# Patient Record
Sex: FEMALE | Race: BLACK OR AFRICAN AMERICAN | NOT HISPANIC OR LATINO | Employment: UNEMPLOYED | URBAN - METROPOLITAN AREA
[De-identification: names, ages, dates, MRNs, and addresses within clinical notes are randomized per-mention and may not be internally consistent; named-entity substitution may affect disease eponyms.]

---

## 2017-05-15 PROCEDURE — 99284 EMERGENCY DEPT VISIT MOD MDM: CPT

## 2017-05-16 ENCOUNTER — APPOINTMENT (OUTPATIENT)
Dept: GENERAL RADIOLOGY | Facility: HOSPITAL | Age: 62
End: 2017-05-16

## 2017-05-16 ENCOUNTER — HOSPITAL ENCOUNTER (INPATIENT)
Facility: HOSPITAL | Age: 62
LOS: 3 days | Discharge: NURSING, REHAB OR LONG-TERM CARE | End: 2017-05-19
Attending: EMERGENCY MEDICINE | Admitting: INTERNAL MEDICINE

## 2017-05-16 DIAGNOSIS — N39.0 UTI (URINARY TRACT INFECTION), BACTERIAL: ICD-10-CM

## 2017-05-16 DIAGNOSIS — R53.1 GENERALIZED WEAKNESS: Primary | ICD-10-CM

## 2017-05-16 DIAGNOSIS — R77.8 ELEVATED TROPONIN: ICD-10-CM

## 2017-05-16 DIAGNOSIS — A49.9 UTI (URINARY TRACT INFECTION), BACTERIAL: ICD-10-CM

## 2017-05-16 PROBLEM — I25.10 CORONARY ARTERY DISEASE: Chronic | Status: ACTIVE | Noted: 2017-05-16

## 2017-05-16 PROBLEM — J96.11 CHRONIC RESPIRATORY FAILURE WITH HYPOXIA AND HYPERCAPNIA (HCC): Chronic | Status: ACTIVE | Noted: 2017-05-16

## 2017-05-16 PROBLEM — J96.12 CHRONIC RESPIRATORY FAILURE WITH HYPOXIA AND HYPERCAPNIA (HCC): Chronic | Status: ACTIVE | Noted: 2017-05-16

## 2017-05-16 PROBLEM — G92.8 TOXIC METABOLIC ENCEPHALOPATHY: Status: ACTIVE | Noted: 2017-05-16

## 2017-05-16 PROBLEM — C54.1 ENDOMETRIAL CANCER DETERMINED BY UTERINE BIOPSY (HCC): Chronic | Status: ACTIVE | Noted: 2017-05-16

## 2017-05-16 PROBLEM — I50.9 CHF (CONGESTIVE HEART FAILURE) (HCC): Chronic | Status: ACTIVE | Noted: 2017-05-16

## 2017-05-16 PROBLEM — E11.9 DIABETES MELLITUS (HCC): Chronic | Status: ACTIVE | Noted: 2017-05-16

## 2017-05-16 PROBLEM — F25.9 SCHIZO AFFECTIVE SCHIZOPHRENIA (HCC): Chronic | Status: ACTIVE | Noted: 2017-05-16

## 2017-05-16 LAB
ALBUMIN SERPL-MCNC: 3.5 G/DL (ref 3.5–5.2)
ALBUMIN/GLOB SERPL: 0.8 G/DL
ALP SERPL-CCNC: 79 U/L (ref 39–117)
ALT SERPL W P-5'-P-CCNC: 10 U/L (ref 1–33)
ANION GAP SERPL CALCULATED.3IONS-SCNC: 11.1 MMOL/L
APTT PPP: 28.6 SECONDS (ref 22.7–35.4)
AST SERPL-CCNC: 12 U/L (ref 1–32)
BACTERIA UR QL AUTO: ABNORMAL /HPF
BASOPHILS # BLD AUTO: 0.02 10*3/MM3 (ref 0–0.2)
BASOPHILS NFR BLD AUTO: 0.3 % (ref 0–1.5)
BILIRUB SERPL-MCNC: 0.5 MG/DL (ref 0.1–1.2)
BILIRUB UR QL STRIP: NEGATIVE
BUN BLD-MCNC: 14 MG/DL (ref 8–23)
BUN/CREAT SERPL: 19.4 (ref 7–25)
CALCIUM SPEC-SCNC: 10.1 MG/DL (ref 8.6–10.5)
CHLORIDE SERPL-SCNC: 104 MMOL/L (ref 98–107)
CLARITY UR: ABNORMAL
CO2 SERPL-SCNC: 31.9 MMOL/L (ref 22–29)
COLOR UR: YELLOW
CREAT BLD-MCNC: 0.72 MG/DL (ref 0.57–1)
DEPRECATED RDW RBC AUTO: 51.6 FL (ref 37–54)
EOSINOPHIL # BLD AUTO: 0.16 10*3/MM3 (ref 0–0.7)
EOSINOPHIL NFR BLD AUTO: 2.3 % (ref 0.3–6.2)
ERYTHROCYTE [DISTWIDTH] IN BLOOD BY AUTOMATED COUNT: 17.2 % (ref 11.7–13)
GFR SERPL CREATININE-BSD FRML MDRD: 99 ML/MIN/1.73
GLOBULIN UR ELPH-MCNC: 4.2 GM/DL
GLUCOSE BLD-MCNC: 85 MG/DL (ref 65–99)
GLUCOSE BLDC GLUCOMTR-MCNC: 103 MG/DL (ref 70–130)
GLUCOSE BLDC GLUCOMTR-MCNC: 118 MG/DL (ref 70–130)
GLUCOSE BLDC GLUCOMTR-MCNC: 67 MG/DL (ref 70–130)
GLUCOSE BLDC GLUCOMTR-MCNC: 88 MG/DL (ref 70–130)
GLUCOSE BLDC GLUCOMTR-MCNC: 89 MG/DL (ref 70–130)
GLUCOSE UR STRIP-MCNC: NEGATIVE MG/DL
HCT VFR BLD AUTO: 41.4 % (ref 35.6–45.5)
HGB BLD-MCNC: 13.2 G/DL (ref 11.9–15.5)
HGB UR QL STRIP.AUTO: ABNORMAL
HYALINE CASTS UR QL AUTO: ABNORMAL /LPF
IMM GRANULOCYTES # BLD: 0 10*3/MM3 (ref 0–0.03)
IMM GRANULOCYTES NFR BLD: 0 % (ref 0–0.5)
INR PPP: 1.13 (ref 0.9–1.1)
KETONES UR QL STRIP: NEGATIVE
LEUKOCYTE ESTERASE UR QL STRIP.AUTO: ABNORMAL
LYMPHOCYTES # BLD AUTO: 1.61 10*3/MM3 (ref 0.9–4.8)
LYMPHOCYTES NFR BLD AUTO: 22.9 % (ref 19.6–45.3)
MAGNESIUM SERPL-MCNC: 2.1 MG/DL (ref 1.6–2.4)
MCH RBC QN AUTO: 26.2 PG (ref 26.9–32)
MCHC RBC AUTO-ENTMCNC: 31.9 G/DL (ref 32.4–36.3)
MCV RBC AUTO: 82.1 FL (ref 80.5–98.2)
MONOCYTES # BLD AUTO: 0.64 10*3/MM3 (ref 0.2–1.2)
MONOCYTES NFR BLD AUTO: 9.1 % (ref 5–12)
NEUTROPHILS # BLD AUTO: 4.6 10*3/MM3 (ref 1.9–8.1)
NEUTROPHILS NFR BLD AUTO: 65.4 % (ref 42.7–76)
NITRITE UR QL STRIP: NEGATIVE
NT-PROBNP SERPL-MCNC: 47 PG/ML (ref 5–900)
NT-PROBNP SERPL-MCNC: 57.4 PG/ML (ref 5–900)
PH UR STRIP.AUTO: 5.5 [PH] (ref 5–8)
PLATELET # BLD AUTO: 293 10*3/MM3 (ref 140–500)
PMV BLD AUTO: 10.6 FL (ref 6–12)
POTASSIUM BLD-SCNC: 3.7 MMOL/L (ref 3.5–5.2)
PROT SERPL-MCNC: 7.7 G/DL (ref 6–8.5)
PROT UR QL STRIP: ABNORMAL
PROTHROMBIN TIME: 14 SECONDS (ref 11.7–14.2)
RBC # BLD AUTO: 5.04 10*6/MM3 (ref 3.9–5.2)
RBC # UR: ABNORMAL /HPF
REF LAB TEST METHOD: ABNORMAL
SODIUM BLD-SCNC: 147 MMOL/L (ref 136–145)
SP GR UR STRIP: 1.01 (ref 1–1.03)
SQUAMOUS #/AREA URNS HPF: ABNORMAL /HPF
T4 FREE SERPL-MCNC: 1.24 NG/DL (ref 0.93–1.7)
TROPONIN T SERPL-MCNC: 0.08 NG/ML (ref 0–0.03)
TSH SERPL DL<=0.05 MIU/L-ACNC: 2.65 MIU/ML (ref 0.27–4.2)
UROBILINOGEN UR QL STRIP: ABNORMAL
VALPROATE SERPL-MCNC: 25 MCG/ML (ref 50–125)
WBC NRBC COR # BLD: 7.03 10*3/MM3 (ref 4.5–10.7)
WBC UR QL AUTO: ABNORMAL /HPF

## 2017-05-16 PROCEDURE — 93005 ELECTROCARDIOGRAM TRACING: CPT | Performed by: EMERGENCY MEDICINE

## 2017-05-16 PROCEDURE — 93005 ELECTROCARDIOGRAM TRACING: CPT | Performed by: INTERNAL MEDICINE

## 2017-05-16 PROCEDURE — 84484 ASSAY OF TROPONIN QUANT: CPT | Performed by: EMERGENCY MEDICINE

## 2017-05-16 PROCEDURE — 83880 ASSAY OF NATRIURETIC PEPTIDE: CPT | Performed by: NURSE PRACTITIONER

## 2017-05-16 PROCEDURE — 87186 SC STD MICRODIL/AGAR DIL: CPT | Performed by: EMERGENCY MEDICINE

## 2017-05-16 PROCEDURE — 99222 1ST HOSP IP/OBS MODERATE 55: CPT | Performed by: INTERNAL MEDICINE

## 2017-05-16 PROCEDURE — 84484 ASSAY OF TROPONIN QUANT: CPT | Performed by: NURSE PRACTITIONER

## 2017-05-16 PROCEDURE — 85025 COMPLETE CBC W/AUTO DIFF WBC: CPT | Performed by: EMERGENCY MEDICINE

## 2017-05-16 PROCEDURE — 85730 THROMBOPLASTIN TIME PARTIAL: CPT | Performed by: EMERGENCY MEDICINE

## 2017-05-16 PROCEDURE — 84443 ASSAY THYROID STIM HORMONE: CPT | Performed by: NURSE PRACTITIONER

## 2017-05-16 PROCEDURE — 83735 ASSAY OF MAGNESIUM: CPT | Performed by: NURSE PRACTITIONER

## 2017-05-16 PROCEDURE — 84439 ASSAY OF FREE THYROXINE: CPT | Performed by: NURSE PRACTITIONER

## 2017-05-16 PROCEDURE — 93010 ELECTROCARDIOGRAM REPORT: CPT | Performed by: INTERNAL MEDICINE

## 2017-05-16 PROCEDURE — 25010000002 ENOXAPARIN PER 10 MG: Performed by: NURSE PRACTITIONER

## 2017-05-16 PROCEDURE — 71020 HC CHEST PA AND LATERAL: CPT

## 2017-05-16 PROCEDURE — 80053 COMPREHEN METABOLIC PANEL: CPT | Performed by: EMERGENCY MEDICINE

## 2017-05-16 PROCEDURE — 82962 GLUCOSE BLOOD TEST: CPT

## 2017-05-16 PROCEDURE — 84484 ASSAY OF TROPONIN QUANT: CPT | Performed by: INTERNAL MEDICINE

## 2017-05-16 PROCEDURE — 80164 ASSAY DIPROPYLACETIC ACD TOT: CPT | Performed by: EMERGENCY MEDICINE

## 2017-05-16 PROCEDURE — 25010000003 CEFTRIAXONE PER 250 MG: Performed by: NURSE PRACTITIONER

## 2017-05-16 PROCEDURE — 81001 URINALYSIS AUTO W/SCOPE: CPT | Performed by: EMERGENCY MEDICINE

## 2017-05-16 PROCEDURE — 85610 PROTHROMBIN TIME: CPT | Performed by: EMERGENCY MEDICINE

## 2017-05-16 PROCEDURE — 87086 URINE CULTURE/COLONY COUNT: CPT | Performed by: EMERGENCY MEDICINE

## 2017-05-16 RX ORDER — BUMETANIDE 2 MG/1
2 TABLET ORAL DAILY
COMMUNITY
End: 2018-01-10 | Stop reason: HOSPADM

## 2017-05-16 RX ORDER — ATORVASTATIN CALCIUM 20 MG/1
20 TABLET, FILM COATED ORAL DAILY
COMMUNITY
End: 2018-02-02 | Stop reason: HOSPADM

## 2017-05-16 RX ORDER — ATORVASTATIN CALCIUM 20 MG/1
20 TABLET, FILM COATED ORAL DAILY
Status: DISCONTINUED | OUTPATIENT
Start: 2017-05-16 | End: 2017-05-16

## 2017-05-16 RX ORDER — CEFTRIAXONE SODIUM 1 G/50ML
1 INJECTION, SOLUTION INTRAVENOUS EVERY 24 HOURS
Status: DISCONTINUED | OUTPATIENT
Start: 2017-05-16 | End: 2017-05-19 | Stop reason: HOSPADM

## 2017-05-16 RX ORDER — BUMETANIDE 2 MG/1
2 TABLET ORAL DAILY
Status: DISCONTINUED | OUTPATIENT
Start: 2017-05-16 | End: 2017-05-16

## 2017-05-16 RX ORDER — ROSUVASTATIN CALCIUM 10 MG/1
10 TABLET, COATED ORAL DAILY
Status: DISCONTINUED | OUTPATIENT
Start: 2017-05-16 | End: 2017-05-19 | Stop reason: HOSPADM

## 2017-05-16 RX ORDER — MEGESTROL ACETATE 40 MG/ML
80 SUSPENSION ORAL 2 TIMES DAILY
COMMUNITY
End: 2017-05-19 | Stop reason: HOSPADM

## 2017-05-16 RX ORDER — CLOPIDOGREL BISULFATE 75 MG/1
75 TABLET ORAL DAILY
COMMUNITY
End: 2017-06-23 | Stop reason: HOSPADM

## 2017-05-16 RX ORDER — RISPERIDONE 2 MG/1
2 TABLET ORAL NIGHTLY
Status: DISCONTINUED | OUTPATIENT
Start: 2017-05-16 | End: 2017-05-19 | Stop reason: HOSPADM

## 2017-05-16 RX ORDER — METOPROLOL TARTRATE 100 MG/1
100 TABLET ORAL 2 TIMES DAILY
Status: DISCONTINUED | OUTPATIENT
Start: 2017-05-16 | End: 2017-05-19 | Stop reason: HOSPADM

## 2017-05-16 RX ORDER — PETROLATUM 42 G/100G
OINTMENT TOPICAL
Status: DISCONTINUED | OUTPATIENT
Start: 2017-05-16 | End: 2017-05-19 | Stop reason: HOSPADM

## 2017-05-16 RX ORDER — PANTOPRAZOLE SODIUM 40 MG/1
40 TABLET, DELAYED RELEASE ORAL
Status: DISCONTINUED | OUTPATIENT
Start: 2017-05-16 | End: 2017-05-19 | Stop reason: HOSPADM

## 2017-05-16 RX ORDER — ACETAMINOPHEN 160 MG/5ML
650 SUSPENSION ORAL EVERY 4 HOURS PRN
COMMUNITY
End: 2017-11-23 | Stop reason: HOSPADM

## 2017-05-16 RX ORDER — HYDROCODONE BITARTRATE AND ACETAMINOPHEN 5; 325 MG/1; MG/1
1 TABLET ORAL EVERY 6 HOURS PRN
COMMUNITY
End: 2017-05-19 | Stop reason: HOSPADM

## 2017-05-16 RX ORDER — VALPROIC ACID 250 MG/5ML
250 SOLUTION ORAL EVERY 12 HOURS SCHEDULED
Status: DISCONTINUED | OUTPATIENT
Start: 2017-05-16 | End: 2017-05-19 | Stop reason: HOSPADM

## 2017-05-16 RX ORDER — IPRATROPIUM BROMIDE AND ALBUTEROL SULFATE 2.5; .5 MG/3ML; MG/3ML
3 SOLUTION RESPIRATORY (INHALATION) EVERY 4 HOURS PRN
COMMUNITY

## 2017-05-16 RX ORDER — LORAZEPAM 1 MG/1
1 TABLET ORAL EVERY 8 HOURS PRN
Status: DISCONTINUED | OUTPATIENT
Start: 2017-05-16 | End: 2017-05-16

## 2017-05-16 RX ORDER — CLOPIDOGREL BISULFATE 75 MG/1
75 TABLET ORAL DAILY
Status: DISCONTINUED | OUTPATIENT
Start: 2017-05-16 | End: 2017-05-19 | Stop reason: HOSPADM

## 2017-05-16 RX ORDER — LORAZEPAM 1 MG/1
1 TABLET ORAL EVERY 8 HOURS PRN
Status: ON HOLD | COMMUNITY
End: 2017-06-23

## 2017-05-16 RX ORDER — RISPERIDONE 2 MG/1
2 TABLET ORAL EVERY MORNING
Status: DISCONTINUED | OUTPATIENT
Start: 2017-05-16 | End: 2017-05-19 | Stop reason: HOSPADM

## 2017-05-16 RX ORDER — LORAZEPAM 1 MG/1
1 TABLET ORAL EVERY 8 HOURS PRN
Status: DISCONTINUED | OUTPATIENT
Start: 2017-05-16 | End: 2017-05-19 | Stop reason: HOSPADM

## 2017-05-16 RX ORDER — MEGESTROL ACETATE 40 MG/ML
80 SUSPENSION ORAL 2 TIMES DAILY
Status: DISCONTINUED | OUTPATIENT
Start: 2017-05-16 | End: 2017-05-16

## 2017-05-16 RX ORDER — HYDROCODONE BITARTRATE AND ACETAMINOPHEN 5; 325 MG/1; MG/1
1 TABLET ORAL EVERY 6 HOURS PRN
Status: DISCONTINUED | OUTPATIENT
Start: 2017-05-16 | End: 2017-05-19 | Stop reason: HOSPADM

## 2017-05-16 RX ORDER — SODIUM CHLORIDE 0.9 % (FLUSH) 0.9 %
10 SYRINGE (ML) INJECTION AS NEEDED
Status: DISCONTINUED | OUTPATIENT
Start: 2017-05-16 | End: 2017-05-19 | Stop reason: HOSPADM

## 2017-05-16 RX ORDER — IPRATROPIUM BROMIDE AND ALBUTEROL SULFATE 2.5; .5 MG/3ML; MG/3ML
3 SOLUTION RESPIRATORY (INHALATION)
COMMUNITY
End: 2017-06-23 | Stop reason: HOSPADM

## 2017-05-16 RX ORDER — RISPERIDONE 3 MG/1
3 TABLET ORAL NIGHTLY
Status: DISCONTINUED | OUTPATIENT
Start: 2017-05-16 | End: 2017-05-16

## 2017-05-16 RX ORDER — BUMETANIDE 2 MG/1
2 TABLET ORAL DAILY
Status: DISCONTINUED | OUTPATIENT
Start: 2017-05-16 | End: 2017-05-19 | Stop reason: HOSPADM

## 2017-05-16 RX ORDER — RISPERIDONE 3 MG/1
3 TABLET ORAL NIGHTLY
COMMUNITY

## 2017-05-16 RX ADMIN — VALPROIC ACID 250 MG: 250 SOLUTION ORAL at 11:09

## 2017-05-16 RX ADMIN — ISOSORBIDE MONONITRATE 30 MG: 20 TABLET ORAL at 11:10

## 2017-05-16 RX ADMIN — RISPERIDONE 2 MG: 2 TABLET, FILM COATED ORAL at 11:10

## 2017-05-16 RX ADMIN — VALPROIC ACID 250 MG: 250 SOLUTION ORAL at 20:36

## 2017-05-16 RX ADMIN — PANTOPRAZOLE SODIUM 40 MG: 40 TABLET, DELAYED RELEASE ORAL at 11:10

## 2017-05-16 RX ADMIN — ROSUVASTATIN CALCIUM 10 MG: 10 TABLET, FILM COATED ORAL at 11:10

## 2017-05-16 RX ADMIN — CEFTRIAXONE SODIUM 1 G: 1 INJECTION, SOLUTION INTRAVENOUS at 11:11

## 2017-05-16 RX ADMIN — BUMETANIDE 2 MG: 2 TABLET ORAL at 11:09

## 2017-05-16 RX ADMIN — ENOXAPARIN SODIUM 40 MG: 40 INJECTION SUBCUTANEOUS at 11:16

## 2017-05-16 RX ADMIN — METOPROLOL TARTRATE 100 MG: 100 TABLET ORAL at 11:10

## 2017-05-16 RX ADMIN — MEGESTROL ACETATE 80 MG: 40 SUSPENSION ORAL at 11:09

## 2017-05-16 RX ADMIN — CLOPIDOGREL 75 MG: 75 TABLET, FILM COATED ORAL at 11:10

## 2017-05-16 RX ADMIN — PETROLATUM 1 APPLICATION: 42 OINTMENT TOPICAL at 12:00

## 2017-05-16 RX ADMIN — ENOXAPARIN SODIUM 40 MG: 40 INJECTION SUBCUTANEOUS at 23:21

## 2017-05-16 RX ADMIN — METOPROLOL TARTRATE 100 MG: 100 TABLET ORAL at 18:00

## 2017-05-17 ENCOUNTER — APPOINTMENT (OUTPATIENT)
Dept: CARDIOLOGY | Facility: HOSPITAL | Age: 62
End: 2017-05-17
Attending: INTERNAL MEDICINE

## 2017-05-17 PROBLEM — J18.9 PNEUMONIA: Status: ACTIVE | Noted: 2017-05-17

## 2017-05-17 LAB
ANION GAP SERPL CALCULATED.3IONS-SCNC: 10.1 MMOL/L
BASOPHILS # BLD AUTO: 0.02 10*3/MM3 (ref 0–0.2)
BASOPHILS NFR BLD AUTO: 0.3 % (ref 0–1.5)
BUN BLD-MCNC: 22 MG/DL (ref 8–23)
BUN/CREAT SERPL: 27.8 (ref 7–25)
CALCIUM SPEC-SCNC: 9.1 MG/DL (ref 8.6–10.5)
CHLORIDE SERPL-SCNC: 99 MMOL/L (ref 98–107)
CHOLEST SERPL-MCNC: 92 MG/DL (ref 0–200)
CO2 SERPL-SCNC: 28.9 MMOL/L (ref 22–29)
CREAT BLD-MCNC: 0.79 MG/DL (ref 0.57–1)
DEPRECATED RDW RBC AUTO: 51.5 FL (ref 37–54)
EOSINOPHIL # BLD AUTO: 0.22 10*3/MM3 (ref 0–0.7)
EOSINOPHIL NFR BLD AUTO: 3.5 % (ref 0.3–6.2)
ERYTHROCYTE [DISTWIDTH] IN BLOOD BY AUTOMATED COUNT: 17 % (ref 11.7–13)
GFR SERPL CREATININE-BSD FRML MDRD: 89 ML/MIN/1.73
GLUCOSE BLD-MCNC: 97 MG/DL (ref 65–99)
GLUCOSE BLDC GLUCOMTR-MCNC: 106 MG/DL (ref 70–130)
GLUCOSE BLDC GLUCOMTR-MCNC: 137 MG/DL (ref 70–130)
GLUCOSE BLDC GLUCOMTR-MCNC: 86 MG/DL (ref 70–130)
GLUCOSE BLDC GLUCOMTR-MCNC: 94 MG/DL (ref 70–130)
HCT VFR BLD AUTO: 35.7 % (ref 35.6–45.5)
HDLC SERPL-MCNC: 33 MG/DL (ref 40–60)
HGB BLD-MCNC: 11.1 G/DL (ref 11.9–15.5)
IMM GRANULOCYTES # BLD: 0 10*3/MM3 (ref 0–0.03)
IMM GRANULOCYTES NFR BLD: 0 % (ref 0–0.5)
LDLC SERPL CALC-MCNC: 43 MG/DL (ref 0–100)
LDLC/HDLC SERPL: 1.29 {RATIO}
LYMPHOCYTES # BLD AUTO: 1.72 10*3/MM3 (ref 0.9–4.8)
LYMPHOCYTES NFR BLD AUTO: 27.5 % (ref 19.6–45.3)
MCH RBC QN AUTO: 25.4 PG (ref 26.9–32)
MCHC RBC AUTO-ENTMCNC: 31.1 G/DL (ref 32.4–36.3)
MCV RBC AUTO: 81.7 FL (ref 80.5–98.2)
MONOCYTES # BLD AUTO: 0.65 10*3/MM3 (ref 0.2–1.2)
MONOCYTES NFR BLD AUTO: 10.4 % (ref 5–12)
NEUTROPHILS # BLD AUTO: 3.64 10*3/MM3 (ref 1.9–8.1)
NEUTROPHILS NFR BLD AUTO: 58.3 % (ref 42.7–76)
PLATELET # BLD AUTO: 293 10*3/MM3 (ref 140–500)
PMV BLD AUTO: 10.9 FL (ref 6–12)
POTASSIUM BLD-SCNC: 3.6 MMOL/L (ref 3.5–5.2)
RBC # BLD AUTO: 4.37 10*6/MM3 (ref 3.9–5.2)
SODIUM BLD-SCNC: 138 MMOL/L (ref 136–145)
TRIGL SERPL-MCNC: 82 MG/DL (ref 0–150)
TROPONIN T SERPL-MCNC: 0.07 NG/ML (ref 0–0.03)
VLDLC SERPL-MCNC: 16.4 MG/DL (ref 5–40)
WBC NRBC COR # BLD: 6.25 10*3/MM3 (ref 4.5–10.7)

## 2017-05-17 PROCEDURE — 25010000003 CEFTRIAXONE PER 250 MG: Performed by: NURSE PRACTITIONER

## 2017-05-17 PROCEDURE — 99232 SBSQ HOSP IP/OBS MODERATE 35: CPT | Performed by: INTERNAL MEDICINE

## 2017-05-17 PROCEDURE — 80061 LIPID PANEL: CPT | Performed by: NURSE PRACTITIONER

## 2017-05-17 PROCEDURE — 93306 TTE W/DOPPLER COMPLETE: CPT | Performed by: INTERNAL MEDICINE

## 2017-05-17 PROCEDURE — 93010 ELECTROCARDIOGRAM REPORT: CPT | Performed by: INTERNAL MEDICINE

## 2017-05-17 PROCEDURE — 82962 GLUCOSE BLOOD TEST: CPT

## 2017-05-17 PROCEDURE — 84484 ASSAY OF TROPONIN QUANT: CPT | Performed by: INTERNAL MEDICINE

## 2017-05-17 PROCEDURE — 93005 ELECTROCARDIOGRAM TRACING: CPT | Performed by: NURSE PRACTITIONER

## 2017-05-17 PROCEDURE — 80048 BASIC METABOLIC PNL TOTAL CA: CPT | Performed by: NURSE PRACTITIONER

## 2017-05-17 PROCEDURE — 85025 COMPLETE CBC W/AUTO DIFF WBC: CPT | Performed by: NURSE PRACTITIONER

## 2017-05-17 PROCEDURE — 25010000002 ENOXAPARIN PER 10 MG: Performed by: NURSE PRACTITIONER

## 2017-05-17 PROCEDURE — 93306 TTE W/DOPPLER COMPLETE: CPT

## 2017-05-17 RX ADMIN — ENOXAPARIN SODIUM 40 MG: 40 INJECTION SUBCUTANEOUS at 10:49

## 2017-05-17 RX ADMIN — RISPERIDONE 2 MG: 2 TABLET, FILM COATED ORAL at 20:18

## 2017-05-17 RX ADMIN — CEFTRIAXONE SODIUM 1 G: 1 INJECTION, SOLUTION INTRAVENOUS at 11:49

## 2017-05-17 RX ADMIN — Medication 10 ML: at 11:48

## 2017-05-17 RX ADMIN — ROSUVASTATIN CALCIUM 10 MG: 10 TABLET, FILM COATED ORAL at 10:52

## 2017-05-17 RX ADMIN — ISOSORBIDE MONONITRATE 30 MG: 20 TABLET ORAL at 10:52

## 2017-05-17 RX ADMIN — METOPROLOL TARTRATE 100 MG: 100 TABLET ORAL at 18:37

## 2017-05-17 RX ADMIN — CLOPIDOGREL 75 MG: 75 TABLET, FILM COATED ORAL at 10:51

## 2017-05-17 RX ADMIN — VALPROIC ACID 250 MG: 250 SOLUTION ORAL at 20:18

## 2017-05-17 RX ADMIN — ENOXAPARIN SODIUM 40 MG: 40 INJECTION SUBCUTANEOUS at 23:44

## 2017-05-17 RX ADMIN — VALPROIC ACID 250 MG: 250 SOLUTION ORAL at 10:53

## 2017-05-17 RX ADMIN — BUMETANIDE 2 MG: 2 TABLET ORAL at 10:52

## 2017-05-17 RX ADMIN — METOPROLOL TARTRATE 100 MG: 100 TABLET ORAL at 10:51

## 2017-05-17 RX ADMIN — Medication 10 ML: at 10:50

## 2017-05-17 RX ADMIN — PANTOPRAZOLE SODIUM 40 MG: 40 TABLET, DELAYED RELEASE ORAL at 06:16

## 2017-05-17 RX ADMIN — RISPERIDONE 2 MG: 2 TABLET, FILM COATED ORAL at 10:53

## 2017-05-17 RX ADMIN — PETROLATUM: 42 OINTMENT TOPICAL at 10:55

## 2017-05-18 LAB
ANION GAP SERPL CALCULATED.3IONS-SCNC: 11.1 MMOL/L
BACTERIA SPEC AEROBE CULT: ABNORMAL
BASOPHILS # BLD AUTO: 0.04 10*3/MM3 (ref 0–0.2)
BASOPHILS NFR BLD AUTO: 0.8 % (ref 0–1.5)
BUN BLD-MCNC: 12 MG/DL (ref 8–23)
BUN/CREAT SERPL: 18.8 (ref 7–25)
CALCIUM SPEC-SCNC: 9.3 MG/DL (ref 8.6–10.5)
CHLORIDE SERPL-SCNC: 99 MMOL/L (ref 98–107)
CO2 SERPL-SCNC: 27.9 MMOL/L (ref 22–29)
CREAT BLD-MCNC: 0.64 MG/DL (ref 0.57–1)
DEPRECATED RDW RBC AUTO: 50.7 FL (ref 37–54)
EOSINOPHIL # BLD AUTO: 0.17 10*3/MM3 (ref 0–0.7)
EOSINOPHIL NFR BLD AUTO: 3.3 % (ref 0.3–6.2)
ERYTHROCYTE [DISTWIDTH] IN BLOOD BY AUTOMATED COUNT: 17.1 % (ref 11.7–13)
GFR SERPL CREATININE-BSD FRML MDRD: 114 ML/MIN/1.73
GLUCOSE BLD-MCNC: 90 MG/DL (ref 65–99)
GLUCOSE BLDC GLUCOMTR-MCNC: 102 MG/DL (ref 70–130)
GLUCOSE BLDC GLUCOMTR-MCNC: 112 MG/DL (ref 70–130)
GLUCOSE BLDC GLUCOMTR-MCNC: 145 MG/DL (ref 70–130)
GLUCOSE BLDC GLUCOMTR-MCNC: 86 MG/DL (ref 70–130)
HCT VFR BLD AUTO: 34.3 % (ref 35.6–45.5)
HGB BLD-MCNC: 10.8 G/DL (ref 11.9–15.5)
IMM GRANULOCYTES # BLD: 0 10*3/MM3 (ref 0–0.03)
IMM GRANULOCYTES NFR BLD: 0 % (ref 0–0.5)
LYMPHOCYTES # BLD AUTO: 1.61 10*3/MM3 (ref 0.9–4.8)
LYMPHOCYTES NFR BLD AUTO: 31.4 % (ref 19.6–45.3)
MCH RBC QN AUTO: 25.4 PG (ref 26.9–32)
MCHC RBC AUTO-ENTMCNC: 31.5 G/DL (ref 32.4–36.3)
MCV RBC AUTO: 80.7 FL (ref 80.5–98.2)
MONOCYTES # BLD AUTO: 0.59 10*3/MM3 (ref 0.2–1.2)
MONOCYTES NFR BLD AUTO: 11.5 % (ref 5–12)
NEUTROPHILS # BLD AUTO: 2.72 10*3/MM3 (ref 1.9–8.1)
NEUTROPHILS NFR BLD AUTO: 53 % (ref 42.7–76)
PLATELET # BLD AUTO: 278 10*3/MM3 (ref 140–500)
PMV BLD AUTO: 10.4 FL (ref 6–12)
POTASSIUM BLD-SCNC: 3.3 MMOL/L (ref 3.5–5.2)
POTASSIUM BLD-SCNC: 3.9 MMOL/L (ref 3.5–5.2)
RBC # BLD AUTO: 4.25 10*6/MM3 (ref 3.9–5.2)
SODIUM BLD-SCNC: 138 MMOL/L (ref 136–145)
WBC NRBC COR # BLD: 5.13 10*3/MM3 (ref 4.5–10.7)

## 2017-05-18 PROCEDURE — 99232 SBSQ HOSP IP/OBS MODERATE 35: CPT | Performed by: INTERNAL MEDICINE

## 2017-05-18 PROCEDURE — 85025 COMPLETE CBC W/AUTO DIFF WBC: CPT | Performed by: NURSE PRACTITIONER

## 2017-05-18 PROCEDURE — 80048 BASIC METABOLIC PNL TOTAL CA: CPT | Performed by: NURSE PRACTITIONER

## 2017-05-18 PROCEDURE — 93010 ELECTROCARDIOGRAM REPORT: CPT | Performed by: INTERNAL MEDICINE

## 2017-05-18 PROCEDURE — 84132 ASSAY OF SERUM POTASSIUM: CPT | Performed by: INTERNAL MEDICINE

## 2017-05-18 PROCEDURE — 25010000002 ENOXAPARIN PER 10 MG: Performed by: NURSE PRACTITIONER

## 2017-05-18 PROCEDURE — 25010000003 CEFTRIAXONE PER 250 MG: Performed by: NURSE PRACTITIONER

## 2017-05-18 PROCEDURE — 82962 GLUCOSE BLOOD TEST: CPT

## 2017-05-18 PROCEDURE — 93005 ELECTROCARDIOGRAM TRACING: CPT | Performed by: NURSE PRACTITIONER

## 2017-05-18 RX ORDER — POTASSIUM CHLORIDE 1.5 G/1.77G
40 POWDER, FOR SOLUTION ORAL AS NEEDED
Status: DISCONTINUED | OUTPATIENT
Start: 2017-05-18 | End: 2017-05-19 | Stop reason: HOSPADM

## 2017-05-18 RX ORDER — POTASSIUM CHLORIDE 750 MG/1
40 CAPSULE, EXTENDED RELEASE ORAL AS NEEDED
Status: DISCONTINUED | OUTPATIENT
Start: 2017-05-18 | End: 2017-05-19 | Stop reason: HOSPADM

## 2017-05-18 RX ORDER — POTASSIUM CHLORIDE 1.5 G/1.77G
40 POWDER, FOR SOLUTION ORAL ONCE
Status: COMPLETED | OUTPATIENT
Start: 2017-05-18 | End: 2017-05-18

## 2017-05-18 RX ORDER — POTASSIUM CHLORIDE 7.45 MG/ML
10 INJECTION INTRAVENOUS
Status: DISCONTINUED | OUTPATIENT
Start: 2017-05-18 | End: 2017-05-19 | Stop reason: HOSPADM

## 2017-05-18 RX ADMIN — POTASSIUM CHLORIDE 40 MEQ: 1.5 POWDER, FOR SOLUTION ORAL at 12:28

## 2017-05-18 RX ADMIN — POTASSIUM CHLORIDE 40 MEQ: 750 CAPSULE, EXTENDED RELEASE ORAL at 09:09

## 2017-05-18 RX ADMIN — ISOSORBIDE MONONITRATE 30 MG: 20 TABLET ORAL at 09:09

## 2017-05-18 RX ADMIN — PANTOPRAZOLE SODIUM 40 MG: 40 TABLET, DELAYED RELEASE ORAL at 06:16

## 2017-05-18 RX ADMIN — METOPROLOL TARTRATE 100 MG: 100 TABLET ORAL at 18:07

## 2017-05-18 RX ADMIN — ENOXAPARIN SODIUM 40 MG: 40 INJECTION SUBCUTANEOUS at 21:47

## 2017-05-18 RX ADMIN — CEFTRIAXONE SODIUM 1 G: 1 INJECTION, SOLUTION INTRAVENOUS at 09:08

## 2017-05-18 RX ADMIN — ENOXAPARIN SODIUM 40 MG: 40 INJECTION SUBCUTANEOUS at 12:29

## 2017-05-18 RX ADMIN — ROSUVASTATIN CALCIUM 10 MG: 10 TABLET, FILM COATED ORAL at 09:09

## 2017-05-18 RX ADMIN — RISPERIDONE 2 MG: 2 TABLET, FILM COATED ORAL at 09:10

## 2017-05-18 RX ADMIN — CLOPIDOGREL 75 MG: 75 TABLET, FILM COATED ORAL at 09:09

## 2017-05-18 RX ADMIN — VALPROIC ACID 250 MG: 250 SOLUTION ORAL at 09:09

## 2017-05-18 RX ADMIN — PETROLATUM: 42 OINTMENT TOPICAL at 09:10

## 2017-05-18 RX ADMIN — METOPROLOL TARTRATE 100 MG: 100 TABLET ORAL at 09:11

## 2017-05-18 RX ADMIN — BUMETANIDE 2 MG: 2 TABLET ORAL at 09:09

## 2017-05-18 RX ADMIN — VALPROIC ACID 250 MG: 250 SOLUTION ORAL at 21:47

## 2017-05-18 RX ADMIN — RISPERIDONE 2 MG: 2 TABLET, FILM COATED ORAL at 21:47

## 2017-05-19 VITALS
TEMPERATURE: 98.6 F | DIASTOLIC BLOOD PRESSURE: 63 MMHG | WEIGHT: 265 LBS | HEIGHT: 63 IN | SYSTOLIC BLOOD PRESSURE: 110 MMHG | HEART RATE: 64 BPM | RESPIRATION RATE: 16 BRPM | OXYGEN SATURATION: 99 % | BODY MASS INDEX: 46.95 KG/M2

## 2017-05-19 LAB
ANION GAP SERPL CALCULATED.3IONS-SCNC: 13.6 MMOL/L
BASOPHILS # BLD AUTO: 0.02 10*3/MM3 (ref 0–0.2)
BASOPHILS NFR BLD AUTO: 0.4 % (ref 0–1.5)
BH CV ECHO MEAS - ACS: 2.1 CM
BH CV ECHO MEAS - AO MEAN PG (FULL): 1 MMHG
BH CV ECHO MEAS - AO MEAN PG: 3 MMHG
BH CV ECHO MEAS - AO ROOT AREA (BSA CORRECTED): 1.4
BH CV ECHO MEAS - AO ROOT AREA: 7.1 CM^2
BH CV ECHO MEAS - AO ROOT DIAM: 3 CM
BH CV ECHO MEAS - AO V2 MEAN: 87.1 CM/SEC
BH CV ECHO MEAS - AO V2 VTI: 19.3 CM
BH CV ECHO MEAS - AVA(I,A): 2.8 CM^2
BH CV ECHO MEAS - AVA(I,D): 2.8 CM^2
BH CV ECHO MEAS - BSA(HAYCOCK): 2.4 M^2
BH CV ECHO MEAS - BSA: 2.2 M^2
BH CV ECHO MEAS - BZI_BMI: 47.8 KILOGRAMS/M^2
BH CV ECHO MEAS - BZI_METRIC_HEIGHT: 160 CM
BH CV ECHO MEAS - BZI_METRIC_WEIGHT: 122.5 KG
BH CV ECHO MEAS - CONTRAST EF (2CH): 60.7 ML/M^2
BH CV ECHO MEAS - CONTRAST EF 4CH: 56.5 ML/M^2
BH CV ECHO MEAS - EDV(CUBED): 91.1 ML
BH CV ECHO MEAS - EDV(MOD-SP2): 84 ML
BH CV ECHO MEAS - EDV(MOD-SP4): 92 ML
BH CV ECHO MEAS - EDV(TEICH): 92.4 ML
BH CV ECHO MEAS - EF(CUBED): 89.8 %
BH CV ECHO MEAS - EF(MOD-SP2): 60.7 %
BH CV ECHO MEAS - EF(MOD-SP4): 56.5 %
BH CV ECHO MEAS - EF(TEICH): 84.4 %
BH CV ECHO MEAS - ESV(CUBED): 9.3 ML
BH CV ECHO MEAS - ESV(MOD-SP2): 33 ML
BH CV ECHO MEAS - ESV(MOD-SP4): 40 ML
BH CV ECHO MEAS - ESV(TEICH): 14.4 ML
BH CV ECHO MEAS - FS: 53.3 %
BH CV ECHO MEAS - IVS/LVPW: 1.3
BH CV ECHO MEAS - IVSD: 1 CM
BH CV ECHO MEAS - LAT PEAK E' VEL: 7 CM/SEC
BH CV ECHO MEAS - LV DIASTOLIC VOL/BSA (35-75): 41.9 ML/M^2
BH CV ECHO MEAS - LV MASS(C)D: 132.8 GRAMS
BH CV ECHO MEAS - LV MASS(C)DI: 60.5 GRAMS/M^2
BH CV ECHO MEAS - LV MEAN PG: 2 MMHG
BH CV ECHO MEAS - LV SYSTOLIC VOL/BSA (12-30): 18.2 ML/M^2
BH CV ECHO MEAS - LV V1 MEAN: 61.7 CM/SEC
BH CV ECHO MEAS - LV V1 VTI: 15.4 CM
BH CV ECHO MEAS - LVIDD: 4.5 CM
BH CV ECHO MEAS - LVIDS: 2.1 CM
BH CV ECHO MEAS - LVLD AP2: 7.6 CM
BH CV ECHO MEAS - LVLD AP4: 8.2 CM
BH CV ECHO MEAS - LVLS AP2: 6 CM
BH CV ECHO MEAS - LVLS AP4: 6.8 CM
BH CV ECHO MEAS - LVOT AREA (M): 3.5 CM^2
BH CV ECHO MEAS - LVOT AREA: 3.5 CM^2
BH CV ECHO MEAS - LVOT DIAM: 2.1 CM
BH CV ECHO MEAS - LVPWD: 0.8 CM
BH CV ECHO MEAS - MED PEAK E' VEL: 7 CM/SEC
BH CV ECHO MEAS - MV A DUR: 0.1 SEC
BH CV ECHO MEAS - MV A MAX VEL: 91.8 CM/SEC
BH CV ECHO MEAS - MV DEC SLOPE: 282 CM/SEC^2
BH CV ECHO MEAS - MV DEC TIME: 0.26 SEC
BH CV ECHO MEAS - MV E MAX VEL: 51.8 CM/SEC
BH CV ECHO MEAS - MV E/A: 0.56
BH CV ECHO MEAS - MV MEAN PG: 2 MMHG
BH CV ECHO MEAS - MV P1/2T MAX VEL: 66.5 CM/SEC
BH CV ECHO MEAS - MV P1/2T: 69.1 MSEC
BH CV ECHO MEAS - MV V2 MEAN: 66 CM/SEC
BH CV ECHO MEAS - MV V2 VTI: 20.8 CM
BH CV ECHO MEAS - MVA P1/2T LCG: 3.3 CM^2
BH CV ECHO MEAS - MVA(P1/2T): 3.2 CM^2
BH CV ECHO MEAS - MVA(VTI): 2.6 CM^2
BH CV ECHO MEAS - PA ACC SLOPE: 96.4 CM/SEC^2
BH CV ECHO MEAS - PA ACC TIME: 0.03 SEC
BH CV ECHO MEAS - PA MAX PG (FULL): 2.3 MMHG
BH CV ECHO MEAS - PA MAX PG: 5.3 MMHG
BH CV ECHO MEAS - PA PR(ACCEL): 66.4 MMHG
BH CV ECHO MEAS - PA V2 MAX: 115 CM/SEC
BH CV ECHO MEAS - PVA(V,A): 1.2 CM^2
BH CV ECHO MEAS - PVA(V,D): 1.2 CM^2
BH CV ECHO MEAS - QP/QS: 0.52
BH CV ECHO MEAS - RV MAX PG: 3 MMHG
BH CV ECHO MEAS - RV MEAN PG: 2 MMHG
BH CV ECHO MEAS - RV V1 MAX: 86.5 CM/SEC
BH CV ECHO MEAS - RV V1 MEAN: 67.5 CM/SEC
BH CV ECHO MEAS - RV V1 VTI: 18 CM
BH CV ECHO MEAS - RVOT AREA: 1.5 CM^2
BH CV ECHO MEAS - RVOT DIAM: 1.4 CM
BH CV ECHO MEAS - SI(AO): 62.1 ML/M^2
BH CV ECHO MEAS - SI(CUBED): 37.3 ML/M^2
BH CV ECHO MEAS - SI(LVOT): 24.3 ML/M^2
BH CV ECHO MEAS - SI(MOD-SP2): 23.2 ML/M^2
BH CV ECHO MEAS - SI(MOD-SP4): 23.7 ML/M^2
BH CV ECHO MEAS - SI(TEICH): 35.5 ML/M^2
BH CV ECHO MEAS - SUP REN AO DIAM: 2.1 CM
BH CV ECHO MEAS - SV(AO): 136.4 ML
BH CV ECHO MEAS - SV(CUBED): 81.9 ML
BH CV ECHO MEAS - SV(LVOT): 53.3 ML
BH CV ECHO MEAS - SV(MOD-SP2): 51 ML
BH CV ECHO MEAS - SV(MOD-SP4): 52 ML
BH CV ECHO MEAS - SV(RVOT): 27.7 ML
BH CV ECHO MEAS - SV(TEICH): 78 ML
BH CV ECHO MEAS - TAPSE (>1.6): 1.1 CM2
BH CV XLRA - RV BASE: 3 CM
BH CV XLRA - TDI S': 10 CM/SEC
BUN BLD-MCNC: 13 MG/DL (ref 8–23)
BUN/CREAT SERPL: 21 (ref 7–25)
CALCIUM SPEC-SCNC: 9.5 MG/DL (ref 8.6–10.5)
CHLORIDE SERPL-SCNC: 100 MMOL/L (ref 98–107)
CO2 SERPL-SCNC: 27.4 MMOL/L (ref 22–29)
CREAT BLD-MCNC: 0.62 MG/DL (ref 0.57–1)
DEPRECATED RDW RBC AUTO: 48.8 FL (ref 37–54)
E/E' RATIO: 8
EOSINOPHIL # BLD AUTO: 0.16 10*3/MM3 (ref 0–0.7)
EOSINOPHIL NFR BLD AUTO: 3.3 % (ref 0.3–6.2)
ERYTHROCYTE [DISTWIDTH] IN BLOOD BY AUTOMATED COUNT: 17.1 % (ref 11.7–13)
GFR SERPL CREATININE-BSD FRML MDRD: 118 ML/MIN/1.73
GLUCOSE BLD-MCNC: 89 MG/DL (ref 65–99)
GLUCOSE BLDC GLUCOMTR-MCNC: 85 MG/DL (ref 70–130)
GLUCOSE BLDC GLUCOMTR-MCNC: 96 MG/DL (ref 70–130)
HCT VFR BLD AUTO: 34.9 % (ref 35.6–45.5)
HGB BLD-MCNC: 11.3 G/DL (ref 11.9–15.5)
IMM GRANULOCYTES # BLD: 0 10*3/MM3 (ref 0–0.03)
IMM GRANULOCYTES NFR BLD: 0 % (ref 0–0.5)
LEFT ATRIUM VOLUME INDEX: 19 ML/M2
LYMPHOCYTES # BLD AUTO: 1.6 10*3/MM3 (ref 0.9–4.8)
LYMPHOCYTES NFR BLD AUTO: 33.2 % (ref 19.6–45.3)
MCH RBC QN AUTO: 25.3 PG (ref 26.9–32)
MCHC RBC AUTO-ENTMCNC: 32.4 G/DL (ref 32.4–36.3)
MCV RBC AUTO: 78.3 FL (ref 80.5–98.2)
MONOCYTES # BLD AUTO: 0.5 10*3/MM3 (ref 0.2–1.2)
MONOCYTES NFR BLD AUTO: 10.4 % (ref 5–12)
NEUTROPHILS # BLD AUTO: 2.54 10*3/MM3 (ref 1.9–8.1)
NEUTROPHILS NFR BLD AUTO: 52.7 % (ref 42.7–76)
PLATELET # BLD AUTO: 297 10*3/MM3 (ref 140–500)
PMV BLD AUTO: 10.4 FL (ref 6–12)
POTASSIUM BLD-SCNC: 3.7 MMOL/L (ref 3.5–5.2)
RBC # BLD AUTO: 4.46 10*6/MM3 (ref 3.9–5.2)
SODIUM BLD-SCNC: 141 MMOL/L (ref 136–145)
WBC NRBC COR # BLD: 4.82 10*3/MM3 (ref 4.5–10.7)

## 2017-05-19 PROCEDURE — 25010000002 ENOXAPARIN PER 10 MG: Performed by: NURSE PRACTITIONER

## 2017-05-19 PROCEDURE — 93005 ELECTROCARDIOGRAM TRACING: CPT | Performed by: NURSE PRACTITIONER

## 2017-05-19 PROCEDURE — 25010000003 CEFTRIAXONE PER 250 MG: Performed by: NURSE PRACTITIONER

## 2017-05-19 PROCEDURE — 80048 BASIC METABOLIC PNL TOTAL CA: CPT | Performed by: NURSE PRACTITIONER

## 2017-05-19 PROCEDURE — 93010 ELECTROCARDIOGRAM REPORT: CPT | Performed by: INTERNAL MEDICINE

## 2017-05-19 PROCEDURE — 82962 GLUCOSE BLOOD TEST: CPT

## 2017-05-19 PROCEDURE — 85025 COMPLETE CBC W/AUTO DIFF WBC: CPT | Performed by: NURSE PRACTITIONER

## 2017-05-19 RX ORDER — CEFTRIAXONE 1 G/1
1 INJECTION, POWDER, FOR SOLUTION INTRAMUSCULAR; INTRAVENOUS DAILY
Qty: 3 VIAL | Refills: 0 | Status: SHIPPED | OUTPATIENT
Start: 2017-05-19 | End: 2017-05-22

## 2017-05-19 RX ORDER — HYDROCODONE BITARTRATE AND ACETAMINOPHEN 5; 325 MG/1; MG/1
1 TABLET ORAL EVERY 6 HOURS PRN
Qty: 120 TABLET | Refills: 0 | Status: ON HOLD | OUTPATIENT
Start: 2017-05-19 | End: 2017-06-23

## 2017-05-19 RX ADMIN — ROSUVASTATIN CALCIUM 10 MG: 10 TABLET, FILM COATED ORAL at 09:11

## 2017-05-19 RX ADMIN — ENOXAPARIN SODIUM 40 MG: 40 INJECTION SUBCUTANEOUS at 11:13

## 2017-05-19 RX ADMIN — METOPROLOL TARTRATE 100 MG: 100 TABLET ORAL at 09:12

## 2017-05-19 RX ADMIN — VALPROIC ACID 250 MG: 250 SOLUTION ORAL at 09:12

## 2017-05-19 RX ADMIN — CLOPIDOGREL 75 MG: 75 TABLET, FILM COATED ORAL at 09:12

## 2017-05-19 RX ADMIN — RISPERIDONE 2 MG: 2 TABLET, FILM COATED ORAL at 09:12

## 2017-05-19 RX ADMIN — BUMETANIDE 2 MG: 2 TABLET ORAL at 09:12

## 2017-05-19 RX ADMIN — PANTOPRAZOLE SODIUM 40 MG: 40 TABLET, DELAYED RELEASE ORAL at 06:15

## 2017-05-19 RX ADMIN — ISOSORBIDE MONONITRATE 30 MG: 20 TABLET ORAL at 09:11

## 2017-05-19 RX ADMIN — CEFTRIAXONE SODIUM 1 G: 1 INJECTION, SOLUTION INTRAVENOUS at 09:12

## 2017-05-19 RX ADMIN — PETROLATUM: 42 OINTMENT TOPICAL at 09:12

## 2017-06-18 ENCOUNTER — HOSPITAL ENCOUNTER (OUTPATIENT)
Facility: HOSPITAL | Age: 62
Setting detail: OBSERVATION
LOS: 4 days | Discharge: NURSING, REHAB OR LONG-TERM CARE | End: 2017-06-23
Attending: FAMILY MEDICINE | Admitting: INTERNAL MEDICINE

## 2017-06-18 ENCOUNTER — APPOINTMENT (OUTPATIENT)
Dept: CT IMAGING | Facility: HOSPITAL | Age: 62
End: 2017-06-18

## 2017-06-18 DIAGNOSIS — N85.8 UTERINE MASS: ICD-10-CM

## 2017-06-18 DIAGNOSIS — N93.9 ABNORMAL VAGINAL BLEEDING: Primary | ICD-10-CM

## 2017-06-18 LAB
ALBUMIN SERPL-MCNC: 3.1 G/DL (ref 3.5–5.2)
ALBUMIN/GLOB SERPL: 0.8 G/DL
ALP SERPL-CCNC: 76 U/L (ref 39–117)
ALT SERPL W P-5'-P-CCNC: 11 U/L (ref 1–33)
ANION GAP SERPL CALCULATED.3IONS-SCNC: 12 MMOL/L
AST SERPL-CCNC: 8 U/L (ref 1–32)
BACTERIA UR QL AUTO: NORMAL /HPF
BASOPHILS # BLD AUTO: 0.03 10*3/MM3 (ref 0–0.2)
BASOPHILS NFR BLD AUTO: 0.5 % (ref 0–1.5)
BILIRUB SERPL-MCNC: 0.4 MG/DL (ref 0.1–1.2)
BILIRUB UR QL STRIP: NEGATIVE
BUN BLD-MCNC: 15 MG/DL (ref 8–23)
BUN/CREAT SERPL: 23.1 (ref 7–25)
CALCIUM SPEC-SCNC: 9 MG/DL (ref 8.6–10.5)
CHLORIDE SERPL-SCNC: 103 MMOL/L (ref 98–107)
CLARITY UR: CLEAR
CO2 SERPL-SCNC: 29 MMOL/L (ref 22–29)
COLOR UR: YELLOW
CREAT BLD-MCNC: 0.65 MG/DL (ref 0.57–1)
DEPRECATED RDW RBC AUTO: 53.2 FL (ref 37–54)
EOSINOPHIL # BLD AUTO: 0.15 10*3/MM3 (ref 0–0.7)
EOSINOPHIL NFR BLD AUTO: 2.6 % (ref 0.3–6.2)
ERYTHROCYTE [DISTWIDTH] IN BLOOD BY AUTOMATED COUNT: 17.6 % (ref 11.7–13)
GFR SERPL CREATININE-BSD FRML MDRD: 112 ML/MIN/1.73
GLOBULIN UR ELPH-MCNC: 3.8 GM/DL
GLUCOSE BLD-MCNC: 107 MG/DL (ref 65–99)
GLUCOSE UR STRIP-MCNC: NEGATIVE MG/DL
HCT VFR BLD AUTO: 33 % (ref 35.6–45.5)
HGB BLD-MCNC: 10.2 G/DL (ref 11.9–15.5)
HGB UR QL STRIP.AUTO: ABNORMAL
HYALINE CASTS UR QL AUTO: NORMAL /LPF
IMM GRANULOCYTES # BLD: 0.03 10*3/MM3 (ref 0–0.03)
IMM GRANULOCYTES NFR BLD: 0.5 % (ref 0–0.5)
KETONES UR QL STRIP: NEGATIVE
LEUKOCYTE ESTERASE UR QL STRIP.AUTO: ABNORMAL
LYMPHOCYTES # BLD AUTO: 1.56 10*3/MM3 (ref 0.9–4.8)
LYMPHOCYTES NFR BLD AUTO: 27.1 % (ref 19.6–45.3)
MCH RBC QN AUTO: 25.4 PG (ref 26.9–32)
MCHC RBC AUTO-ENTMCNC: 30.9 G/DL (ref 32.4–36.3)
MCV RBC AUTO: 82.3 FL (ref 80.5–98.2)
MONOCYTES # BLD AUTO: 0.56 10*3/MM3 (ref 0.2–1.2)
MONOCYTES NFR BLD AUTO: 9.7 % (ref 5–12)
NEUTROPHILS # BLD AUTO: 3.43 10*3/MM3 (ref 1.9–8.1)
NEUTROPHILS NFR BLD AUTO: 59.6 % (ref 42.7–76)
NITRITE UR QL STRIP: NEGATIVE
NRBC BLD MANUAL-RTO: 0 /100 WBC (ref 0–0)
PH UR STRIP.AUTO: 6 [PH] (ref 5–8)
PLATELET # BLD AUTO: 299 10*3/MM3 (ref 140–500)
PMV BLD AUTO: 10.3 FL (ref 6–12)
POTASSIUM BLD-SCNC: 3.5 MMOL/L (ref 3.5–5.2)
PROT SERPL-MCNC: 6.9 G/DL (ref 6–8.5)
PROT UR QL STRIP: NEGATIVE
RBC # BLD AUTO: 4.01 10*6/MM3 (ref 3.9–5.2)
RBC # UR: NORMAL /HPF
REF LAB TEST METHOD: NORMAL
SODIUM BLD-SCNC: 144 MMOL/L (ref 136–145)
SP GR UR STRIP: 1.01 (ref 1–1.03)
SQUAMOUS #/AREA URNS HPF: NORMAL /HPF
UROBILINOGEN UR QL STRIP: ABNORMAL
WBC NRBC COR # BLD: 5.76 10*3/MM3 (ref 4.5–10.7)
WBC UR QL AUTO: NORMAL /HPF

## 2017-06-18 PROCEDURE — 99284 EMERGENCY DEPT VISIT MOD MDM: CPT

## 2017-06-18 PROCEDURE — 80053 COMPREHEN METABOLIC PANEL: CPT | Performed by: FAMILY MEDICINE

## 2017-06-18 PROCEDURE — 85025 COMPLETE CBC W/AUTO DIFF WBC: CPT | Performed by: FAMILY MEDICINE

## 2017-06-18 PROCEDURE — 81001 URINALYSIS AUTO W/SCOPE: CPT | Performed by: FAMILY MEDICINE

## 2017-06-18 PROCEDURE — 74176 CT ABD & PELVIS W/O CONTRAST: CPT

## 2017-06-18 RX ORDER — ATORVASTATIN CALCIUM 20 MG/1
20 TABLET, FILM COATED ORAL DAILY
Status: DISCONTINUED | OUTPATIENT
Start: 2017-06-19 | End: 2017-06-23 | Stop reason: HOSPADM

## 2017-06-18 RX ORDER — LACTULOSE 10 G/15ML
20 SOLUTION ORAL DAILY
Status: DISCONTINUED | OUTPATIENT
Start: 2017-06-19 | End: 2017-06-23 | Stop reason: HOSPADM

## 2017-06-18 RX ORDER — IPRATROPIUM BROMIDE AND ALBUTEROL SULFATE 2.5; .5 MG/3ML; MG/3ML
3 SOLUTION RESPIRATORY (INHALATION) EVERY 4 HOURS PRN
Status: DISCONTINUED | OUTPATIENT
Start: 2017-06-18 | End: 2017-06-23 | Stop reason: HOSPADM

## 2017-06-18 RX ORDER — POTASSIUM CHLORIDE 1.5 G/1.77G
20 POWDER, FOR SOLUTION ORAL DAILY
Status: DISCONTINUED | OUTPATIENT
Start: 2017-06-19 | End: 2017-06-23 | Stop reason: HOSPADM

## 2017-06-18 RX ORDER — HYDROCODONE BITARTRATE AND ACETAMINOPHEN 5; 325 MG/1; MG/1
1 TABLET ORAL EVERY 6 HOURS PRN
Status: DISCONTINUED | OUTPATIENT
Start: 2017-06-18 | End: 2017-06-23 | Stop reason: HOSPADM

## 2017-06-18 RX ORDER — CLOPIDOGREL BISULFATE 75 MG/1
75 TABLET ORAL DAILY
Status: DISCONTINUED | OUTPATIENT
Start: 2017-06-19 | End: 2017-06-20

## 2017-06-18 RX ORDER — RISPERIDONE 1 MG/1
1 TABLET ORAL DAILY
Status: DISCONTINUED | OUTPATIENT
Start: 2017-06-19 | End: 2017-06-19

## 2017-06-18 RX ORDER — BUMETANIDE 1 MG/1
2 TABLET ORAL DAILY
Status: DISCONTINUED | OUTPATIENT
Start: 2017-06-19 | End: 2017-06-19 | Stop reason: RX

## 2017-06-18 RX ORDER — PANTOPRAZOLE SODIUM 40 MG/1
40 TABLET, DELAYED RELEASE ORAL
Status: DISCONTINUED | OUTPATIENT
Start: 2017-06-19 | End: 2017-06-23 | Stop reason: HOSPADM

## 2017-06-18 RX ORDER — VALPROIC ACID 250 MG/5ML
250 SOLUTION ORAL EVERY 12 HOURS SCHEDULED
Status: DISCONTINUED | OUTPATIENT
Start: 2017-06-19 | End: 2017-06-23 | Stop reason: HOSPADM

## 2017-06-18 RX ORDER — LORAZEPAM 1 MG/1
1 TABLET ORAL EVERY 8 HOURS PRN
Status: DISCONTINUED | OUTPATIENT
Start: 2017-06-18 | End: 2017-06-23 | Stop reason: HOSPADM

## 2017-06-18 NOTE — ED NOTES
Call ahead placed for pt with history of endomet. CA with excessive vaginal bleeding     Fifi Jefferson RN  06/18/17 1760

## 2017-06-18 NOTE — ED PROVIDER NOTES
" EMERGENCY DEPARTMENT ENCOUNTER    CHIEF COMPLAINT  Chief Complaint: vaginal bleeding  History given by: pt  History limited by: nothing  Room Number: 11/11  PMD: Shane Barcenas MD      HPI:  Pt is a 61 y.o. female who presents complaining o vaginal bleeding. Pt last saw a gynecologist and pelvic exam one year ago which showed an endometrial tumor and possibly cancer.  She had biopsies but no surgery, she says due to her morbid obesity.. Pt admits to vague abdominal pain but does not look in pain.  She denies fever, trouble urinating, vomiting, or diarrhea.    Duration:  chronic  Onset: gradual  Timing: constant  Location: pelvic  Radiation: none  Quality: \"bleeding\"  Intensity/Severity: moderate  Progression: unchanged  Associated Symptoms: abdominal pain  Aggravating Factors: unknown  Alleviating Factors: unknown  Previous Episodes: no  Treatment before arrival: unknown    PAST MEDICAL HISTORY  Active Ambulatory Problems     Diagnosis Date Noted   • Elevated troponin 05/13/2016   • Aspiration pneumonia 05/16/2016   • Hematuria 05/16/2016   • Hypokalemia 05/18/2016   • Pelvic mass in female 05/19/2016   • Fecal impaction 05/19/2016   • Endometrial carcinoma 05/21/2016   • Acute on chronic respiratory failure with hypoxia and hypercapnia 05/21/2016   • Acute on chronic diastolic CHF (congestive heart failure) 05/21/2016   • UTI (urinary tract infection) 05/29/2016   • Leucocytosis 05/30/2016   • Chronic diastolic CHF (congestive heart failure) 05/30/2016   • DM (diabetes mellitus) 05/30/2016   • Morbid obesity 05/30/2016   • HTN (hypertension) 05/30/2016   • Schizophrenia 05/30/2016   • Tracheostomy malfunction 06/22/2016   • Pyuria 06/22/2016   • THAI (obstructive sleep apnea) 06/22/2016   • Generalized weakness 05/16/2017   • Schizo affective schizophrenia 05/16/2017   • Diabetes mellitus 05/16/2017   • Coronary artery disease 05/16/2017   • Endometrial cancer determined by uterine biopsy 05/16/2017   • Chronic " respiratory failure with hypoxia and hypercapnia 05/16/2017   • Toxic metabolic encephalopathy 05/16/2017   • Pneumonia 05/17/2017     Resolved Ambulatory Problems     Diagnosis Date Noted   • No Resolved Ambulatory Problems     Past Medical History:   Diagnosis Date   • Acute respiratory distress syndrome    • Arthritis    • Bipolar disorder, unspecified    • Cellulitis    • CHF (congestive heart failure)    • Chronic ischemic heart disease    • Chronic respiratory failure with hypoxia and hypercapnia    • Chronic ulcer of calf    • Coronary artery disease    • Depression    • Diabetes mellitus    • Dysphagia    • Endometrial cancer determined by uterine biopsy    • Endometrial hyperplasia    • History of transfusion    • Hypertension    • Immobility    • Lymphedema    • Muscle weakness    • Obesities, morbid    • Oxygen dependent    • Postmenopausal bleeding    • Schizo affective schizophrenia    • Skin ulcer    • Stroke    • Uterine cancer    • Venous insufficiency        PAST SURGICAL HISTORY  Past Surgical History:   Procedure Laterality Date   • TRACHEOSTOMY AND PEG TUBE INSERTION         FAMILY HISTORY  Family History   Problem Relation Age of Onset   • Family history unknown: Yes       SOCIAL HISTORY  Social History     Social History   • Marital status: Single     Spouse name: N/A   • Number of children: N/A   • Years of education: N/A     Occupational History   • Not on file.     Social History Main Topics   • Smoking status: Former Smoker     Packs/day: 2.00     Years: 15.00     Types: Cigarettes     Quit date: 1988   • Smokeless tobacco: Not on file   • Alcohol use No   • Drug use: No   • Sexual activity: No     Other Topics Concern   • Not on file     Social History Narrative       ALLERGIES  Codeine; Penicillins; and Sulfa antibiotics    REVIEW OF SYSTEMS  Review of Systems   Constitutional: Negative for fever.   HENT: Negative for sore throat.    Eyes: Negative.    Respiratory: Negative for cough and  "shortness of breath.    Cardiovascular: Negative for chest pain.   Gastrointestinal: Positive for abdominal pain. Negative for diarrhea and vomiting.   Genitourinary: Positive for vaginal bleeding. Negative for dysuria.   Musculoskeletal: Negative for neck pain.   Skin: Negative for rash.   Allergic/Immunologic: Negative.    Neurological: Negative for weakness, numbness and headaches.   Hematological: Negative.    Psychiatric/Behavioral: Negative.    All other systems reviewed and are negative.      PHYSICAL EXAM  ED Triage Vitals   Temp Heart Rate Resp BP SpO2   06/18/17 1717 06/18/17 1717 06/18/17 1717 06/18/17 1717 06/18/17 1717   97.6 °F (36.4 °C) 97 19 127/74 99 %      Temp src Heart Rate Source Patient Position BP Location FiO2 (%)   06/18/17 1717 06/18/17 1717 06/18/17 1717 06/18/17 1717 --   Oral Monitor Sitting Right arm        Physical Exam   Constitutional: She is oriented to person, place, and time and well-developed, well-nourished, and in no distress. No distress.   HENT:   Head: Normocephalic and atraumatic.   Eyes: EOM are normal. Pupils are equal, round, and reactive to light.   Neck: Normal range of motion. Neck supple.   Cardiovascular: Normal rate, regular rhythm and normal heart sounds.    Pulmonary/Chest: Effort normal and breath sounds normal. No respiratory distress.   Abdominal: Soft. There is no tenderness. There is no rebound and no guarding.   Genitourinary:   Genitourinary Comments: Pelvic exam performed, chaperone present. Cannot reach cervix, old, black, vaginal blood is present.   Musculoskeletal: Normal range of motion. She exhibits no edema.   Lymphedema and dressings on legs, which pt stated were \"doing fine\"   Neurological: She is alert and oriented to person, place, and time. She has normal sensation and normal strength.   Skin: Skin is warm and dry. No rash noted.   Psychiatric: Mood and affect normal.   Nursing note and vitals reviewed.      LAB RESULTS  Lab Results (last 24 " hours)     Procedure Component Value Units Date/Time    Comprehensive Metabolic Panel [[id number]]  (Abnormal) Collected:  06/18/17 1835    Specimen:  Blood Updated:  06/18/17 1905     Glucose 107 (H) mg/dL      BUN 15 mg/dL      Creatinine 0.65 mg/dL      Sodium 144 mmol/L      Potassium 3.5 mmol/L      Chloride 103 mmol/L      CO2 29.0 mmol/L      Calcium 9.0 mg/dL      Total Protein 6.9 g/dL      Albumin 3.10 (L) g/dL      ALT (SGPT) 11 U/L      AST (SGOT) 8 U/L      Alkaline Phosphatase 76 U/L      Total Bilirubin 0.4 mg/dL      eGFR  African Amer 112 mL/min/1.73      Globulin 3.8 gm/dL      A/G Ratio 0.8 g/dL      BUN/Creatinine Ratio 23.1     Anion Gap 12.0 mmol/L     CBC & Differential [[id number]] Collected:  06/18/17 1836    Specimen:  Blood Updated:  06/18/17 1844    Narrative:       The following orders were created for panel order CBC & Differential.  Procedure                               Abnormality         Status                     ---------                               -----------         ------                     CBC Auto Differential[[id number]]        Abnormal            Final result                 Please view results for these tests on the individual orders.    CBC Auto Differential [[id number]]  (Abnormal) Collected:  06/18/17 1836    Specimen:  Blood Updated:  06/18/17 1844     WBC 5.76 10*3/mm3      RBC 4.01 10*6/mm3      Hemoglobin 10.2 (L) g/dL      Hematocrit 33.0 (L) %      MCV 82.3 fL      MCH 25.4 (L) pg      MCHC 30.9 (L) g/dL      RDW 17.6 (H) %      RDW-SD 53.2 fl      MPV 10.3 fL      Platelets 299 10*3/mm3      Neutrophil % 59.6 %      Lymphocyte % 27.1 %      Monocyte % 9.7 %      Eosinophil % 2.6 %      Basophil % 0.5 %      Immature Grans % 0.5 %      Neutrophils, Absolute 3.43 10*3/mm3      Lymphocytes, Absolute 1.56 10*3/mm3      Monocytes, Absolute 0.56 10*3/mm3      Eosinophils, Absolute 0.15 10*3/mm3      Basophils, Absolute 0.03 10*3/mm3      Immature Grans, Absolute 0.03  10*3/mm3      nRBC 0.0 /100 WBC     Urinalysis With / Culture If Indicated [[id number]]  (Abnormal) Collected:  06/18/17 1923    Specimen:  Urine from Urine, Catheter Updated:  06/18/17 1937     Color, UA Yellow     Appearance, UA Clear     pH, UA 6.0     Specific Gravity, UA 1.014     Glucose, UA Negative     Ketones, UA Negative     Bilirubin, UA Negative     Blood, UA Trace (A)     Protein, UA Negative     Leuk Esterase, UA Trace (A)     Nitrite, UA Negative     Urobilinogen, UA 1.0 E.U./dL    Urinalysis, Microscopic Only [[id number]] Collected:  06/18/17 1923    Specimen:  Urine from Urine, Catheter Updated:  06/18/17 1945     RBC, UA None Seen /HPF      WBC, UA 0-2 /HPF      Bacteria, UA None Seen /HPF      Squamous Epithelial Cells, UA 0-2 /HPF      Hyaline Casts, UA None Seen /LPF      Methodology Manual Light Microscopy          I ordered the above labs and reviewed the results    RADIOLOGY  CT Abdomen Pelvis Without Contrast   Preliminary Result   1.  Multiple hyperdense masses in the uterus, may be there are bleeding   uterine lesions, differential diagnoses includes fibroids, more   aggressive lesions cannot be entirely excluded. Overall uterine size has   not significantly changed.   2.  Stable 1.6 cm cyst of the right hepatic lobe. Findings of fecal   impaction.       Findings called to Dr. Clifford, 9:35 PM.               I ordered the above noted radiological studies. Interpreted by radiologist. Discussed with radiologist.  She has a hemorrhagic mass, not much larger than a year ago but the bleeding is new.      PROCEDURES  Procedures      PROGRESS AND CONSULTS  ED Course     1818 - Ordered labs and CT Abdomen/Pelvis for further evaluation.    Discussed with Dr Barcenas who will admit.  Her CT shows the uterine mass slightly larger from a year ago with blood in the uterus.    MEDICAL DECISION MAKING  Results were reviewed/discussed with the patient and they were also made aware of online access. Pt also  made aware that some labs, such as cultures, will not be resulted during ER visit and follow up with PMD is necessary.     MDM  Number of Diagnoses or Management Options     Amount and/or Complexity of Data Reviewed  Decide to obtain previous medical records or to obtain history from someone other than the patient: yes  Review and summarize past medical records: yes (Pt had a CT scan that showed a uterine mass while admitted to [facility] one year ago.)           DIAGNOSIS  Final diagnoses:   Abnormal vaginal bleeding   Uterine mass       DISPOSITIONAdmission    Latest Documented Vital Signs:  As of 9:41 PM  BP- 115/67 HR- 105 Temp- 97.6 °F (36.4 °C) (Oral) O2 sat- 99%    --  Documentation assistance provided by jennie Herring for Dr. Clifford.  Information recorded by the scribe was done at my direction and has been verified and validated by me.     Zeus Herring  06/18/17 1932       Payam Clifford MD  06/18/17 7325

## 2017-06-19 PROBLEM — I25.10 CORONARY ARTERY DISEASE: Chronic | Status: ACTIVE | Noted: 2017-06-19

## 2017-06-19 PROBLEM — Z74.09 IMMOBILITY: Chronic | Status: ACTIVE | Noted: 2017-06-19

## 2017-06-19 PROBLEM — C54.1 ENDOMETRIAL CANCER DETERMINED BY UTERINE BIOPSY (HCC): Chronic | Status: ACTIVE | Noted: 2017-06-19

## 2017-06-19 PROBLEM — I50.9 CHF (CONGESTIVE HEART FAILURE) (HCC): Chronic | Status: ACTIVE | Noted: 2017-06-19

## 2017-06-19 PROBLEM — F25.9 SCHIZO AFFECTIVE SCHIZOPHRENIA (HCC): Chronic | Status: ACTIVE | Noted: 2017-06-19

## 2017-06-19 PROBLEM — I89.0 LYMPHEDEMA: Chronic | Status: ACTIVE | Noted: 2017-06-19

## 2017-06-19 LAB
ANION GAP SERPL CALCULATED.3IONS-SCNC: 10.4 MMOL/L
BUN BLD-MCNC: 13 MG/DL (ref 8–23)
BUN/CREAT SERPL: 24.5 (ref 7–25)
CALCIUM SPEC-SCNC: 9.3 MG/DL (ref 8.6–10.5)
CHLORIDE SERPL-SCNC: 101 MMOL/L (ref 98–107)
CO2 SERPL-SCNC: 29.6 MMOL/L (ref 22–29)
CREAT BLD-MCNC: 0.53 MG/DL (ref 0.57–1)
DEPRECATED RDW RBC AUTO: 50.6 FL (ref 37–54)
ERYTHROCYTE [DISTWIDTH] IN BLOOD BY AUTOMATED COUNT: 17 % (ref 11.7–13)
GFR SERPL CREATININE-BSD FRML MDRD: 141 ML/MIN/1.73
GLUCOSE BLD-MCNC: 97 MG/DL (ref 65–99)
GLUCOSE BLDC GLUCOMTR-MCNC: 100 MG/DL (ref 70–130)
GLUCOSE BLDC GLUCOMTR-MCNC: 101 MG/DL (ref 70–130)
GLUCOSE BLDC GLUCOMTR-MCNC: 106 MG/DL (ref 70–130)
GLUCOSE BLDC GLUCOMTR-MCNC: 92 MG/DL (ref 70–130)
HCT VFR BLD AUTO: 32 % (ref 35.6–45.5)
HGB BLD-MCNC: 10.1 G/DL (ref 11.9–15.5)
INR PPP: 1.1 (ref 0.9–1.1)
MCH RBC QN AUTO: 25.4 PG (ref 26.9–32)
MCHC RBC AUTO-ENTMCNC: 31.6 G/DL (ref 32.4–36.3)
MCV RBC AUTO: 80.6 FL (ref 80.5–98.2)
PLATELET # BLD AUTO: 279 10*3/MM3 (ref 140–500)
PMV BLD AUTO: 10.5 FL (ref 6–12)
POTASSIUM BLD-SCNC: 3.4 MMOL/L (ref 3.5–5.2)
PROTHROMBIN TIME: 13.7 SECONDS (ref 11.7–14.2)
RBC # BLD AUTO: 3.97 10*6/MM3 (ref 3.9–5.2)
SODIUM BLD-SCNC: 141 MMOL/L (ref 136–145)
WBC NRBC COR # BLD: 6.02 10*3/MM3 (ref 4.5–10.7)

## 2017-06-19 PROCEDURE — 80048 BASIC METABOLIC PNL TOTAL CA: CPT | Performed by: INTERNAL MEDICINE

## 2017-06-19 PROCEDURE — 96361 HYDRATE IV INFUSION ADD-ON: CPT

## 2017-06-19 PROCEDURE — 85027 COMPLETE CBC AUTOMATED: CPT | Performed by: INTERNAL MEDICINE

## 2017-06-19 PROCEDURE — 82962 GLUCOSE BLOOD TEST: CPT

## 2017-06-19 PROCEDURE — 96360 HYDRATION IV INFUSION INIT: CPT

## 2017-06-19 PROCEDURE — 85610 PROTHROMBIN TIME: CPT | Performed by: INTERNAL MEDICINE

## 2017-06-19 RX ORDER — BUMETANIDE 0.5 MG/1
2 TABLET ORAL DAILY
Status: DISCONTINUED | OUTPATIENT
Start: 2017-06-19 | End: 2017-06-23 | Stop reason: HOSPADM

## 2017-06-19 RX ORDER — RISPERIDONE 1 MG/1
2 TABLET, ORALLY DISINTEGRATING ORAL EVERY MORNING
Status: DISCONTINUED | OUTPATIENT
Start: 2017-06-19 | End: 2017-06-23 | Stop reason: HOSPADM

## 2017-06-19 RX ORDER — RISPERIDONE 1 MG/1
3 TABLET, ORALLY DISINTEGRATING ORAL NIGHTLY
Status: DISCONTINUED | OUTPATIENT
Start: 2017-06-19 | End: 2017-06-23 | Stop reason: HOSPADM

## 2017-06-19 RX ORDER — DEXTROSE MONOHYDRATE 25 G/50ML
25 INJECTION, SOLUTION INTRAVENOUS
Status: DISCONTINUED | OUTPATIENT
Start: 2017-06-19 | End: 2017-06-23 | Stop reason: HOSPADM

## 2017-06-19 RX ORDER — MEGESTROL ACETATE 40 MG/ML
80 SUSPENSION ORAL 2 TIMES DAILY
COMMUNITY
End: 2017-06-23 | Stop reason: HOSPADM

## 2017-06-19 RX ORDER — POTASSIUM CHLORIDE 750 MG/1
40 CAPSULE, EXTENDED RELEASE ORAL 2 TIMES DAILY
Status: COMPLETED | OUTPATIENT
Start: 2017-06-19 | End: 2017-06-19

## 2017-06-19 RX ORDER — SODIUM CHLORIDE 9 MG/ML
75 INJECTION, SOLUTION INTRAVENOUS CONTINUOUS
Status: DISCONTINUED | OUTPATIENT
Start: 2017-06-19 | End: 2017-06-23 | Stop reason: HOSPADM

## 2017-06-19 RX ORDER — NICOTINE POLACRILEX 4 MG
15 LOZENGE BUCCAL
Status: DISCONTINUED | OUTPATIENT
Start: 2017-06-19 | End: 2017-06-23 | Stop reason: HOSPADM

## 2017-06-19 RX ADMIN — RISPERIDONE 2 MG: 1 TABLET, ORALLY DISINTEGRATING ORAL at 10:30

## 2017-06-19 RX ADMIN — ATORVASTATIN CALCIUM 20 MG: 20 TABLET, FILM COATED ORAL at 08:52

## 2017-06-19 RX ADMIN — CLOPIDOGREL 75 MG: 75 TABLET, FILM COATED ORAL at 08:52

## 2017-06-19 RX ADMIN — POTASSIUM CHLORIDE 40 MEQ: 750 CAPSULE, EXTENDED RELEASE ORAL at 10:29

## 2017-06-19 RX ADMIN — POTASSIUM CHLORIDE 40 MEQ: 750 CAPSULE, EXTENDED RELEASE ORAL at 17:25

## 2017-06-19 RX ADMIN — PANTOPRAZOLE SODIUM 40 MG: 40 TABLET, DELAYED RELEASE ORAL at 07:00

## 2017-06-19 RX ADMIN — POTASSIUM CHLORIDE 20 MEQ: 1.5 POWDER, FOR SOLUTION ORAL at 08:54

## 2017-06-19 RX ADMIN — VALPROIC ACID 250 MG: 250 SOLUTION ORAL at 20:10

## 2017-06-19 RX ADMIN — VALPROIC ACID 250 MG: 250 SOLUTION ORAL at 03:13

## 2017-06-19 RX ADMIN — RISPERIDONE 3 MG: 1 TABLET, ORALLY DISINTEGRATING ORAL at 20:10

## 2017-06-19 RX ADMIN — ISOSORBIDE MONONITRATE 30 MG: 20 TABLET ORAL at 08:53

## 2017-06-19 RX ADMIN — BUMETANIDE 2 MG: 0.5 TABLET ORAL at 13:28

## 2017-06-19 RX ADMIN — SODIUM CHLORIDE 75 ML/HR: 9 INJECTION, SOLUTION INTRAVENOUS at 10:29

## 2017-06-19 NOTE — PLAN OF CARE
Problem: Patient Care Overview (Adult)  Goal: Plan of Care Review  Outcome: Ongoing (interventions implemented as appropriate)    06/19/17 0543   Coping/Psychosocial Response Interventions   Plan Of Care Reviewed With patient   Patient Care Overview   Progress improving   Outcome Evaluation   Outcome Summary/Follow up Plan pt arrived from ER, confused at times, from NH, gtube in place, NPO diet, GYN consult for vag bleeding, hx of uterine cancer w/o treatment       Goal: Adult Individualization and Mutuality  Outcome: Ongoing (interventions implemented as appropriate)  Goal: Discharge Needs Assessment  Outcome: Ongoing (interventions implemented as appropriate)    Problem: Skin Integrity Impairment, Risk/Actual (Adult)  Goal: Identify Related Risk Factors and Signs and Symptoms  Outcome: Ongoing (interventions implemented as appropriate)  Goal: Skin Integrity/Wound Healing  Outcome: Ongoing (interventions implemented as appropriate)    Problem: Fall Risk (Adult)  Goal: Identify Related Risk Factors and Signs and Symptoms  Outcome: Outcome(s) achieved Date Met:  06/19/17  Goal: Absence of Falls  Outcome: Ongoing (interventions implemented as appropriate)

## 2017-06-19 NOTE — PROGRESS NOTES
Continued Stay Note  [facility]     Patient Name: Lupe Burleson  MRN: [id number]  Today's Date: 6/19/2017    Admit Date: 6/18/2017          Discharge Plan       06/19/17 1447    Case Management/Social Work Plan    Additional Comments I notfied Helene Pepe(State guardian) at [phone] ext. [phone] of pt's admission and that currently pt has an inpt order but the attending plans to change pt to observation if no surgery is planned, I offered to fax copy of IMM Letter to her but she said that it was not necessary, she ask that she be updated with plans, I advised I will and will also update her if pt is changed to observation.  [facility] notified of admit, she said pt is from an  bed with a Medicaid bed hold.       06/19/17 1446    Case Management/Social Work Plan    Plan Return to [facility] level of care    Patient/Family In Agreement With Plan yes              Discharge Codes     None            Maria Ines Sebastian RN

## 2017-06-19 NOTE — PROGRESS NOTES
"HISTORY AND PHYSICAL   [facility], [facility]        Patient Identification:    Name: Lupe Burleson  Age: 61 y.o.  Sex: female  :  1955  MRN: [id number]                     Primary Care Physician: Shane Barcenas MD    Chief Complaint:  Abnormal vaginal bleeding    Chief Complaint   Patient presents with   • Vaginal Bleeding     pt with \"excessive bleeding\" and a history of endometrial CA         History of Present Illness:     Ms. Burleson is a 61 year old female with a prior history of schizophrenia, CAD, CHF, endometrial Cancer who has had a 2 year history of intermittent vaginal bleeding. She is followed by Dr. Lebron and is receiving megace for the bleeding. It has been determined that she is NOT a candidate for surgical intervention due to co- morbidities. During last few days she was experiencing heavy vaginal bleeding, she was tachycardic and her BP dropped, she was sent to ER. In the ER her hgb had decreased by about 1 gram from previous levels. Patient was placed in the hospital for further evaluation and treatment. This morning, she denies CP, SOA, N/V/D.     Past Medical History:  Past Medical History:   Diagnosis Date   • Acute respiratory distress syndrome    • Arthritis    • Bipolar disorder, unspecified    • CAD (coronary artery disease)    • Cellulitis    • Cellulitis    • CHF (congestive heart failure)    • Chronic ischemic heart disease    • Chronic respiratory failure with hypoxia and hypercapnia    • Chronic ulcer of calf    • Constipation    • Coronary artery disease    • Depression    • Depressive disorder    • Diabetes mellitus    • Dysphagia    • Dysphagia    • Endometrial cancer determined by uterine biopsy     s/p radiation; no improvement   • Endometrial hyperplasia    • History of transfusion    • Hypercapnia    • Hypertension    • Immobility    • Lack of coordination    • Lymphedema    • Malignant neoplasm of uterus    • Muscle weakness    • Obesities, morbid    • " Oxygen dependent    • Post-menopausal bleeding    • Postmenopausal bleeding    • Schizo affective schizophrenia    • Skin ulcer    • Sleep apnea    • Stroke    • Symbolic dysfunction    • Uterine cancer    • Venous insufficiency    • Venous insufficiency      Past Surgical History:  Past Surgical History:   Procedure Laterality Date   • TRACHEOSTOMY AND PEG TUBE INSERTION        Home Meds:  Prescriptions Prior to Admission   Medication Sig Dispense Refill Last Dose   • acetaminophen (MAPAP) 160 MG/5ML liquid 15 mg/kg by Per G Tube route Every 4 (Four) Hours As Needed for Fever (give 20.3 ml per G tube).      • atorvastatin (LIPITOR) 20 MG tablet 20 mg by Per G Tube route Daily.      • bumetanide (BUMEX) 2 MG tablet 2 mg by Per G Tube route Daily.      • clopidogrel (PLAVIX) 75 MG tablet Take 75 mg by mouth Daily.      • HYDROcodone-acetaminophen (NORCO) 5-325 MG per tablet Take 1 tablet by mouth Every 6 (Six) Hours As Needed for Moderate Pain (4-6). 120 tablet 0    • insulin aspart (novoLOG FLEXPEN) 100 UNIT/ML solution pen-injector sc pen Inject 7 Units under the skin 4 (Four) Times a Day With Meals & at Bedtime.      • ipratropium (ATROVENT) 0.02 % nebulizer solution Take 500 mcg by nebulization Every 4 (Four) Hours As Needed for Wheezing or Shortness of Air.      • ipratropium-albuterol (DUO-NEB) 0.5-2.5 mg/mL nebulizer Take 3 mL by nebulization Every 4 (Four) Hours As Needed for Wheezing.      • ISOSORBIDE MONONITRATE PO 30 mg by Per G Tube route Daily.      • lactulose (CHRONULAC) 10 GM/15ML solution 20 g by Per PEG Tube route daily.   5/28/2016 at Unknown time   • LORazepam (ATIVAN) 1 MG tablet 1 mg by Per G Tube route Every 8 (Eight) Hours As Needed for Anxiety.      • megestrol (MEGACE) 40 MG/ML suspension Take 80 mg by mouth 2 (Two) Times a Day.      • pantoprazole (PROTONIX) 40 MG pack packet 40 mg by Per PEG Tube route.   5/12/2016 at Unknown time   • Pollen Extracts (PROSTAT PO) Take 30 mL by mouth 2  (Two) Times a Day.      • POTASSIUM CHLORIDE PO 20 mEq by Per G Tube route Daily.      • risperiDONE (RisperDAL) 1 MG tablet Take 2 mg by mouth Every Morning. Take 3 mg at bedtime & 2 mg every morning   5/28/2016 at Unknown time   • valproic acid (DEPAKENE) 250 MG/5ML syrup syrup 250 mg by Per PEG Tube route every 12 (twelve) hours.   5/28/2016 at Unknown time   • ammonium lactate (AMLACTIN) 12 % cream Apply 1 application topically 2 (two) times a day. To bilat legs and feet   5/28/2016 at Unknown time   • ipratropium-albuterol (DUO-NEB) 0.5-2.5 mg/mL nebulizer Take 3 mL by nebulization Every 4 (Four) Hours.      • metoprolol tartrate (LOPRESSOR) 100 MG tablet Take 100 mg by mouth 2 (two) times a day.      • QUEtiapine (SEROquel) 25 MG tablet 25 mg by Per PEG Tube route every 12 (twelve) hours.   5/28/2016 at Unknown time   • risperiDONE (risperDAL) 3 MG tablet Take 3 mg by mouth Every Night.          Allergies:  Allergies   Allergen Reactions   • Codeine    • Penicillins      Tolerated ceftriaxone during 5/2017 admission   • Sulfa Antibiotics      Immunizations:  There is no immunization history for the selected administration types on file for this patient.  Social History:   Social History     Social History Narrative     Social History   Substance Use Topics   • Smoking status: Former Smoker     Packs/day: 2.00     Years: 15.00     Types: Cigarettes     Quit date: 1988   • Smokeless tobacco: Not on file   • Alcohol use No     Family History:  Family History   Problem Relation Age of Onset   • Family history unknown: Yes        Review of Systems  See history of present illness and past medical history.    Patient denies headache, dizziness, syncope, falls, trauma, change in vision, change in hearing, change in taste, changes in weight, changes in appetite, focal weakness, numbness, or paresthesia.    Patient denies chest pain, palpitations, dyspnea, orthopnea, PND, cough, sinus pressure, rhinorrhea, epistaxis,  "hemoptysis, nausea, vomiting,hematemesis, diarrhea, constipation or hematchezia.    Denies cold or heat intolerance, polydipsia, polyuria, polyphagia.   Denies hematuria, pyuria, dysuria, hesitancy, frequency or urgency.   Denies consumption of raw and under cooked meats foods or change in water source.  Denies fever, chills, sweats, night sweats.    Denies missing any routine medications.   Remainder of ROS is negative.    Objective:  tMax 24 hrs: Temp (24hrs), Av.2 °F (36.8 °C), Min:97.6 °F (36.4 °C), Max:98.9 °F (37.2 °C)    Vitals Ranges:   Temp:  [97.6 °F (36.4 °C)-98.9 °F (37.2 °C)] 97.8 °F (36.6 °C)  Heart Rate:  [] 88  Resp:  [16-19] 18  BP: (107-141)/(61-86) 126/79      Exam:    /79 (BP Location: Right arm, Patient Position: Lying)  Pulse 88  Temp 97.8 °F (36.6 °C) (Oral)   Resp 18  Ht 63\" (160 cm)  Wt 258 lb 6.4 oz (117 kg)  SpO2 98%  BMI 45.77 kg/m2    General: Alert, cooperative and appears stated age. In no acute distress  Constitutional: She is oriented to person, place, and time.       Head: Normocephalic, without obvious abnormality, atraumatic  Eyes: EOM are normal. Pupils are equal, round, and reactive to light.   Oropharynx: Mucosa and tongue normal  Neck: Supple, symmetrical, trachea midline, no adenopathy;              Thyroid without enlargement/tenderness/nodules;              no carotid bruit or JVD  Cardiovascular: Normal rate, regular rhythm and intact distal pulses.                              Exam reveals no gallop and no friction rub. No murmur heard  Chest wall: No tenderness or deformity  Pulmonary: Clear to auscultation bilaterally, respirations unlabored.                        No rhonchi, wheezing or rales.   Abdominal: Soft,  non-tender; bowel sounds active all four quadrants;                      no masses,  hepatomegaly, splenomegaly.   Extremities: Bilateral LE lymphedema, chronic  Pulses:         2 + symmetric all extremities  Neurological: She is alert " and oriented to person, place, and time.                           CNII-XII intact, normal strength, sensation intact throughout  Skin: Skin color, texture, turgor normal, no rashes or lesions      Data Review:      Results from last 7 days  Lab Units 06/19/17  0626 06/18/17  1836   WBC 10*3/mm3 6.02 5.76   HEMOGLOBIN g/dL 10.1* 10.2*   HEMATOCRIT % 32.0* 33.0*   PLATELETS 10*3/mm3 279 299         Results from last 7 days  Lab Units 06/19/17  0626 06/18/17  1835   SODIUM mmol/L 141 144   POTASSIUM mmol/L 3.4* 3.5   CHLORIDE mmol/L 101 103   TOTAL CO2 mmol/L 29.6* 29.0   BUN mg/dL 13 15   CREATININE mg/dL 0.53* 0.65   CALCIUM mg/dL 9.3 9.0   BILIRUBIN mg/dL  --  0.4   ALK PHOS U/L  --  76   ALT (SGPT) U/L  --  11   AST (SGOT) U/L  --  8   GLUCOSE mg/dL 97 107*       Results from last 7 days  Lab Units 06/19/17  0626   INR  1.10         0  Lab Value Date/Time   TROPONINT 0.065 (H) 05/17/2017 0505   TROPONINT 0.077 (H) 05/16/2017 1517   TROPONINT 0.080 (H) 05/16/2017 1112   TROPONINT 0.078 (H) 05/16/2017 0703   TROPONINT 0.084 (H) 05/16/2017 0116   TROPONINT <0.010 05/15/2016 0622   TROPONINT 0.017 05/14/2016 0437   TROPONINT 0.023 05/13/2016 2036   TROPONINT 0.031 (H) 05/13/2016 1231   TROPONINT <0.01 02/03/2016 0537   TROPONINT <0.01 02/02/2016 0914   TROPONINT <0.01 11/11/2015 0516   TROPONINT <0.01 11/09/2015 2049   TROPONINT <0.01 04/18/2014 0517        Imaging Results (all)     Procedure Component Value Units Date/Time    CT Abdomen Pelvis Without Contrast [[id number]] Collected:  06/18/17 2135     Updated:  06/19/17 0057    Narrative:       CT ABDOMEN AND PELVIS WITHOUT CONTRAST.     TECHNIQUE: A routine CT scan of the abdomen and pelvis was performed  with coronal and sagittal reconstructed images.     HISTORY: Vaginal bleeding.     COMPARISON: 05/20/2016.     FINDINGS:  Lung bases demonstrate no consolidation or effusion.          Liver demonstrates homogeneous parenchyma, no definite mass. 1.6 cm cyst  of the  right hepatic lobe is unchanged. Multiple gallstones are seen  again, no evidence for acute cholecystitis. No intra or extrahepatic  biliary ductal dilatation.      The spleen is unremarkable. Pancreas is unremarkable, no pancreatic  ductal dilatation. Adrenal glands are within normal limits.     Kidneys do not demonstrate hydronephrosis. No definite renal calculi.  Urinary bladder is unremarkable.         Gastrostomy tube is in position. Small and large bowel loops are within  normal limits. Appendix is normal. Large amount of stool in the rectum,  concerning for fecal impaction.     No significant retroperitoneal lymphadenopathy. Uterus is not  significantly changed in size and demonstrates multiple hyperdense  masses, there appears to be hyperdense material within the lumen of the  uterus, in keeping with known vaginal bleeding.          Impression:       1.  Multiple hyperdense masses in the uterus, may be hemorrhagic uterine  lesions, in keeping with history. differential diagnoses includes  fibroids, more aggressive lesions cannot be entirely excluded. Overall  uterine size has not significantly changed.  2.  Stable 1.6 cm cyst of the right hepatic lobe. Findings of fecal  impaction.     Findings called to Dr. Clifford, 9:35 PM.             Assessment:    Active Problems:    Abnormal vaginal bleeding    Schizo affective schizophrenia    CHF (congestive heart failure)    Endometrial cancer determined by uterine biopsy    Coronary artery disease    Lymphedema    Immobility      Patient Active Problem List   Diagnosis Code   • Elevated troponin R79.89   • Aspiration pneumonia J69.0   • Hematuria R31.9   • Hypokalemia E87.6   • Pelvic mass in female R19.00   • Fecal impaction K56.41   • Endometrial carcinoma C54.1   • Acute on chronic respiratory failure with hypoxia and hypercapnia J96.21, J96.22   • Acute on chronic diastolic CHF (congestive heart failure) I50.33   • UTI (urinary tract infection) N39.0   •  Leucocytosis D72.829   • Chronic diastolic CHF (congestive heart failure) I50.32   • DM (diabetes mellitus) E11.9   • Morbid obesity E66.01   • HTN (hypertension) I10   • Schizophrenia F20.9   • Tracheostomy malfunction J95.03   • Pyuria N39.0   • THAI (obstructive sleep apnea) G47.33   • Generalized weakness R53.1   • Schizo affective schizophrenia F25.0   • Diabetes mellitus E11.9   • Coronary artery disease I25.10   • Endometrial cancer determined by uterine biopsy C54.1, Z15.04   • Chronic respiratory failure with hypoxia and hypercapnia J96.11, J96.12   • Toxic metabolic encephalopathy G92   • Pneumonia J18.9   • Abnormal vaginal bleeding N93.9   • Schizo affective schizophrenia F25.0   • CHF (congestive heart failure) I50.9   • Endometrial cancer determined by uterine biopsy C54.1, Z15.04   • Coronary artery disease I25.10   • Lymphedema I89.0   • Immobility Z74.09       Plan:      NPO until seen by OBGYN  OBGYN consult  IV fluids  Monitor and correct electrolytes- replace potassium  Monitor mental status  Accucheck and SSI- has been well controlled DMII with diet to this time  Home medications  PT/OT/ST consult  DVT/stress ulcer prophylaxis  Labs in am        Shane Barcenas MD  6/19/2017  9:35 AM      Much of this encounter note is an electronic transcription/translation of spoken language to printed text. The electronic translation of spoken language may permit erroneous, or at times, nonsensical words or phrases to be inadvertently transcribed; Although I have reviewed the note for such errors, some may still exist

## 2017-06-19 NOTE — CONSULTS
"GYN/ONC CONSULTATION      Requesting MD Dr. Barcenas    CHIEF COMPLAINT    Chief Complaint   Patient presents with   • Vaginal Bleeding     pt with \"excessive bleeding\" and a history of endometrial CA       HISTORY OF PRESENT ILLNESS    Ms. Lupe Burleson is a 61 y.o. female who presented to the  with vaginal bleeding. She has schizoaffective disorder and lives at [facility]. She is a izquierdo of the [facility]; Maria Ines Sen is patient's guardian. She is unaware of how long she has been bleeding. She reports stomach pain and back pain.     Oncology history.  G4/P$4female with papillary serous adenocarcinoma of the uterus. She was seen by Dr. Lebron in 06/2014 during hospital admission for [facility]. She has significant co-morbidies of respiratory failure, CAD, CHF, DM and severe morbid obesity. She was felt not to be surgical candidate. She was seen by Dr. Mike for palliative external beam radiation; however, simulation showed no reduction in the sie of the uterus and treatments were stopped. She has been treated with Megace; 80mg BID since 7/2014. She was last seen by Dr. Lebron in 12/2015.    PAST MEDICAL HISTORY    Past Medical History:   Diagnosis Date   • Acute respiratory distress syndrome    • Arthritis    • Bipolar disorder, unspecified    • CAD (coronary artery disease)    • Cellulitis    • Cellulitis    • CHF (congestive heart failure)    • Chronic ischemic heart disease    • Chronic respiratory failure with hypoxia and hypercapnia    • Chronic ulcer of calf    • Constipation    • Coronary artery disease    • Depression    • Depressive disorder    • Diabetes mellitus    • Dysphagia    • Dysphagia    • Endometrial cancer determined by uterine biopsy     s/p radiation; no improvement   • Endometrial hyperplasia    • History of transfusion    • Hypercapnia    • Hypertension    • Immobility    • Lack of coordination    • Lymphedema    • Malignant neoplasm of uterus    • Muscle weakness    • Obesities, morbid    • " Oxygen dependent    • Post-menopausal bleeding    • Postmenopausal bleeding    • Schizo affective schizophrenia    • Skin ulcer    • Sleep apnea    • Stroke    • Symbolic dysfunction    • Uterine cancer    • Venous insufficiency    • Venous insufficiency        PAST SURGICAL HISTORY    Past Surgical History:   Procedure Laterality Date   • D&C HYSTEROSCOPY     • LAPAROSCOPIC TUBAL LIGATION     • TRACHEOSTOMY AND PEG TUBE INSERTION     • UMBILICAL HERNIA REPAIR     • WOUND DEBRIDEMENT         Family history  Family History   Problem Relation Age of Onset   • Family history unknown: Yes       MEDICATIONS    Current Facility-Administered Medications   Medication Dose Route Frequency Provider Last Rate Last Dose   • atorvastatin (LIPITOR) tablet 20 mg  20 mg Per G Tube Daily Shane Barcenas MD   20 mg at 06/19/17 0852   • bumetanide (BUMEX) tablet 2 mg  2 mg Per G Tube Daily Shane Barcenas MD       • clopidogrel (PLAVIX) tablet 75 mg  75 mg Oral Daily Shane Barcenas MD   75 mg at 06/19/17 0852   • dextrose (D50W) solution 25 g  25 g Intravenous Q15 Min PRN Shane Barcenas MD       • dextrose (GLUTOSE) oral gel 15 g  15 g Oral Q15 Min PRN Shane Barcenas MD       • glucagon (human recombinant) (GLUCAGEN DIAGNOSTIC) injection 1 mg  1 mg Subcutaneous Q15 Min PRN Shane Barcenas MD       • HYDROcodone-acetaminophen (NORCO) 5-325 MG per tablet 1 tablet  1 tablet Oral Q6H PRN Shane Barcenas MD       • insulin aspart (novoLOG) injection 0-9 Units  0-9 Units Subcutaneous 4x Daily With Meals & Nightly Shane Barcenas MD       • ipratropium-albuterol (DUO-NEB) nebulizer solution 3 mL  3 mL Nebulization Q4H PRN Shane Barcenas MD       • isosorbide mononitrate (ISMO,MONOKET) tablet 30 mg  30 mg Per G Tube Daily Shane Barcenas MD   30 mg at 06/19/17 0853   • lactulose (CHRONULAC) 10 GM/15ML solution 20 g  20 g Oral Daily Shane Barcenas MD       • LORazepam (ATIVAN) tablet 1 mg  1 mg Per G Tube Q8H PRN Shane MANUEL  "MD Swapna       • pantoprazole (PROTONIX) EC tablet 40 mg  40 mg Oral Q AM Shane Barcenas MD   40 mg at 06/19/17 0700   • potassium chloride (KLOR-CON) packet 20 mEq  20 mEq Per G Tube Daily Shane Barcenas MD   20 mEq at 06/19/17 0854   • potassium chloride (MICRO-K) CR capsule 40 mEq  40 mEq Oral BID VASHTI Heller       • risperiDONE (risperDAL M-TABS) disintegrating tablet 2 mg  2 mg Oral QAM Shane Barcenas MD       • risperiDONE (risperDAL M-TABS) disintegrating tablet 3 mg  3 mg Oral Nightly Shane Barcenas MD       • sodium chloride 0.9 % infusion  75 mL/hr Intravenous Continuous Shane Barcenas MD       • Valproic Acid (DEPAKENE) syrup 250 mg  250 mg Oral Q12H Shane Barcenas MD   250 mg at 06/19/17 0313        ALLERGIES    Allergies   Allergen Reactions   • Codeine    • Penicillins      Tolerated ceftriaxone during 5/2017 admission   • Sulfa Antibiotics        Review of Systems    Review of Systems - History obtained from unobtainable from patient due to mental status  General ROS: positive for  - weakness  Psychological ROS: positive for - behavioral disorder and concentration difficulties  Respiratory ROS: no cough, shortness of breath, or wheezing  Cardiovascular ROS: no chest pain or dyspnea on exertion  Gastrointestinal ROS: positive for - abdominal pain  Genito-Urinary ROS: positive for - vaginal bleeding  Musculoskeletal ROS: positive for - gait disturbance, muscular weakness and swelling in leg - bilateral  Neurological ROS: negative  Dermatological ROS: positive for skin changes of lower legs      PHYSICIAL EXAM    Vital Signs: /79 (BP Location: Right arm, Patient Position: Lying)  Pulse 88  Temp 97.8 °F (36.6 °C) (Oral)   Resp 18  Ht 63\" (160 cm)  Wt 258 lb 6.4 oz (117 kg)  SpO2 98%  BMI 45.77 kg/m2  Constitutional:  Chronically ill appearing. Alert to person.    Respiratory:  No respiratory distress, normal breath sounds, no rales, no wheezing. O2 per NC.  "   Cardiovascular:  Normal rate, normal rhythm, no murmurs, no gallops, no rubs   GI:  Soft, nondistended, normal bowel sounds, generalized tenderness,   Musculoskeletal:  Trace edema,  Integument:  Hyperpigmented lower legs with chronic ulcer scar on left tibia.        LABORATORY  Recent Results (from the past 24 hour(s))   Comprehensive Metabolic Panel    Collection Time: 06/18/17  6:35 PM   Result Value Ref Range    Glucose 107 (H) 65 - 99 mg/dL    BUN 15 8 - 23 mg/dL    Creatinine 0.65 0.57 - 1.00 mg/dL    Sodium 144 136 - 145 mmol/L    Potassium 3.5 3.5 - 5.2 mmol/L    Chloride 103 98 - 107 mmol/L    CO2 29.0 22.0 - 29.0 mmol/L    Calcium 9.0 8.6 - 10.5 mg/dL    Total Protein 6.9 6.0 - 8.5 g/dL    Albumin 3.10 (L) 3.50 - 5.20 g/dL    ALT (SGPT) 11 1 - 33 U/L    AST (SGOT) 8 1 - 32 U/L    Alkaline Phosphatase 76 39 - 117 U/L    Total Bilirubin 0.4 0.1 - 1.2 mg/dL    eGFR  African Amer 112 >60 mL/min/1.73    Globulin 3.8 gm/dL    A/G Ratio 0.8 g/dL    BUN/Creatinine Ratio 23.1 7.0 - 25.0    Anion Gap 12.0 mmol/L   CBC Auto Differential    Collection Time: 06/18/17  6:36 PM   Result Value Ref Range    WBC 5.76 4.50 - 10.70 10*3/mm3    RBC 4.01 3.90 - 5.20 10*6/mm3    Hemoglobin 10.2 (L) 11.9 - 15.5 g/dL    Hematocrit 33.0 (L) 35.6 - 45.5 %    MCV 82.3 80.5 - 98.2 fL    MCH 25.4 (L) 26.9 - 32.0 pg    MCHC 30.9 (L) 32.4 - 36.3 g/dL    RDW 17.6 (H) 11.7 - 13.0 %    RDW-SD 53.2 37.0 - 54.0 fl    MPV 10.3 6.0 - 12.0 fL    Platelets 299 140 - 500 10*3/mm3    Neutrophil % 59.6 42.7 - 76.0 %    Lymphocyte % 27.1 19.6 - 45.3 %    Monocyte % 9.7 5.0 - 12.0 %    Eosinophil % 2.6 0.3 - 6.2 %    Basophil % 0.5 0.0 - 1.5 %    Immature Grans % 0.5 0.0 - 0.5 %    Neutrophils, Absolute 3.43 1.90 - 8.10 10*3/mm3    Lymphocytes, Absolute 1.56 0.90 - 4.80 10*3/mm3    Monocytes, Absolute 0.56 0.20 - 1.20 10*3/mm3    Eosinophils, Absolute 0.15 0.00 - 0.70 10*3/mm3    Basophils, Absolute 0.03 0.00 - 0.20 10*3/mm3    Immature Grans,  Absolute 0.03 0.00 - 0.03 10*3/mm3    nRBC 0.0 0.0 - 0.0 /100 WBC   Urinalysis With / Culture If Indicated    Collection Time: 06/18/17  7:23 PM   Result Value Ref Range    Color, UA Yellow Yellow, Straw    Appearance, UA Clear Clear    pH, UA 6.0 5.0 - 8.0    Specific Gravity, UA 1.014 1.005 - 1.030    Glucose, UA Negative Negative    Ketones, UA Negative Negative    Bilirubin, UA Negative Negative    Blood, UA Trace (A) Negative    Protein, UA Negative Negative    Leuk Esterase, UA Trace (A) Negative    Nitrite, UA Negative Negative    Urobilinogen, UA 1.0 E.U./dL 0.2 - 1.0 E.U./dL   Urinalysis, Microscopic Only    Collection Time: 06/18/17  7:23 PM   Result Value Ref Range    RBC, UA None Seen None Seen, 0-2 /HPF    WBC, UA 0-2 None Seen, 0-2 /HPF    Bacteria, UA None Seen None Seen /HPF    Squamous Epithelial Cells, UA 0-2 None Seen, 0-2 /HPF    Hyaline Casts, UA None Seen None Seen /LPF    Methodology Manual Light Microscopy    CBC (No Diff)    Collection Time: 06/19/17  6:26 AM   Result Value Ref Range    WBC 6.02 4.50 - 10.70 10*3/mm3    RBC 3.97 3.90 - 5.20 10*6/mm3    Hemoglobin 10.1 (L) 11.9 - 15.5 g/dL    Hematocrit 32.0 (L) 35.6 - 45.5 %    MCV 80.6 80.5 - 98.2 fL    MCH 25.4 (L) 26.9 - 32.0 pg    MCHC 31.6 (L) 32.4 - 36.3 g/dL    RDW 17.0 (H) 11.7 - 13.0 %    RDW-SD 50.6 37.0 - 54.0 fl    MPV 10.5 6.0 - 12.0 fL    Platelets 279 140 - 500 10*3/mm3   Basic Metabolic Panel    Collection Time: 06/19/17  6:26 AM   Result Value Ref Range    Glucose 97 65 - 99 mg/dL    BUN 13 8 - 23 mg/dL    Creatinine 0.53 (L) 0.57 - 1.00 mg/dL    Sodium 141 136 - 145 mmol/L    Potassium 3.4 (L) 3.5 - 5.2 mmol/L    Chloride 101 98 - 107 mmol/L    CO2 29.6 (H) 22.0 - 29.0 mmol/L    Calcium 9.3 8.6 - 10.5 mg/dL    eGFR  African Amer 141 >60 mL/min/1.73    BUN/Creatinine Ratio 24.5 7.0 - 25.0    Anion Gap 10.4 mmol/L   Protime-INR    Collection Time: 06/19/17  6:26 AM   Result Value Ref Range    Protime 13.7 11.7 - 14.2 Seconds     INR 1.10 0.90 - 1.10   POC Glucose Fingerstick    Collection Time: 06/19/17  7:33 AM   Result Value Ref Range    Glucose 106 70 - 130 mg/dL       Imaging & OTHER STUDIES    Ct Abdomen Pelvis Without Contrast    Result Date: 6/19/2017  CT ABDOMEN AND PELVIS WITHOUT CONTRAST.  TECHNIQUE: A routine CT scan of the abdomen and pelvis was performed with coronal and sagittal reconstructed images.  HISTORY: Vaginal bleeding.  COMPARISON: 05/20/2016.  FINDINGS: Lung bases demonstrate no consolidation or effusion.      Liver demonstrates homogeneous parenchyma, no definite mass. 1.6 cm cyst of the right hepatic lobe is unchanged. Multiple gallstones are seen again, no evidence for acute cholecystitis. No intra or extrahepatic biliary ductal dilatation.  The spleen is unremarkable. Pancreas is unremarkable, no pancreatic ductal dilatation. Adrenal glands are within normal limits.  Kidneys do not demonstrate hydronephrosis. No definite renal calculi. Urinary bladder is unremarkable.     Gastrostomy tube is in position. Small and large bowel loops are within normal limits. Appendix is normal. Large amount of stool in the rectum, concerning for fecal impaction.  No significant retroperitoneal lymphadenopathy. Uterus is not significantly changed in size and demonstrates multiple hyperdense masses, there appears to be hyperdense material within the lumen of the uterus, in keeping with known vaginal bleeding.       1.  Multiple hyperdense masses in the uterus, may be hemorrhagic uterine lesions, in keeping with history. differential diagnoses includes fibroids, more aggressive lesions cannot be entirely excluded. Overall uterine size has not significantly changed. 2.  Stable 1.6 cm cyst of the right hepatic lobe. Findings of fecal impaction.  Findings called to Dr. Clifford, 9:35 PM.        aSSESSMENT & PLAN    1. Papillary serous adenocarcinoma of the endometrium. Palliative care (radaition stopped secondary to poor tumor response  and megace therapy). Continues megace at 80mg BID.  2. Vaginal bleeding secondary to above. Hemoglobin stable for over one year. Might be exacerbated by Plavix.   3. Significant co-morbidities- CHF,CAD, Stoke, DM, obesity, schizoaffective disorder  4. [place] of the state.      Ok to let her eat. I do not anticipate emergent need for surgery and she has taken Plavix none-the-less.  MD to follow and manage consult    VASHTI Sanchez  6/19/2017    Gynecologic Oncology Attending Physician:  History reviewed with VASHTI Webber.  61-year-old female with known history of papillary serous adenocarcinoma of the endometrium.  Due to multiple comorbid medical conditions including CHF, CAD, CVA, DM, obesity, and schizoaffective disorder, she had previously been judged to be a poor surgical candidate.  She did receive some pelvic radiation therapy in the past, although I am uncertain of the details.  Currently admitted with vaginal bleeding.    She is not able to provide any useful information regarding her medical history her previous treatment.  She does state that she remembers having vaginal bleeding, and that has stopped now.    Physical exam: Abdomen is soft and nontender    Agree with assessment and plans as outlined above by VASHTI Webber.    We'll need to discuss her care with whoever has POA for medical decision making.  Her cancer is likely not curable.  We could consider palliative hysterectomy for control of bleeding, if she can be cleared for surgery by cardiology.    Electronically signed by:  Ortiz Washington MD 6/19/2017 6:54 PM

## 2017-06-20 LAB
ANION GAP SERPL CALCULATED.3IONS-SCNC: 9.5 MMOL/L
BASOPHILS # BLD AUTO: 0.02 10*3/MM3 (ref 0–0.2)
BASOPHILS NFR BLD AUTO: 0.4 % (ref 0–1.5)
BUN BLD-MCNC: 11 MG/DL (ref 8–23)
BUN/CREAT SERPL: 17.2 (ref 7–25)
CALCIUM SPEC-SCNC: 9 MG/DL (ref 8.6–10.5)
CHLORIDE SERPL-SCNC: 100 MMOL/L (ref 98–107)
CO2 SERPL-SCNC: 27.5 MMOL/L (ref 22–29)
CREAT BLD-MCNC: 0.64 MG/DL (ref 0.57–1)
DEPRECATED RDW RBC AUTO: 51.4 FL (ref 37–54)
EOSINOPHIL # BLD AUTO: 0.18 10*3/MM3 (ref 0–0.7)
EOSINOPHIL NFR BLD AUTO: 3.3 % (ref 0.3–6.2)
ERYTHROCYTE [DISTWIDTH] IN BLOOD BY AUTOMATED COUNT: 17 % (ref 11.7–13)
GFR SERPL CREATININE-BSD FRML MDRD: 114 ML/MIN/1.73
GLUCOSE BLD-MCNC: 91 MG/DL (ref 65–99)
GLUCOSE BLDC GLUCOMTR-MCNC: 89 MG/DL (ref 70–130)
GLUCOSE BLDC GLUCOMTR-MCNC: 97 MG/DL (ref 70–130)
GLUCOSE BLDC GLUCOMTR-MCNC: 98 MG/DL (ref 70–130)
GLUCOSE BLDC GLUCOMTR-MCNC: 98 MG/DL (ref 70–130)
HCT VFR BLD AUTO: 30.7 % (ref 35.6–45.5)
HGB BLD-MCNC: 9.6 G/DL (ref 11.9–15.5)
IMM GRANULOCYTES # BLD: 0 10*3/MM3 (ref 0–0.03)
IMM GRANULOCYTES NFR BLD: 0 % (ref 0–0.5)
LYMPHOCYTES # BLD AUTO: 1.37 10*3/MM3 (ref 0.9–4.8)
LYMPHOCYTES NFR BLD AUTO: 25.3 % (ref 19.6–45.3)
MCH RBC QN AUTO: 25.7 PG (ref 26.9–32)
MCHC RBC AUTO-ENTMCNC: 31.3 G/DL (ref 32.4–36.3)
MCV RBC AUTO: 82.3 FL (ref 80.5–98.2)
MONOCYTES # BLD AUTO: 0.66 10*3/MM3 (ref 0.2–1.2)
MONOCYTES NFR BLD AUTO: 12.2 % (ref 5–12)
NEUTROPHILS # BLD AUTO: 3.18 10*3/MM3 (ref 1.9–8.1)
NEUTROPHILS NFR BLD AUTO: 58.8 % (ref 42.7–76)
PLATELET # BLD AUTO: 301 10*3/MM3 (ref 140–500)
PMV BLD AUTO: 10.7 FL (ref 6–12)
POTASSIUM BLD-SCNC: 4.3 MMOL/L (ref 3.5–5.2)
RBC # BLD AUTO: 3.73 10*6/MM3 (ref 3.9–5.2)
SODIUM BLD-SCNC: 137 MMOL/L (ref 136–145)
WBC NRBC COR # BLD: 5.41 10*3/MM3 (ref 4.5–10.7)

## 2017-06-20 PROCEDURE — 85025 COMPLETE CBC W/AUTO DIFF WBC: CPT | Performed by: NURSE PRACTITIONER

## 2017-06-20 PROCEDURE — 96361 HYDRATE IV INFUSION ADD-ON: CPT

## 2017-06-20 PROCEDURE — 82962 GLUCOSE BLOOD TEST: CPT

## 2017-06-20 PROCEDURE — 80048 BASIC METABOLIC PNL TOTAL CA: CPT | Performed by: NURSE PRACTITIONER

## 2017-06-20 RX ADMIN — PANTOPRAZOLE SODIUM 40 MG: 40 TABLET, DELAYED RELEASE ORAL at 06:39

## 2017-06-20 RX ADMIN — ATORVASTATIN CALCIUM 20 MG: 20 TABLET, FILM COATED ORAL at 09:32

## 2017-06-20 RX ADMIN — CLOPIDOGREL 75 MG: 75 TABLET, FILM COATED ORAL at 09:32

## 2017-06-20 RX ADMIN — BUMETANIDE 2 MG: 0.5 TABLET ORAL at 09:32

## 2017-06-20 RX ADMIN — SODIUM CHLORIDE 75 ML/HR: 9 INJECTION, SOLUTION INTRAVENOUS at 18:28

## 2017-06-20 RX ADMIN — POTASSIUM CHLORIDE 20 MEQ: 1.5 POWDER, FOR SOLUTION ORAL at 09:32

## 2017-06-20 RX ADMIN — VALPROIC ACID 250 MG: 250 SOLUTION ORAL at 20:29

## 2017-06-20 RX ADMIN — VALPROIC ACID 250 MG: 250 SOLUTION ORAL at 09:32

## 2017-06-20 RX ADMIN — RISPERIDONE 2 MG: 1 TABLET, ORALLY DISINTEGRATING ORAL at 06:39

## 2017-06-20 RX ADMIN — ISOSORBIDE MONONITRATE 30 MG: 20 TABLET ORAL at 09:33

## 2017-06-20 NOTE — PROGRESS NOTES
"DAILY PROGRESS NOTE  [facility], [facility]      Patient Identification:  Name: Lupe Burleson  Age: 61 y.o.  Sex: female  :  1955  MRN: [id number]         Primary Care Physician: Shane Barcenas MD    Subjective:  Interval History:    Ms. Burleson is awake and alert this morning. She denies CP, SOA, N/V/D. We await cardiology consult for possible surgical clearance for palliative hysterectomy.   She is still having considerable bleeding and her Hgb started to drop.    Objective:    Scheduled Meds:    atorvastatin 20 mg Per G Tube Daily   bumetanide 2 mg Per G Tube Daily   clopidogrel 75 mg Oral Daily   insulin aspart 0-9 Units Subcutaneous 4x Daily With Meals & Nightly   isosorbide mononitrate 30 mg Per G Tube Daily   lactulose 20 g Oral Daily   pantoprazole 40 mg Oral Q AM   potassium chloride 20 mEq Per G Tube Daily   risperiDONE 2 mg Oral QAM   risperiDONE 3 mg Oral Nightly   Valproic Acid 250 mg Oral Q12H     Continuous Infusions:    sodium chloride 75 mL/hr Last Rate: 75 mL/hr (17 1029)       Vital signs in last 24 hours:  Temp:  [97.4 °F (36.3 °C)-98.9 °F (37.2 °C)] 98.1 °F (36.7 °C)  Heart Rate:  [63-77] 63  Resp:  [18] 18  BP: (104-138)/(58-69) 104/58    tMax 24 hrs: Temp (24hrs), Av.3 °F (36.8 °C), Min:97.4 °F (36.3 °C), Max:98.9 °F (37.2 °C)      Intake/Output:    Intake/Output Summary (Last 24 hours) at 17 1227  Last data filed at 17 0930   Gross per 24 hour   Intake             1970 ml   Output                0 ml   Net             1970 ml       Exam:    /58 (BP Location: Right arm, Patient Position: Lying)  Pulse 63  Temp 98.1 °F (36.7 °C) (Oral)   Resp 18  Ht 63\" (160 cm)  Wt 258 lb 6.4 oz (117 kg)  SpO2 98%  BMI 45.77 kg/m2    General: Alert, cooperative, appears stated age. In no acute distress.    Head: Normocephalic, without obvious abnormality, atraumatic  Neck: Supple, symmetrical; trachea midline, without  " adenopathy;              Thyroid without  enlargement/tenderness/nodules;              no carotid bruit or JVD  Cardiovascular: Normal rate, regular rhythm and intact distal pulses.                              Exam reveals no gallop and no friction rub. No murmur heard  Pulmonary: Clear to auscultation bilaterally, respirations unlabored.                         No rhonchi, wheezing or rales.   Abdominal: Soft, non-tender, bowel sounds active all four quadrants;                       no masses,  hepatomegaly, or  splenomegaly.   Extremities: Chronic bilateral Lymphedema LE's, atraumatic; no cyanosis  Pulses:        2 + symmetric all extremities  Neurological: She is alert and oriented to person, place, and time.                          CNII-XII intact, normal strength, sensation intact throughout  Skin:  Smooth, without lesions, rash, or wounds. Warm, dry and intact.         Data Review:      Results from last 7 days  Lab Units 06/20/17 0555 06/19/17 0626 06/18/17  1836   WBC 10*3/mm3 5.41 6.02 5.76   HEMOGLOBIN g/dL 9.6* 10.1* 10.2*   HEMATOCRIT % 30.7* 32.0* 33.0*   PLATELETS 10*3/mm3 301 279 299         Results from last 7 days  Lab Units 06/20/17  0555 06/19/17  0626 06/18/17  1835   SODIUM mmol/L 137 141 144   POTASSIUM mmol/L 4.3 3.4* 3.5   CHLORIDE mmol/L 100 101 103   TOTAL CO2 mmol/L 27.5 29.6* 29.0   BUN mg/dL 11 13 15   CREATININE mg/dL 0.64 0.53* 0.65   CALCIUM mg/dL 9.0 9.3 9.0   BILIRUBIN mg/dL  --   --  0.4   ALK PHOS U/L  --   --  76   ALT (SGPT) U/L  --   --  11   AST (SGOT) U/L  --   --  8   GLUCOSE mg/dL 91 97 107*       Results from last 7 days  Lab Units 06/19/17  0626   INR  1.10         0  Lab Value Date/Time   TROPONINT 0.065 (H) 05/17/2017 0505   TROPONINT 0.077 (H) 05/16/2017 1517   TROPONINT 0.080 (H) 05/16/2017 1112   TROPONINT 0.078 (H) 05/16/2017 0703   TROPONINT 0.084 (H) 05/16/2017 0116   TROPONINT <0.010 05/15/2016 0622   TROPONINT 0.017 05/14/2016 0437   TROPONINT 0.023  05/13/2016 2036   TROPONINT 0.031 (H) 05/13/2016 1231   TROPONINT <0.01 02/03/2016 0537   TROPONINT <0.01 02/02/2016 0914   TROPONINT <0.01 11/11/2015 0516   TROPONINT <0.01 11/09/2015 2049   TROPONINT <0.01 04/18/2014 0517       Microbiology Results (last 10 days)     ** No results found for the last 240 hours. **           Imaging Results (last 7 days)     Procedure Component Value Units Date/Time    CT Abdomen Pelvis Without Contrast [[id number]] Collected:  06/18/17 2135     Updated:  06/19/17 0057    Narrative:       CT ABDOMEN AND PELVIS WITHOUT CONTRAST.     TECHNIQUE: A routine CT scan of the abdomen and pelvis was performed  with coronal and sagittal reconstructed images.     HISTORY: Vaginal bleeding.     COMPARISON: 05/20/2016.     FINDINGS:  Lung bases demonstrate no consolidation or effusion.          Liver demonstrates homogeneous parenchyma, no definite mass. 1.6 cm cyst  of the right hepatic lobe is unchanged. Multiple gallstones are seen  again, no evidence for acute cholecystitis. No intra or extrahepatic  biliary ductal dilatation.      The spleen is unremarkable. Pancreas is unremarkable, no pancreatic  ductal dilatation. Adrenal glands are within normal limits.     Kidneys do not demonstrate hydronephrosis. No definite renal calculi.  Urinary bladder is unremarkable.         Gastrostomy tube is in position. Small and large bowel loops are within  normal limits. Appendix is normal. Large amount of stool in the rectum,  concerning for fecal impaction.     No significant retroperitoneal lymphadenopathy. Uterus is not  significantly changed in size and demonstrates multiple hyperdense  masses, there appears to be hyperdense material within the lumen of the  uterus, in keeping with known vaginal bleeding.          Impression:       1.  Multiple hyperdense masses in the uterus, may be hemorrhagic uterine  lesions, in keeping with history. differential diagnoses includes  fibroids, more aggressive  lesions cannot be entirely excluded. Overall  uterine size has not significantly changed.  2.  Stable 1.6 cm cyst of the right hepatic lobe. Findings of fecal  impaction.     Findings called to Dr. Clifford, 9:35 PM.               Assessment:      Active Problems:    Abnormal vaginal bleeding    Schizo affective schizophrenia    CHF (congestive heart failure)    Endometrial cancer determined by uterine biopsy    Coronary artery disease    Lymphedema    Immobility      Patient Active Problem List   Diagnosis Code   • Elevated troponin R79.89   • Aspiration pneumonia J69.0   • Hematuria R31.9   • Hypokalemia E87.6   • Pelvic mass in female R19.00   • Fecal impaction K56.41   • Endometrial carcinoma C54.1   • Acute on chronic respiratory failure with hypoxia and hypercapnia J96.21, J96.22   • Acute on chronic diastolic CHF (congestive heart failure) I50.33   • UTI (urinary tract infection) N39.0   • Leucocytosis D72.829   • Chronic diastolic CHF (congestive heart failure) I50.32   • DM (diabetes mellitus) E11.9   • Morbid obesity E66.01   • HTN (hypertension) I10   • Schizophrenia F20.9   • Tracheostomy malfunction J95.03   • Pyuria N39.0   • THAI (obstructive sleep apnea) G47.33   • Generalized weakness R53.1   • Schizo affective schizophrenia F25.0   • Diabetes mellitus E11.9   • Coronary artery disease I25.10   • Endometrial cancer determined by uterine biopsy C54.1, Z15.04   • Chronic respiratory failure with hypoxia and hypercapnia J96.11, J96.12   • Toxic metabolic encephalopathy G92   • Pneumonia J18.9   • Abnormal vaginal bleeding N93.9   • Schizo affective schizophrenia F25.0   • CHF (congestive heart failure) I50.9   • Endometrial cancer determined by uterine biopsy C54.1, Z15.04   • Coronary artery disease I25.10   • Lymphedema I89.0   • Immobility Z74.09       Plan:    F/U cardiology and gyn recommendations- Patient is state guardian.   If it is of any help, psychiatry in the nursing home where she resides  cleared her for decisional capacity as it relates to her cancer.   Diet: CCHO  Monitor and correct electrolytes  Monitor mental status  Continue Accuchecks and SSI  PT to see pt  DVT/stress ulcer prophylaxis  Labs in am          Shane Barcenas MD  6/20/2017  12:27 PM        Much of this encounter note is an electronic transcription/translation of spoken language to printed text. The electronic translation of spoken language may permit erroneous, or at times, nonsensical words or phrases to be inadvertently transcribed; Although I have reviewed the note for such errors, some may still exist

## 2017-06-20 NOTE — PROGRESS NOTES
Continued Stay Note  [facility]     Patient Name: Lupe Burleson  MRN: [id number]  Today's Date: 6/20/2017    Admit Date: 6/18/2017          Discharge Plan       06/20/17 1518    Case Management/Social Work Plan    Plan Return to [facility] level of care    Additional Comments Voice mail left on nursing prompt at Dr Washington's office, I left name and number of pt's state guardian in event he wanted to speak with her re possible hysterectomy.              Discharge Codes     None            Maria Ines Sebastian RN

## 2017-06-20 NOTE — PLAN OF CARE
Problem: Patient Care Overview (Adult)  Goal: Plan of Care Review  Outcome: Ongoing (interventions implemented as appropriate)    06/20/17 9936   Outcome Evaluation   Outcome Summary/Follow up Plan tolerating PO meds. pt states tightness in abdomen, but no pain. continues to saturate briefs with urine and blood. no large clots noted. turning patient every 2 hours. She is refusing scd's.        Goal: Adult Individualization and Mutuality  Outcome: Ongoing (interventions implemented as appropriate)  Goal: Discharge Needs Assessment  Outcome: Ongoing (interventions implemented as appropriate)    Problem: Skin Integrity Impairment, Risk/Actual (Adult)  Goal: Identify Related Risk Factors and Signs and Symptoms  Outcome: Ongoing (interventions implemented as appropriate)  Goal: Skin Integrity/Wound Healing  Outcome: Ongoing (interventions implemented as appropriate)    Problem: Fall Risk (Adult)  Goal: Absence of Falls  Outcome: Ongoing (interventions implemented as appropriate)

## 2017-06-20 NOTE — PLAN OF CARE
Problem: Patient Care Overview (Adult)  Goal: Plan of Care Review  Outcome: Ongoing (interventions implemented as appropriate)    06/20/17 0414   Coping/Psychosocial Response Interventions   Plan Of Care Reviewed With patient   Patient Care Overview   Progress improving   Outcome Evaluation   Outcome Summary/Follow up Plan Isolation maintained. Dr Washington examined, small amt vag bleeding. He is to contact state guardian in re: to plan. Wander Po meds and diet. Rested well tonight, no complaints.       Goal: Adult Individualization and Mutuality  Outcome: Ongoing (interventions implemented as appropriate)  Goal: Discharge Needs Assessment  Outcome: Ongoing (interventions implemented as appropriate)    Problem: Skin Integrity Impairment, Risk/Actual (Adult)  Goal: Identify Related Risk Factors and Signs and Symptoms  Outcome: Ongoing (interventions implemented as appropriate)  Goal: Skin Integrity/Wound Healing  Outcome: Ongoing (interventions implemented as appropriate)    Problem: Fall Risk (Adult)  Goal: Absence of Falls  Outcome: Ongoing (interventions implemented as appropriate)

## 2017-06-21 LAB
ANION GAP SERPL CALCULATED.3IONS-SCNC: 10 MMOL/L
BASOPHILS # BLD AUTO: 0.01 10*3/MM3 (ref 0–0.2)
BASOPHILS NFR BLD AUTO: 0.2 % (ref 0–1.5)
BUN BLD-MCNC: 8 MG/DL (ref 8–23)
BUN/CREAT SERPL: 11.8 (ref 7–25)
CALCIUM SPEC-SCNC: 8.5 MG/DL (ref 8.6–10.5)
CHLORIDE SERPL-SCNC: 102 MMOL/L (ref 98–107)
CO2 SERPL-SCNC: 30 MMOL/L (ref 22–29)
CREAT BLD-MCNC: 0.68 MG/DL (ref 0.57–1)
DEPRECATED RDW RBC AUTO: 50.7 FL (ref 37–54)
EOSINOPHIL # BLD AUTO: 0.19 10*3/MM3 (ref 0–0.7)
EOSINOPHIL NFR BLD AUTO: 3.7 % (ref 0.3–6.2)
ERYTHROCYTE [DISTWIDTH] IN BLOOD BY AUTOMATED COUNT: 17 % (ref 11.7–13)
GFR SERPL CREATININE-BSD FRML MDRD: 106 ML/MIN/1.73
GLUCOSE BLD-MCNC: 101 MG/DL (ref 65–99)
GLUCOSE BLDC GLUCOMTR-MCNC: 100 MG/DL (ref 70–130)
GLUCOSE BLDC GLUCOMTR-MCNC: 100 MG/DL (ref 70–130)
GLUCOSE BLDC GLUCOMTR-MCNC: 111 MG/DL (ref 70–130)
GLUCOSE BLDC GLUCOMTR-MCNC: 97 MG/DL (ref 70–130)
HCT VFR BLD AUTO: 27.5 % (ref 35.6–45.5)
HCT VFR BLD AUTO: 27.7 % (ref 35.6–45.5)
HGB BLD-MCNC: 8.7 G/DL (ref 11.9–15.5)
HGB BLD-MCNC: 8.7 G/DL (ref 11.9–15.5)
IMM GRANULOCYTES # BLD: 0 10*3/MM3 (ref 0–0.03)
IMM GRANULOCYTES NFR BLD: 0 % (ref 0–0.5)
LYMPHOCYTES # BLD AUTO: 1.35 10*3/MM3 (ref 0.9–4.8)
LYMPHOCYTES NFR BLD AUTO: 26.1 % (ref 19.6–45.3)
MCH RBC QN AUTO: 25.7 PG (ref 26.9–32)
MCHC RBC AUTO-ENTMCNC: 31.4 G/DL (ref 32.4–36.3)
MCV RBC AUTO: 81.7 FL (ref 80.5–98.2)
MONOCYTES # BLD AUTO: 0.4 10*3/MM3 (ref 0.2–1.2)
MONOCYTES NFR BLD AUTO: 7.7 % (ref 5–12)
NEUTROPHILS # BLD AUTO: 3.22 10*3/MM3 (ref 1.9–8.1)
NEUTROPHILS NFR BLD AUTO: 62.3 % (ref 42.7–76)
PLATELET # BLD AUTO: 292 10*3/MM3 (ref 140–500)
PMV BLD AUTO: 10.2 FL (ref 6–12)
POTASSIUM BLD-SCNC: 3.5 MMOL/L (ref 3.5–5.2)
RBC # BLD AUTO: 3.39 10*6/MM3 (ref 3.9–5.2)
SODIUM BLD-SCNC: 142 MMOL/L (ref 136–145)
WBC NRBC COR # BLD: 5.17 10*3/MM3 (ref 4.5–10.7)

## 2017-06-21 PROCEDURE — 96361 HYDRATE IV INFUSION ADD-ON: CPT

## 2017-06-21 PROCEDURE — 85018 HEMOGLOBIN: CPT | Performed by: INTERNAL MEDICINE

## 2017-06-21 PROCEDURE — 85025 COMPLETE CBC W/AUTO DIFF WBC: CPT | Performed by: NURSE PRACTITIONER

## 2017-06-21 PROCEDURE — 85014 HEMATOCRIT: CPT | Performed by: INTERNAL MEDICINE

## 2017-06-21 PROCEDURE — 82962 GLUCOSE BLOOD TEST: CPT

## 2017-06-21 PROCEDURE — 80048 BASIC METABOLIC PNL TOTAL CA: CPT | Performed by: NURSE PRACTITIONER

## 2017-06-21 PROCEDURE — 99222 1ST HOSP IP/OBS MODERATE 55: CPT | Performed by: INTERNAL MEDICINE

## 2017-06-21 RX ADMIN — ATORVASTATIN CALCIUM 20 MG: 20 TABLET, FILM COATED ORAL at 08:09

## 2017-06-21 RX ADMIN — RISPERIDONE 3 MG: 1 TABLET, ORALLY DISINTEGRATING ORAL at 20:06

## 2017-06-21 RX ADMIN — BUMETANIDE 2 MG: 0.5 TABLET ORAL at 08:08

## 2017-06-21 RX ADMIN — VALPROIC ACID 250 MG: 250 SOLUTION ORAL at 20:06

## 2017-06-21 RX ADMIN — PANTOPRAZOLE SODIUM 40 MG: 40 TABLET, DELAYED RELEASE ORAL at 06:05

## 2017-06-21 RX ADMIN — LACTULOSE 20 G: 20 SOLUTION ORAL at 08:08

## 2017-06-21 RX ADMIN — POTASSIUM CHLORIDE 20 MEQ: 1.5 POWDER, FOR SOLUTION ORAL at 08:08

## 2017-06-21 RX ADMIN — ISOSORBIDE MONONITRATE 30 MG: 20 TABLET ORAL at 08:08

## 2017-06-21 RX ADMIN — SODIUM CHLORIDE 75 ML/HR: 9 INJECTION, SOLUTION INTRAVENOUS at 20:10

## 2017-06-21 RX ADMIN — RISPERIDONE 2 MG: 1 TABLET, ORALLY DISINTEGRATING ORAL at 06:05

## 2017-06-21 RX ADMIN — VALPROIC ACID 250 MG: 250 SOLUTION ORAL at 08:08

## 2017-06-21 NOTE — PROGRESS NOTES
"DAILY PROGRESS NOTE  [facility], [facility]      Patient Identification:  Name: Lupe Burleson  Age: 61 y.o.  Sex: female  :  1955  MRN: [id number]         Primary Care Physician: Shane Barcenas MD    Subjective:  Ms. Burleson is awake and alert this morning, eating breakfast. Cardiology has seen and cleared for surgery. She denies CP, SOA, N/V/D. She continues to bleed and hbg is lower today.    Objective:    Scheduled Meds:    atorvastatin 20 mg Per G Tube Daily   bumetanide 2 mg Per G Tube Daily   insulin aspart 0-9 Units Subcutaneous 4x Daily With Meals & Nightly   isosorbide mononitrate 30 mg Per G Tube Daily   lactulose 20 g Oral Daily   pantoprazole 40 mg Oral Q AM   potassium chloride 20 mEq Per G Tube Daily   risperiDONE 2 mg Oral QAM   risperiDONE 3 mg Oral Nightly   Valproic Acid 250 mg Oral Q12H     Continuous Infusions:    sodium chloride 75 mL/hr Last Rate: 75 mL/hr (17 1828)       Vital signs in last 24 hours:  Temp:  [97.2 °F (36.2 °C)-99.5 °F (37.5 °C)] 97.2 °F (36.2 °C)  Heart Rate:  [63-92] 65  Resp:  [18] 18  BP: (104-132)/(56-67) 132/56    tMax 24 hrs: Temp (24hrs), Av.2 °F (36.8 °C), Min:97.2 °F (36.2 °C), Max:99.5 °F (37.5 °C)      Intake/Output:    Intake/Output Summary (Last 24 hours) at 17 0924  Last data filed at 17 0458   Gross per 24 hour   Intake             3741 ml   Output                0 ml   Net             3741 ml       Exam:    /56 (BP Location: Right arm, Patient Position: Lying)  Pulse 65  Temp 97.2 °F (36.2 °C) (Oral)   Resp 18  Ht 63\" (160 cm)  Wt 253 lb 12.8 oz (115 kg)  SpO2 99%  BMI 44.96 kg/m2    General: Alert, cooperative, appears stated age. In no acute distress.    Head: Normocephalic, without obvious abnormality, atraumatic  Neck: Supple, symmetrical; trachea midline, without  adenopathy;              Thyroid without  enlargement/tenderness/nodules;              no carotid bruit or " JVD  Cardiovascular: Normal rate, regular rhythm and intact distal pulses.                              Exam reveals no gallop and no friction rub. No murmur heard  Pulmonary: Clear to auscultation bilaterally, respirations unlabored.                         No rhonchi, wheezing or rales.   Abdominal: Soft, non-tender, bowel sounds active all four quadrants;                       no masses,  hepatomegaly, or  splenomegaly.   Extremities: Chronic bilateral Lymphedema LE's, atraumatic; no cyanosis  Pulses:        2 + symmetric all extremities  Neurological: She is alert and oriented to person, place, and time.                          CNII-XII intact, normal strength, sensation intact throughout  Skin:  Smooth, without lesions, rash, or wounds. Warm, dry and intact.         Data Review:      Results from last 7 days  Lab Units 06/21/17 0524 06/20/17  0555 06/19/17 0626   WBC 10*3/mm3 5.17 5.41 6.02   HEMOGLOBIN g/dL 8.7* 9.6* 10.1*   HEMATOCRIT % 27.7* 30.7* 32.0*   PLATELETS 10*3/mm3 292 301 279         Results from last 7 days  Lab Units 06/21/17 0524 06/20/17  0555 06/19/17  0626 06/18/17  1835   SODIUM mmol/L 142 137 141 144   POTASSIUM mmol/L 3.5 4.3 3.4* 3.5   CHLORIDE mmol/L 102 100 101 103   TOTAL CO2 mmol/L 30.0* 27.5 29.6* 29.0   BUN mg/dL 8 11 13 15   CREATININE mg/dL 0.68 0.64 0.53* 0.65   CALCIUM mg/dL 8.5* 9.0 9.3 9.0   BILIRUBIN mg/dL  --   --   --  0.4   ALK PHOS U/L  --   --   --  76   ALT (SGPT) U/L  --   --   --  11   AST (SGOT) U/L  --   --   --  8   GLUCOSE mg/dL 101* 91 97 107*       Results from last 7 days  Lab Units 06/19/17 0626   INR  1.10         0  Lab Value Date/Time   TROPONINT 0.065 (H) 05/17/2017 0505   TROPONINT 0.077 (H) 05/16/2017 1517   TROPONINT 0.080 (H) 05/16/2017 1112   TROPONINT 0.078 (H) 05/16/2017 0703   TROPONINT 0.084 (H) 05/16/2017 0116   TROPONINT <0.010 05/15/2016 0622   TROPONINT 0.017 05/14/2016 0437   TROPONINT 0.023 05/13/2016 2036   TROPONINT 0.031 (H)  05/13/2016 1231   TROPONINT <0.01 02/03/2016 0537   TROPONINT <0.01 02/02/2016 0914   TROPONINT <0.01 11/11/2015 0516   TROPONINT <0.01 11/09/2015 2049   TROPONINT <0.01 04/18/2014 0517       Microbiology Results (last 10 days)     ** No results found for the last 240 hours. **           Imaging Results (last 7 days)     Procedure Component Value Units Date/Time    CT Abdomen Pelvis Without Contrast [[id number]] Collected:  06/18/17 2135     Updated:  06/19/17 0057    Narrative:       CT ABDOMEN AND PELVIS WITHOUT CONTRAST.     TECHNIQUE: A routine CT scan of the abdomen and pelvis was performed  with coronal and sagittal reconstructed images.     HISTORY: Vaginal bleeding.     COMPARISON: 05/20/2016.     FINDINGS:  Lung bases demonstrate no consolidation or effusion.          Liver demonstrates homogeneous parenchyma, no definite mass. 1.6 cm cyst  of the right hepatic lobe is unchanged. Multiple gallstones are seen  again, no evidence for acute cholecystitis. No intra or extrahepatic  biliary ductal dilatation.      The spleen is unremarkable. Pancreas is unremarkable, no pancreatic  ductal dilatation. Adrenal glands are within normal limits.     Kidneys do not demonstrate hydronephrosis. No definite renal calculi.  Urinary bladder is unremarkable.         Gastrostomy tube is in position. Small and large bowel loops are within  normal limits. Appendix is normal. Large amount of stool in the rectum,  concerning for fecal impaction.     No significant retroperitoneal lymphadenopathy. Uterus is not  significantly changed in size and demonstrates multiple hyperdense  masses, there appears to be hyperdense material within the lumen of the  uterus, in keeping with known vaginal bleeding.          Impression:       1.  Multiple hyperdense masses in the uterus, may be hemorrhagic uterine  lesions, in keeping with history. differential diagnoses includes  fibroids, more aggressive lesions cannot be entirely excluded.  Overall  uterine size has not significantly changed.  2.  Stable 1.6 cm cyst of the right hepatic lobe. Findings of fecal  impaction.     Findings called to Dr. Clifford, 9:35 PM.               Assessment:      Active Problems:    Abnormal vaginal bleeding    Schizo affective schizophrenia    CHF (congestive heart failure)    Endometrial cancer determined by uterine biopsy    Coronary artery disease    Lymphedema    Immobility      Patient Active Problem List   Diagnosis Code   • Elevated troponin R79.89   • Aspiration pneumonia J69.0   • Hematuria R31.9   • Hypokalemia E87.6   • Pelvic mass in female R19.00   • Fecal impaction K56.41   • Endometrial carcinoma C54.1   • Acute on chronic respiratory failure with hypoxia and hypercapnia J96.21, J96.22   • Acute on chronic diastolic CHF (congestive heart failure) I50.33   • UTI (urinary tract infection) N39.0   • Leucocytosis D72.829   • Chronic diastolic CHF (congestive heart failure) I50.32   • DM (diabetes mellitus) E11.9   • Morbid obesity E66.01   • HTN (hypertension) I10   • Schizophrenia F20.9   • Tracheostomy malfunction J95.03   • Pyuria N39.0   • THAI (obstructive sleep apnea) G47.33   • Generalized weakness R53.1   • Schizo affective schizophrenia F25.0   • Diabetes mellitus E11.9   • Coronary artery disease I25.10   • Endometrial cancer determined by uterine biopsy C54.1, Z15.04   • Chronic respiratory failure with hypoxia and hypercapnia J96.11, J96.12   • Toxic metabolic encephalopathy G92   • Pneumonia J18.9   • Abnormal vaginal bleeding N93.9   • Schizo affective schizophrenia F25.0   • CHF (congestive heart failure) I50.9   • Endometrial cancer determined by uterine biopsy C54.1, Z15.04   • Coronary artery disease I25.10   • Lymphedema I89.0   • Immobility Z74.09       Plan:    F/U further gyn recommendations   Patient continues to bleed, if Hbg keep dropping, then it should be considered as an emergency situation and needing emergency surgery.  Diet:  CCHO  Monitor and correct electrolytes  Monitor mental status  Continue Accuchecks and SSI  DVT/stress ulcer prophylaxis  Labs in am    Shane Barcenas MD  6/21/2017  9:24 AM        Much of this encounter note is an electronic transcription/translation of spoken language to printed text. The electronic translation of spoken language may permit erroneous, or at times, nonsensical words or phrases to be inadvertently transcribed; Although I have reviewed the note for such errors, some may still exist

## 2017-06-21 NOTE — PROGRESS NOTES
Continued Stay Note  [facility]     Patient Name: Lupe Burleson  MRN: [id number]  Today's Date: 6/21/2017    Admit Date: 6/18/2017          Discharge Plan       06/21/17 1547    Case Management/Social Work Plan    Plan Return to [facility] level of care    Additional Comments I faxed to both Dr Washington's office [phone] & Dr Barcenas's office [phone] the letter that was sent to me from 's State Guardian  Helene Pepe ([phone] ext. [phone]) advising of information needed to take request for non-emergent organ removal (hysterectomy) to the court for approval and advised the court only reviews these request on Thursdays.              Discharge Codes     None            Maria Ines Sebastian RN

## 2017-06-21 NOTE — PLAN OF CARE
Problem: Patient Care Overview (Adult)  Goal: Plan of Care Review  Outcome: Ongoing (interventions implemented as appropriate)    06/21/17 0402   Coping/Psychosocial Response Interventions   Plan Of Care Reviewed With patient   Patient Care Overview   Progress no change   Outcome Evaluation   Outcome Summary/Follow up Plan tolerated po meds well, slow to respond but able to repond appropriately. small amount of vaginal bleeding, clots noted.        Goal: Adult Individualization and Mutuality  Outcome: Ongoing (interventions implemented as appropriate)  Goal: Discharge Needs Assessment  Outcome: Ongoing (interventions implemented as appropriate)    Problem: Skin Integrity Impairment, Risk/Actual (Adult)  Goal: Identify Related Risk Factors and Signs and Symptoms  Outcome: Ongoing (interventions implemented as appropriate)  Goal: Skin Integrity/Wound Healing  Outcome: Ongoing (interventions implemented as appropriate)    Problem: Fall Risk (Adult)  Goal: Absence of Falls  Outcome: Ongoing (interventions implemented as appropriate)

## 2017-06-21 NOTE — PROGRESS NOTES
"DAILY PROGRESS NOTE    Patient Identification:  Name: Lupe Burleson  Age: 61 y.o.  Sex: Female  :  1955  MRN:[id number]    Cheif complaint:  Chief Complaint   Patient presents with   • Vaginal Bleeding     pt with \"excessive bleeding\" and a history of endometrial CA       Subjective:  Patient mildly interactive. She is angry about NPO status.  Nursing reports patient was made NPO in case surgery could be performed today; however, she continues to take Plavix which contraindicates elective/pallative surgery none-the-less.    Objective:  Scheduled Meds:    atorvastatin 20 mg Per G Tube Daily   bumetanide 2 mg Per G Tube Daily   insulin aspart 0-9 Units Subcutaneous 4x Daily With Meals & Nightly   isosorbide mononitrate 30 mg Per G Tube Daily   lactulose 20 g Oral Daily   pantoprazole 40 mg Oral Q AM   potassium chloride 20 mEq Per G Tube Daily   risperiDONE 2 mg Oral QAM   risperiDONE 3 mg Oral Nightly   Valproic Acid 250 mg Oral Q12H       Continuous Infusions:    sodium chloride 75 mL/hr Last Rate: 75 mL/hr (17)       PRN Meds:  dextrose  •  dextrose  •  glucagon (human recombinant)  •  HYDROcodone-acetaminophen  •  ipratropium-albuterol  •  LORazepam    Intake/Output:  I/O last 3 completed shifts:  In: 4751 [P.O.:2270; I.V.:2481]  Out: 0     Exam:  /56 (BP Location: Right arm, Patient Position: Lying)  Pulse 65  Temp 97.2 °F (36.2 °C) (Oral)   Resp 18  Ht 63\" (160 cm)  Wt 253 lb 12.8 oz (115 kg)  SpO2 99%  BMI 44.96 kg/m2    Physical Examination:   General appearance - chronically ill appearing, alert, and in no distress  Chest - clear to auscultation, no wheezes, rales or rhonchi, symmetric air entry  Heart - normal rate, regular rhythm, normal S1, S2, no murmurs, rubs, clicks or gallops  Abdomen - soft, nontender, nondistended,      Data Review:  Recent Results (from the past 36 hour(s))   POC Glucose Fingerstick    Collection Time: 17  9:30 PM   Result Value Ref Range    " Glucose 100 70 - 130 mg/dL   POC Glucose Fingerstick    Collection Time: 06/20/17  5:54 AM   Result Value Ref Range    Glucose 89 70 - 130 mg/dL   Basic Metabolic Panel    Collection Time: 06/20/17  5:55 AM   Result Value Ref Range    Glucose 91 65 - 99 mg/dL    BUN 11 8 - 23 mg/dL    Creatinine 0.64 0.57 - 1.00 mg/dL    Sodium 137 136 - 145 mmol/L    Potassium 4.3 3.5 - 5.2 mmol/L    Chloride 100 98 - 107 mmol/L    CO2 27.5 22.0 - 29.0 mmol/L    Calcium 9.0 8.6 - 10.5 mg/dL    eGFR  African Amer 114 >60 mL/min/1.73    BUN/Creatinine Ratio 17.2 7.0 - 25.0    Anion Gap 9.5 mmol/L   CBC Auto Differential    Collection Time: 06/20/17  5:55 AM   Result Value Ref Range    WBC 5.41 4.50 - 10.70 10*3/mm3    RBC 3.73 (L) 3.90 - 5.20 10*6/mm3    Hemoglobin 9.6 (L) 11.9 - 15.5 g/dL    Hematocrit 30.7 (L) 35.6 - 45.5 %    MCV 82.3 80.5 - 98.2 fL    MCH 25.7 (L) 26.9 - 32.0 pg    MCHC 31.3 (L) 32.4 - 36.3 g/dL    RDW 17.0 (H) 11.7 - 13.0 %    RDW-SD 51.4 37.0 - 54.0 fl    MPV 10.7 6.0 - 12.0 fL    Platelets 301 140 - 500 10*3/mm3    Neutrophil % 58.8 42.7 - 76.0 %    Lymphocyte % 25.3 19.6 - 45.3 %    Monocyte % 12.2 (H) 5.0 - 12.0 %    Eosinophil % 3.3 0.3 - 6.2 %    Basophil % 0.4 0.0 - 1.5 %    Immature Grans % 0.0 0.0 - 0.5 %    Neutrophils, Absolute 3.18 1.90 - 8.10 10*3/mm3    Lymphocytes, Absolute 1.37 0.90 - 4.80 10*3/mm3    Monocytes, Absolute 0.66 0.20 - 1.20 10*3/mm3    Eosinophils, Absolute 0.18 0.00 - 0.70 10*3/mm3    Basophils, Absolute 0.02 0.00 - 0.20 10*3/mm3    Immature Grans, Absolute 0.00 0.00 - 0.03 10*3/mm3   POC Glucose Fingerstick    Collection Time: 06/20/17 12:00 PM   Result Value Ref Range    Glucose 97 70 - 130 mg/dL   POC Glucose Fingerstick    Collection Time: 06/20/17  4:50 PM   Result Value Ref Range    Glucose 98 70 - 130 mg/dL   POC Glucose Fingerstick    Collection Time: 06/20/17  9:26 PM   Result Value Ref Range    Glucose 98 70 - 130 mg/dL   Basic Metabolic Panel    Collection Time: 06/21/17   5:24 AM   Result Value Ref Range    Glucose 101 (H) 65 - 99 mg/dL    BUN 8 8 - 23 mg/dL    Creatinine 0.68 0.57 - 1.00 mg/dL    Sodium 142 136 - 145 mmol/L    Potassium 3.5 3.5 - 5.2 mmol/L    Chloride 102 98 - 107 mmol/L    CO2 30.0 (H) 22.0 - 29.0 mmol/L    Calcium 8.5 (L) 8.6 - 10.5 mg/dL    eGFR  African Amer 106 >60 mL/min/1.73    BUN/Creatinine Ratio 11.8 7.0 - 25.0    Anion Gap 10.0 mmol/L   CBC Auto Differential    Collection Time: 06/21/17  5:24 AM   Result Value Ref Range    WBC 5.17 4.50 - 10.70 10*3/mm3    RBC 3.39 (L) 3.90 - 5.20 10*6/mm3    Hemoglobin 8.7 (L) 11.9 - 15.5 g/dL    Hematocrit 27.7 (L) 35.6 - 45.5 %    MCV 81.7 80.5 - 98.2 fL    MCH 25.7 (L) 26.9 - 32.0 pg    MCHC 31.4 (L) 32.4 - 36.3 g/dL    RDW 17.0 (H) 11.7 - 13.0 %    RDW-SD 50.7 37.0 - 54.0 fl    MPV 10.2 6.0 - 12.0 fL    Platelets 292 140 - 500 10*3/mm3    Neutrophil % 62.3 42.7 - 76.0 %    Lymphocyte % 26.1 19.6 - 45.3 %    Monocyte % 7.7 5.0 - 12.0 %    Eosinophil % 3.7 0.3 - 6.2 %    Basophil % 0.2 0.0 - 1.5 %    Immature Grans % 0.0 0.0 - 0.5 %    Neutrophils, Absolute 3.22 1.90 - 8.10 10*3/mm3    Lymphocytes, Absolute 1.35 0.90 - 4.80 10*3/mm3    Monocytes, Absolute 0.40 0.20 - 1.20 10*3/mm3    Eosinophils, Absolute 0.19 0.00 - 0.70 10*3/mm3    Basophils, Absolute 0.01 0.00 - 0.20 10*3/mm3    Immature Grans, Absolute 0.00 0.00 - 0.03 10*3/mm3   POC Glucose Fingerstick    Collection Time: 06/21/17  5:46 AM   Result Value Ref Range    Glucose 100 70 - 130 mg/dL         Assessment/Plan:  Papillary serous carcinoma of the uterus.  Considering palliative hysterectomy to control vaginal bleeding.   Cardiac consult for pre-operative clearance. + clearance at high risk  Co-morbidities- CHF, CAD, DM, obesity, history of respiratory failture  Dr. Washington tried to contact State guardian, Helene Pepe, yesterday 06/20/2017 but was unsuccessful.   Plavix on hold (last dose 06/20/2017)    If patient is discharge, we can follow outpatiently  and schedule surgery once consent obtained.    MD to follow    VASHTI Sanchez    6/21/2017  8:06 AM     Gynecologic Oncology Attending Physician:  History reviewed with VASHTI Webber.  The patient has no specific complaints this afternoon.  She reports that she continues to bleed vaginally.  He is agreeable to surgery.    Physical exam: Min remains soft.    Agree with assessment and plans as outlined above by VASHTI Webber.    Appreciate cardiology input.  I have attempted to contact the patient's guardian, Helene Pepe, however I been unsuccessful.  I did leave a message on her voicemail.  Once I speak with the patient's guardian and obtain permission to proceed with surgery, we will attempt to schedule the procedure.  Most likely I will not be up to perform the procedure until next week at the earliest.  If the patient is stable, she could be discharged back to her residential facility, with plans to return for scheduled surgery.    Electronically signed by:  Ortiz Washington MD 6/21/2017 4:58 PM

## 2017-06-21 NOTE — CONSULTS
"                       [facility]  Cardiology Consult Note  .  Patient Identification:  Name: Lupe Burleson  Age: 61 y.o.  Sex: female  :  1955  MRN: [id number]            Requesting Physician: Dr Mary  Reason for Consultation: Preop eval    Chief Complaint   Patient presents with   • Vaginal Bleeding     pt with \"excessive bleeding\" and a history of endometrial CA       HPI     Patient is a 61-year-old -American female, resident of nursing home, with history for bipolar disorder, schizophrenia, arthritis, congestive heart failure possible, coronary disease, diabetes, hypertension, morbid obesity, who presents for reported by the staff and the nursing home with vaginal bleed.. . Denies shortness of breath. Appears comfortable, in no acute distress. Resting lying back . No orthopnea or PND. When asked other simple questions falls back to appears sleeping.She was seen in past underwent cardiac testing . Past Cardiolite stress test May 2016 shows no ischemia. Past Echocardiogram with Doppler shows normal left ventricular systolic function. Diastolic dysfunction grade 1 present. Echo in may of this year showed normal LVEF    Stress test in May of last year showed no ischemia and normal LVEF. She is mostly bed bound and denies any angina.    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    Past Medical History:  Past Medical History:   Diagnosis Date   • Acute respiratory distress syndrome    • Arthritis    • Bipolar disorder, unspecified    • CAD (coronary artery disease)    • Cellulitis    • Cellulitis    • CHF (congestive heart failure)    • Chronic ischemic heart disease    • Chronic respiratory failure with hypoxia and hypercapnia    • Chronic ulcer of calf    • Constipation    • Coronary artery disease    • Depression    • Depressive disorder    • Diabetes mellitus    • Dysphagia    • Dysphagia    • Endometrial cancer determined by uterine biopsy     s/p " radiation; no improvement   • Endometrial hyperplasia    • History of transfusion    • Hypercapnia    • Hypertension    • Immobility    • Lack of coordination    • Lymphedema    • Malignant neoplasm of uterus    • Muscle weakness    • Obesities, morbid    • Oxygen dependent    • Post-menopausal bleeding    • Postmenopausal bleeding    • Schizo affective schizophrenia    • Skin ulcer    • Sleep apnea    • Stroke    • Symbolic dysfunction    • Uterine cancer    • Venous insufficiency    • Venous insufficiency      Past Surgical History:  Past Surgical History:   Procedure Laterality Date   • D&C HYSTEROSCOPY     • LAPAROSCOPIC TUBAL LIGATION     • TRACHEOSTOMY AND PEG TUBE INSERTION     • UMBILICAL HERNIA REPAIR     • WOUND DEBRIDEMENT        Current Meds:     Current Facility-Administered Medications:   •  atorvastatin (LIPITOR) tablet 20 mg, 20 mg, Per G Tube, Daily, Shane Barcenas MD, 20 mg at 06/20/17 0932  •  bumetanide (BUMEX) tablet 2 mg, 2 mg, Per G Tube, Daily, Shane Barcenas MD, 2 mg at 06/20/17 0932  •  dextrose (D50W) solution 25 g, 25 g, Intravenous, Q15 Min PRN, Shane Barcenas MD  •  dextrose (GLUTOSE) oral gel 15 g, 15 g, Oral, Q15 Min PRN, Shane Barcenas MD  •  glucagon (human recombinant) (GLUCAGEN DIAGNOSTIC) injection 1 mg, 1 mg, Subcutaneous, Q15 Min PRN, Shane Barcenas MD  •  HYDROcodone-acetaminophen (NORCO) 5-325 MG per tablet 1 tablet, 1 tablet, Oral, Q6H PRN, Shane Barcenas MD  •  insulin aspart (novoLOG) injection 0-9 Units, 0-9 Units, Subcutaneous, 4x Daily With Meals & Nightly, Shane Barcenas MD  •  ipratropium-albuterol (DUO-NEB) nebulizer solution 3 mL, 3 mL, Nebulization, Q4H PRN, Shane Barcenas MD  •  isosorbide mononitrate (ISMO,MONOKET) tablet 30 mg, 30 mg, Per G Tube, Daily, Shane Barcenas MD, 30 mg at 06/20/17 0933  •  lactulose (CHRONULAC) 10 GM/15ML solution 20 g, 20 g, Oral, Daily, Shane Barcenas MD  •  LORazepam (ATIVAN) tablet 1 mg, 1 mg, Per G Tube, Q8H  "PRN, Shane Barcenas MD  •  pantoprazole (PROTONIX) EC tablet 40 mg, 40 mg, Oral, Q AM, Shane Barcenas MD, 40 mg at 06/20/17 0639  •  potassium chloride (KLOR-CON) packet 20 mEq, 20 mEq, Per G Tube, Daily, Shane Barcenas MD, 20 mEq at 06/20/17 0932  •  risperiDONE (risperDAL M-TABS) disintegrating tablet 2 mg, 2 mg, Oral, QAM, Shane Barcenas MD, 2 mg at 06/20/17 0639  •  risperiDONE (risperDAL M-TABS) disintegrating tablet 3 mg, 3 mg, Oral, Nightly, Shane Barcenas MD, 3 mg at 06/19/17 2010  •  sodium chloride 0.9 % infusion, 75 mL/hr, Intravenous, Continuous, Shane Barcenas MD, Last Rate: 75 mL/hr at 06/20/17 1828, 75 mL/hr at 06/20/17 1828  •  Valproic Acid (DEPAKENE) syrup 250 mg, 250 mg, Oral, Q12H, Shane Barcenas MD, 250 mg at 06/20/17 2029    Allergies:  Allergies   Allergen Reactions   • Codeine    • Penicillins      Tolerated ceftriaxone during 5/2017 admission   • Sulfa Antibiotics      Social History:   Social History   Substance Use Topics   • Smoking status: Former Smoker     Packs/day: 2.00     Years: 15.00     Types: Cigarettes     Quit date: 1988   • Smokeless tobacco: Not on file   • Alcohol use No      Family History:  Family History   Problem Relation Age of Onset   • Diabetes Mother    • Stroke Mother         Review of systems could not be obtained due to  patient confusion.  Review of systems:  Pertenant positives are as mentioned in the HPI and all the other    review of systems are negative    Objective:  /61 (BP Location: Right arm, Patient Position: Lying)  Pulse 77  Temp 99.5 °F (37.5 °C) (Oral)   Resp 18  Ht 63\" (160 cm)  Wt 258 lb 6.4 oz (117 kg)  SpO2 96%  BMI 45.77 kg/m2    I/O last 3 completed shifts:  In: 4330 [P.O.:2640; I.V.:1690]  Out: 0        Physical Examination: By     Physical Exam        General: No acute distress, obese   Skin: Warm and dry, no diaphoresis noted;    no pallor or cyanosis   HEENT: Normal Pupills; no conjunctiva erythema; " external ear and nose normal; oral mucosa moist, no xanthelasma, lips not cyanotic   Neck: Supple; no carotid bruits; mild JVD; thyroid not enlarged. Trachea mid line    Heart: [place]ha not palpable, S1S2  regular rate and rhythm; soft systolic murmurs, no rubs or gallops are auscultated.   Chest: Respirations regular, unlabored at rest, bilateral breath sounds have good air entry throughout all lung fields; no crackles,  or wheezes auscultated.     Abdomen: Soft, non-tender, non-distended, positive bowel sounds, no hepatomegaly, No Bruit   Extremities: Bilateral lower extremities have  pre-tibial pitting llymph edema; Radials pulses are palpable   Musculoskeletal:  Gait and station; in bedrest   No clubbing of the fingers   Neurological/  Psychological:  Alert and oriented; no new  motor deficits; speech and behavior appropriate;     Rest of the exam is stable     Lab Review:  Personally reviewed the labs, radiology imaging and other cardiac procedures.      Results from last 7 days  Lab Units 06/20/17  0555  06/18/17  1835   SODIUM mmol/L 137  < > 144   POTASSIUM mmol/L 4.3  < > 3.5   CHLORIDE mmol/L 100  < > 103   TOTAL CO2 mmol/L 27.5  < > 29.0   BUN mg/dL 11  < > 15   CREATININE mg/dL 0.64  < > 0.65   CALCIUM mg/dL 9.0  < > 9.0   BILIRUBIN mg/dL  --   --  0.4   ALK PHOS U/L  --   --  76   ALT (SGPT) U/L  --   --  11   AST (SGOT) U/L  --   --  8   GLUCOSE mg/dL 91  < > 107*   < > = values in this interval not displayed.      @LABRCNTbnp@    Results from last 7 days  Lab Units 06/20/17  0555 06/19/17  0626 06/18/17  1836   WBC 10*3/mm3 5.41 6.02 5.76   HEMOGLOBIN g/dL 9.6* 10.1* 10.2*   HEMATOCRIT % 30.7* 32.0* 33.0*   PLATELETS 10*3/mm3 301 279 299       Results from last 7 days  Lab Units 06/19/17  0626   INR  1.10         @LABRCNTIP(chol,trig,hdl,ldl)    I personally viewed and interpreted the patient's EKG/Telemetry data    ECG: sinus  Echocardiogram: normal LVEF    XRAY:  )  Imaging Results (last 24 hours)      ** No results found for the last 24 hours. **             Patient Active Problem List   Diagnosis   • Elevated troponin   • Aspiration pneumonia   • Hematuria   • Hypokalemia   • Pelvic mass in female   • Fecal impaction   • Endometrial carcinoma   • Acute on chronic respiratory failure with hypoxia and hypercapnia   • Acute on chronic diastolic CHF (congestive heart failure)   • UTI (urinary tract infection)   • Leucocytosis   • Chronic diastolic CHF (congestive heart failure)   • DM (diabetes mellitus)   • Morbid obesity   • HTN (hypertension)   • Schizophrenia   • Tracheostomy malfunction   • Pyuria   • THAI (obstructive sleep apnea)   • Generalized weakness   • Schizo affective schizophrenia   • Diabetes mellitus   • Coronary artery disease   • Endometrial cancer determined by uterine biopsy   • Chronic respiratory failure with hypoxia and hypercapnia   • Toxic metabolic encephalopathy   • Pneumonia   • Abnormal vaginal bleeding   • Schizo affective schizophrenia   • CHF (congestive heart failure)   • Endometrial cancer determined by uterine biopsy   • Coronary artery disease   • Lymphedema   • Immobility          Assessment/ Plan :    Overall she has multiple comorbidities and her risk is going to be high. But the importance of surgery is noted. Can proceed with surgery. Recommmend to avoid hypotension post op and telemtery monitoring. She is prone to respiratoy failure with any sedation as well.      Thank you for requesting the consult and please call me if has any further questions on this consult    I not only counseled the patient today on the significant factors noted in the assessment and plan, but I also recommended that the patient reduce salt and saturated animal fat intake in diet, as well as to perform scheduled exercise on a regular basis.    Sage Lake MD,FACC  6/21/2017, 12:02 AM    Patient personally interviewed and above subjective findings personally confirmed during a face to face  contact with patient today, and I personally performed the  physical examination.  All labs, cardiac procedures,pertinent radiographs of the last 24 hours personally reviewed, Impression and plans discussed/elaborated and implemented by me or jointly as described above -----------Sage Lake MD

## 2017-06-21 NOTE — PROGRESS NOTES
Continued Stay Note  [facility]     Patient Name: Lupe Burleson  MRN: [id number]  Today's Date: 6/21/2017    Admit Date: 6/18/2017          Discharge Plan       06/21/17 0955    Case Management/Social Work Plan    Plan Return to [facility] level of care    Additional Comments I spoke with  Helene Pepe(State guardian) at [phone] ext. [phone], I advised of planned surgical procedure & ask if she has reviewed with a doctor, she said she has not had a call or voice mail from a doctor re patients surgery.  She said if a surgical procedure is not an emergency and pt is a [place] of the [facility] there is paperwork that must be done,  the court reviewes these request on Thursdays, she said if the paperwork is not ready for review tomorrow it will not be presented to the court until the following Thursday.  I have updated attending.              Discharge Codes     None            Maria Ines Sebastian RN

## 2017-06-21 NOTE — PLAN OF CARE
Problem: Patient Care Overview (Adult)  Goal: Plan of Care Review  Outcome: Ongoing (interventions implemented as appropriate)    06/21/17 2719   Coping/Psychosocial Response Interventions   Plan Of Care Reviewed With patient   Patient Care Overview   Progress no change   Outcome Evaluation   Outcome Summary/Follow up Plan Wander Po meds. Vag bleeding same, small amt of clots present. Cardio ok with surg, now awaiting ok from state to proceed.        Goal: Adult Individualization and Mutuality  Outcome: Ongoing (interventions implemented as appropriate)  Goal: Discharge Needs Assessment  Outcome: Ongoing (interventions implemented as appropriate)    Problem: Skin Integrity Impairment, Risk/Actual (Adult)  Goal: Skin Integrity/Wound Healing  Outcome: Ongoing (interventions implemented as appropriate)    Problem: Fall Risk (Adult)  Goal: Absence of Falls  Outcome: Ongoing (interventions implemented as appropriate)

## 2017-06-22 PROBLEM — D62 ACUTE BLOOD LOSS ANEMIA: Status: ACTIVE | Noted: 2017-06-22

## 2017-06-22 LAB
ANION GAP SERPL CALCULATED.3IONS-SCNC: 9.9 MMOL/L
BASOPHILS # BLD AUTO: 0.01 10*3/MM3 (ref 0–0.2)
BASOPHILS NFR BLD AUTO: 0.2 % (ref 0–1.5)
BUN BLD-MCNC: 10 MG/DL (ref 8–23)
BUN/CREAT SERPL: 20.8 (ref 7–25)
CALCIUM SPEC-SCNC: 8.8 MG/DL (ref 8.6–10.5)
CHLORIDE SERPL-SCNC: 104 MMOL/L (ref 98–107)
CO2 SERPL-SCNC: 29.1 MMOL/L (ref 22–29)
CREAT BLD-MCNC: 0.48 MG/DL (ref 0.57–1)
DEPRECATED RDW RBC AUTO: 50 FL (ref 37–54)
EOSINOPHIL # BLD AUTO: 0.22 10*3/MM3 (ref 0–0.7)
EOSINOPHIL NFR BLD AUTO: 3.6 % (ref 0.3–6.2)
ERYTHROCYTE [DISTWIDTH] IN BLOOD BY AUTOMATED COUNT: 17.2 % (ref 11.7–13)
GFR SERPL CREATININE-BSD FRML MDRD: >150 ML/MIN/1.73
GLUCOSE BLD-MCNC: 95 MG/DL (ref 65–99)
GLUCOSE BLDC GLUCOMTR-MCNC: 129 MG/DL (ref 70–130)
GLUCOSE BLDC GLUCOMTR-MCNC: 85 MG/DL (ref 70–130)
GLUCOSE BLDC GLUCOMTR-MCNC: 87 MG/DL (ref 70–130)
GLUCOSE BLDC GLUCOMTR-MCNC: 94 MG/DL (ref 70–130)
HCT VFR BLD AUTO: 26.1 % (ref 35.6–45.5)
HCT VFR BLD AUTO: 27.7 % (ref 35.6–45.5)
HGB BLD-MCNC: 8.4 G/DL (ref 11.9–15.5)
HGB BLD-MCNC: 8.8 G/DL (ref 11.9–15.5)
IMM GRANULOCYTES # BLD: 0.02 10*3/MM3 (ref 0–0.03)
IMM GRANULOCYTES NFR BLD: 0.3 % (ref 0–0.5)
LYMPHOCYTES # BLD AUTO: 1.81 10*3/MM3 (ref 0.9–4.8)
LYMPHOCYTES NFR BLD AUTO: 29.9 % (ref 19.6–45.3)
MCH RBC QN AUTO: 25.4 PG (ref 26.9–32)
MCHC RBC AUTO-ENTMCNC: 31.8 G/DL (ref 32.4–36.3)
MCV RBC AUTO: 80.1 FL (ref 80.5–98.2)
MONOCYTES # BLD AUTO: 0.49 10*3/MM3 (ref 0.2–1.2)
MONOCYTES NFR BLD AUTO: 8.1 % (ref 5–12)
NEUTROPHILS # BLD AUTO: 3.5 10*3/MM3 (ref 1.9–8.1)
NEUTROPHILS NFR BLD AUTO: 57.9 % (ref 42.7–76)
PLATELET # BLD AUTO: 317 10*3/MM3 (ref 140–500)
PMV BLD AUTO: 10.2 FL (ref 6–12)
POTASSIUM BLD-SCNC: 3.6 MMOL/L (ref 3.5–5.2)
RBC # BLD AUTO: 3.46 10*6/MM3 (ref 3.9–5.2)
SODIUM BLD-SCNC: 143 MMOL/L (ref 136–145)
WBC NRBC COR # BLD: 6.05 10*3/MM3 (ref 4.5–10.7)

## 2017-06-22 PROCEDURE — G0378 HOSPITAL OBSERVATION PER HR: HCPCS

## 2017-06-22 PROCEDURE — 85025 COMPLETE CBC W/AUTO DIFF WBC: CPT | Performed by: NURSE PRACTITIONER

## 2017-06-22 PROCEDURE — 80048 BASIC METABOLIC PNL TOTAL CA: CPT | Performed by: NURSE PRACTITIONER

## 2017-06-22 PROCEDURE — 85018 HEMOGLOBIN: CPT | Performed by: INTERNAL MEDICINE

## 2017-06-22 PROCEDURE — 82962 GLUCOSE BLOOD TEST: CPT

## 2017-06-22 PROCEDURE — 85014 HEMATOCRIT: CPT | Performed by: INTERNAL MEDICINE

## 2017-06-22 PROCEDURE — 96361 HYDRATE IV INFUSION ADD-ON: CPT

## 2017-06-22 RX ORDER — MEGESTROL ACETATE 40 MG/1
80 TABLET ORAL 4 TIMES DAILY
Status: DISCONTINUED | OUTPATIENT
Start: 2017-06-22 | End: 2017-06-23 | Stop reason: HOSPADM

## 2017-06-22 RX ADMIN — SODIUM CHLORIDE 75 ML/HR: 9 INJECTION, SOLUTION INTRAVENOUS at 14:05

## 2017-06-22 RX ADMIN — POTASSIUM CHLORIDE 20 MEQ: 1.5 POWDER, FOR SOLUTION ORAL at 08:36

## 2017-06-22 RX ADMIN — VALPROIC ACID 250 MG: 250 SOLUTION ORAL at 21:10

## 2017-06-22 RX ADMIN — ISOSORBIDE MONONITRATE 30 MG: 20 TABLET ORAL at 08:35

## 2017-06-22 RX ADMIN — MEGESTROL ACETATE 80 MG: 40 TABLET ORAL at 21:28

## 2017-06-22 RX ADMIN — LACTULOSE 20 G: 20 SOLUTION ORAL at 08:44

## 2017-06-22 RX ADMIN — RISPERIDONE 2 MG: 1 TABLET, ORALLY DISINTEGRATING ORAL at 06:31

## 2017-06-22 RX ADMIN — ATORVASTATIN CALCIUM 20 MG: 20 TABLET, FILM COATED ORAL at 08:35

## 2017-06-22 RX ADMIN — RISPERIDONE 3 MG: 1 TABLET, ORALLY DISINTEGRATING ORAL at 21:10

## 2017-06-22 RX ADMIN — VALPROIC ACID 250 MG: 250 SOLUTION ORAL at 08:36

## 2017-06-22 RX ADMIN — PANTOPRAZOLE SODIUM 40 MG: 40 TABLET, DELAYED RELEASE ORAL at 06:31

## 2017-06-22 RX ADMIN — BUMETANIDE 2 MG: 0.5 TABLET ORAL at 08:35

## 2017-06-22 RX ADMIN — SODIUM CHLORIDE 75 ML/HR: 9 INJECTION, SOLUTION INTRAVENOUS at 21:10

## 2017-06-22 NOTE — PLAN OF CARE
Problem: Patient Care Overview (Adult)  Goal: Adult Individualization and Mutuality  Outcome: Ongoing (interventions implemented as appropriate)  Goal: Discharge Needs Assessment  Outcome: Ongoing (interventions implemented as appropriate)    Problem: Fluid Volume Deficit (Adult)  Goal: Fluid/Electrolyte Balance  Outcome: Ongoing (interventions implemented as appropriate)  Goal: Comfort/Well Being  Outcome: Ongoing (interventions implemented as appropriate)

## 2017-06-22 NOTE — PROGRESS NOTES
Continued Stay Note  [facility]     Patient Name: Lupe Burleson  MRN: [id number]  Today's Date: 6/22/2017    Admit Date: 6/18/2017          Discharge Plan       06/22/17 1447    Case Management/Social Work Plan    Additional Comments Angie with Dr Washington's office called to advise she has the information ready to send to pt's state guardian, I called pt's state Guardian: Helene Pepe: [phone] ext. [phone], she said the infomation can be faxed to her @ [phone] or emailed at [email or web address], I called Angie back with this information.      06/22/17 1409    Case Management/Social Work Plan    Plan Return to [facility] level of care    Additional Comments I spoke earlier via phone with Dr Barcenas, he said the nurse told him that I had submitted the information to the [facility] for pt to have a hysterectomy, I advised the clinical must come from the doctors and I have put the list of information on the front of pts chart and faxed to his and Dr Washington's office.  He said if pt's H&H remains stable then he will discharge tomorrow back to [facility].      06/22/17 1236    Case Management/Social Work Plan    Plan Return to [facility] level of care    Additional Comments Earlier today @ 0887 I left a voice mail at Dr Washington with request for return call from Ibis KINGSTON, she returnd my call a few minutes ago, I advised what must be done in order for pt to have hysterectomy and that I see in her note today pt can be discharged and return for non emergent surgery, she ask that I fax the information to the office attention to Angie, I advised I faxed it yesterday, she said then she should have it.              Discharge Codes     None            Maria Ines Sebastian RN

## 2017-06-22 NOTE — PROGRESS NOTES
Continued Stay Note  [facility]     Patient Name: Lupe Burleson  MRN: [id number]  Today's Date: 6/22/2017    Admit Date: 6/18/2017          Discharge Plan       06/22/17 0844    Case Management/Social Work Plan    Plan Return to [facility] level of care    Additional Comments Yesterday I spoke with pt's state guardian: Helene Sethp: [phone] ext. [phone], she said that she has not received a call or voice mail from a doctor re patient. I reviewed with Helene the plan for hysterectomy and pt has had cardiac clearance, she said in order for pt to have a non-emergent organ removal (hysterectomy) she will have to have a letter from two doctors with the reason pt needs a non emergent organ removal, then she will have to present that information to the court.  Helene emailed to me a list of what she will need to take the request to the court, I faxed to both Dr Barcenas &Padmini's office the list and advised the court only reviews request for non-emergent organ removal on Thursdays.  At this point we are not waiting on approval from the state for the procedure, we are waiting on the request to be made to the state via the letters to patients state guardian Helene Pepe.              Discharge Codes     None            Maria Ines Sebastian RN

## 2017-06-22 NOTE — SIGNIFICANT NOTE
06/22/17 1400   Rehab Evaluation   Evaluation Not Performed (Per notes from admission last year, pt is dependent and transferred with a lift at LTC facility. Discussed with CCP, pt will return to LTC facility at d/c. Therefore, pt not appropriate for skilled therapy. PT will sign off.)

## 2017-06-22 NOTE — PROGRESS NOTES
Continued Stay Note  [facility]     Patient Name: Lupe Burleson  MRN: [id number]  Today's Date: 6/22/2017    Admit Date: 6/18/2017          Discharge Plan       06/22/17 1236    Case Management/Social Work Plan    Plan Return to [facility] of care    Additional Comments Earlier today @ 0823 I left a voice mail at Dr Washington with request for return call from Ibis KINGSTON, she returnd my call a few minutes ago, I advised what must be done in order for pt to have non-emergent hysterectomy, I acknowledged that I see in her note today pt can be discharged and return for non emergent surgery.  Ibis asked that I fax the information list (from state guardian) to the office attention to Angie, I advised I faxed it yesterday, she said then she should have it.              Discharge Codes     None            Maria Ines Sebastian RN

## 2017-06-22 NOTE — PLAN OF CARE
Problem: Patient Care Overview (Adult)  Goal: Plan of Care Review  Outcome: Outcome(s) achieved Date Met:  06/22/17 06/22/17 0414   Coping/Psychosocial Response Interventions   Plan Of Care Reviewed With patient   Patient Care Overview   Progress no change   Outcome Evaluation   Outcome Summary/Follow up Plan small amount of vagina l bleeding, hgb last night at 8.7. awaiting approval from state for surgery       Goal: Adult Individualization and Mutuality  Outcome: Ongoing (interventions implemented as appropriate)  Goal: Discharge Needs Assessment  Outcome: Ongoing (interventions implemented as appropriate)    Problem: Skin Integrity Impairment, Risk/Actual (Adult)  Goal: Identify Related Risk Factors and Signs and Symptoms  Outcome: Outcome(s) achieved Date Met:  06/22/17  Goal: Skin Integrity/Wound Healing  Outcome: Ongoing (interventions implemented as appropriate)    Problem: Fall Risk (Adult)  Goal: Absence of Falls  Outcome: Ongoing (interventions implemented as appropriate)    Problem: Fluid Volume Deficit (Adult)  Goal: Identify Related Risk Factors and Signs and Symptoms  Outcome: Outcome(s) achieved Date Met:  06/22/17  Goal: Fluid/Electrolyte Balance  Outcome: Ongoing (interventions implemented as appropriate)  Goal: Comfort/Well Being  Outcome: Ongoing (interventions implemented as appropriate)

## 2017-06-22 NOTE — PROGRESS NOTES
"DAILY PROGRESS NOTE  [facility], [facility]      Patient Identification:  Name: Lupe Burleson  Age: 61 y.o.  Sex: female  :  1955  MRN: [id number]         Primary Care Physician: Shane Barcenas MD    Subjective:    No new events  Still bleeding  Hbg 8.7, 8.8  Cardiology has seen and cleared for surgery.   She denies CP, SOA, N/V/D.     Objective:    Scheduled Meds:    atorvastatin 20 mg Per G Tube Daily   bumetanide 2 mg Per G Tube Daily   insulin aspart 0-9 Units Subcutaneous 4x Daily With Meals & Nightly   isosorbide mononitrate 30 mg Per G Tube Daily   lactulose 20 g Oral Daily   pantoprazole 40 mg Oral Q AM   potassium chloride 20 mEq Per G Tube Daily   risperiDONE 2 mg Oral QAM   risperiDONE 3 mg Oral Nightly   Valproic Acid 250 mg Oral Q12H     Continuous Infusions:    sodium chloride 75 mL/hr Last Rate: 75 mL/hr (17)       Vital signs in last 24 hours:  Temp:  [97.2 °F (36.2 °C)-98.7 °F (37.1 °C)] 98.1 °F (36.7 °C)  Heart Rate:  [66-88] 68  Resp:  [18] 18  BP: (109-175)/(62-87) 115/69    tMax 24 hrs: Temp (24hrs), Av.2 °F (36.8 °C), Min:97.2 °F (36.2 °C), Max:98.7 °F (37.1 °C)      Intake/Output:    Intake/Output Summary (Last 24 hours) at 17 1235  Last data filed at 17 0900   Gross per 24 hour   Intake             1732 ml   Output               95 ml   Net             1637 ml       Exam:    /69 (BP Location: Right arm, Patient Position: Lying)  Pulse 68  Temp 98.1 °F (36.7 °C) (Oral)   Resp 18  Ht 63\" (160 cm)  Wt 253 lb 12.8 oz (115 kg)  SpO2 99%  BMI 44.96 kg/m2    General: Alert, cooperative, appears stated age. In no acute distress.    Head: Normocephalic, without obvious abnormality, atraumatic  Neck: Supple, symmetrical; trachea midline, without  adenopathy;              Thyroid without  enlargement/tenderness/nodules;              no carotid bruit or JVD  Cardiovascular: Normal rate, regular rhythm and " intact distal pulses.                              Exam reveals no gallop and no friction rub. No murmur heard  Pulmonary: Diminished BS bilaterally, respirations unlabored.                         No rhonchi, wheezing or rales.   Abdominal: Soft, non-tender, bowel sounds active all four quadrants;                       no masses,  hepatomegaly, or  splenomegaly.   Extremities: Chronic bilateral Lymphedema LE's, atraumatic; no cyanosis  Pulses:        2 + symmetric all extremities  Neurological: She is alert and oriented to person, place, and time.                          CNII-XII intact, normal strength, sensation intact throughout  Skin:  Smooth, without lesions, rash, or wounds. Warm, dry and intact.         Data Review:      Results from last 7 days  Lab Units 06/22/17  0733 06/21/17 1957 06/21/17  0524 06/20/17  0555   WBC 10*3/mm3 6.05  --  5.17 5.41   HEMOGLOBIN g/dL 8.8* 8.7* 8.7* 9.6*   HEMATOCRIT % 27.7* 27.5* 27.7* 30.7*   PLATELETS 10*3/mm3 317  --  292 301         Results from last 7 days  Lab Units 06/22/17  0733 06/21/17  0524 06/20/17  0555  06/18/17  1835   SODIUM mmol/L 143 142 137  < > 144   POTASSIUM mmol/L 3.6 3.5 4.3  < > 3.5   CHLORIDE mmol/L 104 102 100  < > 103   TOTAL CO2 mmol/L 29.1* 30.0* 27.5  < > 29.0   BUN mg/dL 10 8 11  < > 15   CREATININE mg/dL 0.48* 0.68 0.64  < > 0.65   CALCIUM mg/dL 8.8 8.5* 9.0  < > 9.0   BILIRUBIN mg/dL  --   --   --   --  0.4   ALK PHOS U/L  --   --   --   --  76   ALT (SGPT) U/L  --   --   --   --  11   AST (SGOT) U/L  --   --   --   --  8   GLUCOSE mg/dL 95 101* 91  < > 107*   < > = values in this interval not displayed.    Results from last 7 days  Lab Units 06/19/17  0626   INR  1.10         0  Lab Value Date/Time   TROPONINT 0.065 (H) 05/17/2017 0505   TROPONINT 0.077 (H) 05/16/2017 1517   TROPONINT 0.080 (H) 05/16/2017 1112   TROPONINT 0.078 (H) 05/16/2017 0703   TROPONINT 0.084 (H) 05/16/2017 0116   TROPONINT <0.010 05/15/2016 0622   TROPONINT 0.017  05/14/2016 0437   TROPONINT 0.023 05/13/2016 2036   TROPONINT 0.031 (H) 05/13/2016 1231   TROPONINT <0.01 02/03/2016 0537   TROPONINT <0.01 02/02/2016 0914   TROPONINT <0.01 11/11/2015 0516   TROPONINT <0.01 11/09/2015 2049   TROPONINT <0.01 04/18/2014 0517       Microbiology Results (last 10 days)     ** No results found for the last 240 hours. **           Imaging Results (last 7 days)     Procedure Component Value Units Date/Time    CT Abdomen Pelvis Without Contrast [[id number]] Collected:  06/18/17 2135     Updated:  06/19/17 0057    Narrative:       CT ABDOMEN AND PELVIS WITHOUT CONTRAST.     TECHNIQUE: A routine CT scan of the abdomen and pelvis was performed  with coronal and sagittal reconstructed images.     HISTORY: Vaginal bleeding.     COMPARISON: 05/20/2016.     FINDINGS:  Lung bases demonstrate no consolidation or effusion.          Liver demonstrates homogeneous parenchyma, no definite mass. 1.6 cm cyst  of the right hepatic lobe is unchanged. Multiple gallstones are seen  again, no evidence for acute cholecystitis. No intra or extrahepatic  biliary ductal dilatation.      The spleen is unremarkable. Pancreas is unremarkable, no pancreatic  ductal dilatation. Adrenal glands are within normal limits.     Kidneys do not demonstrate hydronephrosis. No definite renal calculi.  Urinary bladder is unremarkable.         Gastrostomy tube is in position. Small and large bowel loops are within  normal limits. Appendix is normal. Large amount of stool in the rectum,  concerning for fecal impaction.     No significant retroperitoneal lymphadenopathy. Uterus is not  significantly changed in size and demonstrates multiple hyperdense  masses, there appears to be hyperdense material within the lumen of the  uterus, in keeping with known vaginal bleeding.          Impression:       1.  Multiple hyperdense masses in the uterus, may be hemorrhagic uterine  lesions, in keeping with history. differential diagnoses  includes  fibroids, more aggressive lesions cannot be entirely excluded. Overall  uterine size has not significantly changed.  2.  Stable 1.6 cm cyst of the right hepatic lobe. Findings of fecal  impaction.     Findings called to Dr. Clifford, 9:35 PM.               Assessment:      Principal Problem:    Acute blood loss anemia  Active Problems:    Abnormal vaginal bleeding    Endometrial cancer determined by uterine biopsy    Chronic diastolic CHF (congestive heart failure)    DM (diabetes mellitus)    Schizo affective schizophrenia    Coronary artery disease    Lymphedema    Immobility      Patient Active Problem List   Diagnosis Code   • Elevated troponin R79.89   • Aspiration pneumonia J69.0   • Hematuria R31.9   • Hypokalemia E87.6   • Pelvic mass in female R19.00   • Fecal impaction K56.41   • Endometrial carcinoma C54.1   • Acute on chronic respiratory failure with hypoxia and hypercapnia J96.21, J96.22   • Acute on chronic diastolic CHF (congestive heart failure) I50.33   • UTI (urinary tract infection) N39.0   • Leucocytosis D72.829   • Chronic diastolic CHF (congestive heart failure) I50.32   • DM (diabetes mellitus) E11.9   • Morbid obesity E66.01   • HTN (hypertension) I10   • Schizophrenia F20.9   • Tracheostomy malfunction J95.03   • Pyuria N39.0   • THAI (obstructive sleep apnea) G47.33   • Generalized weakness R53.1   • Schizo affective schizophrenia F25.0   • Diabetes mellitus E11.9   • Coronary artery disease I25.10   • Endometrial cancer determined by uterine biopsy C54.1, Z15.04   • Chronic respiratory failure with hypoxia and hypercapnia J96.11, J96.12   • Toxic metabolic encephalopathy G92   • Pneumonia J18.9   • Abnormal vaginal bleeding N93.9   • Schizo affective schizophrenia F25.0   • CHF (congestive heart failure) I50.9   • Endometrial cancer determined by uterine biopsy C54.1, Z15.04   • Coronary artery disease I25.10   • Lymphedema I89.0   • Immobility Z74.09   • Acute blood loss anemia D62        Plan:    Continue monitor Hgb. Has stabilized at 8.7 so far, but still bleeding  Appreciate GYN input  Await permission from court re surgery  Diet: CCHO  Monitor and correct electrolytes  Monitor mental status  Continue Accuchecks and SSI  DVT/stress ulcer prophylaxis  Labs in am    Shane Barcenas MD  6/22/2017  12:35 PM        Much of this encounter note is an electronic transcription/translation of spoken language to printed text. The electronic translation of spoken language may permit erroneous, or at times, nonsensical words or phrases to be inadvertently transcribed; Although I have reviewed the note for such errors, some may still exist

## 2017-06-22 NOTE — PLAN OF CARE
Problem: Patient Care Overview (Adult)  Goal: Plan of Care Review  Outcome: Ongoing (interventions implemented as appropriate)    06/22/17 3860   Outcome Evaluation   Outcome Summary/Follow up Plan small amt. of vag. bleeding, hgb, remains >8 ,vss, no c/o pain. no acute distress noted.        Goal: Adult Individualization and Mutuality  Outcome: Ongoing (interventions implemented as appropriate)    Problem: Skin Integrity Impairment, Risk/Actual (Adult)  Goal: Skin Integrity/Wound Healing  Outcome: Ongoing (interventions implemented as appropriate)    Problem: Fluid Volume Deficit (Adult)  Goal: Fluid/Electrolyte Balance  Outcome: Ongoing (interventions implemented as appropriate)    Problem: Pressure Ulcer Risk (Sunny Scale) (Adult,Obstetrics,Pediatric)  Goal: Identify Related Risk Factors and Signs and Symptoms  Outcome: Ongoing (interventions implemented as appropriate)

## 2017-06-22 NOTE — PROGRESS NOTES
"                       [facility]  Cardiology Progress Note    Patient Identification:  Name:Lupe Burleson  Age:61 y.o.  Sex: female  :  1955  MRN: [id number]             Length of Stay: 3    Follow up for: Indeterminate troponin      Interval History :  No new symtpoms, clincially stable with no angina, ECG has been stable, more confused today, cardiac wise I intend not to do any aggressive work up      Vaginal Bleeding     Altered Mental Status       Patient is a 61-year-old -American female, resident of nursing home, with history for bipolar disorder, schizophrenia, arthritis, congestive heart failure ischemic heart disease, coronary disease, diabetes, hypertension, morbid obesity, who presents for reported by the staff and the nursing home to be \"barely responsive \"all day. Information obtained from medical record and small amount of information obtained from patient. She is lethargic, opens eyes when spoken to, frequently falling back to sleep during interview. When arouseable is alert and oriented. When asked reason for this hospital stay she states \"I don't know\" when asked where she lives \"nursing home \"when asked if having any chest pain or chest discomfort, says \"no\". When asked if having a shortness of breath says \"no\". When asked if has history of heart attack \"yes\". Denies shortness of breath. Appears comfortable, in no acute distress. Resting lying back . No orthopnea or PND. When asked other simple questions falls back to appears sleeping.  Reportedly had not eat or drink anything by NH staff. The staff was instructed to send the patient to the ER for further evaluation. In ER, showed elevated troponin of 0.084, was 0.078, for indeterminate range. Urine showed positive leukocytes, blood, TNTC WBCs. Vital signs: Pulse rate 53 /82 RR 20. Exam: Alert briefly, cooperative and appears in no acute distress cardiac: Normal rate, regular rhythm, tach pulses. No murmur, " gallop or rub pulmonary: Clear. No EKG noted. She was admitted for further evaluation and treatment. Cardiology consultation asked to evaluate patient with high risk history with elevated troponins per Dr Barcenas. She was seen in past underwent cardiac testing by Dr. Lake. Past Cardiolite stress test May 2016 shows no ischemia. Past Echocardiogram with Doppler shows normal left ventricular systolic function. Diastolic dysfunction grade 1 present.      Cardiac risk factors: Positive for diabetes. Positive hypertension. Unknown cholesterol.  Former smoker, 2 pack per day, ×15 years, quit 1988. Unknown family history for early CAD.   The following portions of the patient's history were reviewed and updated as appropriate: allergies, current medications, past family history, past medical history, past social history, past surgical history and problem list.  Past Medical History:   Diagnosis Date   • Acute respiratory distress syndrome    • Arthritis    • Bipolar disorder, unspecified    • CAD (coronary artery disease)    • Cellulitis    • Cellulitis    • CHF (congestive heart failure)    • Chronic ischemic heart disease    • Chronic respiratory failure with hypoxia and hypercapnia    • Chronic ulcer of calf    • Constipation    • Coronary artery disease    • Depression    • Depressive disorder    • Diabetes mellitus    • Dysphagia    • Dysphagia    • Endometrial cancer determined by uterine biopsy     s/p radiation; no improvement   • Endometrial hyperplasia    • History of transfusion    • Hypercapnia    • Hypertension    • Immobility    • Lack of coordination    • Lymphedema    • Malignant neoplasm of uterus    • Muscle weakness    • Obesities, morbid    • Oxygen dependent    • Post-menopausal bleeding    • Postmenopausal bleeding    • Schizo affective schizophrenia    • Skin ulcer    • Sleep apnea    • Stroke    • Symbolic dysfunction    • Uterine cancer    • Venous insufficiency    • Venous insufficiency         Scheduled Meds:    Current Facility-Administered Medications:   •  atorvastatin (LIPITOR) tablet 20 mg, 20 mg, Per G Tube, Daily, Shane Barcenas MD, 20 mg at 06/21/17 0809  •  bumetanide (BUMEX) tablet 2 mg, 2 mg, Per G Tube, Daily, Shane Barcenas MD, 2 mg at 06/21/17 0808  •  dextrose (D50W) solution 25 g, 25 g, Intravenous, Q15 Min PRN, Shane Barcenas MD  •  dextrose (GLUTOSE) oral gel 15 g, 15 g, Oral, Q15 Min PRN, Shane Barcenas MD  •  glucagon (human recombinant) (GLUCAGEN DIAGNOSTIC) injection 1 mg, 1 mg, Subcutaneous, Q15 Min PRN, Shane Barcenas MD  •  HYDROcodone-acetaminophen (NORCO) 5-325 MG per tablet 1 tablet, 1 tablet, Oral, Q6H PRN, Shane Barcenas MD  •  insulin aspart (novoLOG) injection 0-9 Units, 0-9 Units, Subcutaneous, 4x Daily With Meals & Nightly, Shane Barcenas MD  •  ipratropium-albuterol (DUO-NEB) nebulizer solution 3 mL, 3 mL, Nebulization, Q4H PRN, Shane Barcenas MD  •  isosorbide mononitrate (ISMO,MONOKET) tablet 30 mg, 30 mg, Per G Tube, Daily, Shane Barcenas MD, 30 mg at 06/21/17 0808  •  lactulose (CHRONULAC) 10 GM/15ML solution 20 g, 20 g, Oral, Daily, Shane Barcenas MD, 20 g at 06/21/17 0808  •  LORazepam (ATIVAN) tablet 1 mg, 1 mg, Per G Tube, Q8H PRN, Shane Bacrenas MD  •  pantoprazole (PROTONIX) EC tablet 40 mg, 40 mg, Oral, Q AM, Shane Barcenas MD, 40 mg at 06/21/17 0605  •  potassium chloride (KLOR-CON) packet 20 mEq, 20 mEq, Per G Tube, Daily, Shane Barcenas MD, 20 mEq at 06/21/17 0808  •  risperiDONE (risperDAL M-TABS) disintegrating tablet 2 mg, 2 mg, Oral, QAM, Shane Barcenas MD, 2 mg at 06/21/17 0605  •  risperiDONE (risperDAL M-TABS) disintegrating tablet 3 mg, 3 mg, Oral, Nightly, Shane Barcenas MD, 3 mg at 06/21/17 2006  •  sodium chloride 0.9 % infusion, 75 mL/hr, Intravenous, Continuous, Shane Barcenas MD, Last Rate: 75 mL/hr at 06/21/17 2010, 75 mL/hr at 06/21/17 2010  •  Valproic Acid (DEPAKENE) syrup 250 mg, 250 mg, Oral, Q12H,  "Shane Barcenas MD, 250 mg at 06/21/17 2006    The following systems were reviewed and negative;  cardiovascular    /66 (BP Location: Right arm, Patient Position: Lying)  Pulse 78  Temp 98.7 °F (37.1 °C) (Oral)   Resp 18  Ht 63\" (160 cm)  Wt 253 lb 12.8 oz (115 kg)  SpO2 92%  BMI 44.96 kg/m2       Intake/Output Summary (Last 24 hours) at 06/21/17 2144  Last data filed at 06/21/17 2009   Gross per 24 hour   Intake             1771 ml   Output                0 ml   Net             1771 ml          Wt Readings from Last 1 Encounters:   06/21/17 0550 253 lb 12.8 oz (115 kg)   06/18/17 2245 258 lb 6.4 oz (117 kg)   06/18/17 1717 260 lb (118 kg)       Physical Examination: By       Physical Exam    General: No acute distress. obese   Skin: Warm and dry, no diaphoresis noted   HEENT: No ptosis;  oral mucosa moist   Neck: Supple; no carotid bruits; no JVD, Trachea mid line   Heart: S1S2  regular rate and rhythm;  Soft systolic murmurs; no gallop or rub appreciated, No thrills palpable   Chest: Respirations unlabored at rest, bilateral breath sounds have good air entry; no  wheezes auscultated.     Abdomen: Soft, non-tender, non-distended, positive bowel sounds  ,No aneurysms palpable   Extremities: Bilateral lower extremities have no pre-tibial pitting edema; Radials are palpable   Neurological: Alert and oriented ; no new motor deficits,       Lab Review:  Personally reviewed the labs, radiology imaging and other cardiac procedures.       Results from last 7 days  Lab Units 06/21/17  0524  06/18/17  1835   SODIUM mmol/L 142  < > 144   POTASSIUM mmol/L 3.5  < > 3.5   CHLORIDE mmol/L 102  < > 103   TOTAL CO2 mmol/L 30.0*  < > 29.0   BUN mg/dL 8  < > 15   CREATININE mg/dL 0.68  < > 0.65   CALCIUM mg/dL 8.5*  < > 9.0   BILIRUBIN mg/dL  --   --  0.4   ALK PHOS U/L  --   --  76   ALT (SGPT) U/L  --   --  11   AST (SGOT) U/L  --   --  8   GLUCOSE mg/dL 101*  < > 107*   < > = values in this interval not " displayed.      @[id number]@    Results from last 7 days  Lab Units 06/21/17  1957 06/21/17  0524 06/20/17  0555 06/19/17  0626   WBC 10*3/mm3  --  5.17 5.41 6.02   HEMOGLOBIN g/dL 8.7* 8.7* 9.6* 10.1*   HEMATOCRIT % 27.5* 27.7* 30.7* 32.0*   PLATELETS 10*3/mm3  --  292 301 279       Results from last 7 days  Lab Units 06/19/17  0626   INR  1.10       Estimated Creatinine Clearance: 103.5 mL/min (by C-G formula based on Cr of 0.68).    I personally viewed and interpreted the patient's EKG/Telemetry data    ECG: SR        Echocardiogram:  ordered      Patient Active Problem List   Diagnosis   • Elevated troponin   • Aspiration pneumonia   • Hematuria   • Hypokalemia   • Pelvic mass in female   • Fecal impaction   • Endometrial carcinoma   • Acute on chronic respiratory failure with hypoxia and hypercapnia   • Acute on chronic diastolic CHF (congestive heart failure)   • UTI (urinary tract infection)   • Leucocytosis   • Chronic diastolic CHF (congestive heart failure)   • DM (diabetes mellitus)   • Morbid obesity   • HTN (hypertension)   • Schizophrenia   • Tracheostomy malfunction   • Pyuria   • THAI (obstructive sleep apnea)   • Generalized weakness   • Schizo affective schizophrenia   • Diabetes mellitus   • Coronary artery disease   • Endometrial cancer determined by uterine biopsy   • Chronic respiratory failure with hypoxia and hypercapnia   • Toxic metabolic encephalopathy   • Pneumonia   • Abnormal vaginal bleeding   • Schizo affective schizophrenia   • CHF (congestive heart failure)   • Endometrial cancer determined by uterine biopsy   • Coronary artery disease   • Lymphedema   • Immobility       Assessment/ Plan :  Elevated troponin During previous admission in May 2017: dropping 0.065->was0.080, toward the end of the hospital stay.  We'll recheck troponin and CK-MB and CK. She denies chest pain or shortness of breath. EKG shows sinus, inferior infarct, low voltage. Cardiolite imaging study shows no ischemia  nor infarction May 2017. Echocardiogram with Doppler in May 2017 showed normal ejection fraction.  Taking antiplatelet, anticoagulation, nitrate, beta blocker      -CAD- Taking Plavix, Lovenox 40 mg every 12 hours, Imdur, Lopressor, Crestor.  It would be okay to hold the Plavix prior to a semielective procedure.      -Chronic diastolic congestive heart failure-on O2 by nasal cannula. Takes oral Bumex.  Will check chest x-ray PA/ lateral     -Abnormal EKG:sinus, low voltage, inferior Q waves .  However, no MI on Cardiolite stress test a month ago.     -Hyperlipidemia-takes statin. LDL 43, excellent     -LV EF 55%     The patient will be at a moderate to high risk for GYN oncology surgery per Dr. Lake's note a month ago.  The recommendation was to make sure that she had adequate hydration to provide hypotension.  It sounds like the surgery will not be scheduled until next week as Plavix has to be held.  Make sure that she takes Crestor on the day of surgery.    I not only counseled the patient today on the significant factors noted in the assessment and plan, but I also recommended that the patient reduce salt and saturated animal fat intake in diet, as well as to perform scheduled exercise on a regular basis.    Mars Wilhelm M.D.  6/21/20174:05 PM

## 2017-06-22 NOTE — PROGRESS NOTES
Continued Stay Note  [facility]     Patient Name: Lupe Burleson  MRN: [id number]  Today's Date: 6/22/2017    Admit Date: 6/18/2017          Discharge Plan       06/22/17 1409    Case Management/Social Work Plan    Plan Return to [facility] level of care    Additional Comments I spoke earlier via phone with Dr Barcenas, he said the nurse told him that I had submitted the information to the [facility] for pt to have a hysterectomy, I advised the cliniical must come from the doctors and I have put the list of information on the front of pts char and faxed to his and Dr Washington's office.  He said if pt's H&H remains stable then he will discharge tomorrow back to [place].      06/22/17 1236    Case Management/Social Work Plan    Plan Return to [facility] level of care    Additional Comments Earlier today @ @ 0859 I left a voice mail at Dr Washington with request for return call from Ibis KINGSTON, she returnd my call a few minutes ago, I advised what must be done in order for pt to have hysterectomy and that I see in her note today pt can be discharged and return for non emergent surgery, she ask that I fax the information to the office attention to Angei, I advised I faxed it yesterday, she said then she should have it.              Discharge Codes     None            Maria Ines Sebastian RN

## 2017-06-22 NOTE — SIGNIFICANT NOTE
06/21/17 0754   Rehab Treatment   Discipline physical therapist   Rehab Evaluation   Evaluation Not Performed (No sx yet, discussed pt status w/ RN who reports yelling out when turned.  Waiting possible approval for sx 6/22. MIGUE kaur w/ PT plan to check back 6/23.  )   Recommendation   PT - Next Appointment 06/23/17

## 2017-06-23 VITALS
TEMPERATURE: 97 F | DIASTOLIC BLOOD PRESSURE: 54 MMHG | BODY MASS INDEX: 44.97 KG/M2 | OXYGEN SATURATION: 99 % | WEIGHT: 253.8 LBS | SYSTOLIC BLOOD PRESSURE: 98 MMHG | HEIGHT: 63 IN | RESPIRATION RATE: 18 BRPM | HEART RATE: 69 BPM

## 2017-06-23 LAB
ANION GAP SERPL CALCULATED.3IONS-SCNC: 10.6 MMOL/L
BUN BLD-MCNC: 8 MG/DL (ref 8–23)
BUN/CREAT SERPL: 16 (ref 7–25)
CALCIUM SPEC-SCNC: 8.9 MG/DL (ref 8.6–10.5)
CHLORIDE SERPL-SCNC: 106 MMOL/L (ref 98–107)
CO2 SERPL-SCNC: 28.4 MMOL/L (ref 22–29)
CREAT BLD-MCNC: 0.5 MG/DL (ref 0.57–1)
DEPRECATED RDW RBC AUTO: 49.6 FL (ref 37–54)
ERYTHROCYTE [DISTWIDTH] IN BLOOD BY AUTOMATED COUNT: 17 % (ref 11.7–13)
GFR SERPL CREATININE-BSD FRML MDRD: >150 ML/MIN/1.73
GLUCOSE BLD-MCNC: 96 MG/DL (ref 65–99)
GLUCOSE BLDC GLUCOMTR-MCNC: 101 MG/DL (ref 70–130)
GLUCOSE BLDC GLUCOMTR-MCNC: 92 MG/DL (ref 70–130)
GLUCOSE BLDC GLUCOMTR-MCNC: 98 MG/DL (ref 70–130)
HCT VFR BLD AUTO: 24.8 % (ref 35.6–45.5)
HCT VFR BLD AUTO: 27.2 % (ref 35.6–45.5)
HCT VFR BLD AUTO: 27.7 % (ref 35.6–45.5)
HCT VFR BLD AUTO: 27.7 % (ref 35.6–45.5)
HGB BLD-MCNC: 7.9 G/DL (ref 11.9–15.5)
HGB BLD-MCNC: 8.4 G/DL (ref 11.9–15.5)
HGB BLD-MCNC: 8.8 G/DL (ref 11.9–15.5)
HGB BLD-MCNC: 8.8 G/DL (ref 11.9–15.5)
MCH RBC QN AUTO: 25.6 PG (ref 26.9–32)
MCHC RBC AUTO-ENTMCNC: 31.8 G/DL (ref 32.4–36.3)
MCV RBC AUTO: 80.5 FL (ref 80.5–98.2)
PLATELET # BLD AUTO: 311 10*3/MM3 (ref 140–500)
PMV BLD AUTO: 10.1 FL (ref 6–12)
POTASSIUM BLD-SCNC: 3.6 MMOL/L (ref 3.5–5.2)
RBC # BLD AUTO: 3.44 10*6/MM3 (ref 3.9–5.2)
SODIUM BLD-SCNC: 145 MMOL/L (ref 136–145)
WBC NRBC COR # BLD: 6.9 10*3/MM3 (ref 4.5–10.7)

## 2017-06-23 PROCEDURE — G0378 HOSPITAL OBSERVATION PER HR: HCPCS

## 2017-06-23 PROCEDURE — 85027 COMPLETE CBC AUTOMATED: CPT | Performed by: INTERNAL MEDICINE

## 2017-06-23 PROCEDURE — 82962 GLUCOSE BLOOD TEST: CPT

## 2017-06-23 PROCEDURE — 85014 HEMATOCRIT: CPT | Performed by: INTERNAL MEDICINE

## 2017-06-23 PROCEDURE — 85018 HEMOGLOBIN: CPT | Performed by: INTERNAL MEDICINE

## 2017-06-23 PROCEDURE — 80048 BASIC METABOLIC PNL TOTAL CA: CPT | Performed by: INTERNAL MEDICINE

## 2017-06-23 PROCEDURE — 96361 HYDRATE IV INFUSION ADD-ON: CPT

## 2017-06-23 RX ORDER — MEGESTROL ACETATE 40 MG/1
80 TABLET ORAL 4 TIMES DAILY
Qty: 240 TABLET | Refills: 0 | Status: SHIPPED | OUTPATIENT
Start: 2017-06-23 | End: 2017-08-21 | Stop reason: HOSPADM

## 2017-06-23 RX ORDER — LORAZEPAM 1 MG/1
1 TABLET ORAL EVERY 8 HOURS PRN
Qty: 90 TABLET | Refills: 0 | Status: SHIPPED | OUTPATIENT
Start: 2017-06-23 | End: 2017-08-21 | Stop reason: HOSPADM

## 2017-06-23 RX ORDER — HYDROCODONE BITARTRATE AND ACETAMINOPHEN 5; 325 MG/1; MG/1
1 TABLET ORAL EVERY 6 HOURS PRN
Qty: 120 TABLET | Refills: 0 | Status: SHIPPED | OUTPATIENT
Start: 2017-06-23 | End: 2017-08-21 | Stop reason: HOSPADM

## 2017-06-23 RX ADMIN — ATORVASTATIN CALCIUM 20 MG: 20 TABLET, FILM COATED ORAL at 08:24

## 2017-06-23 RX ADMIN — ISOSORBIDE MONONITRATE 30 MG: 20 TABLET ORAL at 08:23

## 2017-06-23 RX ADMIN — BUMETANIDE 2 MG: 0.5 TABLET ORAL at 08:24

## 2017-06-23 RX ADMIN — RISPERIDONE 2 MG: 1 TABLET, ORALLY DISINTEGRATING ORAL at 05:59

## 2017-06-23 RX ADMIN — POTASSIUM CHLORIDE 20 MEQ: 1.5 POWDER, FOR SOLUTION ORAL at 08:24

## 2017-06-23 RX ADMIN — LACTULOSE 20 G: 20 SOLUTION ORAL at 08:24

## 2017-06-23 RX ADMIN — PANTOPRAZOLE SODIUM 40 MG: 40 TABLET, DELAYED RELEASE ORAL at 05:59

## 2017-06-23 RX ADMIN — MEGESTROL ACETATE 80 MG: 40 TABLET ORAL at 18:34

## 2017-06-23 RX ADMIN — HYDROCODONE BITARTRATE AND ACETAMINOPHEN 1 TABLET: 5; 325 TABLET ORAL at 09:07

## 2017-06-23 RX ADMIN — HYDROCODONE BITARTRATE AND ACETAMINOPHEN 1 TABLET: 5; 325 TABLET ORAL at 17:24

## 2017-06-23 RX ADMIN — VALPROIC ACID 250 MG: 250 SOLUTION ORAL at 08:24

## 2017-06-23 RX ADMIN — MEGESTROL ACETATE 80 MG: 40 TABLET ORAL at 08:23

## 2017-06-23 NOTE — PROGRESS NOTES
Case Management/Social Work    Patient Name:  Lupe Burleson  YOB: 1955  MRN: [id number]  Admit Date:  6/18/2017    Re: [facility] Observation Letter  I did not receive a return call from Helene Pepe, pt's state guardian but at 1227 today she did send me an email with the heading of observation KIM Burleson: advising: Here ya go! Just let me know if/when she’s being discharged!   Thanks! Helene Pepe    Electronically signed by:  Maria Ines Sebastian RN  06/23/17 4:12 PM

## 2017-06-23 NOTE — PLAN OF CARE
Problem: Patient Care Overview (Adult)  Goal: Plan of Care Review  Outcome: Ongoing (interventions implemented as appropriate)    06/23/17 0442   Coping/Psychosocial Response Interventions   Plan Of Care Reviewed With patient   Outcome Evaluation   Outcome Summary/Follow up Plan hgb <8, vaginal bleeding present with every brief change. no c/o pain. vss. slept well.        Goal: Adult Individualization and Mutuality  Outcome: Ongoing (interventions implemented as appropriate)  Goal: Discharge Needs Assessment  Outcome: Ongoing (interventions implemented as appropriate)    Problem: Skin Integrity Impairment, Risk/Actual (Adult)  Goal: Skin Integrity/Wound Healing  Outcome: Ongoing (interventions implemented as appropriate)    Problem: Fall Risk (Adult)  Goal: Absence of Falls  Outcome: Ongoing (interventions implemented as appropriate)    Problem: Fluid Volume Deficit (Adult)  Goal: Fluid/Electrolyte Balance  Outcome: Ongoing (interventions implemented as appropriate)  Goal: Comfort/Well Being  Outcome: Ongoing (interventions implemented as appropriate)    Problem: Pressure Ulcer Risk (Sunny Scale) (Adult,Obstetrics,Pediatric)  Goal: Identify Related Risk Factors and Signs and Symptoms  Outcome: Ongoing (interventions implemented as appropriate)  Goal: Skin Integrity  Outcome: Ongoing (interventions implemented as appropriate)

## 2017-06-23 NOTE — DISCHARGE SUMMARY
PHYSICIAN DISCHARGE SUMMARY  [facility], [facility]    Patient Identification:    Name: Lupe Burleson  Age: 61 y.o.  Sex: female  :  1955  MRN: [id number]    Primary Care Physician: Shane Barcenas MD    Admit date: 2017    Discharge date and time:2017    Discharged Condition: stable    Discharge Diagnoses:  Principal Problem:    Acute blood loss anemia  Active Problems:    Abnormal vaginal bleeding    Endometrial cancer determined by uterine biopsy    Chronic diastolic CHF (congestive heart failure)    DM (diabetes mellitus)    Schizo affective schizophrenia    Coronary artery disease    Lymphedema    Immobility    Patient Active Problem List   Diagnosis Code   • Elevated troponin R79.89   • Aspiration pneumonia J69.0   • Hematuria R31.9   • Hypokalemia E87.6   • Pelvic mass in female R19.00   • Fecal impaction K56.41   • Endometrial carcinoma C54.1   • Acute on chronic respiratory failure with hypoxia and hypercapnia J96.21, J96.22   • Acute on chronic diastolic CHF (congestive heart failure) I50.33   • UTI (urinary tract infection) N39.0   • Leucocytosis D72.829   • Chronic diastolic CHF (congestive heart failure) I50.32   • DM (diabetes mellitus) E11.9   • Morbid obesity E66.01   • HTN (hypertension) I10   • Schizophrenia F20.9   • Tracheostomy malfunction J95.03   • Pyuria N39.0   • THAI (obstructive sleep apnea) G47.33   • Generalized weakness R53.1   • Schizo affective schizophrenia F25.0   • Diabetes mellitus E11.9   • Coronary artery disease I25.10   • Endometrial cancer determined by uterine biopsy C54.1, Z15.04   • Chronic respiratory failure with hypoxia and hypercapnia J96.11, J96.12   • Toxic metabolic encephalopathy G92   • Pneumonia J18.9   • Abnormal vaginal bleeding N93.9   • Schizo affective schizophrenia F25.0   • CHF (congestive heart failure) I50.9   • Endometrial cancer determined by uterine biopsy C54.1, Z15.04   • Coronary artery  disease I25.10   • Lymphedema I89.0   • Immobility Z74.09   • Acute blood loss anemia D62          Hospital Course: Lupe Burleson  is a 61 year old female with a prior history of schizophrenia, CAD, CHF, endometrial Cancer who has had a 2 year history of intermittent vaginal bleeding. She is followed by Dr. Lebron and is receiving megace for the bleeding. It has been determined that she is NOT a candidate for surgical intervention due to co- morbidities. During last few days she was experiencing heavy vaginal bleeding, she was tachycardic and her BP dropped, she was sent to ER. In the ER her hgb had decreased by about 1 gram from previous levels. Patient was placed in the hospital for further evaluation and treatment. This morning, she denies CP, SOA, N/V/D.     OB/GYN was consulted, needed for palliative hysterectomy was recommended to alleviate her bleeding.  Process for this has not been approved yet per her guardian.  Patient's hemoglobin dropped but now has stabilized around 8.5 during the last 48 hours.  Patient should be okay to be discharged, however, if her bleeding recurs, she will be admitted for emergency surgery.  At this time her vital signs are stable, she will go back to the nursing home without Plavix and with no diuretics.  We'll follow her very close to the nursing home.    PMHX:   Past Medical History:   Diagnosis Date   • Acute respiratory distress syndrome    • Arthritis    • Bipolar disorder, unspecified    • CAD (coronary artery disease)    • Cellulitis    • Cellulitis    • CHF (congestive heart failure)    • Chronic ischemic heart disease    • Chronic respiratory failure with hypoxia and hypercapnia    • Chronic ulcer of calf    • Constipation    • Coronary artery disease    • Depression    • Depressive disorder    • Diabetes mellitus    • Dysphagia    • Dysphagia    • Endometrial cancer determined by uterine biopsy     s/p radiation; no improvement   • Endometrial hyperplasia    •  "History of transfusion    • Hypercapnia    • Hypertension    • Immobility    • Lack of coordination    • Lymphedema    • Malignant neoplasm of uterus    • Muscle weakness    • Obesities, morbid    • Oxygen dependent    • Post-menopausal bleeding    • Postmenopausal bleeding    • Schizo affective schizophrenia    • Skin ulcer    • Sleep apnea    • Stroke    • Symbolic dysfunction    • Uterine cancer    • Venous insufficiency    • Venous insufficiency      PSHX:   Past Surgical History:   Procedure Laterality Date   • D&C HYSTEROSCOPY     • LAPAROSCOPIC TUBAL LIGATION     • TRACHEOSTOMY AND PEG TUBE INSERTION     • UMBILICAL HERNIA REPAIR     • WOUND DEBRIDEMENT             Consults:     Consults     Date and Time Order Name Status Description    6/20/2017 0802 Inpatient Consult to Cardiology Completed     6/19/2017 0646 Inpatient Consult to Gynecologic Oncology Completed     6/18/2017 2338 Inpatient Consult to Obstetrics / Gynecology      6/18/2017 2132 Family Medicine Consult Completed           Discharge Exam:    BP 99/63 (BP Location: Left arm, Patient Position: Lying)  Pulse 89  Temp 97.2 °F (36.2 °C) (Oral)   Resp 18  Ht 63\" (160 cm)  Wt 253 lb 12.8 oz (115 kg)  SpO2 95%  BMI 44.96 kg/m2    General: Alert, cooperative, appears stated age. In no acute distress.   Head: Normocephalic, without obvious abnormality, atraumatic  Neck: Supple, symmetrical; trachea midline, without  adenopathy;   Thyroid without  enlargement/tenderness/nodules;   no carotid bruit or JVD  Cardiovascular: Normal rate, regular rhythm and intact distal pulses.  Exam reveals no gallop and no friction rub. No murmur heard  Pulmonary: Diminished BS bilaterally, respirations unlabored.   No rhonchi, wheezing or rales.   Abdominal: Soft, non-tender, bowel sounds active all four quadrants;  no masses, hepatomegaly, or  splenomegaly.   Extremities: Chronic bilateral Lymphedema LE's, atraumatic; no cyanosis  Pulses:  2 + symmetric all " extremities  Neurological: She is alert and oriented to person, place, and time.   CNII-XII intact, normal strength, sensation intact throughout  Skin: Smooth, without lesions, rash, or wounds. Warm, dry and intact.        Data Review:          Results from last 7 days  Lab Units 06/23/17  0751 06/22/17  2354 06/22/17  1554 06/22/17  0733  06/21/17  0524   WBC 10*3/mm3 6.90  --   --  6.05  --  5.17   HEMOGLOBIN g/dL 8.8*  8.8* 7.9* 8.4* 8.8*  < > 8.7*   HEMATOCRIT % 27.7*  27.7* 24.8* 26.1* 27.7*  < > 27.7*   PLATELETS 10*3/mm3 311  --   --  317  --  292   < > = values in this interval not displayed.      Results from last 7 days  Lab Units 06/23/17  0751 06/22/17  0733 06/21/17  0524  06/18/17  1835   SODIUM mmol/L 145 143 142  < > 144   POTASSIUM mmol/L 3.6 3.6 3.5  < > 3.5   CHLORIDE mmol/L 106 104 102  < > 103   TOTAL CO2 mmol/L 28.4 29.1* 30.0*  < > 29.0   BUN mg/dL 8 10 8  < > 15   CREATININE mg/dL 0.50* 0.48* 0.68  < > 0.65   CALCIUM mg/dL 8.9 8.8 8.5*  < > 9.0   BILIRUBIN mg/dL  --   --   --   --  0.4   ALK PHOS U/L  --   --   --   --  76   ALT (SGPT) U/L  --   --   --   --  11   AST (SGOT) U/L  --   --   --   --  8   GLUCOSE mg/dL 96 95 101*  < > 107*   < > = values in this interval not displayed.    Results from last 7 days  Lab Units 06/19/17  0626   INR  1.10         0  Lab Value Date/Time   TROPONINT 0.065 (H) 05/17/2017 0505   TROPONINT 0.077 (H) 05/16/2017 1517   TROPONINT 0.080 (H) 05/16/2017 1112   TROPONINT 0.078 (H) 05/16/2017 0703   TROPONINT 0.084 (H) 05/16/2017 0116   TROPONINT <0.010 05/15/2016 0622   TROPONINT 0.017 05/14/2016 0437   TROPONINT 0.023 05/13/2016 2036   TROPONINT 0.031 (H) 05/13/2016 1231   TROPONINT <0.01 02/03/2016 0537   TROPONINT <0.01 02/02/2016 0914   TROPONINT <0.01 11/11/2015 0516   TROPONINT <0.01 11/09/2015 2049   TROPONINT <0.01 04/18/2014 0517       Microbiology Results (last 10 days)     ** No results found for the last 240 hours. **           Imaging Results  (all)     Procedure Component Value Units Date/Time    CT Abdomen Pelvis Without Contrast [[id number]] Collected:  06/18/17 2135     Updated:  06/22/17 1334    Narrative:       CT ABDOMEN AND PELVIS WITHOUT CONTRAST.     TECHNIQUE: A routine CT scan of the abdomen and pelvis was performed  with coronal and sagittal reconstructed images.     HISTORY: Vaginal bleeding.     COMPARISON: 05/20/2016.     FINDINGS:  Lung bases demonstrate no consolidation or effusion.          Liver demonstrates homogeneous parenchyma, no definite mass. 1.6 cm cyst  of the right hepatic lobe is unchanged. Multiple gallstones are seen  again, no evidence for acute cholecystitis. No intra or extrahepatic  biliary ductal dilatation.      The spleen is unremarkable. Pancreas is unremarkable, no pancreatic  ductal dilatation. Adrenal glands are within normal limits.     Kidneys do not demonstrate hydronephrosis. No definite renal calculi.  Urinary bladder is unremarkable.         Gastrostomy tube is in position. Small and large bowel loops are within  normal limits. Appendix is normal. Large amount of stool in the rectum,  concerning for fecal impaction.     No significant retroperitoneal lymphadenopathy. Uterus is not  significantly changed in size and demonstrates multiple hyperdense  masses, there appears to be hyperdense material within the lumen of the  uterus, in keeping with known vaginal bleeding.          Impression:       1.  Multiple hyperdense masses in the uterus, may be hemorrhagic uterine  lesions, in keeping with history. differential diagnoses includes  fibroids, more aggressive lesions cannot be entirely excluded. Overall  uterine size has not significantly changed.  2.  Stable 1.6 cm cyst of the right hepatic lobe. Findings of fecal  impaction.     Findings called to Dr. Clifofrd, 9:35 PM.     This report was finalized on 6/22/2017 1:31 PM by Dr. Cesia Sarabia MD.               Disposition:    Skilled nursing  facility    Patient Instructions:      Lupe Burleson   Home Medication Instructions NATO:[id number]    Printed on:06/23/17 6392   Medication Information                      acetaminophen (MAPAP) 160 MG/5ML liquid  15 mg/kg by Per G Tube route Every 4 (Four) Hours As Needed for Fever (give 20.3 ml per G tube).             ammonium lactate (AMLACTIN) 12 % cream  Apply 1 application topically 2 (two) times a day. To bilat legs and feet             atorvastatin (LIPITOR) 20 MG tablet  20 mg by Per G Tube route Daily.             bumetanide (BUMEX) 2 MG tablet  2 mg by Per G Tube route Daily.             HYDROcodone-acetaminophen (NORCO) 5-325 MG per tablet  Take 1 tablet by mouth Every 6 (Six) Hours As Needed for Moderate Pain (4-6) or Severe Pain (7-10).             insulin aspart (novoLOG FLEXPEN) 100 UNIT/ML solution pen-injector sc pen  Inject 5 Units under the skin 4 (Four) Times a Day With Meals & at Bedtime.             ipratropium-albuterol (DUO-NEB) 0.5-2.5 mg/mL nebulizer  Take 3 mL by nebulization Every 4 (Four) Hours As Needed for Wheezing.             ISOSORBIDE MONONITRATE PO  30 mg by Per G Tube route Daily.             lactulose (CHRONULAC) 10 GM/15ML solution  20 g by Per PEG Tube route daily.             LORazepam (ATIVAN) 1 MG tablet  1 tablet by Per G Tube route Every 8 (Eight) Hours As Needed for Anxiety.             megestrol (MEGACE) 40 MG tablet  Take 2 tablets by mouth 4 (Four) Times a Day.             pantoprazole (PROTONIX) 40 MG pack packet  40 mg by Per PEG Tube route.             POTASSIUM CHLORIDE PO  20 mEq by Per G Tube route Daily.             QUEtiapine (SEROquel) 25 MG tablet  25 mg by Per PEG Tube route every 12 (twelve) hours.             risperiDONE (RisperDAL) 1 MG tablet  Take 2 mg by mouth Every Morning. Take 3 mg at bedtime & 2 mg every morning             risperiDONE (risperDAL) 3 MG tablet  Take 3 mg by mouth Every Night.             valproic acid (DEPAKENE) 250 MG/5ML  syrup syrup  250 mg by Per PEG Tube route every 12 (twelve) hours.                 Discharge Order     Start     Ordered    06/23/17 1210  Discharge patient  Once     Expected Discharge Date:  06/23/17    Discharge Disposition:  Skilled Nursing Facility (DC - External)        06/23/17 1209          Follow-up Information     Follow up with [facility] .    Specialties:  Skilled Nursing Facility, Intermediate Care Facility    Contact information:    [address]urto  [facility] [address] [phone]          Total time spent discharging patient including evaluation,post hospitalization follow up,  medication and post hospitalization instructions and education total time exceeds 30 minutes.    Signed:  Shane Barcenas MD  6/23/2017  12:09 PM      Much of this encounter note is an electronic transcription/translation of spoken language to printed text. The electronic translation of spoken language may permit erroneous, or at times, nonsensical words or phrases to be inadvertently transcribed; Although I have reviewed the note for such errors, some may still exist

## 2017-06-23 NOTE — PROGRESS NOTES
Continued Stay Note  [facility]     Patient Name: Lupe Burleson  MRN: [id number]  Today's Date: 6/23/2017    Admit Date: 6/18/2017          Discharge Plan       06/23/17 1338    Case Management/Social Work Plan    Plan Return to the [facility] of care    Additional Comments Johny at [place] notified of discharge and [facility] scheduled for 7:00 PM and if ambulance can transport sooner they will, I later left her a voice mail that pt was changed to observation (due to she told me on the initial call for pt's stay that if she had a qualifying stay she would return skilled).  Voice mail to pt's [facility] guardian: Helene Pepe [phone] notifying of discharge and transport time, I also reminded her of pt's change to observtion status and ask if she has questions or needs any info re patient that she return my call. Discharge clinical faxed to [place]      06/23/17 1134    Case Management/Social Work Plan    Plan Return to [facility]    Additional Comments Today at 0825 I left a voice mail for pt's state guardian Helene Pepe: [phone] ext. [phone] advising that pt has been changed to observation and attending's plan is that pt will be discharged today if H&H is stable, and it is.  I also faxed to Helene Pepe ([phone]) the observation letter and request that she sign and fax back.  I have not had a return fax from Helene Pepe, I spoke with pt and advised of observation status, she said she does not understand what I am talking about.               Discharge Codes     None        Expected Discharge Date and Time     Expected Discharge Date Expected Discharge Time    Jun 23, 2017             Maria Ines Sebastian RN

## 2017-06-23 NOTE — PROGRESS NOTES
Continued Stay Note  [facility]     Patient Name: Lupe Burleson  MRN: [id number]  Today's Date: 6/23/2017    Admit Date: 6/18/2017          Discharge Plan       06/23/17 1134    Case Management/Social Work Plan    Plan Return to [facility] of care    Additional Comments Today at 0825 I left a voice mail for pt's [facility] madhurian Helene My: [phone] ext. [phone] advising that pt has been changed to observation and according to what attending told me yesterday the plan is that pt will be discharged today if H&H is stable, and it is.  I also faxed to Helene Pepe ([phone]) the observation letter and request that she sign and fax back.    I have not had a return fax from Helene Pepe.  I spoke with pt and advised of observation status, she said she does not understand what I am talking about.   I called Helene Pepe again at this time and the call was answered by voice mail again, I did not leave 2nd voice mail. I called the main line for  Guardian ship and ask if Helene was working today, they did not answer my question and instead transferred me to Helene Pepe's voice mail.  I left a message asking (1) if she received the fax advising of pt's change to observation, (2) to confirm that she received the clinical from Dr Washington's office to support the need for a hysterectomy and (3) if clinical has been provided to her from Dr Barcenas to support the need for surgery.  I request a return call.              Discharge Codes     None            Maria Ines Sebastian RN

## 2017-06-23 NOTE — PROGRESS NOTES
"DAILY PROGRESS NOTE  [facility], [facility]      Patient Identification:  Name: Lupe Burleson  Age: 61 y.o.  Sex: female  :  1955  MRN: [id number]         Primary Care Physician: Shane Barcenas MD    Subjective:   Ms Burleson is awake and alert this morning. She has no complaints of vomiting, diarrhea, SOA, CP. She continues with vaginal bleeding and her Hgb is 7.9 last PM, it is 8.8 this am.  We have cardiology clearance for surgery, but are awaiting guardianship clearance for surgery.     Objective:    Scheduled Meds:    atorvastatin 20 mg Per G Tube Daily   bumetanide 2 mg Per G Tube Daily   insulin aspart 0-9 Units Subcutaneous 4x Daily With Meals & Nightly   isosorbide mononitrate 30 mg Per G Tube Daily   lactulose 20 g Oral Daily   megestrol 80 mg Oral 4x Daily   pantoprazole 40 mg Oral Q AM   potassium chloride 20 mEq Per G Tube Daily   risperiDONE 2 mg Oral QAM   risperiDONE 3 mg Oral Nightly   Valproic Acid 250 mg Oral Q12H     Continuous Infusions:    sodium chloride 75 mL/hr Last Rate: 75 mL/hr (17 0827)       Vital signs in last 24 hours:  Temp:  [97 °F (36.1 °C)-98.7 °F (37.1 °C)] 97.2 °F (36.2 °C)  Heart Rate:  [78-89] 89  Resp:  [18] 18  BP: ()/(62-65) 99/63    tMax 24 hrs: Temp (24hrs), Av.6 °F (36.4 °C), Min:97 °F (36.1 °C), Max:98.7 °F (37.1 °C)      Intake/Output:    Intake/Output Summary (Last 24 hours) at 17 1205  Last data filed at 17 0900   Gross per 24 hour   Intake          2563.75 ml   Output                0 ml   Net          2563.75 ml       Exam:    BP 99/63 (BP Location: Left arm, Patient Position: Lying)  Pulse 89  Temp 97.2 °F (36.2 °C) (Oral)   Resp 18  Ht 63\" (160 cm)  Wt 253 lb 12.8 oz (115 kg)  SpO2 95%  BMI 44.96 kg/m2    General: Alert, cooperative, appears stated age. In no acute distress.    Head: Normocephalic, without obvious abnormality, atraumatic  Neck: Supple, symmetrical; trachea " midline, without  adenopathy;              Thyroid without  enlargement/tenderness/nodules;              no carotid bruit or JVD  Cardiovascular: Normal rate, regular rhythm and intact distal pulses.                              Exam reveals no gallop and no friction rub. No murmur heard  Pulmonary: Diminished BS bilaterally, respirations unlabored.                         No rhonchi, wheezing or rales.   Abdominal: Soft, non-tender, bowel sounds active all four quadrants;                       no masses,  hepatomegaly, or  splenomegaly.   Extremities: Chronic bilateral Lymphedema LE's, atraumatic; no cyanosis  Pulses:        2 + symmetric all extremities  Neurological: She is alert and oriented to person, place, and time.                          CNII-XII intact, normal strength, sensation intact throughout  Skin:  Smooth, without lesions, rash, or wounds. Warm, dry and intact.         Data Review:      Results from last 7 days  Lab Units 06/23/17  0751 06/22/17  2354 06/22/17  1554 06/22/17  0733  06/21/17  0524   WBC 10*3/mm3 6.90  --   --  6.05  --  5.17   HEMOGLOBIN g/dL 8.8*  8.8* 7.9* 8.4* 8.8*  < > 8.7*   HEMATOCRIT % 27.7*  27.7* 24.8* 26.1* 27.7*  < > 27.7*   PLATELETS 10*3/mm3 311  --   --  317  --  292   < > = values in this interval not displayed.      Results from last 7 days  Lab Units 06/23/17  0751 06/22/17  0733 06/21/17  0524  06/18/17  1835   SODIUM mmol/L 145 143 142  < > 144   POTASSIUM mmol/L 3.6 3.6 3.5  < > 3.5   CHLORIDE mmol/L 106 104 102  < > 103   TOTAL CO2 mmol/L 28.4 29.1* 30.0*  < > 29.0   BUN mg/dL 8 10 8  < > 15   CREATININE mg/dL 0.50* 0.48* 0.68  < > 0.65   CALCIUM mg/dL 8.9 8.8 8.5*  < > 9.0   BILIRUBIN mg/dL  --   --   --   --  0.4   ALK PHOS U/L  --   --   --   --  76   ALT (SGPT) U/L  --   --   --   --  11   AST (SGOT) U/L  --   --   --   --  8   GLUCOSE mg/dL 96 95 101*  < > 107*   < > = values in this interval not displayed.    Results from last 7 days  Lab Units  06/19/17  0626   INR  1.10         0  Lab Value Date/Time   TROPONINT 0.065 (H) 05/17/2017 0505   TROPONINT 0.077 (H) 05/16/2017 1517   TROPONINT 0.080 (H) 05/16/2017 1112   TROPONINT 0.078 (H) 05/16/2017 0703   TROPONINT 0.084 (H) 05/16/2017 0116   TROPONINT <0.010 05/15/2016 0622   TROPONINT 0.017 05/14/2016 0437   TROPONINT 0.023 05/13/2016 2036   TROPONINT 0.031 (H) 05/13/2016 1231   TROPONINT <0.01 02/03/2016 0537   TROPONINT <0.01 02/02/2016 0914   TROPONINT <0.01 11/11/2015 0516   TROPONINT <0.01 11/09/2015 2049   TROPONINT <0.01 04/18/2014 0517       Microbiology Results (last 10 days)     ** No results found for the last 240 hours. **           Imaging Results (last 7 days)     Procedure Component Value Units Date/Time    CT Abdomen Pelvis Without Contrast [[id number]] Collected:  06/18/17 2135     Updated:  06/19/17 0057    Narrative:       CT ABDOMEN AND PELVIS WITHOUT CONTRAST.     TECHNIQUE: A routine CT scan of the abdomen and pelvis was performed  with coronal and sagittal reconstructed images.     HISTORY: Vaginal bleeding.     COMPARISON: 05/20/2016.     FINDINGS:  Lung bases demonstrate no consolidation or effusion.          Liver demonstrates homogeneous parenchyma, no definite mass. 1.6 cm cyst  of the right hepatic lobe is unchanged. Multiple gallstones are seen  again, no evidence for acute cholecystitis. No intra or extrahepatic  biliary ductal dilatation.      The spleen is unremarkable. Pancreas is unremarkable, no pancreatic  ductal dilatation. Adrenal glands are within normal limits.     Kidneys do not demonstrate hydronephrosis. No definite renal calculi.  Urinary bladder is unremarkable.         Gastrostomy tube is in position. Small and large bowel loops are within  normal limits. Appendix is normal. Large amount of stool in the rectum,  concerning for fecal impaction.     No significant retroperitoneal lymphadenopathy. Uterus is not  significantly changed in size and demonstrates  multiple hyperdense  masses, there appears to be hyperdense material within the lumen of the  uterus, in keeping with known vaginal bleeding.          Impression:       1.  Multiple hyperdense masses in the uterus, may be hemorrhagic uterine  lesions, in keeping with history. differential diagnoses includes  fibroids, more aggressive lesions cannot be entirely excluded. Overall  uterine size has not significantly changed.  2.  Stable 1.6 cm cyst of the right hepatic lobe. Findings of fecal  impaction.     Findings called to Dr. Clifford, 9:35 PM.               Assessment:      Principal Problem:    Acute blood loss anemia  Active Problems:    Abnormal vaginal bleeding    Endometrial cancer determined by uterine biopsy    Chronic diastolic CHF (congestive heart failure)    DM (diabetes mellitus)    Schizo affective schizophrenia    Coronary artery disease    Lymphedema    Immobility      Patient Active Problem List   Diagnosis Code   • Elevated troponin R79.89   • Aspiration pneumonia J69.0   • Hematuria R31.9   • Hypokalemia E87.6   • Pelvic mass in female R19.00   • Fecal impaction K56.41   • Endometrial carcinoma C54.1   • Acute on chronic respiratory failure with hypoxia and hypercapnia J96.21, J96.22   • Acute on chronic diastolic CHF (congestive heart failure) I50.33   • UTI (urinary tract infection) N39.0   • Leucocytosis D72.829   • Chronic diastolic CHF (congestive heart failure) I50.32   • DM (diabetes mellitus) E11.9   • Morbid obesity E66.01   • HTN (hypertension) I10   • Schizophrenia F20.9   • Tracheostomy malfunction J95.03   • Pyuria N39.0   • THAI (obstructive sleep apnea) G47.33   • Generalized weakness R53.1   • Schizo affective schizophrenia F25.0   • Diabetes mellitus E11.9   • Coronary artery disease I25.10   • Endometrial cancer determined by uterine biopsy C54.1, Z15.04   • Chronic respiratory failure with hypoxia and hypercapnia J96.11, J96.12   • Toxic metabolic encephalopathy G92   • Pneumonia  J18.9   • Abnormal vaginal bleeding N93.9   • Schizo affective schizophrenia F25.0   • CHF (congestive heart failure) I50.9   • Endometrial cancer determined by uterine biopsy C54.1, Z15.04   • Coronary artery disease I25.10   • Lymphedema I89.0   • Immobility Z74.09   • Acute blood loss anemia D62       Plan:    Hgb stable, will dc to NH on megace. If bleeding persist, she will come back to the hospital as an emergency and needing emergency surgery, o/w surgery will be scheduled as outpt.      Shane Barcenas MD  6/23/2017  12:05 PM        Much of this encounter note is an electronic transcription/translation of spoken language to printed text. The electronic translation of spoken language may permit erroneous, or at times, nonsensical words or phrases to be inadvertently transcribed; Although I have reviewed the note for such errors, some may still exist

## 2017-06-23 NOTE — PROGRESS NOTES
"DAILY PROGRESS NOTE    Patient Identification:  Name: Lupe Burleson  Age: 61 y.o.  Sex: Female  :  1955  MRN:[id number]    Cheif complaint:  Chief Complaint   Patient presents with   • Vaginal Bleeding     pt with \"excessive bleeding\" and a history of endometrial CA       Subjective:  Patient sleeping but awakens. Reports feeling fine. She does reports vaginal bleeding.     Objective:  Scheduled Meds:    atorvastatin 20 mg Per G Tube Daily   bumetanide 2 mg Per G Tube Daily   insulin aspart 0-9 Units Subcutaneous 4x Daily With Meals & Nightly   isosorbide mononitrate 30 mg Per G Tube Daily   lactulose 20 g Oral Daily   megestrol 80 mg Oral 4x Daily   pantoprazole 40 mg Oral Q AM   potassium chloride 20 mEq Per G Tube Daily   risperiDONE 2 mg Oral QAM   risperiDONE 3 mg Oral Nightly   Valproic Acid 250 mg Oral Q12H       Continuous Infusions:    sodium chloride 75 mL/hr Last Rate: 75 mL/hr (17 0056)       PRN Meds:  dextrose  •  dextrose  •  glucagon (human recombinant)  •  HYDROcodone-acetaminophen  •  ipratropium-albuterol  •  LORazepam    Intake/Output:  I/O last 3 completed shifts:  In: 3642 [P.O.:2000; I.V.:1642]  Out: 95 [Stool:95]    Exam:  BP 97/63 (BP Location: Left arm, Patient Position: Lying)  Pulse 81  Temp 98.7 °F (37.1 °C) (Oral)   Resp 18  Ht 63\" (160 cm)  Wt 253 lb 12.8 oz (115 kg)  SpO2 95%  BMI 44.96 kg/m2    Physical Examination:   General appearance - chronically ill appearing, alert, and in no distress  Chest - clear to auscultation, no wheezes, rales or rhonchi, symmetric air entry  Heart - normal rate, regular rhythm, normal S1, S2, no murmurs, rubs, clicks or gallops  Abdomen - soft, nontender, nondistended,  - scant blood in brief.       Data Review:  Recent Results (from the past 36 hour(s))   Hemoglobin & Hematocrit, Blood    Collection Time: 17  7:57 PM   Result Value Ref Range    Hemoglobin 8.7 (L) 11.9 - 15.5 g/dL    Hematocrit 27.5 (L) 35.6 - 45.5 % "   POC Glucose Fingerstick    Collection Time: 06/21/17  9:20 PM   Result Value Ref Range    Glucose 100 70 - 130 mg/dL   POC Glucose Fingerstick    Collection Time: 06/22/17  6:11 AM   Result Value Ref Range    Glucose 94 70 - 130 mg/dL   Basic Metabolic Panel    Collection Time: 06/22/17  7:33 AM   Result Value Ref Range    Glucose 95 65 - 99 mg/dL    BUN 10 8 - 23 mg/dL    Creatinine 0.48 (L) 0.57 - 1.00 mg/dL    Sodium 143 136 - 145 mmol/L    Potassium 3.6 3.5 - 5.2 mmol/L    Chloride 104 98 - 107 mmol/L    CO2 29.1 (H) 22.0 - 29.0 mmol/L    Calcium 8.8 8.6 - 10.5 mg/dL    eGFR  African Amer >150 >60 mL/min/1.73    BUN/Creatinine Ratio 20.8 7.0 - 25.0    Anion Gap 9.9 mmol/L   CBC Auto Differential    Collection Time: 06/22/17  7:33 AM   Result Value Ref Range    WBC 6.05 4.50 - 10.70 10*3/mm3    RBC 3.46 (L) 3.90 - 5.20 10*6/mm3    Hemoglobin 8.8 (L) 11.9 - 15.5 g/dL    Hematocrit 27.7 (L) 35.6 - 45.5 %    MCV 80.1 (L) 80.5 - 98.2 fL    MCH 25.4 (L) 26.9 - 32.0 pg    MCHC 31.8 (L) 32.4 - 36.3 g/dL    RDW 17.2 (H) 11.7 - 13.0 %    RDW-SD 50.0 37.0 - 54.0 fl    MPV 10.2 6.0 - 12.0 fL    Platelets 317 140 - 500 10*3/mm3    Neutrophil % 57.9 42.7 - 76.0 %    Lymphocyte % 29.9 19.6 - 45.3 %    Monocyte % 8.1 5.0 - 12.0 %    Eosinophil % 3.6 0.3 - 6.2 %    Basophil % 0.2 0.0 - 1.5 %    Immature Grans % 0.3 0.0 - 0.5 %    Neutrophils, Absolute 3.50 1.90 - 8.10 10*3/mm3    Lymphocytes, Absolute 1.81 0.90 - 4.80 10*3/mm3    Monocytes, Absolute 0.49 0.20 - 1.20 10*3/mm3    Eosinophils, Absolute 0.22 0.00 - 0.70 10*3/mm3    Basophils, Absolute 0.01 0.00 - 0.20 10*3/mm3    Immature Grans, Absolute 0.02 0.00 - 0.03 10*3/mm3   POC Glucose Fingerstick    Collection Time: 06/22/17 11:33 AM   Result Value Ref Range    Glucose 129 70 - 130 mg/dL   Hemoglobin & Hematocrit, Blood    Collection Time: 06/22/17  3:54 PM   Result Value Ref Range    Hemoglobin 8.4 (L) 11.9 - 15.5 g/dL    Hematocrit 26.1 (L) 35.6 - 45.5 %   POC Glucose  Fingerstick    Collection Time: 06/22/17  4:08 PM   Result Value Ref Range    Glucose 85 70 - 130 mg/dL   POC Glucose Fingerstick    Collection Time: 06/22/17  9:28 PM   Result Value Ref Range    Glucose 87 70 - 130 mg/dL   Hemoglobin & Hematocrit, Blood    Collection Time: 06/22/17 11:54 PM   Result Value Ref Range    Hemoglobin 7.9 (L) 11.9 - 15.5 g/dL    Hematocrit 24.8 (L) 35.6 - 45.5 %   POC Glucose Fingerstick    Collection Time: 06/23/17  5:35 AM   Result Value Ref Range    Glucose 98 70 - 130 mg/dL       Component      Latest Ref Rng & Units 6/18/2017 6/19/2017 6/20/2017 6/21/2017              5:24 AM   Hemoglobin      11.9 - 15.5 g/dL 10.2 (L) 10.1 (L) 9.6 (L) 8.7 (L)   Hematocrit      35.6 - 45.5 % 33.0 (L) 32.0 (L) 30.7 (L) 27.7 (L)     Component      Latest Ref Rng & Units 6/21/2017 6/22/2017 6/22/2017 6/22/2017           7:57 PM  7:33 AM  3:54 PM 11:54 PM   Hemoglobin      11.9 - 15.5 g/dL 8.7 (L) 8.8 (L) 8.4 (L) 7.9 (L)   Hematocrit      35.6 - 45.5 % 27.5 (L) 27.7 (L) 26.1 (L) 24.8 (L)     Assessment/Plan:  1. Papillary serous carcinoma of the uterus.  -Considering palliative hysterectomy to control vaginal bleeding.    Court approval is required for nonemergent surgery.  The Court apparently meets once weekly on Thursdays.  We have submitted the paperwork to her guardian, Helene Pepe; however it will apparently be at least another week before we can even have approval.  After that we will need to find time on the OR schedule for the surgery.  If her bleeding persists, we may have to consider this an emergency and move toward surgery without Court approval.  In the meantime we can try increasing her Megace to 80 mg by mouth 4 times daily in an attempt to suppress her bleeding. If the patient is stable, she could be discharged back to her residential facility, with plans to return for scheduled surgery    2. Acute on chronic blood loss anemia. Today's hgb pending though last night was down at 7.9  3.  Cardiac consult for pre-operative clearance. + clearance at high risk. Plavix on hold (last dose 06/20/2017).  Prophylactic lovenox  4. Co-morbidities- CHF, CAD, DM, obesity, history of respiratory failture       MD to follow    VASHTI Sanchez    6/23/2017  6:23 AM     Gynecologic Oncology Attending Physician:  History reviewed with VASHTI Webber.  Patient reports that she feels well.  She is continuing to have some bleeding.    Physical exam: Abdomen remains soft    Agree with assessment and plans as outlined above by VASHTI Webber.    Patient is apparently going to be discharged night to [facility].  Once we have Court approval, we can schedule hysterectomy for palliation of her individual cancer and bleeding.  It is not likely to cure the disease.  If she returns to the hospital with more bleeding, I think we will have to consider to be an emergency and proceed with surgery.    Electronically signed by:  Ortiz Washington MD 6/23/2017 5:30 PM

## 2017-06-26 NOTE — PROGRESS NOTES
Continued Stay Note  [facility]     Patient Name: Lupe Burleson  MRN: [id number]  Today's Date: 6/26/2017    Admit Date: 6/18/2017          Discharge Plan       06/26/17 1241    Case Management/Social Work Plan    Additional Comments Per Flor with [facility]: returned to her  level of care bed on Friday, June 23    Final Note    Final Note 04 / returned to [facility] level of care bed on Friday, June 23, 2017              Discharge Codes     None        Expected Discharge Date and Time     Expected Discharge Date Expected Discharge Time    Jun 23, 2017             Maria Ines Sebastian RN

## 2017-07-20 ENCOUNTER — APPOINTMENT (OUTPATIENT)
Dept: PREADMISSION TESTING | Facility: HOSPITAL | Age: 62
End: 2017-07-20

## 2017-07-25 ENCOUNTER — APPOINTMENT (OUTPATIENT)
Dept: PREADMISSION TESTING | Facility: HOSPITAL | Age: 62
End: 2017-07-25

## 2017-08-13 ENCOUNTER — APPOINTMENT (OUTPATIENT)
Dept: ULTRASOUND IMAGING | Facility: HOSPITAL | Age: 62
End: 2017-08-13

## 2017-08-13 ENCOUNTER — HOSPITAL ENCOUNTER (INPATIENT)
Facility: HOSPITAL | Age: 62
LOS: 8 days | Discharge: NURSING, REHAB OR LONG-TERM CARE | End: 2017-08-21
Attending: EMERGENCY MEDICINE | Admitting: INTERNAL MEDICINE

## 2017-08-13 DIAGNOSIS — C55 UTERINE CANCER (HCC): ICD-10-CM

## 2017-08-13 DIAGNOSIS — N93.9 VAGINAL BLEEDING: Primary | ICD-10-CM

## 2017-08-13 DIAGNOSIS — D50.0 BLOOD LOSS ANEMIA: ICD-10-CM

## 2017-08-13 DIAGNOSIS — F20.9 SCHIZOPHRENIA, UNSPECIFIED TYPE (HCC): ICD-10-CM

## 2017-08-13 DIAGNOSIS — C54.1 ENDOMETRIAL CANCER (HCC): ICD-10-CM

## 2017-08-13 DIAGNOSIS — D25.9 UTERINE LEIOMYOMA, UNSPECIFIED LOCATION: ICD-10-CM

## 2017-08-13 LAB
ABO GROUP BLD: NORMAL
ALBUMIN SERPL-MCNC: 3.3 G/DL (ref 3.5–5.2)
ALBUMIN/GLOB SERPL: 0.9 G/DL
ALP SERPL-CCNC: 68 U/L (ref 39–117)
ALT SERPL W P-5'-P-CCNC: 10 U/L (ref 1–33)
ANION GAP SERPL CALCULATED.3IONS-SCNC: 9.5 MMOL/L
ANISOCYTOSIS BLD QL: NORMAL
AST SERPL-CCNC: 9 U/L (ref 1–32)
BACTERIA UR QL AUTO: ABNORMAL /HPF
BASOPHILS # BLD AUTO: 0.02 10*3/MM3 (ref 0–0.2)
BASOPHILS NFR BLD AUTO: 0.3 % (ref 0–1.5)
BILIRUB SERPL-MCNC: 0.4 MG/DL (ref 0.1–1.2)
BILIRUB UR QL STRIP: NEGATIVE
BLD GP AB SCN SERPL QL: NEGATIVE
BUN BLD-MCNC: 16 MG/DL (ref 8–23)
BUN/CREAT SERPL: 22.2 (ref 7–25)
CALCIUM SPEC-SCNC: 9.4 MG/DL (ref 8.6–10.5)
CHLORIDE SERPL-SCNC: 101 MMOL/L (ref 98–107)
CLARITY UR: CLEAR
CO2 SERPL-SCNC: 32.5 MMOL/L (ref 22–29)
COLOR UR: YELLOW
CREAT BLD-MCNC: 0.72 MG/DL (ref 0.57–1)
DEPRECATED RDW RBC AUTO: 47.9 FL (ref 37–54)
EOSINOPHIL # BLD AUTO: 0.14 10*3/MM3 (ref 0–0.7)
EOSINOPHIL NFR BLD AUTO: 2 % (ref 0.3–6.2)
ERYTHROCYTE [DISTWIDTH] IN BLOOD BY AUTOMATED COUNT: 18 % (ref 11.7–13)
GFR SERPL CREATININE-BSD FRML MDRD: 99 ML/MIN/1.73
GLOBULIN UR ELPH-MCNC: 3.8 GM/DL
GLUCOSE BLD-MCNC: 95 MG/DL (ref 65–99)
GLUCOSE UR STRIP-MCNC: NEGATIVE MG/DL
HCT VFR BLD AUTO: 29.9 % (ref 35.6–45.5)
HGB BLD-MCNC: 8.5 G/DL (ref 11.9–15.5)
HGB UR QL STRIP.AUTO: NEGATIVE
HYALINE CASTS UR QL AUTO: ABNORMAL /LPF
HYPOCHROMIA BLD QL: NORMAL
IMM GRANULOCYTES # BLD: 0 10*3/MM3 (ref 0–0.03)
IMM GRANULOCYTES NFR BLD: 0 % (ref 0–0.5)
INR PPP: 1.15 (ref 0.9–1.1)
KETONES UR QL STRIP: NEGATIVE
LEUKOCYTE ESTERASE UR QL STRIP.AUTO: ABNORMAL
LYMPHOCYTES # BLD AUTO: 1.39 10*3/MM3 (ref 0.9–4.8)
LYMPHOCYTES NFR BLD AUTO: 19.7 % (ref 19.6–45.3)
MCH RBC QN AUTO: 20.6 PG (ref 26.9–32)
MCHC RBC AUTO-ENTMCNC: 28.4 G/DL (ref 32.4–36.3)
MCV RBC AUTO: 72.4 FL (ref 80.5–98.2)
MICROCYTES BLD QL: NORMAL
MONOCYTES # BLD AUTO: 0.77 10*3/MM3 (ref 0.2–1.2)
MONOCYTES NFR BLD AUTO: 10.9 % (ref 5–12)
NEUTROPHILS # BLD AUTO: 4.72 10*3/MM3 (ref 1.9–8.1)
NEUTROPHILS NFR BLD AUTO: 67.1 % (ref 42.7–76)
NITRITE UR QL STRIP: NEGATIVE
PH UR STRIP.AUTO: 6 [PH] (ref 5–8)
PLAT MORPH BLD: NORMAL
PLATELET # BLD AUTO: 386 10*3/MM3 (ref 140–500)
PMV BLD AUTO: 10.1 FL (ref 6–12)
POIKILOCYTOSIS BLD QL SMEAR: NORMAL
POTASSIUM BLD-SCNC: 3.7 MMOL/L (ref 3.5–5.2)
PROT SERPL-MCNC: 7.1 G/DL (ref 6–8.5)
PROT UR QL STRIP: ABNORMAL
PROTHROMBIN TIME: 14.2 SECONDS (ref 11.7–14.2)
RBC # BLD AUTO: 4.13 10*6/MM3 (ref 3.9–5.2)
RBC # UR: ABNORMAL /HPF
REF LAB TEST METHOD: ABNORMAL
RH BLD: POSITIVE
SODIUM BLD-SCNC: 143 MMOL/L (ref 136–145)
SP GR UR STRIP: 1.01 (ref 1–1.03)
SQUAMOUS #/AREA URNS HPF: ABNORMAL /HPF
UROBILINOGEN UR QL STRIP: ABNORMAL
WBC MORPH BLD: NORMAL
WBC NRBC COR # BLD: 7.04 10*3/MM3 (ref 4.5–10.7)
WBC UR QL AUTO: ABNORMAL /HPF

## 2017-08-13 PROCEDURE — 86923 COMPATIBILITY TEST ELECTRIC: CPT

## 2017-08-13 PROCEDURE — 85025 COMPLETE CBC W/AUTO DIFF WBC: CPT | Performed by: EMERGENCY MEDICINE

## 2017-08-13 PROCEDURE — 85007 BL SMEAR W/DIFF WBC COUNT: CPT | Performed by: EMERGENCY MEDICINE

## 2017-08-13 PROCEDURE — 87086 URINE CULTURE/COLONY COUNT: CPT | Performed by: EMERGENCY MEDICINE

## 2017-08-13 PROCEDURE — 86901 BLOOD TYPING SEROLOGIC RH(D): CPT | Performed by: EMERGENCY MEDICINE

## 2017-08-13 PROCEDURE — 86850 RBC ANTIBODY SCREEN: CPT | Performed by: EMERGENCY MEDICINE

## 2017-08-13 PROCEDURE — 80053 COMPREHEN METABOLIC PANEL: CPT | Performed by: EMERGENCY MEDICINE

## 2017-08-13 PROCEDURE — 86900 BLOOD TYPING SEROLOGIC ABO: CPT | Performed by: EMERGENCY MEDICINE

## 2017-08-13 PROCEDURE — 76856 US EXAM PELVIC COMPLETE: CPT

## 2017-08-13 PROCEDURE — 99285 EMERGENCY DEPT VISIT HI MDM: CPT

## 2017-08-13 PROCEDURE — 85610 PROTHROMBIN TIME: CPT | Performed by: EMERGENCY MEDICINE

## 2017-08-13 PROCEDURE — 81001 URINALYSIS AUTO W/SCOPE: CPT | Performed by: EMERGENCY MEDICINE

## 2017-08-13 RX ORDER — POTASSIUM CHLORIDE 750 MG/1
20 CAPSULE, EXTENDED RELEASE ORAL DAILY
Status: DISCONTINUED | OUTPATIENT
Start: 2017-08-14 | End: 2017-08-20

## 2017-08-13 RX ORDER — RISPERIDONE 2 MG/1
2 TABLET ORAL DAILY
Status: DISCONTINUED | OUTPATIENT
Start: 2017-08-14 | End: 2017-08-22 | Stop reason: HOSPADM

## 2017-08-13 RX ORDER — SODIUM CHLORIDE 0.9 % (FLUSH) 0.9 %
10 SYRINGE (ML) INJECTION AS NEEDED
Status: DISCONTINUED | OUTPATIENT
Start: 2017-08-13 | End: 2017-08-22 | Stop reason: HOSPADM

## 2017-08-13 RX ORDER — QUETIAPINE FUMARATE 25 MG/1
25 TABLET, FILM COATED ORAL EVERY 12 HOURS SCHEDULED
Status: DISCONTINUED | OUTPATIENT
Start: 2017-08-13 | End: 2017-08-22 | Stop reason: HOSPADM

## 2017-08-13 RX ORDER — SODIUM CHLORIDE 9 MG/ML
100 INJECTION, SOLUTION INTRAVENOUS CONTINUOUS
Status: DISCONTINUED | OUTPATIENT
Start: 2017-08-13 | End: 2017-08-19

## 2017-08-13 RX ORDER — MEGESTROL ACETATE 40 MG/1
80 TABLET ORAL 4 TIMES DAILY
Status: DISCONTINUED | OUTPATIENT
Start: 2017-08-13 | End: 2017-08-16

## 2017-08-13 RX ORDER — LACTULOSE 10 G/15ML
20 SOLUTION ORAL DAILY
Status: DISCONTINUED | OUTPATIENT
Start: 2017-08-14 | End: 2017-08-22 | Stop reason: HOSPADM

## 2017-08-13 RX ORDER — ACETAMINOPHEN 160 MG/5ML
500 SOLUTION ORAL EVERY 4 HOURS PRN
Status: DISCONTINUED | OUTPATIENT
Start: 2017-08-13 | End: 2017-08-22 | Stop reason: HOSPADM

## 2017-08-13 RX ORDER — HYDROCODONE BITARTRATE AND ACETAMINOPHEN 5; 325 MG/1; MG/1
1 TABLET ORAL EVERY 6 HOURS PRN
Status: DISCONTINUED | OUTPATIENT
Start: 2017-08-13 | End: 2017-08-22 | Stop reason: HOSPADM

## 2017-08-13 RX ORDER — PANTOPRAZOLE SODIUM 40 MG/1
40 TABLET, DELAYED RELEASE ORAL
Status: DISCONTINUED | OUTPATIENT
Start: 2017-08-14 | End: 2017-08-22 | Stop reason: HOSPADM

## 2017-08-13 RX ORDER — RISPERIDONE 3 MG/1
3 TABLET ORAL NIGHTLY
Status: DISCONTINUED | OUTPATIENT
Start: 2017-08-13 | End: 2017-08-22 | Stop reason: HOSPADM

## 2017-08-13 RX ORDER — ATORVASTATIN CALCIUM 20 MG/1
20 TABLET, FILM COATED ORAL DAILY
Status: DISCONTINUED | OUTPATIENT
Start: 2017-08-14 | End: 2017-08-22 | Stop reason: HOSPADM

## 2017-08-13 RX ORDER — VALPROIC ACID 250 MG/5ML
250 SOLUTION ORAL EVERY 12 HOURS SCHEDULED
Status: DISCONTINUED | OUTPATIENT
Start: 2017-08-13 | End: 2017-08-22 | Stop reason: HOSPADM

## 2017-08-13 RX ORDER — BUMETANIDE 2 MG/1
2 TABLET ORAL DAILY
Status: DISCONTINUED | OUTPATIENT
Start: 2017-08-14 | End: 2017-08-22 | Stop reason: HOSPADM

## 2017-08-13 RX ORDER — IPRATROPIUM BROMIDE AND ALBUTEROL SULFATE 2.5; .5 MG/3ML; MG/3ML
3 SOLUTION RESPIRATORY (INHALATION) EVERY 4 HOURS PRN
Status: DISCONTINUED | OUTPATIENT
Start: 2017-08-13 | End: 2017-08-22 | Stop reason: HOSPADM

## 2017-08-13 RX ORDER — POTASSIUM CHLORIDE 20MEQ/15ML
20 LIQUID (ML) ORAL DAILY
COMMUNITY
End: 2018-02-02 | Stop reason: HOSPADM

## 2017-08-13 RX ORDER — LORAZEPAM 1 MG/1
1 TABLET ORAL EVERY 8 HOURS PRN
Status: DISCONTINUED | OUTPATIENT
Start: 2017-08-13 | End: 2017-08-22 | Stop reason: HOSPADM

## 2017-08-13 RX ORDER — POTASSIUM CHLORIDE 20MEQ/15ML
20 LIQUID (ML) ORAL DAILY
Status: DISCONTINUED | OUTPATIENT
Start: 2017-08-14 | End: 2017-08-13 | Stop reason: CLARIF

## 2017-08-13 RX ADMIN — SODIUM CHLORIDE 100 ML/HR: 9 INJECTION, SOLUTION INTRAVENOUS at 23:52

## 2017-08-13 NOTE — ED NOTES
Spoke with patients nurse Lilliana at [facility] and she stated they sent mabel in today because he d/c paper work stated if she had any further vaginal bleeding she was to return to the hospital for surgery. Nurse states vaginally bleeding had stopped since last D/C from hospital but stared back up, at this point the nurse is not aware of a scheduled surgery date for patient. Nurse is going fax over medication list as it is not on chart.      Mady Lloyd RN  08/13/17 1910

## 2017-08-13 NOTE — ED NOTES
Nursing home reports vaginal bleeding since noon today. Nursing home report changing her diaper 3 times since noon.     Kayla Cortez RN  08/13/17 [id number]

## 2017-08-13 NOTE — ED PROVIDER NOTES
" EMERGENCY DEPARTMENT ENCOUNTER    CHIEF COMPLAINT  Chief Complaint: vaginal bleeding  History given by: patient  History limited by: schizophrenia  Room Number: 09/09  PMD: Shane Barcenas MD      HPI:  Pt is a 61 y.o. female who presents complaining of vaginal bleeding since 1200 today. Pt states that there was \"a lot\" of vaginal bleeding today. Pt denies abd pain. Pt states that she is non-ambulatory at baseline.    Duration:  6 hours  Onset: gradual  Timing: constant  Location: vaginal  Intensity/Severity: moderate  Progression: unchanged  Associated Symptoms: none  Aggravating Factors: none specified   Alleviating Factors: none specified  Previous Episodes: Pt has a history of abnormal vaginal bleeding secondary to endometrial CA.  Treatment before arrival: none    PAST MEDICAL HISTORY  Active Ambulatory Problems     Diagnosis Date Noted   • Elevated troponin 05/13/2016   • Aspiration pneumonia 05/16/2016   • Hematuria 05/16/2016   • Hypokalemia 05/18/2016   • Pelvic mass in female 05/19/2016   • Fecal impaction 05/19/2016   • Endometrial carcinoma 05/21/2016   • Acute on chronic respiratory failure with hypoxia and hypercapnia 05/21/2016   • Acute on chronic diastolic CHF (congestive heart failure) 05/21/2016   • UTI (urinary tract infection) 05/29/2016   • Leucocytosis 05/30/2016   • Chronic diastolic CHF (congestive heart failure) 05/30/2016   • DM (diabetes mellitus) 05/30/2016   • Morbid obesity 05/30/2016   • HTN (hypertension) 05/30/2016   • Schizophrenia 05/30/2016   • Tracheostomy malfunction 06/22/2016   • Pyuria 06/22/2016   • THAI (obstructive sleep apnea) 06/22/2016   • Generalized weakness 05/16/2017   • Schizo affective schizophrenia 05/16/2017   • Diabetes mellitus 05/16/2017   • Coronary artery disease 05/16/2017   • Endometrial cancer determined by uterine biopsy 05/16/2017   • Chronic respiratory failure with hypoxia and hypercapnia 05/16/2017   • Toxic metabolic encephalopathy 05/16/2017 "   • Pneumonia 05/17/2017   • Abnormal vaginal bleeding 06/18/2017   • Schizo affective schizophrenia 06/19/2017   • CHF (congestive heart failure) 06/19/2017   • Endometrial cancer determined by uterine biopsy 06/19/2017   • Coronary artery disease 06/19/2017   • Lymphedema 06/19/2017   • Immobility 06/19/2017   • Acute blood loss anemia 06/22/2017     Resolved Ambulatory Problems     Diagnosis Date Noted   • No Resolved Ambulatory Problems     Past Medical History:   Diagnosis Date   • Acute respiratory distress syndrome    • Arthritis    • Bipolar disorder, unspecified    • CAD (coronary artery disease)    • Cellulitis    • Cellulitis    • CHF (congestive heart failure)    • Chronic ischemic heart disease    • Chronic respiratory failure with hypoxia and hypercapnia    • Chronic ulcer of calf    • Constipation    • Coronary artery disease    • Depression    • Depressive disorder    • Diabetes mellitus    • Dysphagia    • Dysphagia    • Endometrial cancer determined by uterine biopsy    • Endometrial hyperplasia    • History of transfusion    • Hypercapnia    • Hypertension    • Immobility    • Lack of coordination    • Lymphedema    • Malignant neoplasm of uterus    • Muscle weakness    • Obesities, morbid    • Oxygen dependent    • Post-menopausal bleeding    • Postmenopausal bleeding    • Schizo affective schizophrenia    • Skin ulcer    • Sleep apnea    • Stroke    • Symbolic dysfunction    • Uterine cancer    • Venous insufficiency    • Venous insufficiency        PAST SURGICAL HISTORY  Past Surgical History:   Procedure Laterality Date   • D&C HYSTEROSCOPY     • LAPAROSCOPIC TUBAL LIGATION     • TRACHEOSTOMY AND PEG TUBE INSERTION     • UMBILICAL HERNIA REPAIR     • WOUND DEBRIDEMENT         FAMILY HISTORY  Family History   Problem Relation Age of Onset   • Diabetes Mother    • Stroke Mother        SOCIAL HISTORY  Social History     Social History   • Marital status: Single     Spouse name: N/A   • Number of  children: N/A   • Years of education: N/A     Occupational History   • Not on file.     Social History Main Topics   • Smoking status: Former Smoker     Packs/day: 2.00     Years: 15.00     Types: Cigarettes     Quit date: 1988   • Smokeless tobacco: Not on file   • Alcohol use No   • Drug use: No   • Sexual activity: No     Other Topics Concern   • Not on file     Social History Narrative       ALLERGIES  Codeine; Penicillins; and Sulfa antibiotics    REVIEW OF SYSTEMS  Review of Systems   Unable to perform ROS: Psychiatric disorder   Genitourinary: Positive for vaginal bleeding.       PHYSICAL EXAM  ED Triage Vitals   Temp Heart Rate Resp BP SpO2   -- 08/13/17 1729 08/13/17 1729 08/13/17 1729 08/13/17 1729    90 20 133/70 97 %      Temp src Heart Rate Source Patient Position BP Location FiO2 (%)   -- -- -- -- --              Physical Exam   Constitutional: She is well-developed, well-nourished, and in no distress. No distress.   HENT:   Head: Normocephalic and atraumatic.   Eyes: Conjunctivae and EOM are normal. Pupils are equal, round, and reactive to light.   Neck: Normal range of motion. Neck supple.   Cardiovascular: Normal rate, regular rhythm and normal heart sounds.    Pulmonary/Chest: Effort normal and breath sounds normal. No respiratory distress.   Abdominal: Soft. There is no tenderness. There is no rebound and no guarding.   obese   Genitourinary:   Genitourinary Comments: Female chaperone present for  exam. There is a mild amount of bloody discharge.   Neurological: She is alert. She displays weakness (generalized).   Pt is immobile at baseline   Skin: Skin is warm and dry. No rash noted.   Nursing note and vitals reviewed.      LAB RESULTS  Lab Results (last 24 hours)     Procedure Component Value Units Date/Time    CBC & Differential [[id number]] Collected:  08/13/17 1852    Specimen:  Blood Updated:  08/13/17 1935    Narrative:       The following orders were created for panel order CBC &  Differential.  Procedure                               Abnormality         Status                     ---------                               -----------         ------                     Scan Slide[[id number]]                                       Final result               CBC Auto Differential[[id number]]        Abnormal            Final result                 Please view results for these tests on the individual orders.    Comprehensive Metabolic Panel [[id number]]  (Abnormal) Collected:  08/13/17 1852    Specimen:  Blood Updated:  08/13/17 1921     Glucose 95 mg/dL      BUN 16 mg/dL      Creatinine 0.72 mg/dL      Sodium 143 mmol/L      Potassium 3.7 mmol/L      Chloride 101 mmol/L      CO2 32.5 (H) mmol/L      Calcium 9.4 mg/dL      Total Protein 7.1 g/dL      Albumin 3.30 (L) g/dL      ALT (SGPT) 10 U/L      AST (SGOT) 9 U/L      Alkaline Phosphatase 68 U/L      Total Bilirubin 0.4 mg/dL      eGFR  African Amer 99 mL/min/1.73      Globulin 3.8 gm/dL      A/G Ratio 0.9 g/dL      BUN/Creatinine Ratio 22.2     Anion Gap 9.5 mmol/L     Protime-INR [[id number]]  (Abnormal) Collected:  08/13/17 1852    Specimen:  Blood Updated:  08/13/17 1924     Protime 14.2 Seconds      INR 1.15 (H)    CBC Auto Differential [[id number]]  (Abnormal) Collected:  08/13/17 1852    Specimen:  Blood Updated:  08/13/17 1935     WBC 7.04 10*3/mm3      RBC 4.13 10*6/mm3      Hemoglobin 8.5 (L) g/dL      Hematocrit 29.9 (L) %      MCV 72.4 (L) fL      MCH 20.6 (L) pg      MCHC 28.4 (L) g/dL      RDW 18.0 (H) %      RDW-SD 47.9 fl      MPV 10.1 fL      Platelets 386 10*3/mm3      Neutrophil % 67.1 %      Lymphocyte % 19.7 %      Monocyte % 10.9 %      Eosinophil % 2.0 %      Basophil % 0.3 %      Immature Grans % 0.0 %      Neutrophils, Absolute 4.72 10*3/mm3      Lymphocytes, Absolute 1.39 10*3/mm3      Monocytes, Absolute 0.77 10*3/mm3      Eosinophils, Absolute 0.14 10*3/mm3      Basophils, Absolute 0.02 10*3/mm3      Immature Grans,  Absolute 0.00 10*3/mm3     Scan Slide [[id number]] Collected:  08/13/17 1852    Specimen:  Blood Updated:  08/13/17 1935     Anisocytosis Large/3+     Hypochromia Mod/2+     Microcytes Mod/2+     Poikilocytes Mod/2+     WBC Morphology Normal     Platelet Morphology Normal    Urinalysis With / Culture If Indicated [[id number]]  (Abnormal) Collected:  08/13/17 2002    Specimen:  Urine from Urine, Catheter Updated:  08/13/17 2025     Color, UA Yellow     Appearance, UA Clear     pH, UA 6.0     Specific Gravity, UA 1.013     Glucose, UA Negative     Ketones, UA Negative     Bilirubin, UA Negative     Blood, UA Negative     Protein, UA 30 mg/dL (1+) (A)     Leuk Esterase, UA Trace (A)     Nitrite, UA Negative     Urobilinogen, UA 1.0 E.U./dL    Urinalysis, Microscopic Only [[id number]]  (Abnormal) Collected:  08/13/17 2002    Specimen:  Urine from Urine, Catheter Updated:  08/13/17 2025     RBC, UA None Seen /HPF      WBC, UA 6-12 (A) /HPF      Bacteria, UA None Seen /HPF      Squamous Epithelial Cells, UA 0-2 /HPF      Hyaline Casts, UA 3-6 /LPF      Methodology Automated Microscopy    Urine Culture [[id number]] Collected:  08/13/17 2002    Specimen:  Urine from Urine, Catheter Updated:  08/13/17 2023          I ordered the above labs and reviewed the results    RADIOLOGY  US Pelvis Complete            1833- Reviewed pt's pelvic US, which shows the endometrial stripe thickened at 1.9cm and uterine fibroids, with the largest at 7cm. Independently viewed by me. Interpreted by radiologist. Discussed with US technician.    I ordered the above noted radiological studies. Interpreted by radiologist. Reviewed by me in PACS.       PROCEDURES  Procedures      PROGRESS AND CONSULTS  ED Course     1801- Ordered blood work, PT with INR, UA, type and screen and non-OB transvaginal US for further evaluation.     1942- Placed call to Dr. Barcenas (PMD) for admission.    2004- Discussed the pt's case with Dr. Barcenas (PMD) who agrees to admit  the pt to a telemetry bed.    8:07 PM  Latest vital signs   BP- 120/73 HR- 90 Temp- 98.5 °F (36.9 °C) (Oral) O2 sat- 100%. Has remained stable in ED. No distress.       MEDICAL DECISION MAKING  Results were reviewed/discussed with the patient and they were also made aware of online access. Pt also made aware that some labs, such as cultures, will not be resulted during ER visit and follow up with PMD is necessary.     MDM  Number of Diagnoses or Management Options     Amount and/or Complexity of Data Reviewed  Clinical lab tests: ordered and reviewed (Hgb=8.5)  Tests in the radiology section of CPT®: ordered and reviewed (Pelvic US shows the endometrial stripe thickened at 1.9cm and uterine fibroids, with the largest at 7cm.)  Discussion of test results with the performing providers: yes (US technician)  Decide to obtain previous medical records or to obtain history from someone other than the patient: yes  Review and summarize past medical records: yes (Pt was admitted from 6-18 until 6-23-17 for abnormal vaginal bleeding)  Independent visualization of images, tracings, or specimens: yes           DIAGNOSIS  Final diagnoses:   Vaginal bleeding   Endometrial cancer   Blood loss anemia, chronic   Uterine leiomyoma, unspecified location   Schizophrenia, unspecified type       DISPOSITION  ADMISSION    Discussed treatment plan and reason for admission with pt/family and admitting physician.  Pt/family voiced understanding of the plan for admission for further testing/treatment as needed.         Latest Documented Vital Signs:  As of 8:33 PM  BP- 120/73 HR- 90 Temp- 98.5 °F (36.9 °C) (Oral) O2 sat- 100%    --  Documentation assistance provided by jennie Call for Dr. Kinney.  Information recorded by the jennie was done at my direction and has been verified and validated by me.     Carissa Call  08/13/17 2011       Harley Kinney MD  08/13/17 2033

## 2017-08-14 PROBLEM — I10 HYPERTENSION: Status: ACTIVE | Noted: 2017-08-14

## 2017-08-14 PROBLEM — C55 UTERINE CANCER (HCC): Chronic | Status: ACTIVE | Noted: 2017-08-14

## 2017-08-14 PROBLEM — F31.9 BIPOLAR DISORDER, UNSPECIFIED (HCC): Chronic | Status: ACTIVE | Noted: 2017-08-14

## 2017-08-14 LAB
ANION GAP SERPL CALCULATED.3IONS-SCNC: 6.8 MMOL/L
BUN BLD-MCNC: 17 MG/DL (ref 8–23)
BUN/CREAT SERPL: 26.2 (ref 7–25)
CALCIUM SPEC-SCNC: 8.9 MG/DL (ref 8.6–10.5)
CHLORIDE SERPL-SCNC: 103 MMOL/L (ref 98–107)
CO2 SERPL-SCNC: 34.2 MMOL/L (ref 22–29)
CREAT BLD-MCNC: 0.65 MG/DL (ref 0.57–1)
DEPRECATED RDW RBC AUTO: 47 FL (ref 37–54)
ERYTHROCYTE [DISTWIDTH] IN BLOOD BY AUTOMATED COUNT: 17.8 % (ref 11.7–13)
GFR SERPL CREATININE-BSD FRML MDRD: 112 ML/MIN/1.73
GLUCOSE BLD-MCNC: 110 MG/DL (ref 65–99)
HCT VFR BLD AUTO: 28 % (ref 35.6–45.5)
HGB BLD-MCNC: 8.3 G/DL (ref 11.9–15.5)
MCH RBC QN AUTO: 21.3 PG (ref 26.9–32)
MCHC RBC AUTO-ENTMCNC: 29.6 G/DL (ref 32.4–36.3)
MCV RBC AUTO: 72 FL (ref 80.5–98.2)
PLATELET # BLD AUTO: 330 10*3/MM3 (ref 140–500)
PMV BLD AUTO: 9.8 FL (ref 6–12)
POTASSIUM BLD-SCNC: 3.4 MMOL/L (ref 3.5–5.2)
RBC # BLD AUTO: 3.89 10*6/MM3 (ref 3.9–5.2)
SODIUM BLD-SCNC: 144 MMOL/L (ref 136–145)
WBC NRBC COR # BLD: 6.45 10*3/MM3 (ref 4.5–10.7)

## 2017-08-14 PROCEDURE — 86900 BLOOD TYPING SEROLOGIC ABO: CPT

## 2017-08-14 PROCEDURE — 85027 COMPLETE CBC AUTOMATED: CPT | Performed by: INTERNAL MEDICINE

## 2017-08-14 PROCEDURE — 97162 PT EVAL MOD COMPLEX 30 MIN: CPT

## 2017-08-14 PROCEDURE — 80048 BASIC METABOLIC PNL TOTAL CA: CPT | Performed by: INTERNAL MEDICINE

## 2017-08-14 PROCEDURE — 86901 BLOOD TYPING SEROLOGIC RH(D): CPT

## 2017-08-14 RX ORDER — CEFAZOLIN SODIUM 2 G/100ML
2 INJECTION, SOLUTION INTRAVENOUS ONCE
Status: COMPLETED | OUTPATIENT
Start: 2017-08-15 | End: 2017-08-15

## 2017-08-14 RX ORDER — CEFAZOLIN SODIUM 2 G/100ML
2 INJECTION, SOLUTION INTRAVENOUS ONCE
Status: DISCONTINUED | OUTPATIENT
Start: 2017-08-14 | End: 2017-08-14

## 2017-08-14 RX ORDER — POTASSIUM CHLORIDE 1.5 G/1.77G
40 POWDER, FOR SOLUTION ORAL 2 TIMES DAILY
Status: DISPENSED | OUTPATIENT
Start: 2017-08-14 | End: 2017-08-16

## 2017-08-14 RX ADMIN — MEGESTROL ACETATE 80 MG: 40 TABLET ORAL at 12:24

## 2017-08-14 RX ADMIN — QUETIAPINE FUMARATE 25 MG: 25 TABLET, FILM COATED ORAL at 01:45

## 2017-08-14 RX ADMIN — ATORVASTATIN CALCIUM 20 MG: 20 TABLET, FILM COATED ORAL at 09:16

## 2017-08-14 RX ADMIN — RISPERIDONE 2 MG: 2 TABLET ORAL at 11:20

## 2017-08-14 RX ADMIN — VALPROIC ACID 250 MG: 250 SOLUTION ORAL at 20:56

## 2017-08-14 RX ADMIN — POTASSIUM CHLORIDE 20 MEQ: 750 CAPSULE, EXTENDED RELEASE ORAL at 09:16

## 2017-08-14 RX ADMIN — QUETIAPINE FUMARATE 25 MG: 25 TABLET, FILM COATED ORAL at 20:57

## 2017-08-14 RX ADMIN — MEGESTROL ACETATE 80 MG: 40 TABLET ORAL at 01:46

## 2017-08-14 RX ADMIN — QUETIAPINE FUMARATE 25 MG: 25 TABLET, FILM COATED ORAL at 09:16

## 2017-08-14 RX ADMIN — POTASSIUM CHLORIDE 40 MEQ: 1.5 POWDER, FOR SOLUTION ORAL at 14:34

## 2017-08-14 RX ADMIN — SODIUM CHLORIDE 100 ML/HR: 9 INJECTION, SOLUTION INTRAVENOUS at 09:21

## 2017-08-14 RX ADMIN — BUMETANIDE 2 MG: 2 TABLET ORAL at 09:16

## 2017-08-14 RX ADMIN — PANTOPRAZOLE SODIUM 40 MG: 40 TABLET, DELAYED RELEASE ORAL at 05:24

## 2017-08-14 RX ADMIN — MEGESTROL ACETATE 80 MG: 40 TABLET ORAL at 20:57

## 2017-08-14 RX ADMIN — ISOSORBIDE MONONITRATE 30 MG: 20 TABLET ORAL at 09:16

## 2017-08-14 RX ADMIN — RISPERIDONE 3 MG: 3 TABLET ORAL at 01:46

## 2017-08-14 RX ADMIN — MEGESTROL ACETATE 80 MG: 40 TABLET ORAL at 09:16

## 2017-08-14 RX ADMIN — RISPERIDONE 3 MG: 3 TABLET ORAL at 20:57

## 2017-08-14 RX ADMIN — MEGESTROL ACETATE 80 MG: 40 TABLET ORAL at 18:21

## 2017-08-14 RX ADMIN — VALPROIC ACID 250 MG: 250 SOLUTION ORAL at 01:47

## 2017-08-14 RX ADMIN — POTASSIUM CHLORIDE 40 MEQ: 1.5 POWDER, FOR SOLUTION ORAL at 20:56

## 2017-08-14 RX ADMIN — VALPROIC ACID 250 MG: 250 SOLUTION ORAL at 09:16

## 2017-08-14 RX ADMIN — SODIUM CHLORIDE 100 ML/HR: 9 INJECTION, SOLUTION INTRAVENOUS at 18:21

## 2017-08-14 NOTE — PROGRESS NOTES
Discharge Planning Assessment  [facility]     Patient Name: Lupe Burleson  MRN: [id number]  Today's Date: 8/14/2017    Admit Date: 8/13/2017          Discharge Needs Assessment       08/14/17 0935    Living Environment    Lives With facility resident   from [facility]    Living Arrangements extended care facility    Home Accessibility no concerns    Stair Railings at Home none    Type of Financial/Environmental Concern none    Transportation Available ambulance    Discharge Needs Assessment    Concerns To Be Addressed no discharge needs identified    Readmission Within The Last 30 Days no previous admission in last 30 days    Anticipated Changes Related to Illness none    Equipment Currently Used at Home none    Equipment Needed After Discharge none    Discharge Disposition nursing facility    Discharge Planning Comments [facility] Helene johnson ( [phone], ext [phone])            Discharge Plan       08/14/17 0937    Case Management/Social Work Plan    Plan return to [facility]- waiting to hear back from [facility] on bed status    Patient/Family In Agreement With Plan yes    Additional Comments Per chart, patient is from [facility].  Call placed to [facility] guardianHelene ( [phone], ext [phone]), and she confirms that patient will return there at [place].   Per Helene Cota, [facility] has approved patient for a hysterectomy as of August 2nd.  She e-mailed me a copy of this 1 page document..  One copy was placed in patient chart and another copy placed in basket to have scanned into system by HIM.  Transfer packet started and in office.  CCP will follow.         Discharge Placement     Facility/Agency Request Status Selected? Address Phone Number Fax Number    [facility] Pending - Request Sent     [address] [address] [phone]                Demographic Summary       08/14/17 0933    Referral  Information    Admission Type inpatient    Arrived From long-term care   from [facility]- waiting to here back from [facility] on bed status    Referral Source admission list    Reason For Consult discharge planning    Primary Care Physician Information    Name Dr Shane Thomas             Functional Status       08/14/17 0934    Functional Status Current    Ambulation 4-->completely dependent    Transferring 4-->completely dependent    Toileting 4-->completely dependent    Bathing 4-->completely dependent    Dressing 4-->completely dependent    Eating 0-->independent    Communication 2-->difficulty speaking (not related to language barrier)    Swallowing (if score 2 or more for any item, consult Rehab Services) 0-->swallows foods/liquids without difficulty    Change in Functional Status Since Onset of Current Illness/Injury no    Functional Status Prior    Ambulation 4-->completely dependent    Transferring 4-->completely dependent    Toileting 4-->completely dependent    Bathing 4-->completely dependent    Dressing 4-->completely dependent    Eating 0-->independent    Communication 2-->difficulty speaking (not related to language barrier)    Swallowing 0-->swallows foods/liquids without difficulty    Activity Tolerance    Usual Activity Tolerance poor    Current Activity Tolerance poor            Psychosocial     None            Abuse/Neglect     None            Legal     None            Substance Abuse     None            Patient Forms     None          Mady Holguin, RN

## 2017-08-14 NOTE — PLAN OF CARE
Problem: Fall Risk (Adult)  Goal: Identify Related Risk Factors and Signs and Symptoms  Outcome: Ongoing (interventions implemented as appropriate)    08/13/17 2155   Fall Risk   Fall Risk: Related Risk Factors age-related changes;environment unfamiliar;sleep pattern alteration   Fall Risk: Signs and Symptoms presence of risk factors         Problem: Tissue Perfusion, Ineffective Peripheral (Adult)  Goal: Identify Related Risk Factors and Signs and Symptoms  Outcome: Ongoing (interventions implemented as appropriate)    08/13/17 2155   Tissue Perfusion, Ineffective Peripheral   Tissue Perfusion, Ineffective Peripheral: Related Risk Factors anemia;disease process   Signs and Symptoms (Ineffective Peripheral Tissue Perfusion) muscle weakness;skin color changes       Goal: Adequate Tissue Perfusion  Outcome: Ongoing (interventions implemented as appropriate)    08/13/17 2155   Tissue Perfusion, Ineffective Peripheral (Adult)   Adequate Tissue Perfusion making progress toward outcome

## 2017-08-14 NOTE — H&P
"HISTORY AND PHYSICAL   [facility], [facility]        Patient Identification:    Name: Lupe Burleson  Age: 61 y.o.  Sex: female  :  1955  MRN: [id number]                     Primary Care Physician: Shane Barcenas MD    Chief Complaint:  Vaginal Bleeding    Chief Complaint   Patient presents with   • Vaginal Bleeding         History of Present Illness:     Ms. Lupe Burleson is a 61 year old female with prior history of bipolar DO, CHF, DMII and edometrial cancer who has been on megace for vaginal bleeding for approximately 2 years. She was discharged from this facility 17 for vaginal bleeding and was due to follow up with Dr. Washington this week if she had no further bleeding. Per the NH staff and the patient, she had no bleeding until yesterday, at which times she states \"it was a Lot\". She was transferred to the ED and admitted for further treatment. This morning, she denies CP, SOA, N/V/d.     Past Medical History:  Past Medical History:   Diagnosis Date   • Acute respiratory distress syndrome    • Arthritis    • Bipolar disorder, unspecified    • CAD (coronary artery disease)    • Cellulitis    • Cellulitis    • CHF (congestive heart failure)    • Chronic ischemic heart disease    • Chronic respiratory failure with hypoxia and hypercapnia    • Chronic ulcer of calf    • Constipation    • Coronary artery disease    • Depression    • Depressive disorder    • Diabetes mellitus    • Dysphagia    • Dysphagia    • Endometrial cancer determined by uterine biopsy     s/p radiation; no improvement   • Endometrial hyperplasia    • History of transfusion    • Hypercapnia    • Hypertension    • Immobility    • Lack of coordination    • Lymphedema    • Malignant neoplasm of uterus    • Muscle weakness    • Obesities, morbid    • Oxygen dependent    • Post-menopausal bleeding    • Postmenopausal bleeding    • Schizo affective schizophrenia    • Skin ulcer    • Sleep apnea    • Stroke    • Symbolic " dysfunction    • Uterine cancer    • Venous insufficiency    • Venous insufficiency      Past Surgical History:  Past Surgical History:   Procedure Laterality Date   • D&C HYSTEROSCOPY     • LAPAROSCOPIC TUBAL LIGATION     • TRACHEOSTOMY AND PEG TUBE INSERTION     • UMBILICAL HERNIA REPAIR     • WOUND DEBRIDEMENT        Home Meds:  Prescriptions Prior to Admission   Medication Sig Dispense Refill Last Dose   • acetaminophen (MAPAP) 160 MG/5ML liquid 15 mg/kg by Per G Tube route Every 4 (Four) Hours As Needed for Fever (give 20.3 ml per G tube).      • atorvastatin (LIPITOR) 20 MG tablet 20 mg by Per G Tube route Daily.      • bumetanide (BUMEX) 2 MG tablet 2 mg by Per G Tube route Daily.      • ipratropium-albuterol (DUO-NEB) 0.5-2.5 mg/mL nebulizer Take 3 mL by nebulization Every 4 (Four) Hours As Needed for Wheezing.      • ISOSORBIDE MONONITRATE PO 30 mg by Per G Tube route Daily.      • lactulose (CHRONULAC) 10 GM/15ML solution 20 g by Per PEG Tube route daily.   5/28/2016 at Unknown time   • megestrol (MEGACE) 40 MG tablet Take 2 tablets by mouth 4 (Four) Times a Day. 240 tablet 0    • pantoprazole (PROTONIX) 40 MG pack packet 80 mg by Per PEG Tube route 4 (Four) Times a Day.   5/12/2016 at Unknown time   • potassium chloride (KAYCIEL) 20 MEQ/15ML (10%) solution Take 20 mEq by mouth Daily.      • valproic acid (DEPAKENE) 250 MG/5ML syrup syrup 250 mg by Per PEG Tube route every 12 (twelve) hours.   5/28/2016 at Unknown time   • ammonium lactate (AMLACTIN) 12 % cream Apply 1 application topically 2 (two) times a day. To bilat legs and feet   5/28/2016 at Unknown time   • HYDROcodone-acetaminophen (NORCO) 5-325 MG per tablet Take 1 tablet by mouth Every 6 (Six) Hours As Needed for Moderate Pain (4-6) or Severe Pain (7-10). 120 tablet 0    • insulin aspart (novoLOG FLEXPEN) 100 UNIT/ML solution pen-injector sc pen Inject 5 Units under the skin 4 (Four) Times a Day With Meals & at Bedtime. 1 pen 1    • LORazepam  (ATIVAN) 1 MG tablet 1 tablet by Per G Tube route Every 8 (Eight) Hours As Needed for Anxiety. 90 tablet 0    • POTASSIUM CHLORIDE PO 20 mEq by Per G Tube route Daily.      • QUEtiapine (SEROquel) 25 MG tablet 25 mg by Per PEG Tube route every 12 (twelve) hours.   5/28/2016 at Unknown time   • risperiDONE (RisperDAL) 1 MG tablet Take 2 mg by mouth Every Morning. Take 3 mg at bedtime & 2 mg every morning   5/28/2016 at Unknown time   • risperiDONE (risperDAL) 3 MG tablet Take 3 mg by mouth Every Night.          Allergies:  Allergies   Allergen Reactions   • Codeine    • Penicillins      Tolerated ceftriaxone during 5/2017 admission   • Sulfa Antibiotics      Immunizations:  There is no immunization history for the selected administration types on file for this patient.  Social History:   Social History     Social History Narrative     Social History   Substance Use Topics   • Smoking status: Former Smoker     Packs/day: 2.00     Years: 15.00     Types: Cigarettes     Quit date: 1988   • Smokeless tobacco: Not on file   • Alcohol use No     Family History:  Family History   Problem Relation Age of Onset   • Diabetes Mother    • Stroke Mother         Review of Systems  See history of present illness and past medical history.    Patient denies headache, dizziness, syncope, falls, trauma, change in vision, change in hearing, change in taste, changes in weight, changes in appetite, focal weakness, numbness, or paresthesia.    Patient denies chest pain, palpitations, dyspnea, orthopnea, PND, cough, sinus pressure, rhinorrhea, epistaxis, hemoptysis, nausea, vomiting,hematemesis, diarrhea, constipation or hematchezia.    Denies cold or heat intolerance, polydipsia, polyuria, polyphagia.   Denies hematuria, pyuria, dysuria, hesitancy, frequency or urgency.   Denies consumption of raw and under cooked meats foods or change in water source.  Denies fever, chills, sweats, night sweats.    Denies missing any routine medications.  "  Remainder of ROS is negative.    Objective:  tMax 24 hrs: Temp (24hrs), Av.1 °F (36.7 °C), Min:97.5 °F (36.4 °C), Max:98.5 °F (36.9 °C)    Vitals Ranges:   Temp:  [97.5 °F (36.4 °C)-98.5 °F (36.9 °C)] 97.5 °F (36.4 °C)  Heart Rate:  [70-90] 83  Resp:  [16-20] 16  BP: (111-142)/() 111/59      Exam:    /59 (BP Location: Right arm, Patient Position: Lying)  Pulse 83  Temp 97.5 °F (36.4 °C) (Oral)   Resp 16  Ht 66\" (167.6 cm)  Wt 300 lb (136 kg)  SpO2 96%  BMI 48.42 kg/m2    General: Alert, cooperative and appears stated age. In no acute distress  Constitutional: She is oriented to person, place, and time.       Head: Normocephalic, without obvious abnormality, atraumatic  Eyes: EOM are normal. Pupils are equal, round, and reactive to light.   Oropharynx: Mucosa and tongue normal  Neck: Supple, symmetrical, trachea midline, no adenopathy;              Thyroid without enlargement/tenderness/nodules;              no carotid bruit or JVD  Cardiovascular: Normal rate, regular rhythm and intact distal pulses.                              Exam reveals no gallop and no friction rub. No murmur heard  Chest wall: No tenderness or deformity  Pulmonary: Clear to auscultation bilaterally, respirations unlabored.                        No rhonchi, wheezing or rales.   Abdominal: Soft,  non-tender; bowel sounds active all four quadrants;                      no masses,  hepatomegaly, splenomegaly.   Extremities: Normal, atraumatic, no cyanosis or edema  Pulses:         2 + symmetric all extremities  Neurological: She is alert and oriented to person, place, and time.                           CNII-XII intact, normal strength, sensation intact throughout  Skin: Skin color, texture, turgor normal, no rashes or lesions      Data Review:      Results from last 7 days  Lab Units 17  0646 17  1852   WBC 10*3/mm3 6.45 7.04   HEMOGLOBIN g/dL 8.3* 8.5*   HEMATOCRIT % 28.0* 29.9*   PLATELETS 10*3/mm3 330 386 "         Results from last 7 days  Lab Units 08/14/17  0646 08/13/17  1852   SODIUM mmol/L 144 143   POTASSIUM mmol/L 3.4* 3.7   CHLORIDE mmol/L 103 101   CO2 mmol/L 34.2* 32.5*   BUN mg/dL 17 16   CREATININE mg/dL 0.65 0.72   CALCIUM mg/dL 8.9 9.4   BILIRUBIN mg/dL  --  0.4   ALK PHOS U/L  --  68   ALT (SGPT) U/L  --  10   AST (SGOT) U/L  --  9   GLUCOSE mg/dL 110* 95       Results from last 7 days  Lab Units 08/13/17  1852   INR  1.15*         Lab Results  Lab Value Date/Time   TROPONINT 0.065 (H) 05/17/2017 0505   TROPONINT 0.077 (H) 05/16/2017 1517   TROPONINT 0.080 (H) 05/16/2017 1112   TROPONINT 0.078 (H) 05/16/2017 0703   TROPONINT 0.084 (H) 05/16/2017 0116   TROPONINT <0.010 05/15/2016 0622   TROPONINT 0.017 05/14/2016 0437   TROPONINT 0.023 05/13/2016 2036   TROPONINT 0.031 (H) 05/13/2016 1231   TROPONINT <0.01 02/03/2016 0537   TROPONINT <0.01 02/02/2016 0914   TROPONINT <0.01 11/11/2015 0516   TROPONINT <0.01 11/09/2015 2049   TROPONINT <0.01 04/18/2014 0517        Imaging Results (all)     Procedure Component Value Units Date/Time    US Pelvis Complete [[id number]] Collected:  08/13/17 1902     Updated:  08/13/17 1902    Narrative:       PELVIC ULTRASOUND     HISTORY: 61-year-old nursing home patient with vaginal bleeding.     The examination was performed as an emergency procedure. The patient  refused transvaginal imaging. The uterus is enlarged and lobulated,  measuring 9.8 x 9.8 x 13.7 cm and there are multiple fibroids measuring  up to 7 cm in size. There is also generalized endometrial thickening  with double wall thickness of 1.9 cm. The patient is postmenopausal and  this does raise a concern of endometrial tumor and further clinical  correlation is required.     The ovaries are not visualized. There is no free fluid identified.             Assessment:    Principal Problem:    Vaginal bleeding  Active Problems:    Endometrial cancer determined by uterine biopsy    Hypertension    Chronic diastolic  CHF (congestive heart failure)    DM (diabetes mellitus)    Schizo affective schizophrenia    Coronary artery disease    Bipolar disorder, unspecified      Patient Active Problem List   Diagnosis Code   • Elevated troponin R79.89   • Aspiration pneumonia J69.0   • Hematuria R31.9   • Hypokalemia E87.6   • Pelvic mass in female R19.00   • Fecal impaction K56.41   • Endometrial carcinoma C54.1   • Acute on chronic respiratory failure with hypoxia and hypercapnia J96.21, J96.22   • Acute on chronic diastolic CHF (congestive heart failure) I50.33   • UTI (urinary tract infection) N39.0   • Leucocytosis D72.829   • Chronic diastolic CHF (congestive heart failure) I50.32   • DM (diabetes mellitus) E11.9   • Morbid obesity E66.01   • HTN (hypertension) I10   • Schizophrenia F20.9   • Tracheostomy malfunction J95.03   • Pyuria N39.0   • THAI (obstructive sleep apnea) G47.33   • Generalized weakness R53.1   • Schizo affective schizophrenia F25.0   • Diabetes mellitus E11.9   • Coronary artery disease I25.10   • Endometrial cancer determined by uterine biopsy C54.1, Z15.04   • Chronic respiratory failure with hypoxia and hypercapnia J96.11, J96.12   • Toxic metabolic encephalopathy G92   • Pneumonia J18.9   • Abnormal vaginal bleeding N93.9   • Schizo affective schizophrenia F25.0   • CHF (congestive heart failure) I50.9   • Endometrial cancer determined by uterine biopsy C54.1, Z15.04   • Coronary artery disease I25.10   • Lymphedema I89.0   • Immobility Z74.09   • Acute blood loss anemia D62   • Vaginal bleeding N93.9   • Hypertension I10   • Uterine cancer C55   • Bipolar disorder, unspecified F31.9       Plan:    Inpatient admission  [facility] soft, CCHO  IV fluids  Monitor and correct electrolytes- replace potassium  GYN-Oncology consult  Monitor mental status  Accucheck and SSI  Home medications  PT consult  DVT/stress ulcer prophylaxis  Labs in am        Shane Barcenas MD  8/14/2017  1:16 PM      Much of this encounter  note is an electronic transcription/translation of spoken language to printed text. The electronic translation of spoken language may permit erroneous, or at times, nonsensical words or phrases to be inadvertently transcribed; Although I have reviewed the note for such errors, some may still exist

## 2017-08-14 NOTE — CONSULTS
"Consults   GYN/ONC CONSULTATION      Requesting MD Dr. Barcenas    CHIEF COMPLAINT    Chief Complaint   Patient presents with   • Vaginal Bleeding       HISTORY OF PRESENT ILLNESS    Ms. Lupe Burleson is a 61 y.o. female well known to GYN/Oncology with personal history of papillary serous carcinoma of the uterus. She has been treated with Megace 80 mg, which was more recently increased around June 2017, to four times daily due to continued vaginal bleeding. Unfortunately, despite the increase in Megace she presented to [facility] with \"excessie vaginal bleeding\" from her long term care facility. Today, she reports vaginal bleeding became heavy a few days ago. She denies associated pain/cramping, or any other symptoms. 8/13/17 pelvic ultrasound, demonstrated an enlarged lobulated uterus measing 9.8 x 9.8 x 13.7 cm with multiple fibroids measuring up to 7 cm. Endometrial thickness of 1.9 cm.   She has significant co morbidities including CHF, CAD, DM, history respiratory failure, schizoaffective d/o, and severe morbid obesity. At one time, she was seen by Dr. Lebron, GYN/Oncology and due to these co morbidities she was felt to be a poor surgical candidate. Palliative external beam radiation (1 course of \"quad shot\" external beam therapy in May 2015) was attempted at one time under the direction of Dr. Harley Mike, but was discontinued due resolution of vaginal bleeding at that time, along with no reduction in size of the uterus.     PAST MEDICAL HISTORY    Past Medical History:   Diagnosis Date   • Acute respiratory distress syndrome    • Arthritis    • Bipolar disorder, unspecified    • CAD (coronary artery disease)    • Cellulitis    • Cellulitis    • CHF (congestive heart failure)    • Chronic ischemic heart disease    • Chronic respiratory failure with hypoxia and hypercapnia    • Chronic ulcer of calf    • Constipation    • Coronary artery disease    • Depression    • Depressive disorder    • Diabetes mellitus    • " Dysphagia    • Dysphagia    • Endometrial cancer determined by uterine biopsy     s/p radiation; no improvement   • Endometrial hyperplasia    • History of transfusion    • Hypercapnia    • Hypertension    • Immobility    • Lack of coordination    • Lymphedema    • Malignant neoplasm of uterus    • Muscle weakness    • Obesities, morbid    • Oxygen dependent    • Post-menopausal bleeding    • Postmenopausal bleeding    • Schizo affective schizophrenia    • Skin ulcer    • Sleep apnea    • Stroke    • Symbolic dysfunction    • Uterine cancer    • Venous insufficiency    • Venous insufficiency        PAST SURGICAL HISTORY    Past Surgical History:   Procedure Laterality Date   • D&C HYSTEROSCOPY     • LAPAROSCOPIC TUBAL LIGATION     • TRACHEOSTOMY AND PEG TUBE INSERTION     • UMBILICAL HERNIA REPAIR     • WOUND DEBRIDEMENT         Family history  Family History   Problem Relation Age of Onset   • Diabetes Mother    • Stroke Mother        MEDICATIONS    Current Facility-Administered Medications   Medication Dose Route Frequency Provider Last Rate Last Dose   • acetaminophen (TYLENOL) 160 MG/5ML solution 500 mg  500 mg Per G Tube Q4H PRN Shane Barcenas MD       • atorvastatin (LIPITOR) tablet 20 mg  20 mg Per G Tube Daily Shane Barcenas MD   20 mg at 08/14/17 0916   • bumetanide (BUMEX) tablet 2 mg  2 mg Per G Tube Daily Shane Barcenas MD   2 mg at 08/14/17 0916   • ceFAZolin in dextrose (ANCEF) IVPB solution 2 g  2 g Intravenous Once Tara Laurae Schoenbeck, APRN       • HYDROcodone-acetaminophen (NORCO) 5-325 MG per tablet 1 tablet  1 tablet Oral Q6H PRN Shane Barcenas MD       • ipratropium-albuterol (DUO-NEB) nebulizer solution 3 mL  3 mL Nebulization Q4H PRN Shane Barcenas MD       • isosorbide mononitrate (ISMO,MONOKET) tablet 30 mg  30 mg Per G Tube Daily Shane Barcenas MD   30 mg at 08/14/17 0916   • lactulose (CHRONULAC) 10 GM/15ML solution 20 g  20 g Oral Daily Shane Barcenas MD       • LORazepam  (ATIVAN) tablet 1 mg  1 mg Per G Tube Q8H PRN Shane Barcenas MD       • megestrol (MEGACE) tablet 80 mg  80 mg Oral 4x Daily Shane Barcenas MD   80 mg at 08/14/17 1224   • pantoprazole (PROTONIX) EC tablet 40 mg  40 mg Oral Q AM Shane Barcenas MD   40 mg at 08/14/17 0524   • potassium chloride (KLOR-CON) packet 40 mEq  40 mEq Oral BID VASHTI Heller   40 mEq at 08/14/17 1434   • potassium chloride (MICRO-K) CR capsule 20 mEq  20 mEq Oral Daily Shane Barcenas MD   20 mEq at 08/14/17 0916   • QUEtiapine (SEROquel) tablet 25 mg  25 mg Oral Q12H Shane Barcenas MD   25 mg at 08/14/17 0916   • risperiDONE (risperDAL) tablet 2 mg  2 mg Oral Daily Shane Barcenas MD   2 mg at 08/14/17 1120   • risperiDONE (risperDAL) tablet 3 mg  3 mg Oral Nightly Shane Barcenas MD   3 mg at 08/14/17 0146   • sodium chloride 0.9 % flush 10 mL  10 mL Intravenous PRN Harley Kinney MD       • sodium chloride 0.9 % infusion  100 mL/hr Intravenous Continuous Shane Barcenas  mL/hr at 08/14/17 0921 100 mL/hr at 08/14/17 0921   • Valproic Acid (DEPAKENE) syrup 250 mg  250 mg Per G Tube Q12H Shane Barcenas MD   250 mg at 08/14/17 0916        ALLERGIES    Allergies   Allergen Reactions   • Codeine    • Penicillins      Tolerated ceftriaxone during 5/2017 admission   • Sulfa Antibiotics        Review of Systems    Review of Systems - History obtained from chart and patient  General ROS: negative  Psychological ROS: positive for - schizoaffective disorder. Poor historian  Respiratory ROS: no cough, shortness of breath, or wheezing  Cardiovascular ROS: no chest pain or dyspnea on exertion  Gastrointestinal ROS: no abdominal pain, change in bowel habits, or black or bloody stools  Genito-Urinary ROS: positive for - vaginal bleeding  Musculoskeletal ROS: positive for - poor mobility  Neurological ROS: negative  Dermatological ROS: negative      PHYSICIAL EXAM    Vital Signs: /59 (BP Location: Right arm, Patient  "Position: Lying)  Pulse 83  Temp 97.5 °F (36.4 °C) (Oral)   Resp 16  Ht 66\" (167.6 cm)  Wt 300 lb (136 kg)  SpO2 96%  BMI 48.42 kg/m2  Constitutional:  Morbidly obese, no acute distress, non-toxic appearance   Eyes:  PERRL, conjunctiva normal   HENT:  Atraumatic, external ears normal, nose normal, oropharynx moist, no pharyngeal exudates. Neck- normal range of motion, no tenderness, supple   Respiratory:  No respiratory distress, normal breath sounds, no rales, no wheezing   Cardiovascular:  Normal rate, normal rhythm, no murmurs, no gallops, no rubs   GI:  Soft, obese, nondistended, normal bowel sounds, nontender, firm mass in midline pelvis, no rebound, no guarding. G-tube noted in LUQ.   :  No costovertebral angle tenderness   Musculoskeletal:  No edema, no tenderness, no deformities. Back- no tenderness  Integument:  Well hydrated, no rash   Lymphatic:  No lymphadenopathy noted   Neurologic:  Alert & oriented to person, year, can report that she is in the hospital, but not sure where.     LABORATORY  Recent Results (from the past 24 hour(s))   Comprehensive Metabolic Panel    Collection Time: 08/13/17  6:52 PM   Result Value Ref Range    Glucose 95 65 - 99 mg/dL    BUN 16 8 - 23 mg/dL    Creatinine 0.72 0.57 - 1.00 mg/dL    Sodium 143 136 - 145 mmol/L    Potassium 3.7 3.5 - 5.2 mmol/L    Chloride 101 98 - 107 mmol/L    CO2 32.5 (H) 22.0 - 29.0 mmol/L    Calcium 9.4 8.6 - 10.5 mg/dL    Total Protein 7.1 6.0 - 8.5 g/dL    Albumin 3.30 (L) 3.50 - 5.20 g/dL    ALT (SGPT) 10 1 - 33 U/L    AST (SGOT) 9 1 - 32 U/L    Alkaline Phosphatase 68 39 - 117 U/L    Total Bilirubin 0.4 0.1 - 1.2 mg/dL    eGFR  African Amer 99 >60 mL/min/1.73    Globulin 3.8 gm/dL    A/G Ratio 0.9 g/dL    BUN/Creatinine Ratio 22.2 7.0 - 25.0    Anion Gap 9.5 mmol/L   Protime-INR    Collection Time: 08/13/17  6:52 PM   Result Value Ref Range    Protime 14.2 11.7 - 14.2 Seconds    INR 1.15 (H) 0.90 - 1.10   Type & Screen    Collection " Time: 08/13/17  6:52 PM   Result Value Ref Range    ABO Type B     RH type Positive     Antibody Screen Negative    CBC Auto Differential    Collection Time: 08/13/17  6:52 PM   Result Value Ref Range    WBC 7.04 4.50 - 10.70 10*3/mm3    RBC 4.13 3.90 - 5.20 10*6/mm3    Hemoglobin 8.5 (L) 11.9 - 15.5 g/dL    Hematocrit 29.9 (L) 35.6 - 45.5 %    MCV 72.4 (L) 80.5 - 98.2 fL    MCH 20.6 (L) 26.9 - 32.0 pg    MCHC 28.4 (L) 32.4 - 36.3 g/dL    RDW 18.0 (H) 11.7 - 13.0 %    RDW-SD 47.9 37.0 - 54.0 fl    MPV 10.1 6.0 - 12.0 fL    Platelets 386 140 - 500 10*3/mm3    Neutrophil % 67.1 42.7 - 76.0 %    Lymphocyte % 19.7 19.6 - 45.3 %    Monocyte % 10.9 5.0 - 12.0 %    Eosinophil % 2.0 0.3 - 6.2 %    Basophil % 0.3 0.0 - 1.5 %    Immature Grans % 0.0 0.0 - 0.5 %    Neutrophils, Absolute 4.72 1.90 - 8.10 10*3/mm3    Lymphocytes, Absolute 1.39 0.90 - 4.80 10*3/mm3    Monocytes, Absolute 0.77 0.20 - 1.20 10*3/mm3    Eosinophils, Absolute 0.14 0.00 - 0.70 10*3/mm3    Basophils, Absolute 0.02 0.00 - 0.20 10*3/mm3    Immature Grans, Absolute 0.00 0.00 - 0.03 10*3/mm3   Scan Slide    Collection Time: 08/13/17  6:52 PM   Result Value Ref Range    Anisocytosis Large/3+ None Seen    Hypochromia Mod/2+ None Seen    Microcytes Mod/2+ None Seen    Poikilocytes Mod/2+ None Seen    WBC Morphology Normal Normal    Platelet Morphology Normal Normal   Urinalysis With / Culture If Indicated    Collection Time: 08/13/17  8:02 PM   Result Value Ref Range    Color, UA Yellow Yellow, Straw    Appearance, UA Clear Clear    pH, UA 6.0 5.0 - 8.0    Specific Gravity, UA 1.013 1.005 - 1.030    Glucose, UA Negative Negative    Ketones, UA Negative Negative    Bilirubin, UA Negative Negative    Blood, UA Negative Negative    Protein, UA 30 mg/dL (1+) (A) Negative    Leuk Esterase, UA Trace (A) Negative    Nitrite, UA Negative Negative    Urobilinogen, UA 1.0 E.U./dL 0.2 - 1.0 E.U./dL   Urinalysis, Microscopic Only    Collection Time: 08/13/17  8:02 PM    Result Value Ref Range    RBC, UA None Seen None Seen, 0-2 /HPF    WBC, UA 6-12 (A) None Seen, 0-2 /HPF    Bacteria, UA None Seen None Seen /HPF    Squamous Epithelial Cells, UA 0-2 None Seen, 0-2 /HPF    Hyaline Casts, UA 3-6 None Seen /LPF    Methodology Automated Microscopy    Urine Culture    Collection Time: 08/13/17  8:02 PM   Result Value Ref Range    Urine Culture Culture in progress    CBC (No Diff)    Collection Time: 08/14/17  6:46 AM   Result Value Ref Range    WBC 6.45 4.50 - 10.70 10*3/mm3    RBC 3.89 (L) 3.90 - 5.20 10*6/mm3    Hemoglobin 8.3 (L) 11.9 - 15.5 g/dL    Hematocrit 28.0 (L) 35.6 - 45.5 %    MCV 72.0 (L) 80.5 - 98.2 fL    MCH 21.3 (L) 26.9 - 32.0 pg    MCHC 29.6 (L) 32.4 - 36.3 g/dL    RDW 17.8 (H) 11.7 - 13.0 %    RDW-SD 47.0 37.0 - 54.0 fl    MPV 9.8 6.0 - 12.0 fL    Platelets 330 140 - 500 10*3/mm3   Basic Metabolic Panel    Collection Time: 08/14/17  6:46 AM   Result Value Ref Range    Glucose 110 (H) 65 - 99 mg/dL    BUN 17 8 - 23 mg/dL    Creatinine 0.65 0.57 - 1.00 mg/dL    Sodium 144 136 - 145 mmol/L    Potassium 3.4 (L) 3.5 - 5.2 mmol/L    Chloride 103 98 - 107 mmol/L    CO2 34.2 (H) 22.0 - 29.0 mmol/L    Calcium 8.9 8.6 - 10.5 mg/dL    eGFR  African Amer 112 >60 mL/min/1.73    BUN/Creatinine Ratio 26.2 (H) 7.0 - 25.0    Anion Gap 6.8 mmol/L       Imaging & OTHER STUDIES    Us Pelvis Complete    Result Date: 8/13/2017  PELVIC ULTRASOUND  HISTORY: 61-year-old nursing home patient with vaginal bleeding.  The examination was performed as an emergency procedure. The patient refused transvaginal imaging. The uterus is enlarged and lobulated, measuring 9.8 x 9.8 x 13.7 cm and there are multiple fibroids measuring up to 7 cm in size. There is also generalized endometrial thickening with double wall thickness of 1.9 cm. The patient is postmenopausal and this does raise a concern of endometrial tumor and further clinical correlation is required.  The ovaries are not visualized. There is  "no free fluid identified.        aSSESSMENT & PLAN    Papillary serous adenocarcinoma of the endometrium. Previous palliative radiation stopped due to poor tumor response. Megace 80 mg increased from BID to QID in June 2017. Now with continued heavy breakthrough vaginal bleeding. Multiple medical comorbidities including CHF, CAD, CVA, DM, obesity, and schizoaffective disorder.  Patient is a iz[facility]do of the [facility], with state appointed Guardian, Helene Pepe. Patient has court approval to proceed with Davinci TLH/BSO, omentectomy, staging with lymph node dissection, possibly laparotomy. Preop orders have been placed for informed consent of this surgery with nursing to obtain phone consent today for planned procedure tomorrow morning at 0700; also, type and cross for 2 units and hold for OR, Ancef 2 gm on call to OR, NPO after MN, hold any prophylactic anticoagulant.  Currently nursing has a call out to state guardian for surgical consent. MD to follow and manage consent.      Tara Schoenbeck, APRN  8/14/2017    Gynecologic Oncology Attending Physician:  History reviewed with Tara Schoenbeck, APRN.  61-year-old patient with known papillary serous uterine carcinoma with continued heavy vaginal bleeding, despite Megace 80 mg 4 times daily.  She also had one course of \"quad shot\" external beam radiation therapy in 2015.    Physical exam: Abdomen is obese but soft.  G-tube present in the left upper quadrant.    Agree with assessment and plans as outlined above by Tara Schoenbeck, APRN.    We plan da Carlos TLH/BSO if possible.  Laparotomy may be necessary.  These procedures are considered palliative.  I do not plan to perform any type of extended staging procedure, as the patient has already had radiation therapy and 2 years of hormonal therapy.    She is awake and alert today and indicates that she wants to proceed with the surgery.    I have counseled the patient in detail as to the nature of the proposed procedure(s), " including indications, alternatives, risks versus benefits. Risks include bleeding, which might require blood transfusion; infection, which might require antibiotic therapy or abscess drainage; damage to other organs, including bowel or urinary tract injury which might cause obstruction or fistula and require additional surgery, neurovascular injury which might cause pain, numbness, or weakness (which could be permanent); failure to cure malignancy if present; even the remote possibility of death. She indicates understanding and agreement, and desires to proceed.      Electronically signed by:  Ortiz Washington MD 8/14/2017 6:08 PM

## 2017-08-14 NOTE — DISCHARGE PLACEMENT REQUEST
"Lupe Burleson (61 y.o. Female)     Date of Birth Social Security Number Address Home Phone MRN    [id number]  [id number] [place] [id number] [phone] [id number]    Amish Marital Status          Sikhism Single       Admission Date Admission Type Admitting Provider Attending Provider Department, Room/Bed    8/13/17 Emergency Shane Barcenas MD [place], Marlon R, MD [address], [address]    Discharge Date Discharge Disposition Discharge Destination                      Attending Provider: Shane Barcenas MD     Allergies:  Codeine, Penicillins, Sulfa Antibiotics    Isolation:  Contact   Infection:  CRE (05/31/16), MDR Acinetobacter (04/17/14)   Code Status:  Prior    Ht:  66\" (167.6 cm)   Wt:  300 lb (136 kg)    Admission Cmt:  None   Principal Problem:  Vaginal bleeding [N93.9]                 Active Insurance as of 8/13/2017     Primary Coverage     Payor Plan Insurance Group Employer/Plan Group    MEDICARE MEDICARE A & B      Payor Plan Address Payor Plan Phone Number Effective From Effective To    [address] [phone] 10/1/[address]     [place] [address]       Subscriber Name Subscriber Birth Date Member ID       LUPE BURLESON 1955 [id number]           Secondary Coverage     Payor Plan Insurance Group Employer/Plan Group    [place]      Payor Plan Address Payor Plan Phone Number Effective From Effective To    [address] [phone] 5/28/2016     [place] [address]       Subscriber Name Subscriber Birth Date Member ID       LUPE BURLESON 1955 [id number]                 Emergency Contacts      (Rel.) Home Phone Work Phone Mobile Phone    Helene Pepe () [phone] -- --    Raji Fairbanks (Other) [phone] -- --    Tavia Torres (Relative) [phone] -- --              "

## 2017-08-14 NOTE — NURSING NOTE
Blood consent given by the on call guardianship, Katelyn Kearns.  According to Katelyn she can not give consent for the hysterectomy but it should be covered under the court order document that was signed by the court August 3, 2017; see chart.

## 2017-08-14 NOTE — PLAN OF CARE
Problem: Fall Risk (Adult)  Goal: Identify Related Risk Factors and Signs and Symptoms  Outcome: Ongoing (interventions implemented as appropriate)  Goal: Absence of Falls  Outcome: Ongoing (interventions implemented as appropriate)    Problem: Tissue Perfusion, Ineffective Peripheral (Adult)  Goal: Identify Related Risk Factors and Signs and Symptoms  Outcome: Ongoing (interventions implemented as appropriate)  Goal: Adequate Tissue Perfusion  Outcome: Ongoing (interventions implemented as appropriate)    Problem: Patient Care Overview (Adult)  Goal: Plan of Care Review  Outcome: Ongoing (interventions implemented as appropriate)    08/14/17 1548 08/14/17 1633   Coping/Psychosocial Response Interventions   Plan Of Care Reviewed With patient --    Outcome Evaluation   Outcome Summary/Follow up Plan --  pt is flat; Dr. Washington is planning a hysterectomy for tomorrow but awaiting call back from guardian for consent. also need blood consent from guardian. pt had small amount of blood in her briefs today.    Patient Care Overview   Progress --  no change

## 2017-08-14 NOTE — THERAPY EVALUATION
Acute Care - Physical Therapy Initial Evaluation  [facility]     Patient Name: Lupe Burleson  : 1955  MRN: [id number]  Today's Date: 2017   Onset of Illness/Injury or Date of Surgery Date: 17  Date of Referral to PT: 17  Referring Physician: Dr. Barcenas      Admit Date: 2017     Visit Dx:    ICD-10-CM ICD-9-CM   1. Vaginal bleeding N93.9 623.8   2. Endometrial cancer C54.1 182.0   3. Blood loss anemia, chronic D50.0 280.0   4. Uterine leiomyoma, unspecified location D25.9 218.9   5. Schizophrenia, unspecified type F20.9      Patient Active Problem List   Diagnosis   • Elevated troponin   • Aspiration pneumonia   • Hematuria   • Hypokalemia   • Pelvic mass in female   • Fecal impaction   • Endometrial carcinoma   • Acute on chronic respiratory failure with hypoxia and hypercapnia   • Acute on chronic diastolic CHF (congestive heart failure)   • UTI (urinary tract infection)   • Leucocytosis   • Chronic diastolic CHF (congestive heart failure)   • DM (diabetes mellitus)   • Morbid obesity   • HTN (hypertension)   • Schizophrenia   • Tracheostomy malfunction   • Pyuria   • THAI (obstructive sleep apnea)   • Generalized weakness   • Schizo affective schizophrenia   • Diabetes mellitus   • Coronary artery disease   • Endometrial cancer determined by uterine biopsy   • Chronic respiratory failure with hypoxia and hypercapnia   • Toxic metabolic encephalopathy   • Pneumonia   • Abnormal vaginal bleeding   • Schizo affective schizophrenia   • CHF (congestive heart failure)   • Endometrial cancer determined by uterine biopsy   • Coronary artery disease   • Lymphedema   • Immobility   • Acute blood loss anemia   • Vaginal bleeding   • Hypertension   • Uterine cancer   • Bipolar disorder, unspecified     Past Medical History:   Diagnosis Date   • Acute respiratory distress syndrome    • Arthritis    • Bipolar disorder, unspecified    • CAD (coronary artery disease)    • Cellulitis    •  Cellulitis    • CHF (congestive heart failure)    • Chronic ischemic heart disease    • Chronic respiratory failure with hypoxia and hypercapnia    • Chronic ulcer of calf    • Constipation    • Coronary artery disease    • Depression    • Depressive disorder    • Diabetes mellitus    • Dysphagia    • Dysphagia    • Endometrial cancer determined by uterine biopsy     s/p radiation; no improvement   • Endometrial hyperplasia    • History of transfusion    • Hypercapnia    • Hypertension    • Immobility    • Lack of coordination    • Lymphedema    • Malignant neoplasm of uterus    • Muscle weakness    • Obesities, morbid    • Oxygen dependent    • Post-menopausal bleeding    • Postmenopausal bleeding    • Schizo affective schizophrenia    • Skin ulcer    • Sleep apnea    • Stroke    • Symbolic dysfunction    • Uterine cancer    • Venous insufficiency    • Venous insufficiency      Past Surgical History:   Procedure Laterality Date   • D&C HYSTEROSCOPY     • LAPAROSCOPIC TUBAL LIGATION     • TRACHEOSTOMY AND PEG TUBE INSERTION     • UMBILICAL HERNIA REPAIR     • WOUND DEBRIDEMENT            PT ASSESSMENT (last 72 hours)      PT Evaluation       08/14/17 1500 08/14/17 0935    Rehab Evaluation    Document Type evaluation  -MA     Subjective Information agree to therapy  -MA     Patient Effort, Rehab Treatment poor  -MA     Symptoms Noted During/After Treatment none  -MA     General Information    Patient Profile Review yes  -MA     Onset of Illness/Injury or Date of Surgery Date 08/14/17  -MA     Referring Physician Dr. Barcenas  -MA     Precautions/Limitations fall precautions  -MA     Equipment Currently Used at Home  none  -    Plans/Goals Discussed With patient  -MA     Barriers to Rehab previous functional deficit  -MA     Living Environment    Lives With facility resident  -MA facility resident   from [facility]  -    Living Arrangements extended care facility  -MA extended care facility  -     Home Accessibility  no concerns  -KH    Stair Railings at Home  none  -KH    Type of Financial/Environmental Concern  none  -KH    Transportation Available ambulance  -MA ambulance  -    Clinical Impression    Date of Referral to PT 08/14/17  -MA     PT Diagnosis decr strength, decr endurance, decr functional mobility  -MA     Criteria for Skilled Therapeutic Interventions Met no;no significant expected improvement in functional status;current level of function same as previous level of function  -MA     Pathology/Pathophysiology Noted (Describe Specifically for Each System) genitourinary;musculoskeletal  -MA     Impairments Found (describe specific impairments) aerobic capacity/endurance;gait, locomotion, and balance  -MA     Rehab Potential other (see comments)   PT services not indicated at this time  -MA     Pain Assessment    Pain Assessment No/denies pain  -MA     Vision Assessment/Intervention    Visual Impairment WFL  -MA     Cognitive Assessment/Intervention    Current Cognitive/Communication Assessment functional  -MA     Orientation Status oriented to;person;place  -MA     Follows Commands/Answers Questions 50% of the time;able to follow single-step instructions;needs increased time;needs cueing;needs repetition;unable/difficult to assess  -MA     Personal Safety fully aware of deficits  -MA     ROM (Range of Motion)    General ROM other (see comments)   ROM grossly limited  -MA     MMT (Manual Muscle Testing)    General MMT Assessment other (see comments)   not formally assessed-B LE MMT grossly 3+/5  -MA     Bed Mobility, Assessment/Treatment    Bed Mobility, Assistive Device bed rails;head of bed elevated;leg ;draw sheet  -MA     Bed Mobility, Roll Left, [place] dependent (less than 25% patient effort);verbal cues required  -MA     Bed Mobility, Scoot/Bridge, [place] not tested;unable to perform   patient refusal  -MA     Bed Mob, Supine to Sit, [place] not tested;unable to  perform   patient refusal  -MA     Bed Mobility, Safety Issues impaired trunk control for bed mobility;decreased use of legs for bridging/pushing;decreased use of arms for pushing/pulling  -MA     Bed Mobility, Impairments strength decreased;decreased flexibility  -MA     Transfer Assessment/Treatment    Transfers, Bed-Chair Power not appropriate to assess  -MA     Transfers, Sit-Stand Power not appropriate to assess  -MA     Gait Assessment/Treatment    Gait, Power Level not appropriate to assess  -MA     Therapy Exercises    Bilateral Lower Extremities PROM:;5 reps;ankle pumps/circles;hip flexion;heel slides  -MA     Positioning and Restraints    Pre-Treatment Position in bed  -MA     Post Treatment Position bed  -MA     In Bed notified nsg;supine;call light within reach;encouraged to call for assist;exit alarm on;LLE elevated;L heel elevated  -MA       08/13/17 2300 08/13/17 7200    General Information    Equipment Currently Used at Home  none  -TJ    Living Environment    Lives With facility resident   [facility]  -     Living Arrangements extended care facility  -     Home Accessibility no concerns  -TJ     Stair Railings at Home none  -TJ     Type of Financial/Environmental Concern none  -TJ     Transportation Available ambulance  -TJ       User Key  (r) = Recorded By, (t) = Taken By, (c) = Cosigned By    Initials Name Provider Type    DARIUS Holguin, MIGUE Case Manager    HEENA Rubi, RN Registered Nurse    ELY Hopkins PT Physical Therapist          Physical Therapy Education     Title: PT OT SLP Therapies (Active)     Topic: Physical Therapy (Active)     Point: Mobility training (Done)    Learning Progress Summary    Learner Readiness Method Response Comment Documented by Status   Patient Avinash WINTERS  MA 08/14/17 2911 Done               Point: Home exercise program (Done)    Learning Progress Summary    Learner Readiness Method Response Comment  Documented by Status   Patient Avinash WINTERS MA 08/14/17 1548 Done               Point: Precautions (Done)    Learning Progress Summary    Learner Readiness Method Response Comment Documented by Status   Patient Avinash WINTERS MA 08/14/17 1548 Done                      User Key     Initials Effective Dates Name Provider Type Discipline    MA 12/13/16 -  Maria Fernanda Hopkins, PT Physical Therapist PT                PT Recommendation and Plan  Anticipated Discharge Disposition: extended care facility  PT Frequency: evaluation only  Plan of Care Review  Plan Of Care Reviewed With: (P) patient  Outcome Summary/Follow up Plan: (P) PT evaluation performed. Patient lacked motivation and refused sitting EOB. Patient currently resides in extended care facility. Patient stated facility uses a leonardo lift for bed mobility/transfers and has not ambulated in years. Patient assisted with B LE LEONCIO in supine- 25% patient effort. Maximum encouragement required but patient continued to refuse sitting EOB. Due to clinical presentation and patient at baseline level of function, PT skilled services acutely is not appropriate at this time.               Outcome Measures       08/14/17 1500          How much help from another person do you currently need...    Turning from your back to your side while in flat bed without using bedrails? 1  -MA      Moving from lying on back to sitting on the side of a flat bed without bedrails? 1  -MA      Moving to and from a bed to a chair (including a wheelchair)? 1  -MA      Standing up from a chair using your arms (e.g., wheelchair, bedside chair)? 1  -MA      Climbing 3-5 steps with a railing? 1  -MA      To walk in hospital room? 1  -MA      AM-PAC 6 Clicks Score 6  -MA      Functional Assessment    Outcome Measure Options AM-PAC 6 Clicks Basic Mobility (PT)  -MA        User Key  (r) = Recorded By, (t) = Taken By, (c) = Cosigned By    Initials Name Provider Type    ELY Hopkins PT Physical  Therapist           Time Calculation:         PT Charges       08/14/17 1555          Time Calculation    Start Time 1515  -MA      Stop Time 1530  -MA      Time Calculation (min) 15 min  -MA      PT Received On 08/14/17  -MA        User Key  (r) = Recorded By, (t) = Taken By, (c) = Cosigned By    Initials Name Provider Type    ELY Hopkins PT Physical Therapist          Therapy Charges for Today     Code Description Service Date Service Provider Modifiers Qty    29172774245 HC PT EVAL MOD COMPLEXITY 2 8/14/2017 Maria Fernanda Hopkins, PT GP 1          PT G-Codes  Outcome Measure Options: AM-PAC 6 Clicks Basic Mobility (PT)      Maria Fernanda Hopkins, PT  8/14/2017

## 2017-08-14 NOTE — PLAN OF CARE
Problem: Patient Care Overview (Adult)  Goal: Plan of Care Review    08/14/17 1548   Coping/Psychosocial Response Interventions   Plan Of Care Reviewed With patient   Outcome Evaluation   Outcome Summary/Follow up Plan PT evaluation performed. Patient lacked motivation and refused sitting EOB. Patient currently resides in extended care facility. Patient stated facility uses a leonardo lift for bed mobility/transfers and has not ambulated in years. Patient assisted with B LE TE in supine- 25% patient effort. Maximum encouragement required but patient continued to refuse sitting EOB. Due to clinical presentation and patient at baseline level of function, PT skilled services acutely is not appropriate at this time. Thank you.

## 2017-08-15 ENCOUNTER — APPOINTMENT (OUTPATIENT)
Dept: GENERAL RADIOLOGY | Facility: HOSPITAL | Age: 62
End: 2017-08-15

## 2017-08-15 ENCOUNTER — ANESTHESIA EVENT (OUTPATIENT)
Dept: PERIOP | Facility: HOSPITAL | Age: 62
End: 2017-08-15

## 2017-08-15 ENCOUNTER — ANESTHESIA (OUTPATIENT)
Dept: PERIOP | Facility: HOSPITAL | Age: 62
End: 2017-08-15

## 2017-08-15 LAB
ANION GAP SERPL CALCULATED.3IONS-SCNC: 10 MMOL/L
ANISOCYTOSIS BLD QL: NORMAL
BACTERIA SPEC AEROBE CULT: NO GROWTH
BASOPHILS # BLD AUTO: 0.02 10*3/MM3 (ref 0–0.2)
BASOPHILS NFR BLD AUTO: 0.1 % (ref 0–1.5)
BUN BLD-MCNC: 11 MG/DL (ref 8–23)
BUN/CREAT SERPL: 19.6 (ref 7–25)
CALCIUM SPEC-SCNC: 8.6 MG/DL (ref 8.6–10.5)
CHLORIDE SERPL-SCNC: 109 MMOL/L (ref 98–107)
CO2 SERPL-SCNC: 25 MMOL/L (ref 22–29)
CREAT BLD-MCNC: 0.56 MG/DL (ref 0.57–1)
DACRYOCYTES BLD QL SMEAR: NORMAL
DEPRECATED RDW RBC AUTO: 50.2 FL (ref 37–54)
EOSINOPHIL # BLD AUTO: 0.11 10*3/MM3 (ref 0–0.7)
EOSINOPHIL NFR BLD AUTO: 0.6 % (ref 0.3–6.2)
ERYTHROCYTE [DISTWIDTH] IN BLOOD BY AUTOMATED COUNT: 18.5 % (ref 11.7–13)
GFR SERPL CREATININE-BSD FRML MDRD: 133 ML/MIN/1.73
GLUCOSE BLD-MCNC: 120 MG/DL (ref 65–99)
GLUCOSE BLDC GLUCOMTR-MCNC: 114 MG/DL (ref 70–130)
GLUCOSE BLDC GLUCOMTR-MCNC: 89 MG/DL (ref 70–130)
HCT VFR BLD AUTO: 29.8 % (ref 35.6–45.5)
HGB BLD-MCNC: 8.7 G/DL (ref 11.9–15.5)
HYPOCHROMIA BLD QL: NORMAL
IMM GRANULOCYTES # BLD: 0.05 10*3/MM3 (ref 0–0.03)
IMM GRANULOCYTES NFR BLD: 0.3 % (ref 0–0.5)
LYMPHOCYTES # BLD AUTO: 1.82 10*3/MM3 (ref 0.9–4.8)
LYMPHOCYTES NFR BLD AUTO: 10.7 % (ref 19.6–45.3)
MCH RBC QN AUTO: 21.6 PG (ref 26.9–32)
MCHC RBC AUTO-ENTMCNC: 29.2 G/DL (ref 32.4–36.3)
MCV RBC AUTO: 73.9 FL (ref 80.5–98.2)
MONOCYTES # BLD AUTO: 1.09 10*3/MM3 (ref 0.2–1.2)
MONOCYTES NFR BLD AUTO: 6.4 % (ref 5–12)
NEUTROPHILS # BLD AUTO: 13.95 10*3/MM3 (ref 1.9–8.1)
NEUTROPHILS NFR BLD AUTO: 81.9 % (ref 42.7–76)
OVALOCYTES BLD QL SMEAR: NORMAL
PLAT MORPH BLD: NORMAL
PLATELET # BLD AUTO: 353 10*3/MM3 (ref 140–500)
PMV BLD AUTO: 9.9 FL (ref 6–12)
POTASSIUM BLD-SCNC: 3.7 MMOL/L (ref 3.5–5.2)
RBC # BLD AUTO: 4.03 10*6/MM3 (ref 3.9–5.2)
SODIUM BLD-SCNC: 144 MMOL/L (ref 136–145)
WBC MORPH BLD: NORMAL
WBC NRBC COR # BLD: 17.04 10*3/MM3 (ref 4.5–10.7)

## 2017-08-15 PROCEDURE — 85025 COMPLETE CBC W/AUTO DIFF WBC: CPT | Performed by: NURSE PRACTITIONER

## 2017-08-15 PROCEDURE — 82947 ASSAY GLUCOSE BLOOD QUANT: CPT

## 2017-08-15 PROCEDURE — 85014 HEMATOCRIT: CPT

## 2017-08-15 PROCEDURE — 0UT90ZZ RESECTION OF UTERUS, OPEN APPROACH: ICD-10-PCS | Performed by: OBSTETRICS & GYNECOLOGY

## 2017-08-15 PROCEDURE — 85018 HEMOGLOBIN: CPT

## 2017-08-15 PROCEDURE — 25810000003 POTASSIUM CHLORIDE PER 2 MEQ: Performed by: OBSTETRICS & GYNECOLOGY

## 2017-08-15 PROCEDURE — 25010000002 PROPOFOL 10 MG/ML EMULSION: Performed by: NURSE ANESTHETIST, CERTIFIED REGISTERED

## 2017-08-15 PROCEDURE — 25010000002 PHENYLEPHRINE PER 1 ML: Performed by: NURSE ANESTHETIST, CERTIFIED REGISTERED

## 2017-08-15 PROCEDURE — 86900 BLOOD TYPING SEROLOGIC ABO: CPT

## 2017-08-15 PROCEDURE — 0UTC0ZZ RESECTION OF CERVIX, OPEN APPROACH: ICD-10-PCS | Performed by: OBSTETRICS & GYNECOLOGY

## 2017-08-15 PROCEDURE — 25010000002 HYDROMORPHONE PER 4 MG: Performed by: NURSE ANESTHETIST, CERTIFIED REGISTERED

## 2017-08-15 PROCEDURE — 0UT20ZZ RESECTION OF BILATERAL OVARIES, OPEN APPROACH: ICD-10-PCS | Performed by: OBSTETRICS & GYNECOLOGY

## 2017-08-15 PROCEDURE — 88309 TISSUE EXAM BY PATHOLOGIST: CPT | Performed by: OBSTETRICS & GYNECOLOGY

## 2017-08-15 PROCEDURE — 82803 BLOOD GASES ANY COMBINATION: CPT

## 2017-08-15 PROCEDURE — 36430 TRANSFUSION BLD/BLD COMPNT: CPT

## 2017-08-15 PROCEDURE — 25010000002 SUCCINYLCHOLINE PER 20 MG: Performed by: NURSE ANESTHETIST, CERTIFIED REGISTERED

## 2017-08-15 PROCEDURE — 80048 BASIC METABOLIC PNL TOTAL CA: CPT | Performed by: NURSE PRACTITIONER

## 2017-08-15 PROCEDURE — 94799 UNLISTED PULMONARY SVC/PX: CPT

## 2017-08-15 PROCEDURE — 25010000003 CEFAZOLIN IN DEXTROSE 2-4 GM/100ML-% SOLUTION: Performed by: OBSTETRICS & GYNECOLOGY

## 2017-08-15 PROCEDURE — 25010000002 FENTANYL CITRATE (PF) 100 MCG/2ML SOLUTION: Performed by: NURSE ANESTHETIST, CERTIFIED REGISTERED

## 2017-08-15 PROCEDURE — 82962 GLUCOSE BLOOD TEST: CPT

## 2017-08-15 PROCEDURE — C1751 CATH, INF, PER/CENT/MIDLINE: HCPCS | Performed by: ANESTHESIOLOGY

## 2017-08-15 PROCEDURE — 25010000002 FENTANYL CITRATE (PF) 100 MCG/2ML SOLUTION: Performed by: ANESTHESIOLOGY

## 2017-08-15 PROCEDURE — 25010000002 MORPHINE PER 10 MG: Performed by: OBSTETRICS & GYNECOLOGY

## 2017-08-15 PROCEDURE — 25010000002 ONDANSETRON PER 1 MG: Performed by: NURSE ANESTHETIST, CERTIFIED REGISTERED

## 2017-08-15 PROCEDURE — 85007 BL SMEAR W/DIFF WBC COUNT: CPT | Performed by: NURSE PRACTITIONER

## 2017-08-15 PROCEDURE — 0UT70ZZ RESECTION OF BILATERAL FALLOPIAN TUBES, OPEN APPROACH: ICD-10-PCS | Performed by: OBSTETRICS & GYNECOLOGY

## 2017-08-15 PROCEDURE — P9016 RBC LEUKOCYTES REDUCED: HCPCS

## 2017-08-15 PROCEDURE — 88311 DECALCIFY TISSUE: CPT | Performed by: OBSTETRICS & GYNECOLOGY

## 2017-08-15 RX ORDER — ALUMINA, MAGNESIA, AND SIMETHICONE 2400; 2400; 240 MG/30ML; MG/30ML; MG/30ML
15 SUSPENSION ORAL EVERY 6 HOURS PRN
Status: DISCONTINUED | OUTPATIENT
Start: 2017-08-15 | End: 2017-08-22 | Stop reason: HOSPADM

## 2017-08-15 RX ORDER — FENTANYL CITRATE 50 UG/ML
50 INJECTION, SOLUTION INTRAMUSCULAR; INTRAVENOUS
Status: DISCONTINUED | OUTPATIENT
Start: 2017-08-15 | End: 2017-08-15 | Stop reason: HOSPADM

## 2017-08-15 RX ORDER — ONDANSETRON 2 MG/ML
INJECTION INTRAMUSCULAR; INTRAVENOUS AS NEEDED
Status: DISCONTINUED | OUTPATIENT
Start: 2017-08-15 | End: 2017-08-15 | Stop reason: SURG

## 2017-08-15 RX ORDER — SODIUM CHLORIDE, SODIUM LACTATE, POTASSIUM CHLORIDE, CALCIUM CHLORIDE 600; 310; 30; 20 MG/100ML; MG/100ML; MG/100ML; MG/100ML
9 INJECTION, SOLUTION INTRAVENOUS CONTINUOUS
Status: DISCONTINUED | OUTPATIENT
Start: 2017-08-15 | End: 2017-08-22 | Stop reason: HOSPADM

## 2017-08-15 RX ORDER — BISACODYL 5 MG/1
10 TABLET, DELAYED RELEASE ORAL DAILY PRN
Status: DISCONTINUED | OUTPATIENT
Start: 2017-08-15 | End: 2017-08-22 | Stop reason: HOSPADM

## 2017-08-15 RX ORDER — FENTANYL CITRATE 50 UG/ML
INJECTION, SOLUTION INTRAMUSCULAR; INTRAVENOUS AS NEEDED
Status: DISCONTINUED | OUTPATIENT
Start: 2017-08-15 | End: 2017-08-15 | Stop reason: SURG

## 2017-08-15 RX ORDER — SUCCINYLCHOLINE CHLORIDE 20 MG/ML
INJECTION INTRAMUSCULAR; INTRAVENOUS AS NEEDED
Status: DISCONTINUED | OUTPATIENT
Start: 2017-08-15 | End: 2017-08-15 | Stop reason: SURG

## 2017-08-15 RX ORDER — PROMETHAZINE HYDROCHLORIDE 25 MG/ML
12.5 INJECTION, SOLUTION INTRAMUSCULAR; INTRAVENOUS ONCE AS NEEDED
Status: DISCONTINUED | OUTPATIENT
Start: 2017-08-15 | End: 2017-08-15 | Stop reason: HOSPADM

## 2017-08-15 RX ORDER — BUPIVACAINE HYDROCHLORIDE AND EPINEPHRINE 5; 5 MG/ML; UG/ML
INJECTION, SOLUTION PERINEURAL AS NEEDED
Status: DISCONTINUED | OUTPATIENT
Start: 2017-08-15 | End: 2017-08-15 | Stop reason: HOSPADM

## 2017-08-15 RX ORDER — ACETAMINOPHEN 325 MG/1
650 TABLET ORAL EVERY 4 HOURS PRN
Status: DISCONTINUED | OUTPATIENT
Start: 2017-08-15 | End: 2017-08-22 | Stop reason: HOSPADM

## 2017-08-15 RX ORDER — CALCIUM CARBONATE 200(500)MG
1 TABLET,CHEWABLE ORAL 3 TIMES DAILY PRN
Status: DISCONTINUED | OUTPATIENT
Start: 2017-08-15 | End: 2017-08-22 | Stop reason: HOSPADM

## 2017-08-15 RX ORDER — IPRATROPIUM BROMIDE AND ALBUTEROL SULFATE 2.5; .5 MG/3ML; MG/3ML
3 SOLUTION RESPIRATORY (INHALATION) ONCE AS NEEDED
Status: DISCONTINUED | OUTPATIENT
Start: 2017-08-15 | End: 2017-08-15 | Stop reason: HOSPADM

## 2017-08-15 RX ORDER — MIDAZOLAM HYDROCHLORIDE 1 MG/ML
2 INJECTION INTRAMUSCULAR; INTRAVENOUS
Status: DISCONTINUED | OUTPATIENT
Start: 2017-08-15 | End: 2017-08-15 | Stop reason: HOSPADM

## 2017-08-15 RX ORDER — MIDAZOLAM HYDROCHLORIDE 1 MG/ML
1 INJECTION INTRAMUSCULAR; INTRAVENOUS
Status: DISCONTINUED | OUTPATIENT
Start: 2017-08-15 | End: 2017-08-15 | Stop reason: HOSPADM

## 2017-08-15 RX ORDER — FAMOTIDINE 10 MG/ML
20 INJECTION, SOLUTION INTRAVENOUS ONCE
Status: COMPLETED | OUTPATIENT
Start: 2017-08-15 | End: 2017-08-15

## 2017-08-15 RX ORDER — PROMETHAZINE HYDROCHLORIDE 25 MG/1
12.5 TABLET ORAL ONCE AS NEEDED
Status: DISCONTINUED | OUTPATIENT
Start: 2017-08-15 | End: 2017-08-15 | Stop reason: HOSPADM

## 2017-08-15 RX ORDER — LABETALOL HYDROCHLORIDE 5 MG/ML
INJECTION, SOLUTION INTRAVENOUS AS NEEDED
Status: DISCONTINUED | OUTPATIENT
Start: 2017-08-15 | End: 2017-08-15 | Stop reason: SURG

## 2017-08-15 RX ORDER — EPHEDRINE SULFATE 50 MG/ML
5 INJECTION, SOLUTION INTRAVENOUS ONCE AS NEEDED
Status: DISCONTINUED | OUTPATIENT
Start: 2017-08-15 | End: 2017-08-15 | Stop reason: HOSPADM

## 2017-08-15 RX ORDER — OXYCODONE HYDROCHLORIDE AND ACETAMINOPHEN 5; 325 MG/1; MG/1
2 TABLET ORAL EVERY 4 HOURS PRN
Status: DISCONTINUED | OUTPATIENT
Start: 2017-08-15 | End: 2017-08-15 | Stop reason: CLARIF

## 2017-08-15 RX ORDER — PROPOFOL 10 MG/ML
VIAL (ML) INTRAVENOUS AS NEEDED
Status: DISCONTINUED | OUTPATIENT
Start: 2017-08-15 | End: 2017-08-15 | Stop reason: SURG

## 2017-08-15 RX ORDER — ONDANSETRON 4 MG/1
4 TABLET, FILM COATED ORAL EVERY 6 HOURS PRN
Status: DISCONTINUED | OUTPATIENT
Start: 2017-08-15 | End: 2017-08-22 | Stop reason: HOSPADM

## 2017-08-15 RX ORDER — NALOXONE HCL 0.4 MG/ML
0.4 VIAL (ML) INJECTION
Status: DISCONTINUED | OUTPATIENT
Start: 2017-08-15 | End: 2017-08-20

## 2017-08-15 RX ORDER — OXYCODONE AND ACETAMINOPHEN 7.5; 325 MG/1; MG/1
1 TABLET ORAL ONCE AS NEEDED
Status: DISCONTINUED | OUTPATIENT
Start: 2017-08-15 | End: 2017-08-15 | Stop reason: HOSPADM

## 2017-08-15 RX ORDER — PROMETHAZINE HYDROCHLORIDE 25 MG/1
25 SUPPOSITORY RECTAL ONCE AS NEEDED
Status: DISCONTINUED | OUTPATIENT
Start: 2017-08-15 | End: 2017-08-15 | Stop reason: HOSPADM

## 2017-08-15 RX ORDER — MAGNESIUM HYDROXIDE 1200 MG/15ML
LIQUID ORAL AS NEEDED
Status: DISCONTINUED | OUTPATIENT
Start: 2017-08-15 | End: 2017-08-15 | Stop reason: HOSPADM

## 2017-08-15 RX ORDER — MORPHINE SULFATE 2 MG/ML
2 INJECTION, SOLUTION INTRAMUSCULAR; INTRAVENOUS
Status: DISCONTINUED | OUTPATIENT
Start: 2017-08-15 | End: 2017-08-20

## 2017-08-15 RX ORDER — ONDANSETRON 2 MG/ML
4 INJECTION INTRAMUSCULAR; INTRAVENOUS EVERY 6 HOURS PRN
Status: DISCONTINUED | OUTPATIENT
Start: 2017-08-15 | End: 2017-08-22 | Stop reason: HOSPADM

## 2017-08-15 RX ORDER — SODIUM CHLORIDE AND POTASSIUM CHLORIDE 150; 450 MG/100ML; MG/100ML
125 INJECTION, SOLUTION INTRAVENOUS CONTINUOUS
Status: DISCONTINUED | OUTPATIENT
Start: 2017-08-15 | End: 2017-08-17

## 2017-08-15 RX ORDER — HYDROMORPHONE HCL 110MG/55ML
PATIENT CONTROLLED ANALGESIA SYRINGE INTRAVENOUS AS NEEDED
Status: DISCONTINUED | OUTPATIENT
Start: 2017-08-15 | End: 2017-08-15 | Stop reason: SURG

## 2017-08-15 RX ORDER — ONDANSETRON 4 MG/1
4 TABLET, ORALLY DISINTEGRATING ORAL EVERY 6 HOURS PRN
Status: DISCONTINUED | OUTPATIENT
Start: 2017-08-15 | End: 2017-08-22 | Stop reason: HOSPADM

## 2017-08-15 RX ORDER — ROCURONIUM BROMIDE 10 MG/ML
INJECTION, SOLUTION INTRAVENOUS AS NEEDED
Status: DISCONTINUED | OUTPATIENT
Start: 2017-08-15 | End: 2017-08-15 | Stop reason: SURG

## 2017-08-15 RX ORDER — DIPHENHYDRAMINE HCL 25 MG
25 CAPSULE ORAL NIGHTLY PRN
Status: DISCONTINUED | OUTPATIENT
Start: 2017-08-15 | End: 2017-08-20

## 2017-08-15 RX ORDER — OXYCODONE HYDROCHLORIDE AND ACETAMINOPHEN 5; 325 MG/1; MG/1
2 TABLET ORAL EVERY 4 HOURS PRN
Status: DISCONTINUED | OUTPATIENT
Start: 2017-08-15 | End: 2017-08-22 | Stop reason: HOSPADM

## 2017-08-15 RX ORDER — LIDOCAINE HYDROCHLORIDE 20 MG/ML
INJECTION, SOLUTION INFILTRATION; PERINEURAL AS NEEDED
Status: DISCONTINUED | OUTPATIENT
Start: 2017-08-15 | End: 2017-08-15 | Stop reason: SURG

## 2017-08-15 RX ORDER — DIPHENHYDRAMINE HYDROCHLORIDE 50 MG/ML
25 INJECTION INTRAMUSCULAR; INTRAVENOUS NIGHTLY PRN
Status: DISCONTINUED | OUTPATIENT
Start: 2017-08-15 | End: 2017-08-20

## 2017-08-15 RX ORDER — NALOXONE HCL 0.4 MG/ML
0.2 VIAL (ML) INJECTION AS NEEDED
Status: DISCONTINUED | OUTPATIENT
Start: 2017-08-15 | End: 2017-08-15 | Stop reason: HOSPADM

## 2017-08-15 RX ORDER — HYDROMORPHONE HYDROCHLORIDE 1 MG/ML
0.5 INJECTION, SOLUTION INTRAMUSCULAR; INTRAVENOUS; SUBCUTANEOUS
Status: DISCONTINUED | OUTPATIENT
Start: 2017-08-15 | End: 2017-08-15 | Stop reason: HOSPADM

## 2017-08-15 RX ORDER — OXYCODONE HYDROCHLORIDE AND ACETAMINOPHEN 5; 325 MG/1; MG/1
1 TABLET ORAL EVERY 4 HOURS PRN
Status: DISCONTINUED | OUTPATIENT
Start: 2017-08-15 | End: 2017-08-22 | Stop reason: HOSPADM

## 2017-08-15 RX ORDER — HYDROCODONE BITARTRATE AND ACETAMINOPHEN 7.5; 325 MG/1; MG/1
1 TABLET ORAL ONCE AS NEEDED
Status: DISCONTINUED | OUTPATIENT
Start: 2017-08-15 | End: 2017-08-15 | Stop reason: HOSPADM

## 2017-08-15 RX ORDER — BISACODYL 10 MG
10 SUPPOSITORY, RECTAL RECTAL DAILY PRN
Status: DISCONTINUED | OUTPATIENT
Start: 2017-08-15 | End: 2017-08-22 | Stop reason: HOSPADM

## 2017-08-15 RX ORDER — LABETALOL HYDROCHLORIDE 5 MG/ML
5 INJECTION, SOLUTION INTRAVENOUS
Status: DISCONTINUED | OUTPATIENT
Start: 2017-08-15 | End: 2017-08-15 | Stop reason: HOSPADM

## 2017-08-15 RX ORDER — DIPHENHYDRAMINE HYDROCHLORIDE 50 MG/ML
12.5 INJECTION INTRAMUSCULAR; INTRAVENOUS
Status: DISCONTINUED | OUTPATIENT
Start: 2017-08-15 | End: 2017-08-15 | Stop reason: HOSPADM

## 2017-08-15 RX ORDER — SODIUM CHLORIDE 0.9 % (FLUSH) 0.9 %
1-10 SYRINGE (ML) INJECTION AS NEEDED
Status: DISCONTINUED | OUTPATIENT
Start: 2017-08-15 | End: 2017-08-15 | Stop reason: HOSPADM

## 2017-08-15 RX ORDER — ONDANSETRON 2 MG/ML
4 INJECTION INTRAMUSCULAR; INTRAVENOUS ONCE AS NEEDED
Status: DISCONTINUED | OUTPATIENT
Start: 2017-08-15 | End: 2017-08-15 | Stop reason: HOSPADM

## 2017-08-15 RX ORDER — OXYCODONE HYDROCHLORIDE AND ACETAMINOPHEN 5; 325 MG/1; MG/1
1 TABLET ORAL EVERY 4 HOURS PRN
Status: DISCONTINUED | OUTPATIENT
Start: 2017-08-15 | End: 2017-08-15 | Stop reason: CLARIF

## 2017-08-15 RX ORDER — DOCUSATE SODIUM 100 MG/1
100 CAPSULE, LIQUID FILLED ORAL 2 TIMES DAILY PRN
Status: DISCONTINUED | OUTPATIENT
Start: 2017-08-15 | End: 2017-08-22 | Stop reason: HOSPADM

## 2017-08-15 RX ORDER — HYDRALAZINE HYDROCHLORIDE 20 MG/ML
5 INJECTION INTRAMUSCULAR; INTRAVENOUS
Status: DISCONTINUED | OUTPATIENT
Start: 2017-08-15 | End: 2017-08-15 | Stop reason: HOSPADM

## 2017-08-15 RX ORDER — SODIUM CHLORIDE 9 MG/ML
INJECTION, SOLUTION INTRAVENOUS CONTINUOUS PRN
Status: DISCONTINUED | OUTPATIENT
Start: 2017-08-15 | End: 2017-08-15 | Stop reason: SURG

## 2017-08-15 RX ORDER — FLUMAZENIL 0.1 MG/ML
0.2 INJECTION INTRAVENOUS AS NEEDED
Status: DISCONTINUED | OUTPATIENT
Start: 2017-08-15 | End: 2017-08-15 | Stop reason: HOSPADM

## 2017-08-15 RX ORDER — SIMETHICONE 80 MG
80 TABLET,CHEWABLE ORAL 4 TIMES DAILY PRN
Status: DISCONTINUED | OUTPATIENT
Start: 2017-08-15 | End: 2017-08-22 | Stop reason: HOSPADM

## 2017-08-15 RX ORDER — PROMETHAZINE HYDROCHLORIDE 25 MG/1
25 TABLET ORAL ONCE AS NEEDED
Status: DISCONTINUED | OUTPATIENT
Start: 2017-08-15 | End: 2017-08-15 | Stop reason: HOSPADM

## 2017-08-15 RX ADMIN — PHENYLEPHRINE HYDROCHLORIDE 100 MCG: 10 INJECTION INTRAVENOUS at 09:38

## 2017-08-15 RX ADMIN — FAMOTIDINE 20 MG: 10 INJECTION, SOLUTION INTRAVENOUS at 06:46

## 2017-08-15 RX ADMIN — SODIUM CHLORIDE: 9 INJECTION, SOLUTION INTRAVENOUS at 07:57

## 2017-08-15 RX ADMIN — RISPERIDONE 3 MG: 3 TABLET ORAL at 20:49

## 2017-08-15 RX ADMIN — POTASSIUM CHLORIDE AND SODIUM CHLORIDE 125 ML/HR: 450; 150 INJECTION, SOLUTION INTRAVENOUS at 14:52

## 2017-08-15 RX ADMIN — PHENYLEPHRINE HYDROCHLORIDE 100 MCG: 10 INJECTION INTRAVENOUS at 09:41

## 2017-08-15 RX ADMIN — SODIUM CHLORIDE, POTASSIUM CHLORIDE, SODIUM LACTATE AND CALCIUM CHLORIDE 9 ML/HR: 600; 310; 30; 20 INJECTION, SOLUTION INTRAVENOUS at 06:46

## 2017-08-15 RX ADMIN — SODIUM CHLORIDE: 9 INJECTION, SOLUTION INTRAVENOUS at 10:50

## 2017-08-15 RX ADMIN — HYDROMORPHONE HYDROCHLORIDE 0.5 MG: 1 INJECTION, SOLUTION INTRAMUSCULAR; INTRAVENOUS; SUBCUTANEOUS at 12:25

## 2017-08-15 RX ADMIN — PHENYLEPHRINE HYDROCHLORIDE 0.1 MCG: 10 INJECTION, SOLUTION INTRAMUSCULAR; INTRAVENOUS; SUBCUTANEOUS at 09:55

## 2017-08-15 RX ADMIN — MEGESTROL ACETATE 80 MG: 40 TABLET ORAL at 17:48

## 2017-08-15 RX ADMIN — MEGESTROL ACETATE 80 MG: 40 TABLET ORAL at 20:45

## 2017-08-15 RX ADMIN — LIDOCAINE HYDROCHLORIDE 100 MG: 20 INJECTION, SOLUTION INFILTRATION; PERINEURAL at 07:34

## 2017-08-15 RX ADMIN — HYDROMORPHONE HYDROCHLORIDE 0.5 MG: 1 INJECTION, SOLUTION INTRAMUSCULAR; INTRAVENOUS; SUBCUTANEOUS at 11:12

## 2017-08-15 RX ADMIN — PROPOFOL 200 MG: 10 INJECTION, EMULSION INTRAVENOUS at 07:34

## 2017-08-15 RX ADMIN — SUGAMMADEX 500 MG: 100 INJECTION, SOLUTION INTRAVENOUS at 10:20

## 2017-08-15 RX ADMIN — HYDROMORPHONE HYDROCHLORIDE 2 MG: 2 INJECTION, SOLUTION INTRAMUSCULAR; INTRAVENOUS; SUBCUTANEOUS at 09:10

## 2017-08-15 RX ADMIN — FENTANYL CITRATE 50 MCG: 50 INJECTION INTRAMUSCULAR; INTRAVENOUS at 11:12

## 2017-08-15 RX ADMIN — MORPHINE SULFATE 2 MG: 2 INJECTION, SOLUTION INTRAMUSCULAR; INTRAVENOUS at 17:49

## 2017-08-15 RX ADMIN — FENTANYL CITRATE 100 MCG: 50 INJECTION, SOLUTION INTRAMUSCULAR; INTRAVENOUS at 08:55

## 2017-08-15 RX ADMIN — ROCURONIUM BROMIDE 20 MG: 10 INJECTION INTRAVENOUS at 09:06

## 2017-08-15 RX ADMIN — FENTANYL CITRATE 100 MCG: 50 INJECTION, SOLUTION INTRAMUSCULAR; INTRAVENOUS at 07:34

## 2017-08-15 RX ADMIN — OXYCODONE HYDROCHLORIDE AND ACETAMINOPHEN 2 TABLET: 5; 325 TABLET ORAL at 20:46

## 2017-08-15 RX ADMIN — LABETALOL HYDROCHLORIDE 10 MG: 5 INJECTION, SOLUTION INTRAVENOUS at 10:41

## 2017-08-15 RX ADMIN — PHENYLEPHRINE HYDROCHLORIDE 200 MCG: 10 INJECTION INTRAVENOUS at 09:47

## 2017-08-15 RX ADMIN — MORPHINE SULFATE 2 MG: 2 INJECTION, SOLUTION INTRAMUSCULAR; INTRAVENOUS at 14:54

## 2017-08-15 RX ADMIN — ROCURONIUM BROMIDE 30 MG: 10 INJECTION INTRAVENOUS at 08:54

## 2017-08-15 RX ADMIN — ROCURONIUM BROMIDE 45 MG: 10 INJECTION INTRAVENOUS at 07:42

## 2017-08-15 RX ADMIN — ROCURONIUM BROMIDE 20 MG: 10 INJECTION INTRAVENOUS at 09:25

## 2017-08-15 RX ADMIN — LABETALOL HYDROCHLORIDE 10 MG: 5 INJECTION, SOLUTION INTRAVENOUS at 09:17

## 2017-08-15 RX ADMIN — ROCURONIUM BROMIDE 30 MG: 10 INJECTION INTRAVENOUS at 08:35

## 2017-08-15 RX ADMIN — SODIUM CHLORIDE, POTASSIUM CHLORIDE, SODIUM LACTATE AND CALCIUM CHLORIDE: 600; 310; 30; 20 INJECTION, SOLUTION INTRAVENOUS at 09:30

## 2017-08-15 RX ADMIN — FENTANYL CITRATE 50 MCG: 50 INJECTION, SOLUTION INTRAMUSCULAR; INTRAVENOUS at 08:59

## 2017-08-15 RX ADMIN — PHENYLEPHRINE HYDROCHLORIDE 100 MCG: 10 INJECTION INTRAVENOUS at 09:35

## 2017-08-15 RX ADMIN — QUETIAPINE FUMARATE 25 MG: 25 TABLET, FILM COATED ORAL at 20:45

## 2017-08-15 RX ADMIN — POTASSIUM CHLORIDE 40 MEQ: 1.5 POWDER, FOR SOLUTION ORAL at 17:48

## 2017-08-15 RX ADMIN — PHENYLEPHRINE HYDROCHLORIDE 200 MCG: 10 INJECTION INTRAVENOUS at 09:44

## 2017-08-15 RX ADMIN — SODIUM CHLORIDE 100 ML/HR: 9 INJECTION, SOLUTION INTRAVENOUS at 04:28

## 2017-08-15 RX ADMIN — VALPROIC ACID 250 MG: 250 SOLUTION ORAL at 20:45

## 2017-08-15 RX ADMIN — FENTANYL CITRATE 50 MCG: 50 INJECTION INTRAMUSCULAR; INTRAVENOUS at 06:46

## 2017-08-15 RX ADMIN — PHENYLEPHRINE HYDROCHLORIDE 100 MCG: 10 INJECTION INTRAVENOUS at 09:53

## 2017-08-15 RX ADMIN — CEFAZOLIN SODIUM 2 G: 2 INJECTION, SOLUTION INTRAVENOUS at 07:34

## 2017-08-15 RX ADMIN — SUCCINYLCHOLINE CHLORIDE 200 MG: 20 INJECTION, SOLUTION INTRAMUSCULAR; INTRAVENOUS; PARENTERAL at 07:34

## 2017-08-15 RX ADMIN — ROCURONIUM BROMIDE 5 MG: 10 INJECTION INTRAVENOUS at 07:34

## 2017-08-15 RX ADMIN — PHENYLEPHRINE HYDROCHLORIDE 100 MCG: 10 INJECTION INTRAVENOUS at 09:50

## 2017-08-15 RX ADMIN — ONDANSETRON 4 MG: 2 INJECTION INTRAMUSCULAR; INTRAVENOUS at 10:20

## 2017-08-15 RX ADMIN — FENTANYL CITRATE 50 MCG: 50 INJECTION INTRAMUSCULAR; INTRAVENOUS at 11:37

## 2017-08-15 NOTE — PROGRESS NOTES
DAILY PROGRESS NOTE  [facility], [facility]    8/15/2017    Patient Identification:  Name: Lupe Burleson  Age: 61 y.o.  Sex: female  :  1955  MRN: [id number]           Primary Care Physician: Shane Barcenas MD    Subjective:    Interval History:    Patient underwent surgery today.  No complications.  She is having supper.  No other events.    ROS:     Denies nausea, vomiting, diarrhea, constipation.  She does have some abdominal tenderness. No worsening shortness of breath.    Objective:    Scheduled Meds:    atorvastatin 20 mg Per G Tube Daily   bumetanide 2 mg Per G Tube Daily   [START ON 2017] enoxaparin 40 mg Subcutaneous Daily   isosorbide mononitrate 30 mg Per G Tube Daily   lactulose 20 g Oral Daily   megestrol 80 mg Oral 4x Daily   pantoprazole 40 mg Oral Q AM   potassium chloride 40 mEq Oral BID   potassium chloride 20 mEq Oral Daily   QUEtiapine 25 mg Oral Q12H   risperiDONE 2 mg Oral Daily   risperiDONE 3 mg Oral Nightly   Valproic Acid 250 mg Per G Tube Q12H       Continuous Infusions:    lactated ringers 9 mL/hr Last Rate: 9 mL/hr (08/15/17 0646)   sodium chloride 0.45 % with KCl 20 mEq 125 mL/hr Last Rate: 125 mL/hr (08/15/17 1452)   sodium chloride 100 mL/hr Last Rate: 100 mL/hr (08/15/17 0428)       PRN Meds:  •  acetaminophen  •  acetaminophen  •  aluminum-magnesium hydroxide-simethicone  •  bisacodyl  •  bisacodyl  •  calcium carbonate  •  diphenhydrAMINE **OR** diphenhydrAMINE  •  docusate sodium  •  HYDROcodone-acetaminophen  •  ipratropium-albuterol  •  LORazepam  •  magnesium hydroxide  •  Morphine **AND** naloxone  •  Morphine **AND** naloxone  •  ondansetron **OR** ondansetron ODT **OR** ondansetron  •  oxyCODONE-acetaminophen **OR** oxyCODONE-acetaminophen  •  simethicone  •  Insert peripheral IV **AND** sodium chloride    Intake/Output:    Intake/Output Summary (Last 24 hours) at 08/15/17 7425  Last data filed at 08/15/17 4002    "Gross per 24 hour   Intake             4625 ml   Output             1120 ml   Net             3505 ml         Exam:    tMax 24 hrs: Temp (24hrs), Av.1 °F (36.7 °C), Min:97.6 °F (36.4 °C), Max:99.2 °F (37.3 °C)    Vitals Ranges:   Temp:  [97.6 °F (36.4 °C)-99.2 °F (37.3 °C)] 97.8 °F (36.6 °C)  Heart Rate:  [] 109  Resp:  [12-18] 16  BP: (103-164)/(58-94) 141/92    /92 (BP Location: Right arm, Patient Position: Lying)  Pulse 109  Temp 97.8 °F (36.6 °C) (Oral)   Resp 16  Ht 66\" (167.6 cm)  Wt 263 lb (119 kg)  SpO2 100%  BMI 42.45 kg/m2    General: Alert, cooperative and appears stated age. In no acute distress  Neck: Supple, symmetrical, trachea midline, no adenopathy;              Thyroid without enlargement/tenderness/nodules;              no carotid bruit or JVD  Cardiovascular: Normal rate, regular rhythm and intact distal pulses.                              Exam reveals no gallop and no friction rub. No murmur heard  Pulmonary: Clear to auscultation bilaterally, respirations unlabored.                        No rhonchi, wheezing or rales.   Abdominal: Soft,  non-tender; bowel sounds active all four quadrants;                      no masses,  hepatomegaly, splenomegaly.   Extremities: Normal, atraumatic, no cyanosis or edema  Pulses:         2 + symmetric all extremities  Neurological: She is alert and oriented to person, place, and time.                           CNII-XII intact, normal strength, sensation intact throughout  Skin: Skin color, texture, turgor normal, no rashes or lesions    Data Review:      Results from last 7 days  Lab Units 08/15/17  1149 17  0646 17  1852   WBC 10*3/mm3 17.04* 6.45 7.04   HEMOGLOBIN g/dL 8.7* 8.3* 8.5*   HEMATOCRIT % 29.8* 28.0* 29.9*   PLATELETS 10*3/mm3 353 330 386         Results from last 7 days  Lab Units 08/15/17  1149 17  0646 17  1852   SODIUM mmol/L 144 144 143   POTASSIUM mmol/L 3.7 3.4* 3.7   CHLORIDE mmol/L 109* 103 " 101   CO2 mmol/L 25.0 34.2* 32.5*   BUN mg/dL 11 17 16   CREATININE mg/dL 0.56* 0.65 0.72   CALCIUM mg/dL 8.6 8.9 9.4   BILIRUBIN mg/dL  --   --  0.4   ALK PHOS U/L  --   --  68   ALT (SGPT) U/L  --   --  10   AST (SGOT) U/L  --   --  9   GLUCOSE mg/dL 120* 110* 95       Results from last 7 days  Lab Units 08/13/17  1852   INR  1.15*         Lab Results  Lab Value Date/Time   TROPONINT 0.065 (H) 05/17/2017 0505   TROPONINT 0.077 (H) 05/16/2017 1517   TROPONINT 0.080 (H) 05/16/2017 1112   TROPONINT 0.078 (H) 05/16/2017 0703   TROPONINT 0.084 (H) 05/16/2017 0116   TROPONINT <0.010 05/15/2016 0622   TROPONINT 0.017 05/14/2016 0437   TROPONINT 0.023 05/13/2016 2036   TROPONINT 0.031 (H) 05/13/2016 1231   TROPONINT <0.01 02/03/2016 0537   TROPONINT <0.01 02/02/2016 0914   TROPONINT <0.01 11/11/2015 0516   TROPONINT <0.01 11/09/2015 2049   TROPONINT <0.01 04/18/2014 0517       Microbiology Results (last 10 days)     Procedure Component Value - Date/Time    Urine Culture [[id number]]  (Normal) Collected:  08/13/17 2002    Lab Status:  Final result Specimen:  Urine from Urine, Catheter Updated:  08/15/17 0632     Urine Culture No growth           Imaging Results (last 7 days)     Procedure Component Value Units Date/Time    US Pelvis Complete [[id number]] Collected:  08/13/17 1902     Updated:  08/13/17 1902    Narrative:       PELVIC ULTRASOUND     HISTORY: 61-year-old nursing home patient with vaginal bleeding.     The examination was performed as an emergency procedure. The patient  refused transvaginal imaging. The uterus is enlarged and lobulated,  measuring 9.8 x 9.8 x 13.7 cm and there are multiple fibroids measuring  up to 7 cm in size. There is also generalized endometrial thickening  with double wall thickness of 1.9 cm. The patient is postmenopausal and  this does raise a concern of endometrial tumor and further clinical  correlation is required.     The ovaries are not visualized. There is no free fluid  identified.       XR Chest Post CVA Port [[id number]] Collected:  08/15/17 1148     Updated:  08/15/17 1153    Narrative:       CHEST POST CVA PORT     HISTORY: Morbidly obese 61-year-old female for right jugular line  placement.     COMPARISON: 05/16/2017     FINDINGS:  1. Right jugular line projects over the superior vena cava.  2. Imaging is considerably limited by morbid obesity, low lung volumes,  mandibular artifact obscuring detail of the right apex.     If patient has symptoms suggesting pneumothorax, follow-up lordotic  imaging or standard imaging with removal mandibular artifact would be  helpful.     This report was finalized on 8/15/2017 11:50 AM by Dr. Bryan Yadav MD.               Assessment:      Principal Problem:    Vaginal bleeding  Active Problems:    Endometrial cancer determined by uterine biopsy    Hypertension    Chronic diastolic CHF (congestive heart failure)    DM (diabetes mellitus)    Schizo affective schizophrenia    Coronary artery disease    Bipolar disorder, unspecified      Patient Active Problem List   Diagnosis Code   • Elevated troponin R79.89   • Aspiration pneumonia J69.0   • Hematuria R31.9   • Hypokalemia E87.6   • Pelvic mass in female R19.00   • Fecal impaction K56.41   • Endometrial carcinoma C54.1   • Acute on chronic respiratory failure with hypoxia and hypercapnia J96.21, J96.22   • Acute on chronic diastolic CHF (congestive heart failure) I50.33   • UTI (urinary tract infection) N39.0   • Leucocytosis D72.829   • Chronic diastolic CHF (congestive heart failure) I50.32   • DM (diabetes mellitus) E11.9   • Morbid obesity E66.01   • HTN (hypertension) I10   • Schizophrenia F20.9   • Tracheostomy malfunction J95.03   • Pyuria N39.0   • THAI (obstructive sleep apnea) G47.33   • Generalized weakness R53.1   • Schizo affective schizophrenia F25.0   • Diabetes mellitus E11.9   • Coronary artery disease I25.10   • Endometrial cancer determined by uterine biopsy C54.1, Z15.04   •  Chronic respiratory failure with hypoxia and hypercapnia J96.11, J96.12   • Toxic metabolic encephalopathy G92   • Pneumonia J18.9   • Abnormal vaginal bleeding N93.9   • Schizo affective schizophrenia F25.0   • CHF (congestive heart failure) I50.9   • Endometrial cancer determined by uterine biopsy C54.1, Z15.04   • Coronary artery disease I25.10   • Lymphedema I89.0   • Immobility Z74.09   • Acute blood loss anemia D62   • Vaginal bleeding N93.9   • Hypertension I10   • Uterine cancer C55   • Bipolar disorder, unspecified F31.9       Plan:    Continue post operatory management.  IV fluids, by mouth diet.  Monitor and correct electrolytes  Adjust insulin as needed  Monitor mental status  Continue home medications  PT to see pt  DVT/stress ulcer prophylaxis  Labs in       Shane Barcenas MD  8/15/2017  6:28 PM

## 2017-08-15 NOTE — PLAN OF CARE
Problem: Fall Risk (Adult)  Goal: Identify Related Risk Factors and Signs and Symptoms  Outcome: Ongoing (interventions implemented as appropriate)  Goal: Absence of Falls  Outcome: Ongoing (interventions implemented as appropriate)    Problem: Tissue Perfusion, Ineffective Peripheral (Adult)  Goal: Identify Related Risk Factors and Signs and Symptoms  Outcome: Ongoing (interventions implemented as appropriate)  Goal: Adequate Tissue Perfusion  Outcome: Ongoing (interventions implemented as appropriate)    Problem: Patient Care Overview (Adult)  Goal: Plan of Care Review  Outcome: Ongoing (interventions implemented as appropriate)  Goal: Adult Individualization and Mutuality  Outcome: Ongoing (interventions implemented as appropriate)    08/15/17 6189   Mutuality/Individual Preferences   What Information Would Help Us Give You More Personalized Care? Patient post total hysterectomy. Pain managed. Patient flat affect difficult to monitor pain level. Chirinos catheter in place. Patient will communicated occasionallly. VS and labs monitored. Fluids continuos with potassium addittive started. VS and labs monitored.       Goal: Discharge Needs Assessment  Outcome: Ongoing (interventions implemented as appropriate)    Problem: Perioperative Period (Adult)  Goal: Signs and Symptoms of Listed Potential Problems Will be Absent or Manageable (Perioperative Period)  Outcome: Ongoing (interventions implemented as appropriate)

## 2017-08-15 NOTE — NURSING NOTE
Spoke with Dr. Lopez with anesthesia regarding H&H results. No new orders other than to call surgeon with ressults.

## 2017-08-15 NOTE — ANESTHESIA POSTPROCEDURE EVALUATION
Patient: Lupe Burleson    Procedure Summary     Date Anesthesia Start Anesthesia Stop Room / Location    08/15/17 0725 1056  [facility] / [facility] MAIN OR       Procedure Diagnosis Surgeon Provider    OPEN TOTAL  ABDOMINAL HYSTERECTOMY WITH BILATERAL SALPINGO-OOPHERECTOMY (Bilateral Abdomen) No diagnosis on file. MD Ortiz Pino MD          Anesthesia Type: general  Last vitals  BP   126/93 (08/15/17 1300)    Temp   36.6 °C (97.9 °F) (08/15/17 1245)    Pulse   97 (08/15/17 1300)   Resp   16 (08/15/17 1300)    SpO2   100 % (08/15/17 1300)      Post Anesthesia Care and Evaluation    Patient location during evaluation: PACU  Patient participation: complete - patient participated  Level of consciousness: awake and alert  Pain management: adequate  Airway patency: patent  Anesthetic complications: No anesthetic complications    Cardiovascular status: acceptable  Respiratory status: acceptable  Hydration status: acceptable    Comments: --------------------            08/15/17               1300     --------------------   BP:       126/93     Pulse:      97       Resp:       16       Temp:                SpO2:      100%     --------------------

## 2017-08-15 NOTE — ANESTHESIA PROCEDURE NOTES
Airway  Urgency: elective    Date/Time: 8/15/2017 8:30 AM  End Time:8/15/2017 8:30 AM  Airway not difficult    General Information and Staff    Patient location during procedure: OR  Anesthesiologist: TUYET BARTON  CRNA: MARIAELENA AGUILAR    Indications and Patient Condition  Indications for airway management: airway protection    Preoxygenated: yes  MILS maintained throughout  Mask difficulty assessment: 1 - vent by mask    Final Airway Details  Final airway type: endotracheal airway      Successful airway: ETT  Cuffed: yes   Successful intubation technique: direct laryngoscopy  Facilitating devices/methods: intubating stylet  Endotracheal tube insertion site: oral  Blade: Awad  Blade size: #2  ETT size: 7.5 mm  Cormack-Lehane Classification: grade I - full view of glottis  Placement verified by: chest auscultation and capnometry   Measured from: lips  Number of attempts at approach: 1    Additional Comments  Atraumatic, Secured after verification of placement. Hi-low evac tube

## 2017-08-15 NOTE — ANESTHESIA PROCEDURE NOTES
Central Line    Patient location during procedure: OR  Start time: 8/15/2017 7:40 AM  Stop Time:8/15/2017 7:55 AM  Indications: vascular access, MD/Surgeon request and central pressure monitoring  Staff  Anesthesiologist: TUYET BARTON  Preanesthetic Checklist  Completed: patient identified, surgical consent, pre-op evaluation, timeout performed, IV checked, risks and benefits discussed and monitors and equipment checked  Central Line Prep  Sterile Tech:cap, gloves, gown, mask and sterile barriers  Prep: chloraprep  Patient monitoring: blood pressure monitoring, continuous pulse oximetry and EKG  Central Line Procedure  Laterality:right  Location:internal jugular  Catheter Type:double lumen  Catheter Size:8 Fr  Guidance:landmark technique  Assessment  Post procedure:biopatch applied, line sutured and occlusive dressing applied  Assessement:blood return through all ports, free fluid flow and Angel Test  Complications:no  Patient Tolerance:patient tolerated the procedure well with no apparent complications

## 2017-08-15 NOTE — PLAN OF CARE
Problem: Fall Risk (Adult)  Goal: Identify Related Risk Factors and Signs and Symptoms  Outcome: Ongoing (interventions implemented as appropriate)  Goal: Absence of Falls  Outcome: Ongoing (interventions implemented as appropriate)    Problem: Tissue Perfusion, Ineffective Peripheral (Adult)  Goal: Identify Related Risk Factors and Signs and Symptoms  Outcome: Ongoing (interventions implemented as appropriate)  Goal: Adequate Tissue Perfusion  Outcome: Ongoing (interventions implemented as appropriate)    Problem: Patient Care Overview (Adult)  Goal: Plan of Care Review  Outcome: Ongoing (interventions implemented as appropriate)    08/15/17 0102   Coping/Psychosocial Response Interventions   Plan Of Care Reviewed With patient   Outcome Evaluation   Outcome Summary/Follow up Plan pt is alert but flat expression, slow to answer, some confusion occassionally, incont of B&B, diet NPO since midnight, new Iv site for surgery in am, planned for possible hysterectomy in am, consent from guardian on chart, blood consent from guardian on chart, small amounts of blood noted with brief change, small oozing wound to Lf ankle area open to air, Peg tube-LUQ- in tact flushed with 30cc water, dry guaze placed at site, heals elevated on pillows   Patient Care Overview   Progress no change       Goal: Adult Individualization and Mutuality  Outcome: Ongoing (interventions implemented as appropriate)

## 2017-08-15 NOTE — ANESTHESIA PREPROCEDURE EVALUATION
Anesthesia Evaluation     Patient summary reviewed and Nursing notes reviewed   no history of anesthetic complications:  NPO Solid Status: > 8 hours  NPO Liquid Status: > 8 hours     Airway   Mallampati: III  Dental    (+) poor dentition    Pulmonary - normal exam   (+) sleep apnea,   Cardiovascular - normal exam    (+) hypertension well controlled, CAD, CHF,       Neuro/Psych  (+) CVA, psychiatric history Depression and Schizophrenia,    GI/Hepatic/Renal/Endo    (+) obesity, morbid obesity, diabetes mellitus type 1,     Musculoskeletal     Abdominal    Substance History      OB/GYN          Other                                        Anesthesia Plan    ASA 4     general     intravenous induction   Anesthetic plan and risks discussed with patient.

## 2017-08-15 NOTE — OP NOTE
Operative Note      Lupe Burleson  61 y.o.  1955  female  [id number]      8/13/2017 - 8/15/2017    Surgeon(s) and Role:     * Ortiz Washington MD - Primary     Assistant:  Rohan Hernandez M.D., who assisted in all aspects of the surgery including the incision, dissection, exposure, and closure.    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  The tubes and ovaries were atrophic.  There was no evidence of carcinoma outside of the uterus.  The anterior abdominal wall in the pelvis as well as the pelvic tissues were quite fibrotic due to the patient's previous radiation therapy.  This, along with the patient's body habitus made exposure very difficult approximately doubled the length of the surgery.  The visualized upper abdominal structures, including the liver, gallbladder, stomach, small and large intestines, and omentum, were all normal.  There was no palpable retroperitoneal adenopathy.    Residual Disease: None    Procedure(s):   CRISS, BSO    Specimens:  Uterus, tubes, ovaries     Estimated Blood Loss: 900 cc    Fluids:   1 unit packed red blood cells, 1900 cc crystalloid    Drains:   Chirinos catheter    Complications:   None    Description of Procedure:  The patient was properly identified and taken to the operating room.  A surgical timeout was performed, verifying the patient's identification, the proposed procedures, and the necessary equipment.  After the satisfactory induction of Gen. anesthesia, exam under anesthesia was performed with the above noted findings.  The abdomen, vagina, and perineum were prepped and draped in the usual sterile fashion.  The bladder was drained with a Chirinos catheter.    Exam under anesthesia revealed the uterus to be very large (approximate 16 weeks) and relatively immobile.  Despite the use of an extra long vaginal speculum and  Breisky retractors, we were not able to visualize the cervix.  Therefore we determined that the da Carlos hysterectomy was not possible and that the patient would require a laparotomy.    The peritoneal cavity was entered through a vertical midline infraumbilical incision.  Exploration was carried out with the above noted findings. A Bookwalter retractor was placed into the incision, and the intestines were packed into the upper abdomen.  Caution was exercised avoid pressure on the femoral nerves or psoas muscles.    Attention was turned to the hysterectomy.  The uterine cornua were grasped with Chayo clamps and traction was applied.  The round ligaments were ligated and transected, and the anterior leaves of the broad ligament were opened to the midline.  The bladder was dissected off the anterior aspect of the cervix and upper vagina using careful sharp dissection.  The infundibulopelvic ligaments were identified anterior to the ureters.  They were doubly crossclamped, incised, and suture-ligated bilaterally.  The cardinal ligaments were skeletonized.  They were then  crossclamped, incised, and suture-ligated on each side at the level of the internal cervical os.  The cardinal and uterosacral ligaments were then successively crossclamped, incised, and suture-ligated, until the level of the external cervical os was reached.  The vaginal angles were crossclamped, incised and suture-ligated.  The specimen was removed by transecting the vagina beneath the cervix.  The vaginal cuff was closed with interrupted figures-of-eight of 0 Vicryl.    The operative fields were copiously irrigated and inspected for hemostasis.  After confirming adequate hemostasis, all packs and instruments were removed from the abdominal cavity.  The abdominal incision was closed using a running, #1 loop, PDS suture in a modified Smead Chou fashion to reapproximate the fascia, muscle, and peritoneum.  In the midportion of the incision, around  the umbilicus, the fascia was so thickened fibrotic that I was not able to drive the needle of the PDS suture through the fascia.  Therefore the midportion of this incision was closed with a running #1 Prolene suture.  Each end of the Prolene suture was secured to the PDS sutures which had been started inferiorly and superiorly.  The skin and subcutaneous tissues were copiously irrigated, the skin edges were reapproximated with skin staples.      A dressing was applied, the patient was awakened from her anesthetic, and taken to the recovery room in satisfactory condition, having tolerated the procedure well.  The sponge, needle, and instrument counts were correct and verified x2, as reported by the operating room staff.         Ortiz Washington MD  8/15/2017  10:40 AM

## 2017-08-15 NOTE — NURSING NOTE
Called Dr. Washington in regards to H&H results as requested by Dr. Lopez (AA). No new orders from Dr. Washington at this time. Dr. Washington stated that patient norm is around 8.7 Hemoglobin

## 2017-08-16 LAB
ANION GAP SERPL CALCULATED.3IONS-SCNC: 11.4 MMOL/L
BASE EXCESS BLDA CALC-SCNC: 2 MMOL/L (ref -5–5)
BUN BLD-MCNC: 7 MG/DL (ref 8–23)
BUN/CREAT SERPL: 11.5 (ref 7–25)
CALCIUM SPEC-SCNC: 9.3 MG/DL (ref 8.6–10.5)
CHLORIDE SERPL-SCNC: 102 MMOL/L (ref 98–107)
CO2 BLDA-SCNC: 29 MMOL/L (ref 24–29)
CO2 SERPL-SCNC: 24.6 MMOL/L (ref 22–29)
CREAT BLD-MCNC: 0.61 MG/DL (ref 0.57–1)
DEPRECATED RDW RBC AUTO: 51 FL (ref 37–54)
ERYTHROCYTE [DISTWIDTH] IN BLOOD BY AUTOMATED COUNT: 19.2 % (ref 11.7–13)
GFR SERPL CREATININE-BSD FRML MDRD: 120 ML/MIN/1.73
GLUCOSE BLD-MCNC: 111 MG/DL (ref 65–99)
GLUCOSE BLDC GLUCOMTR-MCNC: 108 MG/DL (ref 70–130)
GLUCOSE BLDC GLUCOMTR-MCNC: 115 MG/DL (ref 70–130)
HCO3 BLDA-SCNC: 27.2 MMOL/L (ref 22–26)
HCT VFR BLD AUTO: 29.5 % (ref 35.6–45.5)
HCT VFR BLDA CALC: 29 % (ref 38–51)
HGB BLD-MCNC: 8.9 G/DL (ref 11.9–15.5)
HGB BLDA-MCNC: 9.9 G/DL (ref 12–17)
LAB AP CASE REPORT: NORMAL
Lab: NORMAL
MCH RBC QN AUTO: 22.2 PG (ref 26.9–32)
MCHC RBC AUTO-ENTMCNC: 30.2 G/DL (ref 32.4–36.3)
MCV RBC AUTO: 73.6 FL (ref 80.5–98.2)
PATH REPORT.FINAL DX SPEC: NORMAL
PATH REPORT.GROSS SPEC: NORMAL
PCO2 BLDA: 47.3 MM HG (ref 35–45)
PH BLDA: 7.37 PH UNITS (ref 7.35–7.6)
PLATELET # BLD AUTO: 360 10*3/MM3 (ref 140–500)
PMV BLD AUTO: 10.5 FL (ref 6–12)
PO2 BLDA: 33 MMHG (ref 80–105)
POTASSIUM BLD-SCNC: 4.6 MMOL/L (ref 3.5–5.2)
POTASSIUM BLDA-SCNC: 4.1 MMOL/L (ref 3.5–4.9)
RBC # BLD AUTO: 4.01 10*6/MM3 (ref 3.9–5.2)
SAO2 % BLDA: 61 % (ref 95–98)
SODIUM BLD-SCNC: 138 MMOL/L (ref 136–145)
WBC NRBC COR # BLD: 10.13 10*3/MM3 (ref 4.5–10.7)

## 2017-08-16 PROCEDURE — 94799 UNLISTED PULMONARY SVC/PX: CPT

## 2017-08-16 PROCEDURE — 25010000002 MORPHINE PER 10 MG: Performed by: OBSTETRICS & GYNECOLOGY

## 2017-08-16 PROCEDURE — 93010 ELECTROCARDIOGRAM REPORT: CPT | Performed by: INTERNAL MEDICINE

## 2017-08-16 PROCEDURE — 25010000002 ENOXAPARIN PER 10 MG: Performed by: OBSTETRICS & GYNECOLOGY

## 2017-08-16 PROCEDURE — 93005 ELECTROCARDIOGRAM TRACING: CPT | Performed by: INTERNAL MEDICINE

## 2017-08-16 PROCEDURE — 94640 AIRWAY INHALATION TREATMENT: CPT

## 2017-08-16 PROCEDURE — 85027 COMPLETE CBC AUTOMATED: CPT | Performed by: INTERNAL MEDICINE

## 2017-08-16 PROCEDURE — 80048 BASIC METABOLIC PNL TOTAL CA: CPT | Performed by: INTERNAL MEDICINE

## 2017-08-16 PROCEDURE — 82962 GLUCOSE BLOOD TEST: CPT

## 2017-08-16 PROCEDURE — 94760 N-INVAS EAR/PLS OXIMETRY 1: CPT

## 2017-08-16 PROCEDURE — 25810000003 POTASSIUM CHLORIDE PER 2 MEQ: Performed by: OBSTETRICS & GYNECOLOGY

## 2017-08-16 RX ORDER — IPRATROPIUM BROMIDE AND ALBUTEROL SULFATE 2.5; .5 MG/3ML; MG/3ML
3 SOLUTION RESPIRATORY (INHALATION)
Status: DISCONTINUED | OUTPATIENT
Start: 2017-08-16 | End: 2017-08-22 | Stop reason: HOSPADM

## 2017-08-16 RX ADMIN — LACTULOSE 20 G: 20 SOLUTION ORAL at 09:54

## 2017-08-16 RX ADMIN — POTASSIUM CHLORIDE AND SODIUM CHLORIDE 125 ML/HR: 450; 150 INJECTION, SOLUTION INTRAVENOUS at 14:56

## 2017-08-16 RX ADMIN — OXYCODONE HYDROCHLORIDE AND ACETAMINOPHEN 2 TABLET: 5; 325 TABLET ORAL at 21:55

## 2017-08-16 RX ADMIN — MEGESTROL ACETATE 80 MG: 40 TABLET ORAL at 09:55

## 2017-08-16 RX ADMIN — MORPHINE SULFATE 2 MG: 2 INJECTION, SOLUTION INTRAMUSCULAR; INTRAVENOUS at 17:27

## 2017-08-16 RX ADMIN — RISPERIDONE 2 MG: 2 TABLET ORAL at 09:55

## 2017-08-16 RX ADMIN — OXYCODONE HYDROCHLORIDE AND ACETAMINOPHEN 2 TABLET: 5; 325 TABLET ORAL at 06:26

## 2017-08-16 RX ADMIN — IPRATROPIUM BROMIDE AND ALBUTEROL SULFATE 3 ML: .5; 3 SOLUTION RESPIRATORY (INHALATION) at 20:38

## 2017-08-16 RX ADMIN — ENOXAPARIN SODIUM 40 MG: 40 INJECTION SUBCUTANEOUS at 09:54

## 2017-08-16 RX ADMIN — POTASSIUM CHLORIDE 20 MEQ: 750 CAPSULE, EXTENDED RELEASE ORAL at 09:55

## 2017-08-16 RX ADMIN — BUMETANIDE 2 MG: 2 TABLET ORAL at 09:54

## 2017-08-16 RX ADMIN — ATORVASTATIN CALCIUM 20 MG: 20 TABLET, FILM COATED ORAL at 09:55

## 2017-08-16 RX ADMIN — ISOSORBIDE MONONITRATE 30 MG: 20 TABLET ORAL at 09:54

## 2017-08-16 RX ADMIN — MEGESTROL ACETATE 80 MG: 40 TABLET ORAL at 12:48

## 2017-08-16 RX ADMIN — VALPROIC ACID 250 MG: 250 SOLUTION ORAL at 21:54

## 2017-08-16 RX ADMIN — QUETIAPINE FUMARATE 25 MG: 25 TABLET, FILM COATED ORAL at 21:54

## 2017-08-16 RX ADMIN — IPRATROPIUM BROMIDE AND ALBUTEROL SULFATE 3 ML: .5; 3 SOLUTION RESPIRATORY (INHALATION) at 11:32

## 2017-08-16 RX ADMIN — MORPHINE SULFATE 2 MG: 2 INJECTION, SOLUTION INTRAMUSCULAR; INTRAVENOUS at 14:56

## 2017-08-16 RX ADMIN — QUETIAPINE FUMARATE 25 MG: 25 TABLET, FILM COATED ORAL at 09:54

## 2017-08-16 RX ADMIN — RISPERIDONE 3 MG: 3 TABLET ORAL at 21:54

## 2017-08-16 RX ADMIN — POTASSIUM CHLORIDE AND SODIUM CHLORIDE 125 ML/HR: 450; 150 INJECTION, SOLUTION INTRAVENOUS at 06:26

## 2017-08-16 RX ADMIN — PANTOPRAZOLE SODIUM 40 MG: 40 TABLET, DELAYED RELEASE ORAL at 06:26

## 2017-08-16 RX ADMIN — OXYCODONE HYDROCHLORIDE AND ACETAMINOPHEN 1 TABLET: 5; 325 TABLET ORAL at 10:37

## 2017-08-16 RX ADMIN — VALPROIC ACID 250 MG: 250 SOLUTION ORAL at 09:55

## 2017-08-16 NOTE — PROGRESS NOTES
DAILY PROGRESS NOTE    Patient Identification:  Name: Lupe Burleson  Age: 61 y.o.  Sex: Female  :  1955  MRN:[id number]    Cheif complaint:  Chief Complaint   Patient presents with   • Vaginal Bleeding     Post-op    Subjective:  Patient not responding this afternoon to questions, also, does not nod head in response.     Objective:  Scheduled Meds:    atorvastatin 20 mg Per G Tube Daily   bumetanide 2 mg Per G Tube Daily   enoxaparin 40 mg Subcutaneous Daily   isosorbide mononitrate 30 mg Per G Tube Daily   lactulose 20 g Oral Daily   megestrol 80 mg Oral 4x Daily   pantoprazole 40 mg Oral Q AM   potassium chloride 20 mEq Oral Daily   QUEtiapine 25 mg Oral Q12H   risperiDONE 2 mg Oral Daily   risperiDONE 3 mg Oral Nightly   Valproic Acid 250 mg Per G Tube Q12H       Continuous Infusions:    lactated ringers 9 mL/hr Last Rate: 9 mL/hr (08/15/17 0646)   sodium chloride 0.45 % with KCl 20 mEq 125 mL/hr Last Rate: Stopped (17)   sodium chloride 100 mL/hr Last Rate: 100 mL/hr (08/15/17 0428)       PRN Meds:  •  acetaminophen  •  acetaminophen  •  aluminum-magnesium hydroxide-simethicone  •  bisacodyl  •  bisacodyl  •  calcium carbonate  •  diphenhydrAMINE **OR** diphenhydrAMINE  •  docusate sodium  •  HYDROcodone-acetaminophen  •  ipratropium-albuterol  •  LORazepam  •  magnesium hydroxide  •  Morphine **AND** naloxone  •  Morphine **AND** naloxone  •  ondansetron **OR** ondansetron ODT **OR** ondansetron  •  oxyCODONE-acetaminophen **OR** oxyCODONE-acetaminophen  •  simethicone  •  Insert peripheral IV **AND** sodium chloride    Intake/Output:  I/O last 3 completed shifts:  In: 4825 [P.O.:200; I.V.:4295; Blood:300; Other:30]  Out: 1520 [Urine:600; Emesis/NG output:20; Blood:900]    Exam:  Vitals:    17 1355   BP: 113/58   Pulse: 111   Resp: 14   Temp:    SpO2: 100%         Physical Examination:   General appearance - alert and awake, not answering questions at this time verbally or by  nodding. NAD  Chest - decreased bibasilar breath sounds, however, not taking deep breaths when asked. No adventitious sounds Heart - tachy, regular rhythm, normal S1, S2, no murmurs, rubs, clicks or gallops  Abdomen - obese, soft, nondistended, hypoactive bowel sounds.    - keen to BSD with clear urine output  Incision- midline staples intact without erythema, or drainage. Old blood on dressing at distal end  Neurological - alert, unable to fully assess due to patient not speaking at this time.   Musculoskeletal - no deformity or swelling  Extremities - peripheral pulses normal, no pedal edema, no clubbing or cyanosis    Data Review:  Recent Results (from the past 36 hour(s))   POC Glucose Fingerstick    Collection Time: 08/15/17  6:31 AM   Result Value Ref Range    Glucose 89 70 - 130 mg/dL   POC OR Panel, ISTAT    Collection Time: 08/15/17  9:55 AM   Result Value Ref Range    Potassium 4.1 3.5 - 4.9 mmol/L    Total CO2 29 24 - 29 mmol/L    Hemoglobin 9.9 (L) 12.0 - 17.0 g/dL    Hematocrit 29 (L) 38 - 51 %    pH, Arterial 7.37 7.35 - 7.6 pH units    HCO3, Arterial 27.2 (H) 22 - 26 mmol/L    Base Excess 2.0000 -5 - 5 mmol/L    O2 Saturation, Arterial 61 (L) 95 - 98 %    pO2, Arterial 33 (L) 80 - 105 mmHg    pCO2, Arterial 47.3 (H) 35 - 45 mm Hg    Glucose 108 70 - 130 mg/dL   POC Glucose Fingerstick    Collection Time: 08/15/17 10:58 AM   Result Value Ref Range    Glucose 114 70 - 130 mg/dL   Basic Metabolic Panel    Collection Time: 08/15/17 11:49 AM   Result Value Ref Range    Glucose 120 (H) 65 - 99 mg/dL    BUN 11 8 - 23 mg/dL    Creatinine 0.56 (L) 0.57 - 1.00 mg/dL    Sodium 144 136 - 145 mmol/L    Potassium 3.7 3.5 - 5.2 mmol/L    Chloride 109 (H) 98 - 107 mmol/L    CO2 25.0 22.0 - 29.0 mmol/L    Calcium 8.6 8.6 - 10.5 mg/dL    eGFR  African Amer 133 >60 mL/min/1.73    BUN/Creatinine Ratio 19.6 7.0 - 25.0    Anion Gap 10.0 mmol/L   CBC Auto Differential    Collection Time: 08/15/17 11:49 AM   Result Value  Ref Range    WBC 17.04 (H) 4.50 - 10.70 10*3/mm3    RBC 4.03 3.90 - 5.20 10*6/mm3    Hemoglobin 8.7 (L) 11.9 - 15.5 g/dL    Hematocrit 29.8 (L) 35.6 - 45.5 %    MCV 73.9 (L) 80.5 - 98.2 fL    MCH 21.6 (L) 26.9 - 32.0 pg    MCHC 29.2 (L) 32.4 - 36.3 g/dL    RDW 18.5 (H) 11.7 - 13.0 %    RDW-SD 50.2 37.0 - 54.0 fl    MPV 9.9 6.0 - 12.0 fL    Platelets 353 140 - 500 10*3/mm3    Neutrophil % 81.9 (H) 42.7 - 76.0 %    Lymphocyte % 10.7 (L) 19.6 - 45.3 %    Monocyte % 6.4 5.0 - 12.0 %    Eosinophil % 0.6 0.3 - 6.2 %    Basophil % 0.1 0.0 - 1.5 %    Immature Grans % 0.3 0.0 - 0.5 %    Neutrophils, Absolute 13.95 (H) 1.90 - 8.10 10*3/mm3    Lymphocytes, Absolute 1.82 0.90 - 4.80 10*3/mm3    Monocytes, Absolute 1.09 0.20 - 1.20 10*3/mm3    Eosinophils, Absolute 0.11 0.00 - 0.70 10*3/mm3    Basophils, Absolute 0.02 0.00 - 0.20 10*3/mm3    Immature Grans, Absolute 0.05 (H) 0.00 - 0.03 10*3/mm3   Scan Slide    Collection Time: 08/15/17 11:49 AM   Result Value Ref Range    Anisocytosis Mod/2+ None Seen    Dacrocytes Slight/1+ None Seen    Hypochromia Mod/2+ None Seen    Ovalocytes Slight/1+ None Seen    WBC Morphology Normal Normal    Platelet Morphology Normal Normal   Prepare RBC, 2 Units    Collection Time: 08/16/17  6:00 AM   Result Value Ref Range    Product Code T8333T34     Unit Number [id number]     UNIT  ABO B     UNIT  RH POS     Dispense Status XM     Blood Type BPOS     Blood Expiration Date 201709052359     Blood Type Barcode 7300     Product Code K6842D79     Unit Number [id number]     UNIT  ABO B     UNIT  RH POS     Dispense Status PT     Blood Type BPOS     Blood Expiration Date 201709012359     Blood Type Barcode 7300            Assessment/Plan:  POD 1: CRISS/BSO  Final Path: Pending    Heme/ID-  Acute on chronic anemia, preop Hgb 8.5; post op 8.7. Stable. Continue to monitor CBC in am.   CV/Resp-  stable  GI- per nursing tolerating diet w/o N/V. No flatus/BM, awaiting return bowel function  - keen  currently in place, will d/c today  Post-op/Pain- well controlled    MD to follow    Tara Schoenbeck, APRN  8/16/2017  2:20 PM    Gynecologic Oncology Attending Physician:  History reviewed with Tara Schoenbeck, APRN.  The patient has no specific complaints this afternoon.  She answers questions with yes or no.    Physical exam: Abdomen is obese, mildly tender, few bowel sounds.  Incision-clean, dry, intact    Agree with assessment and plans as outlined above by Tara Schoenbeck, APRN.    Electronically signed by:  Ortiz Washington MD 8/16/2017 4:07 PM

## 2017-08-16 NOTE — PROGRESS NOTES
Continued Stay Note  [facility]     Patient Name: Lupe Burleson  MRN: [id number]  Today's Date: 8/16/2017    Admit Date: 8/13/2017          Discharge Plan       08/16/17 1424    Case Management/Social Work Plan    Plan Return to [facility]    Patient/Family In Agreement With Plan yes    Additional Comments Spoke with Flor - patient is from  level bed with a medicaid bedhold.  Flor sent copy of guardianship order and it is in chart.  Packet started and in cubbie.  CCP will follow.               Discharge Codes     None            Becky S. [place], RN

## 2017-08-16 NOTE — PROGRESS NOTES
DAILY PROGRESS NOTE  [facility], [facility]    2017    Patient Identification:  Name: Lupe Puente  Age: 61 y.o.  Sex: female  :  1955  MRN: [id number]           Primary Care Physician: Shane Barcenas MD    Subjective:    Interval History:    Ms. Puente is awake and alert this morning, but not responding verbally. She has productive cough, but is not clearing it and staff has been encouraging cooperation with suctioning, but she is minimally responding. Their has been no vomiting or diarrhea. She shake head no to questions related to pain.       Objective:    Scheduled Meds:    atorvastatin 20 mg Per G Tube Daily   bumetanide 2 mg Per G Tube Daily   enoxaparin 40 mg Subcutaneous Daily   isosorbide mononitrate 30 mg Per G Tube Daily   lactulose 20 g Oral Daily   pantoprazole 40 mg Oral Q AM   potassium chloride 20 mEq Oral Daily   QUEtiapine 25 mg Oral Q12H   risperiDONE 2 mg Oral Daily   risperiDONE 3 mg Oral Nightly   Valproic Acid 250 mg Per G Tube Q12H       Continuous Infusions:    lactated ringers 9 mL/hr Last Rate: 9 mL/hr (08/15/17 0646)   sodium chloride 0.45 % with KCl 20 mEq 125 mL/hr Last Rate: 125 mL/hr (17 1456)   sodium chloride 100 mL/hr Last Rate: 100 mL/hr (08/15/17 0428)       PRN Meds:  •  acetaminophen  •  acetaminophen  •  aluminum-magnesium hydroxide-simethicone  •  bisacodyl  •  bisacodyl  •  calcium carbonate  •  diphenhydrAMINE **OR** diphenhydrAMINE  •  docusate sodium  •  HYDROcodone-acetaminophen  •  ipratropium-albuterol  •  LORazepam  •  magnesium hydroxide  •  Morphine **AND** naloxone  •  Morphine **AND** naloxone  •  ondansetron **OR** ondansetron ODT **OR** ondansetron  •  oxyCODONE-acetaminophen **OR** oxyCODONE-acetaminophen  •  simethicone  •  Insert peripheral IV **AND** sodium chloride    Intake/Output:    Intake/Output Summary (Last 24 hours) at 17 4331  Last data filed at 17 1456   Gross per 24 hour  "  Intake             1200 ml   Output             2500 ml   Net            -1300 ml         Exam:    tMax 24 hrs: Temp (24hrs), Av.6 °F (36.4 °C), Min:96.6 °F (35.9 °C), Max:99.1 °F (37.3 °C)    Vitals Ranges:   Temp:  [96.6 °F (35.9 °C)-99.1 °F (37.3 °C)] 99.1 °F (37.3 °C)  Heart Rate:  [105-118] 111  Resp:  [14-18] 14  BP: (112-128)/(58-88) 113/58    /58 (BP Location: Left arm, Patient Position: Lying)  Pulse 111  Temp 99.1 °F (37.3 °C) (Oral)   Resp 14  Ht 66\" (167.6 cm)  Wt 265 lb (120 kg)  SpO2 100%  BMI 42.77 kg/m2    General: Alert, passive, but not actively cooperating,  and appears stated age. In no acute distress  Neck: Supple, symmetrical, trachea midline, no adenopathy;              Thyroid without enlargement/tenderness/nodules;              no carotid bruit or JVD  Cardiovascular: Normal rate, regular rhythm and intact distal pulses.                              Exam reveals no gallop and no friction rub. No murmur heard  Pulmonary: dimnished bilaterally                        No rhonchi, wheezing or rales.   Abdominal: Soft,  non-tender; bowel sounds active all four quadrants;                      no masses,  hepatomegaly, splenomegaly. Mild SS staining of closed and intact vertical abdominal     incision.   Extremities: Normal, atraumatic, no cyanosis or edema  Pulses:         2 + symmetric all extremities  Neurological: She is alert and oriented to person, place, and time.                           CNII-XII intact, normal strength, sensation intact throughout  Skin: Skin color, texture, turgor normal, no rashes or lesions    Data Review:      Results from last 7 days  Lab Units 08/15/17  1149 08/15/17  0955 17  0646 17  1852   WBC 10*3/mm3 17.04*  --  6.45 7.04   HEMOGLOBIN g/dL 8.7*  --  8.3* 8.5*   HEMOGLOBIN, POC g/dL  --  9.9*  --   --    HEMATOCRIT % 29.8*  --  28.0* 29.9*   HEMATOCRIT POC %  --  29*  --   --    PLATELETS 10*3/mm3 353  --  330 386         Results " from last 7 days  Lab Units 08/15/17  1149 08/14/17  0646 08/13/17  1852   SODIUM mmol/L 144 144 143   POTASSIUM mmol/L 3.7 3.4* 3.7   CHLORIDE mmol/L 109* 103 101   CO2 mmol/L 25.0 34.2* 32.5*   BUN mg/dL 11 17 16   CREATININE mg/dL 0.56* 0.65 0.72   CALCIUM mg/dL 8.6 8.9 9.4   BILIRUBIN mg/dL  --   --  0.4   ALK PHOS U/L  --   --  68   ALT (SGPT) U/L  --   --  10   AST (SGOT) U/L  --   --  9   GLUCOSE mg/dL 120* 110* 95       Results from last 7 days  Lab Units 08/13/17  1852   INR  1.15*         Lab Results  Lab Value Date/Time   TROPONINT 0.065 (H) 05/17/2017 0505   TROPONINT 0.077 (H) 05/16/2017 1517   TROPONINT 0.080 (H) 05/16/2017 1112   TROPONINT 0.078 (H) 05/16/2017 0703   TROPONINT 0.084 (H) 05/16/2017 0116   TROPONINT <0.010 05/15/2016 0622   TROPONINT 0.017 05/14/2016 0437   TROPONINT 0.023 05/13/2016 2036   TROPONINT 0.031 (H) 05/13/2016 1231   TROPONINT <0.01 02/03/2016 0537   TROPONINT <0.01 02/02/2016 0914   TROPONINT <0.01 11/11/2015 0516   TROPONINT <0.01 11/09/2015 2049   TROPONINT <0.01 04/18/2014 0517       Microbiology Results (last 10 days)     Procedure Component Value - Date/Time    Urine Culture [[id number]]  (Normal) Collected:  08/13/17 2002    Lab Status:  Final result Specimen:  Urine from Urine, Catheter Updated:  08/15/17 0632     Urine Culture No growth           Imaging Results (last 7 days)     Procedure Component Value Units Date/Time    US Pelvis Complete [[id number]] Collected:  08/13/17 1902     Updated:  08/13/17 1902    Narrative:       PELVIC ULTRASOUND     HISTORY: 61-year-old nursing home patient with vaginal bleeding.     The examination was performed as an emergency procedure. The patient  refused transvaginal imaging. The uterus is enlarged and lobulated,  measuring 9.8 x 9.8 x 13.7 cm and there are multiple fibroids measuring  up to 7 cm in size. There is also generalized endometrial thickening  with double wall thickness of 1.9 cm. The patient is postmenopausal  and  this does raise a concern of endometrial tumor and further clinical  correlation is required.     The ovaries are not visualized. There is no free fluid identified.       XR Chest Post CVA Port [[id number]] Collected:  08/15/17 1148     Updated:  08/15/17 1153    Narrative:       CHEST POST CVA PORT     HISTORY: Morbidly obese 61-year-old female for right jugular line  placement.     COMPARISON: 05/16/2017     FINDINGS:  1. Right jugular line projects over the superior vena cava.  2. Imaging is considerably limited by morbid obesity, low lung volumes,  mandibular artifact obscuring detail of the right apex.     If patient has symptoms suggesting pneumothorax, follow-up lordotic  imaging or standard imaging with removal mandibular artifact would be  helpful.     This report was finalized on 8/15/2017 11:50 AM by Dr. Bryan Yadav MD.               Assessment:      Principal Problem:    Vaginal bleeding  Active Problems:    Endometrial cancer determined by uterine biopsy    Hypertension    Chronic diastolic CHF (congestive heart failure)    DM (diabetes mellitus)    Schizo affective schizophrenia    Coronary artery disease    Bipolar disorder, unspecified      Patient Active Problem List   Diagnosis Code   • Elevated troponin R79.89   • Aspiration pneumonia J69.0   • Hematuria R31.9   • Hypokalemia E87.6   • Pelvic mass in female R19.00   • Fecal impaction K56.41   • Endometrial carcinoma C54.1   • Acute on chronic respiratory failure with hypoxia and hypercapnia J96.21, J96.22   • Acute on chronic diastolic CHF (congestive heart failure) I50.33   • UTI (urinary tract infection) N39.0   • Leucocytosis D72.829   • Chronic diastolic CHF (congestive heart failure) I50.32   • DM (diabetes mellitus) E11.9   • Morbid obesity E66.01   • HTN (hypertension) I10   • Schizophrenia F20.9   • Tracheostomy malfunction J95.03   • Pyuria N39.0   • THAI (obstructive sleep apnea) G47.33   • Generalized weakness R53.1   • Schizo  affective schizophrenia F25.0   • Diabetes mellitus E11.9   • Coronary artery disease I25.10   • Endometrial cancer determined by uterine biopsy C54.1, Z15.04   • Chronic respiratory failure with hypoxia and hypercapnia J96.11, J96.12   • Toxic metabolic encephalopathy G92   • Pneumonia J18.9   • Abnormal vaginal bleeding N93.9   • Schizo affective schizophrenia F25.0   • CHF (congestive heart failure) I50.9   • Endometrial cancer determined by uterine biopsy C54.1, Z15.04   • Coronary artery disease I25.10   • Lymphedema I89.0   • Immobility Z74.09   • Acute blood loss anemia D62   • Vaginal bleeding N93.9   • Hypertension I10   • Uterine cancer C55   • Bipolar disorder, unspecified F31.9       Plan:    Continue post operatory management.  IV fluids, by mouth diet.  Monitor and correct electrolytes  Adjust insulin as needed  Monitor mental status  Encourage pulmonary toiletry- discussed with her the need to cooperate in order to avoid complications. MN  Continue home medications  PT to see pt  DVT/stress ulcer prophylaxis  Labs in       Shane Barcenas MD  8/16/2017  6:53 PM

## 2017-08-16 NOTE — PLAN OF CARE
Problem: Patient Care Overview (Adult)  Goal: Plan of Care Review  Outcome: Ongoing (interventions implemented as appropriate)    08/16/17 0609   Coping/Psychosocial Response Interventions   Plan Of Care Reviewed With patient   Outcome Evaluation   Outcome Summary/Follow up Plan Patient slept the majority of the night. Very slow to respond to questions and often won't respond. Flat facial expressions. Small amount of moist drainage on island dressing. Turned Q2hrs. IVF's infusing. VSS. Will continue to monitor.    Patient Care Overview   Progress no change

## 2017-08-16 NOTE — PLAN OF CARE
Problem: Fall Risk (Adult)  Goal: Identify Related Risk Factors and Signs and Symptoms  Outcome: Ongoing (interventions implemented as appropriate)  Goal: Absence of Falls  Outcome: Ongoing (interventions implemented as appropriate)    Problem: Tissue Perfusion, Ineffective Peripheral (Adult)  Goal: Identify Related Risk Factors and Signs and Symptoms  Outcome: Ongoing (interventions implemented as appropriate)  Goal: Adequate Tissue Perfusion  Outcome: Ongoing (interventions implemented as appropriate)    Problem: Patient Care Overview (Adult)  Goal: Plan of Care Review  Outcome: Ongoing (interventions implemented as appropriate)  Goal: Adult Individualization and Mutuality  Outcome: Ongoing (interventions implemented as appropriate)    08/16/17 5227   Mutuality/Individual Preferences   What Information Would Help Us Give You More Personalized Care? Catheter removed today. Surgical site staples intact, GLADYS. Duoneb requested PRN. VS and labs monitored.       Goal: Discharge Needs Assessment  Outcome: Ongoing (interventions implemented as appropriate)    Problem: Perioperative Period (Adult)  Goal: Signs and Symptoms of Listed Potential Problems Will be Absent or Manageable (Perioperative Period)  Outcome: Ongoing (interventions implemented as appropriate)    Problem: Infection, Risk/Actual (Adult)  Goal: Identify Related Risk Factors and Signs and Symptoms  Outcome: Ongoing (interventions implemented as appropriate)  Goal: Infection Prevention/Resolution  Outcome: Ongoing (interventions implemented as appropriate)

## 2017-08-17 LAB
ABO + RH BLD: NORMAL
ABO + RH BLD: NORMAL
ANION GAP SERPL CALCULATED.3IONS-SCNC: 9.9 MMOL/L
BASOPHILS # BLD AUTO: 0.02 10*3/MM3 (ref 0–0.2)
BASOPHILS NFR BLD AUTO: 0.2 % (ref 0–1.5)
BH BB BLOOD EXPIRATION DATE: NORMAL
BH BB BLOOD EXPIRATION DATE: NORMAL
BH BB BLOOD TYPE BARCODE: 7300
BH BB BLOOD TYPE BARCODE: 7300
BH BB DISPENSE STATUS: NORMAL
BH BB DISPENSE STATUS: NORMAL
BH BB PRODUCT CODE: NORMAL
BH BB PRODUCT CODE: NORMAL
BH BB UNIT NUMBER: NORMAL
BH BB UNIT NUMBER: NORMAL
BUN BLD-MCNC: 6 MG/DL (ref 8–23)
BUN/CREAT SERPL: 12 (ref 7–25)
CALCIUM SPEC-SCNC: 8.7 MG/DL (ref 8.6–10.5)
CHLORIDE SERPL-SCNC: 101 MMOL/L (ref 98–107)
CO2 SERPL-SCNC: 27.1 MMOL/L (ref 22–29)
CREAT BLD-MCNC: 0.5 MG/DL (ref 0.57–1)
DEPRECATED RDW RBC AUTO: 51.5 FL (ref 37–54)
EOSINOPHIL # BLD AUTO: 0.13 10*3/MM3 (ref 0–0.7)
EOSINOPHIL NFR BLD AUTO: 1.4 % (ref 0.3–6.2)
ERYTHROCYTE [DISTWIDTH] IN BLOOD BY AUTOMATED COUNT: 19.4 % (ref 11.7–13)
GFR SERPL CREATININE-BSD FRML MDRD: >150 ML/MIN/1.73
GLUCOSE BLD-MCNC: 102 MG/DL (ref 65–99)
HCT VFR BLD AUTO: 26.8 % (ref 35.6–45.5)
HGB BLD-MCNC: 8.1 G/DL (ref 11.9–15.5)
IMM GRANULOCYTES # BLD: 0.02 10*3/MM3 (ref 0–0.03)
IMM GRANULOCYTES NFR BLD: 0.2 % (ref 0–0.5)
LYMPHOCYTES # BLD AUTO: 1.16 10*3/MM3 (ref 0.9–4.8)
LYMPHOCYTES NFR BLD AUTO: 12.8 % (ref 19.6–45.3)
MCH RBC QN AUTO: 21.9 PG (ref 26.9–32)
MCHC RBC AUTO-ENTMCNC: 30.2 G/DL (ref 32.4–36.3)
MCV RBC AUTO: 72.4 FL (ref 80.5–98.2)
MONOCYTES # BLD AUTO: 1.3 10*3/MM3 (ref 0.2–1.2)
MONOCYTES NFR BLD AUTO: 14.3 % (ref 5–12)
NEUTROPHILS # BLD AUTO: 6.44 10*3/MM3 (ref 1.9–8.1)
NEUTROPHILS NFR BLD AUTO: 71.1 % (ref 42.7–76)
PLATELET # BLD AUTO: 340 10*3/MM3 (ref 140–500)
PMV BLD AUTO: 10.4 FL (ref 6–12)
POTASSIUM BLD-SCNC: 4.2 MMOL/L (ref 3.5–5.2)
RBC # BLD AUTO: 3.7 10*6/MM3 (ref 3.9–5.2)
SODIUM BLD-SCNC: 138 MMOL/L (ref 136–145)
UNIT  ABO: NORMAL
UNIT  ABO: NORMAL
UNIT  RH: NORMAL
UNIT  RH: NORMAL
WBC NRBC COR # BLD: 9.07 10*3/MM3 (ref 4.5–10.7)

## 2017-08-17 PROCEDURE — 25810000003 POTASSIUM CHLORIDE PER 2 MEQ: Performed by: OBSTETRICS & GYNECOLOGY

## 2017-08-17 PROCEDURE — 85025 COMPLETE CBC W/AUTO DIFF WBC: CPT | Performed by: NURSE PRACTITIONER

## 2017-08-17 PROCEDURE — 25010000002 MORPHINE PER 10 MG: Performed by: OBSTETRICS & GYNECOLOGY

## 2017-08-17 PROCEDURE — 94799 UNLISTED PULMONARY SVC/PX: CPT

## 2017-08-17 PROCEDURE — 80048 BASIC METABOLIC PNL TOTAL CA: CPT | Performed by: NURSE PRACTITIONER

## 2017-08-17 PROCEDURE — 25010000002 ENOXAPARIN PER 10 MG: Performed by: OBSTETRICS & GYNECOLOGY

## 2017-08-17 RX ORDER — AMMONIUM LACTATE 120 MG/G
CREAM TOPICAL EVERY 12 HOURS
Status: DISCONTINUED | OUTPATIENT
Start: 2017-08-17 | End: 2017-08-22 | Stop reason: HOSPADM

## 2017-08-17 RX ADMIN — MORPHINE SULFATE 4 MG: 4 INJECTION, SOLUTION INTRAMUSCULAR; INTRAVENOUS at 18:04

## 2017-08-17 RX ADMIN — POTASSIUM CHLORIDE AND SODIUM CHLORIDE 125 ML/HR: 450; 150 INJECTION, SOLUTION INTRAVENOUS at 14:14

## 2017-08-17 RX ADMIN — AMMONIUM LACTATE: 120 CREAM TOPICAL at 20:17

## 2017-08-17 RX ADMIN — POTASSIUM CHLORIDE AND SODIUM CHLORIDE 125 ML/HR: 450; 150 INJECTION, SOLUTION INTRAVENOUS at 06:03

## 2017-08-17 RX ADMIN — IPRATROPIUM BROMIDE AND ALBUTEROL SULFATE 3 ML: .5; 3 SOLUTION RESPIRATORY (INHALATION) at 15:00

## 2017-08-17 RX ADMIN — RISPERIDONE 2 MG: 2 TABLET ORAL at 10:28

## 2017-08-17 RX ADMIN — BUMETANIDE 2 MG: 2 TABLET ORAL at 10:29

## 2017-08-17 RX ADMIN — QUETIAPINE FUMARATE 25 MG: 25 TABLET, FILM COATED ORAL at 10:29

## 2017-08-17 RX ADMIN — IPRATROPIUM BROMIDE AND ALBUTEROL SULFATE 3 ML: .5; 3 SOLUTION RESPIRATORY (INHALATION) at 07:08

## 2017-08-17 RX ADMIN — OXYCODONE HYDROCHLORIDE AND ACETAMINOPHEN 2 TABLET: 5; 325 TABLET ORAL at 20:18

## 2017-08-17 RX ADMIN — LACTULOSE 20 G: 20 SOLUTION ORAL at 10:29

## 2017-08-17 RX ADMIN — PANTOPRAZOLE SODIUM 40 MG: 40 TABLET, DELAYED RELEASE ORAL at 06:03

## 2017-08-17 RX ADMIN — VALPROIC ACID 250 MG: 250 SOLUTION ORAL at 10:29

## 2017-08-17 RX ADMIN — RISPERIDONE 3 MG: 3 TABLET ORAL at 20:17

## 2017-08-17 RX ADMIN — VALPROIC ACID 250 MG: 250 SOLUTION ORAL at 20:17

## 2017-08-17 RX ADMIN — MORPHINE SULFATE 2 MG: 2 INJECTION, SOLUTION INTRAMUSCULAR; INTRAVENOUS at 10:08

## 2017-08-17 RX ADMIN — QUETIAPINE FUMARATE 25 MG: 25 TABLET, FILM COATED ORAL at 20:17

## 2017-08-17 RX ADMIN — POTASSIUM CHLORIDE 20 MEQ: 750 CAPSULE, EXTENDED RELEASE ORAL at 10:18

## 2017-08-17 RX ADMIN — IPRATROPIUM BROMIDE AND ALBUTEROL SULFATE 3 ML: .5; 3 SOLUTION RESPIRATORY (INHALATION) at 19:29

## 2017-08-17 RX ADMIN — OXYCODONE HYDROCHLORIDE AND ACETAMINOPHEN 2 TABLET: 5; 325 TABLET ORAL at 06:02

## 2017-08-17 RX ADMIN — ENOXAPARIN SODIUM 40 MG: 40 INJECTION SUBCUTANEOUS at 10:30

## 2017-08-17 RX ADMIN — IPRATROPIUM BROMIDE AND ALBUTEROL SULFATE 3 ML: .5; 3 SOLUTION RESPIRATORY (INHALATION) at 11:37

## 2017-08-17 RX ADMIN — MORPHINE SULFATE 4 MG: 4 INJECTION, SOLUTION INTRAMUSCULAR; INTRAVENOUS at 14:14

## 2017-08-17 RX ADMIN — ISOSORBIDE MONONITRATE 30 MG: 20 TABLET ORAL at 10:28

## 2017-08-17 RX ADMIN — ATORVASTATIN CALCIUM 20 MG: 20 TABLET, FILM COATED ORAL at 10:19

## 2017-08-17 NOTE — PROGRESS NOTES
DAILY PROGRESS NOTE  [facility], [facility]    2017    Patient Identification:  Name: Lupe Puente  Age: 61 y.o.  Sex: female  :  1955  MRN: [id number]           Primary Care Physician: Shane Barcenas MD    Subjective:    Interval History:    Ms. Puente is found sitting upright in bed, diaphoretic. Nursing reports she has been in pain, but that her R. IJ is leaking. She just successfully gave her some morphine in her right hand and she is slowly calming. She is reported to be eating and drinking and has been able to swallow pain pills, but call placed to PCP to determine if the line can be dc'd. She denies CP, SOA, N/V/D and now that morphine is starting to work she is no longer diaphoretic.       Objective:    Scheduled Meds:    atorvastatin 20 mg Per G Tube Daily   bumetanide 2 mg Per G Tube Daily   enoxaparin 40 mg Subcutaneous Daily   ipratropium-albuterol 3 mL Nebulization 4x Daily - RT   isosorbide mononitrate 30 mg Per G Tube Daily   lactulose 20 g Oral Daily   pantoprazole 40 mg Oral Q AM   potassium chloride 20 mEq Oral Daily   QUEtiapine 25 mg Oral Q12H   risperiDONE 2 mg Oral Daily   risperiDONE 3 mg Oral Nightly   Valproic Acid 250 mg Per G Tube Q12H       Continuous Infusions:    lactated ringers 9 mL/hr Last Rate: 9 mL/hr (08/15/17 0646)   Pharmacy Consult     sodium chloride 0.45 % with KCl 20 mEq 125 mL/hr Last Rate: Stopped (17 1031)   sodium chloride 100 mL/hr Last Rate: 100 mL/hr (08/15/17 0428)       PRN Meds:  •  acetaminophen  •  acetaminophen  •  aluminum-magnesium hydroxide-simethicone  •  bisacodyl  •  bisacodyl  •  calcium carbonate  •  diphenhydrAMINE **OR** diphenhydrAMINE  •  docusate sodium  •  HYDROcodone-acetaminophen  •  ipratropium-albuterol  •  LORazepam  •  magnesium hydroxide  •  Morphine **AND** naloxone  •  Morphine **AND** naloxone  •  ondansetron **OR** ondansetron ODT **OR** ondansetron  •   "oxyCODONE-acetaminophen **OR** oxyCODONE-acetaminophen  •  Pharmacy Consult  •  simethicone  •  Insert peripheral IV **AND** sodium chloride    Intake/Output:    Intake/Output Summary (Last 24 hours) at 17 1335  Last data filed at 17 1008   Gross per 24 hour   Intake             2180 ml   Output             2100 ml   Net               80 ml         Exam:    tMax 24 hrs: Temp (24hrs), Av.5 °F (36.4 °C), Min:97.3 °F (36.3 °C), Max:97.6 °F (36.4 °C)    Vitals Ranges:   Temp:  [97.3 °F (36.3 °C)-97.6 °F (36.4 °C)] 97.3 °F (36.3 °C)  Heart Rate:  [] 109  Resp:  [14-20] 20  BP: ()/(58-78) 108/69    /69 (BP Location: Left arm, Patient Position: Lying)  Pulse 109  Temp 97.3 °F (36.3 °C) (Oral)   Resp 20  Ht 66\" (167.6 cm)  Wt 262 lb 8 oz (119 kg)  SpO2 97%  BMI 42.37 kg/m2    General: Alert, very slow to respond, but following commands and cooperating with nursing.+ In no acute distress  Neck: Supple, symmetrical, trachea midline, no adenopathy;              Thyroid without enlargement/tenderness/nodules;              no carotid bruit or JVD  Cardiovascular: Normal rate, regular rhythm and intact distal pulses.                              Exam reveals no gallop and no friction rub. No murmur heard  Pulmonary: dimnished bilaterally                        No rhonchi, wheezing or rales.   Abdominal: Soft,  non-tender; bowel sounds active all four quadrants;                      no masses,  hepatomegaly, splenomegaly. Mild SS staining of closed and intact vertical abdominal     incision.   Extremities: Normal, atraumatic, no cyanosis or edema  Pulses:         2 + symmetric all extremities  Neurological: She is alert and oriented to person, place, and time.                           CNII-XII intact, normal strength, sensation intact throughout  Skin: Skin color, texture, turgor normal, no rashes or lesions    Data Review:      Results from last 7 days  Lab Units 17  0521 " 08/16/17 2044 08/15/17  1149   WBC 10*3/mm3 9.07 10.13 17.04*   HEMOGLOBIN g/dL 8.1* 8.9* 8.7*   HEMATOCRIT % 26.8* 29.5* 29.8*   PLATELETS 10*3/mm3 340 360 353         Results from last 7 days  Lab Units 08/17/17  0521 08/16/17 2038 08/15/17  1149  08/13/17  1852   SODIUM mmol/L 138 138 144  < > 143   POTASSIUM mmol/L 4.2 4.6 3.7  < > 3.7   CHLORIDE mmol/L 101 102 109*  < > 101   CO2 mmol/L 27.1 24.6 25.0  < > 32.5*   BUN mg/dL 6* 7* 11  < > 16   CREATININE mg/dL 0.50* 0.61 0.56*  < > 0.72   CALCIUM mg/dL 8.7 9.3 8.6  < > 9.4   BILIRUBIN mg/dL  --   --   --   --  0.4   ALK PHOS U/L  --   --   --   --  68   ALT (SGPT) U/L  --   --   --   --  10   AST (SGOT) U/L  --   --   --   --  9   GLUCOSE mg/dL 102* 111* 120*  < > 95   < > = values in this interval not displayed.    Results from last 7 days  Lab Units 08/13/17  1852   INR  1.15*         Lab Results  Lab Value Date/Time   TROPONINT 0.065 (H) 05/17/2017 0505   TROPONINT 0.077 (H) 05/16/2017 1517   TROPONINT 0.080 (H) 05/16/2017 1112   TROPONINT 0.078 (H) 05/16/2017 0703   TROPONINT 0.084 (H) 05/16/2017 0116   TROPONINT <0.010 05/15/2016 0622   TROPONINT 0.017 05/14/2016 0437   TROPONINT 0.023 05/13/2016 2036   TROPONINT 0.031 (H) 05/13/2016 1231   TROPONINT <0.01 02/03/2016 0537   TROPONINT <0.01 02/02/2016 0914   TROPONINT <0.01 11/11/2015 0516   TROPONINT <0.01 11/09/2015 2049   TROPONINT <0.01 04/18/2014 0517       Microbiology Results (last 10 days)     Procedure Component Value - Date/Time    Urine Culture [[id number]]  (Normal) Collected:  08/13/17 2002    Lab Status:  Final result Specimen:  Urine from Urine, Catheter Updated:  08/15/17 0632     Urine Culture No growth           Imaging Results (last 7 days)     Procedure Component Value Units Date/Time    US Pelvis Complete [[id number]] Collected:  08/13/17 1902     Updated:  08/13/17 1902    Narrative:       PELVIC ULTRASOUND     HISTORY: 61-year-old nursing home patient with vaginal bleeding.     The  examination was performed as an emergency procedure. The patient  refused transvaginal imaging. The uterus is enlarged and lobulated,  measuring 9.8 x 9.8 x 13.7 cm and there are multiple fibroids measuring  up to 7 cm in size. There is also generalized endometrial thickening  with double wall thickness of 1.9 cm. The patient is postmenopausal and  this does raise a concern of endometrial tumor and further clinical  correlation is required.     The ovaries are not visualized. There is no free fluid identified.       XR Chest Post CVA Port [[id number]] Collected:  08/15/17 1148     Updated:  08/15/17 1153    Narrative:       CHEST POST CVA PORT     HISTORY: Morbidly obese 61-year-old female for right jugular line  placement.     COMPARISON: 05/16/2017     FINDINGS:  1. Right jugular line projects over the superior vena cava.  2. Imaging is considerably limited by morbid obesity, low lung volumes,  mandibular artifact obscuring detail of the right apex.     If patient has symptoms suggesting pneumothorax, follow-up lordotic  imaging or standard imaging with removal mandibular artifact would be  helpful.     This report was finalized on 8/15/2017 11:50 AM by Dr. Bryan Yadav MD.               Assessment:      Principal Problem:    Vaginal bleeding  Active Problems:    Endometrial cancer determined by uterine biopsy    Hypertension    Chronic diastolic CHF (congestive heart failure)    DM (diabetes mellitus)    Schizo affective schizophrenia    Coronary artery disease    Bipolar disorder, unspecified  s/p CRISS SBO    Patient Active Problem List   Diagnosis Code   • Elevated troponin R79.89   • Aspiration pneumonia J69.0   • Hematuria R31.9   • Hypokalemia E87.6   • Pelvic mass in female R19.00   • Fecal impaction K56.41   • Endometrial carcinoma C54.1   • Acute on chronic respiratory failure with hypoxia and hypercapnia J96.21, J96.22   • Acute on chronic diastolic CHF (congestive heart failure) I50.33   • UTI (urinary  tract infection) N39.0   • Leucocytosis D72.829   • Chronic diastolic CHF (congestive heart failure) I50.32   • DM (diabetes mellitus) E11.9   • Morbid obesity E66.01   • HTN (hypertension) I10   • Schizophrenia F20.9   • Tracheostomy malfunction J95.03   • Pyuria N39.0   • THAI (obstructive sleep apnea) G47.33   • Generalized weakness R53.1   • Schizo affective schizophrenia F25.0   • Diabetes mellitus E11.9   • Coronary artery disease I25.10   • Endometrial cancer determined by uterine biopsy C54.1, Z15.04   • Chronic respiratory failure with hypoxia and hypercapnia J96.11, J96.12   • Toxic metabolic encephalopathy G92   • Pneumonia J18.9   • Abnormal vaginal bleeding N93.9   • Schizo affective schizophrenia F25.0   • CHF (congestive heart failure) I50.9   • Endometrial cancer determined by uterine biopsy C54.1, Z15.04   • Coronary artery disease I25.10   • Lymphedema I89.0   • Immobility Z74.09   • Acute blood loss anemia D62   • Vaginal bleeding N93.9   • Hypertension I10   • Uterine cancer C55   • Bipolar disorder, unspecified F31.9       Plan:    Continue post operatory management.  IV fluids, by mouth diet. MN, O2  Check CXR, coughing, some SOB.  Monitor and correct electrolytes  Adjust insulin as needed  Monitor mental status  Encourage pulmonary toiletry  Continue home medications  PT to see pt  DVT/stress ulcer prophylaxis  Labs in       Shane Barcenas MD  8/17/2017  1:35 PM

## 2017-08-17 NOTE — PROGRESS NOTES
"DAILY PROGRESS NOTE    Patient Identification:  Name: Lupe Burleson  Age: 61 y.o.  Sex: Female  :  1955  MRN:[id number]    Cheif complaint:  Chief Complaint   Patient presents with   • Vaginal Bleeding     Post-op    Subjective:  Patient states \"yes\" when asked if she has pain and if pain is in abdomen.      Objective:  Scheduled Meds:    ammonium lactate  Topical Q12H   atorvastatin 20 mg Per G Tube Daily   bumetanide 2 mg Per G Tube Daily   enoxaparin 40 mg Subcutaneous Daily   ipratropium-albuterol 3 mL Nebulization 4x Daily - RT   isosorbide mononitrate 30 mg Per G Tube Daily   lactulose 20 g Oral Daily   pantoprazole 40 mg Oral Q AM   potassium chloride 20 mEq Oral Daily   QUEtiapine 25 mg Oral Q12H   risperiDONE 2 mg Oral Daily   risperiDONE 3 mg Oral Nightly   Valproic Acid 250 mg Per G Tube Q12H       Continuous Infusions:    lactated ringers 9 mL/hr Last Rate: 9 mL/hr (08/15/17 0646)   Pharmacy Consult     sodium chloride 0.45 % with KCl 20 mEq 125 mL/hr Last Rate: 125 mL/hr (17 1414)   sodium chloride 100 mL/hr Last Rate: 100 mL/hr (08/15/17 0428)       PRN Meds:  •  acetaminophen  •  acetaminophen  •  aluminum-magnesium hydroxide-simethicone  •  bisacodyl  •  bisacodyl  •  calcium carbonate  •  diphenhydrAMINE **OR** diphenhydrAMINE  •  docusate sodium  •  HYDROcodone-acetaminophen  •  ipratropium-albuterol  •  LORazepam  •  magnesium hydroxide  •  Morphine **AND** naloxone  •  Morphine **AND** naloxone  •  ondansetron **OR** ondansetron ODT **OR** ondansetron  •  oxyCODONE-acetaminophen **OR** oxyCODONE-acetaminophen  •  Pharmacy Consult  •  simethicone  •  Insert peripheral IV **AND** sodium chloride    Intake/Output:  I/O last 3 completed shifts:  In: 2350 [P.O.:350; I.V.:2000]  Out: 2500 [Urine:2500]    Exam:  Vitals:    17 1500   BP: (!) 80/51   Pulse: 111   Resp: 16   Temp:    SpO2: 96%         Physical Examination:   General appearance - alert and awake. Answering questions " "with \"yes\" or \"no\" answers today. NAD  Chest - decreased bibasilar breath sounds, however, breaths are shallow. No adventitious sounds   Heart - tachy, regular rhythm, normal S1, S2, no murmurs, rubs, clicks or gallops  Abdomen - obese, soft, nondistended, bowel sounds present throughout.   Incision- midline staples intact without erythema. Small amount blood oozing at middle of incision  Neurological - alert, unable to fully assess    Musculoskeletal - no deformity or swelling  Extremities - peripheral pulses normal, 1-2+ edema, dark scaley patches on skin, heal protectors in place    Data Review:  Recent Results (from the past 36 hour(s))   POC Glucose Fingerstick    Collection Time: 08/16/17  8:07 PM   Result Value Ref Range    Glucose 115 70 - 130 mg/dL   Basic Metabolic Panel    Collection Time: 08/16/17  8:38 PM   Result Value Ref Range    Glucose 111 (H) 65 - 99 mg/dL    BUN 7 (L) 8 - 23 mg/dL    Creatinine 0.61 0.57 - 1.00 mg/dL    Sodium 138 136 - 145 mmol/L    Potassium 4.6 3.5 - 5.2 mmol/L    Chloride 102 98 - 107 mmol/L    CO2 24.6 22.0 - 29.0 mmol/L    Calcium 9.3 8.6 - 10.5 mg/dL    eGFR  African Amer 120 >60 mL/min/1.73    BUN/Creatinine Ratio 11.5 7.0 - 25.0    Anion Gap 11.4 mmol/L   CBC (No Diff)    Collection Time: 08/16/17  8:44 PM   Result Value Ref Range    WBC 10.13 4.50 - 10.70 10*3/mm3    RBC 4.01 3.90 - 5.20 10*6/mm3    Hemoglobin 8.9 (L) 11.9 - 15.5 g/dL    Hematocrit 29.5 (L) 35.6 - 45.5 %    MCV 73.6 (L) 80.5 - 98.2 fL    MCH 22.2 (L) 26.9 - 32.0 pg    MCHC 30.2 (L) 32.4 - 36.3 g/dL    RDW 19.2 (H) 11.7 - 13.0 %    RDW-SD 51.0 37.0 - 54.0 fl    MPV 10.5 6.0 - 12.0 fL    Platelets 360 140 - 500 10*3/mm3   Basic Metabolic Panel    Collection Time: 08/17/17  5:21 AM   Result Value Ref Range    Glucose 102 (H) 65 - 99 mg/dL    BUN 6 (L) 8 - 23 mg/dL    Creatinine 0.50 (L) 0.57 - 1.00 mg/dL    Sodium 138 136 - 145 mmol/L    Potassium 4.2 3.5 - 5.2 mmol/L    Chloride 101 98 - 107 mmol/L    CO2 " 27.1 22.0 - 29.0 mmol/L    Calcium 8.7 8.6 - 10.5 mg/dL    eGFR  African Amer >150 >60 mL/min/1.73    BUN/Creatinine Ratio 12.0 7.0 - 25.0    Anion Gap 9.9 mmol/L   CBC Auto Differential    Collection Time: 08/17/17  5:21 AM   Result Value Ref Range    WBC 9.07 4.50 - 10.70 10*3/mm3    RBC 3.70 (L) 3.90 - 5.20 10*6/mm3    Hemoglobin 8.1 (L) 11.9 - 15.5 g/dL    Hematocrit 26.8 (L) 35.6 - 45.5 %    MCV 72.4 (L) 80.5 - 98.2 fL    MCH 21.9 (L) 26.9 - 32.0 pg    MCHC 30.2 (L) 32.4 - 36.3 g/dL    RDW 19.4 (H) 11.7 - 13.0 %    RDW-SD 51.5 37.0 - 54.0 fl    MPV 10.4 6.0 - 12.0 fL    Platelets 340 140 - 500 10*3/mm3    Neutrophil % 71.1 42.7 - 76.0 %    Lymphocyte % 12.8 (L) 19.6 - 45.3 %    Monocyte % 14.3 (H) 5.0 - 12.0 %    Eosinophil % 1.4 0.3 - 6.2 %    Basophil % 0.2 0.0 - 1.5 %    Immature Grans % 0.2 0.0 - 0.5 %    Neutrophils, Absolute 6.44 1.90 - 8.10 10*3/mm3    Lymphocytes, Absolute 1.16 0.90 - 4.80 10*3/mm3    Monocytes, Absolute 1.30 (H) 0.20 - 1.20 10*3/mm3    Eosinophils, Absolute 0.13 0.00 - 0.70 10*3/mm3    Basophils, Absolute 0.02 0.00 - 0.20 10*3/mm3    Immature Grans, Absolute 0.02 0.00 - 0.03 10*3/mm3   Prepare RBC, 2 Units    Collection Time: 08/17/17  6:29 AM   Result Value Ref Range    Product Code J3048U98     Unit Number [id number]     UNIT  ABO B     UNIT  RH POS     Dispense Status RE     Blood Type BPOS     Blood Expiration Date 201709052359     Blood Type Barcode 7300     Product Code Z2723G55     Unit Number [id number]     UNIT  ABO B     UNIT  RH POS     Dispense Status PT     Blood Type [facility]     Blood Expiration Date 201709012359     Blood Type Barcode 7300            Assessment/Plan:  POD 2: CRISS/BSO  Final Path:   Final Diagnosis   UTERUS WITH BILATERAL OVARIES AND FALLOPIAN TUBES, HYSTERECTOMY AND SALPINGO-OOPHORECTOMY:                         HIGH-GRADE, 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                          SEE SYNOPTIC REPORT (BELOW) FOR ADDITIONAL DETAILS:     SYNOPTIC REPORT  (Based on [facility] Cancer Case Summary, version January 2016):                         Specimen: Uterine corpus, cervix, right ovary, left ovary, right fallopian tube, left fallopian tube, right                                 parametrium, and left parametrium.                         Procedure: Radical hysterectomy.                         Lymph node sampling: Not performed.                         Specimen integrity: Intact hysterectomy specimen.                         Tumor site: Anterior and posterior endometrium and lower uterine segment.                         Tumor size: Indeterminate (tumor diffusely involves entirety of endometrium with extensive myometrial                                 invasion).                         Histologic type: Carcinosarcoma (malignant mixed Mullerian tumor) with heterologous differentiation,                                 serous carcinoma, and clear cell carcinoma.                          Histologic grade: Grade 3 (poorly differentiated).                         Myometrial invasion: Present and extensive.                                                Depth of invasion: Tumor invades much greater than 50% of myometrial thickness.  Tumor                                                        extends to nearly the serosal surface diffusely. Tumor extends through the uterine wall at                                                        the lower uterine segment.                         Tumor involvement of cervix: Invasion of cervical stromal connective tissue.                         Extent of involvement of other organs:                                                Right ovary: Focally involved (microscopic focus of tumor at right  ovarian hilum).                                                Left ovary: Focally involved (microscpic foci of carcinoma in paraovarian soft tissue).                                                Right fallopian tube: Not involved.                                                Left fallopian tube: Focally involved by carcinoma.                                                Right parametrium: Involved by carcinoma.                                                Left parametrium: Involved by carcinoma.                         Peritoneal ascitic fluid: Not performed/unknown.                         Margins: Indeterminate.                         Lymph-vascular invasion: Present.                         Pathologic staging:                                                  Primary tumor: pT3b.                                                Regional lymph nodes: pNX (no nodes submitted or found).                                                Distant metastasis: Not applicable.                         Additional pathologic findings:                                                 Leiomyomata.                                                Adenomyosis.                         Ancillary studies: None performed.            Heme/ID-  Acute on chronic anemia, preop Hgb 8.5; post op 8.7. Stable. Continue to monitor CBC in am.   CV/Resp-  stable  GI- per nursing tolerating diet w/o N/V. No flatus/BM, awaiting return bowel function  - creatinine 0.5; voiding in brief  Post-op/Pain- well controlled, continue prn narcotic    MD to follow    Tara Schoenbeck, APRN  8/17/2017  6:07 PM    Gynecologic Oncology Attending Physician:  History reviewed with Tara Schoenbeck, APRN.  Patient appears comfortable.  She answers affirmatively when asked if she is okay.    Physical exam: Obese, soft, mildly tender    Agree with assessment and plans as outlined above by Tara Schoenbeck, APRN.    Electronically signed by:  Ortiz Washington MD  8/17/2017 8:51 PM

## 2017-08-17 NOTE — PLAN OF CARE
Problem: Fall Risk (Adult)  Goal: Identify Related Risk Factors and Signs and Symptoms  Outcome: Ongoing (interventions implemented as appropriate)  Goal: Absence of Falls  Outcome: Ongoing (interventions implemented as appropriate)    Problem: Tissue Perfusion, Ineffective Peripheral (Adult)  Goal: Identify Related Risk Factors and Signs and Symptoms  Outcome: Ongoing (interventions implemented as appropriate)  Goal: Adequate Tissue Perfusion  Outcome: Ongoing (interventions implemented as appropriate)    Problem: Patient Care Overview (Adult)  Goal: Plan of Care Review  Outcome: Ongoing (interventions implemented as appropriate)  Goal: Adult Individualization and Mutuality  Outcome: Ongoing (interventions implemented as appropriate)  Goal: Discharge Needs Assessment  Outcome: Ongoing (interventions implemented as appropriate)    Problem: Perioperative Period (Adult)  Goal: Signs and Symptoms of Listed Potential Problems Will be Absent or Manageable (Perioperative Period)  Outcome: Ongoing (interventions implemented as appropriate)    Problem: Infection, Risk/Actual (Adult)  Goal: Identify Related Risk Factors and Signs and Symptoms  Outcome: Ongoing (interventions implemented as appropriate)  Goal: Infection Prevention/Resolution  Outcome: Ongoing (interventions implemented as appropriate)

## 2017-08-17 NOTE — PLAN OF CARE
Problem: Patient Care Overview (Adult)  Goal: Plan of Care Review  Outcome: Ongoing (interventions implemented as appropriate)    08/17/17 0412   Coping/Psychosocial Response Interventions   Plan Of Care Reviewed With patient   Outcome Evaluation   Outcome Summary/Follow up Plan VSS and on 2-3 Liters NC. Turn Q 2 hours. Very slow to respond and non-verbal at times. Incontinent of bowel and bladder. Voiding per self after keen was removed. Midline incision open to air, staples intact, with no drainage. Patient nods to pain while guarding abdomen. Will continue to monitor.    Patient Care Overview   Progress improving

## 2017-08-17 NOTE — NURSING NOTE
CWOCN requested by RN- discussed with RN- patient has had a wound on LLE which has healed however legs continue to have very dry and scaly skin. Recommend lac-hydrin lotion for BLE to provide moisture to skin.

## 2017-08-17 NOTE — PROGRESS NOTES
Continued Stay Note  [facility]     Patient Name: Lupe Burleson  MRN: [id number]  Today's Date: 8/17/2017    Admit Date: 8/13/2017          Discharge Plan       08/17/17 1302    Case Management/Social Work Plan    Plan Return to [facility] bed    Patient/Family In Agreement With Plan yes    Additional Comments Spoke with Paty Lobato.  Bed is available whenever patient is ready for dc.  [facility]               Discharge Codes     None            Mady Holguin RN

## 2017-08-17 NOTE — CONSULTS
Pharmacy Consult     Pharmacy asked to review for any medications that can cause urinary retention. Pt has keen removed yesterday and has 2 instances of urinary incontinence this morning.  After reviewing the patient's medications the only one that is know to cause urinary retention is morphine.    Thank you,  Seferino Virgen PharmD

## 2017-08-17 NOTE — PLAN OF CARE
"Problem: Patient Care Overview (Adult)  Goal: Plan of Care Review    08/17/17 1605   Coping/Psychosocial Response Interventions   Plan Of Care Reviewed With patient   Outcome Evaluation   Outcome Summary/Follow up Plan Pt pulled out Central Line today. Notified ; Pt has a flat affect and is very slow to respond. Can make needs known by nodding head to \"Yes\" or \"No\" questions. Spoke to wound care nurse regarding Amonia Lact cream for pt 's lower extermities. Turn q 2 hrs. Needs encouragement to feed self. VSS will continue to monitor. 08/17/2017 1608   Patient Care Overview   Progress improving           "

## 2017-08-17 NOTE — NURSING NOTE
Upon entering the room of the patient, this nurse noticed the patient had removed the dressing over the Central Line, and the patient was picking at it. There was clear fluid dripping from the Central Line onto the patient's chest. It was noted that the patient had been previously picking at the tracheotomy scar on her neck due to the amount of dried blood around the scar. This nurse immediately notified the Charge Nurse for assistance. During that time, the APRN arrived in  the patient's room. Upon further assessment it was discovered that the patient had pulled out the Central Line, yet the Central Line merino/stablizer that was sutured to the patient's neck was intact. To prevent infection, it was decided to remove the Central Line, Central Line merino/stablizer, and sutures. Using aseptic sterile technique, the sutures were removed, and a sterile guaze was placed and covered by a PICC line dressing. The patient was calm and cooperative during this procedure and complained of no pain. The APRN notified Dr. Barcenas of the patient's problem with the Central Line and notified this nurse to continue IV therapy using one of the established IV peripheral sites.

## 2017-08-18 ENCOUNTER — APPOINTMENT (OUTPATIENT)
Dept: GENERAL RADIOLOGY | Facility: HOSPITAL | Age: 62
End: 2017-08-18

## 2017-08-18 LAB
ANION GAP SERPL CALCULATED.3IONS-SCNC: 12.7 MMOL/L
BASOPHILS # BLD AUTO: 0.01 10*3/MM3 (ref 0–0.2)
BASOPHILS NFR BLD AUTO: 0.1 % (ref 0–1.5)
BUN BLD-MCNC: 8 MG/DL (ref 8–23)
BUN/CREAT SERPL: 15.7 (ref 7–25)
CALCIUM SPEC-SCNC: 9 MG/DL (ref 8.6–10.5)
CHLORIDE SERPL-SCNC: 101 MMOL/L (ref 98–107)
CO2 SERPL-SCNC: 26.3 MMOL/L (ref 22–29)
CREAT BLD-MCNC: 0.51 MG/DL (ref 0.57–1)
DEPRECATED RDW RBC AUTO: 51.6 FL (ref 37–54)
EOSINOPHIL # BLD AUTO: 0.07 10*3/MM3 (ref 0–0.7)
EOSINOPHIL NFR BLD AUTO: 1 % (ref 0.3–6.2)
ERYTHROCYTE [DISTWIDTH] IN BLOOD BY AUTOMATED COUNT: 19.8 % (ref 11.7–13)
GFR SERPL CREATININE-BSD FRML MDRD: 148 ML/MIN/1.73
GLUCOSE BLD-MCNC: 104 MG/DL (ref 65–99)
HCT VFR BLD AUTO: 27.8 % (ref 35.6–45.5)
HGB BLD-MCNC: 8.3 G/DL (ref 11.9–15.5)
IMM GRANULOCYTES # BLD: 0 10*3/MM3 (ref 0–0.03)
IMM GRANULOCYTES NFR BLD: 0 % (ref 0–0.5)
LYMPHOCYTES # BLD AUTO: 1.19 10*3/MM3 (ref 0.9–4.8)
LYMPHOCYTES NFR BLD AUTO: 16.9 % (ref 19.6–45.3)
MCH RBC QN AUTO: 21.5 PG (ref 26.9–32)
MCHC RBC AUTO-ENTMCNC: 29.9 G/DL (ref 32.4–36.3)
MCV RBC AUTO: 72 FL (ref 80.5–98.2)
MONOCYTES # BLD AUTO: 1.2 10*3/MM3 (ref 0.2–1.2)
MONOCYTES NFR BLD AUTO: 17 % (ref 5–12)
NEUTROPHILS # BLD AUTO: 4.57 10*3/MM3 (ref 1.9–8.1)
NEUTROPHILS NFR BLD AUTO: 65 % (ref 42.7–76)
PLATELET # BLD AUTO: 395 10*3/MM3 (ref 140–500)
PMV BLD AUTO: 10.7 FL (ref 6–12)
POTASSIUM BLD-SCNC: 4 MMOL/L (ref 3.5–5.2)
RBC # BLD AUTO: 3.86 10*6/MM3 (ref 3.9–5.2)
SODIUM BLD-SCNC: 140 MMOL/L (ref 136–145)
WBC NRBC COR # BLD: 7.04 10*3/MM3 (ref 4.5–10.7)

## 2017-08-18 PROCEDURE — 94799 UNLISTED PULMONARY SVC/PX: CPT

## 2017-08-18 PROCEDURE — 85025 COMPLETE CBC W/AUTO DIFF WBC: CPT | Performed by: NURSE PRACTITIONER

## 2017-08-18 PROCEDURE — 80048 BASIC METABOLIC PNL TOTAL CA: CPT | Performed by: NURSE PRACTITIONER

## 2017-08-18 PROCEDURE — 71020 HC CHEST PA AND LATERAL: CPT

## 2017-08-18 PROCEDURE — 92610 EVALUATE SWALLOWING FUNCTION: CPT | Performed by: SPEECH-LANGUAGE PATHOLOGIST

## 2017-08-18 PROCEDURE — 25010000002 ENOXAPARIN PER 10 MG: Performed by: OBSTETRICS & GYNECOLOGY

## 2017-08-18 RX ADMIN — AMMONIUM LACTATE: 120 CREAM TOPICAL at 09:00

## 2017-08-18 RX ADMIN — ISOSORBIDE MONONITRATE 30 MG: 20 TABLET ORAL at 12:16

## 2017-08-18 RX ADMIN — IPRATROPIUM BROMIDE AND ALBUTEROL SULFATE 3 ML: .5; 3 SOLUTION RESPIRATORY (INHALATION) at 19:15

## 2017-08-18 RX ADMIN — IPRATROPIUM BROMIDE AND ALBUTEROL SULFATE 3 ML: .5; 3 SOLUTION RESPIRATORY (INHALATION) at 06:56

## 2017-08-18 RX ADMIN — ATORVASTATIN CALCIUM 20 MG: 20 TABLET, FILM COATED ORAL at 12:15

## 2017-08-18 RX ADMIN — HYDROCODONE BITARTRATE AND ACETAMINOPHEN 1 TABLET: 5; 325 TABLET ORAL at 23:13

## 2017-08-18 RX ADMIN — POTASSIUM CHLORIDE 20 MEQ: 750 CAPSULE, EXTENDED RELEASE ORAL at 12:15

## 2017-08-18 RX ADMIN — ENOXAPARIN SODIUM 40 MG: 40 INJECTION SUBCUTANEOUS at 11:45

## 2017-08-18 RX ADMIN — RISPERIDONE 2 MG: 2 TABLET ORAL at 12:16

## 2017-08-18 RX ADMIN — RISPERIDONE 3 MG: 3 TABLET ORAL at 21:30

## 2017-08-18 RX ADMIN — QUETIAPINE FUMARATE 25 MG: 25 TABLET, FILM COATED ORAL at 12:16

## 2017-08-18 RX ADMIN — BUMETANIDE 2 MG: 2 TABLET ORAL at 12:16

## 2017-08-18 RX ADMIN — LACTULOSE 20 G: 20 SOLUTION ORAL at 12:16

## 2017-08-18 RX ADMIN — QUETIAPINE FUMARATE 25 MG: 25 TABLET, FILM COATED ORAL at 21:30

## 2017-08-18 RX ADMIN — IPRATROPIUM BROMIDE AND ALBUTEROL SULFATE 3 ML: .5; 3 SOLUTION RESPIRATORY (INHALATION) at 13:19

## 2017-08-18 RX ADMIN — AMMONIUM LACTATE: 120 CREAM TOPICAL at 23:17

## 2017-08-18 RX ADMIN — IPRATROPIUM BROMIDE AND ALBUTEROL SULFATE 3 ML: .5; 3 SOLUTION RESPIRATORY (INHALATION) at 15:30

## 2017-08-18 RX ADMIN — VALPROIC ACID 250 MG: 250 SOLUTION ORAL at 12:16

## 2017-08-18 RX ADMIN — PANTOPRAZOLE SODIUM 40 MG: 40 TABLET, DELAYED RELEASE ORAL at 06:21

## 2017-08-18 RX ADMIN — VALPROIC ACID 250 MG: 250 SOLUTION ORAL at 21:30

## 2017-08-18 RX ADMIN — OXYCODONE HYDROCHLORIDE AND ACETAMINOPHEN 2 TABLET: 5; 325 TABLET ORAL at 06:21
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
## 2017-08-18 NOTE — PLAN OF CARE
Problem: Inpatient SLP  Goal: Dysphagia- Patient will safely consume diet as per recommendation with no signs/symptoms of aspiration  Outcome: Ongoing (interventions implemented as appropriate)    08/18/17 1634   Safely Consume Diet   Safely Consume Diet- SLP, Time to Achieve by discharge

## 2017-08-18 NOTE — PLAN OF CARE
Problem: Patient Care Overview (Adult)  Goal: Plan of Care Review  Outcome: Ongoing (interventions implemented as appropriate)    08/18/17 5468   Coping/Psychosocial Response Interventions   Plan Of Care Reviewed With patient   Outcome Evaluation   Outcome Summary/Follow up Plan Patient slept the majority of the night with some c/o pain. Turn Q2H. Peg tube flushed with 30cc H20. Safety maintained. Midline incision with staples dry and intact. Rx cream applied to BLE. Adequate amount of urine output.  Patient cheeking food in right buccal. Speech consult placed. Large amounts of meat removed from inner right buccal which appears to be inflamed and swollen. Pt. Did deny pain to that area.  Patient non-verbal at times with flat facial expressions. Very slow to respond if she does verbally respond. Will nod head at times to questions. Will continue to monitor.    Patient Care Overview   Progress progress toward functional goals as expected

## 2017-08-18 NOTE — THERAPY EVALUATION
Acute Care - Speech Language Pathology   Swallow Initial Evaluation [facility]     Patient Name: Lupe Burleson  : 1955  MRN: [id number]  Today's Date: 2017  Onset of Illness/Injury or Date of Surgery Date: 17            Admit Date: 2017    SPEECH-LANGUAGE PATHOLOGY EVALUATION - SWALLOW  Subjective: The patient was seen on this date for a Clinical Swallow evaluation.  Patient was alert and cooperative.    The patient's history is significant for schizophrenia, bipolar d/o, hysterectomy for endometrial cancer, stroke.  Pt on regular diet and with current PEG tube at skilled facility.  Pt found to have pocketed meat in left buccal cavity and bilateral infiltrates/effusions on CXR.   Objective: Textures given included thin liquid, puree consistency and mechanical soft consistency.  Assessment: Difficulties were noted with mechanical soft consistency, characterized by bilateral buccal pocketing, greater on left.  Slow oral preparation and transit of puree.  SLP Findings:  Patient presents with mild to moderate oropharyngeal dysphagia, without esophageal component.   Recommendations: Diet Textures: thin liquid, puree consistency food.  Medications should be taken crushed with puree Recommended Strategies: Upright for PO and small bites and sips. Oral care before breakfast, after all meals and PRN.  Will f/u for diet tolerance and ability to upgrade diet      Visit Dx:     ICD-10-CM ICD-9-CM   1. Vaginal bleeding N93.9 623.8   2. Endometrial cancer C54.1 182.0   3. Blood loss anemia, chronic D50.0 280.0   4. Uterine leiomyoma, unspecified location D25.9 218.9   5. Schizophrenia, unspecified type F20.9    6. Uterine cancer C55 179     Patient Active Problem List   Diagnosis   • Elevated troponin   • Aspiration pneumonia   • Hematuria   • Hypokalemia   • Pelvic mass in female   • Fecal impaction   • Endometrial carcinoma   • Acute on chronic respiratory failure with hypoxia and hypercapnia   •  Acute on chronic diastolic CHF (congestive heart failure)   • UTI (urinary tract infection)   • Leucocytosis   • Chronic diastolic CHF (congestive heart failure)   • DM (diabetes mellitus)   • Morbid obesity   • HTN (hypertension)   • Schizophrenia   • Tracheostomy malfunction   • Pyuria   • THAI (obstructive sleep apnea)   • Generalized weakness   • Schizo affective schizophrenia   • Diabetes mellitus   • Coronary artery disease   • Endometrial cancer determined by uterine biopsy   • Chronic respiratory failure with hypoxia and hypercapnia   • Toxic metabolic encephalopathy   • Pneumonia   • Abnormal vaginal bleeding   • Schizo affective schizophrenia   • CHF (congestive heart failure)   • Endometrial cancer determined by uterine biopsy   • Coronary artery disease   • Lymphedema   • Immobility   • Acute blood loss anemia   • Vaginal bleeding   • Hypertension   • Uterine cancer   • Bipolar disorder, unspecified     Past Medical History:   Diagnosis Date   • Acute respiratory distress syndrome    • Arthritis    • Bipolar disorder, unspecified    • CAD (coronary artery disease)    • Cellulitis    • Cellulitis    • CHF (congestive heart failure)    • Chronic ischemic heart disease    • Chronic respiratory failure with hypoxia and hypercapnia    • Chronic ulcer of calf    • Constipation    • Coronary artery disease    • Depression    • Depressive disorder    • Diabetes mellitus    • Dysphagia    • Dysphagia    • Endometrial cancer determined by uterine biopsy     s/p radiation; no improvement   • Endometrial hyperplasia    • History of transfusion    • Hypercapnia    • Hypertension    • Immobility    • Lack of coordination    • Lymphedema    • Malignant neoplasm of uterus    • Muscle weakness    • Obesities, morbid    • Oxygen dependent    • Post-menopausal bleeding    • Postmenopausal bleeding    • Schizo affective schizophrenia    • Skin ulcer    • Sleep apnea    • Stroke    • Symbolic dysfunction    • Uterine cancer     • Venous insufficiency    • Venous insufficiency      Past Surgical History:   Procedure Laterality Date   • D&C HYSTEROSCOPY     • LAPAROSCOPIC TUBAL LIGATION     • TOTAL LAPAROSCOPIC HYSTERECTOMY Bilateral 8/15/2017    Procedure: OPEN TOTAL  ABDOMINAL HYSTERECTOMY WITH BILATERAL SALPINGO-OOPHERECTOMY;  Surgeon: Ortiz Washington MD;  Location: [facility];  Service:    • TRACHEOSTOMY AND PEG TUBE INSERTION     • UMBILICAL HERNIA REPAIR     • WOUND DEBRIDEMENT            SWALLOW EVALUATION (last 72 hours)      Swallow Evaluation       08/18/17 1300                Clinical Impression    Patient's Goals For Discharge patient did not state  -        SLP Swallowing Diagnosis oral dysfunction;pharyngeal dysfunction  -        Rehab Potential/Prognosis, Swallowing fair, will monitor progress closely  -        Criteria for Skilled Therapeutic Interventions Met skilled criteria for dysphagia intervention met  -        Therapy Frequency PRN  -SA        Predicted Duration Therapy Interv (days) until discharge  -        SLP Diet Recommendation II - pureed;thin liquids  -        Recommended Feeding/Eating Techniques maintain upright posture during/after eating for 30 mins;small sips/bites  -        SLP Rec. for Method of Medication Administration meds crushed in pudding/applesauce;meds via alternate route  -        Monitor For Signs Of Aspiration cough;gurgly voice  -        Anticipated Discharge Disposition skilled nursing [facility]  -          User Key  (r) = Recorded By, (t) = Taken By, (c) = Cosigned By    Initials Name Effective Dates     Agueda Ambriz MS [facility]-SLP 04/13/15 -         EDUCATION  The patient has been educated in the following areas:   Dysphagia (Swallowing Impairment) Modified Diet Instruction.    SLP Recommendation and Plan  SLP Swallowing Diagnosis: oral dysfunction, pharyngeal dysfunction  SLP Diet Recommendation: II - pureed, thin liquids  Recommended Feeding/Eating Techniques:  maintain upright posture during/after eating for 30 mins, small sips/bites  SLP Rec. for Method of Medication Administration: meds crushed in pudding/applesauce, meds via alternate route  Monitor For Signs Of Aspiration: cough, gurgly voice     Criteria for Skilled Therapeutic Interventions Met: skilled criteria for dysphagia intervention met  Anticipated Discharge Disposition: [facility] nursing facility  Rehab Potential/Prognosis, Swallowing: fair, will monitor progress closely  Therapy Frequency: PRN                        IP SLP Goals       08/18/17 1634          Safely Consume Diet    Safely Consume Diet- SLP, Time to Achieve by discharge  -        User Key  (r) = Recorded By, (t) = Taken By, (c) = Cosigned By    Initials Name Provider Type    SA Agueda Ambriz MS CCC-SLP Speech and Language Pathologist             SLP Outcome Measures (last 72 hours)      SLP Outcome Measures       08/18/17 1600          SLP Outcome Measures    Outcome Measure Used? Adult [facility]      FCM Scores    Swallowing FCM Score 4  -        User Key  (r) = Recorded By, (t) = Taken By, (c) = Cosigned By    Initials Name Effective Dates    SA Agueda Ambriz MS CCC-SLP 04/13/15 -            Time Calculation:         Time Calculation- SLP       08/18/17 1635          Time Calculation- SLP    SLP Start Time 1200  -      SLP Stop Time 1300  -      SLP Time Calculation (min) 60 min  -      SLP Received On 08/18/17  -        User Key  (r) = Recorded By, (t) = Taken By, (c) = Cosigned By    Initials Name Provider Type    SA Agueda Ambriz MS CCC-SLP Speech and Language Pathologist          Therapy Charges for Today     Code Description Service Date Service Provider Modifiers Qty    [id number]  ST EVAL ORAL PHARYNG SWALLOW 4 8/18/2017 Agueda Ambriz MS CCC-SLP GN 1               MS AMARI Cowan  8/18/2017

## 2017-08-18 NOTE — PLAN OF CARE
Problem: Fall Risk (Adult)  Goal: Identify Related Risk Factors and Signs and Symptoms  Outcome: Ongoing (interventions implemented as appropriate)  Goal: Absence of Falls  Outcome: Ongoing (interventions implemented as appropriate)    Problem: Tissue Perfusion, Ineffective Peripheral (Adult)  Goal: Identify Related Risk Factors and Signs and Symptoms  Outcome: Ongoing (interventions implemented as appropriate)  Goal: Adequate Tissue Perfusion  Outcome: Ongoing (interventions implemented as appropriate)    Problem: Patient Care Overview (Adult)  Goal: Plan of Care Review    08/18/17 0318 08/18/17 1700   Coping/Psychosocial Response Interventions   Plan Of Care Reviewed With patient --    Outcome Evaluation   Outcome Summary/Follow up Plan --  Patient has slept most of the day. Q2hour turn. Peg tube flushed with 30cc of water, and used to give medications. Midline incision with staples dry and intact. Speech evaluated pt and recommend pureed diet. Will continue to monitor.   Patient Care Overview   Progress --  no change         Problem: Infection, Risk/Actual (Adult)  Goal: Infection Prevention/Resolution  Outcome: Ongoing (interventions implemented as appropriate)

## 2017-08-18 NOTE — PROGRESS NOTES
DAILY PROGRESS NOTE    Patient Identification:  Name: Lupe Burleson  Age: 61 y.o.  Sex: Female  :  1955  MRN:[id number]    Che complaint:  Chief Complaint   Patient presents with   • Vaginal Bleeding     Post-op    Subjective:  Patient is awake and responsive (although quite slow) this afternoon.  She complains of pain in her abdomen.  She says that she has not passed gas or had a bowel movement.     Objective:  Scheduled Meds:    ammonium lactate  Topical Q12H   atorvastatin 20 mg Per G Tube Daily   bumetanide 2 mg Per G Tube Daily   enoxaparin 40 mg Subcutaneous Daily   ipratropium-albuterol 3 mL Nebulization 4x Daily - RT   isosorbide mononitrate 30 mg Per G Tube Daily   lactulose 20 g Oral Daily   pantoprazole 40 mg Oral Q AM   potassium chloride 20 mEq Oral Daily   QUEtiapine 25 mg Oral Q12H   risperiDONE 2 mg Oral Daily   risperiDONE 3 mg Oral Nightly   Valproic Acid 250 mg Per G Tube Q12H       Continuous Infusions:    lactated ringers 9 mL/hr Last Rate: 9 mL/hr (08/15/17 0646)   Pharmacy Consult     sodium chloride 100 mL/hr Last Rate: 100 mL/hr (08/15/17 0428)       PRN Meds:  •  acetaminophen  •  acetaminophen  •  aluminum-magnesium hydroxide-simethicone  •  bisacodyl  •  bisacodyl  •  calcium carbonate  •  diphenhydrAMINE **OR** diphenhydrAMINE  •  docusate sodium  •  HYDROcodone-acetaminophen  •  ipratropium-albuterol  •  LORazepam  •  magnesium hydroxide  •  Morphine **AND** naloxone  •  Morphine **AND** naloxone  •  ondansetron **OR** ondansetron ODT **OR** ondansetron  •  oxyCODONE-acetaminophen **OR** oxyCODONE-acetaminophen  •  Pharmacy Consult  •  simethicone  •  Insert peripheral IV **AND** sodium chloride    Intake/Output:  I/O last 3 completed shifts:  In: 2210 [P.O.:150; I.V.:2000; Other:60]  Out: -     Exam:  Vitals:    17 1344   BP: 117/78   Pulse:    Resp: 18   Temp: 99.5 °F (37.5 °C)   SpO2: 97%         Physical Examination:   General appearance - alert and awake.  "Answering questions with \"yes\" or \"no\" answers today. NAD  Chest - decreased bibasilar breath sounds, however, breaths are shallow. Scattered rhonchi, especially on the right side  Heart - tachy, regular rhythm, normal S1, S2, no murmurs, rubs, clicks or gallops  Abdomen - obese, softly distended, hypoactive bowel sounds.   Incision- midline staples intact. Small amount of blood at distal aspect of incision.   Neurological - alert, unable to fully assess    Musculoskeletal - no deformity or swelling  Extremities - peripheral pulses normal, 1-2+ edema, dark scaley patches on skin, heal protectors in place    Data Review:  Recent Results (from the past 36 hour(s))   Basic Metabolic Panel    Collection Time: 08/17/17  5:21 AM   Result Value Ref Range    Glucose 102 (H) 65 - 99 mg/dL    BUN 6 (L) 8 - 23 mg/dL    Creatinine 0.50 (L) 0.57 - 1.00 mg/dL    Sodium 138 136 - 145 mmol/L    Potassium 4.2 3.5 - 5.2 mmol/L    Chloride 101 98 - 107 mmol/L    CO2 27.1 22.0 - 29.0 mmol/L    Calcium 8.7 8.6 - 10.5 mg/dL    eGFR  African Amer >150 >60 mL/min/1.73    BUN/Creatinine Ratio 12.0 7.0 - 25.0    Anion Gap 9.9 mmol/L   CBC Auto Differential    Collection Time: 08/17/17  5:21 AM   Result Value Ref Range    WBC 9.07 4.50 - 10.70 10*3/mm3    RBC 3.70 (L) 3.90 - 5.20 10*6/mm3    Hemoglobin 8.1 (L) 11.9 - 15.5 g/dL    Hematocrit 26.8 (L) 35.6 - 45.5 %    MCV 72.4 (L) 80.5 - 98.2 fL    MCH 21.9 (L) 26.9 - 32.0 pg    MCHC 30.2 (L) 32.4 - 36.3 g/dL    RDW 19.4 (H) 11.7 - 13.0 %    RDW-SD 51.5 37.0 - 54.0 fl    MPV 10.4 6.0 - 12.0 fL    Platelets 340 140 - 500 10*3/mm3    Neutrophil % 71.1 42.7 - 76.0 %    Lymphocyte % 12.8 (L) 19.6 - 45.3 %    Monocyte % 14.3 (H) 5.0 - 12.0 %    Eosinophil % 1.4 0.3 - 6.2 %    Basophil % 0.2 0.0 - 1.5 %    Immature Grans % 0.2 0.0 - 0.5 %    Neutrophils, Absolute 6.44 1.90 - 8.10 10*3/mm3    Lymphocytes, Absolute 1.16 0.90 - 4.80 10*3/mm3    Monocytes, Absolute 1.30 (H) 0.20 - 1.20 10*3/mm3    " Eosinophils, Absolute 0.13 0.00 - 0.70 10*3/mm3    Basophils, Absolute 0.02 0.00 - 0.20 10*3/mm3    Immature Grans, Absolute 0.02 0.00 - 0.03 10*3/mm3   Prepare RBC, 2 Units    Collection Time: 08/17/17  6:29 AM   Result Value Ref Range    Product Code D2831V59     Unit Number [id number]     UNIT  ABO B     UNIT  RH POS     Dispense Status RE     Blood Type BPOS     Blood Expiration Date 201709052359     Blood Type Barcode 7300     Product Code Z7666R62     Unit Number [id number]     UNIT  ABO B     UNIT  RH POS     Dispense Status PT     Blood Type BPOS     Blood Expiration Date 201709012359     Blood Type Barcode 7300    Basic Metabolic Panel    Collection Time: 08/18/17  5:54 AM   Result Value Ref Range    Glucose 104 (H) 65 - 99 mg/dL    BUN 8 8 - 23 mg/dL    Creatinine 0.51 (L) 0.57 - 1.00 mg/dL    Sodium 140 136 - 145 mmol/L    Potassium 4.0 3.5 - 5.2 mmol/L    Chloride 101 98 - 107 mmol/L    CO2 26.3 22.0 - 29.0 mmol/L    Calcium 9.0 8.6 - 10.5 mg/dL    eGFR  African Amer 148 >60 mL/min/1.73    BUN/Creatinine Ratio 15.7 7.0 - 25.0    Anion Gap 12.7 mmol/L   CBC Auto Differential    Collection Time: 08/18/17  5:54 AM   Result Value Ref Range    WBC 7.04 4.50 - 10.70 10*3/mm3    RBC 3.86 (L) 3.90 - 5.20 10*6/mm3    Hemoglobin 8.3 (L) 11.9 - 15.5 g/dL    Hematocrit 27.8 (L) 35.6 - 45.5 %    MCV 72.0 (L) 80.5 - 98.2 fL    MCH 21.5 (L) 26.9 - 32.0 pg    MCHC 29.9 (L) 32.4 - 36.3 g/dL    RDW 19.8 (H) 11.7 - 13.0 %    RDW-SD 51.6 37.0 - 54.0 fl    MPV 10.7 6.0 - 12.0 fL    Platelets 395 140 - 500 10*3/mm3    Neutrophil % 65.0 42.7 - 76.0 %    Lymphocyte % 16.9 (L) 19.6 - 45.3 %    Monocyte % 17.0 (H) 5.0 - 12.0 %    Eosinophil % 1.0 0.3 - 6.2 %    Basophil % 0.1 0.0 - 1.5 %    Immature Grans % 0.0 0.0 - 0.5 %    Neutrophils, Absolute 4.57 1.90 - 8.10 10*3/mm3    Lymphocytes, Absolute 1.19 0.90 - 4.80 10*3/mm3    Monocytes, Absolute 1.20 0.20 - 1.20 10*3/mm3    Eosinophils, Absolute 0.07 0.00 - 0.70  10*3/mm3    Basophils, Absolute 0.01 0.00 - 0.20 10*3/mm3    Immature Grans, Absolute 0.00 0.00 - 0.03 10*3/mm3       PA and lateral CXR:  The right internal jugular central line has been removed. The study is  hampered by the patient's body habitus, patient positioning and  inspiratory effort. There is bibasilar atelectasis/infiltrate more  prominent on the left, similar to slightly less prominent as compared to  prior examination. The pulmonary interstitial vascular prominence which  was present previously is less prominent on the current examination. On  the lateral projection bilateral pleural effusions are appreciated,  moderate in size and there is moderate posterior atelectasis/infiltrate  bilaterally. The above information was called to patient's nurse who is  to  relay the information to the clinical service.    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                          SEE SYNOPTIC REPORT (BELOW) FOR ADDITIONAL DETAILS:     SYNOPTIC REPORT  (Based on [facility] Cancer Case Summary, version January 2016):                         Specimen: Uterine corpus, cervix, right ovary, left ovary, right fallopian tube, left fallopian tube, right                                 parametrium, and left parametrium.                         Procedure: Radical hysterectomy.                         Lymph node sampling: Not performed.                         Specimen integrity: Intact hysterectomy specimen.                         Tumor site: Anterior and posterior endometrium and lower uterine segment.                          Tumor size: Indeterminate (tumor diffusely involves entirety of endometrium with extensive myometrial                                 invasion).                         Histologic type: Carcinosarcoma (malignant mixed Mullerian tumor) with heterologous differentiation,                                 serous carcinoma, and clear cell carcinoma.                          Histologic grade: Grade 3 (poorly differentiated).                         Myometrial invasion: Present and extensive.                                                Depth of invasion: Tumor invades much greater than 50% of myometrial thickness.  Tumor                                                        extends to nearly the serosal surface diffusely. Tumor extends through the uterine wall at                                                        the lower uterine segment.                         Tumor involvement of cervix: Invasion of cervical stromal connective tissue.                         Extent of involvement of other organs:                                                Right ovary: Focally involved (microscopic focus of tumor at right ovarian hilum).                                                Left ovary: Focally involved (microscpic foci of carcinoma in paraovarian soft tissue).                                                Right fallopian tube: Not involved.                                                Left fallopian tube: Focally involved by carcinoma.                                                Right parametrium: Involved by carcinoma.                                                Left parametrium: Involved by carcinoma.                         Peritoneal ascitic fluid: Not performed/unknown.                         Margins: Indeterminate.                         Lymph-vascular invasion: Present.                         Pathologic staging:                                                  Primary tumor: pT3b.                                                 Regional lymph nodes: pNX (no nodes submitted or found).                                                Distant metastasis: Not applicable.                         Additional pathologic findings:                                                 Leiomyomata.                                                Adenomyosis.                         Ancillary studies: None performed.            Heme/ID-  Acute on chronic anemia, preop Hgb 8.5; post op remains stable. WBC wnl. Continue to monitor CBC in am.   CV/Resp-  Stable. O2 sat 100% on 1 L per nc. CXR reviewed. Defer to primary team for pulmonary management.  GI- no emesis or bowel movement per nursing. Awaiting return bowel function  - creatinine 0.5; patient historically incontinent   Post-op/Pain- well controlled, continue prn narcotic    MD to follow    Tara Schoenbeck, APRN  8/18/2017  3:27 PM    Gynecologic Oncology Attending Physician:  History reviewed with Tara Schoenbeck, APRN.  Patient is awake and responsive this afternoon, although she remains quite slow    Physical exam: Heart-regular rate and rhythm.  Lungs-coarse breath sounds, scattered rhonchi on the right.  Abdomen-obese, mild diffuse tenderness, few bowel sounds.    Agree with assessment and plans as outlined above by Tara Schoenbeck, APRN.    Electronically signed by:  Ortiz Washington MD 8/18/2017 6:53 PM

## 2017-08-18 NOTE — PROGRESS NOTES
DAILY PROGRESS NOTE  [facility], [facility]    2017    Patient Identification:  Name: Lupe Puente  Age: 61 y.o.  Sex: female  :  1955  MRN: [id number]           Primary Care Physician: Shane Barcenas MD    Subjective:    Interval History:    Ms. Puente is resting quietly this morning, but awakens. She answers questions appropriately, but does continue with delayed response. She denies CP, SOA , N/V/D.    Objective:    Scheduled Meds:    ammonium lactate  Topical Q12H   atorvastatin 20 mg Per G Tube Daily   bumetanide 2 mg Per G Tube Daily   enoxaparin 40 mg Subcutaneous Daily   ipratropium-albuterol 3 mL Nebulization 4x Daily - RT   isosorbide mononitrate 30 mg Per G Tube Daily   lactulose 20 g Oral Daily   pantoprazole 40 mg Oral Q AM   potassium chloride 20 mEq Oral Daily   QUEtiapine 25 mg Oral Q12H   risperiDONE 2 mg Oral Daily   risperiDONE 3 mg Oral Nightly   Valproic Acid 250 mg Per G Tube Q12H       Continuous Infusions:    lactated ringers 9 mL/hr Last Rate: 9 mL/hr (08/15/17 0646)   Pharmacy Consult     sodium chloride 100 mL/hr Last Rate: 100 mL/hr (08/15/17 0428)       PRN Meds:  •  acetaminophen  •  acetaminophen  •  aluminum-magnesium hydroxide-simethicone  •  bisacodyl  •  bisacodyl  •  calcium carbonate  •  diphenhydrAMINE **OR** diphenhydrAMINE  •  docusate sodium  •  HYDROcodone-acetaminophen  •  ipratropium-albuterol  •  LORazepam  •  magnesium hydroxide  •  Morphine **AND** naloxone  •  Morphine **AND** naloxone  •  ondansetron **OR** ondansetron ODT **OR** ondansetron  •  oxyCODONE-acetaminophen **OR** oxyCODONE-acetaminophen  •  Pharmacy Consult  •  simethicone  •  Insert peripheral IV **AND** sodium chloride    Intake/Output:    Intake/Output Summary (Last 24 hours) at 17 1005  Last data filed at 17 0000   Gross per 24 hour   Intake             1060 ml   Output                0 ml   Net             1060 ml  "        Exam:    tMax 24 hrs: Temp (24hrs), Av.6 °F (37 °C), Min:97.8 °F (36.6 °C), Max:99.3 °F (37.4 °C)    Vitals Ranges:   Temp:  [97.8 °F (36.6 °C)-99.3 °F (37.4 °C)] 98.7 °F (37.1 °C)  Heart Rate:  [109-121] 112  Resp:  [14-24] 14  BP: ()/(51-72) 94/72    BP 94/72 (BP Location: Left arm, Patient Position: Lying)  Pulse 112  Temp 98.7 °F (37.1 °C) (Oral)   Resp 14  Ht 66\" (167.6 cm)  Wt 262 lb 8 oz (119 kg)  SpO2 100%  BMI 42.37 kg/m2    General: Alert, very slow to respond, but following commands and cooperating with nursing.+ In no acute distress  Neck: Supple, symmetrical, trachea midline, no adenopathy;              Thyroid without enlargement/tenderness/nodules;              no carotid bruit or JVD  Cardiovascular: Normal rate, regular rhythm and intact distal pulses.                              Exam reveals no gallop and no friction rub. No murmur heard  Pulmonary: dimnished bilateraly, but rhonchi through bilateral bases. No increased WOB, rales, wheezes.   Abdominal: Soft,  non-tender; bowel sounds active all four quadrants;                      no masses,  hepatomegaly, splenomegaly. Abdominal wound approximated with staples. Right mid-wound   with very mild redness and warmth.   Extremities: Normal, atraumatic, no cyanosis or edema  Pulses:         2 + symmetric all extremities  Neurological: She is alert and oriented to person, place, and time.                           CNII-XII intact, normal strength, sensation intact throughout  Skin: Skin color, texture, turgor normal, no rashes or lesions    Data Review:      Results from last 7 days  Lab Units 17  0554 17  0521 17  2044   WBC 10*3/mm3 7.04 9.07 10.13   HEMOGLOBIN g/dL 8.3* 8.1* 8.9*   HEMATOCRIT % 27.8* 26.8* 29.5*   PLATELETS 10*3/mm3 395 340 360         Results from last 7 days  Lab Units 17  0554 17  0521 17  2038  17  1852   SODIUM mmol/L 140 138 138  < > 143   POTASSIUM mmol/L 4.0 " 4.2 4.6  < > 3.7   CHLORIDE mmol/L 101 101 102  < > 101   CO2 mmol/L 26.3 27.1 24.6  < > 32.5*   BUN mg/dL 8 6* 7*  < > 16   CREATININE mg/dL 0.51* 0.50* 0.61  < > 0.72   CALCIUM mg/dL 9.0 8.7 9.3  < > 9.4   BILIRUBIN mg/dL  --   --   --   --  0.4   ALK PHOS U/L  --   --   --   --  68   ALT (SGPT) U/L  --   --   --   --  10   AST (SGOT) U/L  --   --   --   --  9   GLUCOSE mg/dL 104* 102* 111*  < > 95   < > = values in this interval not displayed.    Results from last 7 days  Lab Units 08/13/17  1852   INR  1.15*         Lab Results  Lab Value Date/Time   TROPONINT 0.065 (H) 05/17/2017 0505   TROPONINT 0.077 (H) 05/16/2017 1517   TROPONINT 0.080 (H) 05/16/2017 1112   TROPONINT 0.078 (H) 05/16/2017 0703   TROPONINT 0.084 (H) 05/16/2017 0116   TROPONINT <0.010 05/15/2016 0622   TROPONINT 0.017 05/14/2016 0437   TROPONINT 0.023 05/13/2016 2036   TROPONINT 0.031 (H) 05/13/2016 1231   TROPONINT <0.01 02/03/2016 0537   TROPONINT <0.01 02/02/2016 0914   TROPONINT <0.01 11/11/2015 0516   TROPONINT <0.01 11/09/2015 2049   TROPONINT <0.01 04/18/2014 0517       Microbiology Results (last 10 days)     Procedure Component Value - Date/Time    Urine Culture [[id number]]  (Normal) Collected:  08/13/17 2002    Lab Status:  Final result Specimen:  Urine from Urine, Catheter Updated:  08/15/17 0632     Urine Culture No growth           Imaging Results (last 7 days)     Procedure Component Value Units Date/Time    US Pelvis Complete [[id number]] Collected:  08/13/17 1902     Updated:  08/13/17 1902    Narrative:       PELVIC ULTRASOUND     HISTORY: 61-year-old nursing home patient with vaginal bleeding.     The examination was performed as an emergency procedure. The patient  refused transvaginal imaging. The uterus is enlarged and lobulated,  measuring 9.8 x 9.8 x 13.7 cm and there are multiple fibroids measuring  up to 7 cm in size. There is also generalized endometrial thickening  with double wall thickness of 1.9 cm. The patient is  postmenopausal and  this does raise a concern of endometrial tumor and further clinical  correlation is required.     The ovaries are not visualized. There is no free fluid identified.       XR Chest Post CVA Port [[id number]] Collected:  08/15/17 1148     Updated:  08/15/17 1153    Narrative:       CHEST POST CVA PORT     HISTORY: Morbidly obese 61-year-old female for right jugular line  placement.     COMPARISON: 05/16/2017     FINDINGS:  1. Right jugular line projects over the superior vena cava.  2. Imaging is considerably limited by morbid obesity, low lung volumes,  mandibular artifact obscuring detail of the right apex.     If patient has symptoms suggesting pneumothorax, follow-up lordotic  imaging or standard imaging with removal mandibular artifact would be  helpful.     This report was finalized on 8/15/2017 11:50 AM by Dr. Bryan Yadav MD.               Assessment:      Principal Problem:    Vaginal bleeding  Active Problems:    Chronic diastolic CHF (congestive heart failure)    DM (diabetes mellitus)    Schizo affective schizophrenia    Coronary artery disease    Endometrial cancer determined by uterine biopsy    Hypertension    Bipolar disorder, unspecified  s/p CRISS SBO    Patient Active Problem List   Diagnosis Code   • Elevated troponin R79.89   • Aspiration pneumonia J69.0   • Hematuria R31.9   • Hypokalemia E87.6   • Pelvic mass in female R19.00   • Fecal impaction K56.41   • Endometrial carcinoma C54.1   • Acute on chronic respiratory failure with hypoxia and hypercapnia J96.21, J96.22   • Acute on chronic diastolic CHF (congestive heart failure) I50.33   • UTI (urinary tract infection) N39.0   • Leucocytosis D72.829   • Chronic diastolic CHF (congestive heart failure) I50.32   • DM (diabetes mellitus) E11.9   • Morbid obesity E66.01   • HTN (hypertension) I10   • Schizophrenia F20.9   • Tracheostomy malfunction J95.03   • Pyuria N39.0   • THAI (obstructive sleep apnea) G47.33   • Generalized  weakness R53.1   • Schizo affective schizophrenia F25.0   • Diabetes mellitus E11.9   • Coronary artery disease I25.10   • Endometrial cancer determined by uterine biopsy C54.1, Z15.04   • Chronic respiratory failure with hypoxia and hypercapnia J96.11, J96.12   • Toxic metabolic encephalopathy G92   • Pneumonia J18.9   • Abnormal vaginal bleeding N93.9   • Schizo affective schizophrenia F25.0   • CHF (congestive heart failure) I50.9   • Endometrial cancer determined by uterine biopsy C54.1, Z15.04   • Coronary artery disease I25.10   • Lymphedema I89.0   • Immobility Z74.09   • Acute blood loss anemia D62   • Vaginal bleeding N93.9   • Hypertension I10   • Uterine cancer C55   • Bipolar disorder, unspecified F31.9       Plan:    Continue post operatory management.  No IV fluids. PO diet. MN, O2  Monitor and correct electrolytes  Local wound care.   Adjust insulin as needed  Monitor mental status  Encourage pulmonary toiletry  Continue home medications  PT to see pt  DVT/stress ulcer prophylaxis  Labs in       Lizzy Hewitt, APRN  8/18/2017  10:05 AM

## 2017-08-19 LAB
ANION GAP SERPL CALCULATED.3IONS-SCNC: 13.7 MMOL/L
BASOPHILS # BLD AUTO: 0.02 10*3/MM3 (ref 0–0.2)
BASOPHILS NFR BLD AUTO: 0.3 % (ref 0–1.5)
BUN BLD-MCNC: 7 MG/DL (ref 8–23)
BUN/CREAT SERPL: 12.5 (ref 7–25)
CALCIUM SPEC-SCNC: 8.9 MG/DL (ref 8.6–10.5)
CHLORIDE SERPL-SCNC: 101 MMOL/L (ref 98–107)
CO2 SERPL-SCNC: 26.3 MMOL/L (ref 22–29)
CREAT BLD-MCNC: 0.56 MG/DL (ref 0.57–1)
DEPRECATED RDW RBC AUTO: 51.6 FL (ref 37–54)
EOSINOPHIL # BLD AUTO: 0.16 10*3/MM3 (ref 0–0.7)
EOSINOPHIL NFR BLD AUTO: 2.1 % (ref 0.3–6.2)
ERYTHROCYTE [DISTWIDTH] IN BLOOD BY AUTOMATED COUNT: 20.1 % (ref 11.7–13)
GFR SERPL CREATININE-BSD FRML MDRD: 133 ML/MIN/1.73
GLUCOSE BLD-MCNC: 112 MG/DL (ref 65–99)
HCT VFR BLD AUTO: 25.5 % (ref 35.6–45.5)
HGB BLD-MCNC: 7.8 G/DL (ref 11.9–15.5)
IMM GRANULOCYTES # BLD: 0.02 10*3/MM3 (ref 0–0.03)
IMM GRANULOCYTES NFR BLD: 0.3 % (ref 0–0.5)
LYMPHOCYTES # BLD AUTO: 1.41 10*3/MM3 (ref 0.9–4.8)
LYMPHOCYTES NFR BLD AUTO: 18.9 % (ref 19.6–45.3)
MCH RBC QN AUTO: 21.4 PG (ref 26.9–32)
MCHC RBC AUTO-ENTMCNC: 30.6 G/DL (ref 32.4–36.3)
MCV RBC AUTO: 69.9 FL (ref 80.5–98.2)
MONOCYTES # BLD AUTO: 0.64 10*3/MM3 (ref 0.2–1.2)
MONOCYTES NFR BLD AUTO: 8.6 % (ref 5–12)
NEUTROPHILS # BLD AUTO: 5.23 10*3/MM3 (ref 1.9–8.1)
NEUTROPHILS NFR BLD AUTO: 69.8 % (ref 42.7–76)
PLATELET # BLD AUTO: 421 10*3/MM3 (ref 140–500)
PMV BLD AUTO: 10.1 FL (ref 6–12)
POTASSIUM BLD-SCNC: 3.5 MMOL/L (ref 3.5–5.2)
RBC # BLD AUTO: 3.65 10*6/MM3 (ref 3.9–5.2)
SODIUM BLD-SCNC: 141 MMOL/L (ref 136–145)
WBC NRBC COR # BLD: 7.48 10*3/MM3 (ref 4.5–10.7)

## 2017-08-19 PROCEDURE — 94799 UNLISTED PULMONARY SVC/PX: CPT

## 2017-08-19 PROCEDURE — 80048 BASIC METABOLIC PNL TOTAL CA: CPT | Performed by: NURSE PRACTITIONER

## 2017-08-19 PROCEDURE — 85025 COMPLETE CBC W/AUTO DIFF WBC: CPT | Performed by: NURSE PRACTITIONER

## 2017-08-19 PROCEDURE — 25010000002 ENOXAPARIN PER 10 MG: Performed by: OBSTETRICS & GYNECOLOGY

## 2017-08-19 RX ORDER — FERROUS SULFATE 325(65) MG
325 TABLET ORAL
Status: DISCONTINUED | OUTPATIENT
Start: 2017-08-19 | End: 2017-08-22 | Stop reason: HOSPADM

## 2017-08-19 RX ORDER — BUMETANIDE 0.25 MG/ML
2 INJECTION INTRAMUSCULAR; INTRAVENOUS ONCE
Status: COMPLETED | OUTPATIENT
Start: 2017-08-19 | End: 2017-08-19

## 2017-08-19 RX ADMIN — ATORVASTATIN CALCIUM 20 MG: 20 TABLET, FILM COATED ORAL at 08:31

## 2017-08-19 RX ADMIN — IPRATROPIUM BROMIDE AND ALBUTEROL SULFATE 3 ML: .5; 3 SOLUTION RESPIRATORY (INHALATION) at 15:01

## 2017-08-19 RX ADMIN — BUMETANIDE 2 MG: 0.25 INJECTION INTRAMUSCULAR; INTRAVENOUS at 15:44

## 2017-08-19 RX ADMIN — AMMONIUM LACTATE: 120 CREAM TOPICAL at 08:31

## 2017-08-19 RX ADMIN — HYDROCODONE BITARTRATE AND ACETAMINOPHEN 1 TABLET: 5; 325 TABLET ORAL at 15:41

## 2017-08-19 RX ADMIN — OXYCODONE HYDROCHLORIDE AND ACETAMINOPHEN 2 TABLET: 5; 325 TABLET ORAL at 22:34

## 2017-08-19 RX ADMIN — ISOSORBIDE MONONITRATE 30 MG: 20 TABLET ORAL at 08:30

## 2017-08-19 RX ADMIN — IPRATROPIUM BROMIDE AND ALBUTEROL SULFATE 3 ML: .5; 3 SOLUTION RESPIRATORY (INHALATION) at 10:49

## 2017-08-19 RX ADMIN — RISPERIDONE 2 MG: 2 TABLET ORAL at 08:30

## 2017-08-19 RX ADMIN — LACTULOSE 20 G: 20 SOLUTION ORAL at 08:31

## 2017-08-19 RX ADMIN — AMMONIUM LACTATE: 120 CREAM TOPICAL at 22:34

## 2017-08-19 RX ADMIN — RISPERIDONE 3 MG: 3 TABLET ORAL at 21:30

## 2017-08-19 RX ADMIN — ENOXAPARIN SODIUM 40 MG: 40 INJECTION SUBCUTANEOUS at 08:31

## 2017-08-19 RX ADMIN — FERROUS SULFATE TAB 325 MG (65 MG ELEMENTAL FE) 325 MG: 325 (65 FE) TAB at 12:22

## 2017-08-19 RX ADMIN — QUETIAPINE FUMARATE 25 MG: 25 TABLET, FILM COATED ORAL at 21:30

## 2017-08-19 RX ADMIN — PANTOPRAZOLE SODIUM 40 MG: 40 TABLET, DELAYED RELEASE ORAL at 08:30

## 2017-08-19 RX ADMIN — VALPROIC ACID 250 MG: 250 SOLUTION ORAL at 08:31

## 2017-08-19 RX ADMIN — VALPROIC ACID 250 MG: 250 SOLUTION ORAL at 21:30

## 2017-08-19 RX ADMIN — POTASSIUM CHLORIDE 20 MEQ: 750 CAPSULE, EXTENDED RELEASE ORAL at 08:31

## 2017-08-19 RX ADMIN — BUMETANIDE 2 MG: 2 TABLET ORAL at 08:30

## 2017-08-19 RX ADMIN — QUETIAPINE FUMARATE 25 MG: 25 TABLET, FILM COATED ORAL at 08:30

## 2017-08-19 NOTE — PROGRESS NOTES
DAILY PROGRESS NOTE  [facility], [facility]    2017    Patient Identification:  Name: Lupe Burleson  Age: 61 y.o.  Sex: female  :  1955  MRN: [id number]           Primary Care Physician: Shane Barcenas MD    Subjective:    Interval History:    Patient is awake this morning, answer questions properly.  She is complaining of pain in the abdominal wound.  She is eating okay.  She denies nausea vomiting, diarrhea, chest pain, shortness of breath.     Objective:    Scheduled Meds:    ammonium lactate  Topical Q12H   atorvastatin 20 mg Per G Tube Daily   bumetanide 2 mg Per G Tube Daily   enoxaparin 40 mg Subcutaneous Daily   ipratropium-albuterol 3 mL Nebulization 4x Daily - RT   isosorbide mononitrate 30 mg Per G Tube Daily   lactulose 20 g Oral Daily   pantoprazole 40 mg Oral Q AM   potassium chloride 20 mEq Oral Daily   QUEtiapine 25 mg Oral Q12H   risperiDONE 2 mg Oral Daily   risperiDONE 3 mg Oral Nightly   Valproic Acid 250 mg Per G Tube Q12H       Continuous Infusions:    lactated ringers 9 mL/hr Last Rate: 9 mL/hr (08/15/17 0646)   Pharmacy Consult     sodium chloride 100 mL/hr Last Rate: 100 mL/hr (08/15/17 0428)       PRN Meds:  •  acetaminophen  •  acetaminophen  •  aluminum-magnesium hydroxide-simethicone  •  bisacodyl  •  bisacodyl  •  calcium carbonate  •  diphenhydrAMINE **OR** diphenhydrAMINE  •  docusate sodium  •  HYDROcodone-acetaminophen  •  ipratropium-albuterol  •  LORazepam  •  magnesium hydroxide  •  Morphine **AND** naloxone  •  Morphine **AND** naloxone  •  ondansetron **OR** ondansetron ODT **OR** ondansetron  •  oxyCODONE-acetaminophen **OR** oxyCODONE-acetaminophen  •  Pharmacy Consult  •  simethicone  •  Insert peripheral IV **AND** sodium chloride    Intake/Output:    Intake/Output Summary (Last 24 hours) at 17 1114  Last data filed at 17 2000   Gross per 24 hour   Intake               30 ml   Output                0 ml  "  Net               30 ml         Exam:    tMax 24 hrs: Temp (24hrs), Av.1 °F (37.3 °C), Min:98.4 °F (36.9 °C), Max:99.5 °F (37.5 °C)    Vitals Ranges:   Temp:  [98.4 °F (36.9 °C)-99.5 °F (37.5 °C)] 98.4 °F (36.9 °C)  Heart Rate:  [] 85  Resp:  [16-18] 16  BP: (102-137)/(68-78) 137/72    /72 (BP Location: Right arm, Patient Position: Lying)  Pulse 85  Temp 98.4 °F (36.9 °C) (Oral)   Resp 16  Ht 66\" (167.6 cm)  Wt 267 lb 9.6 oz (121 kg)  SpO2 95%  BMI 43.19 kg/m2    General: Alert, still slow to respond, but better. In no acute distress  Neck: Supple, symmetrical, trachea midline, no adenopathy;              Thyroid without enlargement/tenderness/nodules;              no carotid bruit or JVD  Cardiovascular: Normal rate, regular rhythm and intact distal pulses.                              Exam reveals no gallop and no friction rub. No murmur heard  Pulmonary: dimnished bilateraly, but few rhonchi through bilateral bases. No increased WOB, rales, wheezes.   Abdominal: Soft,  non-tender; bowel sounds active all four quadrants;                      no masses,  hepatomegaly, splenomegaly. Abdominal wound approximated with staples. Right mid-wound   with very mild redness and warmth.   Extremities: Normal, atraumatic, no cyanosis or edema  Pulses:         2 + symmetric all extremities  Neurological: She is alert and oriented to person, place, and time.                           CNII-XII intact, normal strength, sensation intact throughout  Skin: Skin color, texture, turgor normal, no rashes or lesions     Data Review:      Results from last 7 days  Lab Units 17  0906 17  0554 17  0521   WBC 10*3/mm3 7.48 7.04 9.07   HEMOGLOBIN g/dL 7.8* 8.3* 8.1*   HEMATOCRIT % 25.5* 27.8* 26.8*   PLATELETS 10*3/mm3 421 395 340         Results from last 7 days  Lab Units 17  0906 17  0554 17  0521  17  1852   SODIUM mmol/L 141 140 138  < > 143   POTASSIUM mmol/L 3.5 4.0 4.2 "  < > 3.7   CHLORIDE mmol/L 101 101 101  < > 101   CO2 mmol/L 26.3 26.3 27.1  < > 32.5*   BUN mg/dL 7* 8 6*  < > 16   CREATININE mg/dL 0.56* 0.51* 0.50*  < > 0.72   CALCIUM mg/dL 8.9 9.0 8.7  < > 9.4   BILIRUBIN mg/dL  --   --   --   --  0.4   ALK PHOS U/L  --   --   --   --  68   ALT (SGPT) U/L  --   --   --   --  10   AST (SGOT) U/L  --   --   --   --  9   GLUCOSE mg/dL 112* 104* 102*  < > 95   < > = values in this interval not displayed.    Results from last 7 days  Lab Units 08/13/17  1852   INR  1.15*         Lab Results  Lab Value Date/Time   TROPONINT 0.065 (H) 05/17/2017 0505   TROPONINT 0.077 (H) 05/16/2017 1517   TROPONINT 0.080 (H) 05/16/2017 1112   TROPONINT 0.078 (H) 05/16/2017 0703   TROPONINT 0.084 (H) 05/16/2017 0116   TROPONINT <0.010 05/15/2016 0622   TROPONINT 0.017 05/14/2016 0437   TROPONINT 0.023 05/13/2016 2036   TROPONINT 0.031 (H) 05/13/2016 1231   TROPONINT <0.01 02/03/2016 0537   TROPONINT <0.01 02/02/2016 0914   TROPONINT <0.01 11/11/2015 0516   TROPONINT <0.01 11/09/2015 2049   TROPONINT <0.01 04/18/2014 0517       Microbiology Results (last 10 days)     Procedure Component Value - Date/Time    Urine Culture [[id number]]  (Normal) Collected:  08/13/17 2002    Lab Status:  Final result Specimen:  Urine from Urine, Catheter Updated:  08/15/17 0632     Urine Culture No growth           Imaging Results (last 7 days)     Procedure Component Value Units Date/Time    US Pelvis Complete [[id number]] Collected:  08/13/17 1902     Updated:  08/13/17 1902    Narrative:       PELVIC ULTRASOUND     HISTORY: 61-year-old nursing home patient with vaginal bleeding.     The examination was performed as an emergency procedure. The patient  refused transvaginal imaging. The uterus is enlarged and lobulated,  measuring 9.8 x 9.8 x 13.7 cm and there are multiple fibroids measuring  up to 7 cm in size. There is also generalized endometrial thickening  with double wall thickness of 1.9 cm. The patient is  postmenopausal and  this does raise a concern of endometrial tumor and further clinical  correlation is required.     The ovaries are not visualized. There is no free fluid identified.       XR Chest Post CVA Port [[id number]] Collected:  08/15/17 1148     Updated:  08/15/17 1153    Narrative:       CHEST POST CVA PORT     HISTORY: Morbidly obese 61-year-old female for right jugular line  placement.     COMPARISON: 05/16/2017     FINDINGS:  1. Right jugular line projects over the superior vena cava.  2. Imaging is considerably limited by morbid obesity, low lung volumes,  mandibular artifact obscuring detail of the right apex.     If patient has symptoms suggesting pneumothorax, follow-up lordotic  imaging or standard imaging with removal mandibular artifact would be  helpful.     This report was finalized on 8/15/2017 11:50 AM by Dr. Bryan Yadav MD.             PA AND LATERAL CHEST 8/18/2017      HISTORY: Cough.      PA and lateral views of the chest were obtained and compared to  08/15/2017.      FINDINGS:      The right internal jugular central line has been removed. The study is  hampered by the patient's body habitus, patient positioning and  inspiratory effort.   There is bibasilar atelectasis/infiltrate more prominent on the left, similar to slightly less prominent as compared to  prior examination. The pulmonary interstitial vascular prominence which  was present previously is less prominent on the current examination. On  the lateral projection bilateral pleural effusions are appreciated,  moderate in size and there is moderate posterior atelectasis/infiltrate  bilaterally. The above information was called to patient's nurse who is  to  relay the information to the clinical service.          Assessment:      Principal Problem:    Vaginal bleeding  Active Problems:    Endometrial cancer determined by uterine biopsy    Hypertension    Chronic diastolic CHF (congestive heart failure)    DM (diabetes  mellitus)    Schizo affective schizophrenia    Coronary artery disease    Bipolar disorder, unspecified  s/p CRISS SBO    Patient Active Problem List   Diagnosis Code   • Elevated troponin R79.89   • Aspiration pneumonia J69.0   • Hematuria R31.9   • Hypokalemia E87.6   • Pelvic mass in female R19.00   • Fecal impaction K56.41   • Endometrial carcinoma C54.1   • Acute on chronic respiratory failure with hypoxia and hypercapnia J96.21, J96.22   • Acute on chronic diastolic CHF (congestive heart failure) I50.33   • UTI (urinary tract infection) N39.0   • Leucocytosis D72.829   • Chronic diastolic CHF (congestive heart failure) I50.32   • DM (diabetes mellitus) E11.9   • Morbid obesity E66.01   • HTN (hypertension) I10   • Schizophrenia F20.9   • Tracheostomy malfunction J95.03   • Pyuria N39.0   • THAI (obstructive sleep apnea) G47.33   • Generalized weakness R53.1   • Schizo affective schizophrenia F25.0   • Diabetes mellitus E11.9   • Coronary artery disease I25.10   • Endometrial cancer determined by uterine biopsy C54.1, Z15.04   • Chronic respiratory failure with hypoxia and hypercapnia J96.11, J96.12   • Toxic metabolic encephalopathy G92   • Pneumonia J18.9   • Abnormal vaginal bleeding N93.9   • Schizo affective schizophrenia F25.0   • CHF (congestive heart failure) I50.9   • Endometrial cancer determined by uterine biopsy C54.1, Z15.04   • Coronary artery disease I25.10   • Lymphedema I89.0   • Immobility Z74.09   • Acute blood loss anemia D62   • Vaginal bleeding N93.9   • Hypertension I10   • Uterine cancer C55   • Bipolar disorder, unspecified F31.9       Plan:    Continue post operatory management.  No IV fluids. However, given findings in CXR, she may have some degree of d CHF.   Will give a dose of IV bumex.  PO diet. MN, O2  Monitor and correct electrolytes  Local wound care.   Adjust insulin as needed  Monitor mental status  Encourage pulmonary toiletry  Continue home medications  PT to see pt, up to  chair  DVT/stress ulcer prophylaxis  Labs in       Shane Barcenas MD  8/19/2017  11:14 AM

## 2017-08-19 NOTE — PLAN OF CARE
Problem: Fall Risk (Adult)  Goal: Identify Related Risk Factors and Signs and Symptoms  Outcome: Ongoing (interventions implemented as appropriate)  Goal: Absence of Falls  Outcome: Ongoing (interventions implemented as appropriate)    Problem: Tissue Perfusion, Ineffective Peripheral (Adult)  Goal: Identify Related Risk Factors and Signs and Symptoms  Outcome: Ongoing (interventions implemented as appropriate)  Goal: Adequate Tissue Perfusion  Outcome: Ongoing (interventions implemented as appropriate)    Problem: Patient Care Overview (Adult)  Goal: Plan of Care Review  Outcome: Ongoing (interventions implemented as appropriate)    08/19/17 1810   Coping/Psychosocial Response Interventions   Plan Of Care Reviewed With patient   Outcome Evaluation   Outcome Summary/Follow up Plan Pt more alert and oriented then previously; VSS; Pt c/o pain on abdomen and bottom- pain med given; Med given through PEG tube crushed- flushed with water also   Patient Care Overview   Progress no change       Goal: Adult Individualization and Mutuality  Outcome: Ongoing (interventions implemented as appropriate)  Goal: Discharge Needs Assessment  Outcome: Ongoing (interventions implemented as appropriate)    Problem: Perioperative Period (Adult)  Goal: Signs and Symptoms of Listed Potential Problems Will be Absent or Manageable (Perioperative Period)  Outcome: Ongoing (interventions implemented as appropriate)    Problem: Infection, Risk/Actual (Adult)  Goal: Identify Related Risk Factors and Signs and Symptoms  Outcome: Ongoing (interventions implemented as appropriate)  Goal: Infection Prevention/Resolution  Outcome: Ongoing (interventions implemented as appropriate)    Problem: Cardiac: Heart Failure (Adult)  Goal: Signs and Symptoms of Listed Potential Problems Will be Absent or Manageable (Cardiac: Heart Failure)  Outcome: Ongoing (interventions implemented as appropriate)

## 2017-08-19 NOTE — PROGRESS NOTES
DAILY PROGRESS NOTE    Patient Identification:  Name: Lupe Burleson  Age: 61 y.o.  Sex: female  :  1955  MRN: [id number]                 Chief Complaint:  Vaginal bleeding, abdominal pain s/p surgery    Subjective:  Interval History: Complains of abdominal pain at surgery site. Denies flatus or bowel movement. Denies nausea. Tolerating soft diet.     Objective/Exam  Physical Examination: General appearance - overweight and chronically ill appearing, in no acute distress  Chest - clear to auscultation, no wheezes, rales or rhonchi, symmetric air entry  Heart - normal rate, regular rhythm, normal S1, S2, no murmurs, rubs, clicks or gallops  Abdomen - soft, nontender, nondistended, no masses or hepatosplenomegaly. Bowel sounds hypoactive.   Extremities - skin with changes of chronic vascular disease, mild edema, well-perfused, no cyanosis  Incision - clean, dry, intact    Vital signs in last 24 hours:  Temp:  [98.4 °F (36.9 °C)-99.5 °F (37.5 °C)] 98.4 °F (36.9 °C)  Heart Rate:  [] 85  Resp:  [16-18] 16  BP: (102-137)/(68-78) 137/72    Intake/Output:    Intake/Output Summary (Last 24 hours) at 17 1331  Last data filed at 17 2000   Gross per 24 hour   Intake               30 ml   Output                0 ml   Net               30 ml       Labs:  Recent Results (from the past 24 hour(s))   CBC Auto Differential    Collection Time: 17  9:06 AM   Result Value Ref Range    WBC 7.48 4.50 - 10.70 10*3/mm3    RBC 3.65 (L) 3.90 - 5.20 10*6/mm3    Hemoglobin 7.8 (L) 11.9 - 15.5 g/dL    Hematocrit 25.5 (L) 35.6 - 45.5 %    MCV 69.9 (L) 80.5 - 98.2 fL    MCH 21.4 (L) 26.9 - 32.0 pg    MCHC 30.6 (L) 32.4 - 36.3 g/dL    RDW 20.1 (H) 11.7 - 13.0 %    RDW-SD 51.6 37.0 - 54.0 fl    MPV 10.1 6.0 - 12.0 fL    Platelets 421 140 - 500 10*3/mm3    Neutrophil % 69.8 42.7 - 76.0 %    Lymphocyte % 18.9 (L) 19.6 - 45.3 %    Monocyte % 8.6 5.0 - 12.0 %    Eosinophil % 2.1 0.3 - 6.2 %    Basophil % 0.3 0.0 - 1.5  %    Immature Grans % 0.3 0.0 - 0.5 %    Neutrophils, Absolute 5.23 1.90 - 8.10 10*3/mm3    Lymphocytes, Absolute 1.41 0.90 - 4.80 10*3/mm3    Monocytes, Absolute 0.64 0.20 - 1.20 10*3/mm3    Eosinophils, Absolute 0.16 0.00 - 0.70 10*3/mm3    Basophils, Absolute 0.02 0.00 - 0.20 10*3/mm3    Immature Grans, Absolute 0.02 0.00 - 0.03 10*3/mm3   Basic Metabolic Panel    Collection Time: 08/19/17  9:06 AM   Result Value Ref Range    Glucose 112 (H) 65 - 99 mg/dL    BUN 7 (L) 8 - 23 mg/dL    Creatinine 0.56 (L) 0.57 - 1.00 mg/dL    Sodium 141 136 - 145 mmol/L    Potassium 3.5 3.5 - 5.2 mmol/L    Chloride 101 98 - 107 mmol/L    CO2 26.3 22.0 - 29.0 mmol/L    Calcium 8.9 8.6 - 10.5 mg/dL    eGFR  African Amer 133 >60 mL/min/1.73    BUN/Creatinine Ratio 12.5 7.0 - 25.0    Anion Gap 13.7 mmol/L         Assessment/Plan  POD # 4 s/p CRISS BSO on 8-15-17. 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    Hx: morbid obesity, schizophrenia, longterm care home resident.     Pulm - stable on room air, defer to primary team for pulmonary management    Renal - postop creat 0.5; voiding in adult briefs    GI - awaiting full return of bowel function; no nausea, no flatus, tolerating soft diet    Pain - controlled on oral pain meds     Anemia of chronic disease - Hgb 7.8, stable and asymptomatic    DVT prophylaxis - Lovenox 40mg daily    Cancer - difficult situation. High risk for recurrent/persistent disease, but chemotherapy or radiation would be extremely difficult for a host of reasons. To follow up with Dr. Washington.     Dispo - would await definite passage of flatus or bowel movement prior to discharge to care facility.    Josiane Gonsalves MD  8/19/2017   1:45 PM

## 2017-08-20 ENCOUNTER — APPOINTMENT (OUTPATIENT)
Dept: GENERAL RADIOLOGY | Facility: HOSPITAL | Age: 62
End: 2017-08-20

## 2017-08-20 LAB
ANION GAP SERPL CALCULATED.3IONS-SCNC: 10.6 MMOL/L
BUN BLD-MCNC: 9 MG/DL (ref 8–23)
BUN/CREAT SERPL: 16.1 (ref 7–25)
CALCIUM SPEC-SCNC: 8.5 MG/DL (ref 8.6–10.5)
CHLORIDE SERPL-SCNC: 101 MMOL/L (ref 98–107)
CO2 SERPL-SCNC: 29.4 MMOL/L (ref 22–29)
CREAT BLD-MCNC: 0.56 MG/DL (ref 0.57–1)
DEPRECATED RDW RBC AUTO: 54 FL (ref 37–54)
ERYTHROCYTE [DISTWIDTH] IN BLOOD BY AUTOMATED COUNT: 20.3 % (ref 11.7–13)
GFR SERPL CREATININE-BSD FRML MDRD: 133 ML/MIN/1.73
GLUCOSE BLD-MCNC: 88 MG/DL (ref 65–99)
HCT VFR BLD AUTO: 23.5 % (ref 35.6–45.5)
HGB BLD-MCNC: 7.1 G/DL (ref 11.9–15.5)
MCH RBC QN AUTO: 21.8 PG (ref 26.9–32)
MCHC RBC AUTO-ENTMCNC: 30.2 G/DL (ref 32.4–36.3)
MCV RBC AUTO: 72.3 FL (ref 80.5–98.2)
NT-PROBNP SERPL-MCNC: 3021 PG/ML (ref 0–900)
PLATELET # BLD AUTO: 381 10*3/MM3 (ref 140–500)
PMV BLD AUTO: 10.1 FL (ref 6–12)
POTASSIUM BLD-SCNC: 3.3 MMOL/L (ref 3.5–5.2)
RBC # BLD AUTO: 3.25 10*6/MM3 (ref 3.9–5.2)
SODIUM BLD-SCNC: 141 MMOL/L (ref 136–145)
WBC NRBC COR # BLD: 5.91 10*3/MM3 (ref 4.5–10.7)

## 2017-08-20 PROCEDURE — 83880 ASSAY OF NATRIURETIC PEPTIDE: CPT | Performed by: INTERNAL MEDICINE

## 2017-08-20 PROCEDURE — 71020 HC CHEST PA AND LATERAL: CPT

## 2017-08-20 PROCEDURE — 85027 COMPLETE CBC AUTOMATED: CPT | Performed by: INTERNAL MEDICINE

## 2017-08-20 PROCEDURE — 25010000002 ENOXAPARIN PER 10 MG: Performed by: OBSTETRICS & GYNECOLOGY

## 2017-08-20 PROCEDURE — 94799 UNLISTED PULMONARY SVC/PX: CPT

## 2017-08-20 PROCEDURE — 80048 BASIC METABOLIC PNL TOTAL CA: CPT | Performed by: INTERNAL MEDICINE

## 2017-08-20 RX ORDER — BUMETANIDE 0.25 MG/ML
2 INJECTION INTRAMUSCULAR; INTRAVENOUS ONCE
Status: COMPLETED | OUTPATIENT
Start: 2017-08-20 | End: 2017-08-20

## 2017-08-20 RX ORDER — POTASSIUM CHLORIDE 750 MG/1
40 CAPSULE, EXTENDED RELEASE ORAL DAILY
Status: DISCONTINUED | OUTPATIENT
Start: 2017-08-21 | End: 2017-08-22 | Stop reason: HOSPADM

## 2017-08-20 RX ADMIN — VALPROIC ACID 250 MG: 250 SOLUTION ORAL at 21:10

## 2017-08-20 RX ADMIN — QUETIAPINE FUMARATE 25 MG: 25 TABLET, FILM COATED ORAL at 09:27

## 2017-08-20 RX ADMIN — QUETIAPINE FUMARATE 25 MG: 25 TABLET, FILM COATED ORAL at 21:11

## 2017-08-20 RX ADMIN — RISPERIDONE 3 MG: 3 TABLET ORAL at 21:10

## 2017-08-20 RX ADMIN — AMMONIUM LACTATE: 120 CREAM TOPICAL at 21:11

## 2017-08-20 RX ADMIN — OXYCODONE HYDROCHLORIDE AND ACETAMINOPHEN 2 TABLET: 5; 325 TABLET ORAL at 03:40

## 2017-08-20 RX ADMIN — BUMETANIDE 2 MG: 2 TABLET ORAL at 09:27

## 2017-08-20 RX ADMIN — PANTOPRAZOLE SODIUM 40 MG: 40 TABLET, DELAYED RELEASE ORAL at 09:28

## 2017-08-20 RX ADMIN — AMMONIUM LACTATE: 120 CREAM TOPICAL at 09:27

## 2017-08-20 RX ADMIN — FERROUS SULFATE TAB 325 MG (65 MG ELEMENTAL FE) 325 MG: 325 (65 FE) TAB at 09:27

## 2017-08-20 RX ADMIN — POTASSIUM CHLORIDE 20 MEQ: 750 CAPSULE, EXTENDED RELEASE ORAL at 09:27

## 2017-08-20 RX ADMIN — RISPERIDONE 2 MG: 2 TABLET ORAL at 09:27

## 2017-08-20 RX ADMIN — ISOSORBIDE MONONITRATE 30 MG: 20 TABLET ORAL at 09:27

## 2017-08-20 RX ADMIN — BUMETANIDE 2 MG: 0.25 INJECTION INTRAMUSCULAR; INTRAVENOUS at 14:22

## 2017-08-20 RX ADMIN — OXYCODONE HYDROCHLORIDE AND ACETAMINOPHEN 2 TABLET: 5; 325 TABLET ORAL at 15:49

## 2017-08-20 RX ADMIN — ATORVASTATIN CALCIUM 20 MG: 20 TABLET, FILM COATED ORAL at 09:27

## 2017-08-20 RX ADMIN — VALPROIC ACID 250 MG: 250 SOLUTION ORAL at 09:27

## 2017-08-20 RX ADMIN — IPRATROPIUM BROMIDE AND ALBUTEROL SULFATE 3 ML: .5; 3 SOLUTION RESPIRATORY (INHALATION) at 14:42

## 2017-08-20 RX ADMIN — IPRATROPIUM BROMIDE AND ALBUTEROL SULFATE 3 ML: .5; 3 SOLUTION RESPIRATORY (INHALATION) at 07:55

## 2017-08-20 RX ADMIN — ENOXAPARIN SODIUM 40 MG: 40 INJECTION SUBCUTANEOUS at 09:40

## 2017-08-20 RX ADMIN — IPRATROPIUM BROMIDE AND ALBUTEROL SULFATE 3 ML: .5; 3 SOLUTION RESPIRATORY (INHALATION) at 19:22

## 2017-08-20 RX ADMIN — LACTULOSE 20 G: 20 SOLUTION ORAL at 09:28

## 2017-08-20 NOTE — PLAN OF CARE
Problem: Fall Risk (Adult)  Goal: Absence of Falls  Outcome: Ongoing (interventions implemented as appropriate)    Problem: Tissue Perfusion, Ineffective Peripheral (Adult)  Goal: Adequate Tissue Perfusion  Outcome: Ongoing (interventions implemented as appropriate)    Problem: Patient Care Overview (Adult)  Goal: Plan of Care Review  Outcome: Ongoing (interventions implemented as appropriate)    08/20/17 2028   Coping/Psychosocial Response Interventions   Plan Of Care Reviewed With patient   Outcome Evaluation   Outcome Summary/Follow up Plan Pt is A&Ox4; VSS; wound care consult for tomorrow for coxyxx- barrier applied with mepilex: q2turn- trying to keep patient off bottom; Incision cleaned: bumex and PO pain meds given; possible discharge early this week   Patient Care Overview   Progress no change       Goal: Adult Individualization and Mutuality  Outcome: Ongoing (interventions implemented as appropriate)  Goal: Discharge Needs Assessment  Outcome: Ongoing (interventions implemented as appropriate)    Problem: Perioperative Period (Adult)  Goal: Signs and Symptoms of Listed Potential Problems Will be Absent or Manageable (Perioperative Period)  Outcome: Ongoing (interventions implemented as appropriate)    Problem: Infection, Risk/Actual (Adult)  Goal: Infection Prevention/Resolution  Outcome: Ongoing (interventions implemented as appropriate)    Problem: Cardiac: Heart Failure (Adult)  Goal: Signs and Symptoms of Listed Potential Problems Will be Absent or Manageable (Cardiac: Heart Failure)  Outcome: Ongoing (interventions implemented as appropriate)    Problem: Skin Integrity Impairment, Risk/Actual (Adult)  Goal: Skin Integrity/Wound Healing  Outcome: Ongoing (interventions implemented as appropriate)

## 2017-08-20 NOTE — PLAN OF CARE
Problem: Fall Risk (Adult)  Goal: Identify Related Risk Factors and Signs and Symptoms  Outcome: Outcome(s) achieved Date Met:  08/20/17  Goal: Absence of Falls  Outcome: Ongoing (interventions implemented as appropriate)    Problem: Tissue Perfusion, Ineffective Peripheral (Adult)  Goal: Identify Related Risk Factors and Signs and Symptoms  Outcome: Outcome(s) achieved Date Met:  08/20/17  Goal: Adequate Tissue Perfusion  Outcome: Ongoing (interventions implemented as appropriate)    Problem: Patient Care Overview (Adult)  Goal: Plan of Care Review  Outcome: Ongoing (interventions implemented as appropriate)    08/20/17 0418   Coping/Psychosocial Response Interventions   Plan Of Care Reviewed With patient   Outcome Evaluation   Outcome Summary/Follow up Plan Pt is alert and oriented to 4. There is a pressure ulcer found on her sacrum/coxyxx on the morning of 8/19/17. Picture taken and sent to HIM. Barrier applied. Awaiting wound care consult. Pt has requested pain medication x3 and feels releif from it. Keeping her turned with a wedge. Gtube site care and dressing completed. Incision line cleaned.   Patient Care Overview   Progress no change         Problem: Perioperative Period (Adult)  Goal: Signs and Symptoms of Listed Potential Problems Will be Absent or Manageable (Perioperative Period)  Outcome: Ongoing (interventions implemented as appropriate)    Problem: Infection, Risk/Actual (Adult)  Goal: Identify Related Risk Factors and Signs and Symptoms  Outcome: Outcome(s) achieved Date Met:  08/20/17  Goal: Infection Prevention/Resolution  Outcome: Ongoing (interventions implemented as appropriate)    Problem: Cardiac: Heart Failure (Adult)  Goal: Signs and Symptoms of Listed Potential Problems Will be Absent or Manageable (Cardiac: Heart Failure)  Outcome: Ongoing (interventions implemented as appropriate)    Problem: Skin Integrity Impairment, Risk/Actual (Adult)  Goal: Identify Related Risk Factors and Signs  and Symptoms  Outcome: Outcome(s) achieved Date Met:  08/20/17  Goal: Skin Integrity/Wound Healing  Outcome: Ongoing (interventions implemented as appropriate)

## 2017-08-20 NOTE — PROGRESS NOTES
DAILY PROGRESS NOTE  [facility], [place]    2017    Patient Identification:  Name: Lupe Burleson  Age: 61 y.o.  Sex: female  :  1955  MRN: [id number]           Primary Care Physician: Shane Barcenas MD    Subjective:    Interval History:    Patient is doing OK, c/o pain in wound, but better. She is eating okay.  ANN a little low after IV Bumex. Sats 100% in RA. Hgb dropped to 7.1  Mental status better  She denies nausea vomiting, diarrhea, chest pain, shortness of breath.     Objective:    Scheduled Meds:    ammonium lactate  Topical Q12H   atorvastatin 20 mg Per G Tube Daily   bumetanide 2 mg Per G Tube Daily   enoxaparin 40 mg Subcutaneous Daily   ferrous sulfate 325 mg Oral Daily With Breakfast   ipratropium-albuterol 3 mL Nebulization 4x Daily - RT   isosorbide mononitrate 30 mg Per G Tube Daily   lactulose 20 g Oral Daily   pantoprazole 40 mg Oral Q AM   potassium chloride 20 mEq Oral Daily   QUEtiapine 25 mg Oral Q12H   risperiDONE 2 mg Oral Daily   risperiDONE 3 mg Oral Nightly   Valproic Acid 250 mg Per G Tube Q12H       Continuous Infusions:    lactated ringers 9 mL/hr Last Rate: 9 mL/hr (08/15/17 0646)   Pharmacy Consult         PRN Meds:  •  acetaminophen  •  acetaminophen  •  aluminum-magnesium hydroxide-simethicone  •  bisacodyl  •  bisacodyl  •  calcium carbonate  •  diphenhydrAMINE **OR** diphenhydrAMINE  •  docusate sodium  •  HYDROcodone-acetaminophen  •  ipratropium-albuterol  •  LORazepam  •  magnesium hydroxide  •  Morphine **AND** naloxone  •  Morphine **AND** naloxone  •  ondansetron **OR** ondansetron ODT **OR** ondansetron  •  oxyCODONE-acetaminophen **OR** oxyCODONE-acetaminophen  •  Pharmacy Consult  •  simethicone  •  Insert peripheral IV **AND** sodium chloride    Intake/Output:    Intake/Output Summary (Last 24 hours) at 17 1035  Last data filed at 17 0915   Gross per 24 hour   Intake               90 ml   Output    "             0 ml   Net               90 ml         Exam:    tMax 24 hrs: Temp (24hrs), Av °F (36.7 °C), Min:96.4 °F (35.8 °C), Max:99.2 °F (37.3 °C)    Vitals Ranges:   Temp:  [96.4 °F (35.8 °C)-99.2 °F (37.3 °C)] 96.4 °F (35.8 °C)  Heart Rate:  [67-90] 73  Resp:  [16-18] 16  BP: ()/(52-67) 107/67    /67 (BP Location: Left arm, Patient Position: Lying)  Pulse 73  Temp 96.4 °F (35.8 °C) (Oral)   Resp 16  Ht 66\" (167.6 cm)  Wt 266 lb 6.4 oz (121 kg)  SpO2 100%  BMI 43 kg/m2    General: Alert, better mental status. In no acute distress  Neck: Supple, symmetrical, trachea midline, no adenopathy;              Thyroid without enlargement/tenderness/nodules;              no carotid bruit or JVD  Cardiovascular: Normal rate, regular rhythm and intact distal pulses.                              Exam reveals no gallop and no friction rub. No murmur heard  Pulmonary: dimnished bilateraly, no rhonchi. No increased WOB, rales, wheezes.   Abdominal: Soft,  non-tender; bowel sounds active all four quadrants;                      no masses,  hepatomegaly, splenomegaly. Abdominal wound approximated with staples. Right mid-wound   with very mild redness and warmth.   Extremities: Normal, atraumatic, no cyanosis or edema  Pulses:         2 + symmetric all extremities  Neurological: She is alert and oriented to person, place, and time.                           CNII-XII intact, normal strength, sensation intact throughout  Skin: Skin color, texture, turgor normal, no rashes or lesions     Data Review:      Results from last 7 days  Lab Units 17  0450 17  0917  0554   WBC 10*3/mm3 5.91 7.48 7.04   HEMOGLOBIN g/dL 7.1* 7.8* 8.3*   HEMATOCRIT % 23.5* 25.5* 27.8*   PLATELETS 10*3/mm3 381 421 395         Results from last 7 days  Lab Units 17  0450 17  0906 17  0554  17  1852   SODIUM mmol/L 141 141 140  < > 143   POTASSIUM mmol/L 3.3* 3.5 4.0  < > 3.7   CHLORIDE mmol/L " 101 101 101  < > 101   CO2 mmol/L 29.4* 26.3 26.3  < > 32.5*   BUN mg/dL 9 7* 8  < > 16   CREATININE mg/dL 0.56* 0.56* 0.51*  < > 0.72   CALCIUM mg/dL 8.5* 8.9 9.0  < > 9.4   BILIRUBIN mg/dL  --   --   --   --  0.4   ALK PHOS U/L  --   --   --   --  68   ALT (SGPT) U/L  --   --   --   --  10   AST (SGOT) U/L  --   --   --   --  9   GLUCOSE mg/dL 88 112* 104*  < > 95   < > = values in this interval not displayed.    Results from last 7 days  Lab Units 08/13/17  1852   INR  1.15*         Lab Results  Lab Value Date/Time   TROPONINT 0.065 (H) 05/17/2017 0505   TROPONINT 0.077 (H) 05/16/2017 1517   TROPONINT 0.080 (H) 05/16/2017 1112   TROPONINT 0.078 (H) 05/16/2017 0703   TROPONINT 0.084 (H) 05/16/2017 0116   TROPONINT <0.010 05/15/2016 0622   TROPONINT 0.017 05/14/2016 0437   TROPONINT 0.023 05/13/2016 2036   TROPONINT 0.031 (H) 05/13/2016 1231   TROPONINT <0.01 02/03/2016 0537   TROPONINT <0.01 02/02/2016 0914   TROPONINT <0.01 11/11/2015 0516   TROPONINT <0.01 11/09/2015 2049   TROPONINT <0.01 04/18/2014 0517       Microbiology Results (last 10 days)     Procedure Component Value - Date/Time    Urine Culture [[id number]]  (Normal) Collected:  08/13/17 2002    Lab Status:  Final result Specimen:  Urine from Urine, Catheter Updated:  08/15/17 0632     Urine Culture No growth           Imaging Results (last 7 days)     Procedure Component Value Units Date/Time    US Pelvis Complete [[id number]] Collected:  08/13/17 1902     Updated:  08/13/17 1902    Narrative:       PELVIC ULTRASOUND     HISTORY: 61-year-old nursing home patient with vaginal bleeding.     The examination was performed as an emergency procedure. The patient  refused transvaginal imaging. The uterus is enlarged and lobulated,  measuring 9.8 x 9.8 x 13.7 cm and there are multiple fibroids measuring  up to 7 cm in size. There is also generalized endometrial thickening  with double wall thickness of 1.9 cm. The patient is postmenopausal and  this does raise  a concern of endometrial tumor and further clinical  correlation is required.     The ovaries are not visualized. There is no free fluid identified.       XR Chest Post CVA Port [[id number]] Collected:  08/15/17 1148     Updated:  08/15/17 1153    Narrative:       CHEST POST CVA PORT     HISTORY: Morbidly obese 61-year-old female for right jugular line  placement.     COMPARISON: 05/16/2017     FINDINGS:  1. Right jugular line projects over the superior vena cava.  2. Imaging is considerably limited by morbid obesity, low lung volumes,  mandibular artifact obscuring detail of the right apex.     If patient has symptoms suggesting pneumothorax, follow-up lordotic  imaging or standard imaging with removal mandibular artifact would be  helpful.     This report was finalized on 8/15/2017 11:50 AM by Dr. Bryan Yadav MD.             PA AND LATERAL CHEST 8/18/2017      HISTORY: Cough.      PA and lateral views of the chest were obtained and compared to  08/15/2017.      FINDINGS:      The right internal jugular central line has been removed. The study is  hampered by the patient's body habitus, patient positioning and  inspiratory effort.   There is bibasilar atelectasis/infiltrate more prominent on the left, similar to slightly less prominent as compared to  prior examination. The pulmonary interstitial vascular prominence which  was present previously is less prominent on the current examination. On  the lateral projection bilateral pleural effusions are appreciated,  moderate in size and there is moderate posterior atelectasis/infiltrate  bilaterally. The above information was called to patient's nurse who is  to  relay the information to the clinical service.          Assessment:      Principal Problem:    Vaginal bleeding  Active Problems:    Endometrial cancer determined by uterine biopsy    Hypertension    Chronic diastolic CHF (congestive heart failure)    DM (diabetes mellitus)    Schizo affective schizophrenia     Coronary artery disease    Bipolar disorder, unspecified  s/p CRISS SBO  Hypokalemia  Anemia of blood loss    Patient Active Problem List   Diagnosis Code   • Elevated troponin R79.89   • Aspiration pneumonia J69.0   • Hematuria R31.9   • Hypokalemia E87.6   • Pelvic mass in female R19.00   • Fecal impaction K56.41   • Endometrial carcinoma C54.1   • Acute on chronic respiratory failure with hypoxia and hypercapnia J96.21, J96.22   • Acute on chronic diastolic CHF (congestive heart failure) I50.33   • UTI (urinary tract infection) N39.0   • Leucocytosis D72.829   • Chronic diastolic CHF (congestive heart failure) I50.32   • DM (diabetes mellitus) E11.9   • Morbid obesity E66.01   • HTN (hypertension) I10   • Schizophrenia F20.9   • Tracheostomy malfunction J95.03   • Pyuria N39.0   • THAI (obstructive sleep apnea) G47.33   • Generalized weakness R53.1   • Schizo affective schizophrenia F25.0   • Diabetes mellitus E11.9   • Coronary artery disease I25.10   • Endometrial cancer determined by uterine biopsy C54.1, Z15.04   • Chronic respiratory failure with hypoxia and hypercapnia J96.11, J96.12   • Toxic metabolic encephalopathy G92   • Pneumonia J18.9   • Abnormal vaginal bleeding N93.9   • Schizo affective schizophrenia F25.0   • CHF (congestive heart failure) I50.9   • Endometrial cancer determined by uterine biopsy C54.1, Z15.04   • Coronary artery disease I25.10   • Lymphedema I89.0   • Immobility Z74.09   • Acute blood loss anemia D62   • Vaginal bleeding N93.9   • Hypertension I10   • Uterine cancer C55   • Bipolar disorder, unspecified F31.9       Plan:    Repeat CXR today PA and lat  Iron supplements  Monitor Hgb  Will give an extra dose of Bumex IV  PO diet. MN, O2  Monitor and correct electrolytes, replace K  Local wound care.   Adjust insulin as needed  Monitor mental status  Encourage pulmonary toiletry  Continue home medications  PT to see pt, up to chair  DVT/stress ulcer prophylaxis  Labs in  am      Shane Barcenas MD  8/20/2017  10:35 AM

## 2017-08-20 NOTE — PROGRESS NOTES
DAILY PROGRESS NOTE    Patient Identification:  Name: Lupe Burleson  Age: 61 y.o.  Sex: female  :  1955  MRN: [id number]                 Chief Complaint:  Vaginal bleeding, s/p definitive surgery    Subjective:  Interval History: feels better, tolerating regular diet. She does not walk at baseline. No nausea.      Objective/Exam  Physical Examination: General appearance - alert, well appearing, and in no distress  Chest - clear to auscultation, no wheezes, rales or rhonchi, symmetric air entry  Heart - normal rate, regular rhythm, normal S1, S2, no murmurs, rubs, clicks or gallops  Abdomen - soft, nontender, nondistended, no masses or organomegaly  bowel sounds normal  Extremities - peripheral pulses normal, no pedal edema, no clubbing or cyanosis  Incision - clean, dry, intact    Vital signs in last 24 hours:  Temp:  [96.4 °F (35.8 °C)-99.2 °F (37.3 °C)] 96.4 °F (35.8 °C)  Heart Rate:  [67-90] 73  Resp:  [16-18] 16  BP: ()/(52-67) 107/67    Intake/Output:    Intake/Output Summary (Last 24 hours) at 17 0905  Last data filed at 17 0340   Gross per 24 hour   Intake               60 ml   Output                0 ml   Net               60 ml       Labs:  Recent Results (from the past 24 hour(s))   CBC Auto Differential    Collection Time: 17  9:06 AM   Result Value Ref Range    WBC 7.48 4.50 - 10.70 10*3/mm3    RBC 3.65 (L) 3.90 - 5.20 10*6/mm3    Hemoglobin 7.8 (L) 11.9 - 15.5 g/dL    Hematocrit 25.5 (L) 35.6 - 45.5 %    MCV 69.9 (L) 80.5 - 98.2 fL    MCH 21.4 (L) 26.9 - 32.0 pg    MCHC 30.6 (L) 32.4 - 36.3 g/dL    RDW 20.1 (H) 11.7 - 13.0 %    RDW-SD 51.6 37.0 - 54.0 fl    MPV 10.1 6.0 - 12.0 fL    Platelets 421 140 - 500 10*3/mm3    Neutrophil % 69.8 42.7 - 76.0 %    Lymphocyte % 18.9 (L) 19.6 - 45.3 %    Monocyte % 8.6 5.0 - 12.0 %    Eosinophil % 2.1 0.3 - 6.2 %    Basophil % 0.3 0.0 - 1.5 %    Immature Grans % 0.3 0.0 - 0.5 %    Neutrophils, Absolute 5.23 1.90 - 8.10 10*3/mm3     Lymphocytes, Absolute 1.41 0.90 - 4.80 10*3/mm3    Monocytes, Absolute 0.64 0.20 - 1.20 10*3/mm3    Eosinophils, Absolute 0.16 0.00 - 0.70 10*3/mm3    Basophils, Absolute 0.02 0.00 - 0.20 10*3/mm3    Immature Grans, Absolute 0.02 0.00 - 0.03 10*3/mm3   Basic Metabolic Panel    Collection Time: 08/19/17  9:06 AM   Result Value Ref Range    Glucose 112 (H) 65 - 99 mg/dL    BUN 7 (L) 8 - 23 mg/dL    Creatinine 0.56 (L) 0.57 - 1.00 mg/dL    Sodium 141 136 - 145 mmol/L    Potassium 3.5 3.5 - 5.2 mmol/L    Chloride 101 98 - 107 mmol/L    CO2 26.3 22.0 - 29.0 mmol/L    Calcium 8.9 8.6 - 10.5 mg/dL    eGFR  African Amer 133 >60 mL/min/1.73    BUN/Creatinine Ratio 12.5 7.0 - 25.0    Anion Gap 13.7 mmol/L   CBC (No Diff)    Collection Time: 08/20/17  4:50 AM   Result Value Ref Range    WBC 5.91 4.50 - 10.70 10*3/mm3    RBC 3.25 (L) 3.90 - 5.20 10*6/mm3    Hemoglobin 7.1 (L) 11.9 - 15.5 g/dL    Hematocrit 23.5 (L) 35.6 - 45.5 %    MCV 72.3 (L) 80.5 - 98.2 fL    MCH 21.8 (L) 26.9 - 32.0 pg    MCHC 30.2 (L) 32.4 - 36.3 g/dL    RDW 20.3 (H) 11.7 - 13.0 %    RDW-SD 54.0 37.0 - 54.0 fl    MPV 10.1 6.0 - 12.0 fL    Platelets 381 140 - 500 10*3/mm3   Basic Metabolic Panel    Collection Time: 08/20/17  4:50 AM   Result Value Ref Range    Glucose 88 65 - 99 mg/dL    BUN 9 8 - 23 mg/dL    Creatinine 0.56 (L) 0.57 - 1.00 mg/dL    Sodium 141 136 - 145 mmol/L    Potassium 3.3 (L) 3.5 - 5.2 mmol/L    Chloride 101 98 - 107 mmol/L    CO2 29.4 (H) 22.0 - 29.0 mmol/L    Calcium 8.5 (L) 8.6 - 10.5 mg/dL    eGFR  African Amer 133 >60 mL/min/1.73    BUN/Creatinine Ratio 16.1 7.0 - 25.0    Anion Gap 10.6 mmol/L   BNP    Collection Time: 08/20/17  4:50 AM   Result Value Ref Range    proBNP 3021.0 (H) 0.0 - 900.0 pg/mL         Assessment/Plan  POD # 5 s/p CRISS BSO on 8-15-17. 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     Hx: morbid obesity, schizophrenia, longterm care home resident.      Pulm - stable on room air, defer to primary team for pulmonary management     Renal - postop creat 0.5; voiding in adult briefs     GI - Tolerating soft diet, having bowel movements     Pain - controlled on oral pain meds      Anemia of chronic disease - Hgb 7.1, stable and asymptomatic     DVT prophylaxis - Lovenox 40mg daily     Cancer - difficult situation. High risk for recurrent/persistent disease, but chemotherapy or radiation would be extremely difficult for a host of reasons. To follow up with Dr. Washington.      Dispo - stable for discharge back to care facility when OK with primary team.      Josiane Gonsalves MD  8/20/2017   9:10 AM

## 2017-08-21 VITALS
BODY MASS INDEX: 41.12 KG/M2 | SYSTOLIC BLOOD PRESSURE: 105 MMHG | WEIGHT: 255.9 LBS | HEIGHT: 66 IN | DIASTOLIC BLOOD PRESSURE: 49 MMHG | HEART RATE: 81 BPM | RESPIRATION RATE: 16 BRPM | OXYGEN SATURATION: 100 % | TEMPERATURE: 98 F

## 2017-08-21 LAB
ANION GAP SERPL CALCULATED.3IONS-SCNC: 11.7 MMOL/L
BUN BLD-MCNC: 8 MG/DL (ref 8–23)
BUN/CREAT SERPL: 14.3 (ref 7–25)
CALCIUM SPEC-SCNC: 8.7 MG/DL (ref 8.6–10.5)
CHLORIDE SERPL-SCNC: 97 MMOL/L (ref 98–107)
CO2 SERPL-SCNC: 30.3 MMOL/L (ref 22–29)
CREAT BLD-MCNC: 0.56 MG/DL (ref 0.57–1)
DEPRECATED RDW RBC AUTO: 53.2 FL (ref 37–54)
ERYTHROCYTE [DISTWIDTH] IN BLOOD BY AUTOMATED COUNT: 20.3 % (ref 11.7–13)
GFR SERPL CREATININE-BSD FRML MDRD: 133 ML/MIN/1.73
GLUCOSE BLD-MCNC: 84 MG/DL (ref 65–99)
HCT VFR BLD AUTO: 23.3 % (ref 35.6–45.5)
HGB BLD-MCNC: 7 G/DL (ref 11.9–15.5)
MCH RBC QN AUTO: 21.5 PG (ref 26.9–32)
MCHC RBC AUTO-ENTMCNC: 30 G/DL (ref 32.4–36.3)
MCV RBC AUTO: 71.7 FL (ref 80.5–98.2)
NT-PROBNP SERPL-MCNC: 1684 PG/ML (ref 5–900)
PLATELET # BLD AUTO: 405 10*3/MM3 (ref 140–500)
PMV BLD AUTO: 9.9 FL (ref 6–12)
POTASSIUM BLD-SCNC: 3.6 MMOL/L (ref 3.5–5.2)
RBC # BLD AUTO: 3.25 10*6/MM3 (ref 3.9–5.2)
SODIUM BLD-SCNC: 139 MMOL/L (ref 136–145)
WBC NRBC COR # BLD: 6.36 10*3/MM3 (ref 4.5–10.7)

## 2017-08-21 PROCEDURE — 85027 COMPLETE CBC AUTOMATED: CPT | Performed by: INTERNAL MEDICINE

## 2017-08-21 PROCEDURE — 94799 UNLISTED PULMONARY SVC/PX: CPT

## 2017-08-21 PROCEDURE — 25010000002 ENOXAPARIN PER 10 MG: Performed by: OBSTETRICS & GYNECOLOGY

## 2017-08-21 PROCEDURE — 83880 ASSAY OF NATRIURETIC PEPTIDE: CPT | Performed by: INTERNAL MEDICINE

## 2017-08-21 PROCEDURE — 80048 BASIC METABOLIC PNL TOTAL CA: CPT | Performed by: INTERNAL MEDICINE

## 2017-08-21 RX ORDER — HYDROCODONE BITARTRATE AND ACETAMINOPHEN 5; 325 MG/1; MG/1
1 TABLET ORAL EVERY 6 HOURS PRN
Qty: 120 TABLET | Refills: 0 | Status: SHIPPED | OUTPATIENT
Start: 2017-08-21 | End: 2017-09-08 | Stop reason: HOSPADM

## 2017-08-21 RX ORDER — LORAZEPAM 1 MG/1
1 TABLET ORAL EVERY 8 HOURS PRN
Qty: 90 TABLET | Refills: 0 | Status: SHIPPED | OUTPATIENT
Start: 2017-08-21 | End: 2017-09-08 | Stop reason: HOSPADM

## 2017-08-21 RX ORDER — FERROUS SULFATE 325(65) MG
325 TABLET ORAL
Qty: 60 TABLET | Refills: 12 | Status: SHIPPED | OUTPATIENT
Start: 2017-08-21 | End: 2018-02-02 | Stop reason: HOSPADM

## 2017-08-21 RX ORDER — IPRATROPIUM BROMIDE AND ALBUTEROL SULFATE 2.5; .5 MG/3ML; MG/3ML
3 SOLUTION RESPIRATORY (INHALATION)
Qty: 360 ML | Refills: 2 | Status: CANCELLED | OUTPATIENT
Start: 2017-08-21

## 2017-08-21 RX ADMIN — FERROUS SULFATE TAB 325 MG (65 MG ELEMENTAL FE) 325 MG: 325 (65 FE) TAB at 09:10

## 2017-08-21 RX ADMIN — PANTOPRAZOLE SODIUM 40 MG: 40 TABLET, DELAYED RELEASE ORAL at 06:28

## 2017-08-21 RX ADMIN — RISPERIDONE 3 MG: 3 TABLET ORAL at 22:15

## 2017-08-21 RX ADMIN — LACTULOSE 20 G: 20 SOLUTION ORAL at 09:10

## 2017-08-21 RX ADMIN — HYDROCODONE BITARTRATE AND ACETAMINOPHEN 1 TABLET: 5; 325 TABLET ORAL at 01:24

## 2017-08-21 RX ADMIN — QUETIAPINE FUMARATE 25 MG: 25 TABLET, FILM COATED ORAL at 22:15

## 2017-08-21 RX ADMIN — AMMONIUM LACTATE: 120 CREAM TOPICAL at 22:15

## 2017-08-21 RX ADMIN — BUMETANIDE 2 MG: 2 TABLET ORAL at 09:10

## 2017-08-21 RX ADMIN — AMMONIUM LACTATE: 120 CREAM TOPICAL at 09:10

## 2017-08-21 RX ADMIN — POTASSIUM CHLORIDE 40 MEQ: 750 CAPSULE, EXTENDED RELEASE ORAL at 09:10

## 2017-08-21 RX ADMIN — VALPROIC ACID 250 MG: 250 SOLUTION ORAL at 09:10

## 2017-08-21 RX ADMIN — OXYCODONE HYDROCHLORIDE AND ACETAMINOPHEN 1 TABLET: 5; 325 TABLET ORAL at 09:10

## 2017-08-21 RX ADMIN — QUETIAPINE FUMARATE 25 MG: 25 TABLET, FILM COATED ORAL at 09:10

## 2017-08-21 RX ADMIN — IPRATROPIUM BROMIDE AND ALBUTEROL SULFATE 3 ML: .5; 3 SOLUTION RESPIRATORY (INHALATION) at 07:40

## 2017-08-21 RX ADMIN — VALPROIC ACID 250 MG: 250 SOLUTION ORAL at 22:15

## 2017-08-21 RX ADMIN — IPRATROPIUM BROMIDE AND ALBUTEROL SULFATE 3 ML: .5; 3 SOLUTION RESPIRATORY (INHALATION) at 11:16

## 2017-08-21 RX ADMIN — RISPERIDONE 2 MG: 2 TABLET ORAL at 09:10

## 2017-08-21 RX ADMIN — ENOXAPARIN SODIUM 40 MG: 40 INJECTION SUBCUTANEOUS at 09:00

## 2017-08-21 RX ADMIN — ATORVASTATIN CALCIUM 20 MG: 20 TABLET, FILM COATED ORAL at 09:10

## 2017-08-21 RX ADMIN — IPRATROPIUM BROMIDE AND ALBUTEROL SULFATE 3 ML: .5; 3 SOLUTION RESPIRATORY (INHALATION) at 15:43

## 2017-08-21 RX ADMIN — IPRATROPIUM BROMIDE AND ALBUTEROL SULFATE 3 ML: .5; 3 SOLUTION RESPIRATORY (INHALATION) at 19:11

## 2017-08-21 RX ADMIN — ISOSORBIDE MONONITRATE 30 MG: 20 TABLET ORAL at 09:10

## 2017-08-21 NOTE — PLAN OF CARE
Problem: Patient Care Overview (Adult)  Goal: Plan of Care Review  Outcome: Ongoing (interventions implemented as appropriate)    08/21/17 1324   Coping/Psychosocial Response Interventions   Plan Of Care Reviewed With patient   Outcome Evaluation   Outcome Summary/Follow up Plan Pt to be discharged today back to [facility]; VSS; Pt A&Ox4; wound care consulted; Meds through PEG tube; physicians feel she is at her baseline this AM   Patient Care Overview   Progress improving       Goal: Adult Individualization and Mutuality  Outcome: Ongoing (interventions implemented as appropriate)  Goal: Discharge Needs Assessment  Outcome: Ongoing (interventions implemented as appropriate)

## 2017-08-21 NOTE — PROGRESS NOTES
Continued Stay Note  [facility]     Patient Name: Lupe Burleson  MRN: [id number]  Today's Date: 8/21/2017    Admit Date: 8/13/2017          Discharge Plan       08/21/17 1701    Case Management/Social Work Plan    Plan Return to [facility]    Patient/Family In Agreement With Plan yes    Additional Comments DC orders in Epic.  Spoke with Flor - patient will return to  level bed.  [facility] scheduled for 5:30 p.m. Left message for state guardian Helene Pepe.  Packet to RN.      Final Note    Final Note Patient will be DC'd to  bed at [facility] via [facility].              Discharge Codes       08/21/17 1701    Discharge Codes    Discharge Codes 04  Discharged/transferred to intermediate care facility (ICF)        Expected Discharge Date and Time     Expected Discharge Date Expected Discharge Time    Aug 21, 2017             Becky S. [place], RN

## 2017-08-21 NOTE — NURSING NOTE
08/21/17 1340   Pressure Ulcer 08/19/17 0545 other (see comments) midline coccyx Stage II   Date first assessed/Time first assessed: 08/19/17 0545   Present On Admission (Pressure Ulcer): no  Side: (c) other (see comments)  Orientation: midline  Location: coccyx  Stage: Stage II   Pressure Ulcer Appearance clean;moist;pink   Periwound Area dry   Length (Pressure Ulcer) (cm) 1   Width (Pressure Ulcer) (cm) 0.7   Depth (Pressure Ulcer) (cm) 0.1   Pressure Ulcer Wound Care barrier applied   Pressure Ulcer Therapeutic Interventions (turned)   Periwound Care barrier ointment applied     Patient has a pressure injury, stage 2 to coccyx. Applied barrier ointment to protect the tissue at this time. Patient is being discharged. She will need to continue being repositioned and have barrier ointment applied Q12 hours + PRN.

## 2017-08-21 NOTE — DISCHARGE SUMMARY
PHYSICIAN DISCHARGE SUMMARY  [facility], [facility]    Patient Identification:  Name: Lupe Burleson  Age: 61 y.o.  Sex: female  :  1955  MRN: [id number]    Primary Care Physician: Shane Barcenas MD    Admit date: 2017  Discharge date and time:2017    Discharged Condition: stable    Discharge Diagnoses:  Principal Problem:    Vaginal bleeding  Active Problems:    Endometrial cancer determined by uterine biopsy    Hypertension    Chronic diastolic CHF (congestive heart failure)    DM (diabetes mellitus)    Schizo affective schizophrenia    Coronary artery disease    Bipolar disorder, unspecified    Patient Active Problem List   Diagnosis Code   • Elevated troponin R79.89   • Aspiration pneumonia J69.0   • Hematuria R31.9   • Hypokalemia E87.6   • Pelvic mass in female R19.00   • Fecal impaction K56.41   • Endometrial carcinoma C54.1   • Acute on chronic respiratory failure with hypoxia and hypercapnia J96.21, J96.22   • Acute on chronic diastolic CHF (congestive heart failure) I50.33   • UTI (urinary tract infection) N39.0   • Leucocytosis D72.829   • Chronic diastolic CHF (congestive heart failure) I50.32   • DM (diabetes mellitus) E11.9   • Morbid obesity E66.01   • HTN (hypertension) I10   • Schizophrenia F20.9   • Tracheostomy malfunction J95.03   • Pyuria N39.0   • THAI (obstructive sleep apnea) G47.33   • Generalized weakness R53.1   • Schizo affective schizophrenia F25.0   • Diabetes mellitus E11.9   • Coronary artery disease I25.10   • Endometrial cancer determined by uterine biopsy C54.1, Z15.04   • Chronic respiratory failure with hypoxia and hypercapnia J96.11, J96.12   • Toxic metabolic encephalopathy G92   • Pneumonia J18.9   • Abnormal vaginal bleeding N93.9   • Schizo affective schizophrenia F25.0   • CHF (congestive heart failure) I50.9   • Endometrial cancer determined by uterine biopsy C54.1, Z15.04   • Coronary artery disease I25.10   •  "Lymphedema I89.0   • Immobility Z74.09   • Acute blood loss anemia D62   • Vaginal bleeding N93.9   • Hypertension I10   • Uterine cancer C55   • Bipolar disorder, unspecified F31.9          Hospital Course: Lupe Burleson  is a 55 year old female with prior history of bipolar DO, CHF, DMII and edometrial cancer who has been on megace for vaginal bleeding for approximately 2 years. She was discharged from this facility 6-23-17 for vaginal bleeding and was due to follow up with Dr. Washington this week if she had no further bleeding. Per the NH staff and the patient, she had no bleeding until the day of admission , at which times she states \"it was a Lot\". She was transferred to the ED and admitted for further treatment. She was taken to the OR for CRISS. Post-operatively she has done fairly well, but still needs O2 during sleep and strong encouragement for participation in pulmonary toiletry. Her incision shows no redness or drainage and her pain has been well managed with PO medications. She can be discharged to the nursing home with F/U with Dr. Washington in 4 weeks.     PMHX:   Past Medical History:   Diagnosis Date   • Acute respiratory distress syndrome    • Arthritis    • Bipolar disorder, unspecified    • CAD (coronary artery disease)    • Cellulitis    • Cellulitis    • CHF (congestive heart failure)    • Chronic ischemic heart disease    • Chronic respiratory failure with hypoxia and hypercapnia    • Chronic ulcer of calf    • Constipation    • Coronary artery disease    • Depression    • Depressive disorder    • Diabetes mellitus    • Dysphagia    • Dysphagia    • Endometrial cancer determined by uterine biopsy     s/p radiation; no improvement   • Endometrial hyperplasia    • History of transfusion    • Hypercapnia    • Hypertension    • Immobility    • Lack of coordination    • Lymphedema    • Malignant neoplasm of uterus    • Muscle weakness    • Obesities, morbid    • Oxygen dependent    • Post-menopausal " "bleeding    • Postmenopausal bleeding    • Schizo affective schizophrenia    • Skin ulcer    • Sleep apnea    • Stroke    • Symbolic dysfunction    • Uterine cancer    • Venous insufficiency    • Venous insufficiency      PSHX:   Past Surgical History:   Procedure Laterality Date   • D&C HYSTEROSCOPY     • LAPAROSCOPIC TUBAL LIGATION     • TOTAL LAPAROSCOPIC HYSTERECTOMY Bilateral 8/15/2017    Procedure: OPEN TOTAL  ABDOMINAL HYSTERECTOMY WITH BILATERAL SALPINGO-OOPHERECTOMY;  Surgeon: Ortiz Washington MD;  Location: [facility] OR;  Service:    • TRACHEOSTOMY AND PEG TUBE INSERTION     • UMBILICAL HERNIA REPAIR     • WOUND DEBRIDEMENT             Consults:     Consults     Date and Time Order Name Status Description    8/14/2017 1318 Inpatient Consult to Hematology & Oncology      8/13/2017 1942 Family Medicine Consult Completed           Discharge Exam:    /53 (BP Location: Left arm, Patient Position: Lying)  Pulse 72  Temp 98.1 °F (36.7 °C) (Oral)   Resp 16  Ht 66\" (167.6 cm)  Wt 255 lb 14.4 oz (116 kg)  SpO2 99%  BMI 41.3 kg/m2    General: Alert, better mental status. In no acute distress  Neck: Supple, symmetrical, trachea midline, no adenopathy;              Thyroid without enlargement/tenderness/nodules;              no carotid bruit or JVD  Cardiovascular: Normal rate, regular rhythm and intact distal pulses.                              Exam reveals no gallop and no friction rub. No murmur heard  Pulmonary: dimnished bilateraly, no rhonchi. No increased WOB, rales, wheezes.   Abdominal: Soft,  non-tender; bowel sounds active all four quadrants;                      no masses,  hepatomegaly, splenomegaly. Abdominal wound approximated with staples. Right mid-wound                                        with very no redness or warmth.   Extremities: Normal, atraumatic, no cyanosis or edema  Pulses:         2 + symmetric all extremities  Neurological: She is alert and oriented to person, place, and " time.                           CNII-XII intact, normal strength, sensation intact throughout  Skin: Skin color, texture, turgor normal, no rashes or lesions         Data Review:        Results from last 7 days  Lab Units 08/21/17  0602 08/20/17  0450 08/19/17  0906   WBC 10*3/mm3 6.36 5.91 7.48   HEMOGLOBIN g/dL 7.0* 7.1* 7.8*   HEMATOCRIT % 23.3* 23.5* 25.5*   PLATELETS 10*3/mm3 405 381 421         Results from last 7 days  Lab Units 08/21/17  0602 08/20/17  0450 08/19/17  0906   SODIUM mmol/L 139 141 141   POTASSIUM mmol/L 3.6 3.3* 3.5   CHLORIDE mmol/L 97* 101 101   CO2 mmol/L 30.3* 29.4* 26.3   BUN mg/dL 8 9 7*   CREATININE mg/dL 0.56* 0.56* 0.56*   CALCIUM mg/dL 8.7 8.5* 8.9   GLUCOSE mg/dL 84 88 112*             Lab Results  Lab Value Date/Time   TROPONINT 0.065 (H) 05/17/2017 0505   TROPONINT 0.077 (H) 05/16/2017 1517   TROPONINT 0.080 (H) 05/16/2017 1112   TROPONINT 0.078 (H) 05/16/2017 0703   TROPONINT 0.084 (H) 05/16/2017 0116   TROPONINT <0.010 05/15/2016 0622   TROPONINT 0.017 05/14/2016 0437   TROPONINT 0.023 05/13/2016 2036   TROPONINT 0.031 (H) 05/13/2016 1231   TROPONINT <0.01 02/03/2016 0537   TROPONINT <0.01 02/02/2016 0914   TROPONINT <0.01 11/11/2015 0516   TROPONINT <0.01 11/09/2015 2049   TROPONINT <0.01 04/18/2014 0517       Microbiology Results (last 10 days)     Procedure Component Value - Date/Time    Urine Culture [[id number]]  (Normal) Collected:  08/13/17 2002    Lab Status:  Final result Specimen:  Urine from Urine, Catheter Updated:  08/15/17 0632     Urine Culture No growth           Imaging Results (all)     Procedure Component Value Units Date/Time    XR Chest Post CVA Port [[id number]] Collected:  08/15/17 1148     Updated:  08/15/17 1153    Narrative:       CHEST POST CVA PORT     HISTORY: Morbidly obese 61-year-old female for right jugular line  placement.     COMPARISON: 05/16/2017     FINDINGS:  1. Right jugular line projects over the superior vena cava.  2. Imaging is  considerably limited by morbid obesity, low lung volumes,  mandibular artifact obscuring detail of the right apex.     If patient has symptoms suggesting pneumothorax, follow-up lordotic  imaging or standard imaging with removal mandibular artifact would be  helpful.     This report was finalized on 8/15/2017 11:50 AM by Dr. Bryan Yadav MD.       US Pelvis Complete [[id number]] Collected:  08/13/17 1902     Updated:  08/17/17 1112    Narrative:       PELVIC ULTRASOUND     HISTORY: 61-year-old nursing home patient with vaginal bleeding.     The examination was performed as an emergency procedure. The patient  refused transvaginal imaging. The uterus is enlarged and lobulated,  measuring 9.8 x 9.8 x 13.7 cm and there are multiple fibroids measuring  up to 7 cm in size. There is also generalized endometrial thickening  with double wall thickness of 1.9 cm. The patient is postmenopausal and  this does raise a concern of endometrial tumor and further clinical  correlation is required.     The ovaries are not visualized. There is no free fluid identified.     This report was finalized on 8/17/2017 11:09 AM by Dr. Dionte Barry MD.       XR Chest PA & Lateral [[id number]] Collected:  08/18/17 1131     Updated:  08/18/17 1432    Narrative:       PA AND LATERAL CHEST     HISTORY: Cough.     PA and lateral views of the chest were obtained and compared to  08/15/2017.     FINDINGS:     The right internal jugular central line has been removed. The study is  hampered by the patient's body habitus, patient positioning and  inspiratory effort. There is bibasilar atelectasis/infiltrate more  prominent on the left, similar to slightly less prominent as compared to  prior examination. The pulmonary interstitial vascular prominence which  was present previously is less prominent on the current examination. On  the lateral projection bilateral pleural effusions are appreciated,  moderate in size and there is moderate posterior  atelectasis/infiltrate  bilaterally. The above information was called to patient's nurse who is  to  relay the information to the clinical service.     This report was finalized on 8/18/2017 2:29 PM by Dr. Mario Sim MD.       XR Chest PA & Lateral [[id number]] Collected:  08/20/17 1318     Updated:  08/20/17 1638    Narrative:       PA AND LATERAL CHEST RADIOGRAPHS     HISTORY: 61-year-old female undergoing follow-up evaluation of chest  infiltrates.     FINDINGS: Upright AP and lateral chest radiographs were obtained.  Comparison is made to a prior examination dated 08/18/2017. There is  mild bilateral basilar atelectasis with decreased lung volumes. Moderate  atheromatous calcification is seen within the aortic arch. There is  limited visualization on the lateral chest radiograph and the previously  described pleural effusions cannot be substantiated on the current  examination.       Impression:       Persistent low lung volumes with bibasilar atelectasis. The  previously described pleural effusions are not certainly identified on  the current examination as the lateral chest radiograph is markedly  limited. Little interval change has occurred on the PA chest radiograph  when compared to the prior examination.     This report was finalized on 8/20/2017 4:35 PM by Dr. Darrin Christian MD.               Disposition:    Nursing Home    Patient Instructions: See After Visit Summary for Medications    Discharge Order     None        Follow-up Information     Follow up with [facility] .    Specialties:  Skilled Nursing Facility, Intermediate Care Facility    Contact information:    [address] [place] [address] [id number] [phone]          Total time spent discharging patient including evaluation,post hospitalization follow up,  medication and post hospitalization instructions and education total time exceeds 33 minutes.    Signed:  Shane Barcenas MD  8/21/2017  1:14 PM      Much of  this encounter note is an electronic transcription/translation of spoken language to printed text. The electronic translation of spoken language may permit erroneous, or at times, nonsensical words or phrases to be inadvertently transcribed; Although I have reviewed the note for such errors, some may still exist

## 2017-08-21 NOTE — PLAN OF CARE
Problem: Patient Care Overview (Adult)  Goal: Plan of Care Review  Outcome: Ongoing (interventions implemented as appropriate)    08/21/17 0413   Coping/Psychosocial Response Interventions   Plan Of Care Reviewed With patient   Outcome Evaluation   Outcome Summary/Follow up Plan No change during night. VSS. Pain meds x1. C/o itching during night. Patient thinks she has bugs on her. Will continue to monitor.   Patient Care Overview   Progress no change

## 2017-08-27 ENCOUNTER — HOSPITAL ENCOUNTER (INPATIENT)
Facility: HOSPITAL | Age: 62
LOS: 12 days | Discharge: NURSING, REHAB OR LONG-TERM CARE | End: 2017-09-08
Attending: EMERGENCY MEDICINE | Admitting: INTERNAL MEDICINE

## 2017-08-27 ENCOUNTER — APPOINTMENT (OUTPATIENT)
Dept: GENERAL RADIOLOGY | Facility: HOSPITAL | Age: 62
End: 2017-08-27

## 2017-08-27 ENCOUNTER — APPOINTMENT (OUTPATIENT)
Dept: CT IMAGING | Facility: HOSPITAL | Age: 62
End: 2017-08-27

## 2017-08-27 DIAGNOSIS — A41.9 SEPSIS, DUE TO UNSPECIFIED ORGANISM: Primary | ICD-10-CM

## 2017-08-27 DIAGNOSIS — K65.1 INTRA-ABDOMINAL ABSCESS (HCC): ICD-10-CM

## 2017-08-27 LAB
ALBUMIN SERPL-MCNC: 2.9 G/DL (ref 3.5–5.2)
ALBUMIN/GLOB SERPL: 0.6 G/DL
ALP SERPL-CCNC: 63 U/L (ref 39–117)
ALT SERPL W P-5'-P-CCNC: 10 U/L (ref 1–33)
ANION GAP SERPL CALCULATED.3IONS-SCNC: 14.4 MMOL/L
ANISOCYTOSIS BLD QL: ABNORMAL
ARTERIAL PATENCY WRIST A: POSITIVE
AST SERPL-CCNC: 14 U/L (ref 1–32)
ATMOSPHERIC PRESS: 752.1 MMHG
BACTERIA UR QL AUTO: ABNORMAL /HPF
BASE EXCESS BLDA CALC-SCNC: 8 MMOL/L (ref 0–2)
BDY SITE: ABNORMAL
BILIRUB SERPL-MCNC: 0.6 MG/DL (ref 0.1–1.2)
BILIRUB UR QL STRIP: NEGATIVE
BUN BLD-MCNC: 11 MG/DL (ref 8–23)
BUN/CREAT SERPL: 17.5 (ref 7–25)
CALCIUM SPEC-SCNC: 9.5 MG/DL (ref 8.6–10.5)
CHLORIDE SERPL-SCNC: 101 MMOL/L (ref 98–107)
CLARITY UR: ABNORMAL
CO2 SERPL-SCNC: 31.6 MMOL/L (ref 22–29)
COLOR UR: YELLOW
CREAT BLD-MCNC: 0.63 MG/DL (ref 0.57–1)
D-LACTATE SERPL-SCNC: 1.9 MMOL/L (ref 0.5–2)
D-LACTATE SERPL-SCNC: 2.3 MMOL/L (ref 0.5–2)
DEPRECATED RDW RBC AUTO: 51.6 FL (ref 37–54)
ERYTHROCYTE [DISTWIDTH] IN BLOOD BY AUTOMATED COUNT: 20.8 % (ref 11.7–13)
GAS FLOW AIRWAY: 2 LPM
GFR SERPL CREATININE-BSD FRML MDRD: 116 ML/MIN/1.73
GLOBULIN UR ELPH-MCNC: 4.6 GM/DL
GLUCOSE BLD-MCNC: 119 MG/DL (ref 65–99)
GLUCOSE BLDC GLUCOMTR-MCNC: 110 MG/DL (ref 70–130)
GLUCOSE BLDC GLUCOMTR-MCNC: 91 MG/DL (ref 70–130)
GLUCOSE UR STRIP-MCNC: NEGATIVE MG/DL
HCO3 BLDA-SCNC: 32.3 MMOL/L (ref 22–28)
HCT VFR BLD AUTO: 25.1 % (ref 35.6–45.5)
HGB BLD-MCNC: 7.5 G/DL (ref 11.9–15.5)
HGB UR QL STRIP.AUTO: ABNORMAL
HYALINE CASTS UR QL AUTO: ABNORMAL /LPF
HYPOCHROMIA BLD QL: ABNORMAL
KETONES UR QL STRIP: NEGATIVE
LEUKOCYTE ESTERASE UR QL STRIP.AUTO: ABNORMAL
LYMPHOCYTES # BLD MANUAL: 1.23 10*3/MM3 (ref 0.9–4.8)
LYMPHOCYTES NFR BLD MANUAL: 12 % (ref 5–12)
LYMPHOCYTES NFR BLD MANUAL: 6 % (ref 19.6–45.3)
MCH RBC QN AUTO: 20.5 PG (ref 26.9–32)
MCHC RBC AUTO-ENTMCNC: 29.9 G/DL (ref 32.4–36.3)
MCV RBC AUTO: 68.6 FL (ref 80.5–98.2)
MICROCYTES BLD QL: ABNORMAL
MODALITY: ABNORMAL
MONOCYTES # BLD AUTO: 2.46 10*3/MM3 (ref 0.2–1.2)
NEUTROPHILS # BLD AUTO: 16.82 10*3/MM3 (ref 1.9–8.1)
NEUTROPHILS NFR BLD MANUAL: 82 % (ref 42.7–76)
NITRITE UR QL STRIP: NEGATIVE
PCO2 BLDA: 43 MM HG (ref 35–45)
PH BLDA: 7.48 PH UNITS (ref 7.35–7.45)
PH UR STRIP.AUTO: <=5 [PH] (ref 5–8)
PLAT MORPH BLD: NORMAL
PLATELET # BLD AUTO: 499 10*3/MM3 (ref 140–500)
PMV BLD AUTO: 9.2 FL (ref 6–12)
PO2 BLDA: 83.2 MM HG (ref 80–100)
POTASSIUM BLD-SCNC: 3.5 MMOL/L (ref 3.5–5.2)
PROT SERPL-MCNC: 7.5 G/DL (ref 6–8.5)
PROT UR QL STRIP: NEGATIVE
RBC # BLD AUTO: 3.66 10*6/MM3 (ref 3.9–5.2)
RBC # UR: ABNORMAL /HPF
REF LAB TEST METHOD: ABNORMAL
SAO2 % BLDCOA: 96.9 % (ref 92–99)
SCHISTOCYTES BLD QL SMEAR: ABNORMAL
SODIUM BLD-SCNC: 147 MMOL/L (ref 136–145)
SP GR UR STRIP: 1.01 (ref 1–1.03)
SQUAMOUS #/AREA URNS HPF: ABNORMAL /HPF
TARGETS BLD QL SMEAR: ABNORMAL
TOTAL RATE: 20 BREATHS/MINUTE
UROBILINOGEN UR QL STRIP: ABNORMAL
VALPROATE SERPL-MCNC: 32 MCG/ML (ref 50–125)
WBC MORPH BLD: NORMAL
WBC NRBC COR # BLD: 20.51 10*3/MM3 (ref 4.5–10.7)
WBC UR QL AUTO: ABNORMAL /HPF

## 2017-08-27 PROCEDURE — 25010000002 PIPERACILLIN SOD-TAZOBACTAM PER 1 G: Performed by: EMERGENCY MEDICINE

## 2017-08-27 PROCEDURE — 71010 HC CHEST PA OR AP: CPT

## 2017-08-27 PROCEDURE — 85007 BL SMEAR W/DIFF WBC COUNT: CPT | Performed by: EMERGENCY MEDICINE

## 2017-08-27 PROCEDURE — 82962 GLUCOSE BLOOD TEST: CPT

## 2017-08-27 PROCEDURE — 83605 ASSAY OF LACTIC ACID: CPT | Performed by: EMERGENCY MEDICINE

## 2017-08-27 PROCEDURE — 81001 URINALYSIS AUTO W/SCOPE: CPT | Performed by: EMERGENCY MEDICINE

## 2017-08-27 PROCEDURE — 36600 WITHDRAWAL OF ARTERIAL BLOOD: CPT

## 2017-08-27 PROCEDURE — 0 IOPAMIDOL 61 % SOLUTION: Performed by: EMERGENCY MEDICINE

## 2017-08-27 PROCEDURE — 87150 DNA/RNA AMPLIFIED PROBE: CPT | Performed by: EMERGENCY MEDICINE

## 2017-08-27 PROCEDURE — 25010000002 VANCOMYCIN: Performed by: EMERGENCY MEDICINE

## 2017-08-27 PROCEDURE — 87186 SC STD MICRODIL/AGAR DIL: CPT | Performed by: EMERGENCY MEDICINE

## 2017-08-27 PROCEDURE — 99285 EMERGENCY DEPT VISIT HI MDM: CPT

## 2017-08-27 PROCEDURE — 87086 URINE CULTURE/COLONY COUNT: CPT | Performed by: EMERGENCY MEDICINE

## 2017-08-27 PROCEDURE — 87205 SMEAR GRAM STAIN: CPT | Performed by: EMERGENCY MEDICINE

## 2017-08-27 PROCEDURE — 80164 ASSAY DIPROPYLACETIC ACD TOT: CPT | Performed by: EMERGENCY MEDICINE

## 2017-08-27 PROCEDURE — 85025 COMPLETE CBC W/AUTO DIFF WBC: CPT | Performed by: EMERGENCY MEDICINE

## 2017-08-27 PROCEDURE — 87070 CULTURE OTHR SPECIMN AEROBIC: CPT | Performed by: EMERGENCY MEDICINE

## 2017-08-27 PROCEDURE — 87147 CULTURE TYPE IMMUNOLOGIC: CPT | Performed by: EMERGENCY MEDICINE

## 2017-08-27 PROCEDURE — 80053 COMPREHEN METABOLIC PANEL: CPT | Performed by: EMERGENCY MEDICINE

## 2017-08-27 PROCEDURE — 87040 BLOOD CULTURE FOR BACTERIA: CPT | Performed by: EMERGENCY MEDICINE

## 2017-08-27 PROCEDURE — 82803 BLOOD GASES ANY COMBINATION: CPT

## 2017-08-27 PROCEDURE — 74177 CT ABD & PELVIS W/CONTRAST: CPT

## 2017-08-27 PROCEDURE — 71260 CT THORAX DX C+: CPT

## 2017-08-27 PROCEDURE — 36415 COLL VENOUS BLD VENIPUNCTURE: CPT | Performed by: EMERGENCY MEDICINE

## 2017-08-27 RX ORDER — LORAZEPAM 1 MG/1
1 TABLET ORAL EVERY 8 HOURS PRN
Status: DISCONTINUED | OUTPATIENT
Start: 2017-08-27 | End: 2017-09-08 | Stop reason: HOSPADM

## 2017-08-27 RX ORDER — ACETAMINOPHEN 160 MG/5ML
650 SOLUTION ORAL EVERY 4 HOURS PRN
Status: DISCONTINUED | OUTPATIENT
Start: 2017-08-27 | End: 2017-09-08 | Stop reason: HOSPADM

## 2017-08-27 RX ORDER — RISPERIDONE 3 MG/1
3 TABLET ORAL NIGHTLY
Status: DISCONTINUED | OUTPATIENT
Start: 2017-08-27 | End: 2017-09-08 | Stop reason: HOSPADM

## 2017-08-27 RX ORDER — PANTOPRAZOLE SODIUM 40 MG/1
40 TABLET, DELAYED RELEASE ORAL
Status: DISCONTINUED | OUTPATIENT
Start: 2017-08-28 | End: 2017-09-08 | Stop reason: HOSPADM

## 2017-08-27 RX ORDER — SODIUM CHLORIDE 0.9 % (FLUSH) 0.9 %
10 SYRINGE (ML) INJECTION AS NEEDED
Status: DISCONTINUED | OUTPATIENT
Start: 2017-08-27 | End: 2017-09-08 | Stop reason: HOSPADM

## 2017-08-27 RX ORDER — ACETAMINOPHEN 500 MG
1000 TABLET ORAL ONCE
Status: DISCONTINUED | OUTPATIENT
Start: 2017-08-27 | End: 2017-08-27

## 2017-08-27 RX ORDER — LACTULOSE 10 G/15ML
20 SOLUTION ORAL DAILY
Status: DISCONTINUED | OUTPATIENT
Start: 2017-08-28 | End: 2017-09-08 | Stop reason: HOSPADM

## 2017-08-27 RX ORDER — POTASSIUM CHLORIDE 750 MG/1
20 CAPSULE, EXTENDED RELEASE ORAL DAILY
Status: DISCONTINUED | OUTPATIENT
Start: 2017-08-28 | End: 2017-09-08 | Stop reason: HOSPADM

## 2017-08-27 RX ORDER — HYDROCODONE BITARTRATE AND ACETAMINOPHEN 5; 325 MG/1; MG/1
1 TABLET ORAL EVERY 6 HOURS PRN
Status: DISCONTINUED | OUTPATIENT
Start: 2017-08-27 | End: 2017-09-08 | Stop reason: HOSPADM

## 2017-08-27 RX ORDER — VALPROIC ACID 250 MG/5ML
250 SOLUTION ORAL EVERY 12 HOURS SCHEDULED
Status: DISCONTINUED | OUTPATIENT
Start: 2017-08-27 | End: 2017-09-08 | Stop reason: HOSPADM

## 2017-08-27 RX ORDER — IPRATROPIUM BROMIDE AND ALBUTEROL SULFATE 2.5; .5 MG/3ML; MG/3ML
3 SOLUTION RESPIRATORY (INHALATION) EVERY 4 HOURS PRN
Status: DISCONTINUED | OUTPATIENT
Start: 2017-08-27 | End: 2017-09-08 | Stop reason: HOSPADM

## 2017-08-27 RX ORDER — ACETAMINOPHEN 650 MG/1
650 SUPPOSITORY RECTAL ONCE
Status: COMPLETED | OUTPATIENT
Start: 2017-08-27 | End: 2017-08-27

## 2017-08-27 RX ORDER — FERROUS SULFATE 325(65) MG
325 TABLET ORAL
Status: DISCONTINUED | OUTPATIENT
Start: 2017-08-28 | End: 2017-09-04

## 2017-08-27 RX ORDER — ATORVASTATIN CALCIUM 20 MG/1
20 TABLET, FILM COATED ORAL DAILY
Status: DISCONTINUED | OUTPATIENT
Start: 2017-08-28 | End: 2017-09-08 | Stop reason: HOSPADM

## 2017-08-27 RX ORDER — SODIUM CHLORIDE 9 MG/ML
50 INJECTION, SOLUTION INTRAVENOUS CONTINUOUS
Status: DISCONTINUED | OUTPATIENT
Start: 2017-08-27 | End: 2017-09-02

## 2017-08-27 RX ORDER — RISPERIDONE 2 MG/1
2 TABLET ORAL DAILY
Status: DISCONTINUED | OUTPATIENT
Start: 2017-08-28 | End: 2017-09-08 | Stop reason: HOSPADM

## 2017-08-27 RX ADMIN — VANCOMYCIN HYDROCHLORIDE 2250 MG: 1 INJECTION, POWDER, LYOPHILIZED, FOR SOLUTION INTRAVENOUS at 15:53

## 2017-08-27 RX ADMIN — VALPROIC ACID 250 MG: 250 SOLUTION ORAL at 23:47

## 2017-08-27 RX ADMIN — RISPERIDONE 3 MG: 3 TABLET ORAL at 23:47

## 2017-08-27 RX ADMIN — SODIUM CHLORIDE 125 ML/HR: 9 INJECTION, SOLUTION INTRAVENOUS at 15:24

## 2017-08-27 RX ADMIN — ACETAMINOPHEN 650 MG: 650 SUPPOSITORY RECTAL at 12:08

## 2017-08-27 RX ADMIN — SODIUM CHLORIDE 1000 ML: 9 INJECTION, SOLUTION INTRAVENOUS at 13:54

## 2017-08-27 RX ADMIN — HYDROCODONE BITARTRATE AND ACETAMINOPHEN 1 TABLET: 5; 325 TABLET ORAL at 23:47

## 2017-08-27 RX ADMIN — TAZOBACTAM SODIUM AND PIPERACILLIN SODIUM 4.5 G: 500; 4 INJECTION, SOLUTION INTRAVENOUS at 15:20

## 2017-08-27 RX ADMIN — IOPAMIDOL 85 ML: 612 INJECTION, SOLUTION INTRAVENOUS at 14:57

## 2017-08-28 PROBLEM — K65.1 ABSCESS OF ABDOMINAL CAVITY (HCC): Status: ACTIVE | Noted: 2017-08-28

## 2017-08-28 LAB
ABO GROUP BLD: NORMAL
ANION GAP SERPL CALCULATED.3IONS-SCNC: 12.6 MMOL/L
BACTERIA BLD CULT: ABNORMAL
BLD GP AB SCN SERPL QL: NEGATIVE
BUN BLD-MCNC: 11 MG/DL (ref 8–23)
BUN/CREAT SERPL: 22.4 (ref 7–25)
CALCIUM SPEC-SCNC: 8.6 MG/DL (ref 8.6–10.5)
CHLORIDE SERPL-SCNC: 105 MMOL/L (ref 98–107)
CO2 SERPL-SCNC: 27.4 MMOL/L (ref 22–29)
CREAT BLD-MCNC: 0.49 MG/DL (ref 0.57–1)
DEPRECATED RDW RBC AUTO: 57.7 FL (ref 37–54)
ERYTHROCYTE [DISTWIDTH] IN BLOOD BY AUTOMATED COUNT: 21.9 % (ref 11.7–13)
GFR SERPL CREATININE-BSD FRML MDRD: >150 ML/MIN/1.73
GLUCOSE BLD-MCNC: 127 MG/DL (ref 65–99)
GLUCOSE BLDC GLUCOMTR-MCNC: 120 MG/DL (ref 70–130)
GLUCOSE BLDC GLUCOMTR-MCNC: 124 MG/DL (ref 70–130)
GLUCOSE BLDC GLUCOMTR-MCNC: 129 MG/DL (ref 70–130)
GLUCOSE BLDC GLUCOMTR-MCNC: 164 MG/DL (ref 70–130)
HCT VFR BLD AUTO: 25.9 % (ref 35.6–45.5)
HGB BLD-MCNC: 7.3 G/DL (ref 11.9–15.5)
MCH RBC QN AUTO: 20.4 PG (ref 26.9–32)
MCHC RBC AUTO-ENTMCNC: 28.2 G/DL (ref 32.4–36.3)
MCV RBC AUTO: 72.5 FL (ref 80.5–98.2)
PLATELET # BLD AUTO: 539 10*3/MM3 (ref 140–500)
PMV BLD AUTO: 10 FL (ref 6–12)
POTASSIUM BLD-SCNC: 3.1 MMOL/L (ref 3.5–5.2)
RBC # BLD AUTO: 3.57 10*6/MM3 (ref 3.9–5.2)
RH BLD: POSITIVE
SODIUM BLD-SCNC: 145 MMOL/L (ref 136–145)
WBC NRBC COR # BLD: 22.12 10*3/MM3 (ref 4.5–10.7)

## 2017-08-28 PROCEDURE — 25010000002 VANCOMYCIN 10 G RECONSTITUTED SOLUTION: Performed by: INTERNAL MEDICINE

## 2017-08-28 PROCEDURE — 86850 RBC ANTIBODY SCREEN: CPT | Performed by: INTERNAL MEDICINE

## 2017-08-28 PROCEDURE — 80048 BASIC METABOLIC PNL TOTAL CA: CPT | Performed by: INTERNAL MEDICINE

## 2017-08-28 PROCEDURE — 86901 BLOOD TYPING SEROLOGIC RH(D): CPT | Performed by: INTERNAL MEDICINE

## 2017-08-28 PROCEDURE — 82962 GLUCOSE BLOOD TEST: CPT

## 2017-08-28 PROCEDURE — 93010 ELECTROCARDIOGRAM REPORT: CPT | Performed by: INTERNAL MEDICINE

## 2017-08-28 PROCEDURE — 93005 ELECTROCARDIOGRAM TRACING: CPT | Performed by: NURSE PRACTITIONER

## 2017-08-28 PROCEDURE — 86923 COMPATIBILITY TEST ELECTRIC: CPT

## 2017-08-28 PROCEDURE — 86900 BLOOD TYPING SEROLOGIC ABO: CPT | Performed by: INTERNAL MEDICINE

## 2017-08-28 PROCEDURE — 25010000002 PIPERACILLIN SOD-TAZOBACTAM PER 1 G: Performed by: OBSTETRICS & GYNECOLOGY

## 2017-08-28 PROCEDURE — 85027 COMPLETE CBC AUTOMATED: CPT | Performed by: INTERNAL MEDICINE

## 2017-08-28 RX ORDER — POTASSIUM CHLORIDE 7.45 MG/ML
10 INJECTION INTRAVENOUS
Status: DISCONTINUED | OUTPATIENT
Start: 2017-08-28 | End: 2017-09-08 | Stop reason: HOSPADM

## 2017-08-28 RX ORDER — POTASSIUM CHLORIDE 1.5 G/1.77G
40 POWDER, FOR SOLUTION ORAL AS NEEDED
Status: DISCONTINUED | OUTPATIENT
Start: 2017-08-28 | End: 2017-09-08 | Stop reason: HOSPADM

## 2017-08-28 RX ORDER — PETROLATUM 42 G/100G
OINTMENT TOPICAL
Status: DISCONTINUED | OUTPATIENT
Start: 2017-08-28 | End: 2017-09-08 | Stop reason: HOSPADM

## 2017-08-28 RX ORDER — POTASSIUM CHLORIDE 750 MG/1
40 CAPSULE, EXTENDED RELEASE ORAL AS NEEDED
Status: DISCONTINUED | OUTPATIENT
Start: 2017-08-28 | End: 2017-09-08 | Stop reason: HOSPADM

## 2017-08-28 RX ADMIN — POTASSIUM CHLORIDE 20 MEQ: 750 CAPSULE, EXTENDED RELEASE ORAL at 08:01

## 2017-08-28 RX ADMIN — RISPERIDONE 3 MG: 3 TABLET ORAL at 21:53

## 2017-08-28 RX ADMIN — ISOSORBIDE MONONITRATE 30 MG: 20 TABLET ORAL at 08:01

## 2017-08-28 RX ADMIN — POTASSIUM CHLORIDE 40 MEQ: 750 CAPSULE, EXTENDED RELEASE ORAL at 17:40

## 2017-08-28 RX ADMIN — PANTOPRAZOLE SODIUM 40 MG: 40 TABLET, DELAYED RELEASE ORAL at 05:41

## 2017-08-28 RX ADMIN — RISPERIDONE 2 MG: 2 TABLET, FILM COATED ORAL at 08:01

## 2017-08-28 RX ADMIN — ATORVASTATIN CALCIUM 20 MG: 20 TABLET, FILM COATED ORAL at 08:01

## 2017-08-28 RX ADMIN — VALPROIC ACID 250 MG: 250 SOLUTION ORAL at 08:01

## 2017-08-28 RX ADMIN — VANCOMYCIN HYDROCHLORIDE 1500 MG: 10 INJECTION, POWDER, LYOPHILIZED, FOR SOLUTION INTRAVENOUS at 17:53

## 2017-08-28 RX ADMIN — VALPROIC ACID 250 MG: 250 SOLUTION ORAL at 21:53

## 2017-08-28 RX ADMIN — PETROLATUM: 42 OINTMENT TOPICAL at 14:07

## 2017-08-28 RX ADMIN — FERROUS SULFATE TAB 325 MG (65 MG ELEMENTAL FE) 325 MG: 325 (65 FE) TAB at 08:01

## 2017-08-28 RX ADMIN — VANCOMYCIN HYDROCHLORIDE 1500 MG: 10 INJECTION, POWDER, LYOPHILIZED, FOR SOLUTION INTRAVENOUS at 05:42

## 2017-08-29 ENCOUNTER — APPOINTMENT (OUTPATIENT)
Dept: CARDIOLOGY | Facility: HOSPITAL | Age: 62
End: 2017-08-29

## 2017-08-29 LAB
ANION GAP SERPL CALCULATED.3IONS-SCNC: 8 MMOL/L
ASCENDING AORTA: 1.55 CM
BACTERIA SPEC AEROBE CULT: NO GROWTH
BASOPHILS # BLD AUTO: 0.02 10*3/MM3 (ref 0–0.2)
BASOPHILS NFR BLD AUTO: 0.1 % (ref 0–1.5)
BH CV ECHO MEAS - ACS: 2 CM
BH CV ECHO MEAS - AO MEAN PG (FULL): 2 MMHG
BH CV ECHO MEAS - AO MEAN PG: 6 MMHG
BH CV ECHO MEAS - AO ROOT AREA (BSA CORRECTED): 1.4
BH CV ECHO MEAS - AO ROOT AREA: 7.1 CM^2
BH CV ECHO MEAS - AO ROOT DIAM: 3 CM
BH CV ECHO MEAS - AO V2 MEAN: 109 CM/SEC
BH CV ECHO MEAS - AO V2 VTI: 28.9 CM
BH CV ECHO MEAS - AVA(I,A): 2.8 CM^2
BH CV ECHO MEAS - AVA(I,D): 2.8 CM^2
BH CV ECHO MEAS - BSA(HAYCOCK): 2.3 M^2
BH CV ECHO MEAS - BSA: 2.2 M^2
BH CV ECHO MEAS - BZI_BMI: 45.5 KILOGRAMS/M^2
BH CV ECHO MEAS - BZI_METRIC_HEIGHT: 160 CM
BH CV ECHO MEAS - BZI_METRIC_WEIGHT: 116.6 KG
BH CV ECHO MEAS - CONTRAST EF (2CH): 67.3 ML/M^2
BH CV ECHO MEAS - CONTRAST EF 4CH: 66.4 ML/M^2
BH CV ECHO MEAS - EDV(CUBED): 91.1 ML
BH CV ECHO MEAS - EDV(MOD-SP2): 107 ML
BH CV ECHO MEAS - EDV(MOD-SP4): 128 ML
BH CV ECHO MEAS - EDV(TEICH): 92.4 ML
BH CV ECHO MEAS - EF(CUBED): 82.9 %
BH CV ECHO MEAS - EF(MOD-SP2): 67.3 %
BH CV ECHO MEAS - EF(MOD-SP4): 66.4 %
BH CV ECHO MEAS - EF(TEICH): 75.9 %
BH CV ECHO MEAS - ESV(CUBED): 15.6 ML
BH CV ECHO MEAS - ESV(MOD-SP2): 35 ML
BH CV ECHO MEAS - ESV(MOD-SP4): 43 ML
BH CV ECHO MEAS - ESV(TEICH): 22.3 ML
BH CV ECHO MEAS - FS: 44.4 %
BH CV ECHO MEAS - IVS/LVPW: 1.1
BH CV ECHO MEAS - IVSD: 1.1 CM
BH CV ECHO MEAS - LV DIASTOLIC VOL/BSA (35-75): 59.5 ML/M^2
BH CV ECHO MEAS - LV MASS(C)D: 164 GRAMS
BH CV ECHO MEAS - LV MASS(C)DI: 76.2 GRAMS/M^2
BH CV ECHO MEAS - LV MEAN PG: 4 MMHG
BH CV ECHO MEAS - LV SYSTOLIC VOL/BSA (12-30): 20 ML/M^2
BH CV ECHO MEAS - LV V1 MEAN: 95.1 CM/SEC
BH CV ECHO MEAS - LV V1 VTI: 23.7 CM
BH CV ECHO MEAS - LVIDD: 4.5 CM
BH CV ECHO MEAS - LVIDS: 2.5 CM
BH CV ECHO MEAS - LVLD AP2: 8.4 CM
BH CV ECHO MEAS - LVLD AP4: 8.2 CM
BH CV ECHO MEAS - LVLS AP2: 5.9 CM
BH CV ECHO MEAS - LVLS AP4: 6.5 CM
BH CV ECHO MEAS - LVOT AREA (M): 3.5 CM^2
BH CV ECHO MEAS - LVOT AREA: 3.5 CM^2
BH CV ECHO MEAS - LVOT DIAM: 2.1 CM
BH CV ECHO MEAS - LVPWD: 1 CM
BH CV ECHO MEAS - MV A DUR: 0.12 SEC
BH CV ECHO MEAS - MV A MAX VEL: 81.4 CM/SEC
BH CV ECHO MEAS - MV DEC SLOPE: 173 CM/SEC^2
BH CV ECHO MEAS - MV DEC TIME: 0.26 SEC
BH CV ECHO MEAS - MV E MAX VEL: 75.5 CM/SEC
BH CV ECHO MEAS - MV E/A: 0.93
BH CV ECHO MEAS - MV MEAN PG: 2 MMHG
BH CV ECHO MEAS - MV P1/2T MAX VEL: 77.4 CM/SEC
BH CV ECHO MEAS - MV P1/2T: 131 MSEC
BH CV ECHO MEAS - MV V2 MEAN: 68 CM/SEC
BH CV ECHO MEAS - MV V2 VTI: 26.1 CM
BH CV ECHO MEAS - MVA P1/2T LCG: 2.8 CM^2
BH CV ECHO MEAS - MVA(P1/2T): 1.7 CM^2
BH CV ECHO MEAS - MVA(VTI): 3.1 CM^2
BH CV ECHO MEAS - PA ACC SLOPE: 42.8 CM/SEC^2
BH CV ECHO MEAS - PA ACC TIME: 0.06 SEC
BH CV ECHO MEAS - PA MAX PG (FULL): 0.25 MMHG
BH CV ECHO MEAS - PA MAX PG: 4.5 MMHG
BH CV ECHO MEAS - PA PR(ACCEL): 50.7 MMHG
BH CV ECHO MEAS - PA V2 MAX: 106 CM/SEC
BH CV ECHO MEAS - PULM A REVS DUR: 0.08 SEC
BH CV ECHO MEAS - PULM A REVS VEL: 33.3 CM/SEC
BH CV ECHO MEAS - PULM DIAS VEL: 28.6 CM/SEC
BH CV ECHO MEAS - PULM S/D: 1.6
BH CV ECHO MEAS - PULM SYS VEL: 45.9 CM/SEC
BH CV ECHO MEAS - PVA(V,A): 1.7 CM^2
BH CV ECHO MEAS - PVA(V,D): 1.7 CM^2
BH CV ECHO MEAS - QP/QS: 0.41
BH CV ECHO MEAS - RAP SYSTOLE: 3 MMHG
BH CV ECHO MEAS - RV MAX PG: 4.2 MMHG
BH CV ECHO MEAS - RV MEAN PG: 2 MMHG
BH CV ECHO MEAS - RV V1 MAX: 103 CM/SEC
BH CV ECHO MEAS - RV V1 MEAN: 70.1 CM/SEC
BH CV ECHO MEAS - RV V1 VTI: 19.2 CM
BH CV ECHO MEAS - RVOT AREA: 1.8 CM^2
BH CV ECHO MEAS - RVOT DIAM: 1.5 CM
BH CV ECHO MEAS - RVSP: 20 MMHG
BH CV ECHO MEAS - SI(AO): 95 ML/M^2
BH CV ECHO MEAS - SI(CUBED): 35.1 ML/M^2
BH CV ECHO MEAS - SI(LVOT): 38.2 ML/M^2
BH CV ECHO MEAS - SI(MOD-SP2): 33.5 ML/M^2
BH CV ECHO MEAS - SI(MOD-SP4): 39.5 ML/M^2
BH CV ECHO MEAS - SI(TEICH): 32.6 ML/M^2
BH CV ECHO MEAS - SUP REN AO DIAM: 2.08 CM
BH CV ECHO MEAS - SV(AO): 204.3 ML
BH CV ECHO MEAS - SV(CUBED): 75.5 ML
BH CV ECHO MEAS - SV(LVOT): 82.1 ML
BH CV ECHO MEAS - SV(MOD-SP2): 72 ML
BH CV ECHO MEAS - SV(MOD-SP4): 85 ML
BH CV ECHO MEAS - SV(RVOT): 33.9 ML
BH CV ECHO MEAS - SV(TEICH): 70.1 ML
BH CV ECHO MEAS - TAPSE (>1.6): 2.1 CM2
BH CV ECHO MEAS - TR MAX VEL: 206 CM/SEC
BH CV XLRA - RV BASE: 2.3 CM
BH CV XLRA - TDI S': 16 CM/SEC
BUN BLD-MCNC: 10 MG/DL (ref 8–23)
BUN/CREAT SERPL: 20.8 (ref 7–25)
CALCIUM SPEC-SCNC: 8.4 MG/DL (ref 8.6–10.5)
CHLORIDE SERPL-SCNC: 104 MMOL/L (ref 98–107)
CO2 SERPL-SCNC: 31 MMOL/L (ref 22–29)
CREAT BLD-MCNC: 0.48 MG/DL (ref 0.57–1)
DEPRECATED RDW RBC AUTO: 54.1 FL (ref 37–54)
E/E' RATIO: 11
EOSINOPHIL # BLD AUTO: 0.04 10*3/MM3 (ref 0–0.7)
EOSINOPHIL NFR BLD AUTO: 0.3 % (ref 0.3–6.2)
ERYTHROCYTE [DISTWIDTH] IN BLOOD BY AUTOMATED COUNT: 21.6 % (ref 11.7–13)
GFR SERPL CREATININE-BSD FRML MDRD: >150 ML/MIN/1.73
GLUCOSE BLD-MCNC: 122 MG/DL (ref 65–99)
GLUCOSE BLDC GLUCOMTR-MCNC: 116 MG/DL (ref 70–130)
GLUCOSE BLDC GLUCOMTR-MCNC: 131 MG/DL (ref 70–130)
GLUCOSE BLDC GLUCOMTR-MCNC: 141 MG/DL (ref 70–130)
GLUCOSE BLDC GLUCOMTR-MCNC: 155 MG/DL (ref 70–130)
HCT VFR BLD AUTO: 22.2 % (ref 35.6–45.5)
HGB BLD-MCNC: 6.4 G/DL (ref 11.9–15.5)
IMM GRANULOCYTES # BLD: 0.08 10*3/MM3 (ref 0–0.03)
IMM GRANULOCYTES NFR BLD: 0.5 % (ref 0–0.5)
LEFT ATRIUM VOLUME INDEX: 21 ML/M2
LYMPHOCYTES # BLD AUTO: 1.02 10*3/MM3 (ref 0.9–4.8)
LYMPHOCYTES NFR BLD AUTO: 6.5 % (ref 19.6–45.3)
MCH RBC QN AUTO: 20.2 PG (ref 26.9–32)
MCHC RBC AUTO-ENTMCNC: 28.8 G/DL (ref 32.4–36.3)
MCV RBC AUTO: 70 FL (ref 80.5–98.2)
MONOCYTES # BLD AUTO: 1.55 10*3/MM3 (ref 0.2–1.2)
MONOCYTES NFR BLD AUTO: 9.9 % (ref 5–12)
NEUTROPHILS # BLD AUTO: 12.95 10*3/MM3 (ref 1.9–8.1)
NEUTROPHILS NFR BLD AUTO: 82.7 % (ref 42.7–76)
NRBC BLD MANUAL-RTO: 0 /100 WBC (ref 0–0)
PLATELET # BLD AUTO: 605 10*3/MM3 (ref 140–500)
PMV BLD AUTO: 9.6 FL (ref 6–12)
POTASSIUM BLD-SCNC: 3.4 MMOL/L (ref 3.5–5.2)
POTASSIUM BLD-SCNC: 3.7 MMOL/L (ref 3.5–5.2)
RBC # BLD AUTO: 3.17 10*6/MM3 (ref 3.9–5.2)
SODIUM BLD-SCNC: 143 MMOL/L (ref 136–145)
VANCOMYCIN TROUGH SERPL-MCNC: 16.9 MCG/ML (ref 5–20)
WBC NRBC COR # BLD: 15.66 10*3/MM3 (ref 4.5–10.7)

## 2017-08-29 PROCEDURE — 85025 COMPLETE CBC W/AUTO DIFF WBC: CPT | Performed by: NURSE PRACTITIONER

## 2017-08-29 PROCEDURE — 25010000002 VANCOMYCIN 10 G RECONSTITUTED SOLUTION: Performed by: INTERNAL MEDICINE

## 2017-08-29 PROCEDURE — 86900 BLOOD TYPING SEROLOGIC ABO: CPT

## 2017-08-29 PROCEDURE — 80048 BASIC METABOLIC PNL TOTAL CA: CPT | Performed by: NURSE PRACTITIONER

## 2017-08-29 PROCEDURE — P9016 RBC LEUKOCYTES REDUCED: HCPCS

## 2017-08-29 PROCEDURE — 02HV33Z INSERTION OF INFUSION DEVICE INTO SUPERIOR VENA CAVA, PERCUTANEOUS APPROACH: ICD-10-PCS | Performed by: INTERNAL MEDICINE

## 2017-08-29 PROCEDURE — 80202 ASSAY OF VANCOMYCIN: CPT | Performed by: INTERNAL MEDICINE

## 2017-08-29 PROCEDURE — 82962 GLUCOSE BLOOD TEST: CPT

## 2017-08-29 PROCEDURE — 63710000001 DIPHENHYDRAMINE PER 50 MG: Performed by: NURSE PRACTITIONER

## 2017-08-29 PROCEDURE — 25010000002 PIPERACILLIN SOD-TAZOBACTAM PER 1 G: Performed by: OBSTETRICS & GYNECOLOGY

## 2017-08-29 PROCEDURE — 63710000001 DIPHENHYDRAMINE PER 50 MG: Performed by: INTERNAL MEDICINE

## 2017-08-29 PROCEDURE — C1751 CATH, INF, PER/CENT/MIDLINE: HCPCS

## 2017-08-29 PROCEDURE — 25010000002 FUROSEMIDE PER 20 MG: Performed by: NURSE PRACTITIONER

## 2017-08-29 PROCEDURE — 36430 TRANSFUSION BLD/BLD COMPNT: CPT

## 2017-08-29 PROCEDURE — 99221 1ST HOSP IP/OBS SF/LOW 40: CPT | Performed by: INTERNAL MEDICINE

## 2017-08-29 PROCEDURE — 93306 TTE W/DOPPLER COMPLETE: CPT | Performed by: INTERNAL MEDICINE

## 2017-08-29 PROCEDURE — 93306 TTE W/DOPPLER COMPLETE: CPT

## 2017-08-29 PROCEDURE — 84132 ASSAY OF SERUM POTASSIUM: CPT | Performed by: INTERNAL MEDICINE

## 2017-08-29 RX ORDER — SODIUM CHLORIDE 0.9 % (FLUSH) 0.9 %
10 SYRINGE (ML) INJECTION EVERY 12 HOURS SCHEDULED
Status: DISCONTINUED | OUTPATIENT
Start: 2017-08-29 | End: 2017-09-08 | Stop reason: HOSPADM

## 2017-08-29 RX ORDER — ACETAMINOPHEN 325 MG/1
650 TABLET ORAL ONCE
Status: COMPLETED | OUTPATIENT
Start: 2017-08-29 | End: 2017-08-29

## 2017-08-29 RX ORDER — FUROSEMIDE 10 MG/ML
20 INJECTION INTRAMUSCULAR; INTRAVENOUS ONCE
Status: COMPLETED | OUTPATIENT
Start: 2017-08-29 | End: 2017-08-29

## 2017-08-29 RX ORDER — DIPHENHYDRAMINE HCL 25 MG
25 CAPSULE ORAL ONCE
Status: COMPLETED | OUTPATIENT
Start: 2017-08-29 | End: 2017-08-29

## 2017-08-29 RX ORDER — SODIUM CHLORIDE 0.9 % (FLUSH) 0.9 %
10 SYRINGE (ML) INJECTION AS NEEDED
Status: DISCONTINUED | OUTPATIENT
Start: 2017-08-29 | End: 2017-09-08 | Stop reason: HOSPADM

## 2017-08-29 RX ADMIN — ACETAMINOPHEN 650 MG: 325 TABLET ORAL at 14:47

## 2017-08-29 RX ADMIN — VALPROIC ACID 250 MG: 250 SOLUTION ORAL at 08:11

## 2017-08-29 RX ADMIN — VANCOMYCIN HYDROCHLORIDE 1500 MG: 10 INJECTION, POWDER, LYOPHILIZED, FOR SOLUTION INTRAVENOUS at 11:47

## 2017-08-29 RX ADMIN — RISPERIDONE 3 MG: 3 TABLET ORAL at 21:22

## 2017-08-29 RX ADMIN — PANTOPRAZOLE SODIUM 40 MG: 40 TABLET, DELAYED RELEASE ORAL at 05:19

## 2017-08-29 RX ADMIN — PETROLATUM: 42 OINTMENT TOPICAL at 11:47

## 2017-08-29 RX ADMIN — FUROSEMIDE 20 MG: 10 INJECTION, SOLUTION INTRAMUSCULAR; INTRAVENOUS at 20:18

## 2017-08-29 RX ADMIN — HYDROCODONE BITARTRATE AND ACETAMINOPHEN 1 TABLET: 5; 325 TABLET ORAL at 09:22

## 2017-08-29 RX ADMIN — FERROUS SULFATE TAB 325 MG (65 MG ELEMENTAL FE) 325 MG: 325 (65 FE) TAB at 08:12

## 2017-08-29 RX ADMIN — DIPHENHYDRAMINE HYDROCHLORIDE 25 MG: 25 CAPSULE ORAL at 21:22

## 2017-08-29 RX ADMIN — DIPHENHYDRAMINE HYDROCHLORIDE 25 MG: 25 CAPSULE ORAL at 14:47

## 2017-08-29 RX ADMIN — LACTULOSE 20 G: 20 SOLUTION ORAL at 08:11

## 2017-08-29 RX ADMIN — ATORVASTATIN CALCIUM 20 MG: 20 TABLET, FILM COATED ORAL at 08:12

## 2017-08-29 RX ADMIN — TAZOBACTAM SODIUM AND PIPERACILLIN SODIUM 3.38 G: 375; 3 INJECTION, SOLUTION INTRAVENOUS at 00:57

## 2017-08-29 RX ADMIN — VALPROIC ACID 250 MG: 250 SOLUTION ORAL at 21:22

## 2017-08-29 RX ADMIN — Medication 10 ML: at 20:20

## 2017-08-29 RX ADMIN — POTASSIUM CHLORIDE 40 MEQ: 750 CAPSULE, EXTENDED RELEASE ORAL at 01:07

## 2017-08-29 RX ADMIN — POTASSIUM CHLORIDE 20 MEQ: 750 CAPSULE, EXTENDED RELEASE ORAL at 08:12

## 2017-08-29 RX ADMIN — ISOSORBIDE MONONITRATE 30 MG: 20 TABLET ORAL at 08:12

## 2017-08-29 RX ADMIN — ACETAMINOPHEN 650 MG: 325 TABLET ORAL at 21:22

## 2017-08-29 RX ADMIN — TAZOBACTAM SODIUM AND PIPERACILLIN SODIUM 3.38 G: 375; 3 INJECTION, SOLUTION INTRAVENOUS at 07:30

## 2017-08-29 RX ADMIN — RISPERIDONE 2 MG: 2 TABLET, FILM COATED ORAL at 08:11

## 2017-08-29 RX ADMIN — VANCOMYCIN HYDROCHLORIDE 1500 MG: 10 INJECTION, POWDER, LYOPHILIZED, FOR SOLUTION INTRAVENOUS at 23:43

## 2017-08-29 RX ADMIN — POTASSIUM CHLORIDE 40 MEQ: 750 CAPSULE, EXTENDED RELEASE ORAL at 05:19

## 2017-08-29 RX ADMIN — TAZOBACTAM SODIUM AND PIPERACILLIN SODIUM 3.38 G: 375; 3 INJECTION, SOLUTION INTRAVENOUS at 23:43

## 2017-08-30 LAB
ABO + RH BLD: NORMAL
ABO + RH BLD: NORMAL
ANION GAP SERPL CALCULATED.3IONS-SCNC: 10.8 MMOL/L
BACTERIA SPEC AEROBE CULT: ABNORMAL
BASOPHILS # BLD AUTO: 0.01 10*3/MM3 (ref 0–0.2)
BASOPHILS NFR BLD AUTO: 0.1 % (ref 0–1.5)
BH BB BLOOD EXPIRATION DATE: NORMAL
BH BB BLOOD EXPIRATION DATE: NORMAL
BH BB BLOOD TYPE BARCODE: 7300
BH BB BLOOD TYPE BARCODE: 7300
BH BB DISPENSE STATUS: NORMAL
BH BB DISPENSE STATUS: NORMAL
BH BB PRODUCT CODE: NORMAL
BH BB PRODUCT CODE: NORMAL
BH BB UNIT NUMBER: NORMAL
BH BB UNIT NUMBER: NORMAL
BUN BLD-MCNC: 7 MG/DL (ref 8–23)
BUN/CREAT SERPL: 14.6 (ref 7–25)
CALCIUM SPEC-SCNC: 8.8 MG/DL (ref 8.6–10.5)
CHLORIDE SERPL-SCNC: 106 MMOL/L (ref 98–107)
CO2 SERPL-SCNC: 28.2 MMOL/L (ref 22–29)
CREAT BLD-MCNC: 0.48 MG/DL (ref 0.57–1)
DEPRECATED RDW RBC AUTO: 55 FL (ref 37–54)
EOSINOPHIL # BLD AUTO: 0.09 10*3/MM3 (ref 0–0.7)
EOSINOPHIL NFR BLD AUTO: 0.7 % (ref 0.3–6.2)
ERYTHROCYTE [DISTWIDTH] IN BLOOD BY AUTOMATED COUNT: 21.1 % (ref 11.7–13)
GFR SERPL CREATININE-BSD FRML MDRD: >150 ML/MIN/1.73
GLUCOSE BLD-MCNC: 108 MG/DL (ref 65–99)
GLUCOSE BLDC GLUCOMTR-MCNC: 104 MG/DL (ref 70–130)
GLUCOSE BLDC GLUCOMTR-MCNC: 114 MG/DL (ref 70–130)
GLUCOSE BLDC GLUCOMTR-MCNC: 114 MG/DL (ref 70–130)
GLUCOSE BLDC GLUCOMTR-MCNC: 115 MG/DL (ref 70–130)
GRAM STN SPEC: ABNORMAL
HCT VFR BLD AUTO: 26.9 % (ref 35.6–45.5)
HGB BLD-MCNC: 8.1 G/DL (ref 11.9–15.5)
IMM GRANULOCYTES # BLD: 0.06 10*3/MM3 (ref 0–0.03)
IMM GRANULOCYTES NFR BLD: 0.5 % (ref 0–0.5)
LYMPHOCYTES # BLD AUTO: 0.84 10*3/MM3 (ref 0.9–4.8)
LYMPHOCYTES NFR BLD AUTO: 6.8 % (ref 19.6–45.3)
MCH RBC QN AUTO: 21.6 PG (ref 26.9–32)
MCHC RBC AUTO-ENTMCNC: 30.1 G/DL (ref 32.4–36.3)
MCV RBC AUTO: 71.7 FL (ref 80.5–98.2)
MONOCYTES # BLD AUTO: 1.65 10*3/MM3 (ref 0.2–1.2)
MONOCYTES NFR BLD AUTO: 13.4 % (ref 5–12)
NEUTROPHILS # BLD AUTO: 9.66 10*3/MM3 (ref 1.9–8.1)
NEUTROPHILS NFR BLD AUTO: 78.5 % (ref 42.7–76)
NRBC BLD MANUAL-RTO: 0 /100 WBC (ref 0–0)
PLATELET # BLD AUTO: 572 10*3/MM3 (ref 140–500)
PMV BLD AUTO: 9.5 FL (ref 6–12)
POTASSIUM BLD-SCNC: 3.4 MMOL/L (ref 3.5–5.2)
POTASSIUM BLD-SCNC: 5 MMOL/L (ref 3.5–5.2)
RBC # BLD AUTO: 3.75 10*6/MM3 (ref 3.9–5.2)
SODIUM BLD-SCNC: 145 MMOL/L (ref 136–145)
UNIT  ABO: NORMAL
UNIT  ABO: NORMAL
UNIT  RH: NORMAL
UNIT  RH: NORMAL
WBC NRBC COR # BLD: 12.31 10*3/MM3 (ref 4.5–10.7)

## 2017-08-30 PROCEDURE — 25010000002 VANCOMYCIN 10 G RECONSTITUTED SOLUTION: Performed by: INTERNAL MEDICINE

## 2017-08-30 PROCEDURE — 82962 GLUCOSE BLOOD TEST: CPT

## 2017-08-30 PROCEDURE — 84132 ASSAY OF SERUM POTASSIUM: CPT | Performed by: INTERNAL MEDICINE

## 2017-08-30 PROCEDURE — 99232 SBSQ HOSP IP/OBS MODERATE 35: CPT | Performed by: INTERNAL MEDICINE

## 2017-08-30 PROCEDURE — 85025 COMPLETE CBC W/AUTO DIFF WBC: CPT | Performed by: NURSE PRACTITIONER

## 2017-08-30 PROCEDURE — 80048 BASIC METABOLIC PNL TOTAL CA: CPT | Performed by: NURSE PRACTITIONER

## 2017-08-30 PROCEDURE — 25010000002 PIPERACILLIN SOD-TAZOBACTAM PER 1 G: Performed by: OBSTETRICS & GYNECOLOGY

## 2017-08-30 PROCEDURE — 25010000002 ENOXAPARIN PER 10 MG: Performed by: OBSTETRICS & GYNECOLOGY

## 2017-08-30 RX ORDER — POTASSIUM CHLORIDE 1.5 G/1.77G
40 POWDER, FOR SOLUTION ORAL 2 TIMES DAILY
Status: COMPLETED | OUTPATIENT
Start: 2017-08-30 | End: 2017-08-30

## 2017-08-30 RX ORDER — AMLODIPINE BESYLATE 5 MG/1
5 TABLET ORAL
Status: DISCONTINUED | OUTPATIENT
Start: 2017-08-30 | End: 2017-09-08 | Stop reason: HOSPADM

## 2017-08-30 RX ORDER — HYDROCODONE BITARTRATE AND ACETAMINOPHEN 7.5; 325 MG/1; MG/1
1 TABLET ORAL EVERY 6 HOURS PRN
Status: DISCONTINUED | OUTPATIENT
Start: 2017-08-30 | End: 2017-09-08 | Stop reason: HOSPADM

## 2017-08-30 RX ADMIN — VANCOMYCIN HYDROCHLORIDE 1500 MG: 10 INJECTION, POWDER, LYOPHILIZED, FOR SOLUTION INTRAVENOUS at 11:56

## 2017-08-30 RX ADMIN — HYDROCODONE BITARTRATE AND ACETAMINOPHEN 1 TABLET: 7.5; 325 TABLET ORAL at 19:20

## 2017-08-30 RX ADMIN — TAZOBACTAM SODIUM AND PIPERACILLIN SODIUM 3.38 G: 375; 3 INJECTION, SOLUTION INTRAVENOUS at 22:48

## 2017-08-30 RX ADMIN — ENOXAPARIN SODIUM 40 MG: 40 INJECTION SUBCUTANEOUS at 20:23

## 2017-08-30 RX ADMIN — PANTOPRAZOLE SODIUM 40 MG: 40 TABLET, DELAYED RELEASE ORAL at 06:24

## 2017-08-30 RX ADMIN — POTASSIUM CHLORIDE 20 MEQ: 750 CAPSULE, EXTENDED RELEASE ORAL at 09:08

## 2017-08-30 RX ADMIN — RISPERIDONE 3 MG: 3 TABLET ORAL at 20:23

## 2017-08-30 RX ADMIN — ATORVASTATIN CALCIUM 20 MG: 20 TABLET, FILM COATED ORAL at 09:10

## 2017-08-30 RX ADMIN — RISPERIDONE 2 MG: 2 TABLET, FILM COATED ORAL at 09:09

## 2017-08-30 RX ADMIN — Medication 10 ML: at 21:13

## 2017-08-30 RX ADMIN — Medication 10 ML: at 09:17

## 2017-08-30 RX ADMIN — AMLODIPINE BESYLATE 5 MG: 5 TABLET ORAL at 11:55

## 2017-08-30 RX ADMIN — VALPROIC ACID 250 MG: 250 SOLUTION ORAL at 09:10

## 2017-08-30 RX ADMIN — POTASSIUM CHLORIDE 40 MEQ: 1.5 POWDER, FOR SOLUTION ORAL at 18:11

## 2017-08-30 RX ADMIN — PETROLATUM: 42 OINTMENT TOPICAL at 09:16

## 2017-08-30 RX ADMIN — POTASSIUM CHLORIDE 40 MEQ: 750 CAPSULE, EXTENDED RELEASE ORAL at 06:23

## 2017-08-30 RX ADMIN — HYDROCODONE BITARTRATE AND ACETAMINOPHEN 1 TABLET: 5; 325 TABLET ORAL at 09:07

## 2017-08-30 RX ADMIN — POTASSIUM CHLORIDE 40 MEQ: 1.5 POWDER, FOR SOLUTION ORAL at 11:56

## 2017-08-30 RX ADMIN — SODIUM CHLORIDE 50 ML/HR: 9 INJECTION, SOLUTION INTRAVENOUS at 18:22

## 2017-08-30 RX ADMIN — VANCOMYCIN HYDROCHLORIDE 1500 MG: 10 INJECTION, POWDER, LYOPHILIZED, FOR SOLUTION INTRAVENOUS at 22:47

## 2017-08-30 RX ADMIN — FERROUS SULFATE TAB 325 MG (65 MG ELEMENTAL FE) 325 MG: 325 (65 FE) TAB at 09:09

## 2017-08-30 RX ADMIN — VALPROIC ACID 250 MG: 250 SOLUTION ORAL at 20:23

## 2017-08-30 RX ADMIN — TAZOBACTAM SODIUM AND PIPERACILLIN SODIUM 3.38 G: 375; 3 INJECTION, SOLUTION INTRAVENOUS at 09:23

## 2017-08-30 RX ADMIN — TAZOBACTAM SODIUM AND PIPERACILLIN SODIUM 3.38 G: 375; 3 INJECTION, SOLUTION INTRAVENOUS at 15:25

## 2017-08-30 RX ADMIN — ISOSORBIDE MONONITRATE 30 MG: 20 TABLET ORAL at 09:15

## 2017-08-31 LAB
ANION GAP SERPL CALCULATED.3IONS-SCNC: 7.9 MMOL/L
BASOPHILS # BLD AUTO: 0.01 10*3/MM3 (ref 0–0.2)
BASOPHILS NFR BLD AUTO: 0.1 % (ref 0–1.5)
BUN BLD-MCNC: 7 MG/DL (ref 8–23)
BUN/CREAT SERPL: 14.3 (ref 7–25)
CALCIUM SPEC-SCNC: 8.5 MG/DL (ref 8.6–10.5)
CHLORIDE SERPL-SCNC: 112 MMOL/L (ref 98–107)
CO2 SERPL-SCNC: 26.1 MMOL/L (ref 22–29)
CREAT BLD-MCNC: 0.49 MG/DL (ref 0.57–1)
DEPRECATED RDW RBC AUTO: 57.2 FL (ref 37–54)
EOSINOPHIL # BLD AUTO: 0.08 10*3/MM3 (ref 0–0.7)
EOSINOPHIL NFR BLD AUTO: 0.6 % (ref 0.3–6.2)
ERYTHROCYTE [DISTWIDTH] IN BLOOD BY AUTOMATED COUNT: 21.9 % (ref 11.7–13)
GFR SERPL CREATININE-BSD FRML MDRD: >150 ML/MIN/1.73
GLUCOSE BLD-MCNC: 95 MG/DL (ref 65–99)
GLUCOSE BLDC GLUCOMTR-MCNC: 115 MG/DL (ref 70–130)
GLUCOSE BLDC GLUCOMTR-MCNC: 137 MG/DL (ref 70–130)
GLUCOSE BLDC GLUCOMTR-MCNC: 152 MG/DL (ref 70–130)
GLUCOSE BLDC GLUCOMTR-MCNC: 95 MG/DL (ref 70–130)
HCT VFR BLD AUTO: 25.2 % (ref 35.6–45.5)
HGB BLD-MCNC: 7.5 G/DL (ref 11.9–15.5)
IMM GRANULOCYTES # BLD: 0.04 10*3/MM3 (ref 0–0.03)
IMM GRANULOCYTES NFR BLD: 0.3 % (ref 0–0.5)
LYMPHOCYTES # BLD AUTO: 0.66 10*3/MM3 (ref 0.9–4.8)
LYMPHOCYTES NFR BLD AUTO: 4.9 % (ref 19.6–45.3)
MAGNESIUM SERPL-MCNC: 1.8 MG/DL (ref 1.6–2.4)
MCH RBC QN AUTO: 21.4 PG (ref 26.9–32)
MCHC RBC AUTO-ENTMCNC: 29.8 G/DL (ref 32.4–36.3)
MCV RBC AUTO: 72 FL (ref 80.5–98.2)
MONOCYTES # BLD AUTO: 1.58 10*3/MM3 (ref 0.2–1.2)
MONOCYTES NFR BLD AUTO: 11.7 % (ref 5–12)
NEUTROPHILS # BLD AUTO: 11.11 10*3/MM3 (ref 1.9–8.1)
NEUTROPHILS NFR BLD AUTO: 82.4 % (ref 42.7–76)
NRBC BLD MANUAL-RTO: 0 /100 WBC (ref 0–0)
PLATELET # BLD AUTO: 530 10*3/MM3 (ref 140–500)
PMV BLD AUTO: 9.5 FL (ref 6–12)
POTASSIUM BLD-SCNC: 3.7 MMOL/L (ref 3.5–5.2)
RBC # BLD AUTO: 3.5 10*6/MM3 (ref 3.9–5.2)
SODIUM BLD-SCNC: 146 MMOL/L (ref 136–145)
WBC NRBC COR # BLD: 13.48 10*3/MM3 (ref 4.5–10.7)

## 2017-08-31 PROCEDURE — 25010000002 PIPERACILLIN SOD-TAZOBACTAM PER 1 G: Performed by: OBSTETRICS & GYNECOLOGY

## 2017-08-31 PROCEDURE — 83735 ASSAY OF MAGNESIUM: CPT | Performed by: NURSE PRACTITIONER

## 2017-08-31 PROCEDURE — 82962 GLUCOSE BLOOD TEST: CPT

## 2017-08-31 PROCEDURE — 85025 COMPLETE CBC W/AUTO DIFF WBC: CPT | Performed by: NURSE PRACTITIONER

## 2017-08-31 PROCEDURE — 99231 SBSQ HOSP IP/OBS SF/LOW 25: CPT | Performed by: INTERNAL MEDICINE

## 2017-08-31 PROCEDURE — 25010000002 VANCOMYCIN 10 G RECONSTITUTED SOLUTION: Performed by: INTERNAL MEDICINE

## 2017-08-31 PROCEDURE — 80048 BASIC METABOLIC PNL TOTAL CA: CPT | Performed by: NURSE PRACTITIONER

## 2017-08-31 PROCEDURE — 25010000002 ENOXAPARIN PER 10 MG: Performed by: OBSTETRICS & GYNECOLOGY

## 2017-08-31 RX ADMIN — AMLODIPINE BESYLATE 5 MG: 5 TABLET ORAL at 08:59

## 2017-08-31 RX ADMIN — ENOXAPARIN SODIUM 40 MG: 40 INJECTION SUBCUTANEOUS at 20:45

## 2017-08-31 RX ADMIN — ISOSORBIDE MONONITRATE 30 MG: 20 TABLET ORAL at 09:00

## 2017-08-31 RX ADMIN — SODIUM CHLORIDE 50 ML/HR: 9 INJECTION, SOLUTION INTRAVENOUS at 19:28

## 2017-08-31 RX ADMIN — VANCOMYCIN HYDROCHLORIDE 1500 MG: 10 INJECTION, POWDER, LYOPHILIZED, FOR SOLUTION INTRAVENOUS at 22:20

## 2017-08-31 RX ADMIN — VALPROIC ACID 250 MG: 250 SOLUTION ORAL at 09:00

## 2017-08-31 RX ADMIN — TAZOBACTAM SODIUM AND PIPERACILLIN SODIUM 3.38 G: 375; 3 INJECTION, SOLUTION INTRAVENOUS at 15:15

## 2017-08-31 RX ADMIN — Medication 10 ML: at 22:29

## 2017-08-31 RX ADMIN — HYDROCODONE BITARTRATE AND ACETAMINOPHEN 1 TABLET: 7.5; 325 TABLET ORAL at 15:15

## 2017-08-31 RX ADMIN — ATORVASTATIN CALCIUM 20 MG: 20 TABLET, FILM COATED ORAL at 09:00

## 2017-08-31 RX ADMIN — PETROLATUM: 42 OINTMENT TOPICAL at 09:31

## 2017-08-31 RX ADMIN — RISPERIDONE 3 MG: 3 TABLET ORAL at 23:22

## 2017-08-31 RX ADMIN — Medication 10 ML: at 09:02

## 2017-08-31 RX ADMIN — TAZOBACTAM SODIUM AND PIPERACILLIN SODIUM 3.38 G: 375; 3 INJECTION, SOLUTION INTRAVENOUS at 06:09

## 2017-08-31 RX ADMIN — RISPERIDONE 2 MG: 2 TABLET, FILM COATED ORAL at 09:30

## 2017-08-31 RX ADMIN — PANTOPRAZOLE SODIUM 40 MG: 40 TABLET, DELAYED RELEASE ORAL at 06:09

## 2017-08-31 RX ADMIN — SODIUM CHLORIDE 50 ML/HR: 9 INJECTION, SOLUTION INTRAVENOUS at 11:37

## 2017-08-31 RX ADMIN — VANCOMYCIN HYDROCHLORIDE 1500 MG: 10 INJECTION, POWDER, LYOPHILIZED, FOR SOLUTION INTRAVENOUS at 11:44

## 2017-08-31 RX ADMIN — POTASSIUM CHLORIDE 20 MEQ: 750 CAPSULE, EXTENDED RELEASE ORAL at 08:59

## 2017-08-31 RX ADMIN — ENOXAPARIN SODIUM 40 MG: 40 INJECTION SUBCUTANEOUS at 09:00

## 2017-08-31 RX ADMIN — VALPROIC ACID 250 MG: 250 SOLUTION ORAL at 20:45

## 2017-08-31 RX ADMIN — HYDROCODONE BITARTRATE AND ACETAMINOPHEN 1 TABLET: 7.5; 325 TABLET ORAL at 06:09

## 2017-08-31 RX ADMIN — TAZOBACTAM SODIUM AND PIPERACILLIN SODIUM 3.38 G: 375; 3 INJECTION, SOLUTION INTRAVENOUS at 23:21

## 2017-08-31 RX ADMIN — FERROUS SULFATE TAB 325 MG (65 MG ELEMENTAL FE) 325 MG: 325 (65 FE) TAB at 08:59

## 2017-09-01 ENCOUNTER — APPOINTMENT (OUTPATIENT)
Dept: CT IMAGING | Facility: HOSPITAL | Age: 62
End: 2017-09-01

## 2017-09-01 LAB
ANION GAP SERPL CALCULATED.3IONS-SCNC: 9.2 MMOL/L
ANISOCYTOSIS BLD QL: NORMAL
BASOPHILS # BLD AUTO: 0.02 10*3/MM3 (ref 0–0.2)
BASOPHILS NFR BLD AUTO: 0.1 % (ref 0–1.5)
BUN BLD-MCNC: 8 MG/DL (ref 8–23)
BUN/CREAT SERPL: 13.1 (ref 7–25)
CALCIUM SPEC-SCNC: 8.8 MG/DL (ref 8.6–10.5)
CHLORIDE SERPL-SCNC: 110 MMOL/L (ref 98–107)
CO2 SERPL-SCNC: 27.8 MMOL/L (ref 22–29)
CREAT BLD-MCNC: 0.61 MG/DL (ref 0.57–1)
DEPRECATED RDW RBC AUTO: 60.2 FL (ref 37–54)
EOSINOPHIL # BLD AUTO: 0.34 10*3/MM3 (ref 0–0.7)
EOSINOPHIL NFR BLD AUTO: 2.5 % (ref 0.3–6.2)
ERYTHROCYTE [DISTWIDTH] IN BLOOD BY AUTOMATED COUNT: 23.1 % (ref 11.7–13)
GFR SERPL CREATININE-BSD FRML MDRD: 120 ML/MIN/1.73
GLUCOSE BLD-MCNC: 98 MG/DL (ref 65–99)
GLUCOSE BLDC GLUCOMTR-MCNC: 152 MG/DL (ref 70–130)
GLUCOSE BLDC GLUCOMTR-MCNC: 87 MG/DL (ref 70–130)
GLUCOSE BLDC GLUCOMTR-MCNC: 91 MG/DL (ref 70–130)
GLUCOSE BLDC GLUCOMTR-MCNC: 94 MG/DL (ref 70–130)
HCT VFR BLD AUTO: 24.8 % (ref 35.6–45.5)
HGB BLD-MCNC: 7.3 G/DL (ref 11.9–15.5)
HYPOCHROMIA BLD QL: NORMAL
IMM GRANULOCYTES # BLD: 0.03 10*3/MM3 (ref 0–0.03)
IMM GRANULOCYTES NFR BLD: 0.2 % (ref 0–0.5)
LYMPHOCYTES # BLD AUTO: 0.69 10*3/MM3 (ref 0.9–4.8)
LYMPHOCYTES NFR BLD AUTO: 5.1 % (ref 19.6–45.3)
MCH RBC QN AUTO: 21.7 PG (ref 26.9–32)
MCHC RBC AUTO-ENTMCNC: 29.4 G/DL (ref 32.4–36.3)
MCV RBC AUTO: 73.6 FL (ref 80.5–98.2)
MICROCYTES BLD QL: NORMAL
MONOCYTES # BLD AUTO: 1.7 10*3/MM3 (ref 0.2–1.2)
MONOCYTES NFR BLD AUTO: 12.4 % (ref 5–12)
NEUTROPHILS # BLD AUTO: 10.88 10*3/MM3 (ref 1.9–8.1)
NEUTROPHILS NFR BLD AUTO: 79.7 % (ref 42.7–76)
NRBC BLD MANUAL-RTO: 0 /100 WBC (ref 0–0)
OVALOCYTES BLD QL SMEAR: NORMAL
PLAT MORPH BLD: NORMAL
PLATELET # BLD AUTO: 469 10*3/MM3 (ref 140–500)
PMV BLD AUTO: 10.1 FL (ref 6–12)
POTASSIUM BLD-SCNC: 3.7 MMOL/L (ref 3.5–5.2)
RBC # BLD AUTO: 3.37 10*6/MM3 (ref 3.9–5.2)
SCHISTOCYTES BLD QL SMEAR: NORMAL
SODIUM BLD-SCNC: 147 MMOL/L (ref 136–145)
TARGETS BLD QL SMEAR: NORMAL
VANCOMYCIN TROUGH SERPL-MCNC: 27.6 MCG/ML (ref 5–20)
WBC MORPH BLD: NORMAL
WBC NRBC COR # BLD: 13.66 10*3/MM3 (ref 4.5–10.7)

## 2017-09-01 PROCEDURE — 85007 BL SMEAR W/DIFF WBC COUNT: CPT | Performed by: NURSE PRACTITIONER

## 2017-09-01 PROCEDURE — 74177 CT ABD & PELVIS W/CONTRAST: CPT

## 2017-09-01 PROCEDURE — 25010000002 PIPERACILLIN SOD-TAZOBACTAM PER 1 G: Performed by: OBSTETRICS & GYNECOLOGY

## 2017-09-01 PROCEDURE — 25010000002 ENOXAPARIN PER 10 MG: Performed by: OBSTETRICS & GYNECOLOGY

## 2017-09-01 PROCEDURE — 80202 ASSAY OF VANCOMYCIN: CPT | Performed by: INTERNAL MEDICINE

## 2017-09-01 PROCEDURE — 82962 GLUCOSE BLOOD TEST: CPT

## 2017-09-01 PROCEDURE — 80048 BASIC METABOLIC PNL TOTAL CA: CPT | Performed by: NURSE PRACTITIONER

## 2017-09-01 PROCEDURE — 0 IOPAMIDOL 61 % SOLUTION: Performed by: INTERNAL MEDICINE

## 2017-09-01 PROCEDURE — 85025 COMPLETE CBC W/AUTO DIFF WBC: CPT | Performed by: NURSE PRACTITIONER

## 2017-09-01 PROCEDURE — 99231 SBSQ HOSP IP/OBS SF/LOW 25: CPT | Performed by: NURSE PRACTITIONER

## 2017-09-01 RX ORDER — FLUCONAZOLE 200 MG/1
200 TABLET ORAL EVERY 24 HOURS
Status: DISCONTINUED | OUTPATIENT
Start: 2017-09-01 | End: 2017-09-08 | Stop reason: HOSPADM

## 2017-09-01 RX ADMIN — IOPAMIDOL 85 ML: 612 INJECTION, SOLUTION INTRAVENOUS at 18:00

## 2017-09-01 RX ADMIN — Medication 10 ML: at 20:36

## 2017-09-01 RX ADMIN — Medication 10 ML: at 08:30

## 2017-09-01 RX ADMIN — FERROUS SULFATE TAB 325 MG (65 MG ELEMENTAL FE) 325 MG: 325 (65 FE) TAB at 09:13

## 2017-09-01 RX ADMIN — VALPROIC ACID 250 MG: 250 SOLUTION ORAL at 09:14

## 2017-09-01 RX ADMIN — AMLODIPINE BESYLATE 5 MG: 5 TABLET ORAL at 09:13

## 2017-09-01 RX ADMIN — ENOXAPARIN SODIUM 40 MG: 40 INJECTION SUBCUTANEOUS at 20:32

## 2017-09-01 RX ADMIN — TAZOBACTAM SODIUM AND PIPERACILLIN SODIUM 3.38 G: 375; 3 INJECTION, SOLUTION INTRAVENOUS at 15:06

## 2017-09-01 RX ADMIN — PANTOPRAZOLE SODIUM 40 MG: 40 TABLET, DELAYED RELEASE ORAL at 05:21

## 2017-09-01 RX ADMIN — ENOXAPARIN SODIUM 40 MG: 40 INJECTION SUBCUTANEOUS at 09:15

## 2017-09-01 RX ADMIN — POTASSIUM CHLORIDE 20 MEQ: 750 CAPSULE, EXTENDED RELEASE ORAL at 09:14

## 2017-09-01 RX ADMIN — RISPERIDONE 3 MG: 3 TABLET ORAL at 20:32

## 2017-09-01 RX ADMIN — FLUCONAZOLE 200 MG: 200 TABLET ORAL at 20:35

## 2017-09-01 RX ADMIN — HYDROCODONE BITARTRATE AND ACETAMINOPHEN 1 TABLET: 7.5; 325 TABLET ORAL at 13:04

## 2017-09-01 RX ADMIN — PETROLATUM: 42 OINTMENT TOPICAL at 10:50

## 2017-09-01 RX ADMIN — RISPERIDONE 2 MG: 2 TABLET, FILM COATED ORAL at 09:14

## 2017-09-01 RX ADMIN — ISOSORBIDE MONONITRATE 30 MG: 20 TABLET ORAL at 09:18

## 2017-09-01 RX ADMIN — TAZOBACTAM SODIUM AND PIPERACILLIN SODIUM 3.38 G: 375; 3 INJECTION, SOLUTION INTRAVENOUS at 06:07

## 2017-09-01 RX ADMIN — VALPROIC ACID 250 MG: 250 SOLUTION ORAL at 20:37

## 2017-09-01 RX ADMIN — ATORVASTATIN CALCIUM 20 MG: 20 TABLET, FILM COATED ORAL at 09:14

## 2017-09-01 RX ADMIN — SODIUM CHLORIDE 50 ML/HR: 9 INJECTION, SOLUTION INTRAVENOUS at 16:51

## 2017-09-01 NOTE — CONSULTS
CONSULT NOTE    Infectious Diseases - Ramya Gomes MD  [facility]       Patient Identification:  Name: Lupe Burleson  Age: 61 y.o.  Sex: female  :  1955  MRN: [id number]             Date of Consultation:  2017        Primary Care Physician: Shane Barcenas MD                               Requesting Physician: Dr. Barcenas  Reason for Consultation: Polymicrobial sacral decubitus infection and intra-abdominal abscess-recommendation regarding antibiotic treatment    Impression: 61-year-old female with past medical history as detailed below underwent total abdominal hysterectomy for uterine cancer on 8/15/2017.  Her postoperative course required a week long hospitalization and was eventually discharged on 2017 now presents from nursing home with increased lethargy and fever up to 101.6 on 1 day duration on 2017.  Workup included CT scan of the dominant pelvis showed pericolic intra-abdominal abscess resulting in hospitalization.  Patient is also not very mobile and has sacrococcygeal decubiti which was cultured and grew multiple pathogen's.  Intra-abdominal abscess is not considered to be drainable via percutaneous approach.  This is consistent with  1-toxic metabolic encephalopathy due to:   -Sepsis due to intra-abdominal sepsis   -Probably effect of medications such as Ativan in the context of morbid obesity and possible underlying obstructive sleep apnea   -Underlying bipolar disorder  2-probable colonization of sacrococcygeal decub with multiple pathogen's including MDR pseudomonas, with coag -ve bacteremia being contaminant during collection  3-morbid obesity  4-history of penicillin allergy but has tolerated cephalosporin in previous hospitalizations and currently tolerating Zosyn without any problem  5-severe anemia - with leukocytosis  6-severe free water deficit with evolving hypernatremia      Recommendations/Discussions: At this juncture continue with IV vancomycin  and Zosyn for possible mixed intra-abdominal infection and add Diflucan.  Would recommend switching to oral ciprofloxacin along with Augmentin and oral Diflucan when ready to be discharge and continue treatment for 2 weeks with subsequent CAT scan to follow the progression of this postoperative intra-abdominal collection.  Continue local care of the sacrococcygeal decub as it does not show any surrounding cellulitis and does not require any specific intervention except for local wound care.        History of Present Illness:      61-year-old female with past medical history as detailed below underwent total abdominal hysterectomy for uterine cancer on 8/15/2017.  Her postoperative course required a week long hospitalization and was eventually discharged on 8/21/2017 now presents from nursing home with increased lethargy and fever up to 101.6 on 1 day duration on 8/28/2017.  Workup included CT scan of the dominant pelvis showed pericolic intra-abdominal abscess resulting in hospitalization.  Patient is also not very mobile and has sacrococcygeal decubiti which was cultured and grew multiple pathogen's.  Intra-abdominal abscess is not considered to be drainable via percutaneous approach.  Infectious disease service is consulted.    Past Medical History:  Past Medical History:   Diagnosis Date   • Acute respiratory distress syndrome    • Arthritis    • Bipolar disorder, unspecified    • CAD (coronary artery disease)    • Cellulitis    • Cellulitis    • CHF (congestive heart failure)    • Chronic ischemic heart disease    • Chronic respiratory failure with hypoxia and hypercapnia    • Chronic ulcer of calf    • Constipation    • Coronary artery disease    • Depression    • Depressive disorder    • Diabetes mellitus    • Dysphagia    • Dysphagia    • Endometrial cancer determined by uterine biopsy     s/p radiation; no improvement   • Endometrial hyperplasia    • History of transfusion    • Hypercapnia    • Hypertension     • Immobility    • Lack of coordination    • Lymphedema    • Malignant neoplasm of uterus    • Muscle weakness    • Obesities, morbid    • Oxygen dependent    • Post-menopausal bleeding    • Postmenopausal bleeding    • Schizo affective schizophrenia    • Skin ulcer    • Sleep apnea    • Stroke    • Symbolic dysfunction    • Uterine cancer    • Venous insufficiency    • Venous insufficiency      Past Surgical History:  Past Surgical History:   Procedure Laterality Date   • D&C HYSTEROSCOPY     • LAPAROSCOPIC TUBAL LIGATION     • TOTAL LAPAROSCOPIC HYSTERECTOMY Bilateral 8/15/2017    Procedure: OPEN TOTAL  ABDOMINAL HYSTERECTOMY WITH BILATERAL SALPINGO-OOPHERECTOMY;  Surgeon: Ortiz Washington MD;  Location: [facility];  Service:    • TRACHEOSTOMY AND PEG TUBE INSERTION     • UMBILICAL HERNIA REPAIR     • WOUND DEBRIDEMENT        Home Meds:  Prescriptions Prior to Admission   Medication Sig Dispense Refill Last Dose   • acetaminophen (MAPAP) 160 MG/5ML liquid 650 mg by Per G Tube route Every 4 (Four) Hours As Needed for Fever (give 20.3 ml per G tube).      • ammonium lactate (AMLACTIN) 12 % cream Apply 1 application topically 2 (two) times a day. To bilat legs and feet   5/28/2016 at Unknown time   • atorvastatin (LIPITOR) 20 MG tablet 20 mg by Per G Tube route Daily.      • bumetanide (BUMEX) 2 MG tablet 2 mg by Per G Tube route Daily.      • ferrous sulfate 325 (65 FE) MG tablet Take 1 tablet by mouth Daily With Breakfast. 60 tablet 12    • HYDROcodone-acetaminophen (NORCO) 5-325 MG per tablet Take 1 tablet by mouth Every 6 (Six) Hours As Needed for Moderate Pain  or Severe Pain . 120 tablet 0    • ipratropium-albuterol (DUO-NEB) 0.5-2.5 mg/mL nebulizer Take 3 mL by nebulization Every 4 (Four) Hours As Needed for Wheezing.      • ISOSORBIDE MONONITRATE PO 30 mg by Per G Tube route Daily.      • lactulose (CHRONULAC) 10 GM/15ML solution 20 g by Per PEG Tube route daily.   5/28/2016 at Unknown time   • LORazepam  (ATIVAN) 1 MG tablet 1 tablet by Per G Tube route Every 8 (Eight) Hours As Needed for Anxiety. 90 tablet 0    • pantoprazole (PROTONIX) 40 MG pack packet 40 mg by Per PEG Tube route Every Morning Before Breakfast.   5/12/2016 at Unknown time   • potassium chloride (KAYCIEL) 20 MEQ/15ML (10%) solution Take 20 mEq by mouth Daily.      • risperiDONE (RisperDAL) 1 MG tablet Take 2 mg by mouth Every Morning. Take 3 mg at bedtime & 2 mg every morning   5/28/2016 at Unknown time   • risperiDONE (risperDAL) 3 MG tablet Take 3 mg by mouth Every Night.      • valproic acid (DEPAKENE) 250 MG/5ML syrup syrup 250 mg by Per PEG Tube route every 12 (twelve) hours.   5/28/2016 at Unknown time     Current Meds:     Current Facility-Administered Medications:   •  acetaminophen (TYLENOL) 160 MG/5ML solution 650 mg, 650 mg, Per G Tube, Q4H PRN, Shane Barcenas MD  •  alteplase ((CATHFLO/ACTIVASE)) injection 2 mg, 2 mg, Intravenous, Once, Shane Barcenas MD  •  alteplase ((CATHFLO/ACTIVASE)) injection 2 mg, 2 mg, Intravenous, Once, Shane Barcenas MD  •  alteplase ((CATHFLO/ACTIVASE)) injection 2 mg, 2 mg, Intravenous, Once, Shane Barcenas MD  •  amLODIPine (NORVASC) tablet 5 mg, 5 mg, Oral, Q24H, VASHTI Plunkett, 5 mg at 09/01/17 0913  •  atorvastatin (LIPITOR) tablet 20 mg, 20 mg, Per G Tube, Daily, Shane Barcenas MD, 20 mg at 09/01/17 0914  •  enoxaparin (LOVENOX) syringe 40 mg, 40 mg, Subcutaneous, Q12H, Ortiz Washington MD, 40 mg at 09/01/17 0915  •  ferrous sulfate tablet 325 mg, 325 mg, Oral, Daily With Breakfast, Shane Barcenas MD, 325 mg at 09/01/17 0913  •  HYDROcodone-acetaminophen (NORCO) 5-325 MG per tablet 1 tablet, 1 tablet, Oral, Q6H PRN, Shane Barcenas MD, 1 tablet at 08/30/17 0907  •  HYDROcodone-acetaminophen (NORCO) 7.5-325 MG per tablet 1 tablet, 1 tablet, Oral, Q6H PRN, VASHTI Heller, 1 tablet at 09/01/17 1304  •  hydrophor (AQUAPHOR) ointment, , Topical, Q24H, Shane Barcenas MD  •   ipratropium-albuterol (DUO-NEB) nebulizer solution 3 mL, 3 mL, Nebulization, Q4H PRN, Shane Barcenas MD  •  isosorbide mononitrate (ISMO,MONOKET) tablet 30 mg, 30 mg, Per G Tube, Daily, Shane Barcenas MD, 30 mg at 09/01/17 0918  •  lactulose (CHRONULAC) 10 GM/15ML solution 20 g, 20 g, Oral, Daily, Shane Barcenas MD, 20 g at 08/29/17 0811  •  LORazepam (ATIVAN) tablet 1 mg, 1 mg, Per G Tube, Q8H PRN, Shane Barcenas MD  •  pantoprazole (PROTONIX) EC tablet 40 mg, 40 mg, Oral, Q AM, Shane Barcenas MD, 40 mg at 09/01/17 0521  •  Pharmacy to dose vancomycin, , Does not apply, Continuous PRN, Shane Barcenas MD  •  piperacillin-tazobactam (ZOSYN) 3.375 g in iso-osmotic dextrose 50 ml (premix), 3.375 g, Intravenous, Q8H, Ortiz Washington MD, Last Rate: 12.5 mL/hr at 08/31/17 1515, 3.375 g at 09/01/17 1506  •  potassium chloride (MICRO-K) CR capsule 40 mEq, 40 mEq, Oral, PRN, 40 mEq at 08/30/17 0623 **OR** potassium chloride (KLOR-CON) packet 40 mEq, 40 mEq, Oral, PRN **OR** potassium chloride 10 mEq in 100 mL IVPB, 10 mEq, Intravenous, Q1H PRN, Shane Barcenas MD  •  potassium chloride (MICRO-K) CR capsule 20 mEq, 20 mEq, Oral, Daily, Shane Barcenas MD, 20 mEq at 09/01/17 0914  •  risperiDONE (risperDAL) tablet 2 mg, 2 mg, Oral, Daily, Shane Barcenas MD, 2 mg at 09/01/17 0914  •  risperiDONE (risperDAL) tablet 3 mg, 3 mg, Oral, Nightly, Shane Barcenas MD, 3 mg at 08/31/17 2322  •  Insert peripheral IV, , , Once **AND** sodium chloride 0.9 % flush 10 mL, 10 mL, Intravenous, PRN, Tor Quintero MD  •  sodium chloride 0.9 % flush 10 mL, 10 mL, Intracatheter, Q12H, Shane Barcenas MD, 10 mL at 09/01/17 0830  •  sodium chloride 0.9 % flush 10 mL, 10 mL, Intracatheter, PRN, Shane Barcenas MD  •  sodium chloride 0.9 % infusion, 50 mL/hr, Intravenous, Continuous, Shane Barcenas MD, Last Rate: 50 mL/hr at 08/31/17 1928, 50 mL/hr at 08/31/17 1928  •  Valproic Acid (DEPAKENE) syrup 250 mg, 250 mg, Oral, Q12H,  "Shane Barcenas MD, 250 mg at 09/01/17 0914  •  [START ON 9/2/2017] vancomycin 1500 mg/500 mL 0.9% NS IVPB ([facility]), 1,500 mg, Intravenous, Q24H, Shane Barcenas MD  Allergies:  Allergies   Allergen Reactions   • Codeine    • Penicillins      Tolerated ceftriaxone during 5/2017 admission   • Sulfa Antibiotics      Social History:   Social History   Substance Use Topics   • Smoking status: Former Smoker     Packs/day: 2.00     Years: 15.00     Types: Cigarettes     Quit date: 1988   • Smokeless tobacco: Not on file   • Alcohol use No      Family History:  Family History   Problem Relation Age of Onset   • Diabetes Mother    • Stroke Mother           Review of Systems  See history of present illness and past medical history.   Constitutional: Positive for fever.   HENT: Negative for sore throat.    Eyes: Negative.    Respiratory: Negative for cough and shortness of breath.    Cardiovascular: Negative for chest pain.   Gastrointestinal: Negative for abdominal pain, diarrhea and vomiting.   Genitourinary: Negative for dysuria.   Musculoskeletal: Negative for neck pain.        Pain in her buttocks.   Skin: Negative for rash.   Allergic/Immunologic: Negative.    Neurological: Negative for weakness, numbness and headaches.   Hematological: Negative.    Psychiatric/Behavioral: Negative.    Vitals:   /67  Pulse 94  Temp 99.4 °F (37.4 °C)  Resp 20  Ht 62.99\" (160 cm)  Wt 260 lb 2.3 oz (118 kg)  SpO2 98%  BMI 46.09 kg/m2  I/O:   Intake/Output Summary (Last 24 hours) at 09/01/17 1635  Last data filed at 09/01/17 1506   Gross per 24 hour   Intake               50 ml   Output                0 ml   Net               50 ml     Exam:  General Appearance:    Awake sluggish obese female who does not appear to be toxic or septic    Head:    Normocephalic, without obvious abnormality, atraumatic   Eyes:    PERRL, conjunctiva/corneas clear, EOM's intact, both eyes   Ears:    Normal external ear canals, both ears   Nose:   " Nares normal, septum midline, mucosa normal, no drainage    or sinus tenderness   Throat:   Lips, tongue, gums normal; oral mucosa pink and moist   Neck:   Supple, symmetrical, trachea midline, no adenopathy;     thyroid:  no enlargement/tenderness/nodules; no carotid    bruit or JVD   Back:     Symmetric, no curvature, ROM normal, no CVA tenderness   Lungs:     Clear to auscultation bilaterally, respirations unlabored   Chest Wall:    No tenderness or deformity    Heart:    Regular rate and rhythm, S1 and S2 normal, no murmur, rub   or gallop   Abdomen:     Soft,Obese, with surgical incision well approximated with no drainage, non-tender, bowel sounds active all four quadrants,     no masses, no hepatomegaly, no splenomegaly   Extremities:   Chronic edema of the lower extremities with significant changes involving the left lower extremity due to previous chronic wound    Pulses:   Pulses palpable in all extremities; symmetric all extremities   Skin:   Per nursing station to sacrococcygeal decubiti surrounding cellulitis    Neurologic:   Sluggish       Data Review:    I reviewed the patient's new clinical results.    Results from last 7 days  Lab Units 09/01/17  0606 08/31/17  0437 08/30/17  0459 08/29/17  0410 08/28/17  0715 08/27/17  1359   WBC 10*3/mm3 13.66* 13.48* 12.31* 15.66* 22.12* 20.51*   HEMOGLOBIN g/dL 7.3* 7.5* 8.1* 6.4* 7.3* 7.5*   PLATELETS 10*3/mm3 469 530* 572* 605* 539* 499       Results from last 7 days  Lab Units 09/01/17  0606 08/31/17  0437 08/30/17  1552 08/30/17  0459 08/29/17  0853 08/29/17  0410 08/28/17  0715 08/27/17  1228   SODIUM mmol/L 147* 146*  --  145  --  143 145 147*   POTASSIUM mmol/L 3.7 3.7 5.0 3.4* 3.7 3.4* 3.1* 3.5   CHLORIDE mmol/L 110* 112*  --  106  --  104 105 101   CO2 mmol/L 27.8 26.1  --  28.2  --  31.0* 27.4 31.6*   BUN mg/dL 8 7*  --  7*  --  10 11 11   CREATININE mg/dL 0.61 0.49*  --  0.48*  --  0.48* 0.49* 0.63   CALCIUM mg/dL 8.8 8.5*  --  8.8  --  8.4* 8.6 9.5    GLUCOSE mg/dL 98 95  --  108*  --  122* 127* 119*     Culture   Date Value Ref Range Status   05/14/2016 Light (2+) Pseudomonas aeruginosa MDRO (A)  Final     Comment:     Multi drug resistant Pseudomonas, patient may be an isolation risk.  Multi drug resistant Pseudomonas, patient may be an isolation risk.   05/14/2016 Light (2+) Proteus mirabilis (A)  Final   05/14/2016 Reduced Normal Respiratory Tavia  Final   ]    Assessment:  Active Hospital Problems (** Indicates Principal Problem)    Diagnosis Date Noted   • **Sepsis [A41.9] 08/27/2017   • Abscess of abdominal cavity [K65.1] 08/28/2017   • Hypertension [I10] 08/14/2017   • Lymphedema [I89.0] 06/19/2017   • Diabetes mellitus [E11.9] 05/16/2017   • Chronic respiratory failure with hypoxia and hypercapnia [J96.11, J96.12] 05/16/2017   • Coronary artery disease [I25.10] 05/16/2017   • Schizo affective schizophrenia [F25.0] 05/16/2017   • THAI (obstructive sleep apnea) [G47.33] 06/22/2016   • Chronic diastolic CHF (congestive heart failure) [I50.32] 05/30/2016   • Morbid obesity [E66.01] 05/30/2016      Resolved Hospital Problems    Diagnosis Date Noted Date Resolved   No resolved problems to display.         Plan:  See above  Ramya Alex MD   9/1/2017  4:35 PM    Much of this encounter note is an electronic transcription/translation of spoken language to printed text. The electronic translation of spoken language may permit erroneous, or at times, nonsensical words or phrases to be inadvertently transcribed; Although I have reviewed the note for such errors, some may still exist

## 2017-09-01 NOTE — PROGRESS NOTES
Continued Stay Note  [facility]     Patient Name: Lupe Burleson  MRN: [id number]  Today's Date: 9/1/2017    Admit Date: 8/27/2017          Discharge Plan       09/01/17 1045    Case Management/Social Work Plan    Plan Return back to [facility]- Pt has State Guardian    Additional Comments CCP spoke with Curry with [facility], they are ready for patient to return back when she is ready. They can do Picc line and IV antibiotics. Packet in Chart ryan. Elysia CAMARENA/CCP              Discharge Codes     None            Cherrie Bearden, RN

## 2017-09-01 NOTE — PROGRESS NOTES
"Adult Nutrition  Assessment/PES    Patient Name:  Lupe Burleson  YOB: 1955  MRN: [id number]  Admit Date:  8/27/2017    Assessment Date:  9/1/2017        Reason for Assessment       09/01/17 1433    Reason for Assessment    Reason For Assessment/Visit follow up protocol              Nutrition/Diet History       09/01/17 1434    Nutrition/Diet History    Typical Food/Fluid Intake per RN, ate pretty good yesterday, but not much this am, pain meds also given this am so she doesn't know if maybe that has something to do with it; patient does not answer my questions            Anthropometrics       09/01/17 1434    Anthropometrics    Height 160 cm (62.99\")    Weight 118 kg (260 lb 2.3 oz)    Ideal Body Weight (IBW)    Ideal Body Weight (IBW), Female 53.1    % Ideal Body Weight 222.69    Body Mass Index (BMI)    BMI (kg/m2) 46.19    BMI Grade greater than 40 - obesity grade III            Labs/Tests/Procedures/Meds       09/01/17 1435    Labs/Tests/Procedures/Meds    Diagnostic Test/Procedure Review reviewed, pertinent    Labs/Tests Review Reviewed    Medication Review Reviewed, pertinent    Significant Vitals reviewed, pertinent            Physical Findings       09/01/17 1435    Physical Findings/Assessment    Additional Documentation Physical Appearance (Group)    Physical Appearance    Overall Physical Appearance obese    Gastrointestinal feeding tube    Tubes peg tube    Skin pressure ulcer(s);other (see comments)   L lower calf wound, R heel SDTI, coccyx st II, B=13              Nutrition Prescription Ordered       09/01/17 1435    Nutrition Prescription PO    Current PO Diet Soft Texture    Texture Whole foods    Fluid Consistency Thin    Supplement Magic Cup;Glucerna Shake    Supplement Frequency Daily                Problem/Interventions:                  Intervention Goal       09/01/17 1436    Intervention Goal    General Maintain nutrition;Disease management/therapy;Meet nutritional needs " for age/condition    PO Increase intake;PO intake (%)    PO Intake % 75 %    Weight Appropriate weight loss            Nutrition Intervention       09/01/17 1436    Nutrition Intervention    RD/Tech Action Follow Tx progress;Care plan reviewd              Education/Evaluation       09/01/17 1436    Education    Education Will Instruct as appropriate    Monitor/Evaluation    Monitor Per protocol;PO intake;Supplement intake;Weight;Skin status;Symptoms            Electronically signed by:  Bambi Caba RD  09/01/17 2:36 PM

## 2017-09-01 NOTE — PLAN OF CARE
Problem: Patient Care Overview (Adult)  Goal: Plan of Care Review  Outcome: Ongoing (interventions implemented as appropriate)    09/01/17 0257 09/01/17 1455 09/01/17 1706   Coping/Psychosocial Response Interventions   Plan Of Care Reviewed With --  patient --    Patient Care Overview   Progress no change --  --    Outcome Evaluation   Outcome Summary/Follow up Plan --  --  VSS. Pt response is minimal, takes extra time to respond. Q 2 turn. CT of abdomen today. No physical indications of pain. Will continue to monitor.        Goal: Adult Individualization and Mutuality  Outcome: Ongoing (interventions implemented as appropriate)  Goal: Discharge Needs Assessment  Outcome: Ongoing (interventions implemented as appropriate)    08/28/17 1333 08/28/17 1855 08/30/17 1638   Discharge Needs Assessment   Concerns To Be Addressed --  --  --    Readmission Within The Last 30 Days --  --  --    Discharge Facility/Level Of Care Needs nursing facility, skilled --  --    Current Discharge Risk chronically ill --  --    Discharge Disposition --  still a patient --    Current Health   Outpatient/Agency/Support Group Needs --  --  skilled nursing facility (specify)   Anticipated Changes Related to Illness inability to care for self --  --    Living Environment   Transportation Available ambulance --  --    Self-Care   Equipment Currently Used at Home (Pt is [facility]) --  --      08/1955   Discharge Needs Assessment   Concerns To Be Addressed no discharge needs identified   Readmission Within The Last 30 Days previous discharge plan unsuccessful   Discharge Facility/Level Of Care Needs --    Current Discharge Risk --    Discharge Disposition --    Current Health   Outpatient/Agency/Support Group Needs --    Anticipated Changes Related to Illness --    Living Environment   Transportation Available --    Self-Care   Equipment Currently Used at Home --          Problem: Skin Integrity Impairment, Risk/Actual  (Adult)  Goal: Skin Integrity/Wound Healing  Outcome: Ongoing (interventions implemented as appropriate)    09/01/17 1706   Skin Integrity Impairment, Risk/Actual (Adult)   Skin Integrity/Wound Healing making progress toward outcome         Problem: Sepsis (Adult)  Goal: Signs and Symptoms of Listed Potential Problems Will be Absent or Manageable (Sepsis)  Outcome: Ongoing (interventions implemented as appropriate)    08/1955 09/01/17 0257   Sepsis   Problems Assessed (Sepsis) all --    Problems Present (Sepsis) --  none         Problem: Infection, Risk/Actual (Adult)  Goal: Infection Prevention/Resolution  Outcome: Ongoing (interventions implemented as appropriate)    09/01/17 1706   Infection, Risk/Actual (Adult)   Infection Prevention/Resolution making progress toward outcome         Problem: Fall Risk (Adult)  Goal: Absence of Falls  Outcome: Ongoing (interventions implemented as appropriate)    09/01/17 1706   Fall Risk (Adult)   Absence of Falls making progress toward outcome         Problem: Pain, Acute (Adult)  Goal: Acceptable Pain Control/Comfort Level  Outcome: Ongoing (interventions implemented as appropriate)    09/01/17 1706   Pain, Acute (Adult)   Acceptable Pain Control/Comfort Level making progress toward outcome       Goal: Identify Related Risk Factors and Signs and Symptoms  Outcome: Ongoing (interventions implemented as appropriate)    08/1955 09/01/17 1706   Pain, Acute   Related Risk Factors (Acute Pain) disease process --    Signs and Symptoms (Acute Pain) --  altered time perception;fatigue/weakness       Goal: Acceptable Pain Control/Comfort Level  Outcome: Ongoing (interventions implemented as appropriate)    09/01/17 1706   Pain, Acute (Adult)   Acceptable Pain Control/Comfort Level making progress toward outcome         Problem: Confusion, Acute (Adult)  Goal: Cognitive/Functional Impairments Minimized  Outcome: Ongoing (interventions implemented as appropriate)    09/01/17  1706   Confusion, Acute (Adult)   Cognitive/Functional Impairments Minimized making progress toward outcome       Goal: Safety  Outcome: Ongoing (interventions implemented as appropriate)    09/01/17 1706   Confusion, Acute (Adult)   Safety making progress toward outcome         Problem: Anxiety (Adult)  Goal: Reduction/Resolution  Outcome: Ongoing (interventions implemented as appropriate)    09/01/17 1706   Anxiety (Adult)   Reduction/Resolution making progress toward outcome         Problem: Wound, Traumatic, Nonburn (Adult)  Goal: Signs and Symptoms of Listed Potential Problems Will be Absent or Manageable (Wound, Traumatic, Nonburn)  Outcome: Ongoing (interventions implemented as appropriate)    09/01/17 1706   Wound, Traumatic, Nonburn   Problems Assessed (Wound) situational response   Problems Present (Wound) undernutrition;other (see comments);situational response  (pt not eating adequate amount)         Problem: Respiratory Insufficiency (Adult)  Goal: Acid/Base Balance  Outcome: Ongoing (interventions implemented as appropriate)    09/01/17 1706   Respiratory Insufficiency (Adult)   Acid/Base Balance making progress toward outcome       Goal: Effective Ventilation  Outcome: Ongoing (interventions implemented as appropriate)    09/01/17 1706   Respiratory Insufficiency (Adult)   Effective Ventilation making progress toward outcome

## 2017-09-01 NOTE — PLAN OF CARE
Problem: Patient Care Overview (Adult)  Goal: Plan of Care Review  Outcome: Ongoing (interventions implemented as appropriate)    09/01/17 0257   Coping/Psychosocial Response Interventions   Plan Of Care Reviewed With patient   Patient Care Overview   Progress no change   Outcome Evaluation   Outcome Summary/Follow up Plan VSS. Patient alert to self and place. Needs extra time to respond back to staff questions. Turning Q2H. Will continue to monitor.        Goal: Adult Individualization and Mutuality  Outcome: Ongoing (interventions implemented as appropriate)  Goal: Discharge Needs Assessment  Outcome: Ongoing (interventions implemented as appropriate)    Problem: Skin Integrity Impairment, Risk/Actual (Adult)  Goal: Skin Integrity/Wound Healing  Outcome: Ongoing (interventions implemented as appropriate)    Problem: Sepsis (Adult)  Goal: Signs and Symptoms of Listed Potential Problems Will be Absent or Manageable (Sepsis)  Outcome: Ongoing (interventions implemented as appropriate)    Problem: Infection, Risk/Actual (Adult)  Goal: Infection Prevention/Resolution  Outcome: Ongoing (interventions implemented as appropriate)    Problem: Fall Risk (Adult)  Goal: Absence of Falls  Outcome: Ongoing (interventions implemented as appropriate)    Problem: Pain, Acute (Adult)  Goal: Acceptable Pain Control/Comfort Level  Outcome: Ongoing (interventions implemented as appropriate)  Goal: Identify Related Risk Factors and Signs and Symptoms  Outcome: Ongoing (interventions implemented as appropriate)  Goal: Acceptable Pain Control/Comfort Level  Outcome: Ongoing (interventions implemented as appropriate)    Problem: Confusion, Acute (Adult)  Goal: Cognitive/Functional Impairments Minimized  Outcome: Ongoing (interventions implemented as appropriate)  Goal: Safety  Outcome: Ongoing (interventions implemented as appropriate)    Problem: Anxiety (Adult)  Goal: Reduction/Resolution  Outcome: Ongoing (interventions implemented as  appropriate)    Problem: Wound, Traumatic, Nonburn (Adult)  Goal: Signs and Symptoms of Listed Potential Problems Will be Absent or Manageable (Wound, Traumatic, Nonburn)  Outcome: Ongoing (interventions implemented as appropriate)    Problem: Respiratory Insufficiency (Adult)  Goal: Acid/Base Balance  Outcome: Ongoing (interventions implemented as appropriate)  Goal: Effective Ventilation  Outcome: Ongoing (interventions implemented as appropriate)

## 2017-09-01 NOTE — PROGRESS NOTES
[facility]  Cardiology Progress Note    Patient Identification:  Name: Lupe Burleson  Age: 61 y.o.  Sex: female  :  1955  MRN: [id number]                 Follow Up / Chief Complaint: pericardial effusion, h/o diastolic dysfunction, HTN    Interval History:   61-year-old female with multiple medical issues including history of MI with unknown coronary anatomy and hypertension currently being treated for sepsis and intra-abdominal abscess.  CT showed a small-moderate pericardial effusion.     Subjective:  Very sleepy-just received pain medication complaint of leg soreness.  Denied chest pain or tightness. Devonte SOA or cough.    Discussed cardiac test results to date including ECHO  findings with the patient.  Patient has been advised we  plan to treat effusion conservatively therapy at this time.  She had no questions and voiced understanding    Objective:  SR with rare, isolated PVC, 's-140's / 60's  Afebrile.      Past Medical History:  Past Medical History:   Diagnosis Date   • Acute respiratory distress syndrome    • Arthritis    • Bipolar disorder, unspecified    • CAD (coronary artery disease)    • Cellulitis    • Cellulitis    • CHF (congestive heart failure)    • Chronic ischemic heart disease    • Chronic respiratory failure with hypoxia and hypercapnia    • Chronic ulcer of calf    • Constipation    • Coronary artery disease    • Depression    • Depressive disorder    • Diabetes mellitus    • Dysphagia    • Dysphagia    • Endometrial cancer determined by uterine biopsy     s/p radiation; no improvement   • Endometrial hyperplasia    • History of transfusion    • Hypercapnia    • Hypertension    • Immobility    • Lack of coordination    • Lymphedema    • Malignant neoplasm of uterus    • Muscle weakness    • Obesities, morbid    • Oxygen dependent    • Post-menopausal bleeding    • Postmenopausal bleeding    • Schizo affective schizophrenia    • Skin ulcer    • Sleep apnea     • Stroke    • Symbolic dysfunction    • Uterine cancer    • Venous insufficiency    • Venous insufficiency      Past Surgical History:  Past Surgical History:   Procedure Laterality Date   • D&C HYSTEROSCOPY     • LAPAROSCOPIC TUBAL LIGATION     • TOTAL LAPAROSCOPIC HYSTERECTOMY Bilateral 8/15/2017    Procedure: OPEN TOTAL  ABDOMINAL HYSTERECTOMY WITH BILATERAL SALPINGO-OOPHERECTOMY;  Surgeon: Ortiz Washington MD;  Location: [facility];  Service:    • TRACHEOSTOMY AND PEG TUBE INSERTION     • UMBILICAL HERNIA REPAIR     • WOUND DEBRIDEMENT          Social History:   Social History   Substance Use Topics   • Smoking status: Former Smoker     Packs/day: 2.00     Years: 15.00     Types: Cigarettes     Quit date: 1988   • Smokeless tobacco: Not on file   • Alcohol use No      Family History:  Family History   Problem Relation Age of Onset   • Diabetes Mother    • Stroke Mother           Allergies:  Allergies   Allergen Reactions   • Codeine    • Penicillins      Tolerated ceftriaxone during 5/2017 admission   • Sulfa Antibiotics      Scheduled Meds:    alteplase 2 mg Once   alteplase 2 mg Once   alteplase 2 mg Once   amLODIPine 5 mg Q24H   atorvastatin 20 mg Daily   enoxaparin 40 mg Q12H   ferrous sulfate 325 mg Daily With Breakfast   hydrophor  Q24H   isosorbide mononitrate 30 mg Daily   lactulose 20 g Daily   pantoprazole 40 mg Q AM   piperacillin-tazobactam 3.375 g Q8H   potassium chloride 20 mEq Daily   risperiDONE 2 mg Daily   risperiDONE 3 mg Nightly   sodium chloride 10 mL Q12H   Valproic Acid 250 mg Q12H   vancomycin 1,500 mg Q12H           INTAKE AND OUTPUT:  No intake or output data in the 24 hours ending 09/01/17 1313    Review of Systems:   GI:  No nausea or vomiting  Cardiac:  No chest pain or dizziness  Pulmonary:  No increased work of breathing.  No cough    Constitutional:  Temp:  [97.5 °F (36.4 °C)-98.8 °F (37.1 °C)] 98.8 °F (37.1 °C)  Heart Rate:  [] 94  Resp:  [18-20] 20  BP:  (100-155)/(64-77) 143/67    Physical Exam   General:  Resting quietly Appears in no acute distress  Eyes: PERTL,  HEENT:   Thyroid not visibly enlarged.  Oral mucosa moist, no cyanosis  Respiratory: Respirations regular and unlabored at rest. BBS with slightly diminished air entry in bases. No crackles or wheezes auscultated  Cardiovascular: S1S2 Regular rate and rhythm. No murmur, gallop or rub appreciated  No pretibial pitting edema  Gastrointestinal: Abdomen soft, obese, non tender. Bowel sounds present.    Musculoskeletal: YARBROUGH weakly x4. No abnormal movements  Extremities: No digital clubbing or cyanosis. Evidence of epidermal color changes in lower extremities, chronic, healed wound left medial calf.  Skin:  Skin  dry to touch. Evidence of epidermal color changes in lower extremities, chronic, healed wound left medial calf.  Neuro: AAO x2 CN II-XII grossly intact  Psych: Mood and affect normal, pleasant and cooperative        Cardiographics  Telemetry:  Sinus rhythm, occasional isolated PVC        Echocardiogram:   · Left ventricular wall thickness is consistent with mild concentric hypertrophy  · .Calculated EF = 66.4%  · Left ventricular diastolic dysfunction (grade I) consistent with impaired relaxation.  · Mild mitral valve regurgitation is present  · Mild tricuspid valve regurgitation is present.  · There is a moderate (1-2cm) circumferential pericardial effusion. There is no evidence of cardiac tamponade.    Lab Review     Results from last 7 days  Lab Units 09/01/17  0606   SODIUM mmol/L 147*   POTASSIUM mmol/L 3.7   BUN mg/dL 8   CREATININE mg/dL 0.61   CALCIUM mg/dL 8.8       Results from last 7 days  Lab Units 09/01/17  0606 08/31/17  0437 08/30/17  0459   WBC 10*3/mm3 13.66* 13.48* 12.31*   HEMOGLOBIN g/dL 7.3* 7.5* 8.1*   HEMATOCRIT % 24.8* 25.2* 26.9*   PLATELETS 10*3/mm3 469 530* 572*             Assessment:  - multibacterial decubitus infection  - staphylococcus bacteremia / sepsis  -  "Intra-abdominal abscess  - anemia  - recent CRISS/BSO for endometrial cancer    - Pericardial effusion  - EF 65-70%,  Gr 1 salcido dysfx  - Chronic hypoxic/hypercapnic respiratory failure  - Dyslipidemia      Plan:  - Pericardial effusion - 1-2cm circumferential pericardial effusion. No evidence of cardiac tamponade. No intervention needed    - EF 55-60% - Gr 1 salcido dysfx, mild MR/TR, on Norvasc and home Imdur    - HTN -  controlled Norvasc    - Dyslipidemia - on statin         No intervention needed at this time for pericardial effusion  Little else to add at this point.  We'll sign off and see again upon request.  Thank you    I reviewed the patient's new clinical results and treatment plan   I personally viewed and interpreted the patient's EKG/Telemetry data    )9/1/2017  MD YAO Adam GetAutoBidson/Transcription:   \"Dictated utilizing Dragon dictation\".     "

## 2017-09-01 NOTE — PROGRESS NOTES
DAILY PROGRESS NOTE  [facility], [facility]      Patient Identification:  Name: Lupe Burleson  Age: 61 y.o.  Sex: female  :  1955  MRN: [id number]         Primary Care Physician: Shane Barcenas MD    Subjective:  Interval History:    Ms. Burleson is awake and alert this morning. She denies having any current pain and denies N/V/D. Pt denies any chest pain and SOA. This is day 6 of vancomycin and zosyn for intra-abdominal abscess. Over the course of the pt's hospital visit, the pt has had BM on  (moderate),  (large),  (small), but has not had any recent bowel movements- 3 days. CT of abdomen and pelvis with contrast will be ordered to help evaluate pt's condition.     Objective:    Scheduled Meds:    alteplase 2 mg Intravenous Once   alteplase 2 mg Intravenous Once   alteplase 2 mg Intravenous Once   amLODIPine 5 mg Oral Q24H   atorvastatin 20 mg Per G Tube Daily   enoxaparin 40 mg Subcutaneous Q12H   ferrous sulfate 325 mg Oral Daily With Breakfast   hydrophor  Topical Q24H   isosorbide mononitrate 30 mg Per G Tube Daily   lactulose 20 g Oral Daily   pantoprazole 40 mg Oral Q AM   piperacillin-tazobactam 3.375 g Intravenous Q8H   potassium chloride 20 mEq Oral Daily   risperiDONE 2 mg Oral Daily   risperiDONE 3 mg Oral Nightly   sodium chloride 10 mL Intracatheter Q12H   Valproic Acid 250 mg Oral Q12H   [START ON 2017] vancomycin 1,500 mg Intravenous Q24H     Continuous Infusions:    Pharmacy to dose vancomycin     sodium chloride 50 mL/hr Last Rate: 50 mL/hr (17)       Vital signs in last 24 hours:  Temp:  [98.5 °F (36.9 °C)-99.4 °F (37.4 °C)] 99.4 °F (37.4 °C)  Heart Rate:  [] 94  Resp:  [20] 20  BP: (130-155)/(64-77) 143/67    tMax 24 hrs: Temp (24hrs), Av.8 °F (37.1 °C), Min:98.5 °F (36.9 °C), Max:99.4 °F (37.4 °C)      Intake/Output:    Intake/Output Summary (Last 24 hours) at 17 6921  Last data filed at 17  "1506   Gross per 24 hour   Intake               50 ml   Output                0 ml   Net               50 ml       Exam:    /67  Pulse 94  Temp 99.4 °F (37.4 °C)  Resp 20  Ht 62.99\" (160 cm)  Wt 260 lb 2.3 oz (118 kg)  SpO2 98%  BMI 46.09 kg/m2    General: Alert, cooperative and appears stated age. In no acute distress  Neck: Supple, symmetrical, trachea midline, no adenopathy;              Thyroid without enlargement/tenderness/nodules;              no carotid bruit or JVD  Cardiovascular: Regular rate, regular rhythm and intact distal pulses.                              Exam reveals no gallop and no friction rub. No murmur heard  Chest wall: No tenderness or deformity  Pulmonary: Clear to auscultation bilaterally, respirations unlabored.                        No rhonchi, wheezing or rales.   Abdominal: Soft, non-tender; bowel sounds active on L side, but diminished on R side                     Midline incision approximated, Staples now removed.   Extremities: Atraumatic, no cyanosis, chronic lymphedema bilaterally on LE  Pulses:         2 + symmetric all extremities  Neurological: She is alert and oriented to person, place, and time.                           CNII-XII intact, normal strength, sensation intact throughout  Skin: Skin color, texture, turgor normal, stage 2 coccyx wound         Data Review:      Results from last 7 days  Lab Units 09/01/17  0606 08/31/17 0437 08/30/17  0459   WBC 10*3/mm3 13.66* 13.48* 12.31*   HEMOGLOBIN g/dL 7.3* 7.5* 8.1*   HEMATOCRIT % 24.8* 25.2* 26.9*   PLATELETS 10*3/mm3 469 530* 572*         Results from last 7 days  Lab Units 09/01/17  0606 08/31/17  0437 08/30/17  1552 08/30/17  0459  08/27/17  1228   SODIUM mmol/L 147* 146*  --  145  < > 147*   POTASSIUM mmol/L 3.7 3.7 5.0 3.4*  < > 3.5   CHLORIDE mmol/L 110* 112*  --  106  < > 101   CO2 mmol/L 27.8 26.1  --  28.2  < > 31.6*   BUN mg/dL 8 7*  --  7*  < > 11   CREATININE mg/dL 0.61 0.49*  --  0.48*  < > 0.63 "   CALCIUM mg/dL 8.8 8.5*  --  8.8  < > 9.5   BILIRUBIN mg/dL  --   --   --   --   --  0.6   ALK PHOS U/L  --   --   --   --   --  63   ALT (SGPT) U/L  --   --   --   --   --  10   AST (SGOT) U/L  --   --   --   --   --  14   GLUCOSE mg/dL 98 95  --  108*  < > 119*   < > = values in this interval not displayed.          Lab Results  Lab Value Date/Time   TROPONINT 0.065 (H) 05/17/2017 0505   TROPONINT 0.077 (H) 05/16/2017 1517   TROPONINT 0.080 (H) 05/16/2017 1112   TROPONINT 0.078 (H) 05/16/2017 0703   TROPONINT 0.084 (H) 05/16/2017 0116   TROPONINT <0.010 05/15/2016 0622   TROPONINT 0.017 05/14/2016 0437   TROPONINT 0.023 05/13/2016 2036   TROPONINT 0.031 (H) 05/13/2016 1231   TROPONINT <0.01 02/03/2016 0537   TROPONINT <0.01 02/02/2016 0914   TROPONINT <0.01 11/11/2015 0516   TROPONINT <0.01 11/09/2015 2049   TROPONINT <0.01 04/18/2014 0517       Microbiology Results (last 10 days)     Procedure Component Value - Date/Time    Urine Culture [[id number]]  (Normal) Collected:  08/27/17 1230    Lab Status:  Final result Specimen:  Urine from Urine, Catheter Updated:  08/29/17 0725     Urine Culture No growth    Wound Culture [[id number]]  (Abnormal)  (Susceptibility) Collected:  08/27/17 1230    Lab Status:  Final result Specimen:  Wound from Buttock Updated:  08/30/17 0831     Wound Culture Moderate growth (3+) Pseudomonas aeruginosa (A)      Moderate growth (3+) Staphylococcus warneri (A)      Moderate growth (3+) Proteus mirabilis (A)      Light growth (2+) Klebsiella pneumoniae ssp pneumoniae (A)     Gram Stain Result Few (2+) WBCs seen      Rare (1+) Gram negative bacilli      Rare (1+) Gram positive cocci in pairs    Susceptibility      Pseudomonas aeruginosa     DEBI     Cefepime <=1 ug/ml Susceptible     Ceftazidime 2 ug/ml Susceptible     Ciprofloxacin <=0.25 ug/ml Susceptible     Levofloxacin 0.5 ug/ml Susceptible     Meropenem <=0.25 ug/ml Susceptible     Piperacillin + Tazobactam 8 ug/ml Susceptible      Tobramycin <=1 ug/ml Susceptible                Susceptibility      Staphylococcus warneri     DEBI     Clindamycin <=0.25 ug/ml Susceptible     Erythromycin >=8 ug/ml Resistant     Oxacillin <=0.25 ug/ml Susceptible     Penicillin G >=0.5 ug/ml Resistant     Rifampin <=0.5 ug/ml Susceptible     Tetracycline <=1 ug/ml Susceptible     Trimethoprim + Sulfamethoxazole <=10 ug/ml Susceptible     Vancomycin <=0.5 ug/ml Susceptible                Susceptibility      Proteus mirabilis     DEBI     Ampicillin <=2 ug/ml Susceptible     Ampicillin + Sulbactam <=2 ug/ml Susceptible     Cefazolin 8 ug/ml Susceptible     Cefepime <=1 ug/ml Susceptible     Cefoxitin 8 ug/ml Susceptible     Ceftriaxone <=1 ug/ml Susceptible     Ertapenem 1 ug/ml Susceptible     Gentamicin <=1 ug/ml Susceptible     Levofloxacin >=8 ug/ml Resistant     Meropenem 1 ug/ml Susceptible     Piperacillin + Tazobactam <=4 ug/ml Susceptible     Trimethoprim + Sulfamethoxazole >=320 ug/ml Resistant                Susceptibility      Klebsiella pneumoniae ssp pneumoniae     DEBI     Ampicillin >=32 ug/ml Resistant     Ampicillin + Sulbactam 8 ug/ml Susceptible     Cefazolin <=4 ug/ml Susceptible     Cefepime <=1 ug/ml Susceptible     Cefoxitin <=4 ug/ml Susceptible     Ceftriaxone <=1 ug/ml Susceptible     Ertapenem <=0.5 ug/ml Susceptible     Gentamicin <=1 ug/ml Susceptible     Levofloxacin <=0.12 ug/ml Susceptible     Meropenem <=0.25 ug/ml Susceptible     Piperacillin + Tazobactam 8 ug/ml Susceptible     Trimethoprim + Sulfamethoxazole <=20 ug/ml Susceptible                    Blood Culture [[id number]]  (Abnormal) Collected:  08/27/17 1228    Lab Status:  Final result Specimen:  Blood from Arm, Left Updated:  08/30/17 0710     Blood Culture Staphylococcus, coagulase negative (A)                 Staphylococcus, coagulase negative (A)                Gram Stain Result Aerobic Bottle Gram positive cocci      Anaerobic Bottle Gram positive cocci in clusters     Narrative:       Probable contaminants requires clinical correlation, susceptibility not performed unless requested by physician.    Blood Culture ID, PCR [[id number]]  (Abnormal) Collected:  08/27/17 1228    Lab Status:  Final result Specimen:  Blood from Arm, Left Updated:  08/28/17 1223     BCID, PCR Staphylococcus spp, not aureus. Identification by BCID PCR. (A)           Imaging Results (last 7 days)     Procedure Component Value Units Date/Time    XR Chest 1 View [[id number]] Collected:  08/27/17 1259     Updated:  08/27/17 1303    Narrative:       XR CHEST 1 VW-     Clinical: Postoperative fever     COMPARISON 08/20/2017     FINDINGS: No acute airspace disease has developed. The cardiomediastinal  silhouette is stable. No edema or effusion.     CONCLUSION: No acute cardiovascular or pulmonary process is  demonstrated.     This report was finalized on 8/27/2017 1:00 PM by Dr. Kieran Mackay MD.       CT Abdomen Pelvis With Contrast [[id number]] Collected:  08/27/17 1620     Updated:  08/27/17 1640    Narrative:       CT SCANS OF THE CHEST, ABDOMEN, AND PELVIS WITH INTRAVENOUS CONTRAST     HISTORY: Recent hysterectomy. Fever and shortness of breath and  abdominal pain.     The CT scans were performed as an emergency procedure with CT imaging  through the chest, abdomen, and pelvis with intravenous contrast. The  following findings are present:  1. There is some predominately strand-like atelectasis at both lung  bases medially associated with tiny bilateral pleural effusions and the  lungs are otherwise clear. There is no mediastinal or hilar or axillary  adenopathy. Small to moderate-sized pericardial effusion measuring 1.9  cm in thickness and this shows enlargement since the preoperative CT  scan of 06/18/2017.  2. There is a small hepatic cyst that is unchanged. The spleen,  pancreas, both adrenal glands and right kidney are unremarkable. There  is mild dilatation of the left renal collecting system including  the  left ureter which extends into the pelvis where there is prominent  induration of the mesenteric fat planes associated with generalized wall  thickening of the rectosigmoid colon and there is also an adjacent fluid  collection to the left of the sigmoid colon that measures up to 5.5 x  6.8 cm. There is also generalized wall thickening of the urinary  bladder. Some of these findings may relate to postoperative changes but  are concerning for colitis. The fluid collection to the left of the  sigmoid colon could potentially represent an area of developing abscess  and the distended ureter extends to this region. The generalized  thickening of the urinary bladder may be secondary to the adjacent  inflammatory changes but clinical correlation for cystitis is also  recommended.     These findings have been discussed with Dr. Quintero in the emergency  room.     This report was finalized on 8/27/2017 4:37 PM by Dr. Dionte Barry MD.       CT Chest With Contrast [[id number]] Collected:  08/27/17 1620     Updated:  08/27/17 1640    Narrative:       CT SCANS OF THE CHEST, ABDOMEN, AND PELVIS WITH INTRAVENOUS CONTRAST     HISTORY: Recent hysterectomy. Fever and shortness of breath and  abdominal pain.     The CT scans were performed as an emergency procedure with CT imaging  through the chest, abdomen, and pelvis with intravenous contrast. The  following findings are present:  1. There is some predominately strand-like atelectasis at both lung  bases medially associated with tiny bilateral pleural effusions and the  lungs are otherwise clear. There is no mediastinal or hilar or axillary  adenopathy. Small to moderate-sized pericardial effusion measuring 1.9  cm in thickness and this shows enlargement since the preoperative CT  scan of 06/18/2017.  2. There is a small hepatic cyst that is unchanged. The spleen,  pancreas, both adrenal glands and right kidney are unremarkable. There  is mild dilatation of the left renal  collecting system including the  left ureter which extends into the pelvis where there is prominent  induration of the mesenteric fat planes associated with generalized wall  thickening of the rectosigmoid colon and there is also an adjacent fluid  collection to the left of the sigmoid colon that measures up to 5.5 x  6.8 cm. There is also generalized wall thickening of the urinary  bladder. Some of these findings may relate to postoperative changes but  are concerning for colitis. The fluid collection to the left of the  sigmoid colon could potentially represent an area of developing abscess  and the distended ureter extends to this region. The generalized  thickening of the urinary bladder may be secondary to the adjacent  inflammatory changes but clinical correlation for cystitis is also  recommended.     These findings have been discussed with Dr. Quintero in the emergency  room.     This report was finalized on 8/27/2017 4:37 PM by Dr. Dionte Barry MD.               Assessment:      Principal Problem:    Sepsis  Active Problems:    Diabetes mellitus    Hypertension    Abscess of abdominal cavity    Chronic diastolic CHF (congestive heart failure)    Morbid obesity    THAI (obstructive sleep apnea)    Schizo affective schizophrenia    Coronary artery disease    Chronic respiratory failure with hypoxia and hypercapnia    Lymphedema  Pericardial Effusion    Patient Active Problem List   Diagnosis Code   • Elevated troponin R79.89   • Aspiration pneumonia J69.0   • Hematuria R31.9   • Hypokalemia E87.6   • Pelvic mass in female R19.00   • Fecal impaction K56.41   • Endometrial carcinoma C54.1   • Acute on chronic respiratory failure with hypoxia and hypercapnia J96.21, J96.22   • Acute on chronic diastolic CHF (congestive heart failure) I50.33   • UTI (urinary tract infection) N39.0   • Leucocytosis D72.829   • Chronic diastolic CHF (congestive heart failure) I50.32   • DM (diabetes mellitus) E11.9   • Morbid  obesity E66.01   • HTN (hypertension) I10   • Schizophrenia F20.9   • Tracheostomy malfunction J95.03   • Pyuria N39.0   • THAI (obstructive sleep apnea) G47.33   • Generalized weakness R53.1   • Schizo affective schizophrenia F25.0   • Diabetes mellitus E11.9   • Coronary artery disease I25.10   • Endometrial cancer determined by uterine biopsy C54.1, Z15.04   • Chronic respiratory failure with hypoxia and hypercapnia J96.11, J96.12   • Toxic metabolic encephalopathy G92   • Pneumonia J18.9   • Abnormal vaginal bleeding N93.9   • Schizo affective schizophrenia F25.0   • CHF (congestive heart failure) I50.9   • Endometrial cancer determined by uterine biopsy C54.1, Z15.04   • Coronary artery disease I25.10   • Lymphedema I89.0   • Immobility Z74.09   • Acute blood loss anemia D62   • Vaginal bleeding N93.9   • Hypertension I10   • Uterine cancer C55   • Bipolar disorder, unspecified F31.9   • Sepsis A41.9   • Abscess of abdominal cavity K65.1       Plan:    Continue IV antibiotics  CT scan of abdomen and pelvis will be ordered for f/u absces  Diet: Reg, mech soft.   Monitor and correct electrolytes  Monitor mental status  Continue Accuchecks and SSI  DVT/stress ulcer prophylaxis  Labs in am      Shane Barcenas MD  9/1/2017  4:45 PM

## 2017-09-01 NOTE — PROGRESS NOTES
DAILY PROGRESS NOTE    Patient Identification:  Name: Lupe Burleson  Age: 61 y.o.  Sex: Female  :  1955  MRN:[id number]    Cheif complaint:  Chief Complaint   Patient presents with   • Fever     Patient is 1 week post Hysterectomy and is sent from NH for increased lethargy and a fever. 101.6 per facility.        Subjective:  Patient awake and denies complaints.    Objective:  Scheduled Meds:    alteplase 2 mg Intravenous Once   alteplase 2 mg Intravenous Once   alteplase 2 mg Intravenous Once   amLODIPine 5 mg Oral Q24H   atorvastatin 20 mg Per G Tube Daily   enoxaparin 40 mg Subcutaneous Q12H   ferrous sulfate 325 mg Oral Daily With Breakfast   hydrophor  Topical Q24H   isosorbide mononitrate 30 mg Per G Tube Daily   lactulose 20 g Oral Daily   pantoprazole 40 mg Oral Q AM   piperacillin-tazobactam 3.375 g Intravenous Q8H   potassium chloride 20 mEq Oral Daily   risperiDONE 2 mg Oral Daily   risperiDONE 3 mg Oral Nightly   sodium chloride 10 mL Intracatheter Q12H   Valproic Acid 250 mg Oral Q12H   vancomycin 1,500 mg Intravenous Q12H       Continuous Infusions:    Pharmacy to dose vancomycin     sodium chloride 50 mL/hr Last Rate: 50 mL/hr (17)       PRN Meds:  •  acetaminophen  •  HYDROcodone-acetaminophen  •  HYDROcodone-acetaminophen  •  ipratropium-albuterol  •  LORazepam  •  Pharmacy to dose vancomycin  •  potassium chloride **OR** potassium chloride **OR** potassium chloride  •  Insert peripheral IV **AND** sodium chloride  •  sodium chloride    Intake/Output:  I/O last 3 completed shifts:  In: 410 [P.O.:360; IV Piggyback:50]  Out: -     Exam:  Vitals:    17 0712   BP: 143/67   Pulse: 94   Resp: 20   Temp:    SpO2: 98%         Physical Examination:   PHYSICAL EXAM:  Constitutional:  No acute distress, Non-toxic appearance.  Cardiovascular:  Normal heart rate, Normal rhythm, No murmurs, No rubs, No gallops.  Pulmonary/Chest:  Normal breath sounds, No respiratory distress, No  wheezing, No chest tenderness.   Abdomen:  Obese, bowel sounds normal, Soft, No tenderness,  Extremities:  Normal range of motion, Intact distal pulses, No edema, No tenderness.  Neurologic:  Alert.      Data Review:  Recent Results (from the past 36 hour(s))   Basic Metabolic Panel    Collection Time: 08/31/17  4:37 AM   Result Value Ref Range    Glucose 95 65 - 99 mg/dL    BUN 7 (L) 8 - 23 mg/dL    Creatinine 0.49 (L) 0.57 - 1.00 mg/dL    Sodium 146 (H) 136 - 145 mmol/L    Potassium 3.7 3.5 - 5.2 mmol/L    Chloride 112 (H) 98 - 107 mmol/L    CO2 26.1 22.0 - 29.0 mmol/L    Calcium 8.5 (L) 8.6 - 10.5 mg/dL    eGFR  African Amer >150 >60 mL/min/1.73    BUN/Creatinine Ratio 14.3 7.0 - 25.0    Anion Gap 7.9 mmol/L   CBC Auto Differential    Collection Time: 08/31/17  4:37 AM   Result Value Ref Range    WBC 13.48 (H) 4.50 - 10.70 10*3/mm3    RBC 3.50 (L) 3.90 - 5.20 10*6/mm3    Hemoglobin 7.5 (L) 11.9 - 15.5 g/dL    Hematocrit 25.2 (L) 35.6 - 45.5 %    MCV 72.0 (L) 80.5 - 98.2 fL    MCH 21.4 (L) 26.9 - 32.0 pg    MCHC 29.8 (L) 32.4 - 36.3 g/dL    RDW 21.9 (H) 11.7 - 13.0 %    RDW-SD 57.2 (H) 37.0 - 54.0 fl    MPV 9.5 6.0 - 12.0 fL    Platelets 530 (H) 140 - 500 10*3/mm3    Neutrophil % 82.4 (H) 42.7 - 76.0 %    Lymphocyte % 4.9 (L) 19.6 - 45.3 %    Monocyte % 11.7 5.0 - 12.0 %    Eosinophil % 0.6 0.3 - 6.2 %    Basophil % 0.1 0.0 - 1.5 %    Immature Grans % 0.3 0.0 - 0.5 %    Neutrophils, Absolute 11.11 (H) 1.90 - 8.10 10*3/mm3    Lymphocytes, Absolute 0.66 (L) 0.90 - 4.80 10*3/mm3    Monocytes, Absolute 1.58 (H) 0.20 - 1.20 10*3/mm3    Eosinophils, Absolute 0.08 0.00 - 0.70 10*3/mm3    Basophils, Absolute 0.01 0.00 - 0.20 10*3/mm3    Immature Grans, Absolute 0.04 (H) 0.00 - 0.03 10*3/mm3    nRBC 0.0 0.0 - 0.0 /100 WBC   Magnesium    Collection Time: 08/31/17  4:37 AM   Result Value Ref Range    Magnesium 1.8 1.6 - 2.4 mg/dL   POC Glucose Fingerstick    Collection Time: 08/31/17  7:11 AM   Result Value Ref Range     Glucose 95 70 - 130 mg/dL   POC Glucose Fingerstick    Collection Time: 08/31/17 10:57 AM   Result Value Ref Range    Glucose 115 70 - 130 mg/dL   POC Glucose Fingerstick    Collection Time: 08/31/17  4:27 PM   Result Value Ref Range    Glucose 152 (H) 70 - 130 mg/dL   POC Glucose Fingerstick    Collection Time: 08/31/17  9:19 PM   Result Value Ref Range    Glucose 137 (H) 70 - 130 mg/dL   Basic Metabolic Panel    Collection Time: 09/01/17  6:06 AM   Result Value Ref Range    Glucose 98 65 - 99 mg/dL    BUN 8 8 - 23 mg/dL    Creatinine 0.61 0.57 - 1.00 mg/dL    Sodium 147 (H) 136 - 145 mmol/L    Potassium 3.7 3.5 - 5.2 mmol/L    Chloride 110 (H) 98 - 107 mmol/L    CO2 27.8 22.0 - 29.0 mmol/L    Calcium 8.8 8.6 - 10.5 mg/dL    eGFR  African Amer 120 >60 mL/min/1.73    BUN/Creatinine Ratio 13.1 7.0 - 25.0    Anion Gap 9.2 mmol/L   CBC Auto Differential    Collection Time: 09/01/17  6:06 AM   Result Value Ref Range    WBC 13.66 (H) 4.50 - 10.70 10*3/mm3    RBC 3.37 (L) 3.90 - 5.20 10*6/mm3    Hemoglobin 7.3 (L) 11.9 - 15.5 g/dL    Hematocrit 24.8 (L) 35.6 - 45.5 %    MCV 73.6 (L) 80.5 - 98.2 fL    MCH 21.7 (L) 26.9 - 32.0 pg    MCHC 29.4 (L) 32.4 - 36.3 g/dL    RDW 23.1 (H) 11.7 - 13.0 %    RDW-SD 60.2 (H) 37.0 - 54.0 fl    MPV 10.1 6.0 - 12.0 fL    Platelets 469 140 - 500 10*3/mm3    Neutrophil % 79.7 (H) 42.7 - 76.0 %    Lymphocyte % 5.1 (L) 19.6 - 45.3 %    Monocyte % 12.4 (H) 5.0 - 12.0 %    Eosinophil % 2.5 0.3 - 6.2 %    Basophil % 0.1 0.0 - 1.5 %    Immature Grans % 0.2 0.0 - 0.5 %    Neutrophils, Absolute 10.88 (H) 1.90 - 8.10 10*3/mm3    Lymphocytes, Absolute 0.69 (L) 0.90 - 4.80 10*3/mm3    Monocytes, Absolute 1.70 (H) 0.20 - 1.20 10*3/mm3    Eosinophils, Absolute 0.34 0.00 - 0.70 10*3/mm3    Basophils, Absolute 0.02 0.00 - 0.20 10*3/mm3    Immature Grans, Absolute 0.03 0.00 - 0.03 10*3/mm3    nRBC 0.0 0.0 - 0.0 /100 WBC   Scan Slide    Collection Time: 09/01/17  6:06 AM   Result Value Ref Range     Anisocytosis Mod/2+ None Seen    Hypochromia Mod/2+ None Seen    Microcytes Large/3+ None Seen    Ovalocytes Slight/1+ None Seen    Schistocytes Slight/1+ None Seen    Target Cells Mod/2+ None Seen    WBC Morphology Normal Normal    Platelet Morphology Normal Normal   POC Glucose Fingerstick    Collection Time: 09/01/17  6:17 AM   Result Value Ref Range    Glucose 91 70 - 130 mg/dL         Assessment/Plan:  HD 5:   1. SP CRISS/BSO on 08/15 for endometrial cancer. Advanced aggressive cancer. Should she recover from infection petition with courts would be necessary in making treatment care decisions.     2. Leukocytosis down (13 from 22), pelvic fluid collection not amenable for CT guided drainage secondary fluid collection deep in the pelvis adjacent to the sigmoid colon collection. Continue zosyn and vanc.  3. Elen-cardial effusion, cardiology consulted. Echo with moderate effusion, no tamponade. No intervention planned  4. Anemia, hgb stable at 7.3 ( from 7.5), likely neoplastic, acute on chronic disease. Post two units PRBC 08/29, continue ferrous sulfate.     Follow CBC and continue ATBs. MD to follow    VASHTI Sanchez  9/1/2017    Gynecologic Oncology Attending Physician:  History reviewed with VASHTI Webber.  Ms. Burleson is quiet this afternoon.  She appears to be awake and alert, but she does respond when addressed.  She appears comfortable.    Physical exam: Abdomen remains soft and nontender.  Leukocytosis continues to improve.  Continue current antibiotic regimen.    Agree with assessment and plans as outlined above by VASHTI Webber.    I will be away until Monday, September 11.  Dr. Esquivel will cover for me over the Labor Day weekend, and Dr. Mendoza will cover next week.    Electronically signed by:  Ortiz Washington MD 9/1/2017 3:01 PM

## 2017-09-01 NOTE — PROGRESS NOTES
"Pharmacokinetic Evaluation - Vancomycin    Lupe Burleson is a 61 y.o. female on vancomycin pharmacy to dose.  MRN: [id number]  : 1955    Day of vancomycin therapy: 6  Indication: sepsis  Consulted by: Dr. Barcenas  Goal trough: 15-20    Current dose: vancomycin 1500 mg Q12H   Other antimicrobials: zosyn     Blood pressure 143/67, pulse 94, temperature 99.4 °F (37.4 °C), resp. rate 20, height 62.99\" (160 cm), weight 260 lb (118 kg), SpO2 98 %, not currently breastfeeding.    Results from last 7 days     Lab Units 17  0617  04317  0459   CREATININE mg/dL 0.61 0.49* 0.48*     Estimated Creatinine Clearance: 117.1 mL/min (by C-G formula based on Cr of 0.61).    Results from last 7 days     Lab Units 17  0617  0459   WBC 10*3/mm3 13.66* 13.48* 12.31*   HEMOGLOBIN g/dL 7.3* 7.5* 8.1*   HEMATOCRIT % 24.8* 25.2* 26.9*   PLATELETS 10*3/mm3 469 530* 572*   Baseline culture/source/susceptibility:    ucx: NG    bcx:  peseudomnas, staph warneri, proteus mirabilis and klebs      Vancomycin dosing history:    1553 vancomycin 2250 mg once the 1500 mg IV Q12H   2235 Vt=16.9, drawn 11 hr after the last dose, prior to the 5th dose. Continue vanc 1500 mg Q12H.    110 Vt=27.6, drawn 12.5 hr after the last dose, prior to the 10th dose. Decrease vanc 1500 mg Q24H for predicted tr of ~15.     Assessment:  Level is elevated; drawn at steady state.  Renal function is stable.    Plan:  1) Decrease vancomycin 1500 mg every 24 hours.  2) Next trough on  at 1200 after 2 total doses.      Alexandria Henry, PharmD, BCPS  2017 2:18 PM    "

## 2017-09-02 LAB
ANION GAP SERPL CALCULATED.3IONS-SCNC: 8.3 MMOL/L
BASOPHILS # BLD AUTO: 0.02 10*3/MM3 (ref 0–0.2)
BASOPHILS NFR BLD AUTO: 0.2 % (ref 0–1.5)
BUN BLD-MCNC: 8 MG/DL (ref 8–23)
BUN/CREAT SERPL: 13.8 (ref 7–25)
CALCIUM SPEC-SCNC: 9.1 MG/DL (ref 8.6–10.5)
CHLORIDE SERPL-SCNC: 108 MMOL/L (ref 98–107)
CO2 SERPL-SCNC: 29.7 MMOL/L (ref 22–29)
CREAT BLD-MCNC: 0.58 MG/DL (ref 0.57–1)
DEPRECATED RDW RBC AUTO: 62.3 FL (ref 37–54)
EOSINOPHIL # BLD AUTO: 0.19 10*3/MM3 (ref 0–0.7)
EOSINOPHIL NFR BLD AUTO: 1.6 % (ref 0.3–6.2)
ERYTHROCYTE [DISTWIDTH] IN BLOOD BY AUTOMATED COUNT: 23.9 % (ref 11.7–13)
GFR SERPL CREATININE-BSD FRML MDRD: 127 ML/MIN/1.73
GLUCOSE BLD-MCNC: 94 MG/DL (ref 65–99)
GLUCOSE BLDC GLUCOMTR-MCNC: 121 MG/DL (ref 70–130)
GLUCOSE BLDC GLUCOMTR-MCNC: 125 MG/DL (ref 70–130)
GLUCOSE BLDC GLUCOMTR-MCNC: 94 MG/DL (ref 70–130)
GLUCOSE BLDC GLUCOMTR-MCNC: 97 MG/DL (ref 70–130)
HCT VFR BLD AUTO: 24.5 % (ref 35.6–45.5)
HGB BLD-MCNC: 7.2 G/DL (ref 11.9–15.5)
IMM GRANULOCYTES # BLD: 0.03 10*3/MM3 (ref 0–0.03)
IMM GRANULOCYTES NFR BLD: 0.3 % (ref 0–0.5)
LYMPHOCYTES # BLD AUTO: 0.85 10*3/MM3 (ref 0.9–4.8)
LYMPHOCYTES NFR BLD AUTO: 7.3 % (ref 19.6–45.3)
MCH RBC QN AUTO: 21.5 PG (ref 26.9–32)
MCHC RBC AUTO-ENTMCNC: 29.4 G/DL (ref 32.4–36.3)
MCV RBC AUTO: 73.1 FL (ref 80.5–98.2)
MONOCYTES # BLD AUTO: 1.42 10*3/MM3 (ref 0.2–1.2)
MONOCYTES NFR BLD AUTO: 12.2 % (ref 5–12)
NEUTROPHILS # BLD AUTO: 9.12 10*3/MM3 (ref 1.9–8.1)
NEUTROPHILS NFR BLD AUTO: 78.4 % (ref 42.7–76)
NRBC BLD MANUAL-RTO: 0 /100 WBC (ref 0–0)
PLATELET # BLD AUTO: 530 10*3/MM3 (ref 140–500)
PMV BLD AUTO: 9.6 FL (ref 6–12)
POTASSIUM BLD-SCNC: 3.6 MMOL/L (ref 3.5–5.2)
RBC # BLD AUTO: 3.35 10*6/MM3 (ref 3.9–5.2)
SODIUM BLD-SCNC: 146 MMOL/L (ref 136–145)
WBC NRBC COR # BLD: 11.63 10*3/MM3 (ref 4.5–10.7)

## 2017-09-02 PROCEDURE — 80048 BASIC METABOLIC PNL TOTAL CA: CPT | Performed by: NURSE PRACTITIONER

## 2017-09-02 PROCEDURE — 25010000003 POTASSIUM CHLORIDE 10 MEQ/100ML SOLUTION: Performed by: INTERNAL MEDICINE

## 2017-09-02 PROCEDURE — 85025 COMPLETE CBC W/AUTO DIFF WBC: CPT | Performed by: NURSE PRACTITIONER

## 2017-09-02 PROCEDURE — 25010000002 ENOXAPARIN PER 10 MG: Performed by: OBSTETRICS & GYNECOLOGY

## 2017-09-02 PROCEDURE — 25010000002 VANCOMYCIN 10 G RECONSTITUTED SOLUTION: Performed by: INTERNAL MEDICINE

## 2017-09-02 PROCEDURE — 82962 GLUCOSE BLOOD TEST: CPT

## 2017-09-02 PROCEDURE — 25010000002 PIPERACILLIN SOD-TAZOBACTAM PER 1 G: Performed by: OBSTETRICS & GYNECOLOGY

## 2017-09-02 RX ORDER — DEXTROSE MONOHYDRATE 50 MG/ML
100 INJECTION, SOLUTION INTRAVENOUS CONTINUOUS
Status: DISCONTINUED | OUTPATIENT
Start: 2017-09-02 | End: 2017-09-06

## 2017-09-02 RX ADMIN — VANCOMYCIN HYDROCHLORIDE 1500 MG: 1 INJECTION, POWDER, LYOPHILIZED, FOR SOLUTION INTRAVENOUS at 14:03

## 2017-09-02 RX ADMIN — PANTOPRAZOLE SODIUM 40 MG: 40 TABLET, DELAYED RELEASE ORAL at 05:27

## 2017-09-02 RX ADMIN — POTASSIUM CHLORIDE 10 MEQ: 10 INJECTION, SOLUTION INTRAVENOUS at 08:58

## 2017-09-02 RX ADMIN — PETROLATUM: 42 OINTMENT TOPICAL at 09:00

## 2017-09-02 RX ADMIN — VALPROIC ACID 250 MG: 250 SOLUTION ORAL at 08:59

## 2017-09-02 RX ADMIN — Medication 10 ML: at 21:00

## 2017-09-02 RX ADMIN — FERROUS SULFATE TAB 325 MG (65 MG ELEMENTAL FE) 325 MG: 325 (65 FE) TAB at 08:59

## 2017-09-02 RX ADMIN — TAZOBACTAM SODIUM AND PIPERACILLIN SODIUM 3.38 G: 375; 3 INJECTION, SOLUTION INTRAVENOUS at 06:54

## 2017-09-02 RX ADMIN — ISOSORBIDE MONONITRATE 30 MG: 20 TABLET ORAL at 08:58

## 2017-09-02 RX ADMIN — RISPERIDONE 2 MG: 2 TABLET, FILM COATED ORAL at 08:58

## 2017-09-02 RX ADMIN — RISPERIDONE 3 MG: 3 TABLET ORAL at 21:06

## 2017-09-02 RX ADMIN — POTASSIUM CHLORIDE 10 MEQ: 10 INJECTION, SOLUTION INTRAVENOUS at 14:03

## 2017-09-02 RX ADMIN — POTASSIUM CHLORIDE 10 MEQ: 10 INJECTION, SOLUTION INTRAVENOUS at 18:12

## 2017-09-02 RX ADMIN — VALPROIC ACID 250 MG: 250 SOLUTION ORAL at 21:06

## 2017-09-02 RX ADMIN — ENOXAPARIN SODIUM 40 MG: 40 INJECTION SUBCUTANEOUS at 09:00

## 2017-09-02 RX ADMIN — TAZOBACTAM SODIUM AND PIPERACILLIN SODIUM 3.38 G: 375; 3 INJECTION, SOLUTION INTRAVENOUS at 01:19

## 2017-09-02 RX ADMIN — ENOXAPARIN SODIUM 40 MG: 40 INJECTION SUBCUTANEOUS at 21:06

## 2017-09-02 RX ADMIN — POTASSIUM CHLORIDE 10 MEQ: 10 INJECTION, SOLUTION INTRAVENOUS at 17:02

## 2017-09-02 RX ADMIN — TAZOBACTAM SODIUM AND PIPERACILLIN SODIUM 3.38 G: 375; 3 INJECTION, SOLUTION INTRAVENOUS at 17:02

## 2017-09-02 RX ADMIN — FLUCONAZOLE 200 MG: 200 TABLET ORAL at 21:06

## 2017-09-02 RX ADMIN — AMLODIPINE BESYLATE 5 MG: 5 TABLET ORAL at 08:59

## 2017-09-02 RX ADMIN — HYDROCODONE BITARTRATE AND ACETAMINOPHEN 1 TABLET: 5; 325 TABLET ORAL at 18:47

## 2017-09-02 RX ADMIN — ATORVASTATIN CALCIUM 20 MG: 20 TABLET, FILM COATED ORAL at 08:58

## 2017-09-02 RX ADMIN — DEXTROSE MONOHYDRATE 100 ML/HR: 50 INJECTION, SOLUTION INTRAVENOUS at 14:03

## 2017-09-02 RX ADMIN — LACTULOSE 20 G: 20 SOLUTION ORAL at 08:59

## 2017-09-02 NOTE — PROGRESS NOTES
DAILY PROGRESS NOTE  [facility], [facility]    2017    Patient Identification:  Name: Lupe Burleson  Age: 61 y.o.  Sex: female  :  1955  MRN: [id number]           Primary Care Physician: Shane Barcenas MD    Subjective:    Interval History:    More communicative today. A febrile, VSS  ID consult appreciated  CT f/u findings noted  Lower abd pain maybe a little better     ROS:     No nausea, vomiting, diarrhea. Positive constipation. No CP or SOB      Objective:    Scheduled Meds:    alteplase 2 mg Intravenous Once   alteplase 2 mg Intravenous Once   alteplase 2 mg Intravenous Once   amLODIPine 5 mg Oral Q24H   atorvastatin 20 mg Per G Tube Daily   enoxaparin 40 mg Subcutaneous Q12H   ferrous sulfate 325 mg Oral Daily With Breakfast   fluconazole 200 mg Oral Q24H   hydrophor  Topical Q24H   isosorbide mononitrate 30 mg Per G Tube Daily   lactulose 20 g Oral Daily   pantoprazole 40 mg Oral Q AM   piperacillin-tazobactam 3.375 g Intravenous Q8H   potassium chloride 20 mEq Oral Daily   risperiDONE 2 mg Oral Daily   risperiDONE 3 mg Oral Nightly   sodium chloride 10 mL Intracatheter Q12H   Valproic Acid 250 mg Oral Q12H   vancomycin 1,500 mg Intravenous Q24H       Continuous Infusions:    Pharmacy to dose vancomycin     sodium chloride 50 mL/hr Last Rate: 50 mL/hr (17 1651)       PRN Meds:  •  acetaminophen  •  HYDROcodone-acetaminophen  •  HYDROcodone-acetaminophen  •  ipratropium-albuterol  •  LORazepam  •  Pharmacy to dose vancomycin  •  potassium chloride **OR** potassium chloride **OR** potassium chloride  •  Insert peripheral IV **AND** sodium chloride  •  sodium chloride    Intake/Output:    Intake/Output Summary (Last 24 hours) at 17 1210  Last data filed at 17 0800   Gross per 24 hour   Intake             1250 ml   Output                0 ml   Net             1250 ml         Exam:    tMax 24 hrs: Temp (24hrs), Av.6 °F (37 °C),  "Min:97.1 °F (36.2 °C), Max:99.4 °F (37.4 °C)    Vitals Ranges:   Temp:  [97.1 °F (36.2 °C)-99.4 °F (37.4 °C)] 98.6 °F (37 °C)  Heart Rate:  [] 84  Resp:  [16-18] 18  BP: (135-165)/(59-97) 135/59    /59 (BP Location: Left arm, Patient Position: Lying)  Pulse 84  Temp 98.6 °F (37 °C) (Oral)   Resp 18  Ht 62.99\" (160 cm)  Wt 285 lb (129 kg)  SpO2 93%  BMI 50.5 kg/m2    General: Alert, cooperative and appears stated age. In no acute distress  Neck: Supple, symmetrical, trachea midline, no adenopathy;              Thyroid without enlargement/tenderness/nodules;              no carotid bruit or JVD  Cardiovascular: Regular rate, regular rhythm and intact distal pulses.                              Exam reveals no gallop and no friction rub. No murmur heard  Chest wall: No tenderness or deformity  Pulmonary: Clear to auscultation bilaterally, respirations unlabored.                        No rhonchi, wheezing or rales.   Abdominal: Soft, non-tender; bowel sounds active on L side, but diminished on R side                     Midline incision approximated, Staples now removed.   Extremities: Atraumatic, no cyanosis, chronic lymphedema bilaterally on LE, stable  Pulses:         2 + symmetric all extremities  Neurological: She is alert and oriented to person, place, and time.                           CNII-XII intact, normal strength, sensation intact throughout  Skin: Skin color, texture, turgor normal, stage 2 coccyx wound      Data Review:      Results from last 7 days  Lab Units 09/02/17  0547 09/01/17  0606 08/31/17  0437   WBC 10*3/mm3 11.63* 13.66* 13.48*   HEMOGLOBIN g/dL 7.2* 7.3* 7.5*   HEMATOCRIT % 24.5* 24.8* 25.2*   PLATELETS 10*3/mm3 530* 469 530*         Results from last 7 days  Lab Units 09/02/17  0547 09/01/17  0606 08/31/17  0437  08/27/17  1228   SODIUM mmol/L 146* 147* 146*  < > 147*   POTASSIUM mmol/L 3.6 3.7 3.7  < > 3.5   CHLORIDE mmol/L 108* 110* 112*  < > 101   CO2 mmol/L 29.7* 27.8 " 26.1  < > 31.6*   BUN mg/dL 8 8 7*  < > 11   CREATININE mg/dL 0.58 0.61 0.49*  < > 0.63   CALCIUM mg/dL 9.1 8.8 8.5*  < > 9.5   BILIRUBIN mg/dL  --   --   --   --  0.6   ALK PHOS U/L  --   --   --   --  63   ALT (SGPT) U/L  --   --   --   --  10   AST (SGOT) U/L  --   --   --   --  14   GLUCOSE mg/dL 94 98 95  < > 119*   < > = values in this interval not displayed.          Lab Results  Lab Value Date/Time   TROPONINT 0.065 (H) 05/17/2017 0505   TROPONINT 0.077 (H) 05/16/2017 1517   TROPONINT 0.080 (H) 05/16/2017 1112   TROPONINT 0.078 (H) 05/16/2017 0703   TROPONINT 0.084 (H) 05/16/2017 0116   TROPONINT <0.010 05/15/2016 0622   TROPONINT 0.017 05/14/2016 0437   TROPONINT 0.023 05/13/2016 2036   TROPONINT 0.031 (H) 05/13/2016 1231   TROPONINT <0.01 02/03/2016 0537   TROPONINT <0.01 02/02/2016 0914   TROPONINT <0.01 11/11/2015 0516   TROPONINT <0.01 11/09/2015 2049   TROPONINT <0.01 04/18/2014 0517       Microbiology Results (last 10 days)     Procedure Component Value - Date/Time    Urine Culture [[id number]]  (Normal) Collected:  08/27/17 1230    Lab Status:  Final result Specimen:  Urine from Urine, Catheter Updated:  08/29/17 0725     Urine Culture No growth    Wound Culture [[id number]]  (Abnormal)  (Susceptibility) Collected:  08/27/17 1230    Lab Status:  Final result Specimen:  Wound from Buttock Updated:  08/30/17 0831     Wound Culture Moderate growth (3+) Pseudomonas aeruginosa (A)      Moderate growth (3+) Staphylococcus warneri (A)      Moderate growth (3+) Proteus mirabilis (A)      Light growth (2+) Klebsiella pneumoniae ssp pneumoniae (A)     Gram Stain Result Few (2+) WBCs seen      Rare (1+) Gram negative bacilli      Rare (1+) Gram positive cocci in pairs    Susceptibility      Pseudomonas aeruginosa     DEBI     Cefepime <=1 ug/ml Susceptible     Ceftazidime 2 ug/ml Susceptible     Ciprofloxacin <=0.25 ug/ml Susceptible     Levofloxacin 0.5 ug/ml Susceptible     Meropenem <=0.25 ug/ml Susceptible      Piperacillin + Tazobactam 8 ug/ml Susceptible     Tobramycin <=1 ug/ml Susceptible                Susceptibility      Staphylococcus warneri     DEBI     Clindamycin <=0.25 ug/ml Susceptible     Erythromycin >=8 ug/ml Resistant     Oxacillin <=0.25 ug/ml Susceptible     Penicillin G >=0.5 ug/ml Resistant     Rifampin <=0.5 ug/ml Susceptible     Tetracycline <=1 ug/ml Susceptible     Trimethoprim + Sulfamethoxazole <=10 ug/ml Susceptible     Vancomycin <=0.5 ug/ml Susceptible                Susceptibility      Proteus mirabilis     DEBI     Ampicillin <=2 ug/ml Susceptible     Ampicillin + Sulbactam <=2 ug/ml Susceptible     Cefazolin 8 ug/ml Susceptible     Cefepime <=1 ug/ml Susceptible     Cefoxitin 8 ug/ml Susceptible     Ceftriaxone <=1 ug/ml Susceptible     Ertapenem 1 ug/ml Susceptible     Gentamicin <=1 ug/ml Susceptible     Levofloxacin >=8 ug/ml Resistant     Meropenem 1 ug/ml Susceptible     Piperacillin + Tazobactam <=4 ug/ml Susceptible     Trimethoprim + Sulfamethoxazole >=320 ug/ml Resistant                Susceptibility      Klebsiella pneumoniae ssp pneumoniae     DEBI     Ampicillin >=32 ug/ml Resistant     Ampicillin + Sulbactam 8 ug/ml Susceptible     Cefazolin <=4 ug/ml Susceptible     Cefepime <=1 ug/ml Susceptible     Cefoxitin <=4 ug/ml Susceptible     Ceftriaxone <=1 ug/ml Susceptible     Ertapenem <=0.5 ug/ml Susceptible     Gentamicin <=1 ug/ml Susceptible     Levofloxacin <=0.12 ug/ml Susceptible     Meropenem <=0.25 ug/ml Susceptible     Piperacillin + Tazobactam 8 ug/ml Susceptible     Trimethoprim + Sulfamethoxazole <=20 ug/ml Susceptible                    Blood Culture [[id number]]  (Abnormal) Collected:  08/27/17 1228    Lab Status:  Final result Specimen:  Blood from Arm, Left Updated:  08/30/17 0788     Blood Culture Staphylococcus, coagulase negative (A)                 Staphylococcus, coagulase negative (A)                Gram Stain Result Aerobic Bottle Gram positive cocci       Anaerobic Bottle Gram positive cocci in clusters    Narrative:       Probable contaminants requires clinical correlation, susceptibility not performed unless requested by physician.    Blood Culture ID, PCR [[id number]]  (Abnormal) Collected:  08/27/17 1228    Lab Status:  Final result Specimen:  Blood from Arm, Left Updated:  08/28/17 1223     BCID, PCR Staphylococcus spp, not aureus. Identification by BCID PCR. (A)           Imaging Results (last 7 days)     Procedure Component Value Units Date/Time    XR Chest 1 View [[id number]] Collected:  08/27/17 1259     Updated:  08/27/17 1303    Narrative:       XR CHEST 1 VW-     Clinical: Postoperative fever     COMPARISON 08/20/2017     FINDINGS: No acute airspace disease has developed. The cardiomediastinal  silhouette is stable. No edema or effusion.     CONCLUSION: No acute cardiovascular or pulmonary process is  demonstrated.     This report was finalized on 8/27/2017 1:00 PM by Dr. Kieran Mackay MD.       CT Abdomen Pelvis With Contrast [[id number]] Collected:  08/27/17 1620     Updated:  08/27/17 1640    Narrative:       CT SCANS OF THE CHEST, ABDOMEN, AND PELVIS WITH INTRAVENOUS CONTRAST     HISTORY: Recent hysterectomy. Fever and shortness of breath and  abdominal pain.     The CT scans were performed as an emergency procedure with CT imaging  through the chest, abdomen, and pelvis with intravenous contrast. The  following findings are present:  1. There is some predominately strand-like atelectasis at both lung  bases medially associated with tiny bilateral pleural effusions and the  lungs are otherwise clear. There is no mediastinal or hilar or axillary  adenopathy. Small to moderate-sized pericardial effusion measuring 1.9  cm in thickness and this shows enlargement since the preoperative CT  scan of 06/18/2017.  2. There is a small hepatic cyst that is unchanged. The spleen,  pancreas, both adrenal glands and right kidney are unremarkable. There  is mild  dilatation of the left renal collecting system including the  left ureter which extends into the pelvis where there is prominent  induration of the mesenteric fat planes associated with generalized wall  thickening of the rectosigmoid colon and there is also an adjacent fluid  collection to the left of the sigmoid colon that measures up to 5.5 x  6.8 cm. There is also generalized wall thickening of the urinary  bladder. Some of these findings may relate to postoperative changes but  are concerning for colitis. The fluid collection to the left of the  sigmoid colon could potentially represent an area of developing abscess  and the distended ureter extends to this region. The generalized  thickening of the urinary bladder may be secondary to the adjacent  inflammatory changes but clinical correlation for cystitis is also  recommended.     These findings have been discussed with Dr. Quintero in the emergency  room.     This report was finalized on 8/27/2017 4:37 PM by Dr. Dionte Barry MD.       CT Chest With Contrast [[id number]] Collected:  08/27/17 1620     Updated:  08/27/17 1640    Narrative:       CT SCANS OF THE CHEST, ABDOMEN, AND PELVIS WITH INTRAVENOUS CONTRAST     HISTORY: Recent hysterectomy. Fever and shortness of breath and  abdominal pain.     The CT scans were performed as an emergency procedure with CT imaging  through the chest, abdomen, and pelvis with intravenous contrast. The  following findings are present:  1. There is some predominately strand-like atelectasis at both lung  bases medially associated with tiny bilateral pleural effusions and the  lungs are otherwise clear. There is no mediastinal or hilar or axillary  adenopathy. Small to moderate-sized pericardial effusion measuring 1.9  cm in thickness and this shows enlargement since the preoperative CT  scan of 06/18/2017.  2. There is a small hepatic cyst that is unchanged. The spleen,  pancreas, both adrenal glands and right kidney are  unremarkable. There  is mild dilatation of the left renal collecting system including the  left ureter which extends into the pelvis where there is prominent  induration of the mesenteric fat planes associated with generalized wall  thickening of the rectosigmoid colon and there is also an adjacent fluid  collection to the left of the sigmoid colon that measures up to 5.5 x  6.8 cm. There is also generalized wall thickening of the urinary  bladder. Some of these findings may relate to postoperative changes but  are concerning for colitis. The fluid collection to the left of the  sigmoid colon could potentially represent an area of developing abscess  and the distended ureter extends to this region. The generalized  thickening of the urinary bladder may be secondary to the adjacent  inflammatory changes but clinical correlation for cystitis is also  recommended.     These findings have been discussed with Dr. Quintero in the emergency  room.     This report was finalized on 8/27/2017 4:37 PM by Dr. Dionte Barry MD.       CT Abdomen Pelvis With Contrast [[id number]] Collected:  09/01/17 1927     Updated:  09/01/17 1935    Narrative:       POSTCONTRAST CT ABDOMEN AND PELVIS     HISTORY: 61-year-old female for follow-up of abscess with hypoactive  bowel sounds and persistent abdominal pain.     COMPARISON: 08/27/2017     FINDINGS:  1. Imaging remains limited by body habitus associated upper extremity  artifact, motion artifact.  2. Stable moderate pericardial effusion small bilateral pleural  effusions and bibasilar atelectasis.  3. Cholelithiasis benign hepatic cyst bilateral renal cysts.  4. Left hydronephrosis and hydroureter extending to inflammatory change  within the left pelvis.  4. Previous described pericolonic abscess 6.8 x 5.5 cm has developed an  enhancing wall and shows no change in size.  5. Severe inflammatory change of the sigmoid colon diffusely.  6. Persistent thickening of the urinary bladder  wall.  7. Additional findings as previously noted, no new abnormality.       Impression:       1. No significant interval change in size of pelvic abscess.  2. Other findings as described without other significant interval change  including mild left hydronephrosis, urinary bladder wall thickening,  marked thickening of the sigmoid colon extending to the rectum.     This report was finalized on 9/1/2017 7:32 PM by Dr. Bryan Yadav MD.               Assessment:      Principal Problem:    Sepsis  Active Problems:    Diabetes mellitus    Hypertension    Abscess of abdominal cavity    Chronic diastolic CHF (congestive heart failure)    Morbid obesity    THAI (obstructive sleep apnea)    Schizo affective schizophrenia    Coronary artery disease    Chronic respiratory failure with hypoxia and hypercapnia    Lymphedema  Hypernatremia    Patient Active Problem List   Diagnosis Code   • Elevated troponin R79.89   • Aspiration pneumonia J69.0   • Hematuria R31.9   • Hypokalemia E87.6   • Pelvic mass in female R19.00   • Fecal impaction K56.41   • Endometrial carcinoma C54.1   • Acute on chronic respiratory failure with hypoxia and hypercapnia J96.21, J96.22   • Acute on chronic diastolic CHF (congestive heart failure) I50.33   • UTI (urinary tract infection) N39.0   • Leucocytosis D72.829   • Chronic diastolic CHF (congestive heart failure) I50.32   • DM (diabetes mellitus) E11.9   • Morbid obesity E66.01   • HTN (hypertension) I10   • Schizophrenia F20.9   • Tracheostomy malfunction J95.03   • Pyuria N39.0   • THAI (obstructive sleep apnea) G47.33   • Generalized weakness R53.1   • Schizo affective schizophrenia F25.0   • Diabetes mellitus E11.9   • Coronary artery disease I25.10   • Endometrial cancer determined by uterine biopsy C54.1, Z15.04   • Chronic respiratory failure with hypoxia and hypercapnia J96.11, J96.12   • Toxic metabolic encephalopathy G92   • Pneumonia J18.9   • Abnormal vaginal bleeding N93.9   • Schizo  affective schizophrenia F25.0   • CHF (congestive heart failure) I50.9   • Endometrial cancer determined by uterine biopsy C54.1, Z15.04   • Coronary artery disease I25.10   • Lymphedema I89.0   • Immobility Z74.09   • Acute blood loss anemia D62   • Vaginal bleeding N93.9   • Hypertension I10   • Uterine cancer C55   • Bipolar disorder, unspecified F31.9   • Sepsis A41.9   • Abscess of abdominal cavity K65.1       Plan:    Appreciate ID consult  Continue IV antibiotics, add diflucan  CT scan of abdomen and pelvis reviewed.  Free water: D5W  Diet: Reg, mech soft.   Monitor and correct electrolytes  Monitor mental status  Continue Accuchecks and SSI  DVT/stress ulcer prophylaxis  Labs in am      Shane Barcenas MD  9/2/2017  12:10 PM

## 2017-09-02 NOTE — PROGRESS NOTES
"  Infectious Diseases Progress Note    Ramya Alex MD     [facility]  [place]: 6 days  Patient Identification:  Name: Lupe Burleson  Age: 61 y.o.  Sex: female  :  1955  MRN: [id number]         Primary Care Physician: Shane Barcenas MD            Subjective:no complaints  Interval History: see consultation notes     Objective:    Scheduled Meds:  alteplase 2 mg Intravenous Once   alteplase 2 mg Intravenous Once   alteplase 2 mg Intravenous Once   amLODIPine 5 mg Oral Q24H   atorvastatin 20 mg Per G Tube Daily   enoxaparin 40 mg Subcutaneous Q12H   ferrous sulfate 325 mg Oral Daily With Breakfast   fluconazole 200 mg Oral Q24H   hydrophor  Topical Q24H   isosorbide mononitrate 30 mg Per G Tube Daily   lactulose 20 g Oral Daily   pantoprazole 40 mg Oral Q AM   piperacillin-tazobactam 3.375 g Intravenous Q8H   potassium chloride 20 mEq Oral Daily   risperiDONE 2 mg Oral Daily   risperiDONE 3 mg Oral Nightly   sodium chloride 10 mL Intracatheter Q12H   Valproic Acid 250 mg Oral Q12H   vancomycin 1,500 mg Intravenous Q24H     Continuous Infusions:  dextrose 100 mL/hr Last Rate: 100 mL/hr (17 1403)   Pharmacy to dose vancomycin         Vital signs in last 24 hours:  Temp:  [97.1 °F (36.2 °C)-99.3 °F (37.4 °C)] 98.6 °F (37 °C)  Heart Rate:  [] 84  Resp:  [16-18] 18  BP: (135-165)/(59-97) 135/59    Intake/Output:    Intake/Output Summary (Last 24 hours) at 17 1447  Last data filed at 17 1200   Gross per 24 hour   Intake             1500 ml   Output                0 ml   Net             1500 ml       Exam:  /59 (BP Location: Left arm, Patient Position: Lying)  Pulse 84  Temp 98.6 °F (37 °C) (Oral)   Resp 18  Ht 62.99\" (160 cm)  Wt 285 lb (129 kg)  SpO2 93%  BMI 50.5 kg/m2    General Appearance:    Awake but doesn't want to interact                          Head:    Normocephalic, without obvious abnormality, atraumatic                           Eyes:    PERRL, " conjunctiva/corneas clear, EOM's intact, both eyes                         Throat:   Lips, tongue, gums normal; oral mucosa pink and moist                           Neck:   Supple, symmetrical, trachea midline, no JVD                         Lungs:    Clear to auscultation bilaterally, respirations unlabored                 Chest Wall:    No tenderness or deformity                          Heart:    Regular rate and rhythm, S1 and S2 normal, no murmur,no  Rub                                      or gallop                  Abdomen:     Soft, obese, surgical incision well approximated, non-tender, bowel sounds active                 Extremities:   Chronic changes                        Pulses:   Pulses palpable in all extremities                            Skin:   Skin is warm and dry,  no rashes or palpable lesions                         Data Review:    I reviewed the patient's new clinical results.    Results from last 7 days  Lab Units 09/02/17  0547 09/01/17  0606 08/31/17  0437 08/30/17  0459 08/29/17  0410 08/28/17  0715 08/27/17  1359   WBC 10*3/mm3 11.63* 13.66* 13.48* 12.31* 15.66* 22.12* 20.51*   HEMOGLOBIN g/dL 7.2* 7.3* 7.5* 8.1* 6.4* 7.3* 7.5*   PLATELETS 10*3/mm3 530* 469 530* 572* 605* 539* 499       Results from last 7 days  Lab Units 09/02/17  0547 09/01/17  0606 08/31/17  0437 08/30/17  1552 08/30/17  0459 08/29/17  0853 08/29/17  0410 08/28/17  0715 08/27/17  1228   SODIUM mmol/L 146* 147* 146*  --  145  --  143 145 147*   POTASSIUM mmol/L 3.6 3.7 3.7 5.0 3.4* 3.7 3.4* 3.1* 3.5   CHLORIDE mmol/L 108* 110* 112*  --  106  --  104 105 101   CO2 mmol/L 29.7* 27.8 26.1  --  28.2  --  31.0* 27.4 31.6*   BUN mg/dL 8 8 7*  --  7*  --  10 11 11   CREATININE mg/dL 0.58 0.61 0.49*  --  0.48*  --  0.48* 0.49* 0.63   CALCIUM mg/dL 9.1 8.8 8.5*  --  8.8  --  8.4* 8.6 9.5   GLUCOSE mg/dL 94 98 95  --  108*  --  122* 127* 119*     Microbiology Results (last 10 days)     Procedure Component Value - Date/Time     Urine Culture [[id number]]  (Normal) Collected:  08/27/17 1230    Lab Status:  Final result Specimen:  Urine from Urine, Catheter Updated:  08/29/17 0725     Urine Culture No growth    Wound Culture [[id number]]  (Abnormal)  (Susceptibility) Collected:  08/27/17 1230    Lab Status:  Final result Specimen:  Wound from Buttock Updated:  08/30/17 0831     Wound Culture Moderate growth (3+) Pseudomonas aeruginosa (A)      Moderate growth (3+) Staphylococcus warneri (A)      Moderate growth (3+) Proteus mirabilis (A)      Light growth (2+) Klebsiella pneumoniae ssp pneumoniae (A)     Gram Stain Result Few (2+) WBCs seen      Rare (1+) Gram negative bacilli      Rare (1+) Gram positive cocci in pairs    Susceptibility      Pseudomonas aeruginosa     DEBI     Cefepime <=1 ug/ml Susceptible     Ceftazidime 2 ug/ml Susceptible     Ciprofloxacin <=0.25 ug/ml Susceptible     Levofloxacin 0.5 ug/ml Susceptible     Meropenem <=0.25 ug/ml Susceptible     Piperacillin + Tazobactam 8 ug/ml Susceptible     Tobramycin <=1 ug/ml Susceptible                Susceptibility      Staphylococcus warneri     DEBI     Clindamycin <=0.25 ug/ml Susceptible     Erythromycin >=8 ug/ml Resistant     Oxacillin <=0.25 ug/ml Susceptible     Penicillin G >=0.5 ug/ml Resistant     Rifampin <=0.5 ug/ml Susceptible     Tetracycline <=1 ug/ml Susceptible     Trimethoprim + Sulfamethoxazole <=10 ug/ml Susceptible     Vancomycin <=0.5 ug/ml Susceptible                Susceptibility      Proteus mirabilis     DEBI     Ampicillin <=2 ug/ml Susceptible     Ampicillin + Sulbactam <=2 ug/ml Susceptible     Cefazolin 8 ug/ml Susceptible     Cefepime <=1 ug/ml Susceptible     Cefoxitin 8 ug/ml Susceptible     Ceftriaxone <=1 ug/ml Susceptible     Ertapenem 1 ug/ml Susceptible     Gentamicin <=1 ug/ml Susceptible     Levofloxacin >=8 ug/ml Resistant     Meropenem 1 ug/ml Susceptible     Piperacillin + Tazobactam <=4 ug/ml Susceptible     Trimethoprim + Sulfamethoxazole  >=320 ug/ml Resistant                Susceptibility      Klebsiella pneumoniae ssp pneumoniae     DEBI     Ampicillin >=32 ug/ml Resistant     Ampicillin + Sulbactam 8 ug/ml Susceptible     Cefazolin <=4 ug/ml Susceptible     Cefepime <=1 ug/ml Susceptible     Cefoxitin <=4 ug/ml Susceptible     Ceftriaxone <=1 ug/ml Susceptible     Ertapenem <=0.5 ug/ml Susceptible     Gentamicin <=1 ug/ml Susceptible     Levofloxacin <=0.12 ug/ml Susceptible     Meropenem <=0.25 ug/ml Susceptible     Piperacillin + Tazobactam 8 ug/ml Susceptible     Trimethoprim + Sulfamethoxazole <=20 ug/ml Susceptible                    Blood Culture [[id number]]  (Abnormal) Collected:  08/27/17 1228    Lab Status:  Final result Specimen:  Blood from Arm, Left Updated:  08/30/17 0722     Blood Culture Staphylococcus, coagulase negative (A)                 Staphylococcus, coagulase negative (A)                Gram Stain Result Aerobic Bottle Gram positive cocci      Anaerobic Bottle Gram positive cocci in clusters    Narrative:       Probable contaminants requires clinical correlation, susceptibility not performed unless requested by physician.    Blood Culture ID, PCR [[id number]]  (Abnormal) Collected:  08/27/17 1228    Lab Status:  Final result Specimen:  Blood from Arm, Left Updated:  08/28/17 1223     BCID, PCR Staphylococcus spp, not aureus. Identification by BCID PCR. (A)            Assessment:  Principal Problem:    Sepsis  Active Problems:    Chronic diastolic CHF (congestive heart failure)    Morbid obesity    THAI (obstructive sleep apnea)    Schizo affective schizophrenia    Diabetes mellitus    Coronary artery disease    Chronic respiratory failure with hypoxia and hypercapnia    Lymphedema    Hypertension    Abscess of abdominal cavity  bacteremia likely contaminant    Plan:  continue present treatment. With vanc,zosyn and diflucan, and when ready to dc may switch to po cipro with augmentin and diflucan, and with plans for repeat ct  scan of abdomen in 2 weks to see progression of the abscess.    Ramya Alex MD  9/2/2017  2:47 PM    Much of this encounter note is an electronic transcription/translation of spoken language to printed text. The electronic translation of spoken language may permit erroneous, or at times, nonsensical words or phrases to be inadvertently transcribed; Although I have reviewed the note for such errors, some may still exist

## 2017-09-02 NOTE — PROGRESS NOTES
DAILY PROGRESS NOTE    Patient Identification:  Name: Lupe Burleson  Age: 61 y.o.  Sex: Female  :  1955  MRN:[id number]    Cheif complaint:  Chief Complaint   Patient presents with   • Fever     Patient is 1 week post Hysterectomy and is sent from NH for increased lethargy and a fever. 101.6 per facility.        Subjective:  Patient awake and denies complaints.    Objective:  Scheduled Meds:    alteplase 2 mg Intravenous Once   alteplase 2 mg Intravenous Once   alteplase 2 mg Intravenous Once   amLODIPine 5 mg Oral Q24H   atorvastatin 20 mg Per G Tube Daily   enoxaparin 40 mg Subcutaneous Q12H   ferrous sulfate 325 mg Oral Daily With Breakfast   fluconazole 200 mg Oral Q24H   hydrophor  Topical Q24H   isosorbide mononitrate 30 mg Per G Tube Daily   lactulose 20 g Oral Daily   pantoprazole 40 mg Oral Q AM   piperacillin-tazobactam 3.375 g Intravenous Q8H   potassium chloride 20 mEq Oral Daily   risperiDONE 2 mg Oral Daily   risperiDONE 3 mg Oral Nightly   sodium chloride 10 mL Intracatheter Q12H   Valproic Acid 250 mg Oral Q12H   vancomycin 1,500 mg Intravenous Q24H       Continuous Infusions:    dextrose 100 mL/hr Last Rate: 100 mL/hr (17 1403)   Pharmacy to dose vancomycin         PRN Meds:  •  acetaminophen  •  HYDROcodone-acetaminophen  •  HYDROcodone-acetaminophen  •  ipratropium-albuterol  •  LORazepam  •  Pharmacy to dose vancomycin  •  potassium chloride **OR** potassium chloride **OR** potassium chloride  •  Insert peripheral IV **AND** sodium chloride  •  sodium chloride    Intake/Output:  I/O last 3 completed shifts:  In: 1250 [P.O.:250; I.V.:950; IV Piggyback:50]  Out: -     Exam:  Vitals:    17 0700   BP: 135/59   Pulse: 84   Resp: 18   Temp: 98.6 °F (37 °C)   SpO2: 93%         Physical Examination:   PHYSICAL EXAM:  Constitutional:  No acute distress, Non-toxic appearance.  Cardiovascular:  Regular rate and rythm  Pulmonary/Chest:  CTA-B  Abdomen:  Obese, bowel sounds normal,  Soft, No tenderness, I=C/D/I  Extremities:  No C/C/E  Neurologic:  Alert.      Data Review:  Recent Results (from the past 36 hour(s))   Basic Metabolic Panel    Collection Time: 09/01/17  6:06 AM   Result Value Ref Range    Glucose 98 65 - 99 mg/dL    BUN 8 8 - 23 mg/dL    Creatinine 0.61 0.57 - 1.00 mg/dL    Sodium 147 (H) 136 - 145 mmol/L    Potassium 3.7 3.5 - 5.2 mmol/L    Chloride 110 (H) 98 - 107 mmol/L    CO2 27.8 22.0 - 29.0 mmol/L    Calcium 8.8 8.6 - 10.5 mg/dL    eGFR  African Amer 120 >60 mL/min/1.73    BUN/Creatinine Ratio 13.1 7.0 - 25.0    Anion Gap 9.2 mmol/L   CBC Auto Differential    Collection Time: 09/01/17  6:06 AM   Result Value Ref Range    WBC 13.66 (H) 4.50 - 10.70 10*3/mm3    RBC 3.37 (L) 3.90 - 5.20 10*6/mm3    Hemoglobin 7.3 (L) 11.9 - 15.5 g/dL    Hematocrit 24.8 (L) 35.6 - 45.5 %    MCV 73.6 (L) 80.5 - 98.2 fL    MCH 21.7 (L) 26.9 - 32.0 pg    MCHC 29.4 (L) 32.4 - 36.3 g/dL    RDW 23.1 (H) 11.7 - 13.0 %    RDW-SD 60.2 (H) 37.0 - 54.0 fl    MPV 10.1 6.0 - 12.0 fL    Platelets 469 140 - 500 10*3/mm3    Neutrophil % 79.7 (H) 42.7 - 76.0 %    Lymphocyte % 5.1 (L) 19.6 - 45.3 %    Monocyte % 12.4 (H) 5.0 - 12.0 %    Eosinophil % 2.5 0.3 - 6.2 %    Basophil % 0.1 0.0 - 1.5 %    Immature Grans % 0.2 0.0 - 0.5 %    Neutrophils, Absolute 10.88 (H) 1.90 - 8.10 10*3/mm3    Lymphocytes, Absolute 0.69 (L) 0.90 - 4.80 10*3/mm3    Monocytes, Absolute 1.70 (H) 0.20 - 1.20 10*3/mm3    Eosinophils, Absolute 0.34 0.00 - 0.70 10*3/mm3    Basophils, Absolute 0.02 0.00 - 0.20 10*3/mm3    Immature Grans, Absolute 0.03 0.00 - 0.03 10*3/mm3    nRBC 0.0 0.0 - 0.0 /100 WBC   Scan Slide    Collection Time: 09/01/17  6:06 AM   Result Value Ref Range    Anisocytosis Mod/2+ None Seen    Hypochromia Mod/2+ None Seen    Microcytes Large/3+ None Seen    Ovalocytes Slight/1+ None Seen    Schistocytes Slight/1+ None Seen    Target Cells Mod/2+ None Seen    WBC Morphology Normal Normal    Platelet Morphology Normal Normal    POC Glucose Fingerstick    Collection Time: 09/01/17  6:17 AM   Result Value Ref Range    Glucose 91 70 - 130 mg/dL   Vancomycin, Trough    Collection Time: 09/01/17 11:02 AM   Result Value Ref Range    Vancomycin Trough 27.60 (C) 5.00 - 20.00 mcg/mL   POC Glucose Fingerstick    Collection Time: 09/01/17 11:05 AM   Result Value Ref Range    Glucose 152 (H) 70 - 130 mg/dL   POC Glucose Fingerstick    Collection Time: 09/01/17  4:50 PM   Result Value Ref Range    Glucose 94 70 - 130 mg/dL   POC Glucose Fingerstick    Collection Time: 09/01/17  8:49 PM   Result Value Ref Range    Glucose 87 70 - 130 mg/dL   Basic Metabolic Panel    Collection Time: 09/02/17  5:47 AM   Result Value Ref Range    Glucose 94 65 - 99 mg/dL    BUN 8 8 - 23 mg/dL    Creatinine 0.58 0.57 - 1.00 mg/dL    Sodium 146 (H) 136 - 145 mmol/L    Potassium 3.6 3.5 - 5.2 mmol/L    Chloride 108 (H) 98 - 107 mmol/L    CO2 29.7 (H) 22.0 - 29.0 mmol/L    Calcium 9.1 8.6 - 10.5 mg/dL    eGFR  African Amer 127 >60 mL/min/1.73    BUN/Creatinine Ratio 13.8 7.0 - 25.0    Anion Gap 8.3 mmol/L   CBC Auto Differential    Collection Time: 09/02/17  5:47 AM   Result Value Ref Range    WBC 11.63 (H) 4.50 - 10.70 10*3/mm3    RBC 3.35 (L) 3.90 - 5.20 10*6/mm3    Hemoglobin 7.2 (L) 11.9 - 15.5 g/dL    Hematocrit 24.5 (L) 35.6 - 45.5 %    MCV 73.1 (L) 80.5 - 98.2 fL    MCH 21.5 (L) 26.9 - 32.0 pg    MCHC 29.4 (L) 32.4 - 36.3 g/dL    RDW 23.9 (H) 11.7 - 13.0 %    RDW-SD 62.3 (H) 37.0 - 54.0 fl    MPV 9.6 6.0 - 12.0 fL    Platelets 530 (H) 140 - 500 10*3/mm3    Neutrophil % 78.4 (H) 42.7 - 76.0 %    Lymphocyte % 7.3 (L) 19.6 - 45.3 %    Monocyte % 12.2 (H) 5.0 - 12.0 %    Eosinophil % 1.6 0.3 - 6.2 %    Basophil % 0.2 0.0 - 1.5 %    Immature Grans % 0.3 0.0 - 0.5 %    Neutrophils, Absolute 9.12 (H) 1.90 - 8.10 10*3/mm3    Lymphocytes, Absolute 0.85 (L) 0.90 - 4.80 10*3/mm3    Monocytes, Absolute 1.42 (H) 0.20 - 1.20 10*3/mm3    Eosinophils, Absolute 0.19 0.00 - 0.70  10*3/mm3    Basophils, Absolute 0.02 0.00 - 0.20 10*3/mm3    Immature Grans, Absolute 0.03 0.00 - 0.03 10*3/mm3    nRBC 0.0 0.0 - 0.0 /100 WBC   POC Glucose Fingerstick    Collection Time: 09/02/17  6:17 AM   Result Value Ref Range    Glucose 94 70 - 130 mg/dL   POC Glucose Fingerstick    Collection Time: 09/02/17 11:20 AM   Result Value Ref Range    Glucose 97 70 - 130 mg/dL   POC Glucose Fingerstick    Collection Time: 09/02/17  4:32 PM   Result Value Ref Range    Glucose 121 70 - 130 mg/dL     RADIOLOGY  Ct Chest With Contrast    Result Date: 8/27/2017  Narrative: CT SCANS OF THE CHEST, ABDOMEN, AND PELVIS WITH INTRAVENOUS CONTRAST  HISTORY: Recent hysterectomy. Fever and shortness of breath and abdominal pain.  The CT scans were performed as an emergency procedure with CT imaging through the chest, abdomen, and pelvis with intravenous contrast. The following findings are present: 1. There is some predominately strand-like atelectasis at both lung bases medially associated with tiny bilateral pleural effusions and the lungs are otherwise clear. There is no mediastinal or hilar or axillary adenopathy. Small to moderate-sized pericardial effusion measuring 1.9 cm in thickness and this shows enlargement since the preoperative CT scan of 06/18/2017. 2. There is a small hepatic cyst that is unchanged. The spleen, pancreas, both adrenal glands and right kidney are unremarkable. There is mild dilatation of the left renal collecting system including the left ureter which extends into the pelvis where there is prominent induration of the mesenteric fat planes associated with generalized wall thickening of the rectosigmoid colon and there is also an adjacent fluid collection to the left of the sigmoid colon that measures up to 5.5 x 6.8 cm. There is also generalized wall thickening of the urinary bladder. Some of these findings may relate to postoperative changes but are concerning for colitis. The fluid collection to the  left of the sigmoid colon could potentially represent an area of developing abscess and the distended ureter extends to this region. The generalized thickening of the urinary bladder may be secondary to the adjacent inflammatory changes but clinical correlation for cystitis is also recommended.  These findings have been discussed with Dr. Quintero in the emergency room.  This report was finalized on 8/27/2017 4:37 PM by Dr. Dionte Barry MD.      Us Pelvis Complete    Result Date: 8/17/2017  Narrative: PELVIC ULTRASOUND  HISTORY: 61-year-old nursing home patient with vaginal bleeding.  The examination was performed as an emergency procedure. The patient refused transvaginal imaging. The uterus is enlarged and lobulated, measuring 9.8 x 9.8 x 13.7 cm and there are multiple fibroids measuring up to 7 cm in size. There is also generalized endometrial thickening with double wall thickness of 1.9 cm. The patient is postmenopausal and this does raise a concern of endometrial tumor and further clinical correlation is required.  The ovaries are not visualized. There is no free fluid identified.  This report was finalized on 8/17/2017 11:09 AM by Dr. Dionte Barry MD.      Ct Abdomen Pelvis With Contrast    Result Date: 9/1/2017  Narrative: POSTCONTRAST CT ABDOMEN AND PELVIS  HISTORY: 61-year-old female for follow-up of abscess with hypoactive bowel sounds and persistent abdominal pain.  COMPARISON: 08/27/2017  FINDINGS: 1. Imaging remains limited by body habitus associated upper extremity artifact, motion artifact. 2. Stable moderate pericardial effusion small bilateral pleural effusions and bibasilar atelectasis. 3. Cholelithiasis benign hepatic cyst bilateral renal cysts. 4. Left hydronephrosis and hydroureter extending to inflammatory change within the left pelvis. 4. Previous described pericolonic abscess 6.8 x 5.5 cm has developed an enhancing wall and shows no change in size. 5. Severe inflammatory change of the  sigmoid colon diffusely. 6. Persistent thickening of the urinary bladder wall. 7. Additional findings as previously noted, no new abnormality.      Impression: 1. No significant interval change in size of pelvic abscess. 2. Other findings as described without other significant interval change including mild left hydronephrosis, urinary bladder wall thickening, marked thickening of the sigmoid colon extending to the rectum.  This report was finalized on 9/1/2017 7:32 PM by Dr. Bryan Yadav MD.      Ct Abdomen Pelvis With Contrast    Result Date: 8/27/2017  Narrative: CT SCANS OF THE CHEST, ABDOMEN, AND PELVIS WITH INTRAVENOUS CONTRAST  HISTORY: Recent hysterectomy. Fever and shortness of breath and abdominal pain.  The CT scans were performed as an emergency procedure with CT imaging through the chest, abdomen, and pelvis with intravenous contrast. The following findings are present: 1. There is some predominately strand-like atelectasis at both lung bases medially associated with tiny bilateral pleural effusions and the lungs are otherwise clear. There is no mediastinal or hilar or axillary adenopathy. Small to moderate-sized pericardial effusion measuring 1.9 cm in thickness and this shows enlargement since the preoperative CT scan of 06/18/2017. 2. There is a small hepatic cyst that is unchanged. The spleen, pancreas, both adrenal glands and right kidney are unremarkable. There is mild dilatation of the left renal collecting system including the left ureter which extends into the pelvis where there is prominent induration of the mesenteric fat planes associated with generalized wall thickening of the rectosigmoid colon and there is also an adjacent fluid collection to the left of the sigmoid colon that measures up to 5.5 x 6.8 cm. There is also generalized wall thickening of the urinary bladder. Some of these findings may relate to postoperative changes but are concerning for colitis. The fluid collection to the  left of the sigmoid colon could potentially represent an area of developing abscess and the distended ureter extends to this region. The generalized thickening of the urinary bladder may be secondary to the adjacent inflammatory changes but clinical correlation for cystitis is also recommended.  These findings have been discussed with Dr. Quintero in the emergency room.  This report was finalized on 8/27/2017 4:37 PM by Dr. Dionte Barry MD.      Xr Chest 1 View    Result Date: 8/27/2017  Narrative: XR CHEST 1 VW-  Clinical: Postoperative fever  COMPARISON 08/20/2017  FINDINGS: No acute airspace disease has developed. The cardiomediastinal silhouette is stable. No edema or effusion.  CONCLUSION: No acute cardiovascular or pulmonary process is demonstrated.  This report was finalized on 8/27/2017 1:00 PM by Dr. Kieran Mackay MD.      Xr Chest Pa & Lateral    Result Date: 8/20/2017  Narrative: PA AND LATERAL CHEST RADIOGRAPHS  HISTORY: 61-year-old female undergoing follow-up evaluation of chest infiltrates.  FINDINGS: Upright AP and lateral chest radiographs were obtained. Comparison is made to a prior examination dated 08/18/2017. There is mild bilateral basilar atelectasis with decreased lung volumes. Moderate atheromatous calcification is seen within the aortic arch. There is limited visualization on the lateral chest radiograph and the previously described pleural effusions cannot be substantiated on the current examination.      Impression: Persistent low lung volumes with bibasilar atelectasis. The previously described pleural effusions are not certainly identified on the current examination as the lateral chest radiograph is markedly limited. Little interval change has occurred on the PA chest radiograph when compared to the prior examination.  This report was finalized on 8/20/2017 4:35 PM by Dr. Darrin Christian MD.      Xr Chest Pa & Lateral    Result Date: 8/18/2017  Narrative: PA AND LATERAL CHEST   HISTORY: Cough.  PA and lateral views of the chest were obtained and compared to 08/15/2017.  FINDINGS:  The right internal jugular central line has been removed. The study is hampered by the patient's body habitus, patient positioning and inspiratory effort. There is bibasilar atelectasis/infiltrate more prominent on the left, similar to slightly less prominent as compared to prior examination. The pulmonary interstitial vascular prominence which was present previously is less prominent on the current examination. On the lateral projection bilateral pleural effusions are appreciated, moderate in size and there is moderate posterior atelectasis/infiltrate bilaterally. The above information was called to patient's nurse who is to  relay the information to the clinical service.  This report was finalized on 8/18/2017 2:29 PM by Dr. Mario Sim MD.      Xr Chest Post Cva Port    Result Date: 8/15/2017  Narrative: CHEST POST CVA PORT  HISTORY: Morbidly obese 61-year-old female for right jugular line placement.  COMPARISON: 05/16/2017  FINDINGS: 1. Right jugular line projects over the superior vena cava. 2. Imaging is considerably limited by morbid obesity, low lung volumes, mandibular artifact obscuring detail of the right apex.  If patient has symptoms suggesting pneumothorax, follow-up lordotic imaging or standard imaging with removal mandibular artifact would be helpful.  This report was finalized on 8/15/2017 11:50 AM by Dr. Bryan Yadav MD.        Assessment/Plan:  HD 6:   1. SP CRISS/BSO on 08/15 for endometrial cancer. Advanced aggressive cancer. Should she recover from infection petition with courts would be necessary in making treatment care decisions.   2. Leukocytosis down (13 from 22), pelvic fluid collection not amenable for CT guided drainage secondary fluid collection deep in the pelvis adjacent to the sigmoid colon collection. Continue zosyn and vanc. Repeat CT: no change in size of pelvic abscess  (looks more walled off now).   3. Elen-cardial effusion, cardiology consulted. Echo with moderate effusion, no tamponade. No intervention planned  4. Anemia, hgb stable at 7.2( from 7.3), likely neoplastic, acute on chronic disease. Post two units PRBC 08/29, continue ferrous sulfate.  5. Disposition: to NH likely mid next week    Daniela Esquivel MD  9/2/2017 @ 5:15 PM

## 2017-09-03 LAB
GLUCOSE BLDC GLUCOMTR-MCNC: 102 MG/DL (ref 70–130)
GLUCOSE BLDC GLUCOMTR-MCNC: 113 MG/DL (ref 70–130)
GLUCOSE BLDC GLUCOMTR-MCNC: 118 MG/DL (ref 70–130)
GLUCOSE BLDC GLUCOMTR-MCNC: 119 MG/DL (ref 70–130)
POTASSIUM BLD-SCNC: 3.6 MMOL/L (ref 3.5–5.2)
RETICS/RBC NFR AUTO: 0.4 % (ref 0.5–1.5)

## 2017-09-03 PROCEDURE — 85045 AUTOMATED RETICULOCYTE COUNT: CPT | Performed by: INTERNAL MEDICINE

## 2017-09-03 PROCEDURE — 25010000002 ENOXAPARIN PER 10 MG: Performed by: OBSTETRICS & GYNECOLOGY

## 2017-09-03 PROCEDURE — 82962 GLUCOSE BLOOD TEST: CPT

## 2017-09-03 PROCEDURE — 84132 ASSAY OF SERUM POTASSIUM: CPT | Performed by: INTERNAL MEDICINE

## 2017-09-03 PROCEDURE — 25010000002 PIPERACILLIN SOD-TAZOBACTAM PER 1 G: Performed by: OBSTETRICS & GYNECOLOGY

## 2017-09-03 PROCEDURE — 25010000002 VANCOMYCIN: Performed by: INTERNAL MEDICINE

## 2017-09-03 RX ORDER — BUMETANIDE 2 MG/1
2 TABLET ORAL DAILY
Status: DISCONTINUED | OUTPATIENT
Start: 2017-09-03 | End: 2017-09-08 | Stop reason: HOSPADM

## 2017-09-03 RX ADMIN — VALPROIC ACID 250 MG: 250 SOLUTION ORAL at 08:40

## 2017-09-03 RX ADMIN — VANCOMYCIN HYDROCHLORIDE 1500 MG: 1 INJECTION, POWDER, LYOPHILIZED, FOR SOLUTION INTRAVENOUS at 12:30

## 2017-09-03 RX ADMIN — Medication 10 ML: at 10:02

## 2017-09-03 RX ADMIN — VALPROIC ACID 250 MG: 250 SOLUTION ORAL at 21:15

## 2017-09-03 RX ADMIN — FLUCONAZOLE 200 MG: 200 TABLET ORAL at 21:15

## 2017-09-03 RX ADMIN — AMLODIPINE BESYLATE 5 MG: 5 TABLET ORAL at 08:41

## 2017-09-03 RX ADMIN — DEXTROSE MONOHYDRATE 100 ML/HR: 50 INJECTION, SOLUTION INTRAVENOUS at 18:42

## 2017-09-03 RX ADMIN — ENOXAPARIN SODIUM 40 MG: 40 INJECTION SUBCUTANEOUS at 21:15

## 2017-09-03 RX ADMIN — TAZOBACTAM SODIUM AND PIPERACILLIN SODIUM 3.38 G: 375; 3 INJECTION, SOLUTION INTRAVENOUS at 00:34

## 2017-09-03 RX ADMIN — Medication 10 ML: at 21:16

## 2017-09-03 RX ADMIN — PANTOPRAZOLE SODIUM 40 MG: 40 TABLET, DELAYED RELEASE ORAL at 06:38

## 2017-09-03 RX ADMIN — TAZOBACTAM SODIUM AND PIPERACILLIN SODIUM 3.38 G: 375; 3 INJECTION, SOLUTION INTRAVENOUS at 06:38

## 2017-09-03 RX ADMIN — ATORVASTATIN CALCIUM 20 MG: 20 TABLET, FILM COATED ORAL at 08:40

## 2017-09-03 RX ADMIN — ENOXAPARIN SODIUM 40 MG: 40 INJECTION SUBCUTANEOUS at 08:40

## 2017-09-03 RX ADMIN — LACTULOSE 20 G: 20 SOLUTION ORAL at 08:40

## 2017-09-03 RX ADMIN — BUMETANIDE 2 MG: 2 TABLET ORAL at 15:36

## 2017-09-03 RX ADMIN — RISPERIDONE 2 MG: 2 TABLET, FILM COATED ORAL at 10:02

## 2017-09-03 RX ADMIN — POTASSIUM CHLORIDE 20 MEQ: 750 CAPSULE, EXTENDED RELEASE ORAL at 08:41

## 2017-09-03 RX ADMIN — HYDROCODONE BITARTRATE AND ACETAMINOPHEN 1 TABLET: 7.5; 325 TABLET ORAL at 21:15

## 2017-09-03 RX ADMIN — ISOSORBIDE MONONITRATE 30 MG: 20 TABLET ORAL at 08:41

## 2017-09-03 RX ADMIN — TAZOBACTAM SODIUM AND PIPERACILLIN SODIUM 3.38 G: 375; 3 INJECTION, SOLUTION INTRAVENOUS at 15:36

## 2017-09-03 RX ADMIN — RISPERIDONE 3 MG: 3 TABLET ORAL at 21:16

## 2017-09-03 RX ADMIN — PETROLATUM: 42 OINTMENT TOPICAL at 10:01

## 2017-09-03 RX ADMIN — DEXTROSE MONOHYDRATE 100 ML/HR: 50 INJECTION, SOLUTION INTRAVENOUS at 06:38

## 2017-09-03 RX ADMIN — FERROUS SULFATE TAB 325 MG (65 MG ELEMENTAL FE) 325 MG: 325 (65 FE) TAB at 08:40

## 2017-09-03 NOTE — PLAN OF CARE
Problem: Patient Care Overview (Adult)  Goal: Plan of Care Review  Outcome: Ongoing (interventions implemented as appropriate)    09/03/17 0550   Coping/Psychosocial Response Interventions   Plan Of Care Reviewed With patient   Patient Care Overview   Progress no change   Outcome Evaluation   Outcome Summary/Follow up Plan Patient slept the majority of the night without c/o pain. Turn Q2H. VSS and on 2 liters NC. Pt. slow to respond, if she responds. Will continue to monitor.

## 2017-09-03 NOTE — PROGRESS NOTES
"  Infectious Diseases Progress Note    Ramya Alex MD     [facility]  [place]: 7 days  Patient Identification:  Name: Lupe Burleson  Age: 61 y.o.  Sex: female  :  1955  MRN: [id number]         Primary Care Physician: Shane Barcenas MD            Subjective: Better and more interactive  Interval History: see consultation notes     Objective:    Scheduled Meds:    alteplase 2 mg Intravenous Once   alteplase 2 mg Intravenous Once   alteplase 2 mg Intravenous Once   amLODIPine 5 mg Oral Q24H   atorvastatin 20 mg Per G Tube Daily   bumetanide 2 mg Oral Daily   enoxaparin 40 mg Subcutaneous Q12H   ferrous sulfate 325 mg Oral Daily With Breakfast   fluconazole 200 mg Oral Q24H   hydrophor  Topical Q24H   isosorbide mononitrate 30 mg Per G Tube Daily   lactulose 20 g Oral Daily   pantoprazole 40 mg Oral Q AM   piperacillin-tazobactam 3.375 g Intravenous Q8H   potassium chloride 20 mEq Oral Daily   risperiDONE 2 mg Oral Daily   risperiDONE 3 mg Oral Nightly   sodium chloride 10 mL Intracatheter Q12H   Valproic Acid 250 mg Oral Q12H   vancomycin 1,500 mg Intravenous Q24H     Continuous Infusions:    dextrose 100 mL/hr Last Rate: 100 mL/hr (17 0638)   Pharmacy to dose vancomycin         Vital signs in last 24 hours:  Temp:  [97.4 °F (36.3 °C)-99.1 °F (37.3 °C)] 97.4 °F (36.3 °C)  Heart Rate:  [82-93] 93  Resp:  [18-20] 20  BP: (115-134)/(52-79) 134/79    Intake/Output:    Intake/Output Summary (Last 24 hours) at 17 1402  Last data filed at 17 1303   Gross per 24 hour   Intake             1110 ml   Output                0 ml   Net             1110 ml       Exam:  /79 (BP Location: Left arm)  Pulse 93  Temp 97.4 °F (36.3 °C) (Oral)   Resp 20  Ht 62.99\" (160 cm)  Wt 283 lb 14.4 oz (129 kg)  SpO2 100%  BMI 50.3 kg/m2    General Appearance:    Awake but doesn't want to interact                          Head:    Normocephalic, without obvious abnormality, atraumatic           "                 Eyes:    PERRL, conjunctiva/corneas clear, EOM's intact, both eyes                         Throat:   Lips, tongue, gums normal; oral mucosa pink and moist                           Neck:   Supple, symmetrical, trachea midline, no JVD                         Lungs:    Clear to auscultation bilaterally, respirations unlabored                 Chest Wall:    No tenderness or deformity                          Heart:    Regular rate and rhythm, S1 and S2 normal, no murmur,no  Rub                                      or gallop                  Abdomen:     Soft, obese, surgical incision well approximated, non-tender, bowel sounds active                 Extremities:   Chronic changes                        Pulses:   Pulses palpable in all extremities                            Skin:   Skin is warm and dry,  no rashes or palpable lesions                         Data Review:    I reviewed the patient's new clinical results.    Results from last 7 days  Lab Units 09/02/17  0547 09/01/17  0606 08/31/17  0437 08/30/17  0459 08/29/17  0410 08/28/17  0715   WBC 10*3/mm3 11.63* 13.66* 13.48* 12.31* 15.66* 22.12*   HEMOGLOBIN g/dL 7.2* 7.3* 7.5* 8.1* 6.4* 7.3*   PLATELETS 10*3/mm3 530* 469 530* 572* 605* 539*       Results from last 7 days  Lab Units 09/03/17  0035 09/02/17  0547 09/01/17  0606 08/31/17  0437 08/30/17  1552 08/30/17  0459 08/29/17  0853 08/29/17  0410 08/28/17  0715   SODIUM mmol/L  --  146* 147* 146*  --  145  --  143 145   POTASSIUM mmol/L 3.6 3.6 3.7 3.7 5.0 3.4* 3.7 3.4* 3.1*   CHLORIDE mmol/L  --  108* 110* 112*  --  106  --  104 105   CO2 mmol/L  --  29.7* 27.8 26.1  --  28.2  --  31.0* 27.4   BUN mg/dL  --  8 8 7*  --  7*  --  10 11   CREATININE mg/dL  --  0.58 0.61 0.49*  --  0.48*  --  0.48* 0.49*   CALCIUM mg/dL  --  9.1 8.8 8.5*  --  8.8  --  8.4* 8.6   GLUCOSE mg/dL  --  94 98 95  --  108*  --  122* 127*     Microbiology Results (last 10 days)     Procedure Component Value - Date/Time     Urine Culture [[id number]]  (Normal) Collected:  08/27/17 1230    Lab Status:  Final result Specimen:  Urine from Urine, Catheter Updated:  08/29/17 0725     Urine Culture No growth    Wound Culture [[id number]]  (Abnormal)  (Susceptibility) Collected:  08/27/17 1230    Lab Status:  Final result Specimen:  Wound from Buttock Updated:  08/30/17 0831     Wound Culture Moderate growth (3+) Pseudomonas aeruginosa (A)      Moderate growth (3+) Staphylococcus warneri (A)      Moderate growth (3+) Proteus mirabilis (A)      Light growth (2+) Klebsiella pneumoniae ssp pneumoniae (A)     Gram Stain Result Few (2+) WBCs seen      Rare (1+) Gram negative bacilli      Rare (1+) Gram positive cocci in pairs    Susceptibility      Pseudomonas aeruginosa     DEBI     Cefepime <=1 ug/ml Susceptible     Ceftazidime 2 ug/ml Susceptible     Ciprofloxacin <=0.25 ug/ml Susceptible     Levofloxacin 0.5 ug/ml Susceptible     Meropenem <=0.25 ug/ml Susceptible     Piperacillin + Tazobactam 8 ug/ml Susceptible     Tobramycin <=1 ug/ml Susceptible                Susceptibility      Staphylococcus warneri     DEBI     Clindamycin <=0.25 ug/ml Susceptible     Erythromycin >=8 ug/ml Resistant     Oxacillin <=0.25 ug/ml Susceptible     Penicillin G >=0.5 ug/ml Resistant     Rifampin <=0.5 ug/ml Susceptible     Tetracycline <=1 ug/ml Susceptible     Trimethoprim + Sulfamethoxazole <=10 ug/ml Susceptible     Vancomycin <=0.5 ug/ml Susceptible                Susceptibility      Proteus mirabilis     DEBI     Ampicillin <=2 ug/ml Susceptible     Ampicillin + Sulbactam <=2 ug/ml Susceptible     Cefazolin 8 ug/ml Susceptible     Cefepime <=1 ug/ml Susceptible     Cefoxitin 8 ug/ml Susceptible     Ceftriaxone <=1 ug/ml Susceptible     Ertapenem 1 ug/ml Susceptible     Gentamicin <=1 ug/ml Susceptible     Levofloxacin >=8 ug/ml Resistant     Meropenem 1 ug/ml Susceptible     Piperacillin + Tazobactam <=4 ug/ml Susceptible     Trimethoprim + Sulfamethoxazole  >=320 ug/ml Resistant                Susceptibility      Klebsiella pneumoniae ssp pneumoniae     DEBI     Ampicillin >=32 ug/ml Resistant     Ampicillin + Sulbactam 8 ug/ml Susceptible     Cefazolin <=4 ug/ml Susceptible     Cefepime <=1 ug/ml Susceptible     Cefoxitin <=4 ug/ml Susceptible     Ceftriaxone <=1 ug/ml Susceptible     Ertapenem <=0.5 ug/ml Susceptible     Gentamicin <=1 ug/ml Susceptible     Levofloxacin <=0.12 ug/ml Susceptible     Meropenem <=0.25 ug/ml Susceptible     Piperacillin + Tazobactam 8 ug/ml Susceptible     Trimethoprim + Sulfamethoxazole <=20 ug/ml Susceptible                    Blood Culture [[id number]]  (Abnormal) Collected:  08/27/17 1228    Lab Status:  Final result Specimen:  Blood from Arm, Left Updated:  08/30/17 0722     Blood Culture Staphylococcus, coagulase negative (A)                 Staphylococcus, coagulase negative (A)                Gram Stain Result Aerobic Bottle Gram positive cocci      Anaerobic Bottle Gram positive cocci in clusters    Narrative:       Probable contaminants requires clinical correlation, susceptibility not performed unless requested by physician.    Blood Culture ID, PCR [[id number]]  (Abnormal) Collected:  08/27/17 1228    Lab Status:  Final result Specimen:  Blood from Arm, Left Updated:  08/28/17 1223     BCID, PCR Staphylococcus spp, not aureus. Identification by BCID PCR. (A)            Assessment:  Principal Problem:    Sepsis  Active Problems:    Chronic diastolic CHF (congestive heart failure)    Morbid obesity    THAI (obstructive sleep apnea)    Schizo affective schizophrenia    Diabetes mellitus    Coronary artery disease    Chronic respiratory failure with hypoxia and hypercapnia    Lymphedema    Hypertension    Abscess of abdominal cavity  bacteremia likely contaminant    Plan:  continue present treatment. With vanc,zosyn and diflucan, and when ready to dc may switch to po cipro with augmentin and diflucan, and with plans for repeat ct  scan of abdomen in 2 weks to see progression of the abscess.    Ramya Alex MD  9/3/2017  2:02 PM    Much of this encounter note is an electronic transcription/translation of spoken language to printed text. The electronic translation of spoken language may permit erroneous, or at times, nonsensical words or phrases to be inadvertently transcribed; Although I have reviewed the note for such errors, some may still exist

## 2017-09-03 NOTE — PLAN OF CARE
Problem: Patient Care Overview (Adult)  Goal: Plan of Care Review  Outcome: Ongoing (interventions implemented as appropriate)    09/03/17 1700   Outcome Evaluation   Outcome Summary/Follow up Plan Pt turned Q2h, VSS, no c/o pain, no falls, slow to respond verbally, sats well with 2L O2 per n/c. PEG tube to abdomen flushed w/o difficulty. PICC line to DIETER with dressing change due 9/5/17, wound care to coccyx performed x2 this shift. Continue to monitor       Goal: Adult Individualization and Mutuality  Outcome: Ongoing (interventions implemented as appropriate)    Problem: Skin Integrity Impairment, Risk/Actual (Adult)  Goal: Skin Integrity/Wound Healing  Outcome: Ongoing (interventions implemented as appropriate)    Problem: Sepsis (Adult)  Goal: Signs and Symptoms of Listed Potential Problems Will be Absent or Manageable (Sepsis)  Outcome: Ongoing (interventions implemented as appropriate)    Problem: Infection, Risk/Actual (Adult)  Goal: Infection Prevention/Resolution  Outcome: Ongoing (interventions implemented as appropriate)    Problem: Pain, Acute (Adult)  Goal: Acceptable Pain Control/Comfort Level  Outcome: Ongoing (interventions implemented as appropriate)  Goal: Identify Related Risk Factors and Signs and Symptoms  Outcome: Ongoing (interventions implemented as appropriate)  Goal: Acceptable Pain Control/Comfort Level  Outcome: Ongoing (interventions implemented as appropriate)    Problem: Confusion, Acute (Adult)  Goal: Cognitive/Functional Impairments Minimized  Outcome: Ongoing (interventions implemented as appropriate)  Goal: Safety  Outcome: Ongoing (interventions implemented as appropriate)    Problem: Anxiety (Adult)  Goal: Reduction/Resolution  Outcome: Ongoing (interventions implemented as appropriate)    Problem: Wound, Traumatic, Nonburn (Adult)  Goal: Signs and Symptoms of Listed Potential Problems Will be Absent or Manageable (Wound, Traumatic, Nonburn)  Outcome: Ongoing (interventions  implemented as appropriate)    Problem: Respiratory Insufficiency (Adult)  Goal: Acid/Base Balance  Outcome: Ongoing (interventions implemented as appropriate)

## 2017-09-03 NOTE — PROGRESS NOTES
DAILY PROGRESS NOTE  [facility], [facility]    9/3/2017    Patient Identification:  Name: Lupe Burleson  Age: 61 y.o.  Sex: female  :  1955  MRN: [id number]           Primary Care Physician: Shane Barcenas MD    Subjective:    Interval History:    Patient is eating breakfast, still complaining of lower abdominal pain.  All consultants input appreciated.  Blood sugars are okay, hemoglobin dropped more, to 7.2  WBC trending down.    ROS:     No nausea, vomiting, diarrhea. Positive constipation. No CP or SOB      Objective:    Scheduled Meds:    alteplase 2 mg Intravenous Once   alteplase 2 mg Intravenous Once   alteplase 2 mg Intravenous Once   amLODIPine 5 mg Oral Q24H   atorvastatin 20 mg Per G Tube Daily   enoxaparin 40 mg Subcutaneous Q12H   ferrous sulfate 325 mg Oral Daily With Breakfast   fluconazole 200 mg Oral Q24H   hydrophor  Topical Q24H   isosorbide mononitrate 30 mg Per G Tube Daily   lactulose 20 g Oral Daily   pantoprazole 40 mg Oral Q AM   piperacillin-tazobactam 3.375 g Intravenous Q8H   potassium chloride 20 mEq Oral Daily   risperiDONE 2 mg Oral Daily   risperiDONE 3 mg Oral Nightly   sodium chloride 10 mL Intracatheter Q12H   Valproic Acid 250 mg Oral Q12H   vancomycin 1,500 mg Intravenous Q24H       Continuous Infusions:    dextrose 100 mL/hr Last Rate: 100 mL/hr (17 0638)   Pharmacy to dose vancomycin         PRN Meds:  •  acetaminophen  •  HYDROcodone-acetaminophen  •  HYDROcodone-acetaminophen  •  ipratropium-albuterol  •  LORazepam  •  Pharmacy to dose vancomycin  •  potassium chloride **OR** potassium chloride **OR** potassium chloride  •  Insert peripheral IV **AND** sodium chloride  •  sodium chloride    Intake/Output:    Intake/Output Summary (Last 24 hours) at 17 1218  Last data filed at 17 0829   Gross per 24 hour   Intake              860 ml   Output                0 ml   Net              860 ml  "        Exam:    tMax 24 hrs: Temp (24hrs), Av.3 °F (36.8 °C), Min:97.4 °F (36.3 °C), Max:99.1 °F (37.3 °C)    Vitals Ranges:   Temp:  [97.4 °F (36.3 °C)-99.1 °F (37.3 °C)] 97.4 °F (36.3 °C)  Heart Rate:  [82-93] 93  Resp:  [18-20] 20  BP: (115-134)/(52-79) 134/79    /79 (BP Location: Left arm)  Pulse 93  Temp 97.4 °F (36.3 °C) (Oral)   Resp 20  Ht 62.99\" (160 cm)  Wt 283 lb 14.4 oz (129 kg)  SpO2 100%  BMI 50.3 kg/m2    General: Alert, cooperative and appears stated age. In no acute distress  Neck: Supple, symmetrical, trachea midline, no adenopathy;              Thyroid without enlargement/tenderness/nodules;              no carotid bruit or JVD  Cardiovascular: Regular rate, regular rhythm and intact distal pulses.                              Exam reveals no gallop and no friction rub. No murmur heard  Chest wall: No tenderness or deformity  Pulmonary: Clear to auscultation bilaterally, respirations unlabored.                        No rhonchi, wheezing or rales.   Abdominal: Soft, non-tender; bowel sounds active on L side, but diminished on R side                     Midline incision approximated, Staples now removed.   Extremities: Atraumatic, no cyanosis, chronic lymphedema bilaterally on LE, stable  Pulses:         2 + symmetric all extremities  Neurological: She is alert and oriented to person, place, and time.                           CNII-XII intact, normal strength, sensation intact throughout  Skin: Skin color, texture, turgor normal, stage 2 coccyx wound      Data Review:      Results from last 7 days  Lab Units 17  0547 17  0606 17  0437   WBC 10*3/mm3 11.63* 13.66* 13.48*   HEMOGLOBIN g/dL 7.2* 7.3* 7.5*   HEMATOCRIT % 24.5* 24.8* 25.2*   PLATELETS 10*3/mm3 530* 469 530*         Results from last 7 days  Lab Units 17  0035 17  0547 17  0606 17  0437  17  1228   SODIUM mmol/L  --  146* 147* 146*  < > 147*   POTASSIUM mmol/L 3.6 3.6 3.7 " 3.7  < > 3.5   CHLORIDE mmol/L  --  108* 110* 112*  < > 101   CO2 mmol/L  --  29.7* 27.8 26.1  < > 31.6*   BUN mg/dL  --  8 8 7*  < > 11   CREATININE mg/dL  --  0.58 0.61 0.49*  < > 0.63   CALCIUM mg/dL  --  9.1 8.8 8.5*  < > 9.5   BILIRUBIN mg/dL  --   --   --   --   --  0.6   ALK PHOS U/L  --   --   --   --   --  63   ALT (SGPT) U/L  --   --   --   --   --  10   AST (SGOT) U/L  --   --   --   --   --  14   GLUCOSE mg/dL  --  94 98 95  < > 119*   < > = values in this interval not displayed.          Lab Results  Lab Value Date/Time   TROPONINT 0.065 (H) 05/17/2017 0505   TROPONINT 0.077 (H) 05/16/2017 1517   TROPONINT 0.080 (H) 05/16/2017 1112   TROPONINT 0.078 (H) 05/16/2017 0703   TROPONINT 0.084 (H) 05/16/2017 0116   TROPONINT <0.010 05/15/2016 0622   TROPONINT 0.017 05/14/2016 0437   TROPONINT 0.023 05/13/2016 2036   TROPONINT 0.031 (H) 05/13/2016 1231   TROPONINT <0.01 02/03/2016 0537   TROPONINT <0.01 02/02/2016 0914   TROPONINT <0.01 11/11/2015 0516   TROPONINT <0.01 11/09/2015 2049   TROPONINT <0.01 04/18/2014 0517       Microbiology Results (last 10 days)     Procedure Component Value - Date/Time    Urine Culture [[id number]]  (Normal) Collected:  08/27/17 1230    Lab Status:  Final result Specimen:  Urine from Urine, Catheter Updated:  08/29/17 0725     Urine Culture No growth    Wound Culture [[id number]]  (Abnormal)  (Susceptibility) Collected:  08/27/17 1230    Lab Status:  Final result Specimen:  Wound from Buttock Updated:  08/30/17 0831     Wound Culture Moderate growth (3+) Pseudomonas aeruginosa (A)      Moderate growth (3+) Staphylococcus warneri (A)      Moderate growth (3+) Proteus mirabilis (A)      Light growth (2+) Klebsiella pneumoniae ssp pneumoniae (A)     Gram Stain Result Few (2+) WBCs seen      Rare (1+) Gram negative bacilli      Rare (1+) Gram positive cocci in pairs    Susceptibility      Pseudomonas aeruginosa     DEBI     Cefepime <=1 ug/ml Susceptible     Ceftazidime 2 ug/ml  Susceptible     Ciprofloxacin <=0.25 ug/ml Susceptible     Levofloxacin 0.5 ug/ml Susceptible     Meropenem <=0.25 ug/ml Susceptible     Piperacillin + Tazobactam 8 ug/ml Susceptible     Tobramycin <=1 ug/ml Susceptible                Susceptibility      Staphylococcus warneri     DEBI     Clindamycin <=0.25 ug/ml Susceptible     Erythromycin >=8 ug/ml Resistant     Oxacillin <=0.25 ug/ml Susceptible     Penicillin G >=0.5 ug/ml Resistant     Rifampin <=0.5 ug/ml Susceptible     Tetracycline <=1 ug/ml Susceptible     Trimethoprim + Sulfamethoxazole <=10 ug/ml Susceptible     Vancomycin <=0.5 ug/ml Susceptible                Susceptibility      Proteus mirabilis     DEBI     Ampicillin <=2 ug/ml Susceptible     Ampicillin + Sulbactam <=2 ug/ml Susceptible     Cefazolin 8 ug/ml Susceptible     Cefepime <=1 ug/ml Susceptible     Cefoxitin 8 ug/ml Susceptible     Ceftriaxone <=1 ug/ml Susceptible     Ertapenem 1 ug/ml Susceptible     Gentamicin <=1 ug/ml Susceptible     Levofloxacin >=8 ug/ml Resistant     Meropenem 1 ug/ml Susceptible     Piperacillin + Tazobactam <=4 ug/ml Susceptible     Trimethoprim + Sulfamethoxazole >=320 ug/ml Resistant                Susceptibility      Klebsiella pneumoniae ssp pneumoniae     DEBI     Ampicillin >=32 ug/ml Resistant     Ampicillin + Sulbactam 8 ug/ml Susceptible     Cefazolin <=4 ug/ml Susceptible     Cefepime <=1 ug/ml Susceptible     Cefoxitin <=4 ug/ml Susceptible     Ceftriaxone <=1 ug/ml Susceptible     Ertapenem <=0.5 ug/ml Susceptible     Gentamicin <=1 ug/ml Susceptible     Levofloxacin <=0.12 ug/ml Susceptible     Meropenem <=0.25 ug/ml Susceptible     Piperacillin + Tazobactam 8 ug/ml Susceptible     Trimethoprim + Sulfamethoxazole <=20 ug/ml Susceptible                    Blood Culture [[id number]]  (Abnormal) Collected:  08/27/17 1228    Lab Status:  Final result Specimen:  Blood from Arm, Left Updated:  08/30/17 0706     Blood Culture Staphylococcus, coagulase negative  (A)                 Staphylococcus, coagulase negative (A)                Gram Stain Result Aerobic Bottle Gram positive cocci      Anaerobic Bottle Gram positive cocci in clusters    Narrative:       Probable contaminants requires clinical correlation, susceptibility not performed unless requested by physician.    Blood Culture ID, PCR [[id number]]  (Abnormal) Collected:  08/27/17 1228    Lab Status:  Final result Specimen:  Blood from Arm, Left Updated:  08/28/17 1223     BCID, PCR Staphylococcus spp, not aureus. Identification by BCID PCR. (A)           Imaging Results (last 7 days)     Procedure Component Value Units Date/Time    XR Chest 1 View [[id number]] Collected:  08/27/17 1259     Updated:  08/27/17 1303    Narrative:       XR CHEST 1 VW-     Clinical: Postoperative fever     COMPARISON 08/20/2017     FINDINGS: No acute airspace disease has developed. The cardiomediastinal  silhouette is stable. No edema or effusion.     CONCLUSION: No acute cardiovascular or pulmonary process is  demonstrated.     This report was finalized on 8/27/2017 1:00 PM by Dr. Kieran Mackay MD.       CT Abdomen Pelvis With Contrast [[id number]] Collected:  08/27/17 1620     Updated:  08/27/17 1640    Narrative:       CT SCANS OF THE CHEST, ABDOMEN, AND PELVIS WITH INTRAVENOUS CONTRAST     HISTORY: Recent hysterectomy. Fever and shortness of breath and  abdominal pain.     The CT scans were performed as an emergency procedure with CT imaging  through the chest, abdomen, and pelvis with intravenous contrast. The  following findings are present:  1. There is some predominately strand-like atelectasis at both lung  bases medially associated with tiny bilateral pleural effusions and the  lungs are otherwise clear. There is no mediastinal or hilar or axillary  adenopathy. Small to moderate-sized pericardial effusion measuring 1.9  cm in thickness and this shows enlargement since the preoperative CT  scan of 06/18/2017.  2. There is a  small hepatic cyst that is unchanged. The spleen,  pancreas, both adrenal glands and right kidney are unremarkable. There  is mild dilatation of the left renal collecting system including the  left ureter which extends into the pelvis where there is prominent  induration of the mesenteric fat planes associated with generalized wall  thickening of the rectosigmoid colon and there is also an adjacent fluid  collection to the left of the sigmoid colon that measures up to 5.5 x  6.8 cm. There is also generalized wall thickening of the urinary  bladder. Some of these findings may relate to postoperative changes but  are concerning for colitis. The fluid collection to the left of the  sigmoid colon could potentially represent an area of developing abscess  and the distended ureter extends to this region. The generalized  thickening of the urinary bladder may be secondary to the adjacent  inflammatory changes but clinical correlation for cystitis is also  recommended.     These findings have been discussed with Dr. Quintero in the emergency  room.     This report was finalized on 8/27/2017 4:37 PM by Dr. Dionte Barry MD.       CT Chest With Contrast [[id number]] Collected:  08/27/17 1620     Updated:  08/27/17 1640    Narrative:       CT SCANS OF THE CHEST, ABDOMEN, AND PELVIS WITH INTRAVENOUS CONTRAST     HISTORY: Recent hysterectomy. Fever and shortness of breath and  abdominal pain.     The CT scans were performed as an emergency procedure with CT imaging  through the chest, abdomen, and pelvis with intravenous contrast. The  following findings are present:  1. There is some predominately strand-like atelectasis at both lung  bases medially associated with tiny bilateral pleural effusions and the  lungs are otherwise clear. There is no mediastinal or hilar or axillary  adenopathy. Small to moderate-sized pericardial effusion measuring 1.9  cm in thickness and this shows enlargement since the preoperative CT  scan of  06/18/2017.  2. There is a small hepatic cyst that is unchanged. The spleen,  pancreas, both adrenal glands and right kidney are unremarkable. There  is mild dilatation of the left renal collecting system including the  left ureter which extends into the pelvis where there is prominent  induration of the mesenteric fat planes associated with generalized wall  thickening of the rectosigmoid colon and there is also an adjacent fluid  collection to the left of the sigmoid colon that measures up to 5.5 x  6.8 cm. There is also generalized wall thickening of the urinary  bladder. Some of these findings may relate to postoperative changes but  are concerning for colitis. The fluid collection to the left of the  sigmoid colon could potentially represent an area of developing abscess  and the distended ureter extends to this region. The generalized  thickening of the urinary bladder may be secondary to the adjacent  inflammatory changes but clinical correlation for cystitis is also  recommended.     These findings have been discussed with Dr. Quintero in the emergency  room.     This report was finalized on 8/27/2017 4:37 PM by Dr. Dionte Barry MD.       CT Abdomen Pelvis With Contrast [[id number]] Collected:  09/01/17 1927     Updated:  09/01/17 1935    Narrative:       POSTCONTRAST CT ABDOMEN AND PELVIS     HISTORY: 61-year-old female for follow-up of abscess with hypoactive  bowel sounds and persistent abdominal pain.     COMPARISON: 08/27/2017     FINDINGS:  1. Imaging remains limited by body habitus associated upper extremity  artifact, motion artifact.  2. Stable moderate pericardial effusion small bilateral pleural  effusions and bibasilar atelectasis.  3. Cholelithiasis benign hepatic cyst bilateral renal cysts.  4. Left hydronephrosis and hydroureter extending to inflammatory change  within the left pelvis.  4. Previous described pericolonic abscess 6.8 x 5.5 cm has developed an  enhancing wall and shows no change  in size.  5. Severe inflammatory change of the sigmoid colon diffusely.  6. Persistent thickening of the urinary bladder wall.  7. Additional findings as previously noted, no new abnormality.       Impression:       1. No significant interval change in size of pelvic abscess.  2. Other findings as described without other significant interval change  including mild left hydronephrosis, urinary bladder wall thickening,  marked thickening of the sigmoid colon extending to the rectum.     This report was finalized on 9/1/2017 7:32 PM by Dr. Bryan Yadav MD.               Assessment:      Principal Problem:    Sepsis  Active Problems:    Diabetes mellitus    Hypertension    Abscess of abdominal cavity    Chronic diastolic CHF (congestive heart failure)    Morbid obesity    THAI (obstructive sleep apnea)    Schizo affective schizophrenia    Coronary artery disease    Chronic respiratory failure with hypoxia and hypercapnia    Lymphedema  Hypernatremia  Anemia, multifactorial, chronic disease, cancer, etc    Patient Active Problem List   Diagnosis Code   • Elevated troponin R79.89   • Aspiration pneumonia J69.0   • Hematuria R31.9   • Hypokalemia E87.6   • Pelvic mass in female R19.00   • Fecal impaction K56.41   • Endometrial carcinoma C54.1   • Acute on chronic respiratory failure with hypoxia and hypercapnia J96.21, J96.22   • Acute on chronic diastolic CHF (congestive heart failure) I50.33   • UTI (urinary tract infection) N39.0   • Leucocytosis D72.829   • Chronic diastolic CHF (congestive heart failure) I50.32   • DM (diabetes mellitus) E11.9   • Morbid obesity E66.01   • HTN (hypertension) I10   • Schizophrenia F20.9   • Tracheostomy malfunction J95.03   • Pyuria N39.0   • THAI (obstructive sleep apnea) G47.33   • Generalized weakness R53.1   • Schizo affective schizophrenia F25.0   • Diabetes mellitus E11.9   • Coronary artery disease I25.10   • Endometrial cancer determined by uterine biopsy C54.1, Z15.04   •  Chronic respiratory failure with hypoxia and hypercapnia J96.11, J96.12   • Toxic metabolic encephalopathy G92   • Pneumonia J18.9   • Abnormal vaginal bleeding N93.9   • Schizo affective schizophrenia F25.0   • CHF (congestive heart failure) I50.9   • Endometrial cancer determined by uterine biopsy C54.1, Z15.04   • Coronary artery disease I25.10   • Lymphedema I89.0   • Immobility Z74.09   • Acute blood loss anemia D62   • Vaginal bleeding N93.9   • Hypertension I10   • Uterine cancer C55   • Bipolar disorder, unspecified F31.9   • Sepsis A41.9   • Abscess of abdominal cavity K65.1       Plan:      Continue IV antibiotics, as per ID  Work up for anemia  Free water: D5W (hypernatremia)  Resume Bumex  Diet: Reg, mech soft.   Monitor and correct electrolytes  Monitor mental status  Continue Accuchecks and SSI  DVT/stress ulcer prophylaxis  Labs in       Shane Barcenas MD  9/3/2017  12:18 PM

## 2017-09-03 NOTE — PROGRESS NOTES
"DAILY PROGRESS NOTE    Patient Identification:  Name: Lupe Burleson  Age: 61 y.o.  Sex: Female  :  1955  MRN:[id number]    Cheif complaint:  Chief Complaint   Patient presents with   • Fever     Patient is 1 week post Hysterectomy and is sent from NH for increased lethargy and a fever. 101.6 per facility.      Subjective:  Patient awake and denies complaints.    Objective:  Scheduled Meds:    alteplase 2 mg Intravenous Once   alteplase 2 mg Intravenous Once   alteplase 2 mg Intravenous Once   amLODIPine 5 mg Oral Q24H   atorvastatin 20 mg Per G Tube Daily   bumetanide 2 mg Oral Daily   enoxaparin 40 mg Subcutaneous Q12H   ferrous sulfate 325 mg Oral Daily With Breakfast   fluconazole 200 mg Oral Q24H   hydrophor  Topical Q24H   isosorbide mononitrate 30 mg Per G Tube Daily   lactulose 20 g Oral Daily   pantoprazole 40 mg Oral Q AM   piperacillin-tazobactam 3.375 g Intravenous Q8H   potassium chloride 20 mEq Oral Daily   risperiDONE 2 mg Oral Daily   risperiDONE 3 mg Oral Nightly   sodium chloride 10 mL Intracatheter Q12H   Valproic Acid 250 mg Oral Q12H   vancomycin 1,500 mg Intravenous Q24H       Continuous Infusions:    dextrose 100 mL/hr Last Rate: 100 mL/hr (17 0638)   Pharmacy to dose vancomycin         PRN Meds:  •  acetaminophen  •  HYDROcodone-acetaminophen  •  HYDROcodone-acetaminophen  •  ipratropium-albuterol  •  LORazepam  •  Pharmacy to dose vancomycin  •  potassium chloride **OR** potassium chloride **OR** potassium chloride  •  Insert peripheral IV **AND** sodium chloride  •  sodium chloride    Intake/Output:  I/O last 3 completed shifts:  In: 580 [P.O.:550; Other:30]  Out: -     Exam:  /79 (BP Location: Left arm)  Pulse 93  Temp 97.4 °F (36.3 °C) (Oral)   Resp 20  Ht 62.99\" (160 cm)  Wt 283 lb 14.4 oz (129 kg)  SpO2 100%  BMI 50.3 kg/m2  Temp (24hrs), Av.3 °F (36.8 °C), Min:97.4 °F (36.3 °C), Max:99.1 °F (37.3 °C)    Physical Examination:   PHYSICAL " EXAM:  Constitutional:  No acute distress, Non-toxic appearance.  Cardiovascular:  Regular rate and rythm  Pulmonary/Chest:  CTA-B  Abdomen:  Obese, bowel sounds normal, Soft, No tenderness, I=C/D/I  Extremities:  No C/C/E  Neurologic:  Alert.      Data Review:  Recent Results (from the past 36 hour(s))   Basic Metabolic Panel    Collection Time: 09/02/17  5:47 AM   Result Value Ref Range    Glucose 94 65 - 99 mg/dL    BUN 8 8 - 23 mg/dL    Creatinine 0.58 0.57 - 1.00 mg/dL    Sodium 146 (H) 136 - 145 mmol/L    Potassium 3.6 3.5 - 5.2 mmol/L    Chloride 108 (H) 98 - 107 mmol/L    CO2 29.7 (H) 22.0 - 29.0 mmol/L    Calcium 9.1 8.6 - 10.5 mg/dL    eGFR  African Amer 127 >60 mL/min/1.73    BUN/Creatinine Ratio 13.8 7.0 - 25.0    Anion Gap 8.3 mmol/L   CBC Auto Differential    Collection Time: 09/02/17  5:47 AM   Result Value Ref Range    WBC 11.63 (H) 4.50 - 10.70 10*3/mm3    RBC 3.35 (L) 3.90 - 5.20 10*6/mm3    Hemoglobin 7.2 (L) 11.9 - 15.5 g/dL    Hematocrit 24.5 (L) 35.6 - 45.5 %    MCV 73.1 (L) 80.5 - 98.2 fL    MCH 21.5 (L) 26.9 - 32.0 pg    MCHC 29.4 (L) 32.4 - 36.3 g/dL    RDW 23.9 (H) 11.7 - 13.0 %    RDW-SD 62.3 (H) 37.0 - 54.0 fl    MPV 9.6 6.0 - 12.0 fL    Platelets 530 (H) 140 - 500 10*3/mm3    Neutrophil % 78.4 (H) 42.7 - 76.0 %    Lymphocyte % 7.3 (L) 19.6 - 45.3 %    Monocyte % 12.2 (H) 5.0 - 12.0 %    Eosinophil % 1.6 0.3 - 6.2 %    Basophil % 0.2 0.0 - 1.5 %    Immature Grans % 0.3 0.0 - 0.5 %    Neutrophils, Absolute 9.12 (H) 1.90 - 8.10 10*3/mm3    Lymphocytes, Absolute 0.85 (L) 0.90 - 4.80 10*3/mm3    Monocytes, Absolute 1.42 (H) 0.20 - 1.20 10*3/mm3    Eosinophils, Absolute 0.19 0.00 - 0.70 10*3/mm3    Basophils, Absolute 0.02 0.00 - 0.20 10*3/mm3    Immature Grans, Absolute 0.03 0.00 - 0.03 10*3/mm3    nRBC 0.0 0.0 - 0.0 /100 WBC   POC Glucose Fingerstick    Collection Time: 09/02/17  6:17 AM   Result Value Ref Range    Glucose 94 70 - 130 mg/dL   POC Glucose Fingerstick    Collection Time:  09/02/17 11:20 AM   Result Value Ref Range    Glucose 97 70 - 130 mg/dL   POC Glucose Fingerstick    Collection Time: 09/02/17  4:32 PM   Result Value Ref Range    Glucose 121 70 - 130 mg/dL   POC Glucose Fingerstick    Collection Time: 09/02/17 10:05 PM   Result Value Ref Range    Glucose 125 70 - 130 mg/dL   Potassium    Collection Time: 09/03/17 12:35 AM   Result Value Ref Range    Potassium 3.6 3.5 - 5.2 mmol/L   POC Glucose Fingerstick    Collection Time: 09/03/17  6:57 AM   Result Value Ref Range    Glucose 102 70 - 130 mg/dL   POC Glucose Fingerstick    Collection Time: 09/03/17 11:09 AM   Result Value Ref Range    Glucose 113 70 - 130 mg/dL     RADIOLOGY  Ct Chest With Contrast    Result Date: 8/27/2017  Narrative: CT SCANS OF THE CHEST, ABDOMEN, AND PELVIS WITH INTRAVENOUS CONTRAST  HISTORY: Recent hysterectomy. Fever and shortness of breath and abdominal pain.  The CT scans were performed as an emergency procedure with CT imaging through the chest, abdomen, and pelvis with intravenous contrast. The following findings are present: 1. There is some predominately strand-like atelectasis at both lung bases medially associated with tiny bilateral pleural effusions and the lungs are otherwise clear. There is no mediastinal or hilar or axillary adenopathy. Small to moderate-sized pericardial effusion measuring 1.9 cm in thickness and this shows enlargement since the preoperative CT scan of 06/18/2017. 2. There is a small hepatic cyst that is unchanged. The spleen, pancreas, both adrenal glands and right kidney are unremarkable. There is mild dilatation of the left renal collecting system including the left ureter which extends into the pelvis where there is prominent induration of the mesenteric fat planes associated with generalized wall thickening of the rectosigmoid colon and there is also an adjacent fluid collection to the left of the sigmoid colon that measures up to 5.5 x 6.8 cm. There is also generalized  wall thickening of the urinary bladder. Some of these findings may relate to postoperative changes but are concerning for colitis. The fluid collection to the left of the sigmoid colon could potentially represent an area of developing abscess and the distended ureter extends to this region. The generalized thickening of the urinary bladder may be secondary to the adjacent inflammatory changes but clinical correlation for cystitis is also recommended.  These findings have been discussed with Dr. Quintero in the emergency room.  This report was finalized on 8/27/2017 4:37 PM by Dr. Dionte Barry MD.      Us Pelvis Complete    Result Date: 8/17/2017  Narrative: PELVIC ULTRASOUND  HISTORY: 61-year-old nursing home patient with vaginal bleeding.  The examination was performed as an emergency procedure. The patient refused transvaginal imaging. The uterus is enlarged and lobulated, measuring 9.8 x 9.8 x 13.7 cm and there are multiple fibroids measuring up to 7 cm in size. There is also generalized endometrial thickening with double wall thickness of 1.9 cm. The patient is postmenopausal and this does raise a concern of endometrial tumor and further clinical correlation is required.  The ovaries are not visualized. There is no free fluid identified.  This report was finalized on 8/17/2017 11:09 AM by Dr. Dionte Barry MD.      Ct Abdomen Pelvis With Contrast    Result Date: 9/1/2017  Narrative: POSTCONTRAST CT ABDOMEN AND PELVIS  HISTORY: 61-year-old female for follow-up of abscess with hypoactive bowel sounds and persistent abdominal pain.  COMPARISON: 08/27/2017  FINDINGS: 1. Imaging remains limited by body habitus associated upper extremity artifact, motion artifact. 2. Stable moderate pericardial effusion small bilateral pleural effusions and bibasilar atelectasis. 3. Cholelithiasis benign hepatic cyst bilateral renal cysts. 4. Left hydronephrosis and hydroureter extending to inflammatory change within the left  pelvis. 4. Previous described pericolonic abscess 6.8 x 5.5 cm has developed an enhancing wall and shows no change in size. 5. Severe inflammatory change of the sigmoid colon diffusely. 6. Persistent thickening of the urinary bladder wall. 7. Additional findings as previously noted, no new abnormality.      Impression: 1. No significant interval change in size of pelvic abscess. 2. Other findings as described without other significant interval change including mild left hydronephrosis, urinary bladder wall thickening, marked thickening of the sigmoid colon extending to the rectum.  This report was finalized on 9/1/2017 7:32 PM by Dr. Bryan Yadav MD.      Ct Abdomen Pelvis With Contrast    Result Date: 8/27/2017  Narrative: CT SCANS OF THE CHEST, ABDOMEN, AND PELVIS WITH INTRAVENOUS CONTRAST  HISTORY: Recent hysterectomy. Fever and shortness of breath and abdominal pain.  The CT scans were performed as an emergency procedure with CT imaging through the chest, abdomen, and pelvis with intravenous contrast. The following findings are present: 1. There is some predominately strand-like atelectasis at both lung bases medially associated with tiny bilateral pleural effusions and the lungs are otherwise clear. There is no mediastinal or hilar or axillary adenopathy. Small to moderate-sized pericardial effusion measuring 1.9 cm in thickness and this shows enlargement since the preoperative CT scan of 06/18/2017. 2. There is a small hepatic cyst that is unchanged. The spleen, pancreas, both adrenal glands and right kidney are unremarkable. There is mild dilatation of the left renal collecting system including the left ureter which extends into the pelvis where there is prominent induration of the mesenteric fat planes associated with generalized wall thickening of the rectosigmoid colon and there is also an adjacent fluid collection to the left of the sigmoid colon that measures up to 5.5 x 6.8 cm. There is also  generalized wall thickening of the urinary bladder. Some of these findings may relate to postoperative changes but are concerning for colitis. The fluid collection to the left of the sigmoid colon could potentially represent an area of developing abscess and the distended ureter extends to this region. The generalized thickening of the urinary bladder may be secondary to the adjacent inflammatory changes but clinical correlation for cystitis is also recommended.  These findings have been discussed with Dr. Quintero in the emergency room.  This report was finalized on 8/27/2017 4:37 PM by Dr. Dionte Barry MD.      Xr Chest 1 View    Result Date: 8/27/2017  Narrative: XR CHEST 1 VW-  Clinical: Postoperative fever  COMPARISON 08/20/2017  FINDINGS: No acute airspace disease has developed. The cardiomediastinal silhouette is stable. No edema or effusion.  CONCLUSION: No acute cardiovascular or pulmonary process is demonstrated.  This report was finalized on 8/27/2017 1:00 PM by Dr. Kieran Mackay MD.      Xr Chest Pa & Lateral    Result Date: 8/20/2017  Narrative: PA AND LATERAL CHEST RADIOGRAPHS  HISTORY: 61-year-old female undergoing follow-up evaluation of chest infiltrates.  FINDINGS: Upright AP and lateral chest radiographs were obtained. Comparison is made to a prior examination dated 08/18/2017. There is mild bilateral basilar atelectasis with decreased lung volumes. Moderate atheromatous calcification is seen within the aortic arch. There is limited visualization on the lateral chest radiograph and the previously described pleural effusions cannot be substantiated on the current examination.      Impression: Persistent low lung volumes with bibasilar atelectasis. The previously described pleural effusions are not certainly identified on the current examination as the lateral chest radiograph is markedly limited. Little interval change has occurred on the PA chest radiograph when compared to the prior  examination.  This report was finalized on 8/20/2017 4:35 PM by Dr. Darrin Christian MD.      Xr Chest Pa & Lateral    Result Date: 8/18/2017  Narrative: PA AND LATERAL CHEST  HISTORY: Cough.  PA and lateral views of the chest were obtained and compared to 08/15/2017.  FINDINGS:  The right internal jugular central line has been removed. The study is hampered by the patient's body habitus, patient positioning and inspiratory effort. There is bibasilar atelectasis/infiltrate more prominent on the left, similar to slightly less prominent as compared to prior examination. The pulmonary interstitial vascular prominence which was present previously is less prominent on the current examination. On the lateral projection bilateral pleural effusions are appreciated, moderate in size and there is moderate posterior atelectasis/infiltrate bilaterally. The above information was called to patient's nurse who is to  relay the information to the clinical service.  This report was finalized on 8/18/2017 2:29 PM by Dr. Mario Sim MD.      Xr Chest Post Cva Port    Result Date: 8/15/2017  Narrative: CHEST POST CVA PORT  HISTORY: Morbidly obese 61-year-old female for right jugular line placement.  COMPARISON: 05/16/2017  FINDINGS: 1. Right jugular line projects over the superior vena cava. 2. Imaging is considerably limited by morbid obesity, low lung volumes, mandibular artifact obscuring detail of the right apex.  If patient has symptoms suggesting pneumothorax, follow-up lordotic imaging or standard imaging with removal mandibular artifact would be helpful.  This report was finalized on 8/15/2017 11:50 AM by Dr. Bryan Yadav MD.        Assessment/Plan:  HD 7:   1. SP CRISS/BSO on 08/15 for endometrial cancer. Advanced aggressive cancer. Should she recover from infection petition with courts would be necessary in making treatment care decisions.   2. Leukocytosis down (11.6 from 22), pelvic fluid collection not amenable for CT  guided drainage secondary fluid collection deep in the pelvis adjacent to the sigmoid colon collection. Continue zosyn and vanc. Repeat CT: no change in size of pelvic abscess (looks more walled off now).   3. Elen-cardial effusion, cardiology consulted. Echo with moderate effusion, no tamponade. No intervention planned  4. Anemia, hgb stable, likely neoplastic, acute on chronic disease. Post two units PRBC 08/29, continue ferrous sulfate. Repeat labs in AM.  5. Disposition: to NH likely mid next week    Daniela Esquivel MD  9/3/2017 @ 12:48 PM

## 2017-09-04 LAB
ANION GAP SERPL CALCULATED.3IONS-SCNC: 10.5 MMOL/L
BASOPHILS # BLD AUTO: 0.01 10*3/MM3 (ref 0–0.2)
BASOPHILS NFR BLD AUTO: 0.1 % (ref 0–1.5)
BUN BLD-MCNC: 7 MG/DL (ref 8–23)
BUN/CREAT SERPL: 10.4 (ref 7–25)
CALCIUM SPEC-SCNC: 9 MG/DL (ref 8.6–10.5)
CHLORIDE SERPL-SCNC: 107 MMOL/L (ref 98–107)
CO2 SERPL-SCNC: 29.5 MMOL/L (ref 22–29)
CREAT BLD-MCNC: 0.67 MG/DL (ref 0.57–1)
DEPRECATED RDW RBC AUTO: 63.1 FL (ref 37–54)
EOSINOPHIL # BLD AUTO: 0.11 10*3/MM3 (ref 0–0.7)
EOSINOPHIL NFR BLD AUTO: 1.4 % (ref 0.3–6.2)
ERYTHROCYTE [DISTWIDTH] IN BLOOD BY AUTOMATED COUNT: 24.4 % (ref 11.7–13)
FOLATE SERPL-MCNC: 3.64 NG/ML (ref 4.78–24.2)
GFR SERPL CREATININE-BSD FRML MDRD: 108 ML/MIN/1.73
GLUCOSE BLD-MCNC: 86 MG/DL (ref 65–99)
GLUCOSE BLDC GLUCOMTR-MCNC: 107 MG/DL (ref 70–130)
GLUCOSE BLDC GLUCOMTR-MCNC: 118 MG/DL (ref 70–130)
GLUCOSE BLDC GLUCOMTR-MCNC: 162 MG/DL (ref 70–130)
HCT VFR BLD AUTO: 23.6 % (ref 35.6–45.5)
HGB BLD-MCNC: 6.9 G/DL (ref 11.9–15.5)
IMM GRANULOCYTES # BLD: 0.03 10*3/MM3 (ref 0–0.03)
IMM GRANULOCYTES NFR BLD: 0.4 % (ref 0–0.5)
IRON 24H UR-MRATE: 13 MCG/DL (ref 37–145)
IRON SATN MFR SERPL: 6 % (ref 20–50)
LYMPHOCYTES # BLD AUTO: 0.72 10*3/MM3 (ref 0.9–4.8)
LYMPHOCYTES NFR BLD AUTO: 9.5 % (ref 19.6–45.3)
MCH RBC QN AUTO: 21.3 PG (ref 26.9–32)
MCHC RBC AUTO-ENTMCNC: 29.2 G/DL (ref 32.4–36.3)
MCV RBC AUTO: 72.8 FL (ref 80.5–98.2)
MONOCYTES # BLD AUTO: 0.83 10*3/MM3 (ref 0.2–1.2)
MONOCYTES NFR BLD AUTO: 10.9 % (ref 5–12)
NEUTROPHILS # BLD AUTO: 5.91 10*3/MM3 (ref 1.9–8.1)
NEUTROPHILS NFR BLD AUTO: 77.7 % (ref 42.7–76)
NRBC BLD MANUAL-RTO: 0 /100 WBC (ref 0–0)
PLATELET # BLD AUTO: 504 10*3/MM3 (ref 140–500)
PMV BLD AUTO: 9.5 FL (ref 6–12)
POTASSIUM BLD-SCNC: 3.5 MMOL/L (ref 3.5–5.2)
RBC # BLD AUTO: 3.24 10*6/MM3 (ref 3.9–5.2)
SODIUM BLD-SCNC: 147 MMOL/L (ref 136–145)
TIBC SERPL-MCNC: 215 MCG/DL
TRANSFERRIN SERPL-MCNC: 144 MG/DL (ref 200–360)
VANCOMYCIN TROUGH SERPL-MCNC: 18.8 MCG/ML (ref 5–20)
VIT B12 BLD-MCNC: 1423 PG/ML (ref 211–946)
WBC NRBC COR # BLD: 7.61 10*3/MM3 (ref 4.5–10.7)

## 2017-09-04 PROCEDURE — 83540 ASSAY OF IRON: CPT | Performed by: INTERNAL MEDICINE

## 2017-09-04 PROCEDURE — 84466 ASSAY OF TRANSFERRIN: CPT | Performed by: INTERNAL MEDICINE

## 2017-09-04 PROCEDURE — 82962 GLUCOSE BLOOD TEST: CPT

## 2017-09-04 PROCEDURE — 80048 BASIC METABOLIC PNL TOTAL CA: CPT | Performed by: INTERNAL MEDICINE

## 2017-09-04 PROCEDURE — 25010000003 POTASSIUM CHLORIDE 10 MEQ/100ML SOLUTION: Performed by: INTERNAL MEDICINE

## 2017-09-04 PROCEDURE — 80202 ASSAY OF VANCOMYCIN: CPT | Performed by: INTERNAL MEDICINE

## 2017-09-04 PROCEDURE — 25010000002 PIPERACILLIN SOD-TAZOBACTAM PER 1 G: Performed by: OBSTETRICS & GYNECOLOGY

## 2017-09-04 PROCEDURE — 25010000002 ENOXAPARIN PER 10 MG: Performed by: OBSTETRICS & GYNECOLOGY

## 2017-09-04 PROCEDURE — 82746 ASSAY OF FOLIC ACID SERUM: CPT | Performed by: INTERNAL MEDICINE

## 2017-09-04 PROCEDURE — 82607 VITAMIN B-12: CPT | Performed by: INTERNAL MEDICINE

## 2017-09-04 PROCEDURE — 25010000002 IRON SUCROSE PER 1 MG: Performed by: INTERNAL MEDICINE

## 2017-09-04 PROCEDURE — 85025 COMPLETE CBC W/AUTO DIFF WBC: CPT | Performed by: OBSTETRICS & GYNECOLOGY

## 2017-09-04 PROCEDURE — 25010000002 VANCOMYCIN 10 G RECONSTITUTED SOLUTION: Performed by: INTERNAL MEDICINE

## 2017-09-04 RX ORDER — ISOSORBIDE MONONITRATE 10 MG/1
30 TABLET ORAL DAILY
Status: DISCONTINUED | OUTPATIENT
Start: 2017-09-05 | End: 2017-09-08 | Stop reason: HOSPADM

## 2017-09-04 RX ORDER — FOLIC ACID 1 MG/1
1 TABLET ORAL DAILY
Status: DISCONTINUED | OUTPATIENT
Start: 2017-09-04 | End: 2017-09-08 | Stop reason: HOSPADM

## 2017-09-04 RX ADMIN — DEXTROSE MONOHYDRATE 100 ML/HR: 50 INJECTION, SOLUTION INTRAVENOUS at 18:42

## 2017-09-04 RX ADMIN — POTASSIUM CHLORIDE 10 MEQ: 10 INJECTION, SOLUTION INTRAVENOUS at 16:31

## 2017-09-04 RX ADMIN — TAZOBACTAM SODIUM AND PIPERACILLIN SODIUM 3.38 G: 375; 3 INJECTION, SOLUTION INTRAVENOUS at 06:34

## 2017-09-04 RX ADMIN — POTASSIUM CHLORIDE 10 MEQ: 10 INJECTION, SOLUTION INTRAVENOUS at 17:57

## 2017-09-04 RX ADMIN — BUMETANIDE 2 MG: 2 TABLET ORAL at 08:31

## 2017-09-04 RX ADMIN — PETROLATUM: 42 OINTMENT TOPICAL at 08:32

## 2017-09-04 RX ADMIN — Medication 10 ML: at 08:32

## 2017-09-04 RX ADMIN — POTASSIUM CHLORIDE 10 MEQ: 10 INJECTION, SOLUTION INTRAVENOUS at 21:36

## 2017-09-04 RX ADMIN — PANTOPRAZOLE SODIUM 40 MG: 40 TABLET, DELAYED RELEASE ORAL at 06:34

## 2017-09-04 RX ADMIN — POTASSIUM CHLORIDE 10 MEQ: 10 INJECTION, SOLUTION INTRAVENOUS at 23:21

## 2017-09-04 RX ADMIN — VALPROIC ACID 250 MG: 250 SOLUTION ORAL at 21:31

## 2017-09-04 RX ADMIN — RISPERIDONE 3 MG: 3 TABLET ORAL at 21:32

## 2017-09-04 RX ADMIN — TAZOBACTAM SODIUM AND PIPERACILLIN SODIUM 3.38 G: 375; 3 INJECTION, SOLUTION INTRAVENOUS at 15:02

## 2017-09-04 RX ADMIN — FOLIC ACID 1 MG: 1 TABLET ORAL at 16:31

## 2017-09-04 RX ADMIN — AMLODIPINE BESYLATE 5 MG: 5 TABLET ORAL at 08:31

## 2017-09-04 RX ADMIN — ENOXAPARIN SODIUM 40 MG: 40 INJECTION SUBCUTANEOUS at 08:32

## 2017-09-04 RX ADMIN — IRON SUCROSE 300 MG: 20 INJECTION, SOLUTION INTRAVENOUS at 15:02

## 2017-09-04 RX ADMIN — DEXTROSE MONOHYDRATE 100 ML/HR: 50 INJECTION, SOLUTION INTRAVENOUS at 06:34

## 2017-09-04 RX ADMIN — POTASSIUM CHLORIDE 20 MEQ: 750 CAPSULE, EXTENDED RELEASE ORAL at 08:31

## 2017-09-04 RX ADMIN — TAZOBACTAM SODIUM AND PIPERACILLIN SODIUM 3.38 G: 375; 3 INJECTION, SOLUTION INTRAVENOUS at 23:21

## 2017-09-04 RX ADMIN — ATORVASTATIN CALCIUM 20 MG: 20 TABLET, FILM COATED ORAL at 08:32

## 2017-09-04 RX ADMIN — TAZOBACTAM SODIUM AND PIPERACILLIN SODIUM 3.38 G: 375; 3 INJECTION, SOLUTION INTRAVENOUS at 00:35

## 2017-09-04 RX ADMIN — HYDROCODONE BITARTRATE AND ACETAMINOPHEN 1 TABLET: 7.5; 325 TABLET ORAL at 06:34

## 2017-09-04 RX ADMIN — ISOSORBIDE MONONITRATE 30 MG: 20 TABLET ORAL at 08:31

## 2017-09-04 RX ADMIN — VANCOMYCIN HYDROCHLORIDE 1500 MG: 1 INJECTION, POWDER, LYOPHILIZED, FOR SOLUTION INTRAVENOUS at 13:21

## 2017-09-04 RX ADMIN — FERROUS SULFATE TAB 325 MG (65 MG ELEMENTAL FE) 325 MG: 325 (65 FE) TAB at 08:31

## 2017-09-04 RX ADMIN — VALPROIC ACID 250 MG: 250 SOLUTION ORAL at 08:31

## 2017-09-04 RX ADMIN — FLUCONAZOLE 200 MG: 200 TABLET ORAL at 18:42

## 2017-09-04 RX ADMIN — Medication 10 ML: at 21:32

## 2017-09-04 RX ADMIN — RISPERIDONE 2 MG: 2 TABLET, FILM COATED ORAL at 08:31

## 2017-09-04 NOTE — PROGRESS NOTES
"  Infectious Diseases Progress Note    Ramya Alex MD     [facility]  [place]: 8 days  Patient Identification:  Name: Lupe Burleson  Age: 61 y.o.  Sex: female  :  1955  MRN: [id number]         Primary Care Physician: Shane Barcenas MD            Subjective: Feeling better and wants to know when can she go home.  Interval History: see consultation notes     Objective:    Scheduled Meds:    alteplase 2 mg Intravenous Once   alteplase 2 mg Intravenous Once   alteplase 2 mg Intravenous Once   amLODIPine 5 mg Oral Q24H   atorvastatin 20 mg Per G Tube Daily   bumetanide 2 mg Oral Daily   fluconazole 200 mg Oral Q24H   folic acid 1 mg Oral Daily   hydrophor  Topical Q24H   isosorbide mononitrate 30 mg Per G Tube Daily   lactulose 20 g Oral Daily   pantoprazole 40 mg Oral Q AM   piperacillin-tazobactam 3.375 g Intravenous Q8H   potassium chloride 20 mEq Oral Daily   risperiDONE 2 mg Oral Daily   risperiDONE 3 mg Oral Nightly   sodium chloride 10 mL Intracatheter Q12H   Valproic Acid 250 mg Oral Q12H   vancomycin 1,500 mg Intravenous Q24H     Continuous Infusions:    dextrose 100 mL/hr Last Rate: 100 mL/hr (17 1842)   Pharmacy to dose vancomycin         Vital signs in last 24 hours:  Temp:  [98.6 °F (37 °C)-98.9 °F (37.2 °C)] 98.6 °F (37 °C)  Heart Rate:  [] 86  Resp:  [18-22] 20  BP: (119-156)/(55-83) 124/83    Intake/Output:    Intake/Output Summary (Last 24 hours) at 17  Last data filed at 17 1631   Gross per 24 hour   Intake              490 ml   Output                0 ml   Net              490 ml       Exam:  /83 (BP Location: Left arm, Patient Position: Lying)  Pulse 86  Temp 98.6 °F (37 °C) (Oral)   Resp 20  Ht 62.99\" (160 cm)  Wt 283 lb 14.4 oz (129 kg)  SpO2 100%  BMI 50.3 kg/m2    General Appearance:    Awake but doesn't want to interact                          Head:    Normocephalic, without obvious abnormality, atraumatic                       "     Eyes:    PERRL, conjunctiva/corneas clear, EOM's intact, both eyes                         Throat:   Lips, tongue, gums normal; oral mucosa pink and moist                           Neck:   Supple, symmetrical, trachea midline, no JVD                         Lungs:    Clear to auscultation bilaterally, respirations unlabored                 Chest Wall:    No tenderness or deformity                          Heart:    Regular rate and rhythm, S1 and S2 normal, no murmur,no  Rub                                      or gallop                  Abdomen:     Soft, obese, surgical incision well approximated, non-tender, bowel sounds active                 Extremities:   Chronic changes in the left lower extremity and right arm PICC line in place                        Pulses:   Pulses palpable in all extremities                            Skin:   Skin is warm and dry,  no rashes or palpable lesions                         Data Review:    I reviewed the patient's new clinical results.    Results from last 7 days  Lab Units 09/04/17  1131 09/02/17  0547 09/01/17  0606 08/31/17  0437 08/30/17  0459 08/29/17  0410   WBC 10*3/mm3 7.61 11.63* 13.66* 13.48* 12.31* 15.66*   HEMOGLOBIN g/dL 6.9* 7.2* 7.3* 7.5* 8.1* 6.4*   PLATELETS 10*3/mm3 504* 530* 469 530* 572* 605*       Results from last 7 days  Lab Units 09/04/17  0634 09/03/17  0035 09/02/17  0547 09/01/17  0606 08/31/17  0437 08/30/17  1552 08/30/17  0459  08/29/17  0410   SODIUM mmol/L 147*  --  146* 147* 146*  --  145  --  143   POTASSIUM mmol/L 3.5 3.6 3.6 3.7 3.7 5.0 3.4*  < > 3.4*   CHLORIDE mmol/L 107  --  108* 110* 112*  --  106  --  104   CO2 mmol/L 29.5*  --  29.7* 27.8 26.1  --  28.2  --  31.0*   BUN mg/dL 7*  --  8 8 7*  --  7*  --  10   CREATININE mg/dL 0.67  --  0.58 0.61 0.49*  --  0.48*  --  0.48*   CALCIUM mg/dL 9.0  --  9.1 8.8 8.5*  --  8.8  --  8.4*   GLUCOSE mg/dL 86  --  94 98 95  --  108*  --  122*   < > = values in this interval not  displayed.  Microbiology Results (last 10 days)     Procedure Component Value - Date/Time    Urine Culture [[id number]]  (Normal) Collected:  08/27/17 1230    Lab Status:  Final result Specimen:  Urine from Urine, Catheter Updated:  08/29/17 0725     Urine Culture No growth    Wound Culture [[id number]]  (Abnormal)  (Susceptibility) Collected:  08/27/17 1230    Lab Status:  Final result Specimen:  Wound from Buttock Updated:  08/30/17 0831     Wound Culture Moderate growth (3+) Pseudomonas aeruginosa (A)      Moderate growth (3+) Staphylococcus warneri (A)      Moderate growth (3+) Proteus mirabilis (A)      Light growth (2+) Klebsiella pneumoniae ssp pneumoniae (A)     Gram Stain Result Few (2+) WBCs seen      Rare (1+) Gram negative bacilli      Rare (1+) Gram positive cocci in pairs    Susceptibility      Pseudomonas aeruginosa     DEBI     Cefepime <=1 ug/ml Susceptible     Ceftazidime 2 ug/ml Susceptible     Ciprofloxacin <=0.25 ug/ml Susceptible     Levofloxacin 0.5 ug/ml Susceptible     Meropenem <=0.25 ug/ml Susceptible     Piperacillin + Tazobactam 8 ug/ml Susceptible     Tobramycin <=1 ug/ml Susceptible                Susceptibility      Staphylococcus warneri     DEBI     Clindamycin <=0.25 ug/ml Susceptible     Erythromycin >=8 ug/ml Resistant     Oxacillin <=0.25 ug/ml Susceptible     Penicillin G >=0.5 ug/ml Resistant     Rifampin <=0.5 ug/ml Susceptible     Tetracycline <=1 ug/ml Susceptible     Trimethoprim + Sulfamethoxazole <=10 ug/ml Susceptible     Vancomycin <=0.5 ug/ml Susceptible                Susceptibility      Proteus mirabilis     DEBI     Ampicillin <=2 ug/ml Susceptible     Ampicillin + Sulbactam <=2 ug/ml Susceptible     Cefazolin 8 ug/ml Susceptible     Cefepime <=1 ug/ml Susceptible     Cefoxitin 8 ug/ml Susceptible     Ceftriaxone <=1 ug/ml Susceptible     Ertapenem 1 ug/ml Susceptible     Gentamicin <=1 ug/ml Susceptible     Levofloxacin >=8 ug/ml Resistant     Meropenem 1 ug/ml  Susceptible     Piperacillin + Tazobactam <=4 ug/ml Susceptible     Trimethoprim + Sulfamethoxazole >=320 ug/ml Resistant                Susceptibility      Klebsiella pneumoniae ssp pneumoniae     DEBI     Ampicillin >=32 ug/ml Resistant     Ampicillin + Sulbactam 8 ug/ml Susceptible     Cefazolin <=4 ug/ml Susceptible     Cefepime <=1 ug/ml Susceptible     Cefoxitin <=4 ug/ml Susceptible     Ceftriaxone <=1 ug/ml Susceptible     Ertapenem <=0.5 ug/ml Susceptible     Gentamicin <=1 ug/ml Susceptible     Levofloxacin <=0.12 ug/ml Susceptible     Meropenem <=0.25 ug/ml Susceptible     Piperacillin + Tazobactam 8 ug/ml Susceptible     Trimethoprim + Sulfamethoxazole <=20 ug/ml Susceptible                    Blood Culture [[id number]]  (Abnormal) Collected:  08/27/17 1228    Lab Status:  Final result Specimen:  Blood from Arm, Left Updated:  08/30/17 0722     Blood Culture Staphylococcus, coagulase negative (A)                 Staphylococcus, coagulase negative (A)                Gram Stain Result Aerobic Bottle Gram positive cocci      Anaerobic Bottle Gram positive cocci in clusters    Narrative:       Probable contaminants requires clinical correlation, susceptibility not performed unless requested by physician.    Blood Culture ID, PCR [[id number]]  (Abnormal) Collected:  08/27/17 1228    Lab Status:  Final result Specimen:  Blood from Arm, Left Updated:  08/28/17 1223     BCID, PCR Staphylococcus spp, not aureus. Identification by BCID PCR. (A)            Assessment:  Principal Problem:    Sepsis  Active Problems:    Chronic diastolic CHF (congestive heart failure)    Morbid obesity    THAI (obstructive sleep apnea)    Schizo affective schizophrenia    Diabetes mellitus    Coronary artery disease    Chronic respiratory failure with hypoxia and hypercapnia    Lymphedema    Hypertension    Abscess of abdominal cavity  bacteremia likely contaminant    Plan:See below.  continue present treatment. With vanc,zosyn and  diflucan, and when ready to dc may switch to po cipro with augmentin and diflucan, and with plans for repeat ct scan of abdomen in 2 weks to see progression of the abscess.    Ramya Alex MD  9/4/2017  7:47 PM    Much of this encounter note is an electronic transcription/translation of spoken language to printed text. The electronic translation of spoken language may permit erroneous, or at times, nonsensical words or phrases to be inadvertently transcribed; Although I have reviewed the note for such errors, some may still exist

## 2017-09-04 NOTE — PROGRESS NOTES
"DAILY PROGRESS NOTE    Patient Identification:  Name: Lupe Burleson  Age: 61 y.o.  Sex: Female  :  1955  MRN:[id number]    Cheif complaint:  Chief Complaint   Patient presents with   • Fever     Patient is 1 week post Hysterectomy and is sent from NH for increased lethargy and a fever. 101.6 per facility.      Subjective:  Patient awake and denies complaints. Not very talkative.     Objective:  Scheduled Meds:    alteplase 2 mg Intravenous Once   alteplase 2 mg Intravenous Once   alteplase 2 mg Intravenous Once   amLODIPine 5 mg Oral Q24H   atorvastatin 20 mg Per G Tube Daily   bumetanide 2 mg Oral Daily   enoxaparin 40 mg Subcutaneous Q12H   ferrous sulfate 325 mg Oral Daily With Breakfast   fluconazole 200 mg Oral Q24H   hydrophor  Topical Q24H   isosorbide mononitrate 30 mg Per G Tube Daily   lactulose 20 g Oral Daily   pantoprazole 40 mg Oral Q AM   piperacillin-tazobactam 3.375 g Intravenous Q8H   potassium chloride 20 mEq Oral Daily   risperiDONE 2 mg Oral Daily   risperiDONE 3 mg Oral Nightly   sodium chloride 10 mL Intracatheter Q12H   Valproic Acid 250 mg Oral Q12H   vancomycin 1,500 mg Intravenous Q24H       Continuous Infusions:    dextrose 100 mL/hr Last Rate: 100 mL/hr (17 0634)   Pharmacy to dose vancomycin         PRN Meds:  •  acetaminophen  •  HYDROcodone-acetaminophen  •  HYDROcodone-acetaminophen  •  ipratropium-albuterol  •  LORazepam  •  Pharmacy to dose vancomycin  •  potassium chloride **OR** potassium chloride **OR** potassium chloride  •  Insert peripheral IV **AND** sodium chloride  •  sodium chloride    Intake/Output:  I/O last 3 completed shifts:  In: 2870 [P.O.:1230; I.V.:1000; Other:90; IV Piggyback:550]  Out: -     Exam:  /56 (BP Location: Left arm, Patient Position: Lying)  Pulse 78  Temp 98.9 °F (37.2 °C) (Oral)   Resp 18  Ht 62.99\" (160 cm)  Wt 283 lb 14.4 oz (129 kg)  SpO2 100%  BMI 50.3 kg/m2  Temp (24hrs), Av.8 °F (37.1 °C), Min:98.6 °F (37 °C), " Max:98.9 °F (37.2 °C)    Physical Examination:   PHYSICAL EXAM:  Constitutional:  No acute distress, Non-toxic appearance.  Cardiovascular:  Regular rate and rythm  Pulmonary/Chest:  CTA-B  Abdomen:  Obese, bowel sounds normal, Soft, No tenderness, I=C/D/I  Extremities:  No C/C/E  Neurologic:  Alert.      Data Review:  WBC   Date Value Ref Range Status   09/02/2017 11.63 (H) 4.50 - 10.70 10*3/mm3 Final     RBC   Date Value Ref Range Status   09/02/2017 3.35 (L) 3.90 - 5.20 10*6/mm3 Final     Hemoglobin   Date Value Ref Range Status   09/02/2017 7.2 (L) 11.9 - 15.5 g/dL Final     Hematocrit   Date Value Ref Range Status   09/02/2017 24.5 (L) 35.6 - 45.5 % Final     MCV   Date Value Ref Range Status   09/02/2017 73.1 (L) 80.5 - 98.2 fL Final     MCH   Date Value Ref Range Status   09/02/2017 21.5 (L) 26.9 - 32.0 pg Final     MCHC   Date Value Ref Range Status   09/02/2017 29.4 (L) 32.4 - 36.3 g/dL Final     RDW   Date Value Ref Range Status   09/02/2017 23.9 (H) 11.7 - 13.0 % Final     RDW-SD   Date Value Ref Range Status   09/02/2017 62.3 (H) 37.0 - 54.0 fl Final     MPV   Date Value Ref Range Status   09/02/2017 9.6 6.0 - 12.0 fL Final     Platelets   Date Value Ref Range Status   09/02/2017 530 (H) 140 - 500 10*3/mm3 Final     Neutrophil %   Date Value Ref Range Status   09/02/2017 78.4 (H) 42.7 - 76.0 % Final     Lymphocyte %   Date Value Ref Range Status   09/02/2017 7.3 (L) 19.6 - 45.3 % Final     Monocyte %   Date Value Ref Range Status   09/02/2017 12.2 (H) 5.0 - 12.0 % Final     Eosinophil %   Date Value Ref Range Status   09/02/2017 1.6 0.3 - 6.2 % Final     Basophil %   Date Value Ref Range Status   09/02/2017 0.2 0.0 - 1.5 % Final     Immature Grans %   Date Value Ref Range Status   09/02/2017 0.3 0.0 - 0.5 % Final     Neutrophils, Absolute   Date Value Ref Range Status   09/02/2017 9.12 (H) 1.90 - 8.10 10*3/mm3 Final     Lymphocytes, Absolute   Date Value Ref Range Status   09/02/2017 0.85 (L) 0.90 - 4.80  10*3/mm3 Final     Monocytes, Absolute   Date Value Ref Range Status   09/02/2017 1.42 (H) 0.20 - 1.20 10*3/mm3 Final     Eosinophils, Absolute   Date Value Ref Range Status   09/02/2017 0.19 0.00 - 0.70 10*3/mm3 Final     Basophils, Absolute   Date Value Ref Range Status   09/02/2017 0.02 0.00 - 0.20 10*3/mm3 Final     Immature Grans, Absolute   Date Value Ref Range Status   09/02/2017 0.03 0.00 - 0.03 10*3/mm3 Final     nRBC   Date Value Ref Range Status   09/02/2017 0.0 0.0 - 0.0 /100 WBC Final     Recent Results (from the past 36 hour(s))   POC Glucose Fingerstick    Collection Time: 09/02/17 10:05 PM   Result Value Ref Range    Glucose 125 70 - 130 mg/dL   Potassium    Collection Time: 09/03/17 12:35 AM   Result Value Ref Range    Potassium 3.6 3.5 - 5.2 mmol/L   POC Glucose Fingerstick    Collection Time: 09/03/17  6:57 AM   Result Value Ref Range    Glucose 102 70 - 130 mg/dL   POC Glucose Fingerstick    Collection Time: 09/03/17 11:09 AM   Result Value Ref Range    Glucose 113 70 - 130 mg/dL   POC Glucose Fingerstick    Collection Time: 09/03/17  4:27 PM   Result Value Ref Range    Glucose 119 70 - 130 mg/dL   Reticulocytes    Collection Time: 09/03/17  5:32 PM   Result Value Ref Range    Reticulocyte % 0.40 (L) 0.50 - 1.50 %   POC Glucose Fingerstick    Collection Time: 09/03/17  8:51 PM   Result Value Ref Range    Glucose 118 70 - 130 mg/dL   Vancomycin, Trough    Collection Time: 09/04/17  6:34 AM   Result Value Ref Range    Vancomycin Trough 18.80 5.00 - 20.00 mcg/mL   Vitamin B12    Collection Time: 09/04/17  6:35 AM   Result Value Ref Range    Vitamin B-12 1423 (H) 211 - 946 pg/mL   Folate    Collection Time: 09/04/17  6:35 AM   Result Value Ref Range    Folate 3.64 (L) 4.78 - 24.20 ng/mL   Iron Profile    Collection Time: 09/04/17  6:35 AM   Result Value Ref Range    Iron 13 (L) 37 - 145 mcg/dL    Iron Saturation 6 (L) 20 - 50 %    Transferrin 144 (L) 200 - 360 mg/dL    TIBC 215 mcg/dL      RADIOLOGY  Ct Chest With Contrast    Result Date: 8/27/2017  Narrative: CT SCANS OF THE CHEST, ABDOMEN, AND PELVIS WITH INTRAVENOUS CONTRAST  HISTORY: Recent hysterectomy. Fever and shortness of breath and abdominal pain.  The CT scans were performed as an emergency procedure with CT imaging through the chest, abdomen, and pelvis with intravenous contrast. The following findings are present: 1. There is some predominately strand-like atelectasis at both lung bases medially associated with tiny bilateral pleural effusions and the lungs are otherwise clear. There is no mediastinal or hilar or axillary adenopathy. Small to moderate-sized pericardial effusion measuring 1.9 cm in thickness and this shows enlargement since the preoperative CT scan of 06/18/2017. 2. There is a small hepatic cyst that is unchanged. The spleen, pancreas, both adrenal glands and right kidney are unremarkable. There is mild dilatation of the left renal collecting system including the left ureter which extends into the pelvis where there is prominent induration of the mesenteric fat planes associated with generalized wall thickening of the rectosigmoid colon and there is also an adjacent fluid collection to the left of the sigmoid colon that measures up to 5.5 x 6.8 cm. There is also generalized wall thickening of the urinary bladder. Some of these findings may relate to postoperative changes but are concerning for colitis. The fluid collection to the left of the sigmoid colon could potentially represent an area of developing abscess and the distended ureter extends to this region. The generalized thickening of the urinary bladder may be secondary to the adjacent inflammatory changes but clinical correlation for cystitis is also recommended.  These findings have been discussed with Dr. Quintero in the emergency room.  This report was finalized on 8/27/2017 4:37 PM by Dr. Dionte Barry MD.      Us Pelvis Complete    Result Date:  8/17/2017  Narrative: PELVIC ULTRASOUND  HISTORY: 61-year-old nursing home patient with vaginal bleeding.  The examination was performed as an emergency procedure. The patient refused transvaginal imaging. The uterus is enlarged and lobulated, measuring 9.8 x 9.8 x 13.7 cm and there are multiple fibroids measuring up to 7 cm in size. There is also generalized endometrial thickening with double wall thickness of 1.9 cm. The patient is postmenopausal and this does raise a concern of endometrial tumor and further clinical correlation is required.  The ovaries are not visualized. There is no free fluid identified.  This report was finalized on 8/17/2017 11:09 AM by Dr. Dionte Barry MD.      Ct Abdomen Pelvis With Contrast    Result Date: 9/1/2017  Narrative: POSTCONTRAST CT ABDOMEN AND PELVIS  HISTORY: 61-year-old female for follow-up of abscess with hypoactive bowel sounds and persistent abdominal pain.  COMPARISON: 08/27/2017  FINDINGS: 1. Imaging remains limited by body habitus associated upper extremity artifact, motion artifact. 2. Stable moderate pericardial effusion small bilateral pleural effusions and bibasilar atelectasis. 3. Cholelithiasis benign hepatic cyst bilateral renal cysts. 4. Left hydronephrosis and hydroureter extending to inflammatory change within the left pelvis. 4. Previous described pericolonic abscess 6.8 x 5.5 cm has developed an enhancing wall and shows no change in size. 5. Severe inflammatory change of the sigmoid colon diffusely. 6. Persistent thickening of the urinary bladder wall. 7. Additional findings as previously noted, no new abnormality.      Impression: 1. No significant interval change in size of pelvic abscess. 2. Other findings as described without other significant interval change including mild left hydronephrosis, urinary bladder wall thickening, marked thickening of the sigmoid colon extending to the rectum.  This report was finalized on 9/1/2017 7:32 PM by Dr. Adams  MD Leif.      Ct Abdomen Pelvis With Contrast    Result Date: 8/27/2017  Narrative: CT SCANS OF THE CHEST, ABDOMEN, AND PELVIS WITH INTRAVENOUS CONTRAST  HISTORY: Recent hysterectomy. Fever and shortness of breath and abdominal pain.  The CT scans were performed as an emergency procedure with CT imaging through the chest, abdomen, and pelvis with intravenous contrast. The following findings are present: 1. There is some predominately strand-like atelectasis at both lung bases medially associated with tiny bilateral pleural effusions and the lungs are otherwise clear. There is no mediastinal or hilar or axillary adenopathy. Small to moderate-sized pericardial effusion measuring 1.9 cm in thickness and this shows enlargement since the preoperative CT scan of 06/18/2017. 2. There is a small hepatic cyst that is unchanged. The spleen, pancreas, both adrenal glands and right kidney are unremarkable. There is mild dilatation of the left renal collecting system including the left ureter which extends into the pelvis where there is prominent induration of the mesenteric fat planes associated with generalized wall thickening of the rectosigmoid colon and there is also an adjacent fluid collection to the left of the sigmoid colon that measures up to 5.5 x 6.8 cm. There is also generalized wall thickening of the urinary bladder. Some of these findings may relate to postoperative changes but are concerning for colitis. The fluid collection to the left of the sigmoid colon could potentially represent an area of developing abscess and the distended ureter extends to this region. The generalized thickening of the urinary bladder may be secondary to the adjacent inflammatory changes but clinical correlation for cystitis is also recommended.  These findings have been discussed with Dr. Quintero in the emergency room.  This report was finalized on 8/27/2017 4:37 PM by Dr. Dionte Barry MD.      Xr Chest 1 View    Result Date:  8/27/2017  Narrative: XR CHEST 1 VW-  Clinical: Postoperative fever  COMPARISON 08/20/2017  FINDINGS: No acute airspace disease has developed. The cardiomediastinal silhouette is stable. No edema or effusion.  CONCLUSION: No acute cardiovascular or pulmonary process is demonstrated.  This report was finalized on 8/27/2017 1:00 PM by Dr. Kieran Mackay MD.      Xr Chest Pa & Lateral    Result Date: 8/20/2017  Narrative: PA AND LATERAL CHEST RADIOGRAPHS  HISTORY: 61-year-old female undergoing follow-up evaluation of chest infiltrates.  FINDINGS: Upright AP and lateral chest radiographs were obtained. Comparison is made to a prior examination dated 08/18/2017. There is mild bilateral basilar atelectasis with decreased lung volumes. Moderate atheromatous calcification is seen within the aortic arch. There is limited visualization on the lateral chest radiograph and the previously described pleural effusions cannot be substantiated on the current examination.      Impression: Persistent low lung volumes with bibasilar atelectasis. The previously described pleural effusions are not certainly identified on the current examination as the lateral chest radiograph is markedly limited. Little interval change has occurred on the PA chest radiograph when compared to the prior examination.  This report was finalized on 8/20/2017 4:35 PM by Dr. Darrin Christian MD.      Xr Chest Pa & Lateral    Result Date: 8/18/2017  Narrative: PA AND LATERAL CHEST  HISTORY: Cough.  PA and lateral views of the chest were obtained and compared to 08/15/2017.  FINDINGS:  The right internal jugular central line has been removed. The study is hampered by the patient's body habitus, patient positioning and inspiratory effort. There is bibasilar atelectasis/infiltrate more prominent on the left, similar to slightly less prominent as compared to prior examination. The pulmonary interstitial vascular prominence which was present previously is less  prominent on the current examination. On the lateral projection bilateral pleural effusions are appreciated, moderate in size and there is moderate posterior atelectasis/infiltrate bilaterally. The above information was called to patient's nurse who is to  relay the information to the clinical service.  This report was finalized on 8/18/2017 2:29 PM by Dr. Mario Sim MD.      Xr Chest Post Cva Port    Result Date: 8/15/2017  Narrative: CHEST POST CVA PORT  HISTORY: Morbidly obese 61-year-old female for right jugular line placement.  COMPARISON: 05/16/2017  FINDINGS: 1. Right jugular line projects over the superior vena cava. 2. Imaging is considerably limited by morbid obesity, low lung volumes, mandibular artifact obscuring detail of the right apex.  If patient has symptoms suggesting pneumothorax, follow-up lordotic imaging or standard imaging with removal mandibular artifact would be helpful.  This report was finalized on 8/15/2017 11:50 AM by Dr. Bryan Yadav MD.        Assessment/Plan:  HD 8:   1. SP CRISS/BSO on 08/15 for endometrial cancer. Advanced aggressive cancer. Should she recover from infection petition with courts would be necessary in making treatment care decisions.   2. Leukocytosis down (11.6 from 22), pelvic fluid collection not amenable for CT guided drainage secondary fluid collection deep in the pelvis adjacent to the sigmoid colon collection. Continue zosyn and vanc. Repeat CT: no change in size of pelvic abscess (looks more walled off now). Per ID: at discharge pt may be switched to po cipro with augmentin and diflucan and repeat CT scan in 2 weeks. Tmax: 98.9  3. Elen-cardial effusion, cardiology consulted. Echo with moderate effusion, no tamponade. No intervention planned  4. Anemia, hgb stable, likely neoplastic, acute on chronic disease. Post two units PRBC 08/29, continue ferrous sulfate.  5. AM CBC is pending  5. Disposition: to NH likely tomorrow or Thursday.    Daniela Esquivel  MD  9/4/2017 @ 9:08 AM

## 2017-09-04 NOTE — SIGNIFICANT NOTE
09/04/17 0938   Rehab Treatment   Discipline physical therapist   Rehab Evaluation   Evaluation Not Performed other (see comments)  (Pt not appropriate for skilled PT at this time, pt is total care, using a leonardo lift for transfers at NH. Discussed with RN, Danya. Will sign off.)

## 2017-09-04 NOTE — PROGRESS NOTES
DAILY PROGRESS NOTE  [facility], [facility]    2017    Patient Identification:  Name: Lupe Burleson  Age: 61 y.o.  Sex: female  :  1955  MRN: [id number]           Primary Care Physician: Shane Barcenas MD    Subjective:    Interval History:    Patient is feeling better today. Eating lunch.  Workup for anemia is back, iron level is 13 which is very low.  Hemoglobin is down to 6.9.  Occult Blood pending.    ROS:     No nausea, vomiting, diarrhea. Positive constipation. No CP or SOB.      Objective:    Scheduled Meds:    alteplase 2 mg Intravenous Once   alteplase 2 mg Intravenous Once   alteplase 2 mg Intravenous Once   amLODIPine 5 mg Oral Q24H   atorvastatin 20 mg Per G Tube Daily   bumetanide 2 mg Oral Daily   enoxaparin 40 mg Subcutaneous Q12H   ferrous sulfate 325 mg Oral Daily With Breakfast   fluconazole 200 mg Oral Q24H   hydrophor  Topical Q24H   isosorbide mononitrate 30 mg Per G Tube Daily   lactulose 20 g Oral Daily   pantoprazole 40 mg Oral Q AM   piperacillin-tazobactam 3.375 g Intravenous Q8H   potassium chloride 20 mEq Oral Daily   risperiDONE 2 mg Oral Daily   risperiDONE 3 mg Oral Nightly   sodium chloride 10 mL Intracatheter Q12H   Valproic Acid 250 mg Oral Q12H   vancomycin 1,500 mg Intravenous Q24H       Continuous Infusions:    dextrose 100 mL/hr Last Rate: 100 mL/hr (17 0634)   Pharmacy to dose vancomycin         PRN Meds:  •  acetaminophen  •  HYDROcodone-acetaminophen  •  HYDROcodone-acetaminophen  •  ipratropium-albuterol  •  LORazepam  •  Pharmacy to dose vancomycin  •  potassium chloride **OR** potassium chloride **OR** potassium chloride  •  Insert peripheral IV **AND** sodium chloride  •  sodium chloride    Intake/Output:    Intake/Output Summary (Last 24 hours) at 17 1206  Last data filed at 17 2216   Gross per 24 hour   Intake             2310 ml   Output                0 ml   Net             2310 ml  "        Exam:    tMax 24 hrs: Temp (24hrs), Av.8 °F (37.1 °C), Min:98.6 °F (37 °C), Max:98.9 °F (37.2 °C)    Vitals Ranges:   Temp:  [98.6 °F (37 °C)-98.9 °F (37.2 °C)] 98.9 °F (37.2 °C)  Heart Rate:  [78-91] 78  Resp:  [18-20] 18  BP: (119-156)/(55-69) 119/56    /56 (BP Location: Left arm, Patient Position: Lying)  Pulse 78  Temp 98.9 °F (37.2 °C) (Oral)   Resp 18  Ht 62.99\" (160 cm)  Wt 283 lb 14.4 oz (129 kg)  SpO2 100%  BMI 50.3 kg/m2    General: Alert, cooperative and appears stated age. In no acute distress  Neck: Supple, symmetrical, trachea midline, no adenopathy;              Thyroid without enlargement/tenderness/nodules;              no carotid bruit or JVD  Cardiovascular: Regular rate, regular rhythm and intact distal pulses.                              Exam reveals no gallop and no friction rub. No murmur heard  Chest wall: No tenderness or deformity  Pulmonary: Clear to auscultation bilaterally, respirations unlabored.                        No rhonchi, wheezing or rales.   Abdominal: Soft, non-tender; bowel sounds active                     Midline incision approximated, Staples now removed.   Extremities: Atraumatic, no cyanosis, chronic lymphedema bilaterally on LE, stable  Pulses:         2 + symmetric all extremities  Neurological: She is alert and oriented to person, place, and time.                           CNII-XII intact, normal strength, sensation intact throughout  Skin: Skin color, texture, turgor normal, stage 2 coccyx wound      Data Review:      Results from last 7 days  Lab Units 17  1131 17  0547 17  0606   WBC 10*3/mm3 7.61 11.63* 13.66*   HEMOGLOBIN g/dL 6.9* 7.2* 7.3*   HEMATOCRIT % 23.6* 24.5* 24.8*   PLATELETS 10*3/mm3 504* 530* 469         Results from last 7 days  Lab Units 17  0035 17  0547 17  0606 17  0437   SODIUM mmol/L  --  146* 147* 146*   POTASSIUM mmol/L 3.6 3.6 3.7 3.7   CHLORIDE mmol/L  --  108* 110* 112* "   CO2 mmol/L  --  29.7* 27.8 26.1   BUN mg/dL  --  8 8 7*   CREATININE mg/dL  --  0.58 0.61 0.49*   CALCIUM mg/dL  --  9.1 8.8 8.5*   GLUCOSE mg/dL  --  94 98 95             Lab Results  Lab Value Date/Time   TROPONINT 0.065 (H) 05/17/2017 0505   TROPONINT 0.077 (H) 05/16/2017 1517   TROPONINT 0.080 (H) 05/16/2017 1112   TROPONINT 0.078 (H) 05/16/2017 0703   TROPONINT 0.084 (H) 05/16/2017 0116   TROPONINT <0.010 05/15/2016 0622   TROPONINT 0.017 05/14/2016 0437   TROPONINT 0.023 05/13/2016 2036   TROPONINT 0.031 (H) 05/13/2016 1231   TROPONINT <0.01 02/03/2016 0537   TROPONINT <0.01 02/02/2016 0914   TROPONINT <0.01 11/11/2015 0516   TROPONINT <0.01 11/09/2015 2049   TROPONINT <0.01 04/18/2014 0517       Microbiology Results (last 10 days)     Procedure Component Value - Date/Time    Urine Culture [[id number]]  (Normal) Collected:  08/27/17 1230    Lab Status:  Final result Specimen:  Urine from Urine, Catheter Updated:  08/29/17 0725     Urine Culture No growth    Wound Culture [[id number]]  (Abnormal)  (Susceptibility) Collected:  08/27/17 1230    Lab Status:  Final result Specimen:  Wound from Buttock Updated:  08/30/17 0831     Wound Culture Moderate growth (3+) Pseudomonas aeruginosa (A)      Moderate growth (3+) Staphylococcus warneri (A)      Moderate growth (3+) Proteus mirabilis (A)      Light growth (2+) Klebsiella pneumoniae ssp pneumoniae (A)     Gram Stain Result Few (2+) WBCs seen      Rare (1+) Gram negative bacilli      Rare (1+) Gram positive cocci in pairs    Susceptibility      Pseudomonas aeruginosa     DEBI     Cefepime <=1 ug/ml Susceptible     Ceftazidime 2 ug/ml Susceptible     Ciprofloxacin <=0.25 ug/ml Susceptible     Levofloxacin 0.5 ug/ml Susceptible     Meropenem <=0.25 ug/ml Susceptible     Piperacillin + Tazobactam 8 ug/ml Susceptible     Tobramycin <=1 ug/ml Susceptible                Susceptibility      Staphylococcus warneri     DEBI     Clindamycin <=0.25 ug/ml Susceptible      Erythromycin >=8 ug/ml Resistant     Oxacillin <=0.25 ug/ml Susceptible     Penicillin G >=0.5 ug/ml Resistant     Rifampin <=0.5 ug/ml Susceptible     Tetracycline <=1 ug/ml Susceptible     Trimethoprim + Sulfamethoxazole <=10 ug/ml Susceptible     Vancomycin <=0.5 ug/ml Susceptible                Susceptibility      Proteus mirabilis     DEBI     Ampicillin <=2 ug/ml Susceptible     Ampicillin + Sulbactam <=2 ug/ml Susceptible     Cefazolin 8 ug/ml Susceptible     Cefepime <=1 ug/ml Susceptible     Cefoxitin 8 ug/ml Susceptible     Ceftriaxone <=1 ug/ml Susceptible     Ertapenem 1 ug/ml Susceptible     Gentamicin <=1 ug/ml Susceptible     Levofloxacin >=8 ug/ml Resistant     Meropenem 1 ug/ml Susceptible     Piperacillin + Tazobactam <=4 ug/ml Susceptible     Trimethoprim + Sulfamethoxazole >=320 ug/ml Resistant                Susceptibility      Klebsiella pneumoniae ssp pneumoniae     DEBI     Ampicillin >=32 ug/ml Resistant     Ampicillin + Sulbactam 8 ug/ml Susceptible     Cefazolin <=4 ug/ml Susceptible     Cefepime <=1 ug/ml Susceptible     Cefoxitin <=4 ug/ml Susceptible     Ceftriaxone <=1 ug/ml Susceptible     Ertapenem <=0.5 ug/ml Susceptible     Gentamicin <=1 ug/ml Susceptible     Levofloxacin <=0.12 ug/ml Susceptible     Meropenem <=0.25 ug/ml Susceptible     Piperacillin + Tazobactam 8 ug/ml Susceptible     Trimethoprim + Sulfamethoxazole <=20 ug/ml Susceptible                    Blood Culture [[id number]]  (Abnormal) Collected:  08/27/17 1228    Lab Status:  Final result Specimen:  Blood from Arm, Left Updated:  08/30/17 0722     Blood Culture Staphylococcus, coagulase negative (A)                 Staphylococcus, coagulase negative (A)                Gram Stain Result Aerobic Bottle Gram positive cocci      Anaerobic Bottle Gram positive cocci in clusters    Narrative:       Probable contaminants requires clinical correlation, susceptibility not performed unless requested by physician.    Blood Culture  ID, PCR [[id number]]  (Abnormal) Collected:  08/27/17 1228    Lab Status:  Final result Specimen:  Blood from Arm, Left Updated:  08/28/17 1223     BCID, PCR Staphylococcus spp, not aureus. Identification by BCID PCR. (A)           Imaging Results (last 7 days)     Procedure Component Value Units Date/Time    XR Chest 1 View [[id number]] Collected:  08/27/17 1259     Updated:  08/27/17 1303    Narrative:       XR CHEST 1 VW-     Clinical: Postoperative fever     COMPARISON 08/20/2017     FINDINGS: No acute airspace disease has developed. The cardiomediastinal  silhouette is stable. No edema or effusion.     CONCLUSION: No acute cardiovascular or pulmonary process is  demonstrated.     This report was finalized on 8/27/2017 1:00 PM by Dr. Kieran Mackay MD.       CT Abdomen Pelvis With Contrast [[id number]] Collected:  08/27/17 1620     Updated:  08/27/17 1640    Narrative:       CT SCANS OF THE CHEST, ABDOMEN, AND PELVIS WITH INTRAVENOUS CONTRAST     HISTORY: Recent hysterectomy. Fever and shortness of breath and  abdominal pain.     The CT scans were performed as an emergency procedure with CT imaging  through the chest, abdomen, and pelvis with intravenous contrast. The  following findings are present:  1. There is some predominately strand-like atelectasis at both lung  bases medially associated with tiny bilateral pleural effusions and the  lungs are otherwise clear. There is no mediastinal or hilar or axillary  adenopathy. Small to moderate-sized pericardial effusion measuring 1.9  cm in thickness and this shows enlargement since the preoperative CT  scan of 06/18/2017.  2. There is a small hepatic cyst that is unchanged. The spleen,  pancreas, both adrenal glands and right kidney are unremarkable. There  is mild dilatation of the left renal collecting system including the  left ureter which extends into the pelvis where there is prominent  induration of the mesenteric fat planes associated with generalized  wall  thickening of the rectosigmoid colon and there is also an adjacent fluid  collection to the left of the sigmoid colon that measures up to 5.5 x  6.8 cm. There is also generalized wall thickening of the urinary  bladder. Some of these findings may relate to postoperative changes but  are concerning for colitis. The fluid collection to the left of the  sigmoid colon could potentially represent an area of developing abscess  and the distended ureter extends to this region. The generalized  thickening of the urinary bladder may be secondary to the adjacent  inflammatory changes but clinical correlation for cystitis is also  recommended.     These findings have been discussed with Dr. Quintero in the emergency  room.     This report was finalized on 8/27/2017 4:37 PM by Dr. Dionte Barry MD.       CT Chest With Contrast [[id number]] Collected:  08/27/17 1620     Updated:  08/27/17 1640    Narrative:       CT SCANS OF THE CHEST, ABDOMEN, AND PELVIS WITH INTRAVENOUS CONTRAST     HISTORY: Recent hysterectomy. Fever and shortness of breath and  abdominal pain.     The CT scans were performed as an emergency procedure with CT imaging  through the chest, abdomen, and pelvis with intravenous contrast. The  following findings are present:  1. There is some predominately strand-like atelectasis at both lung  bases medially associated with tiny bilateral pleural effusions and the  lungs are otherwise clear. There is no mediastinal or hilar or axillary  adenopathy. Small to moderate-sized pericardial effusion measuring 1.9  cm in thickness and this shows enlargement since the preoperative CT  scan of 06/18/2017.  2. There is a small hepatic cyst that is unchanged. The spleen,  pancreas, both adrenal glands and right kidney are unremarkable. There  is mild dilatation of the left renal collecting system including the  left ureter which extends into the pelvis where there is prominent  induration of the mesenteric fat planes  associated with generalized wall  thickening of the rectosigmoid colon and there is also an adjacent fluid  collection to the left of the sigmoid colon that measures up to 5.5 x  6.8 cm. There is also generalized wall thickening of the urinary  bladder. Some of these findings may relate to postoperative changes but  are concerning for colitis. The fluid collection to the left of the  sigmoid colon could potentially represent an area of developing abscess  and the distended ureter extends to this region. The generalized  thickening of the urinary bladder may be secondary to the adjacent  inflammatory changes but clinical correlation for cystitis is also  recommended.     These findings have been discussed with Dr. Quintero in the emergency  room.     This report was finalized on 8/27/2017 4:37 PM by Dr. Dionte Barry MD.       CT Abdomen Pelvis With Contrast [[id number]] Collected:  09/01/17 1927     Updated:  09/01/17 1935    Narrative:       POSTCONTRAST CT ABDOMEN AND PELVIS     HISTORY: 61-year-old female for follow-up of abscess with hypoactive  bowel sounds and persistent abdominal pain.     COMPARISON: 08/27/2017     FINDINGS:  1. Imaging remains limited by body habitus associated upper extremity  artifact, motion artifact.  2. Stable moderate pericardial effusion small bilateral pleural  effusions and bibasilar atelectasis.  3. Cholelithiasis benign hepatic cyst bilateral renal cysts.  4. Left hydronephrosis and hydroureter extending to inflammatory change  within the left pelvis.  4. Previous described pericolonic abscess 6.8 x 5.5 cm has developed an  enhancing wall and shows no change in size.  5. Severe inflammatory change of the sigmoid colon diffusely.  6. Persistent thickening of the urinary bladder wall.  7. Additional findings as previously noted, no new abnormality.       Impression:       1. No significant interval change in size of pelvic abscess.  2. Other findings as described without other  significant interval change  including mild left hydronephrosis, urinary bladder wall thickening,  marked thickening of the sigmoid colon extending to the rectum.     This report was finalized on 9/1/2017 7:32 PM by Dr. Bryan Yadav MD.               Assessment:      Principal Problem:    Sepsis  Active Problems:    Diabetes mellitus    Hypertension    Abscess of abdominal cavity    Chronic diastolic CHF (congestive heart failure)    Morbid obesity    THAI (obstructive sleep apnea)    Schizo affective schizophrenia    Coronary artery disease    Chronic respiratory failure with hypoxia and hypercapnia    Lymphedema  Hypernatremia  Anemia, multifactorial, chronic disease, iron def, cancer, etc    Patient Active Problem List   Diagnosis Code   • Elevated troponin R79.89   • Aspiration pneumonia J69.0   • Hematuria R31.9   • Hypokalemia E87.6   • Pelvic mass in female R19.00   • Fecal impaction K56.41   • Endometrial carcinoma C54.1   • Acute on chronic respiratory failure with hypoxia and hypercapnia J96.21, J96.22   • Acute on chronic diastolic CHF (congestive heart failure) I50.33   • UTI (urinary tract infection) N39.0   • Leucocytosis D72.829   • Chronic diastolic CHF (congestive heart failure) I50.32   • DM (diabetes mellitus) E11.9   • Morbid obesity E66.01   • HTN (hypertension) I10   • Schizophrenia F20.9   • Tracheostomy malfunction J95.03   • Pyuria N39.0   • THAI (obstructive sleep apnea) G47.33   • Generalized weakness R53.1   • Schizo affective schizophrenia F25.0   • Diabetes mellitus E11.9   • Coronary artery disease I25.10   • Endometrial cancer determined by uterine biopsy C54.1, Z15.04   • Chronic respiratory failure with hypoxia and hypercapnia J96.11, J96.12   • Toxic metabolic encephalopathy G92   • Pneumonia J18.9   • Abnormal vaginal bleeding N93.9   • Schizo affective schizophrenia F25.0   • CHF (congestive heart failure) I50.9   • Endometrial cancer determined by uterine biopsy C54.1, Z15.04   •  Coronary artery disease I25.10   • Lymphedema I89.0   • Immobility Z74.09   • Acute blood loss anemia D62   • Vaginal bleeding N93.9   • Hypertension I10   • Uterine cancer C55   • Bipolar disorder, unspecified F31.9   • Sepsis A41.9   • Abscess of abdominal cavity K65.1       Plan:      Continue IV antibiotics, as per ID  Work up for anemia: iron deficiency, low folate: to replace  GI consult for possible EGD +- c-scope  Transfuse if symptomatic  Free water: D5W (hypernatremia)  Diet: Reg, mech soft.   Monitor and correct electrolytes  Monitor mental status  Continue Accuchecks and SSI  DVT prophylaxis, will hold Lovenox (pt may be bleeding) start SCD  Labs in am      Shane Barcenas MD  9/4/2017  12:06 PM

## 2017-09-04 NOTE — PLAN OF CARE
Problem: Patient Care Overview (Adult)  Goal: Plan of Care Review  Outcome: Ongoing (interventions implemented as appropriate)    09/03/17 0550 09/04/17 1338 09/04/17 2035   Coping/Psychosocial Response Interventions   Plan Of Care Reviewed With --  patient --    Patient Care Overview   Progress no change --  --    Outcome Evaluation   Outcome Summary/Follow up Plan --  --  vitals signs stable. remains on 4L o2. denies any pain today. patient recieving potassium runs for low potassium today. hgb 6.9- recieved dose of iv venofer. pressure ulcer cleaned and ointment applied. patient flat affect, will only answer about half of the questions you ask.         Problem: Skin Integrity Impairment, Risk/Actual (Adult)  Goal: Skin Integrity/Wound Healing  Outcome: Ongoing (interventions implemented as appropriate)    Problem: Sepsis (Adult)  Goal: Signs and Symptoms of Listed Potential Problems Will be Absent or Manageable (Sepsis)  Outcome: Ongoing (interventions implemented as appropriate)    Problem: Infection, Risk/Actual (Adult)  Goal: Infection Prevention/Resolution  Outcome: Ongoing (interventions implemented as appropriate)    Problem: Fall Risk (Adult)  Goal: Absence of Falls  Outcome: Ongoing (interventions implemented as appropriate)    Problem: Pain, Acute (Adult)  Goal: Acceptable Pain Control/Comfort Level  Outcome: Ongoing (interventions implemented as appropriate)  Goal: Identify Related Risk Factors and Signs and Symptoms  Outcome: Ongoing (interventions implemented as appropriate)  Goal: Acceptable Pain Control/Comfort Level  Outcome: Ongoing (interventions implemented as appropriate)    Problem: Confusion, Acute (Adult)  Goal: Cognitive/Functional Impairments Minimized  Outcome: Ongoing (interventions implemented as appropriate)  Goal: Safety  Outcome: Ongoing (interventions implemented as appropriate)    Problem: Wound, Traumatic, Nonburn (Adult)  Goal: Signs and Symptoms of Listed Potential Problems Will  be Absent or Manageable (Wound, Traumatic, Nonburn)  Outcome: Ongoing (interventions implemented as appropriate)    Problem: Respiratory Insufficiency (Adult)  Goal: Acid/Base Balance  Outcome: Ongoing (interventions implemented as appropriate)  Goal: Effective Ventilation  Outcome: Ongoing (interventions implemented as appropriate)

## 2017-09-04 NOTE — PROGRESS NOTES
"Pharmacokinetic Evaluation - Vancomycin    Lupe Burleson is a 61 y.o. female on vancomycin pharmacy to dose.  MRN: [id number]  : 1955    Day of vancomycin therapy: 9  Indication: sepsis  Consulted by: Dr. Barcenas  Goal trough: 15-20mg/L    Current dose: 1500mg q24h  Other antimicrobials: zosyn 3.375 q8h, fluconazole 200mg PO q24h    Blood pressure 119/56, pulse 78, temperature 98.9 °F (37.2 °C), temperature source Oral, resp. rate 18, height 62.99\" (160 cm), weight 283 lb 14.4 oz (129 kg), SpO2 100 %, not currently breastfeeding.    Results from last 7 days  Lab Units 17  0617  0437   CREATININE mg/dL 0.58 0.61 0.49*     Estimated Creatinine Clearance: 130.1 mL/min (by C-G formula based on Cr of 0.58).    Results from last 7 days  Lab Units 17  0617  0437   WBC 10*3/mm3 11.63* 13.66* 13.48*   HEMOGLOBIN g/dL 7.2* 7.3* 7.5*   HEMATOCRIT % 24.5* 24.8* 25.2*   PLATELETS 10*3/mm3 530* 469 530*         Cultures:    Wcx: pseudomonas aeruginosa, staph warneri, proteus, K pneumoniae   Bcx: CNS    Vancomycin dosing history:    1553 vancomycin 2250 mg once the 1500 mg IV Q12H   5 Vt=16.9, drawn 11 hr after the last dose, prior to the 5th dose. Continue vanc 1500 mg Q12H.    110 Vt=27.6, drawn 12.5 hr after the last dose, prior to the 10th dose. Decrease vanc 1500 mg Q24H for predicted tr of ~15.   9/4 0634 trough=18.8mg/L however last dose was ~ 18 hrs prior (drawn ~ 6 hrs early); post 2 doses of the new regimen of q24h    Assessment:  Level is within goal range however drawn 6 hrs early. I do expect though it to remain therapeutic if it had been drawn on time. Renal function has been stable, last Scr was on  so will plan to check in am. Patient is producing urine however amounts are not documented    Plan:  1) continue vancomycin 1500 mg every 24 hours.  2) Monitor for UOP and SCr. Scr in am .    Madi Colorado RPH    "

## 2017-09-04 NOTE — PLAN OF CARE
Problem: Patient Care Overview (Adult)  Goal: Plan of Care Review  Outcome: Ongoing (interventions implemented as appropriate)    09/04/17 5482   Coping/Psychosocial Response Interventions   Plan Of Care Reviewed With patient   Outcome Evaluation   Outcome Summary/Follow up Plan VSS and on 4 Liters NC. Turn Q2H. Slept the majority of the night with some c/o pain. Pressure ulcer cleaned, ointment applied, and dressing changed. Peg and PICC flushed. Patient more talkative then previous night. Will continue to monitor.

## 2017-09-05 ENCOUNTER — INPATIENT HOSPITAL (OUTPATIENT)
Dept: URBAN - METROPOLITAN AREA HOSPITAL 113 | Facility: HOSPITAL | Age: 62
End: 2017-09-05

## 2017-09-05 DIAGNOSIS — R93.3 ABNORMAL FINDINGS ON DIAGNOSTIC IMAGING OF OTHER PARTS OF DI: ICD-10-CM

## 2017-09-05 DIAGNOSIS — K63.0 ABSCESS OF INTESTINE: ICD-10-CM

## 2017-09-05 DIAGNOSIS — D50.0 IRON DEFICIENCY ANEMIA SECONDARY TO BLOOD LOSS (CHRONIC): ICD-10-CM

## 2017-09-05 LAB
ANION GAP SERPL CALCULATED.3IONS-SCNC: 7.6 MMOL/L
BASOPHILS # BLD AUTO: 0.02 10*3/MM3 (ref 0–0.2)
BASOPHILS NFR BLD AUTO: 0.3 % (ref 0–1.5)
BUN BLD-MCNC: 7 MG/DL (ref 8–23)
BUN/CREAT SERPL: 10.3 (ref 7–25)
CALCIUM SPEC-SCNC: 8.6 MG/DL (ref 8.6–10.5)
CHLORIDE SERPL-SCNC: 105 MMOL/L (ref 98–107)
CO2 SERPL-SCNC: 32.4 MMOL/L (ref 22–29)
CREAT BLD-MCNC: 0.68 MG/DL (ref 0.57–1)
DEPRECATED RDW RBC AUTO: 64.1 FL (ref 37–54)
EOSINOPHIL # BLD AUTO: 0.16 10*3/MM3 (ref 0–0.7)
EOSINOPHIL NFR BLD AUTO: 2.2 % (ref 0.3–6.2)
ERYTHROCYTE [DISTWIDTH] IN BLOOD BY AUTOMATED COUNT: 24.7 % (ref 11.7–13)
GFR SERPL CREATININE-BSD FRML MDRD: 106 ML/MIN/1.73
GLUCOSE BLD-MCNC: 112 MG/DL (ref 65–99)
GLUCOSE BLDC GLUCOMTR-MCNC: 103 MG/DL (ref 70–130)
GLUCOSE BLDC GLUCOMTR-MCNC: 109 MG/DL (ref 70–130)
GLUCOSE BLDC GLUCOMTR-MCNC: 119 MG/DL (ref 70–130)
GLUCOSE BLDC GLUCOMTR-MCNC: 122 MG/DL (ref 70–130)
HCT VFR BLD AUTO: 23.6 % (ref 35.6–45.5)
HEMOCCULT STL QL: NEGATIVE
HGB BLD-MCNC: 7.1 G/DL (ref 11.9–15.5)
IMM GRANULOCYTES # BLD: 0.07 10*3/MM3 (ref 0–0.03)
IMM GRANULOCYTES NFR BLD: 1 % (ref 0–0.5)
LYMPHOCYTES # BLD AUTO: 0.83 10*3/MM3 (ref 0.9–4.8)
LYMPHOCYTES NFR BLD AUTO: 11.6 % (ref 19.6–45.3)
MCH RBC QN AUTO: 22 PG (ref 26.9–32)
MCHC RBC AUTO-ENTMCNC: 30.1 G/DL (ref 32.4–36.3)
MCV RBC AUTO: 73.3 FL (ref 80.5–98.2)
MONOCYTES # BLD AUTO: 0.79 10*3/MM3 (ref 0.2–1.2)
MONOCYTES NFR BLD AUTO: 11.1 % (ref 5–12)
NEUTROPHILS # BLD AUTO: 5.27 10*3/MM3 (ref 1.9–8.1)
NEUTROPHILS NFR BLD AUTO: 73.8 % (ref 42.7–76)
PLATELET # BLD AUTO: 506 10*3/MM3 (ref 140–500)
PMV BLD AUTO: 9.5 FL (ref 6–12)
POTASSIUM BLD-SCNC: 3.5 MMOL/L (ref 3.5–5.2)
POTASSIUM BLD-SCNC: 4 MMOL/L (ref 3.5–5.2)
RBC # BLD AUTO: 3.22 10*6/MM3 (ref 3.9–5.2)
SODIUM BLD-SCNC: 145 MMOL/L (ref 136–145)
WBC NRBC COR # BLD: 7.14 10*3/MM3 (ref 4.5–10.7)

## 2017-09-05 PROCEDURE — 80048 BASIC METABOLIC PNL TOTAL CA: CPT | Performed by: INTERNAL MEDICINE

## 2017-09-05 PROCEDURE — 82272 OCCULT BLD FECES 1-3 TESTS: CPT | Performed by: INTERNAL MEDICINE

## 2017-09-05 PROCEDURE — 82962 GLUCOSE BLOOD TEST: CPT

## 2017-09-05 PROCEDURE — 25010000003 POTASSIUM CHLORIDE 10 MEQ/100ML SOLUTION: Performed by: INTERNAL MEDICINE

## 2017-09-05 PROCEDURE — 84132 ASSAY OF SERUM POTASSIUM: CPT | Performed by: INTERNAL MEDICINE

## 2017-09-05 PROCEDURE — 25010000002 VANCOMYCIN 10 G RECONSTITUTED SOLUTION: Performed by: INTERNAL MEDICINE

## 2017-09-05 PROCEDURE — 99222 1ST HOSP IP/OBS MODERATE 55: CPT | Performed by: INTERNAL MEDICINE

## 2017-09-05 PROCEDURE — 85025 COMPLETE CBC W/AUTO DIFF WBC: CPT | Performed by: INTERNAL MEDICINE

## 2017-09-05 PROCEDURE — 25010000002 PIPERACILLIN SOD-TAZOBACTAM PER 1 G: Performed by: OBSTETRICS & GYNECOLOGY

## 2017-09-05 RX ORDER — POLYETHYLENE GLYCOL 3350 17 G/17G
17 POWDER, FOR SOLUTION ORAL DAILY
Status: DISCONTINUED | OUTPATIENT
Start: 2017-09-05 | End: 2017-09-08 | Stop reason: HOSPADM

## 2017-09-05 RX ORDER — FERROUS SULFATE 325(65) MG
325 TABLET ORAL
Status: DISCONTINUED | OUTPATIENT
Start: 2017-09-06 | End: 2017-09-08 | Stop reason: HOSPADM

## 2017-09-05 RX ADMIN — Medication 10 ML: at 10:23

## 2017-09-05 RX ADMIN — POLYETHYLENE GLYCOL 3350 17 G: 17 POWDER, FOR SOLUTION ORAL at 08:54

## 2017-09-05 RX ADMIN — POTASSIUM CHLORIDE 10 MEQ: 10 INJECTION, SOLUTION INTRAVENOUS at 10:24

## 2017-09-05 RX ADMIN — POTASSIUM CHLORIDE 10 MEQ: 10 INJECTION, SOLUTION INTRAVENOUS at 14:41

## 2017-09-05 RX ADMIN — BUMETANIDE 2 MG: 2 TABLET ORAL at 08:53

## 2017-09-05 RX ADMIN — Medication 10 ML: at 20:08

## 2017-09-05 RX ADMIN — AMLODIPINE BESYLATE 5 MG: 5 TABLET ORAL at 08:53

## 2017-09-05 RX ADMIN — PETROLATUM: 42 OINTMENT TOPICAL at 08:55

## 2017-09-05 RX ADMIN — TAZOBACTAM SODIUM AND PIPERACILLIN SODIUM 3.38 G: 375; 3 INJECTION, SOLUTION INTRAVENOUS at 08:53

## 2017-09-05 RX ADMIN — POTASSIUM CHLORIDE 10 MEQ: 10 INJECTION, SOLUTION INTRAVENOUS at 12:31

## 2017-09-05 RX ADMIN — HYDROCODONE BITARTRATE AND ACETAMINOPHEN 1 TABLET: 5; 325 TABLET ORAL at 02:15

## 2017-09-05 RX ADMIN — POTASSIUM CHLORIDE 10 MEQ: 10 INJECTION, SOLUTION INTRAVENOUS at 08:53

## 2017-09-05 RX ADMIN — VALPROIC ACID 250 MG: 250 SOLUTION ORAL at 20:05

## 2017-09-05 RX ADMIN — FLUCONAZOLE 200 MG: 200 TABLET ORAL at 19:01

## 2017-09-05 RX ADMIN — DEXTROSE MONOHYDRATE 100 ML/HR: 50 INJECTION, SOLUTION INTRAVENOUS at 04:26

## 2017-09-05 RX ADMIN — TAZOBACTAM SODIUM AND PIPERACILLIN SODIUM 3.38 G: 375; 3 INJECTION, SOLUTION INTRAVENOUS at 14:41

## 2017-09-05 RX ADMIN — ATORVASTATIN CALCIUM 20 MG: 20 TABLET, FILM COATED ORAL at 08:53

## 2017-09-05 RX ADMIN — RISPERIDONE 2 MG: 2 TABLET, FILM COATED ORAL at 08:53

## 2017-09-05 RX ADMIN — RISPERIDONE 3 MG: 3 TABLET ORAL at 20:05

## 2017-09-05 RX ADMIN — TAZOBACTAM SODIUM AND PIPERACILLIN SODIUM 3.38 G: 375; 3 INJECTION, SOLUTION INTRAVENOUS at 22:12

## 2017-09-05 RX ADMIN — VANCOMYCIN HYDROCHLORIDE 1500 MG: 1 INJECTION, POWDER, LYOPHILIZED, FOR SOLUTION INTRAVENOUS at 12:31

## 2017-09-05 RX ADMIN — Medication 10 ML: at 20:07

## 2017-09-05 RX ADMIN — VALPROIC ACID 250 MG: 250 SOLUTION ORAL at 08:53

## 2017-09-05 RX ADMIN — HYDROCODONE BITARTRATE AND ACETAMINOPHEN 1 TABLET: 7.5; 325 TABLET ORAL at 09:14

## 2017-09-05 RX ADMIN — POTASSIUM CHLORIDE 20 MEQ: 750 CAPSULE, EXTENDED RELEASE ORAL at 08:53

## 2017-09-05 RX ADMIN — ISOSORBIDE MONONITRATE 30 MG: 10 TABLET ORAL at 08:53

## 2017-09-05 RX ADMIN — FOLIC ACID 1 MG: 1 TABLET ORAL at 08:53

## 2017-09-05 RX ADMIN — PANTOPRAZOLE SODIUM 40 MG: 40 TABLET, DELAYED RELEASE ORAL at 05:30

## 2017-09-05 RX ADMIN — DEXTROSE MONOHYDRATE 100 ML/HR: 50 INJECTION, SOLUTION INTRAVENOUS at 15:15

## 2017-09-05 NOTE — PROGRESS NOTES
DAILY PROGRESS NOTE  [facility], [facility]    2017    Patient Identification:  Name: Lupe Burleson  Age: 61 y.o.  Sex: female  :  1955  MRN: [id number]           Primary Care Physician: Shane Barcenas MD    Subjective:    Interval History:  Ms. Burleson is awake and alert this morning, concerned that she will not be able to go home to NH. Reassured. She denies N/V/D, but still has had only one small BM since admission. She is eating well. She denies CP, SOA, and continues with PO norco. GI consulting due to heme positive stool and anemia. She is on day 8 of vancomycin and zosyn.     Objective:    Scheduled Meds:    alteplase 2 mg Intravenous Once   alteplase 2 mg Intravenous Once   alteplase 2 mg Intravenous Once   amLODIPine 5 mg Oral Q24H   atorvastatin 20 mg Per G Tube Daily   bumetanide 2 mg Oral Daily   [START ON 2017] ferrous sulfate 325 mg Oral Daily With Breakfast   fluconazole 200 mg Oral Q24H   folic acid 1 mg Oral Daily   hydrophor  Topical Q24H   isosorbide mononitrate 30 mg Per G Tube Daily   lactulose 20 g Oral Daily   pantoprazole 40 mg Oral Q AM   piperacillin-tazobactam 3.375 g Intravenous Q8H   polyethylene glycol 17 g Oral Daily   potassium chloride 20 mEq Oral Daily   risperiDONE 2 mg Oral Daily   risperiDONE 3 mg Oral Nightly   sodium chloride 10 mL Intracatheter Q12H   Valproic Acid 250 mg Oral Q12H   vancomycin 1,500 mg Intravenous Q24H       Continuous Infusions:    dextrose 100 mL/hr Last Rate: 100 mL/hr (17 0426)   Pharmacy to dose vancomycin         PRN Meds:  •  acetaminophen  •  HYDROcodone-acetaminophen  •  HYDROcodone-acetaminophen  •  ipratropium-albuterol  •  LORazepam  •  Pharmacy to dose vancomycin  •  potassium chloride **OR** potassium chloride **OR** potassium chloride  •  Insert peripheral IV **AND** sodium chloride  •  sodium chloride    Intake/Output:    Intake/Output Summary (Last 24 hours) at 17  "1452  Last data filed at 17 0426   Gross per 24 hour   Intake             1300 ml   Output                0 ml   Net             1300 ml         Exam:    tMax 24 hrs: Temp (24hrs), Av.5 °F (36.4 °C), Min:96 °F (35.6 °C), Max:98.6 °F (37 °C)    Vitals Ranges:   Temp:  [96 °F (35.6 °C)-98.6 °F (37 °C)] 96 °F (35.6 °C)  Heart Rate:  [60-86] 73  Resp:  [20] 20  BP: (124-137)/(54-83) 128/54    /54 (BP Location: Left arm, Patient Position: Lying)  Pulse 73  Temp 96 °F (35.6 °C) (Oral)   Resp 20  Ht 62.99\" (160 cm)  Wt 284 lb (129 kg)  SpO2 98%  BMI 50.32 kg/m2    General: Alert, cooperative and appears stated age. In no acute distress  Neck: Supple, symmetrical, trachea midline, no adenopathy;              Thyroid without enlargement/tenderness/nodules;              no carotid bruit or JVD  Cardiovascular: Regular rate, regular rhythm and intact distal pulses.                              Exam reveals no gallop and no friction rub. No murmur heard  Chest wall: No tenderness or deformity  Pulmonary: diminished bilateral bases, respirations unlabored.                        No rhonchi, wheezing or rales.   Abdominal: Soft, non-tender; bowel sounds active                     Midline incision approximated, Staples now removed.   Extremities: Atraumatic, no cyanosis, chronic lymphedema bilaterally on LE, stable  Pulses:         2 + symmetric all extremities  Neurological: She is alert and oriented to person, place, and time.                           CNII-XII intact, normal strength, sensation intact throughout  Skin: Skin color, texture, turgor normal, stage 2 coccyx wound      Data Review:      Results from last 7 days  Lab Units 17  0524 17  1131 17  0547   WBC 10*3/mm3 7.14 7.61 11.63*   HEMOGLOBIN g/dL 7.1* 6.9* 7.2*   HEMATOCRIT % 23.6* 23.6* 24.5*   PLATELETS 10*3/mm3 506* 504* 530*         Results from last 7 days  Lab Units 17  0524 17  0634 17  0035 " 09/02/17  0547   SODIUM mmol/L 145 147*  --  146*   POTASSIUM mmol/L 3.5 3.5 3.6 3.6   CHLORIDE mmol/L 105 107  --  108*   CO2 mmol/L 32.4* 29.5*  --  29.7*   BUN mg/dL 7* 7*  --  8   CREATININE mg/dL 0.68 0.67  --  0.58   CALCIUM mg/dL 8.6 9.0  --  9.1   GLUCOSE mg/dL 112* 86  --  94             Lab Results  Lab Value Date/Time   TROPONINT 0.065 (H) 05/17/2017 0505   TROPONINT 0.077 (H) 05/16/2017 1517   TROPONINT 0.080 (H) 05/16/2017 1112   TROPONINT 0.078 (H) 05/16/2017 0703   TROPONINT 0.084 (H) 05/16/2017 0116   TROPONINT <0.010 05/15/2016 0622   TROPONINT 0.017 05/14/2016 0437   TROPONINT 0.023 05/13/2016 2036   TROPONINT 0.031 (H) 05/13/2016 1231   TROPONINT <0.01 02/03/2016 0537   TROPONINT <0.01 02/02/2016 0914   TROPONINT <0.01 11/11/2015 0516   TROPONINT <0.01 11/09/2015 2049   TROPONINT <0.01 04/18/2014 0517       Microbiology Results (last 10 days)     Procedure Component Value - Date/Time    Urine Culture [[id number]]  (Normal) Collected:  08/27/17 1230    Lab Status:  Final result Specimen:  Urine from Urine, Catheter Updated:  08/29/17 0725     Urine Culture No growth    Wound Culture [[id number]]  (Abnormal)  (Susceptibility) Collected:  08/27/17 1230    Lab Status:  Final result Specimen:  Wound from Buttock Updated:  08/30/17 0831     Wound Culture Moderate growth (3+) Pseudomonas aeruginosa (A)      Moderate growth (3+) Staphylococcus warneri (A)      Moderate growth (3+) Proteus mirabilis (A)      Light growth (2+) Klebsiella pneumoniae ssp pneumoniae (A)     Gram Stain Result Few (2+) WBCs seen      Rare (1+) Gram negative bacilli      Rare (1+) Gram positive cocci in pairs    Susceptibility      Pseudomonas aeruginosa     DEBI     Cefepime <=1 ug/ml Susceptible     Ceftazidime 2 ug/ml Susceptible     Ciprofloxacin <=0.25 ug/ml Susceptible     Levofloxacin 0.5 ug/ml Susceptible     Meropenem <=0.25 ug/ml Susceptible     Piperacillin + Tazobactam 8 ug/ml Susceptible     Tobramycin <=1 ug/ml  Susceptible                Susceptibility      Staphylococcus warneri     DEBI     Clindamycin <=0.25 ug/ml Susceptible     Erythromycin >=8 ug/ml Resistant     Oxacillin <=0.25 ug/ml Susceptible     Penicillin G >=0.5 ug/ml Resistant     Rifampin <=0.5 ug/ml Susceptible     Tetracycline <=1 ug/ml Susceptible     Trimethoprim + Sulfamethoxazole <=10 ug/ml Susceptible     Vancomycin <=0.5 ug/ml Susceptible                Susceptibility      Proteus mirabilis     DEBI     Ampicillin <=2 ug/ml Susceptible     Ampicillin + Sulbactam <=2 ug/ml Susceptible     Cefazolin 8 ug/ml Susceptible     Cefepime <=1 ug/ml Susceptible     Cefoxitin 8 ug/ml Susceptible     Ceftriaxone <=1 ug/ml Susceptible     Ertapenem 1 ug/ml Susceptible     Gentamicin <=1 ug/ml Susceptible     Levofloxacin >=8 ug/ml Resistant     Meropenem 1 ug/ml Susceptible     Piperacillin + Tazobactam <=4 ug/ml Susceptible     Trimethoprim + Sulfamethoxazole >=320 ug/ml Resistant                Susceptibility      Klebsiella pneumoniae ssp pneumoniae     DEBI     Ampicillin >=32 ug/ml Resistant     Ampicillin + Sulbactam 8 ug/ml Susceptible     Cefazolin <=4 ug/ml Susceptible     Cefepime <=1 ug/ml Susceptible     Cefoxitin <=4 ug/ml Susceptible     Ceftriaxone <=1 ug/ml Susceptible     Ertapenem <=0.5 ug/ml Susceptible     Gentamicin <=1 ug/ml Susceptible     Levofloxacin <=0.12 ug/ml Susceptible     Meropenem <=0.25 ug/ml Susceptible     Piperacillin + Tazobactam 8 ug/ml Susceptible     Trimethoprim + Sulfamethoxazole <=20 ug/ml Susceptible                    Blood Culture [[id number]]  (Abnormal) Collected:  08/27/17 1228    Lab Status:  Final result Specimen:  Blood from Arm, Left Updated:  08/30/17 0722     Blood Culture Staphylococcus, coagulase negative (A)                 Staphylococcus, coagulase negative (A)                Gram Stain Result Aerobic Bottle Gram positive cocci      Anaerobic Bottle Gram positive cocci in clusters    Narrative:        Probable contaminants requires clinical correlation, susceptibility not performed unless requested by physician.    Blood Culture ID, PCR [[id number]]  (Abnormal) Collected:  08/27/17 1228    Lab Status:  Final result Specimen:  Blood from Arm, Left Updated:  08/28/17 1223     BCID, PCR Staphylococcus spp, not aureus. Identification by BCID PCR. (A)           Imaging Results (last 7 days)     Procedure Component Value Units Date/Time    XR Chest 1 View [[id number]] Collected:  08/27/17 1259     Updated:  08/27/17 1303    Narrative:       XR CHEST 1 VW-     Clinical: Postoperative fever     COMPARISON 08/20/2017     FINDINGS: No acute airspace disease has developed. The cardiomediastinal  silhouette is stable. No edema or effusion.     CONCLUSION: No acute cardiovascular or pulmonary process is  demonstrated.     This report was finalized on 8/27/2017 1:00 PM by Dr. Kieran Mackay MD.       CT Abdomen Pelvis With Contrast [[id number]] Collected:  08/27/17 1620     Updated:  08/27/17 1640    Narrative:       CT SCANS OF THE CHEST, ABDOMEN, AND PELVIS WITH INTRAVENOUS CONTRAST     HISTORY: Recent hysterectomy. Fever and shortness of breath and  abdominal pain.     The CT scans were performed as an emergency procedure with CT imaging  through the chest, abdomen, and pelvis with intravenous contrast. The  following findings are present:  1. There is some predominately strand-like atelectasis at both lung  bases medially associated with tiny bilateral pleural effusions and the  lungs are otherwise clear. There is no mediastinal or hilar or axillary  adenopathy. Small to moderate-sized pericardial effusion measuring 1.9  cm in thickness and this shows enlargement since the preoperative CT  scan of 06/18/2017.  2. There is a small hepatic cyst that is unchanged. The spleen,  pancreas, both adrenal glands and right kidney are unremarkable. There  is mild dilatation of the left renal collecting system including the  left ureter  which extends into the pelvis where there is prominent  induration of the mesenteric fat planes associated with generalized wall  thickening of the rectosigmoid colon and there is also an adjacent fluid  collection to the left of the sigmoid colon that measures up to 5.5 x  6.8 cm. There is also generalized wall thickening of the urinary  bladder. Some of these findings may relate to postoperative changes but  are concerning for colitis. The fluid collection to the left of the  sigmoid colon could potentially represent an area of developing abscess  and the distended ureter extends to this region. The generalized  thickening of the urinary bladder may be secondary to the adjacent  inflammatory changes but clinical correlation for cystitis is also  recommended.     These findings have been discussed with Dr. Quintero in the emergency  room.     This report was finalized on 8/27/2017 4:37 PM by Dr. Dionte Barry MD.       CT Chest With Contrast [[id number]] Collected:  08/27/17 1620     Updated:  08/27/17 1640    Narrative:       CT SCANS OF THE CHEST, ABDOMEN, AND PELVIS WITH INTRAVENOUS CONTRAST     HISTORY: Recent hysterectomy. Fever and shortness of breath and  abdominal pain.     The CT scans were performed as an emergency procedure with CT imaging  through the chest, abdomen, and pelvis with intravenous contrast. The  following findings are present:  1. There is some predominately strand-like atelectasis at both lung  bases medially associated with tiny bilateral pleural effusions and the  lungs are otherwise clear. There is no mediastinal or hilar or axillary  adenopathy. Small to moderate-sized pericardial effusion measuring 1.9  cm in thickness and this shows enlargement since the preoperative CT  scan of 06/18/2017.  2. There is a small hepatic cyst that is unchanged. The spleen,  pancreas, both adrenal glands and right kidney are unremarkable. There  is mild dilatation of the left renal collecting system  including the  left ureter which extends into the pelvis where there is prominent  induration of the mesenteric fat planes associated with generalized wall  thickening of the rectosigmoid colon and there is also an adjacent fluid  collection to the left of the sigmoid colon that measures up to 5.5 x  6.8 cm. There is also generalized wall thickening of the urinary  bladder. Some of these findings may relate to postoperative changes but  are concerning for colitis. The fluid collection to the left of the  sigmoid colon could potentially represent an area of developing abscess  and the distended ureter extends to this region. The generalized  thickening of the urinary bladder may be secondary to the adjacent  inflammatory changes but clinical correlation for cystitis is also  recommended.     These findings have been discussed with Dr. Quintero in the emergency  room.     This report was finalized on 8/27/2017 4:37 PM by Dr. Dionte Barry MD.       CT Abdomen Pelvis With Contrast [[id number]] Collected:  09/01/17 1927     Updated:  09/01/17 1935    Narrative:       POSTCONTRAST CT ABDOMEN AND PELVIS     HISTORY: 61-year-old female for follow-up of abscess with hypoactive  bowel sounds and persistent abdominal pain.     COMPARISON: 08/27/2017     FINDINGS:  1. Imaging remains limited by body habitus associated upper extremity  artifact, motion artifact.  2. Stable moderate pericardial effusion small bilateral pleural  effusions and bibasilar atelectasis.  3. Cholelithiasis benign hepatic cyst bilateral renal cysts.  4. Left hydronephrosis and hydroureter extending to inflammatory change  within the left pelvis.  4. Previous described pericolonic abscess 6.8 x 5.5 cm has developed an  enhancing wall and shows no change in size.  5. Severe inflammatory change of the sigmoid colon diffusely.  6. Persistent thickening of the urinary bladder wall.  7. Additional findings as previously noted, no new abnormality.        Impression:       1. No significant interval change in size of pelvic abscess.  2. Other findings as described without other significant interval change  including mild left hydronephrosis, urinary bladder wall thickening,  marked thickening of the sigmoid colon extending to the rectum.     This report was finalized on 9/1/2017 7:32 PM by Dr. Bryan Yadav MD.               Assessment:      Principal Problem:    Sepsis  Active Problems:    Diabetes mellitus    Hypertension    Abscess of abdominal cavity    Chronic diastolic CHF (congestive heart failure)    Morbid obesity    THAI (obstructive sleep apnea)    Schizo affective schizophrenia    Coronary artery disease    Chronic respiratory failure with hypoxia and hypercapnia    Lymphedema  Hypernatremia  Anemia, multifactorial, chronic disease, iron def, cancer, etc    Patient Active Problem List   Diagnosis Code   • Elevated troponin R79.89   • Aspiration pneumonia J69.0   • Hematuria R31.9   • Hypokalemia E87.6   • Pelvic mass in female R19.00   • Fecal impaction K56.41   • Endometrial carcinoma C54.1   • Acute on chronic respiratory failure with hypoxia and hypercapnia J96.21, J96.22   • Acute on chronic diastolic CHF (congestive heart failure) I50.33   • UTI (urinary tract infection) N39.0   • Leucocytosis D72.829   • Chronic diastolic CHF (congestive heart failure) I50.32   • DM (diabetes mellitus) E11.9   • Morbid obesity E66.01   • HTN (hypertension) I10   • Schizophrenia F20.9   • Tracheostomy malfunction J95.03   • Pyuria N39.0   • THAI (obstructive sleep apnea) G47.33   • Generalized weakness R53.1   • Schizo affective schizophrenia F25.0   • Diabetes mellitus E11.9   • Coronary artery disease I25.10   • Endometrial cancer determined by uterine biopsy C54.1, Z15.04   • Chronic respiratory failure with hypoxia and hypercapnia J96.11, J96.12   • Toxic metabolic encephalopathy G92   • Pneumonia J18.9   • Abnormal vaginal bleeding N93.9   • Schizo affective  schizophrenia F25.0   • CHF (congestive heart failure) I50.9   • Endometrial cancer determined by uterine biopsy C54.1, Z15.04   • Coronary artery disease I25.10   • Lymphedema I89.0   • Immobility Z74.09   • Acute blood loss anemia D62   • Vaginal bleeding N93.9   • Hypertension I10   • Uterine cancer C55   • Bipolar disorder, unspecified F31.9   • Sepsis A41.9   • Abscess of abdominal cavity K65.1       Plan:      Continue IV antibiotics, as per ID  Transfuse if symptomatic  DC IV fluids  Diet: Reg, mech soft.   Monitor and correct electrolytes  Monitor mental status  Continue Accuchecks and SSI  DVT prophylaxis, will hold Lovenox (pt may be bleeding) start SCD  Await final GI recommendations  Labs in am      Shane Barcenas MD  9/5/2017  2:52 PM

## 2017-09-05 NOTE — CONSULTS
Inpatient Consult to Gastroenterology  Consult performed by: HAMMAD YOUNG  Consult ordered by: SHANE BARCENAS          Patient Care Team:  Shane Barcenas MD as PCP - General  Shane Barcenas MD as PCP - Family Medicine    Chief complaint: anemia    Subjective     History of Present Illness  Patient admitted with kelli colonic abscess.  She also has iron deficiency anemia as well. She did have a uterine malignancy and had a relatively recent hysterectomy.  She is a vague historian at best.  She denies any issues with eating,  When asked about black stools she said yes, not not sure when.  She has not had a colonoscopy or upper endoscopy that she recalls.  She does have some vague abdominal pain.    Review of Systems   Unable to perform ROS: Mental status change   Constitutional: Positive for activity change.        Past Medical History:   Diagnosis Date   • Acute respiratory distress syndrome    • Arthritis    • Bipolar disorder, unspecified    • CAD (coronary artery disease)    • Cellulitis    • Cellulitis    • CHF (congestive heart failure)    • Chronic ischemic heart disease    • Chronic respiratory failure with hypoxia and hypercapnia    • Chronic ulcer of calf    • Constipation    • Coronary artery disease    • Depression    • Depressive disorder    • Diabetes mellitus    • Dysphagia    • Dysphagia    • Endometrial cancer determined by uterine biopsy     s/p radiation; no improvement   • Endometrial hyperplasia    • History of transfusion    • Hypercapnia    • Hypertension    • Immobility    • Lack of coordination    • Lymphedema    • Malignant neoplasm of uterus    • Muscle weakness    • Obesities, morbid    • Oxygen dependent    • Post-menopausal bleeding    • Postmenopausal bleeding    • Schizo affective schizophrenia    • Skin ulcer    • Sleep apnea    • Stroke    • Symbolic dysfunction    • Uterine cancer    • Venous insufficiency    • Venous insufficiency    ,   Past Surgical History:   Procedure  Laterality Date   • D&C HYSTEROSCOPY     • LAPAROSCOPIC TUBAL LIGATION     • TOTAL LAPAROSCOPIC HYSTERECTOMY Bilateral 8/15/2017    Procedure: OPEN TOTAL  ABDOMINAL HYSTERECTOMY WITH BILATERAL SALPINGO-OOPHERECTOMY;  Surgeon: Ortiz Washington MD;  Location: [facility];  Service:    • TRACHEOSTOMY AND PEG TUBE INSERTION     • UMBILICAL HERNIA REPAIR     • WOUND DEBRIDEMENT     ,   Family History   Problem Relation Age of Onset   • Diabetes Mother    • Stroke Mother    ,   Social History   Substance Use Topics   • Smoking status: Former Smoker     Packs/day: 2.00     Years: 15.00     Types: Cigarettes     Quit date: 1988   • Smokeless tobacco: None   • Alcohol use No   ,   Prescriptions Prior to Admission   Medication Sig Dispense Refill Last Dose   • acetaminophen (MAPAP) 160 MG/5ML liquid 650 mg by Per G Tube route Every 4 (Four) Hours As Needed for Fever (give 20.3 ml per G tube).      • ammonium lactate (AMLACTIN) 12 % cream Apply 1 application topically 2 (two) times a day. To bilat legs and feet   5/28/2016 at Unknown time   • atorvastatin (LIPITOR) 20 MG tablet 20 mg by Per G Tube route Daily.      • bumetanide (BUMEX) 2 MG tablet 2 mg by Per G Tube route Daily.      • ferrous sulfate 325 (65 FE) MG tablet Take 1 tablet by mouth Daily With Breakfast. 60 tablet 12    • HYDROcodone-acetaminophen (NORCO) 5-325 MG per tablet Take 1 tablet by mouth Every 6 (Six) Hours As Needed for Moderate Pain  or Severe Pain . 120 tablet 0    • ipratropium-albuterol (DUO-NEB) 0.5-2.5 mg/mL nebulizer Take 3 mL by nebulization Every 4 (Four) Hours As Needed for Wheezing.      • ISOSORBIDE MONONITRATE PO 30 mg by Per G Tube route Daily.      • lactulose (CHRONULAC) 10 GM/15ML solution 20 g by Per PEG Tube route daily.   5/28/2016 at Unknown time   • LORazepam (ATIVAN) 1 MG tablet 1 tablet by Per G Tube route Every 8 (Eight) Hours As Needed for Anxiety. 90 tablet 0    • pantoprazole (PROTONIX) 40 MG pack packet 40 mg by Per PEG  Tube route Every Morning Before Breakfast.   5/12/2016 at Unknown time   • potassium chloride (KAYCIEL) 20 MEQ/15ML (10%) solution Take 20 mEq by mouth Daily.      • risperiDONE (RisperDAL) 1 MG tablet Take 2 mg by mouth Every Morning. Take 3 mg at bedtime & 2 mg every morning   5/28/2016 at Unknown time   • risperiDONE (risperDAL) 3 MG tablet Take 3 mg by mouth Every Night.      • valproic acid (DEPAKENE) 250 MG/5ML syrup syrup 250 mg by Per PEG Tube route every 12 (twelve) hours.   5/28/2016 at Unknown time   , Scheduled Meds:    alteplase 2 mg Intravenous Once   alteplase 2 mg Intravenous Once   alteplase 2 mg Intravenous Once   amLODIPine 5 mg Oral Q24H   atorvastatin 20 mg Per G Tube Daily   bumetanide 2 mg Oral Daily   fluconazole 200 mg Oral Q24H   folic acid 1 mg Oral Daily   hydrophor  Topical Q24H   isosorbide mononitrate 30 mg Per G Tube Daily   lactulose 20 g Oral Daily   pantoprazole 40 mg Oral Q AM   piperacillin-tazobactam 3.375 g Intravenous Q8H   polyethylene glycol 17 g Oral Daily   potassium chloride 20 mEq Oral Daily   risperiDONE 2 mg Oral Daily   risperiDONE 3 mg Oral Nightly   sodium chloride 10 mL Intracatheter Q12H   Valproic Acid 250 mg Oral Q12H   vancomycin 1,500 mg Intravenous Q24H   , Continuous Infusions:    dextrose 100 mL/hr Last Rate: 100 mL/hr (09/05/17 0426)   Pharmacy to dose vancomycin     , PRN Meds:  •  acetaminophen  •  HYDROcodone-acetaminophen  •  HYDROcodone-acetaminophen  •  ipratropium-albuterol  •  LORazepam  •  Pharmacy to dose vancomycin  •  potassium chloride **OR** potassium chloride **OR** potassium chloride  •  Insert peripheral IV **AND** sodium chloride  •  sodium chloride and Allergies:  Codeine; Penicillins; and Sulfa antibiotics    Objective      Vital Signs  Temp:  [96 °F (35.6 °C)-98.8 °F (37.1 °C)] 96 °F (35.6 °C)  Heart Rate:  [] 73  Resp:  [20-22] 20  BP: (124-142)/(54-83) 128/54    Physical Exam   Constitutional: She appears well-developed.    HENT:   Mouth/Throat: Oropharynx is clear and moist.   Eyes: Conjunctivae are normal.   Neck: Neck supple.   Cardiovascular: Normal rate and regular rhythm.  Exam reveals distant heart sounds.    Pulmonary/Chest: She has decreased breath sounds in the right lower field and the left lower field.   Abdominal: Bowel sounds are normal. There is tenderness in the periumbilical area. There is no rebound and no guarding.       Intact g tube and well healed scar midline       Results Review:    I reviewed the patient's new clinical results.  I reviewed the patient's new imaging results and agree with the interpretation.        Assessment/Plan     Principal Problem:    Sepsis  Active Problems:    Chronic diastolic CHF (congestive heart failure)    Morbid obesity    THAI (obstructive sleep apnea)    Schizo affective schizophrenia    Diabetes mellitus    Coronary artery disease    Chronic respiratory failure with hypoxia and hypercapnia    Lymphedema    Hypertension    Abscess of abdominal cavity      Assessment:  (Iron deficiency anemia certainly may be on basis of ongoing blood loss,  Although a slow decrease in HGB.    Colonic abscess).     Plan:   (Agree with supportive care and transfuse if needed.  Eventual egd and colonoscopy.  Given the current abscess I feel the instillation air into and distention of the GI tract could lead to catastrophic complications such as a perforation.  If she develops life threatening gastrointestinal hemorrhage, would reconsider perform endoscopic procedures sooner. ).       I discussed the patients findings and my recommendations with patient and consulting provider    Rasheed Shin MD  09/05/17  8:44 AM    Time: Critical care 25 min

## 2017-09-05 NOTE — PLAN OF CARE
Problem: Patient Care Overview (Adult)  Goal: Plan of Care Review  Outcome: Ongoing (interventions implemented as appropriate)    09/05/17 0302   Coping/Psychosocial Response Interventions   Plan Of Care Reviewed With patient   Patient Care Overview   Progress no change   Outcome Evaluation   Outcome Summary/Follow up Plan Patient slept fitfully tonight and complained of pain this evening which was controlled with PO pain medication. RN replaced patient's potassium tonight, recheck will be done with morning labs. Continue to monitor H&H closely. Plan is to continue IVABX. Continue to monitor closely, continue with current plan of care.         Problem: Skin Integrity Impairment, Risk/Actual (Adult)  Goal: Skin Integrity/Wound Healing  Outcome: Ongoing (interventions implemented as appropriate)    Problem: Sepsis (Adult)  Goal: Signs and Symptoms of Listed Potential Problems Will be Absent or Manageable (Sepsis)  Outcome: Ongoing (interventions implemented as appropriate)    Problem: Infection, Risk/Actual (Adult)  Goal: Infection Prevention/Resolution  Outcome: Ongoing (interventions implemented as appropriate)    Problem: Fall Risk (Adult)  Goal: Absence of Falls  Outcome: Ongoing (interventions implemented as appropriate)    Problem: Confusion, Acute (Adult)  Goal: Cognitive/Functional Impairments Minimized  Outcome: Ongoing (interventions implemented as appropriate)  Goal: Safety  Outcome: Ongoing (interventions implemented as appropriate)    Problem: Anxiety (Adult)  Goal: Reduction/Resolution  Outcome: Ongoing (interventions implemented as appropriate)    Problem: Wound, Traumatic, Nonburn (Adult)  Goal: Signs and Symptoms of Listed Potential Problems Will be Absent or Manageable (Wound, Traumatic, Nonburn)  Outcome: Ongoing (interventions implemented as appropriate)    Problem: Respiratory Insufficiency (Adult)  Goal: Acid/Base Balance  Outcome: Ongoing (interventions implemented as appropriate)  Goal:  Effective Ventilation  Outcome: Ongoing (interventions implemented as appropriate)

## 2017-09-05 NOTE — PROGRESS NOTES
"DAILY PROGRESS NOTE    Patient Identification:  Name: Lupe Burleson  Age: 61 y.o.  Sex: Female  :  1955  MRN:[id number]    Cheif complaint:  Chief Complaint   Patient presents with   • Fever     Patient is 1 week post Hysterectomy and is sent from NH for increased lethargy and a fever. 101.6 per facility.      Subjective:  Patient awake. Complains of abdominal pain.     Objective:  Scheduled Meds:    alteplase 2 mg Intravenous Once   alteplase 2 mg Intravenous Once   alteplase 2 mg Intravenous Once   amLODIPine 5 mg Oral Q24H   atorvastatin 20 mg Per G Tube Daily   bumetanide 2 mg Oral Daily   fluconazole 200 mg Oral Q24H   folic acid 1 mg Oral Daily   hydrophor  Topical Q24H   isosorbide mononitrate 30 mg Per G Tube Daily   lactulose 20 g Oral Daily   pantoprazole 40 mg Oral Q AM   piperacillin-tazobactam 3.375 g Intravenous Q8H   polyethylene glycol 17 g Oral Daily   potassium chloride 20 mEq Oral Daily   risperiDONE 2 mg Oral Daily   risperiDONE 3 mg Oral Nightly   sodium chloride 10 mL Intracatheter Q12H   Valproic Acid 250 mg Oral Q12H   vancomycin 1,500 mg Intravenous Q24H       Continuous Infusions:    dextrose 100 mL/hr Last Rate: 100 mL/hr (17 0426)   Pharmacy to dose vancomycin         PRN Meds:  •  acetaminophen  •  HYDROcodone-acetaminophen  •  HYDROcodone-acetaminophen  •  ipratropium-albuterol  •  LORazepam  •  Pharmacy to dose vancomycin  •  potassium chloride **OR** potassium chloride **OR** potassium chloride  •  Insert peripheral IV **AND** sodium chloride  •  sodium chloride    Intake/Output:  I/O last 3 completed shifts:  In: 1690 [P.O.:360; I.V.:950; Other:30; IV Piggyback:350]  Out: -     Exam:  /54 (BP Location: Left arm, Patient Position: Lying)  Pulse 73  Temp 96 °F (35.6 °C) (Oral)   Resp 20  Ht 62.99\" (160 cm)  Wt 284 lb (129 kg)  SpO2 98%  BMI 50.32 kg/m2  Temp (24hrs), Av.9 °F (36.6 °C), Min:96 °F (35.6 °C), Max:98.8 °F (37.1 °C)    Physical " Examination:   PHYSICAL EXAM:  Constitutional:  No acute distress, Non-toxic appearance.  Cardiovascular:  Regular rate and rythm  Pulmonary/Chest:  CTA-B  Abdomen:  Obese, bowel sounds normal, Soft, No tenderness, I=C/D/I  Extremities:  No C/C/E  Neurologic:  Alert.      Data Review:  WBC   Date Value Ref Range Status   09/05/2017 7.14 4.50 - 10.70 10*3/mm3 Final     RBC   Date Value Ref Range Status   09/05/2017 3.22 (L) 3.90 - 5.20 10*6/mm3 Final     Hemoglobin   Date Value Ref Range Status   09/05/2017 7.1 (L) 11.9 - 15.5 g/dL Final     Hematocrit   Date Value Ref Range Status   09/05/2017 23.6 (L) 35.6 - 45.5 % Final     MCV   Date Value Ref Range Status   09/05/2017 73.3 (L) 80.5 - 98.2 fL Final     MCH   Date Value Ref Range Status   09/05/2017 22.0 (L) 26.9 - 32.0 pg Final     MCHC   Date Value Ref Range Status   09/05/2017 30.1 (L) 32.4 - 36.3 g/dL Final     RDW   Date Value Ref Range Status   09/05/2017 24.7 (H) 11.7 - 13.0 % Final     RDW-SD   Date Value Ref Range Status   09/05/2017 64.1 (H) 37.0 - 54.0 fl Final     MPV   Date Value Ref Range Status   09/05/2017 9.5 6.0 - 12.0 fL Final     Platelets   Date Value Ref Range Status   09/05/2017 506 (H) 140 - 500 10*3/mm3 Final     Neutrophil %   Date Value Ref Range Status   09/05/2017 73.8 42.7 - 76.0 % Final     Lymphocyte %   Date Value Ref Range Status   09/05/2017 11.6 (L) 19.6 - 45.3 % Final     Monocyte %   Date Value Ref Range Status   09/05/2017 11.1 5.0 - 12.0 % Final     Eosinophil %   Date Value Ref Range Status   09/05/2017 2.2 0.3 - 6.2 % Final     Basophil %   Date Value Ref Range Status   09/05/2017 0.3 0.0 - 1.5 % Final     Immature Grans %   Date Value Ref Range Status   09/05/2017 1.0 (H) 0.0 - 0.5 % Final     Neutrophils, Absolute   Date Value Ref Range Status   09/05/2017 5.27 1.90 - 8.10 10*3/mm3 Final     Lymphocytes, Absolute   Date Value Ref Range Status   09/05/2017 0.83 (L) 0.90 - 4.80 10*3/mm3 Final     Monocytes, Absolute   Date  Value Ref Range Status   09/05/2017 0.79 0.20 - 1.20 10*3/mm3 Final     Eosinophils, Absolute   Date Value Ref Range Status   09/05/2017 0.16 0.00 - 0.70 10*3/mm3 Final     Basophils, Absolute   Date Value Ref Range Status   09/05/2017 0.02 0.00 - 0.20 10*3/mm3 Final     Immature Grans, Absolute   Date Value Ref Range Status   09/05/2017 0.07 (H) 0.00 - 0.03 10*3/mm3 Final     nRBC   Date Value Ref Range Status   09/04/2017 0.0 0.0 - 0.0 /100 WBC Final     Recent Results (from the past 36 hour(s))   Vancomycin, Trough    Collection Time: 09/04/17  6:34 AM   Result Value Ref Range    Vancomycin Trough 18.80 5.00 - 20.00 mcg/mL   Basic Metabolic Panel    Collection Time: 09/04/17  6:34 AM   Result Value Ref Range    Glucose 86 65 - 99 mg/dL    BUN 7 (L) 8 - 23 mg/dL    Creatinine 0.67 0.57 - 1.00 mg/dL    Sodium 147 (H) 136 - 145 mmol/L    Potassium 3.5 3.5 - 5.2 mmol/L    Chloride 107 98 - 107 mmol/L    CO2 29.5 (H) 22.0 - 29.0 mmol/L    Calcium 9.0 8.6 - 10.5 mg/dL    eGFR  African Amer 108 >60 mL/min/1.73    BUN/Creatinine Ratio 10.4 7.0 - 25.0    Anion Gap 10.5 mmol/L   Vitamin B12    Collection Time: 09/04/17  6:35 AM   Result Value Ref Range    Vitamin B-12 1423 (H) 211 - 946 pg/mL   Folate    Collection Time: 09/04/17  6:35 AM   Result Value Ref Range    Folate 3.64 (L) 4.78 - 24.20 ng/mL   Iron Profile    Collection Time: 09/04/17  6:35 AM   Result Value Ref Range    Iron 13 (L) 37 - 145 mcg/dL    Iron Saturation 6 (L) 20 - 50 %    Transferrin 144 (L) 200 - 360 mg/dL    TIBC 215 mcg/dL   CBC Auto Differential    Collection Time: 09/04/17 11:31 AM   Result Value Ref Range    WBC 7.61 4.50 - 10.70 10*3/mm3    RBC 3.24 (L) 3.90 - 5.20 10*6/mm3    Hemoglobin 6.9 (C) 11.9 - 15.5 g/dL    Hematocrit 23.6 (L) 35.6 - 45.5 %    MCV 72.8 (L) 80.5 - 98.2 fL    MCH 21.3 (L) 26.9 - 32.0 pg    MCHC 29.2 (L) 32.4 - 36.3 g/dL    RDW 24.4 (H) 11.7 - 13.0 %    RDW-SD 63.1 (H) 37.0 - 54.0 fl    MPV 9.5 6.0 - 12.0 fL    Platelets  504 (H) 140 - 500 10*3/mm3    Neutrophil % 77.7 (H) 42.7 - 76.0 %    Lymphocyte % 9.5 (L) 19.6 - 45.3 %    Monocyte % 10.9 5.0 - 12.0 %    Eosinophil % 1.4 0.3 - 6.2 %    Basophil % 0.1 0.0 - 1.5 %    Immature Grans % 0.4 0.0 - 0.5 %    Neutrophils, Absolute 5.91 1.90 - 8.10 10*3/mm3    Lymphocytes, Absolute 0.72 (L) 0.90 - 4.80 10*3/mm3    Monocytes, Absolute 0.83 0.20 - 1.20 10*3/mm3    Eosinophils, Absolute 0.11 0.00 - 0.70 10*3/mm3    Basophils, Absolute 0.01 0.00 - 0.20 10*3/mm3    Immature Grans, Absolute 0.03 0.00 - 0.03 10*3/mm3    nRBC 0.0 0.0 - 0.0 /100 WBC   POC Glucose Fingerstick    Collection Time: 09/04/17 11:33 AM   Result Value Ref Range    Glucose 162 (H) 70 - 130 mg/dL   POC Glucose Fingerstick    Collection Time: 09/04/17  4:37 PM   Result Value Ref Range    Glucose 107 70 - 130 mg/dL   POC Glucose Fingerstick    Collection Time: 09/04/17  9:32 PM   Result Value Ref Range    Glucose 118 70 - 130 mg/dL   Basic Metabolic Panel    Collection Time: 09/05/17  5:24 AM   Result Value Ref Range    Glucose 112 (H) 65 - 99 mg/dL    BUN 7 (L) 8 - 23 mg/dL    Creatinine 0.68 0.57 - 1.00 mg/dL    Sodium 145 136 - 145 mmol/L    Potassium 3.5 3.5 - 5.2 mmol/L    Chloride 105 98 - 107 mmol/L    CO2 32.4 (H) 22.0 - 29.0 mmol/L    Calcium 8.6 8.6 - 10.5 mg/dL    eGFR  African Amer 106 >60 mL/min/1.73    BUN/Creatinine Ratio 10.3 7.0 - 25.0    Anion Gap 7.6 mmol/L   CBC Auto Differential    Collection Time: 09/05/17  5:24 AM   Result Value Ref Range    WBC 7.14 4.50 - 10.70 10*3/mm3    RBC 3.22 (L) 3.90 - 5.20 10*6/mm3    Hemoglobin 7.1 (L) 11.9 - 15.5 g/dL    Hematocrit 23.6 (L) 35.6 - 45.5 %    MCV 73.3 (L) 80.5 - 98.2 fL    MCH 22.0 (L) 26.9 - 32.0 pg    MCHC 30.1 (L) 32.4 - 36.3 g/dL    RDW 24.7 (H) 11.7 - 13.0 %    RDW-SD 64.1 (H) 37.0 - 54.0 fl    MPV 9.5 6.0 - 12.0 fL    Platelets 506 (H) 140 - 500 10*3/mm3    Neutrophil % 73.8 42.7 - 76.0 %    Lymphocyte % 11.6 (L) 19.6 - 45.3 %    Monocyte % 11.1 5.0 -  12.0 %    Eosinophil % 2.2 0.3 - 6.2 %    Basophil % 0.3 0.0 - 1.5 %    Immature Grans % 1.0 (H) 0.0 - 0.5 %    Neutrophils, Absolute 5.27 1.90 - 8.10 10*3/mm3    Lymphocytes, Absolute 0.83 (L) 0.90 - 4.80 10*3/mm3    Monocytes, Absolute 0.79 0.20 - 1.20 10*3/mm3    Eosinophils, Absolute 0.16 0.00 - 0.70 10*3/mm3    Basophils, Absolute 0.02 0.00 - 0.20 10*3/mm3    Immature Grans, Absolute 0.07 (H) 0.00 - 0.03 10*3/mm3   POC Glucose Fingerstick    Collection Time: 09/05/17  6:23 AM   Result Value Ref Range    Glucose 109 70 - 130 mg/dL     RADIOLOGY  Ct Chest With Contrast    Result Date: 8/27/2017  Narrative: CT SCANS OF THE CHEST, ABDOMEN, AND PELVIS WITH INTRAVENOUS CONTRAST  HISTORY: Recent hysterectomy. Fever and shortness of breath and abdominal pain.  The CT scans were performed as an emergency procedure with CT imaging through the chest, abdomen, and pelvis with intravenous contrast. The following findings are present: 1. There is some predominately strand-like atelectasis at both lung bases medially associated with tiny bilateral pleural effusions and the lungs are otherwise clear. There is no mediastinal or hilar or axillary adenopathy. Small to moderate-sized pericardial effusion measuring 1.9 cm in thickness and this shows enlargement since the preoperative CT scan of 06/18/2017. 2. There is a small hepatic cyst that is unchanged. The spleen, pancreas, both adrenal glands and right kidney are unremarkable. There is mild dilatation of the left renal collecting system including the left ureter which extends into the pelvis where there is prominent induration of the mesenteric fat planes associated with generalized wall thickening of the rectosigmoid colon and there is also an adjacent fluid collection to the left of the sigmoid colon that measures up to 5.5 x 6.8 cm. There is also generalized wall thickening of the urinary bladder. Some of these findings may relate to postoperative changes but are  concerning for colitis. The fluid collection to the left of the sigmoid colon could potentially represent an area of developing abscess and the distended ureter extends to this region. The generalized thickening of the urinary bladder may be secondary to the adjacent inflammatory changes but clinical correlation for cystitis is also recommended.  These findings have been discussed with Dr. Quintero in the emergency room.  This report was finalized on 8/27/2017 4:37 PM by Dr. Dionte Barry MD.      Us Pelvis Complete    Result Date: 8/17/2017  Narrative: PELVIC ULTRASOUND  HISTORY: 61-year-old nursing home patient with vaginal bleeding.  The examination was performed as an emergency procedure. The patient refused transvaginal imaging. The uterus is enlarged and lobulated, measuring 9.8 x 9.8 x 13.7 cm and there are multiple fibroids measuring up to 7 cm in size. There is also generalized endometrial thickening with double wall thickness of 1.9 cm. The patient is postmenopausal and this does raise a concern of endometrial tumor and further clinical correlation is required.  The ovaries are not visualized. There is no free fluid identified.  This report was finalized on 8/17/2017 11:09 AM by Dr. Dionte Barry MD.      Ct Abdomen Pelvis With Contrast    Result Date: 9/1/2017  Narrative: POSTCONTRAST CT ABDOMEN AND PELVIS  HISTORY: 61-year-old female for follow-up of abscess with hypoactive bowel sounds and persistent abdominal pain.  COMPARISON: 08/27/2017  FINDINGS: 1. Imaging remains limited by body habitus associated upper extremity artifact, motion artifact. 2. Stable moderate pericardial effusion small bilateral pleural effusions and bibasilar atelectasis. 3. Cholelithiasis benign hepatic cyst bilateral renal cysts. 4. Left hydronephrosis and hydroureter extending to inflammatory change within the left pelvis. 4. Previous described pericolonic abscess 6.8 x 5.5 cm has developed an enhancing wall and shows no  change in size. 5. Severe inflammatory change of the sigmoid colon diffusely. 6. Persistent thickening of the urinary bladder wall. 7. Additional findings as previously noted, no new abnormality.      Impression: 1. No significant interval change in size of pelvic abscess. 2. Other findings as described without other significant interval change including mild left hydronephrosis, urinary bladder wall thickening, marked thickening of the sigmoid colon extending to the rectum.  This report was finalized on 9/1/2017 7:32 PM by Dr. Bryan Yadav MD.      Ct Abdomen Pelvis With Contrast    Result Date: 8/27/2017  Narrative: CT SCANS OF THE CHEST, ABDOMEN, AND PELVIS WITH INTRAVENOUS CONTRAST  HISTORY: Recent hysterectomy. Fever and shortness of breath and abdominal pain.  The CT scans were performed as an emergency procedure with CT imaging through the chest, abdomen, and pelvis with intravenous contrast. The following findings are present: 1. There is some predominately strand-like atelectasis at both lung bases medially associated with tiny bilateral pleural effusions and the lungs are otherwise clear. There is no mediastinal or hilar or axillary adenopathy. Small to moderate-sized pericardial effusion measuring 1.9 cm in thickness and this shows enlargement since the preoperative CT scan of 06/18/2017. 2. There is a small hepatic cyst that is unchanged. The spleen, pancreas, both adrenal glands and right kidney are unremarkable. There is mild dilatation of the left renal collecting system including the left ureter which extends into the pelvis where there is prominent induration of the mesenteric fat planes associated with generalized wall thickening of the rectosigmoid colon and there is also an adjacent fluid collection to the left of the sigmoid colon that measures up to 5.5 x 6.8 cm. There is also generalized wall thickening of the urinary bladder. Some of these findings may relate to postoperative changes but are  concerning for colitis. The fluid collection to the left of the sigmoid colon could potentially represent an area of developing abscess and the distended ureter extends to this region. The generalized thickening of the urinary bladder may be secondary to the adjacent inflammatory changes but clinical correlation for cystitis is also recommended.  These findings have been discussed with Dr. Quintero in the emergency room.  This report was finalized on 8/27/2017 4:37 PM by Dr. Dionte Barry MD.      Xr Chest 1 View    Result Date: 8/27/2017  Narrative: XR CHEST 1 VW-  Clinical: Postoperative fever  COMPARISON 08/20/2017  FINDINGS: No acute airspace disease has developed. The cardiomediastinal silhouette is stable. No edema or effusion.  CONCLUSION: No acute cardiovascular or pulmonary process is demonstrated.  This report was finalized on 8/27/2017 1:00 PM by Dr. Kieran Mackay MD.      Xr Chest Pa & Lateral    Result Date: 8/20/2017  Narrative: PA AND LATERAL CHEST RADIOGRAPHS  HISTORY: 61-year-old female undergoing follow-up evaluation of chest infiltrates.  FINDINGS: Upright AP and lateral chest radiographs were obtained. Comparison is made to a prior examination dated 08/18/2017. There is mild bilateral basilar atelectasis with decreased lung volumes. Moderate atheromatous calcification is seen within the aortic arch. There is limited visualization on the lateral chest radiograph and the previously described pleural effusions cannot be substantiated on the current examination.      Impression: Persistent low lung volumes with bibasilar atelectasis. The previously described pleural effusions are not certainly identified on the current examination as the lateral chest radiograph is markedly limited. Little interval change has occurred on the PA chest radiograph when compared to the prior examination.  This report was finalized on 8/20/2017 4:35 PM by Dr. Darrin Christina MD.      Xr Chest Pa & Lateral    Result Date:  8/18/2017  Narrative: PA AND LATERAL CHEST  HISTORY: Cough.  PA and lateral views of the chest were obtained and compared to 08/15/2017.  FINDINGS:  The right internal jugular central line has been removed. The study is hampered by the patient's body habitus, patient positioning and inspiratory effort. There is bibasilar atelectasis/infiltrate more prominent on the left, similar to slightly less prominent as compared to prior examination. The pulmonary interstitial vascular prominence which was present previously is less prominent on the current examination. On the lateral projection bilateral pleural effusions are appreciated, moderate in size and there is moderate posterior atelectasis/infiltrate bilaterally. The above information was called to patient's nurse who is to  relay the information to the clinical service.  This report was finalized on 8/18/2017 2:29 PM by Dr. Mario Sim MD.      Xr Chest Post Cva Port    Result Date: 8/15/2017  Narrative: CHEST POST CVA PORT  HISTORY: Morbidly obese 61-year-old female for right jugular line placement.  COMPARISON: 05/16/2017  FINDINGS: 1. Right jugular line projects over the superior vena cava. 2. Imaging is considerably limited by morbid obesity, low lung volumes, mandibular artifact obscuring detail of the right apex.  If patient has symptoms suggesting pneumothorax, follow-up lordotic imaging or standard imaging with removal mandibular artifact would be helpful.  This report was finalized on 8/15/2017 11:50 AM by Dr. Bryan Yadav MD.        Assessment/Plan:  HD 9:   1. SP CRISS/BSO on 08/15 for endometrial cancer. Advanced aggressive cancer. Should she recover from infection petition with courts would be necessary in making treatment care decisions.   2. Leukocytosis down (11.6 from 22), pelvic fluid collection not amenable for CT guided drainage secondary fluid collection deep in the pelvis adjacent to the sigmoid colon collection. Continue zosyn, vanc and  diflucan. Repeat CT: no change in size of pelvic abscess (looks more walled off now). Per ID: at discharge pt may be switched to po cipro with augmentin and diflucan and repeat CT scan in 2 weeks. Tmax: 98.9  3. Elen-cardial effusion, cardiology consulted. Echo with moderate effusion, no tamponade. No intervention planned  4. Anemia, hgb stable, likely neoplastic, acute on chronic disease. Post two units PRBC 08/29, continue ferrous sulfate.  5. Blood in stool. GI consulted. Scopes planned for future  6. Disposition: to NH likely tomorrow or Thursday.    Ibis Cazares, APRN  9/5/2017 @ 9:16 AM    I have seen and examined the patient. I agree with [facility] notation above:     S: no new acute issues. Infection appears controlled, not drainable. Denies complaints.   O: AVSS abdomen nontender, non acute, incision CDI.    A: as above POD #9 - advanced endometrial cancer. Pelvic abscess - walled off and controlled with IV abx.   P: Planning for dc to SNF with IV vanc/zosyn with transition to Augmentin/Cipro/Diflucan.   No intervention planned per cards or GI at this time. Hopefully dc to SNF in next 24 hours or so if remains stable.     Ariana Mendoza D.O.   Gynecologic Oncology  [facility]   [id number] [facility] [id number]  [place], [place] [id number] [phone]

## 2017-09-05 NOTE — PROGRESS NOTES
"  Infectious Diseases Progress Note    Ramya Alex MD     [facility]  [place]: 9 days  Patient Identification:  Name: Lupe Burleson  Age: 61 y.o.  Sex: female  :  1955  MRN: [id number]         Primary Care Physician: Shane Barcenas MD            Subjective: feels better and denies any specific complaints  Interval History: see consultation notes     Objective:    Scheduled Meds:    alteplase 2 mg Intravenous Once   alteplase 2 mg Intravenous Once   alteplase 2 mg Intravenous Once   amLODIPine 5 mg Oral Q24H   atorvastatin 20 mg Per G Tube Daily   bumetanide 2 mg Oral Daily   fluconazole 200 mg Oral Q24H   folic acid 1 mg Oral Daily   hydrophor  Topical Q24H   isosorbide mononitrate 30 mg Per G Tube Daily   lactulose 20 g Oral Daily   pantoprazole 40 mg Oral Q AM   piperacillin-tazobactam 3.375 g Intravenous Q8H   polyethylene glycol 17 g Oral Daily   potassium chloride 20 mEq Oral Daily   risperiDONE 2 mg Oral Daily   risperiDONE 3 mg Oral Nightly   sodium chloride 10 mL Intracatheter Q12H   Valproic Acid 250 mg Oral Q12H   vancomycin 1,500 mg Intravenous Q24H     Continuous Infusions:    dextrose 100 mL/hr Last Rate: 100 mL/hr (17)   Pharmacy to dose vancomycin         Vital signs in last 24 hours:  Temp:  [96 °F (35.6 °C)-98.8 °F (37.1 °C)] 96 °F (35.6 °C)  Heart Rate:  [] 73  Resp:  [20-22] 20  BP: (124-142)/(54-83) 128/54    Intake/Output:    Intake/Output Summary (Last 24 hours) at 17  Last data filed at 17   Gross per 24 hour   Intake             1300 ml   Output                0 ml   Net             1300 ml       Exam:  /54 (BP Location: Left arm, Patient Position: Lying)  Pulse 73  Temp 96 °F (35.6 °C) (Oral)   Resp 20  Ht 62.99\" (160 cm)  Wt 284 lb (129 kg)  SpO2 98%  BMI 50.32 kg/m2    General Appearance:    Awake but doesn't want to interact                          Head:    Normocephalic, without obvious abnormality, " atraumatic                           Eyes:    PERRL, conjunctiva/corneas clear, EOM's intact, both eyes                         Throat:   Lips, tongue, gums normal; oral mucosa pink and moist                           Neck:   Supple, symmetrical, trachea midline, no JVD                         Lungs:    Clear to auscultation bilaterally, respirations unlabored                 Chest Wall:    No tenderness or deformity                          Heart:    Regular rate and rhythm, S1 and S2 normal, no murmur,no  Rub                                      or gallop                  Abdomen:     Soft, obese, surgical incision well approximated, non-tender, bowel sounds active                 Extremities:   Chronic changes in the left lower extremity and right arm PICC line in place                        Pulses:   Pulses palpable in all extremities                            Skin:   Skin is warm and dry,  no rashes or palpable lesions                         Data Review:    I reviewed the patient's new clinical results.    Results from last 7 days  Lab Units 09/05/17  0524 09/04/17  1131 09/02/17  0547 09/01/17  0606 08/31/17  0437 08/30/17  0459   WBC 10*3/mm3 7.14 7.61 11.63* 13.66* 13.48* 12.31*   HEMOGLOBIN g/dL 7.1* 6.9* 7.2* 7.3* 7.5* 8.1*   PLATELETS 10*3/mm3 506* 504* 530* 469 530* 572*       Results from last 7 days  Lab Units 09/05/17  0524 09/04/17  0634 09/03/17  0035 09/02/17  0547 09/01/17  0606 08/31/17  0437 08/30/17  1552 08/30/17  0459   SODIUM mmol/L 145 147*  --  146* 147* 146*  --  145   POTASSIUM mmol/L 3.5 3.5 3.6 3.6 3.7 3.7 5.0 3.4*   CHLORIDE mmol/L 105 107  --  108* 110* 112*  --  106   CO2 mmol/L 32.4* 29.5*  --  29.7* 27.8 26.1  --  28.2   BUN mg/dL 7* 7*  --  8 8 7*  --  7*   CREATININE mg/dL 0.68 0.67  --  0.58 0.61 0.49*  --  0.48*   CALCIUM mg/dL 8.6 9.0  --  9.1 8.8 8.5*  --  8.8   GLUCOSE mg/dL 112* 86  --  94 98 95  --  108*     Microbiology Results (last 10 days)     Procedure Component  Value - Date/Time    Urine Culture [[id number]]  (Normal) Collected:  08/27/17 1230    Lab Status:  Final result Specimen:  Urine from Urine, Catheter Updated:  08/29/17 0725     Urine Culture No growth    Wound Culture [[id number]]  (Abnormal)  (Susceptibility) Collected:  08/27/17 1230    Lab Status:  Final result Specimen:  Wound from Buttock Updated:  08/30/17 0831     Wound Culture Moderate growth (3+) Pseudomonas aeruginosa (A)      Moderate growth (3+) Staphylococcus warneri (A)      Moderate growth (3+) Proteus mirabilis (A)      Light growth (2+) Klebsiella pneumoniae ssp pneumoniae (A)     Gram Stain Result Few (2+) WBCs seen      Rare (1+) Gram negative bacilli      Rare (1+) Gram positive cocci in pairs    Susceptibility      Pseudomonas aeruginosa     DEBI     Cefepime <=1 ug/ml Susceptible     Ceftazidime 2 ug/ml Susceptible     Ciprofloxacin <=0.25 ug/ml Susceptible     Levofloxacin 0.5 ug/ml Susceptible     Meropenem <=0.25 ug/ml Susceptible     Piperacillin + Tazobactam 8 ug/ml Susceptible     Tobramycin <=1 ug/ml Susceptible                Susceptibility      Staphylococcus warneri     DEBI     Clindamycin <=0.25 ug/ml Susceptible     Erythromycin >=8 ug/ml Resistant     Oxacillin <=0.25 ug/ml Susceptible     Penicillin G >=0.5 ug/ml Resistant     Rifampin <=0.5 ug/ml Susceptible     Tetracycline <=1 ug/ml Susceptible     Trimethoprim + Sulfamethoxazole <=10 ug/ml Susceptible     Vancomycin <=0.5 ug/ml Susceptible                Susceptibility      Proteus mirabilis     DEBI     Ampicillin <=2 ug/ml Susceptible     Ampicillin + Sulbactam <=2 ug/ml Susceptible     Cefazolin 8 ug/ml Susceptible     Cefepime <=1 ug/ml Susceptible     Cefoxitin 8 ug/ml Susceptible     Ceftriaxone <=1 ug/ml Susceptible     Ertapenem 1 ug/ml Susceptible     Gentamicin <=1 ug/ml Susceptible     Levofloxacin >=8 ug/ml Resistant     Meropenem 1 ug/ml Susceptible     Piperacillin + Tazobactam <=4 ug/ml Susceptible      Trimethoprim + Sulfamethoxazole >=320 ug/ml Resistant                Susceptibility      Klebsiella pneumoniae ssp pneumoniae     DEBI     Ampicillin >=32 ug/ml Resistant     Ampicillin + Sulbactam 8 ug/ml Susceptible     Cefazolin <=4 ug/ml Susceptible     Cefepime <=1 ug/ml Susceptible     Cefoxitin <=4 ug/ml Susceptible     Ceftriaxone <=1 ug/ml Susceptible     Ertapenem <=0.5 ug/ml Susceptible     Gentamicin <=1 ug/ml Susceptible     Levofloxacin <=0.12 ug/ml Susceptible     Meropenem <=0.25 ug/ml Susceptible     Piperacillin + Tazobactam 8 ug/ml Susceptible     Trimethoprim + Sulfamethoxazole <=20 ug/ml Susceptible                    Blood Culture [[id number]]  (Abnormal) Collected:  08/27/17 1228    Lab Status:  Final result Specimen:  Blood from Arm, Left Updated:  08/30/17 0722     Blood Culture Staphylococcus, coagulase negative (A)                 Staphylococcus, coagulase negative (A)                Gram Stain Result Aerobic Bottle Gram positive cocci      Anaerobic Bottle Gram positive cocci in clusters    Narrative:       Probable contaminants requires clinical correlation, susceptibility not performed unless requested by physician.    Blood Culture ID, PCR [[id number]]  (Abnormal) Collected:  08/27/17 1228    Lab Status:  Final result Specimen:  Blood from Arm, Left Updated:  08/28/17 1223     BCID, PCR Staphylococcus spp, not aureus. Identification by BCID PCR. (A)            Assessment:  Principal Problem:    Sepsis  Active Problems:    Chronic diastolic CHF (congestive heart failure)    Morbid obesity    THAI (obstructive sleep apnea)    Schizo affective schizophrenia    Diabetes mellitus    Coronary artery disease    Chronic respiratory failure with hypoxia and hypercapnia    Lymphedema    Hypertension    Abscess of abdominal cavity  bacteremia likely contaminant    Plan:She did very well and plans to continue the care plan as detailed in the initial consultation note.  Eventual discharge disposition  per primary team.  We'll follow while she is here.    Ramya Alex MD  9/5/2017  9:27 AM    Much of this encounter note is an electronic transcription/translation of spoken language to printed text. The electronic translation of spoken language may permit erroneous, or at times, nonsensical words or phrases to be inadvertently transcribed; Although I have reviewed the note for such errors, some may still exist

## 2017-09-05 NOTE — PLAN OF CARE
Problem: Patient Care Overview (Adult)  Goal: Plan of Care Review  Outcome: Ongoing (interventions implemented as appropriate)    09/05/17 0302 09/05/17 1441 09/05/17 1943   Coping/Psychosocial Response Interventions   Plan Of Care Reviewed With --  patient --    Patient Care Overview   Progress no change --  --    Outcome Evaluation   Outcome Summary/Follow up Plan --  --  vitals stable. no falls. medicated for pain x1 today. patient changed frequently-frequently urinating. Gi consult today. possible d/c tomorrow or thursday         Problem: Skin Integrity Impairment, Risk/Actual (Adult)  Goal: Skin Integrity/Wound Healing  Outcome: Ongoing (interventions implemented as appropriate)    Problem: Sepsis (Adult)  Goal: Signs and Symptoms of Listed Potential Problems Will be Absent or Manageable (Sepsis)  Outcome: Ongoing (interventions implemented as appropriate)    Problem: Fall Risk (Adult)  Goal: Absence of Falls  Outcome: Ongoing (interventions implemented as appropriate)    Problem: Pain, Acute (Adult)  Goal: Acceptable Pain Control/Comfort Level  Outcome: Ongoing (interventions implemented as appropriate)  Goal: Identify Related Risk Factors and Signs and Symptoms  Outcome: Ongoing (interventions implemented as appropriate)  Goal: Acceptable Pain Control/Comfort Level  Outcome: Ongoing (interventions implemented as appropriate)    Problem: Confusion, Acute (Adult)  Goal: Cognitive/Functional Impairments Minimized  Outcome: Ongoing (interventions implemented as appropriate)  Goal: Safety  Outcome: Ongoing (interventions implemented as appropriate)    Problem: Respiratory Insufficiency (Adult)  Goal: Acid/Base Balance  Outcome: Ongoing (interventions implemented as appropriate)  Goal: Effective Ventilation  Outcome: Ongoing (interventions implemented as appropriate)

## 2017-09-06 LAB
ABO GROUP BLD: NORMAL
ANION GAP SERPL CALCULATED.3IONS-SCNC: 6.6 MMOL/L
BASOPHILS # BLD AUTO: 0.02 10*3/MM3 (ref 0–0.2)
BASOPHILS NFR BLD AUTO: 0.2 % (ref 0–1.5)
BLD GP AB SCN SERPL QL: NEGATIVE
BUN BLD-MCNC: 6 MG/DL (ref 8–23)
BUN/CREAT SERPL: 8.2 (ref 7–25)
CALCIUM SPEC-SCNC: 9.4 MG/DL (ref 8.6–10.5)
CHLORIDE SERPL-SCNC: 102 MMOL/L (ref 98–107)
CO2 SERPL-SCNC: 35.4 MMOL/L (ref 22–29)
CREAT BLD-MCNC: 0.73 MG/DL (ref 0.57–1)
DEPRECATED RDW RBC AUTO: 64.2 FL (ref 37–54)
EOSINOPHIL # BLD AUTO: 0.24 10*3/MM3 (ref 0–0.7)
EOSINOPHIL NFR BLD AUTO: 3 % (ref 0.3–6.2)
ERYTHROCYTE [DISTWIDTH] IN BLOOD BY AUTOMATED COUNT: 24.6 % (ref 11.7–13)
GFR SERPL CREATININE-BSD FRML MDRD: 98 ML/MIN/1.73
GLUCOSE BLD-MCNC: 100 MG/DL (ref 65–99)
GLUCOSE BLDC GLUCOMTR-MCNC: 125 MG/DL (ref 70–130)
GLUCOSE BLDC GLUCOMTR-MCNC: 93 MG/DL (ref 70–130)
GLUCOSE BLDC GLUCOMTR-MCNC: 95 MG/DL (ref 70–130)
GLUCOSE BLDC GLUCOMTR-MCNC: 98 MG/DL (ref 70–130)
HCT VFR BLD AUTO: 24.8 % (ref 35.6–45.5)
HGB BLD-MCNC: 7.2 G/DL (ref 11.9–15.5)
IMM GRANULOCYTES # BLD: 0.06 10*3/MM3 (ref 0–0.03)
IMM GRANULOCYTES NFR BLD: 0.7 % (ref 0–0.5)
LYMPHOCYTES # BLD AUTO: 0.96 10*3/MM3 (ref 0.9–4.8)
LYMPHOCYTES NFR BLD AUTO: 12 % (ref 19.6–45.3)
MCH RBC QN AUTO: 21.4 PG (ref 26.9–32)
MCHC RBC AUTO-ENTMCNC: 29 G/DL (ref 32.4–36.3)
MCV RBC AUTO: 73.8 FL (ref 80.5–98.2)
MONOCYTES # BLD AUTO: 0.84 10*3/MM3 (ref 0.2–1.2)
MONOCYTES NFR BLD AUTO: 10.5 % (ref 5–12)
NEUTROPHILS # BLD AUTO: 5.9 10*3/MM3 (ref 1.9–8.1)
NEUTROPHILS NFR BLD AUTO: 73.6 % (ref 42.7–76)
PLATELET # BLD AUTO: 493 10*3/MM3 (ref 140–500)
PMV BLD AUTO: 9.7 FL (ref 6–12)
POTASSIUM BLD-SCNC: 3.6 MMOL/L (ref 3.5–5.2)
RBC # BLD AUTO: 3.36 10*6/MM3 (ref 3.9–5.2)
RH BLD: POSITIVE
SODIUM BLD-SCNC: 144 MMOL/L (ref 136–145)
WBC NRBC COR # BLD: 8.02 10*3/MM3 (ref 4.5–10.7)

## 2017-09-06 PROCEDURE — 36430 TRANSFUSION BLD/BLD COMPNT: CPT

## 2017-09-06 PROCEDURE — 80048 BASIC METABOLIC PNL TOTAL CA: CPT | Performed by: NURSE PRACTITIONER

## 2017-09-06 PROCEDURE — 99231 SBSQ HOSP IP/OBS SF/LOW 25: CPT | Performed by: INTERNAL MEDICINE

## 2017-09-06 PROCEDURE — 82962 GLUCOSE BLOOD TEST: CPT

## 2017-09-06 PROCEDURE — 86900 BLOOD TYPING SEROLOGIC ABO: CPT

## 2017-09-06 PROCEDURE — 86901 BLOOD TYPING SEROLOGIC RH(D): CPT | Performed by: INTERNAL MEDICINE

## 2017-09-06 PROCEDURE — 25010000002 PIPERACILLIN SOD-TAZOBACTAM PER 1 G: Performed by: OBSTETRICS & GYNECOLOGY

## 2017-09-06 PROCEDURE — 25010000002 VANCOMYCIN 10 G RECONSTITUTED SOLUTION: Performed by: INTERNAL MEDICINE

## 2017-09-06 PROCEDURE — 25010000003 POTASSIUM CHLORIDE 10 MEQ/100ML SOLUTION: Performed by: INTERNAL MEDICINE

## 2017-09-06 PROCEDURE — 86850 RBC ANTIBODY SCREEN: CPT | Performed by: INTERNAL MEDICINE

## 2017-09-06 PROCEDURE — 86923 COMPATIBILITY TEST ELECTRIC: CPT

## 2017-09-06 PROCEDURE — 85025 COMPLETE CBC W/AUTO DIFF WBC: CPT | Performed by: NURSE PRACTITIONER

## 2017-09-06 PROCEDURE — 86900 BLOOD TYPING SEROLOGIC ABO: CPT | Performed by: INTERNAL MEDICINE

## 2017-09-06 PROCEDURE — P9016 RBC LEUKOCYTES REDUCED: HCPCS

## 2017-09-06 RX ORDER — BUMETANIDE 0.25 MG/ML
2 INJECTION INTRAMUSCULAR; INTRAVENOUS ONCE
Status: COMPLETED | OUTPATIENT
Start: 2017-09-06 | End: 2017-09-06

## 2017-09-06 RX ADMIN — POTASSIUM CHLORIDE 10 MEQ: 10 INJECTION, SOLUTION INTRAVENOUS at 09:39

## 2017-09-06 RX ADMIN — FLUCONAZOLE 200 MG: 200 TABLET ORAL at 22:18

## 2017-09-06 RX ADMIN — Medication 10 ML: at 22:19

## 2017-09-06 RX ADMIN — VANCOMYCIN HYDROCHLORIDE 1500 MG: 1 INJECTION, POWDER, LYOPHILIZED, FOR SOLUTION INTRAVENOUS at 13:24

## 2017-09-06 RX ADMIN — FOLIC ACID 1 MG: 1 TABLET ORAL at 09:18

## 2017-09-06 RX ADMIN — ISOSORBIDE MONONITRATE 30 MG: 10 TABLET ORAL at 09:19

## 2017-09-06 RX ADMIN — TAZOBACTAM SODIUM AND PIPERACILLIN SODIUM 3.38 G: 375; 3 INJECTION, SOLUTION INTRAVENOUS at 06:36

## 2017-09-06 RX ADMIN — RISPERIDONE 3 MG: 3 TABLET ORAL at 22:18

## 2017-09-06 RX ADMIN — FERROUS SULFATE TAB 325 MG (65 MG ELEMENTAL FE) 325 MG: 325 (65 FE) TAB at 09:18

## 2017-09-06 RX ADMIN — ATORVASTATIN CALCIUM 20 MG: 20 TABLET, FILM COATED ORAL at 09:19

## 2017-09-06 RX ADMIN — POTASSIUM CHLORIDE 10 MEQ: 10 INJECTION, SOLUTION INTRAVENOUS at 05:50

## 2017-09-06 RX ADMIN — Medication 10 ML: at 09:42

## 2017-09-06 RX ADMIN — AMLODIPINE BESYLATE 5 MG: 5 TABLET ORAL at 15:54

## 2017-09-06 RX ADMIN — BUMETANIDE 2 MG: 0.25 INJECTION, SOLUTION INTRAMUSCULAR; INTRAVENOUS at 21:21

## 2017-09-06 RX ADMIN — POTASSIUM CHLORIDE 20 MEQ: 750 CAPSULE, EXTENDED RELEASE ORAL at 09:18

## 2017-09-06 RX ADMIN — DEXTROSE MONOHYDRATE 100 ML/HR: 50 INJECTION, SOLUTION INTRAVENOUS at 02:24

## 2017-09-06 RX ADMIN — DEXTROSE MONOHYDRATE 100 ML/HR: 50 INJECTION, SOLUTION INTRAVENOUS at 13:33

## 2017-09-06 RX ADMIN — VALPROIC ACID 250 MG: 250 SOLUTION ORAL at 22:18

## 2017-09-06 RX ADMIN — BUMETANIDE 2 MG: 2 TABLET ORAL at 09:18

## 2017-09-06 RX ADMIN — PANTOPRAZOLE SODIUM 40 MG: 40 TABLET, DELAYED RELEASE ORAL at 05:50

## 2017-09-06 RX ADMIN — TAZOBACTAM SODIUM AND PIPERACILLIN SODIUM 3.38 G: 375; 3 INJECTION, SOLUTION INTRAVENOUS at 15:51

## 2017-09-06 RX ADMIN — VALPROIC ACID 250 MG: 250 SOLUTION ORAL at 09:19

## 2017-09-06 RX ADMIN — PETROLATUM: 42 OINTMENT TOPICAL at 09:20

## 2017-09-06 RX ADMIN — RISPERIDONE 2 MG: 2 TABLET, FILM COATED ORAL at 09:17

## 2017-09-06 NOTE — PROGRESS NOTES
"   LOS: 10 days   Patient Care Team:  Shane Barcenas MD as PCP - General  Shane Barcenas MD as PCP - Family Medicine    Chief Complaint:  anemia    Subjective   No bleeding, or black stools, wants to go home.  Lethargic today   History of Present Illness    Subjective:  Symptoms:  Stable.    Diet:  Adequate intake.    Activity level: Impaired due to weakness.        History taken from: patient chart RN    Objective     Vital Signs  Temp:  [96.7 °F (35.9 °C)-97.7 °F (36.5 °C)] 97.7 °F (36.5 °C)  Heart Rate:  [73-81] 81  Resp:  [18-20] 18  BP: (126-135)/(56-65) 126/56    Objective:  Vital signs: (most recent): Blood pressure 126/56, pulse 81, temperature 97.7 °F (36.5 °C), temperature source Oral, resp. rate 18, height 62.99\" (160 cm), weight 284 lb (129 kg), SpO2 96 %, not currently breastfeeding.    Lungs:  There are decreased breath sounds.    Heart: Normal rate.    Abdomen: Abdomen is soft.  There is no abdominal tenderness.  There is no rebound tenderness.  There is no guarding.     Neurological: Patient is alert.    Skin:  Warm.              Results Review:     I reviewed the patient's new clinical results.  Discussed with Dr Mary     Medication Review:     Assessment/Plan     Principal Problem:    Sepsis  Active Problems:    Chronic diastolic CHF (congestive heart failure)    Morbid obesity    THAI (obstructive sleep apnea)    Schizo affective schizophrenia    Diabetes mellitus    Coronary artery disease    Chronic respiratory failure with hypoxia and hypercapnia    Lymphedema    Hypertension    Abscess of abdominal cavity      Assessment:  (Iron deficiency anemia  Multifactorial stable at present  Colonic abscess.).     Plan:   (Supportive care for the anemia  Given colonic abscess, any endoscopic procedure would pose a huge risk of perforation  Happy to see patient after discharge and after resolution of the colonic abscess in a few months.   ).       Rasheed Shin MD  09/06/17  12:25 PM    Time: " Critical care 14 min

## 2017-09-06 NOTE — PROGRESS NOTES
"DAILY PROGRESS NOTE    Patient Identification:  Name: Lupe Burleson  Age: 61 y.o.  Sex: Female  :  1955  MRN:[id number]    Cheif complaint:  Chief Complaint   Patient presents with   • Fever     Patient is 1 week post Hysterectomy and is sent from NH for increased lethargy and a fever. 101.6 per facility.      Subjective:  Patient awake. Slow to respond. Non-verbal.     Nurse reports loose BM today, non bloody or tarry.      Objective:  Scheduled Meds:    alteplase 2 mg Intravenous Once   alteplase 2 mg Intravenous Once   alteplase 2 mg Intravenous Once   amLODIPine 5 mg Oral Q24H   atorvastatin 20 mg Per G Tube Daily   bumetanide 2 mg Oral Daily   ferrous sulfate 325 mg Oral Daily With Breakfast   fluconazole 200 mg Oral Q24H   folic acid 1 mg Oral Daily   hydrophor  Topical Q24H   isosorbide mononitrate 30 mg Per G Tube Daily   lactulose 20 g Oral Daily   pantoprazole 40 mg Oral Q AM   piperacillin-tazobactam 3.375 g Intravenous Q8H   polyethylene glycol 17 g Oral Daily   potassium chloride 20 mEq Oral Daily   risperiDONE 2 mg Oral Daily   risperiDONE 3 mg Oral Nightly   sodium chloride 10 mL Intracatheter Q12H   Valproic Acid 250 mg Oral Q12H   vancomycin 1,500 mg Intravenous Q24H       Continuous Infusions:    dextrose 100 mL/hr Last Rate: 100 mL/hr (17 022)   Pharmacy to dose vancomycin         PRN Meds:  •  acetaminophen  •  HYDROcodone-acetaminophen  •  HYDROcodone-acetaminophen  •  ipratropium-albuterol  •  LORazepam  •  Pharmacy to dose vancomycin  •  potassium chloride **OR** potassium chloride **OR** potassium chloride  •  Insert peripheral IV **AND** sodium chloride  •  sodium chloride    Intake/Output:  I/O last 3 completed shifts:  In: 3136.7 [P.O.:240; I.V.:2406.7; Other:40; IV Piggyback:450]  Out: -     Exam:  /56 (BP Location: Left arm, Patient Position: Lying)  Pulse 81  Temp 97.7 °F (36.5 °C) (Oral)   Resp 18  Ht 62.99\" (160 cm)  Wt 284 lb 6.3 oz (129 kg)  SpO2 96%  " BMI 50.39 kg/m2  Temp (24hrs), Av.1 °F (36.2 °C), Min:96.7 °F (35.9 °C), Max:97.7 °F (36.5 °C)    Physical Examination:   PHYSICAL EXAM:  Constitutional:  No acute distress, Non-toxic appearance.  Cardiovascular:  Regular rate and rythm  Pulmonary/Chest:  CTA-B  Abdomen:  Obese, tender upon palpation, bowel sounds normal, Soft, I=C/D/I  Extremities:  No C/C/E  Neurologic:  Alert.      Data Review:  WBC   Date Value Ref Range Status   2017 8.02 4.50 - 10.70 10*3/mm3 Final     RBC   Date Value Ref Range Status   2017 3.36 (L) 3.90 - 5.20 10*6/mm3 Final     Hemoglobin   Date Value Ref Range Status   2017 7.2 (L) 11.9 - 15.5 g/dL Final     Hematocrit   Date Value Ref Range Status   2017 24.8 (L) 35.6 - 45.5 % Final     MCV   Date Value Ref Range Status   2017 73.8 (L) 80.5 - 98.2 fL Final     MCH   Date Value Ref Range Status   2017 21.4 (L) 26.9 - 32.0 pg Final     MCHC   Date Value Ref Range Status   2017 29.0 (L) 32.4 - 36.3 g/dL Final     RDW   Date Value Ref Range Status   2017 24.6 (H) 11.7 - 13.0 % Final     RDW-SD   Date Value Ref Range Status   2017 64.2 (H) 37.0 - 54.0 fl Final     MPV   Date Value Ref Range Status   2017 9.7 6.0 - 12.0 fL Final     Platelets   Date Value Ref Range Status   2017 493 140 - 500 10*3/mm3 Final     Neutrophil %   Date Value Ref Range Status   2017 73.6 42.7 - 76.0 % Final     Lymphocyte %   Date Value Ref Range Status   2017 12.0 (L) 19.6 - 45.3 % Final     Monocyte %   Date Value Ref Range Status   2017 10.5 5.0 - 12.0 % Final     Eosinophil %   Date Value Ref Range Status   2017 3.0 0.3 - 6.2 % Final     Basophil %   Date Value Ref Range Status   2017 0.2 0.0 - 1.5 % Final     Immature Grans %   Date Value Ref Range Status   2017 0.7 (H) 0.0 - 0.5 % Final     Neutrophils, Absolute   Date Value Ref Range Status   2017 5.90 1.90 - 8.10 10*3/mm3 Final     Lymphocytes,  Absolute   Date Value Ref Range Status   09/06/2017 0.96 0.90 - 4.80 10*3/mm3 Final     Monocytes, Absolute   Date Value Ref Range Status   09/06/2017 0.84 0.20 - 1.20 10*3/mm3 Final     Eosinophils, Absolute   Date Value Ref Range Status   09/06/2017 0.24 0.00 - 0.70 10*3/mm3 Final     Basophils, Absolute   Date Value Ref Range Status   09/06/2017 0.02 0.00 - 0.20 10*3/mm3 Final     Immature Grans, Absolute   Date Value Ref Range Status   09/06/2017 0.06 (H) 0.00 - 0.03 10*3/mm3 Final     nRBC   Date Value Ref Range Status   09/04/2017 0.0 0.0 - 0.0 /100 WBC Final     Recent Results (from the past 36 hour(s))   Basic Metabolic Panel    Collection Time: 09/05/17  5:24 AM   Result Value Ref Range    Glucose 112 (H) 65 - 99 mg/dL    BUN 7 (L) 8 - 23 mg/dL    Creatinine 0.68 0.57 - 1.00 mg/dL    Sodium 145 136 - 145 mmol/L    Potassium 3.5 3.5 - 5.2 mmol/L    Chloride 105 98 - 107 mmol/L    CO2 32.4 (H) 22.0 - 29.0 mmol/L    Calcium 8.6 8.6 - 10.5 mg/dL    eGFR  African Amer 106 >60 mL/min/1.73    BUN/Creatinine Ratio 10.3 7.0 - 25.0    Anion Gap 7.6 mmol/L   CBC Auto Differential    Collection Time: 09/05/17  5:24 AM   Result Value Ref Range    WBC 7.14 4.50 - 10.70 10*3/mm3    RBC 3.22 (L) 3.90 - 5.20 10*6/mm3    Hemoglobin 7.1 (L) 11.9 - 15.5 g/dL    Hematocrit 23.6 (L) 35.6 - 45.5 %    MCV 73.3 (L) 80.5 - 98.2 fL    MCH 22.0 (L) 26.9 - 32.0 pg    MCHC 30.1 (L) 32.4 - 36.3 g/dL    RDW 24.7 (H) 11.7 - 13.0 %    RDW-SD 64.1 (H) 37.0 - 54.0 fl    MPV 9.5 6.0 - 12.0 fL    Platelets 506 (H) 140 - 500 10*3/mm3    Neutrophil % 73.8 42.7 - 76.0 %    Lymphocyte % 11.6 (L) 19.6 - 45.3 %    Monocyte % 11.1 5.0 - 12.0 %    Eosinophil % 2.2 0.3 - 6.2 %    Basophil % 0.3 0.0 - 1.5 %    Immature Grans % 1.0 (H) 0.0 - 0.5 %    Neutrophils, Absolute 5.27 1.90 - 8.10 10*3/mm3    Lymphocytes, Absolute 0.83 (L) 0.90 - 4.80 10*3/mm3    Monocytes, Absolute 0.79 0.20 - 1.20 10*3/mm3    Eosinophils, Absolute 0.16 0.00 - 0.70 10*3/mm3     Basophils, Absolute 0.02 0.00 - 0.20 10*3/mm3    Immature Grans, Absolute 0.07 (H) 0.00 - 0.03 10*3/mm3   POC Glucose Fingerstick    Collection Time: 09/05/17  6:23 AM   Result Value Ref Range    Glucose 109 70 - 130 mg/dL   POC Glucose Fingerstick    Collection Time: 09/05/17 11:48 AM   Result Value Ref Range    Glucose 119 70 - 130 mg/dL   Occult Blood X 1, Stool    Collection Time: 09/05/17  3:03 PM   Result Value Ref Range    Fecal Occult Blood Negative Negative   POC Glucose Fingerstick    Collection Time: 09/05/17  4:33 PM   Result Value Ref Range    Glucose 122 70 - 130 mg/dL   Potassium    Collection Time: 09/05/17  7:50 PM   Result Value Ref Range    Potassium 4.0 3.5 - 5.2 mmol/L   POC Glucose Fingerstick    Collection Time: 09/05/17  9:14 PM   Result Value Ref Range    Glucose 103 70 - 130 mg/dL   Basic Metabolic Panel    Collection Time: 09/06/17  4:50 AM   Result Value Ref Range    Glucose 100 (H) 65 - 99 mg/dL    BUN 6 (L) 8 - 23 mg/dL    Creatinine 0.73 0.57 - 1.00 mg/dL    Sodium 144 136 - 145 mmol/L    Potassium 3.6 3.5 - 5.2 mmol/L    Chloride 102 98 - 107 mmol/L    CO2 35.4 (H) 22.0 - 29.0 mmol/L    Calcium 9.4 8.6 - 10.5 mg/dL    eGFR  African Amer 98 >60 mL/min/1.73    BUN/Creatinine Ratio 8.2 7.0 - 25.0    Anion Gap 6.6 mmol/L   CBC Auto Differential    Collection Time: 09/06/17  4:50 AM   Result Value Ref Range    WBC 8.02 4.50 - 10.70 10*3/mm3    RBC 3.36 (L) 3.90 - 5.20 10*6/mm3    Hemoglobin 7.2 (L) 11.9 - 15.5 g/dL    Hematocrit 24.8 (L) 35.6 - 45.5 %    MCV 73.8 (L) 80.5 - 98.2 fL    MCH 21.4 (L) 26.9 - 32.0 pg    MCHC 29.0 (L) 32.4 - 36.3 g/dL    RDW 24.6 (H) 11.7 - 13.0 %    RDW-SD 64.2 (H) 37.0 - 54.0 fl    MPV 9.7 6.0 - 12.0 fL    Platelets 493 140 - 500 10*3/mm3    Neutrophil % 73.6 42.7 - 76.0 %    Lymphocyte % 12.0 (L) 19.6 - 45.3 %    Monocyte % 10.5 5.0 - 12.0 %    Eosinophil % 3.0 0.3 - 6.2 %    Basophil % 0.2 0.0 - 1.5 %    Immature Grans % 0.7 (H) 0.0 - 0.5 %    Neutrophils,  Absolute 5.90 1.90 - 8.10 10*3/mm3    Lymphocytes, Absolute 0.96 0.90 - 4.80 10*3/mm3    Monocytes, Absolute 0.84 0.20 - 1.20 10*3/mm3    Eosinophils, Absolute 0.24 0.00 - 0.70 10*3/mm3    Basophils, Absolute 0.02 0.00 - 0.20 10*3/mm3    Immature Grans, Absolute 0.06 (H) 0.00 - 0.03 10*3/mm3   POC Glucose Fingerstick    Collection Time: 09/06/17  5:59 AM   Result Value Ref Range    Glucose 98 70 - 130 mg/dL   POC Glucose Fingerstick    Collection Time: 09/06/17 11:08 AM   Result Value Ref Range    Glucose 93 70 - 130 mg/dL     RADIOLOGY  Ct Chest With Contrast    Result Date: 8/27/2017  Narrative: CT SCANS OF THE CHEST, ABDOMEN, AND PELVIS WITH INTRAVENOUS CONTRAST  HISTORY: Recent hysterectomy. Fever and shortness of breath and abdominal pain.  The CT scans were performed as an emergency procedure with CT imaging through the chest, abdomen, and pelvis with intravenous contrast. The following findings are present: 1. There is some predominately strand-like atelectasis at both lung bases medially associated with tiny bilateral pleural effusions and the lungs are otherwise clear. There is no mediastinal or hilar or axillary adenopathy. Small to moderate-sized pericardial effusion measuring 1.9 cm in thickness and this shows enlargement since the preoperative CT scan of 06/18/2017. 2. There is a small hepatic cyst that is unchanged. The spleen, pancreas, both adrenal glands and right kidney are unremarkable. There is mild dilatation of the left renal collecting system including the left ureter which extends into the pelvis where there is prominent induration of the mesenteric fat planes associated with generalized wall thickening of the rectosigmoid colon and there is also an adjacent fluid collection to the left of the sigmoid colon that measures up to 5.5 x 6.8 cm. There is also generalized wall thickening of the urinary bladder. Some of these findings may relate to postoperative changes but are concerning for  colitis. The fluid collection to the left of the sigmoid colon could potentially represent an area of developing abscess and the distended ureter extends to this region. The generalized thickening of the urinary bladder may be secondary to the adjacent inflammatory changes but clinical correlation for cystitis is also recommended.  These findings have been discussed with Dr. Quintero in the emergency room.  This report was finalized on 8/27/2017 4:37 PM by Dr. Dionte Barry MD.      Us Pelvis Complete    Result Date: 8/17/2017  Narrative: PELVIC ULTRASOUND  HISTORY: 61-year-old nursing home patient with vaginal bleeding.  The examination was performed as an emergency procedure. The patient refused transvaginal imaging. The uterus is enlarged and lobulated, measuring 9.8 x 9.8 x 13.7 cm and there are multiple fibroids measuring up to 7 cm in size. There is also generalized endometrial thickening with double wall thickness of 1.9 cm. The patient is postmenopausal and this does raise a concern of endometrial tumor and further clinical correlation is required.  The ovaries are not visualized. There is no free fluid identified.  This report was finalized on 8/17/2017 11:09 AM by Dr. Dionte Barry MD.      Ct Abdomen Pelvis With Contrast    Result Date: 9/1/2017  Narrative: POSTCONTRAST CT ABDOMEN AND PELVIS  HISTORY: 61-year-old female for follow-up of abscess with hypoactive bowel sounds and persistent abdominal pain.  COMPARISON: 08/27/2017  FINDINGS: 1. Imaging remains limited by body habitus associated upper extremity artifact, motion artifact. 2. Stable moderate pericardial effusion small bilateral pleural effusions and bibasilar atelectasis. 3. Cholelithiasis benign hepatic cyst bilateral renal cysts. 4. Left hydronephrosis and hydroureter extending to inflammatory change within the left pelvis. 4. Previous described pericolonic abscess 6.8 x 5.5 cm has developed an enhancing wall and shows no change in size. 5.  Severe inflammatory change of the sigmoid colon diffusely. 6. Persistent thickening of the urinary bladder wall. 7. Additional findings as previously noted, no new abnormality.      Impression: 1. No significant interval change in size of pelvic abscess. 2. Other findings as described without other significant interval change including mild left hydronephrosis, urinary bladder wall thickening, marked thickening of the sigmoid colon extending to the rectum.  This report was finalized on 9/1/2017 7:32 PM by Dr. Bryan Yadav MD.      Ct Abdomen Pelvis With Contrast    Result Date: 8/27/2017  Narrative: CT SCANS OF THE CHEST, ABDOMEN, AND PELVIS WITH INTRAVENOUS CONTRAST  HISTORY: Recent hysterectomy. Fever and shortness of breath and abdominal pain.  The CT scans were performed as an emergency procedure with CT imaging through the chest, abdomen, and pelvis with intravenous contrast. The following findings are present: 1. There is some predominately strand-like atelectasis at both lung bases medially associated with tiny bilateral pleural effusions and the lungs are otherwise clear. There is no mediastinal or hilar or axillary adenopathy. Small to moderate-sized pericardial effusion measuring 1.9 cm in thickness and this shows enlargement since the preoperative CT scan of 06/18/2017. 2. There is a small hepatic cyst that is unchanged. The spleen, pancreas, both adrenal glands and right kidney are unremarkable. There is mild dilatation of the left renal collecting system including the left ureter which extends into the pelvis where there is prominent induration of the mesenteric fat planes associated with generalized wall thickening of the rectosigmoid colon and there is also an adjacent fluid collection to the left of the sigmoid colon that measures up to 5.5 x 6.8 cm. There is also generalized wall thickening of the urinary bladder. Some of these findings may relate to postoperative changes but are concerning for  colitis. The fluid collection to the left of the sigmoid colon could potentially represent an area of developing abscess and the distended ureter extends to this region. The generalized thickening of the urinary bladder may be secondary to the adjacent inflammatory changes but clinical correlation for cystitis is also recommended.  These findings have been discussed with Dr. Quintero in the emergency room.  This report was finalized on 8/27/2017 4:37 PM by Dr. Dionte Barry MD.      Xr Chest 1 View    Result Date: 8/27/2017  Narrative: XR CHEST 1 VW-  Clinical: Postoperative fever  COMPARISON 08/20/2017  FINDINGS: No acute airspace disease has developed. The cardiomediastinal silhouette is stable. No edema or effusion.  CONCLUSION: No acute cardiovascular or pulmonary process is demonstrated.  This report was finalized on 8/27/2017 1:00 PM by Dr. Kieran Mackay MD.      Xr Chest Pa & Lateral    Result Date: 8/20/2017  Narrative: PA AND LATERAL CHEST RADIOGRAPHS  HISTORY: 61-year-old female undergoing follow-up evaluation of chest infiltrates.  FINDINGS: Upright AP and lateral chest radiographs were obtained. Comparison is made to a prior examination dated 08/18/2017. There is mild bilateral basilar atelectasis with decreased lung volumes. Moderate atheromatous calcification is seen within the aortic arch. There is limited visualization on the lateral chest radiograph and the previously described pleural effusions cannot be substantiated on the current examination.      Impression: Persistent low lung volumes with bibasilar atelectasis. The previously described pleural effusions are not certainly identified on the current examination as the lateral chest radiograph is markedly limited. Little interval change has occurred on the PA chest radiograph when compared to the prior examination.  This report was finalized on 8/20/2017 4:35 PM by Dr. Darrin Christian MD.      Xr Chest Pa & Lateral    Result Date:  8/18/2017  Narrative: PA AND LATERAL CHEST  HISTORY: Cough.  PA and lateral views of the chest were obtained and compared to 08/15/2017.  FINDINGS:  The right internal jugular central line has been removed. The study is hampered by the patient's body habitus, patient positioning and inspiratory effort. There is bibasilar atelectasis/infiltrate more prominent on the left, similar to slightly less prominent as compared to prior examination. The pulmonary interstitial vascular prominence which was present previously is less prominent on the current examination. On the lateral projection bilateral pleural effusions are appreciated, moderate in size and there is moderate posterior atelectasis/infiltrate bilaterally. The above information was called to patient's nurse who is to  relay the information to the clinical service.  This report was finalized on 8/18/2017 2:29 PM by Dr. Mario Sim MD.      Xr Chest Post Cva Port    Result Date: 8/15/2017  Narrative: CHEST POST CVA PORT  HISTORY: Morbidly obese 61-year-old female for right jugular line placement.  COMPARISON: 05/16/2017  FINDINGS: 1. Right jugular line projects over the superior vena cava. 2. Imaging is considerably limited by morbid obesity, low lung volumes, mandibular artifact obscuring detail of the right apex.  If patient has symptoms suggesting pneumothorax, follow-up lordotic imaging or standard imaging with removal mandibular artifact would be helpful.  This report was finalized on 8/15/2017 11:50 AM by Dr. Bryan Yadav MD.        Assessment/Plan:  HD 10:   1. SP CRISS/BSO on 08/15 for endometrial cancer. Advanced aggressive cancer. Should she recover from infection petition with courts would be necessary in making treatment care decisions.   2. Leukocytosis resolved, pelvic fluid collection not amenable for CT guided drainage secondary fluid collection deep in the pelvis adjacent to the sigmoid colon collection. Continue zosyn, vanc and diflucan.  Repeat CT: no change in size of pelvic abscess (looks more walled off now). Per ID: at discharge pt may be switched to po cipro with augmentin and diflucan and repeat CT scan in 2 weeks.   3. Elen-cardial effusion, cardiology consulted. Echo with moderate effusion, no tamponade. No intervention planned  4. Anemia, hgb stable, likely neoplastic, acute on chronic disease. Post two units PRBC 08/29, continue ferrous sulfate.  5. Blood in stool. GI consulted. Scopes planned for outpatient  6. Disposition: to NH soon, per hospitalist     GYN/ONC will follow peripherally. Please call with questions.  Follow-up with Dr. Washington in 2-4 weeks.      VASHTI Sanchez  9/6/2017 @ 1:33 PM

## 2017-09-06 NOTE — NURSING NOTE
CWOCN consult for buttocks/coccyx reeval. Patient continues to have stage 2 pressure injury and scattered friction skin injury on her buttocks and coccyx. She is incontinent of stool and urine. Barrier ointment has been applied. No other recommendations at this time as it is unlikely a dressing will stay on. I have requested a low air loss mattress for her from [facility].

## 2017-09-06 NOTE — PROGRESS NOTES
"  Infectious Diseases Progress Note    Ramya Alex MD     [facility]  [place]: 10 days  Patient Identification:  Name: Lupe Burleson  Age: 61 y.o.  Sex: female  :  1955  MRN: [id number]         Primary Care Physician: Shane Barcenas MD            Subjective: No new complaints  Interval History: see consultation notes     Objective:    Scheduled Meds:    alteplase 2 mg Intravenous Once   alteplase 2 mg Intravenous Once   alteplase 2 mg Intravenous Once   amLODIPine 5 mg Oral Q24H   atorvastatin 20 mg Per G Tube Daily   bumetanide 2 mg Oral Daily   ferrous sulfate 325 mg Oral Daily With Breakfast   fluconazole 200 mg Oral Q24H   folic acid 1 mg Oral Daily   hydrophor  Topical Q24H   isosorbide mononitrate 30 mg Per G Tube Daily   lactulose 20 g Oral Daily   pantoprazole 40 mg Oral Q AM   piperacillin-tazobactam 3.375 g Intravenous Q8H   polyethylene glycol 17 g Oral Daily   potassium chloride 20 mEq Oral Daily   risperiDONE 2 mg Oral Daily   risperiDONE 3 mg Oral Nightly   sodium chloride 10 mL Intracatheter Q12H   Valproic Acid 250 mg Oral Q12H   vancomycin 1,500 mg Intravenous Q24H     Continuous Infusions:    dextrose 100 mL/hr Last Rate: 100 mL/hr (17 0224)   Pharmacy to dose vancomycin         Vital signs in last 24 hours:  Temp:  [96.7 °F (35.9 °C)-97.7 °F (36.5 °C)] 97.7 °F (36.5 °C)  Heart Rate:  [73-81] 81  Resp:  [18-20] 18  BP: (126-135)/(56-65) 126/56    Intake/Output:    Intake/Output Summary (Last 24 hours) at 17 0824  Last data filed at 17 0636   Gross per 24 hour   Intake          1936.66 ml   Output                0 ml   Net          1936.66 ml       Exam:  /56 (BP Location: Left arm, Patient Position: Lying)  Pulse 81  Temp 97.7 °F (36.5 °C) (Oral)   Resp 18  Ht 62.99\" (160 cm)  Wt 284 lb (129 kg)  SpO2 96%  BMI 50.32 kg/m2    General Appearance:    Awake but doesn't want to interact                          Head:    Normocephalic, without " obvious abnormality, atraumatic                           Eyes:    PERRL, conjunctiva/corneas clear, EOM's intact, both eyes                         Throat:   Lips, tongue, gums normal; oral mucosa pink and moist                           Neck:   Supple, symmetrical, trachea midline, no JVD                         Lungs:    Clear to auscultation bilaterally, respirations unlabored                 Chest Wall:    No tenderness or deformity                          Heart:    Regular rate and rhythm, S1 and S2 normal, no murmur,no  Rub                                      or gallop                  Abdomen:     Soft, obese, surgical incision well approximated, non-tender, bowel sounds active                 Extremities:   Chronic changes in the left lower extremity and right arm PICC line in place                        Pulses:   Pulses palpable in all extremities                            Skin:   Skin is warm and dry,  no rashes or palpable lesions                         Data Review:    I reviewed the patient's new clinical results.    Results from last 7 days  Lab Units 09/06/17  0450 09/05/17  0524 09/04/17  1131 09/02/17  0547 09/01/17  0606 08/31/17  0437   WBC 10*3/mm3 8.02 7.14 7.61 11.63* 13.66* 13.48*   HEMOGLOBIN g/dL 7.2* 7.1* 6.9* 7.2* 7.3* 7.5*   PLATELETS 10*3/mm3 493 506* 504* 530* 469 530*       Results from last 7 days  Lab Units 09/06/17  0450 09/05/17  1950 09/05/17  0524 09/04/17  0634 09/03/17  0035 09/02/17  0547 09/01/17  0606 08/31/17  0437   SODIUM mmol/L 144  --  145 147*  --  146* 147* 146*   POTASSIUM mmol/L 3.6 4.0 3.5 3.5 3.6 3.6 3.7 3.7   CHLORIDE mmol/L 102  --  105 107  --  108* 110* 112*   CO2 mmol/L 35.4*  --  32.4* 29.5*  --  29.7* 27.8 26.1   BUN mg/dL 6*  --  7* 7*  --  8 8 7*   CREATININE mg/dL 0.73  --  0.68 0.67  --  0.58 0.61 0.49*   CALCIUM mg/dL 9.4  --  8.6 9.0  --  9.1 8.8 8.5*   GLUCOSE mg/dL 100*  --  112* 86  --  94 98 95     Microbiology Results (last 10 days)      Procedure Component Value - Date/Time    Urine Culture [[id number]]  (Normal) Collected:  08/27/17 1230    Lab Status:  Final result Specimen:  Urine from Urine, Catheter Updated:  08/29/17 0725     Urine Culture No growth    Wound Culture [[id number]]  (Abnormal)  (Susceptibility) Collected:  08/27/17 1230    Lab Status:  Final result Specimen:  Wound from Buttock Updated:  08/30/17 0831     Wound Culture Moderate growth (3+) Pseudomonas aeruginosa (A)      Moderate growth (3+) Staphylococcus warneri (A)      Moderate growth (3+) Proteus mirabilis (A)      Light growth (2+) Klebsiella pneumoniae ssp pneumoniae (A)     Gram Stain Result Few (2+) WBCs seen      Rare (1+) Gram negative bacilli      Rare (1+) Gram positive cocci in pairs    Susceptibility      Pseudomonas aeruginosa     DEBI     Cefepime <=1 ug/ml Susceptible     Ceftazidime 2 ug/ml Susceptible     Ciprofloxacin <=0.25 ug/ml Susceptible     Levofloxacin 0.5 ug/ml Susceptible     Meropenem <=0.25 ug/ml Susceptible     Piperacillin + Tazobactam 8 ug/ml Susceptible     Tobramycin <=1 ug/ml Susceptible                Susceptibility      Staphylococcus warneri     DEBI     Clindamycin <=0.25 ug/ml Susceptible     Erythromycin >=8 ug/ml Resistant     Oxacillin <=0.25 ug/ml Susceptible     Penicillin G >=0.5 ug/ml Resistant     Rifampin <=0.5 ug/ml Susceptible     Tetracycline <=1 ug/ml Susceptible     Trimethoprim + Sulfamethoxazole <=10 ug/ml Susceptible     Vancomycin <=0.5 ug/ml Susceptible                Susceptibility      Proteus mirabilis     DEBI     Ampicillin <=2 ug/ml Susceptible     Ampicillin + Sulbactam <=2 ug/ml Susceptible     Cefazolin 8 ug/ml Susceptible     Cefepime <=1 ug/ml Susceptible     Cefoxitin 8 ug/ml Susceptible     Ceftriaxone <=1 ug/ml Susceptible     Ertapenem 1 ug/ml Susceptible     Gentamicin <=1 ug/ml Susceptible     Levofloxacin >=8 ug/ml Resistant     Meropenem 1 ug/ml Susceptible     Piperacillin + Tazobactam <=4 ug/ml  Susceptible     Trimethoprim + Sulfamethoxazole >=320 ug/ml Resistant                Susceptibility      Klebsiella pneumoniae ssp pneumoniae     DEBI     Ampicillin >=32 ug/ml Resistant     Ampicillin + Sulbactam 8 ug/ml Susceptible     Cefazolin <=4 ug/ml Susceptible     Cefepime <=1 ug/ml Susceptible     Cefoxitin <=4 ug/ml Susceptible     Ceftriaxone <=1 ug/ml Susceptible     Ertapenem <=0.5 ug/ml Susceptible     Gentamicin <=1 ug/ml Susceptible     Levofloxacin <=0.12 ug/ml Susceptible     Meropenem <=0.25 ug/ml Susceptible     Piperacillin + Tazobactam 8 ug/ml Susceptible     Trimethoprim + Sulfamethoxazole <=20 ug/ml Susceptible                    Blood Culture [[id number]]  (Abnormal) Collected:  08/27/17 1228    Lab Status:  Final result Specimen:  Blood from Arm, Left Updated:  08/30/17 0722     Blood Culture Staphylococcus, coagulase negative (A)                 Staphylococcus, coagulase negative (A)                Gram Stain Result Aerobic Bottle Gram positive cocci      Anaerobic Bottle Gram positive cocci in clusters    Narrative:       Probable contaminants requires clinical correlation, susceptibility not performed unless requested by physician.    Blood Culture ID, PCR [[id number]]  (Abnormal) Collected:  08/27/17 1228    Lab Status:  Final result Specimen:  Blood from Arm, Left Updated:  08/28/17 1223     BCID, PCR Staphylococcus spp, not aureus. Identification by BCID PCR. (A)            Assessment:  Principal Problem:    Sepsis  Active Problems:    Chronic diastolic CHF (congestive heart failure)    Morbid obesity    THAI (obstructive sleep apnea)    Schizo affective schizophrenia    Diabetes mellitus    Coronary artery disease    Chronic respiratory failure with hypoxia and hypercapnia    Lymphedema    Hypertension    Abscess of abdominal cavity  bacteremia likely contaminant    Plan:Continue present care and switched to oral antibiotics when ready to be discharged with subsequent CT scan to see  effectiveness of treatment and course of the intra-abdominal abscess.    Ramya Alex MD  9/6/2017  8:24 AM    Much of this encounter note is an electronic transcription/translation of spoken language to printed text. The electronic translation of spoken language may permit erroneous, or at times, nonsensical words or phrases to be inadvertently transcribed; Although I have reviewed the note for such errors, some may still exist

## 2017-09-06 NOTE — PLAN OF CARE
Problem: Patient Care Overview (Adult)  Goal: Plan of Care Review  Outcome: Ongoing (interventions implemented as appropriate)    09/06/17 0509   Coping/Psychosocial Response Interventions   Plan Of Care Reviewed With patient   Patient Care Overview   Progress improving   Outcome Evaluation   Outcome Summary/Follow up Plan vitals stable, no falls, patient denied having pain last night, changed and turned every 2 hours at least. Patient a large bm yesterday on dayshift. picc continue to flush and draw blood in all 3 lumens. continue to monitor labs. tolerating iv antibiotics. possible discharge today or tomorrow.         Problem: Skin Integrity Impairment, Risk/Actual (Adult)  Goal: Skin Integrity/Wound Healing  Outcome: Ongoing (interventions implemented as appropriate)    Problem: Sepsis (Adult)  Goal: Signs and Symptoms of Listed Potential Problems Will be Absent or Manageable (Sepsis)  Outcome: Ongoing (interventions implemented as appropriate)    Problem: Infection, Risk/Actual (Adult)  Goal: Infection Prevention/Resolution  Outcome: Ongoing (interventions implemented as appropriate)    Problem: Fall Risk (Adult)  Goal: Absence of Falls  Outcome: Ongoing (interventions implemented as appropriate)    Problem: Pain, Acute (Adult)  Goal: Acceptable Pain Control/Comfort Level  Outcome: Ongoing (interventions implemented as appropriate)  Goal: Identify Related Risk Factors and Signs and Symptoms  Outcome: Outcome(s) achieved Date Met:  09/06/17  Goal: Acceptable Pain Control/Comfort Level  Outcome: Ongoing (interventions implemented as appropriate)    Problem: Confusion, Acute (Adult)  Goal: Cognitive/Functional Impairments Minimized  Outcome: Ongoing (interventions implemented as appropriate)  Goal: Safety  Outcome: Ongoing (interventions implemented as appropriate)    Problem: Anxiety (Adult)  Goal: Reduction/Resolution  Outcome: Ongoing (interventions implemented as appropriate)    Problem: Wound, Traumatic,  Nonburn (Adult)  Goal: Signs and Symptoms of Listed Potential Problems Will be Absent or Manageable (Wound, Traumatic, Nonburn)  Outcome: Ongoing (interventions implemented as appropriate)    Problem: Respiratory Insufficiency (Adult)  Goal: Acid/Base Balance  Outcome: Ongoing (interventions implemented as appropriate)  Goal: Effective Ventilation  Outcome: Ongoing (interventions implemented as appropriate)

## 2017-09-06 NOTE — PROGRESS NOTES
"Adult Nutrition  Assessment/PES    Patient Name:  Lupe Burleson  YOB: 1955  MRN: [id number]  Admit Date:  8/27/2017    Assessment Date:  9/6/2017        Reason for Assessment       09/06/17 1311    Reason for Assessment    Reason For Assessment/Visit follow up protocol                Anthropometrics       09/06/17 1312    Anthropometrics    Height 160 cm (62.99\")    Weight 129 kg (284 lb 6.3 oz)    Ideal Body Weight (IBW)    Ideal Body Weight (IBW), Female 53.1    % Ideal Body Weight 243.45    Body Mass Index (BMI)    BMI (kg/m2) 50.5    BMI Grade greater than 40 - obesity grade III            Labs/Tests/Procedures/Meds       09/06/17 1312    Labs/Tests/Procedures/Meds    Diagnostic Test/Procedure Review reviewed, pertinent    Labs/Tests Review Reviewed;CO2;BUN;Hgb Hct    Medication Review Reviewed, pertinent;Diuretic;Antacid;Antibiotic;Laxative   FeSO4, folic acid, lactulose, KCl    Significant Vitals reviewed, pertinent            Physical Findings       09/06/17 1313    Physical Findings/Assessment    Additional Documentation Physical Appearance (Group)    Physical Appearance    Overall Physical Appearance obese    Gastrointestinal feeding tube    Tubes peg tube    Skin pressure ulcer(s);other (see comments)   L lower calf wound, R heel SDTI, st II coccyx, B=13              Nutrition Prescription Ordered       09/06/17 1314    Nutrition Prescription PO    Current PO Diet Soft Texture    Texture Whole foods    Fluid Consistency Thin    Supplement Magic Cup;Glucerna Shake    Supplement Frequency Daily            Evaluation of Received Nutrient/Fluid Intake       09/06/17 1314    PO Evaluation    Number of Meals 6    % PO Intake 54   0-100%              Problem/Interventions:                  Intervention Goal       09/06/17 1315    Intervention Goal    General Maintain nutrition;Disease management/therapy    PO Increase intake;PO intake (%)    PO Intake % 75 %    Weight Appropriate weight loss "            Nutrition Intervention       09/06/17 1315    Nutrition Intervention    RD/Tech Action Follow Tx progress;Care plan reviewd;Supplement provided              Education/Evaluation       09/06/17 1316    Education    Education Will Instruct as appropriate    Monitor/Evaluation    Monitor Per protocol;PO intake;Supplement intake;Skin status        Comments:  Follow up.  Patient's PO intake per chart seems to be improving.  Consuming an average of 54% (0-100%).  Possible d/c today or tomorrow per chart.  Patient is receiving oral nutrition supplements BID.  Will continue to follow.    Electronically signed by:  Bambi Caba RD  09/06/17 1:16 PM

## 2017-09-06 NOTE — PROGRESS NOTES
DAILY PROGRESS NOTE  [facility], [facility]    2017    Patient Identification:  Name: Lupe Burleson  Age: 61 y.o.  Sex: female  :  1955  MRN: [id number]           Primary Care Physician: Shane Barcenas MD    Subjective:    Interval History:  Ms. Burleson is awake and alert this morning. She has had no reported N/V/D, but did have one large BM. She continues day 9 of vancomycin and zosyn for intra-abdominal abscess.     Objective:    Scheduled Meds:    alteplase 2 mg Intravenous Once   alteplase 2 mg Intravenous Once   alteplase 2 mg Intravenous Once   amLODIPine 5 mg Oral Q24H   atorvastatin 20 mg Per G Tube Daily   bumetanide 2 mg Intravenous Once   bumetanide 2 mg Oral Daily   ferrous sulfate 325 mg Oral Daily With Breakfast   fluconazole 200 mg Oral Q24H   folic acid 1 mg Oral Daily   hydrophor  Topical Q24H   isosorbide mononitrate 30 mg Per G Tube Daily   lactulose 20 g Oral Daily   pantoprazole 40 mg Oral Q AM   piperacillin-tazobactam 3.375 g Intravenous Q8H   polyethylene glycol 17 g Oral Daily   potassium chloride 20 mEq Oral Daily   risperiDONE 2 mg Oral Daily   risperiDONE 3 mg Oral Nightly   sodium chloride 10 mL Intracatheter Q12H   Valproic Acid 250 mg Oral Q12H   vancomycin 1,500 mg Intravenous Q24H       Continuous Infusions:    Pharmacy to dose vancomycin        PRN Meds:  •  acetaminophen  •  HYDROcodone-acetaminophen  •  HYDROcodone-acetaminophen  •  ipratropium-albuterol  •  LORazepam  •  Pharmacy to dose vancomycin  •  potassium chloride **OR** potassium chloride **OR** potassium chloride  •  Insert peripheral IV **AND** sodium chloride  •  sodium chloride    Intake/Output:    Intake/Output Summary (Last 24 hours) at 17 1721  Last data filed at 17 0840   Gross per 24 hour   Intake          1726.66 ml   Output                0 ml   Net          1726.66 ml         Exam:    tMax 24 hrs: Temp (24hrs), Av.5 °F (36.4 °C), Min:96.7  "°F (35.9 °C), Max:98.6 °F (37 °C)    Vitals Ranges:   Temp:  [96.7 °F (35.9 °C)-98.6 °F (37 °C)] 98.6 °F (37 °C)  Heart Rate:  [73-90] 90  Resp:  [18-20] 18  BP: (126-137)/(56-66) 137/66    /66 (BP Location: Left arm, Patient Position: Lying)  Pulse 90  Temp 98.6 °F (37 °C) (Oral)   Resp 18  Ht 62.99\" (160 cm)  Wt 284 lb 6.3 oz (129 kg)  SpO2 99%  BMI 50.39 kg/m2    General: Alert, cooperative and appears stated age. In no acute distress  Neck: Supple, symmetrical, trachea midline, no adenopathy;              Thyroid without enlargement/tenderness/nodules;              no carotid bruit or JVD  Cardiovascular: Regular rate, regular rhythm and intact distal pulses.                              Exam reveals no gallop and no friction rub. No murmur heard  Chest wall: No tenderness or deformity  Pulmonary: diminished bilateral bases, respirations unlabored.                        Bilat wheezing and rhonchi.   Abdominal: Soft, non-tender; bowel sounds active                     Midline incision approximated, Staples now removed.   Extremities: Atraumatic, no cyanosis, chronic lymphedema bilaterally on LE, stable  Pulses:         2 + symmetric all extremities  Neurological: She is alert and oriented to person, place, and time.                           CNII-XII intact, normal strength, sensation intact throughout  Skin: Skin color, texture, turgor normal, stage 2 coccyx wound      Data Review:      Results from last 7 days  Lab Units 09/06/17  0450 09/05/17 0524 09/04/17  1131   WBC 10*3/mm3 8.02 7.14 7.61   HEMOGLOBIN g/dL 7.2* 7.1* 6.9*   HEMATOCRIT % 24.8* 23.6* 23.6*   PLATELETS 10*3/mm3 493 506* 504*         Results from last 7 days  Lab Units 09/06/17  0450 09/05/17  1950 09/05/17 0524 09/04/17  0634   SODIUM mmol/L 144  --  145 147*   POTASSIUM mmol/L 3.6 4.0 3.5 3.5   CHLORIDE mmol/L 102  --  105 107   CO2 mmol/L 35.4*  --  32.4* 29.5*   BUN mg/dL 6*  --  7* 7*   CREATININE mg/dL 0.73  --  0.68 0.67 "   CALCIUM mg/dL 9.4  --  8.6 9.0   GLUCOSE mg/dL 100*  --  112* 86             Lab Results  Lab Value Date/Time   TROPONINT 0.065 (H) 05/17/2017 0505   TROPONINT 0.077 (H) 05/16/2017 1517   TROPONINT 0.080 (H) 05/16/2017 1112   TROPONINT 0.078 (H) 05/16/2017 0703   TROPONINT 0.084 (H) 05/16/2017 0116   TROPONINT <0.010 05/15/2016 0622   TROPONINT 0.017 05/14/2016 0437   TROPONINT 0.023 05/13/2016 2036   TROPONINT 0.031 (H) 05/13/2016 1231   TROPONINT <0.01 02/03/2016 0537   TROPONINT <0.01 02/02/2016 0914   TROPONINT <0.01 11/11/2015 0516   TROPONINT <0.01 11/09/2015 2049   TROPONINT <0.01 04/18/2014 0517       Microbiology Results (last 10 days)     ** No results found for the last 240 hours. **           Imaging Results (last 7 days)     Procedure Component Value Units Date/Time    XR Chest 1 View [[id number]] Collected:  08/27/17 1259     Updated:  08/27/17 1303    Narrative:       XR CHEST 1 VW-     Clinical: Postoperative fever     COMPARISON 08/20/2017     FINDINGS: No acute airspace disease has developed. The cardiomediastinal  silhouette is stable. No edema or effusion.     CONCLUSION: No acute cardiovascular or pulmonary process is  demonstrated.     This report was finalized on 8/27/2017 1:00 PM by Dr. Kieran Mackay MD.       CT Abdomen Pelvis With Contrast [[id number]] Collected:  08/27/17 1620     Updated:  08/27/17 1640    Narrative:       CT SCANS OF THE CHEST, ABDOMEN, AND PELVIS WITH INTRAVENOUS CONTRAST     HISTORY: Recent hysterectomy. Fever and shortness of breath and  abdominal pain.     The CT scans were performed as an emergency procedure with CT imaging  through the chest, abdomen, and pelvis with intravenous contrast. The  following findings are present:  1. There is some predominately strand-like atelectasis at both lung  bases medially associated with tiny bilateral pleural effusions and the  lungs are otherwise clear. There is no mediastinal or hilar or axillary  adenopathy. Small to  moderate-sized pericardial effusion measuring 1.9  cm in thickness and this shows enlargement since the preoperative CT  scan of 06/18/2017.  2. There is a small hepatic cyst that is unchanged. The spleen,  pancreas, both adrenal glands and right kidney are unremarkable. There  is mild dilatation of the left renal collecting system including the  left ureter which extends into the pelvis where there is prominent  induration of the mesenteric fat planes associated with generalized wall  thickening of the rectosigmoid colon and there is also an adjacent fluid  collection to the left of the sigmoid colon that measures up to 5.5 x  6.8 cm. There is also generalized wall thickening of the urinary  bladder. Some of these findings may relate to postoperative changes but  are concerning for colitis. The fluid collection to the left of the  sigmoid colon could potentially represent an area of developing abscess  and the distended ureter extends to this region. The generalized  thickening of the urinary bladder may be secondary to the adjacent  inflammatory changes but clinical correlation for cystitis is also  recommended.     These findings have been discussed with Dr. Quintero in the emergency  room.     This report was finalized on 8/27/2017 4:37 PM by Dr. Dionte Barry MD.       CT Chest With Contrast [[id number]] Collected:  08/27/17 1620     Updated:  08/27/17 1640    Narrative:       CT SCANS OF THE CHEST, ABDOMEN, AND PELVIS WITH INTRAVENOUS CONTRAST     HISTORY: Recent hysterectomy. Fever and shortness of breath and  abdominal pain.     The CT scans were performed as an emergency procedure with CT imaging  through the chest, abdomen, and pelvis with intravenous contrast. The  following findings are present:  1. There is some predominately strand-like atelectasis at both lung  bases medially associated with tiny bilateral pleural effusions and the  lungs are otherwise clear. There is no mediastinal or hilar or  axillary  adenopathy. Small to moderate-sized pericardial effusion measuring 1.9  cm in thickness and this shows enlargement since the preoperative CT  scan of 06/18/2017.  2. There is a small hepatic cyst that is unchanged. The spleen,  pancreas, both adrenal glands and right kidney are unremarkable. There  is mild dilatation of the left renal collecting system including the  left ureter which extends into the pelvis where there is prominent  induration of the mesenteric fat planes associated with generalized wall  thickening of the rectosigmoid colon and there is also an adjacent fluid  collection to the left of the sigmoid colon that measures up to 5.5 x  6.8 cm. There is also generalized wall thickening of the urinary  bladder. Some of these findings may relate to postoperative changes but  are concerning for colitis. The fluid collection to the left of the  sigmoid colon could potentially represent an area of developing abscess  and the distended ureter extends to this region. The generalized  thickening of the urinary bladder may be secondary to the adjacent  inflammatory changes but clinical correlation for cystitis is also  recommended.     These findings have been discussed with Dr. Quintero in the emergency  room.     This report was finalized on 8/27/2017 4:37 PM by Dr. Dionte Barry MD.       CT Abdomen Pelvis With Contrast [[id number]] Collected:  09/01/17 1927     Updated:  09/01/17 1935    Narrative:       POSTCONTRAST CT ABDOMEN AND PELVIS     HISTORY: 61-year-old female for follow-up of abscess with hypoactive  bowel sounds and persistent abdominal pain.     COMPARISON: 08/27/2017     FINDINGS:  1. Imaging remains limited by body habitus associated upper extremity  artifact, motion artifact.  2. Stable moderate pericardial effusion small bilateral pleural  effusions and bibasilar atelectasis.  3. Cholelithiasis benign hepatic cyst bilateral renal cysts.  4. Left hydronephrosis and hydroureter  extending to inflammatory change  within the left pelvis.  4. Previous described pericolonic abscess 6.8 x 5.5 cm has developed an  enhancing wall and shows no change in size.  5. Severe inflammatory change of the sigmoid colon diffusely.  6. Persistent thickening of the urinary bladder wall.  7. Additional findings as previously noted, no new abnormality.       Impression:       1. No significant interval change in size of pelvic abscess.  2. Other findings as described without other significant interval change  including mild left hydronephrosis, urinary bladder wall thickening,  marked thickening of the sigmoid colon extending to the rectum.     This report was finalized on 9/1/2017 7:32 PM by Dr. Bryan Yadav MD.               Assessment:      Principal Problem:    Sepsis  Active Problems:    Diabetes mellitus    Hypertension    Abscess of abdominal cavity    Chronic diastolic CHF (congestive heart failure)    Morbid obesity    THAI (obstructive sleep apnea)    Schizo affective schizophrenia    Coronary artery disease    Chronic respiratory failure with hypoxia and hypercapnia    Lymphedema  Hypernatremia  Anemia, multifactorial, chronic disease, iron def, cancer, etc  prob d CHF exacerb    Patient Active Problem List   Diagnosis Code   • Elevated troponin R79.89   • Aspiration pneumonia J69.0   • Hematuria R31.9   • Hypokalemia E87.6   • Pelvic mass in female R19.00   • Fecal impaction K56.41   • Endometrial carcinoma C54.1   • Acute on chronic respiratory failure with hypoxia and hypercapnia J96.21, J96.22   • Acute on chronic diastolic CHF (congestive heart failure) I50.33   • UTI (urinary tract infection) N39.0   • Leucocytosis D72.829   • Chronic diastolic CHF (congestive heart failure) I50.32   • DM (diabetes mellitus) E11.9   • Morbid obesity E66.01   • HTN (hypertension) I10   • Schizophrenia F20.9   • Tracheostomy malfunction J95.03   • Pyuria N39.0   • THAI (obstructive sleep apnea) G47.33   •  Generalized weakness R53.1   • Schizo affective schizophrenia F25.0   • Diabetes mellitus E11.9   • Coronary artery disease I25.10   • Endometrial cancer determined by uterine biopsy C54.1, Z15.04   • Chronic respiratory failure with hypoxia and hypercapnia J96.11, J96.12   • Toxic metabolic encephalopathy G92   • Pneumonia J18.9   • Abnormal vaginal bleeding N93.9   • Schizo affective schizophrenia F25.0   • CHF (congestive heart failure) I50.9   • Endometrial cancer determined by uterine biopsy C54.1, Z15.04   • Coronary artery disease I25.10   • Lymphedema I89.0   • Immobility Z74.09   • Acute blood loss anemia D62   • Vaginal bleeding N93.9   • Hypertension I10   • Uterine cancer C55   • Bipolar disorder, unspecified F31.9   • Sepsis A41.9   • Abscess of abdominal cavity K65.1       Plan:      Continue IV antibiotics, as per ID  DC IV fluids  Extra IV Bumex, CXR  Transfuse 1 unit prbc, hbg < 8 with symptoms of CHF  Diet: Reg, mech soft.   Monitor and correct electrolytes  Monitor mental status  Continue Accuchecks and SSI  DVT prophylaxis, will hold Lovenox (pt may be bleeding) start SCD  GI- no recommendations for scope at present due to intra-abdominal abscess.   Labs in am      Shane Barcenas MD  9/6/2017  5:21 PM

## 2017-09-06 NOTE — PLAN OF CARE
Problem: Patient Care Overview (Adult)  Goal: Plan of Care Review  Outcome: Ongoing (interventions implemented as appropriate)    09/06/17 0509 09/06/17 0840 09/06/17 1204   Coping/Psychosocial Response Interventions   Plan Of Care Reviewed With --  patient --    Patient Care Overview   Progress improving --  --    Outcome Evaluation   Outcome Summary/Follow up Plan --  --  Pt resting with eyes closed. VSS. No signs of pain or sitress. WBC decreasing. Receiving IV antibiotics. Will continue to monitor.       Goal: Discharge Needs Assessment  Outcome: Ongoing (interventions implemented as appropriate)    Problem: Skin Integrity Impairment, Risk/Actual (Adult)  Goal: Skin Integrity/Wound Healing  Outcome: Ongoing (interventions implemented as appropriate)    Problem: Sepsis (Adult)  Goal: Signs and Symptoms of Listed Potential Problems Will be Absent or Manageable (Sepsis)  Outcome: Ongoing (interventions implemented as appropriate)    Problem: Infection, Risk/Actual (Adult)  Goal: Infection Prevention/Resolution  Outcome: Ongoing (interventions implemented as appropriate)    Problem: Fall Risk (Adult)  Goal: Absence of Falls  Outcome: Ongoing (interventions implemented as appropriate)    Problem: Pain, Acute (Adult)  Goal: Acceptable Pain Control/Comfort Level  Outcome: Ongoing (interventions implemented as appropriate)  Goal: Acceptable Pain Control/Comfort Level  Outcome: Ongoing (interventions implemented as appropriate)    Problem: Confusion, Acute (Adult)  Goal: Cognitive/Functional Impairments Minimized  Outcome: Ongoing (interventions implemented as appropriate)  Goal: Safety  Outcome: Ongoing (interventions implemented as appropriate)    Problem: Anxiety (Adult)  Goal: Reduction/Resolution  Outcome: Ongoing (interventions implemented as appropriate)    Problem: Wound, Traumatic, Nonburn (Adult)  Goal: Signs and Symptoms of Listed Potential Problems Will be Absent or Manageable (Wound, Traumatic,  Nonburn)  Outcome: Ongoing (interventions implemented as appropriate)    Problem: Respiratory Insufficiency (Adult)  Goal: Acid/Base Balance  Outcome: Ongoing (interventions implemented as appropriate)  Goal: Effective Ventilation  Outcome: Ongoing (interventions implemented as appropriate)

## 2017-09-07 LAB
ABO + RH BLD: NORMAL
ANION GAP SERPL CALCULATED.3IONS-SCNC: 11.1 MMOL/L
BASOPHILS # BLD AUTO: 0.02 10*3/MM3 (ref 0–0.2)
BASOPHILS NFR BLD AUTO: 0.2 % (ref 0–1.5)
BH BB BLOOD EXPIRATION DATE: NORMAL
BH BB BLOOD TYPE BARCODE: 7300
BH BB DISPENSE STATUS: NORMAL
BH BB PRODUCT CODE: NORMAL
BH BB UNIT NUMBER: NORMAL
BUN BLD-MCNC: 7 MG/DL (ref 8–23)
BUN/CREAT SERPL: 8.2 (ref 7–25)
C DIFF TOX GENS STL QL NAA+PROBE: NEGATIVE
CALCIUM SPEC-SCNC: 9.4 MG/DL (ref 8.6–10.5)
CHLORIDE SERPL-SCNC: 96 MMOL/L (ref 98–107)
CO2 SERPL-SCNC: 30.9 MMOL/L (ref 22–29)
CREAT BLD-MCNC: 0.85 MG/DL (ref 0.57–1)
DEPRECATED RDW RBC AUTO: 64.4 FL (ref 37–54)
EOSINOPHIL # BLD AUTO: 0.14 10*3/MM3 (ref 0–0.7)
EOSINOPHIL NFR BLD AUTO: 1.4 % (ref 0.3–6.2)
ERYTHROCYTE [DISTWIDTH] IN BLOOD BY AUTOMATED COUNT: 24.6 % (ref 11.7–13)
GFR SERPL CREATININE-BSD FRML MDRD: 82 ML/MIN/1.73
GLUCOSE BLD-MCNC: 79 MG/DL (ref 65–99)
GLUCOSE BLDC GLUCOMTR-MCNC: 100 MG/DL (ref 70–130)
GLUCOSE BLDC GLUCOMTR-MCNC: 104 MG/DL (ref 70–130)
GLUCOSE BLDC GLUCOMTR-MCNC: 86 MG/DL (ref 70–130)
GLUCOSE BLDC GLUCOMTR-MCNC: 96 MG/DL (ref 70–130)
HCT VFR BLD AUTO: 31.1 % (ref 35.6–45.5)
HGB BLD-MCNC: 9.5 G/DL (ref 11.9–15.5)
IMM GRANULOCYTES # BLD: 0.06 10*3/MM3 (ref 0–0.03)
IMM GRANULOCYTES NFR BLD: 0.6 % (ref 0–0.5)
LYMPHOCYTES # BLD AUTO: 1.12 10*3/MM3 (ref 0.9–4.8)
LYMPHOCYTES NFR BLD AUTO: 11.6 % (ref 19.6–45.3)
MCH RBC QN AUTO: 22.7 PG (ref 26.9–32)
MCHC RBC AUTO-ENTMCNC: 30.5 G/DL (ref 32.4–36.3)
MCV RBC AUTO: 74.2 FL (ref 80.5–98.2)
MONOCYTES # BLD AUTO: 1.12 10*3/MM3 (ref 0.2–1.2)
MONOCYTES NFR BLD AUTO: 11.6 % (ref 5–12)
NEUTROPHILS # BLD AUTO: 7.22 10*3/MM3 (ref 1.9–8.1)
NEUTROPHILS NFR BLD AUTO: 74.6 % (ref 42.7–76)
PLATELET # BLD AUTO: 505 10*3/MM3 (ref 140–500)
PMV BLD AUTO: 10.2 FL (ref 6–12)
POTASSIUM BLD-SCNC: 4 MMOL/L (ref 3.5–5.2)
RBC # BLD AUTO: 4.19 10*6/MM3 (ref 3.9–5.2)
SODIUM BLD-SCNC: 138 MMOL/L (ref 136–145)
UNIT  ABO: NORMAL
UNIT  RH: NORMAL
WBC NRBC COR # BLD: 9.68 10*3/MM3 (ref 4.5–10.7)

## 2017-09-07 PROCEDURE — 25010000002 PIPERACILLIN SOD-TAZOBACTAM PER 1 G: Performed by: OBSTETRICS & GYNECOLOGY

## 2017-09-07 PROCEDURE — 87493 C DIFF AMPLIFIED PROBE: CPT | Performed by: INTERNAL MEDICINE

## 2017-09-07 PROCEDURE — 25010000002 VANCOMYCIN 10 G RECONSTITUTED SOLUTION: Performed by: INTERNAL MEDICINE

## 2017-09-07 PROCEDURE — 82962 GLUCOSE BLOOD TEST: CPT

## 2017-09-07 PROCEDURE — 85025 COMPLETE CBC W/AUTO DIFF WBC: CPT | Performed by: INTERNAL MEDICINE

## 2017-09-07 PROCEDURE — 80048 BASIC METABOLIC PNL TOTAL CA: CPT | Performed by: INTERNAL MEDICINE

## 2017-09-07 RX ORDER — FERROUS SULFATE 325(65) MG
325 TABLET ORAL
Qty: 30 TABLET | Refills: 12 | Status: CANCELLED | OUTPATIENT
Start: 2017-09-07

## 2017-09-07 RX ORDER — AMLODIPINE BESYLATE 5 MG/1
5 TABLET ORAL
Qty: 30 TABLET | Refills: 12 | Status: CANCELLED | OUTPATIENT
Start: 2017-09-07

## 2017-09-07 RX ADMIN — ISOSORBIDE MONONITRATE 30 MG: 10 TABLET ORAL at 09:28

## 2017-09-07 RX ADMIN — PETROLATUM: 42 OINTMENT TOPICAL at 09:31

## 2017-09-07 RX ADMIN — FOLIC ACID 1 MG: 1 TABLET ORAL at 09:31

## 2017-09-07 RX ADMIN — TAZOBACTAM SODIUM AND PIPERACILLIN SODIUM 3.38 G: 375; 3 INJECTION, SOLUTION INTRAVENOUS at 06:33

## 2017-09-07 RX ADMIN — TAZOBACTAM SODIUM AND PIPERACILLIN SODIUM 3.38 G: 375; 3 INJECTION, SOLUTION INTRAVENOUS at 23:53

## 2017-09-07 RX ADMIN — AMLODIPINE BESYLATE 5 MG: 5 TABLET ORAL at 09:29

## 2017-09-07 RX ADMIN — ATORVASTATIN CALCIUM 20 MG: 20 TABLET, FILM COATED ORAL at 09:27

## 2017-09-07 RX ADMIN — TAZOBACTAM SODIUM AND PIPERACILLIN SODIUM 3.38 G: 375; 3 INJECTION, SOLUTION INTRAVENOUS at 17:00

## 2017-09-07 RX ADMIN — VALPROIC ACID 250 MG: 250 SOLUTION ORAL at 09:27

## 2017-09-07 RX ADMIN — VALPROIC ACID 250 MG: 250 SOLUTION ORAL at 21:00

## 2017-09-07 RX ADMIN — Medication 10 ML: at 09:27

## 2017-09-07 RX ADMIN — Medication 10 ML: at 21:00

## 2017-09-07 RX ADMIN — FLUCONAZOLE 200 MG: 200 TABLET ORAL at 21:00

## 2017-09-07 RX ADMIN — RISPERIDONE 3 MG: 3 TABLET ORAL at 21:02

## 2017-09-07 RX ADMIN — POTASSIUM CHLORIDE 20 MEQ: 750 CAPSULE, EXTENDED RELEASE ORAL at 09:28

## 2017-09-07 RX ADMIN — BUMETANIDE 2 MG: 2 TABLET ORAL at 09:28

## 2017-09-07 RX ADMIN — PANTOPRAZOLE SODIUM 40 MG: 40 TABLET, DELAYED RELEASE ORAL at 07:03

## 2017-09-07 RX ADMIN — RISPERIDONE 2 MG: 2 TABLET, FILM COATED ORAL at 09:28

## 2017-09-07 RX ADMIN — FERROUS SULFATE TAB 325 MG (65 MG ELEMENTAL FE) 325 MG: 325 (65 FE) TAB at 09:28

## 2017-09-07 RX ADMIN — VANCOMYCIN HYDROCHLORIDE 1500 MG: 1 INJECTION, POWDER, LYOPHILIZED, FOR SOLUTION INTRAVENOUS at 12:29

## 2017-09-07 NOTE — DISCHARGE SUMMARY
PHYSICIAN DISCHARGE SUMMARY  [facility], [facility]    Shane Barcenas MD    Patient Identification:  Name: Lupe Burleson  Age: 61 y.o.  Sex: female  :  1955  MRN: [id number]    Primary Care Physician: Shane Barcenas MD    Admit date: 2017  Discharge date and time:17      Discharged Condition: stable    Discharge Diagnoses:  Principal Problem:    Sepsis  Active Problems:    Diabetes mellitus    Hypertension    Abscess of abdominal cavity    Chronic diastolic CHF (congestive heart failure)    Morbid obesity    THAI (obstructive sleep apnea)    Schizo affective schizophrenia    Coronary artery disease    Chronic respiratory failure with hypoxia and hypercapnia    Lymphedema    Patient Active Problem List   Diagnosis Code   • Elevated troponin R79.89   • Aspiration pneumonia J69.0   • Hematuria R31.9   • Hypokalemia E87.6   • Pelvic mass in female R19.00   • Fecal impaction K56.41   • Endometrial carcinoma C54.1   • Acute on chronic respiratory failure with hypoxia and hypercapnia J96.21, J96.22   • Acute on chronic diastolic CHF (congestive heart failure) I50.33   • UTI (urinary tract infection) N39.0   • Leucocytosis D72.829   • Chronic diastolic CHF (congestive heart failure) I50.32   • DM (diabetes mellitus) E11.9   • Morbid obesity E66.01   • HTN (hypertension) I10   • Schizophrenia F20.9   • Tracheostomy malfunction J95.03   • Pyuria N39.0   • THAI (obstructive sleep apnea) G47.33   • Generalized weakness R53.1   • Schizo affective schizophrenia F25.0   • Diabetes mellitus E11.9   • Coronary artery disease I25.10   • Endometrial cancer determined by uterine biopsy C54.1, Z15.04   • Chronic respiratory failure with hypoxia and hypercapnia J96.11, J96.12   • Toxic metabolic encephalopathy G92   • Pneumonia J18.9   • Abnormal vaginal bleeding N93.9   • Schizo affective schizophrenia F25.0   • CHF (congestive heart failure) I50.9   • Endometrial cancer  determined by uterine biopsy C54.1, Z15.04   • Coronary artery disease I25.10   • Lymphedema I89.0   • Immobility Z74.09   • Acute blood loss anemia D62   • Vaginal bleeding N93.9   • Hypertension I10   • Uterine cancer C55   • Bipolar disorder, unspecified F31.9   • Sepsis A41.9   • Abscess of abdominal cavity K65.1          Hospital Course: Lupe Burleson  is a 61 year old female resident of a NH with prior medical history of Schizophrenia, Morbid obesity, CAD, CHF, who was discharged from this facility 8-21-17 following [facility] for uterine CA. She was doing fairly well post-operatively when she began to run a fever yesterday. She was sent to the ED where CT scan revealed an intra-abdominal abscess, non amenable to percutaneous drainage due to its position deep in the pelvis.  She was started on IV  Antibiotics and admitted. She has continued IV antibiotics during her stay here. She has required blood transfusions while here due to multi- factorial anemia: Cancer, iron deficiency, chronic illness, sepsis. She has been eating well. Her abdominal incision has healed. She will need f/U CT in 2 weeks as well as F/U with Dr. Washington in 2 weeks. She will need to have barrier cream applied to stage II coccygeal wound. She did have diarrhea but her Cdiff toxin was negative. At this time she is stable to be discharged to NH    PMHX:   Past Medical History:   Diagnosis Date   • Acute respiratory distress syndrome    • Arthritis    • Bipolar disorder, unspecified    • CAD (coronary artery disease)    • Cellulitis    • Cellulitis    • CHF (congestive heart failure)    • Chronic ischemic heart disease    • Chronic respiratory failure with hypoxia and hypercapnia    • Chronic ulcer of calf    • Constipation    • Coronary artery disease    • Depression    • Depressive disorder    • Diabetes mellitus    • Dysphagia    • Dysphagia    • Endometrial cancer determined by uterine biopsy     s/p radiation; no improvement   • Endometrial  "hyperplasia    • History of transfusion    • Hypercapnia    • Hypertension    • Immobility    • Lack of coordination    • Lymphedema    • Malignant neoplasm of uterus    • Muscle weakness    • Obesities, morbid    • Oxygen dependent    • Post-menopausal bleeding    • Postmenopausal bleeding    • Schizo affective schizophrenia    • Skin ulcer    • Sleep apnea    • Stroke    • Symbolic dysfunction    • Uterine cancer    • Venous insufficiency    • Venous insufficiency      PSHX:   Past Surgical History:   Procedure Laterality Date   • D&C HYSTEROSCOPY     • LAPAROSCOPIC TUBAL LIGATION     • TOTAL LAPAROSCOPIC HYSTERECTOMY Bilateral 8/15/2017    Procedure: OPEN TOTAL  ABDOMINAL HYSTERECTOMY WITH BILATERAL SALPINGO-OOPHERECTOMY;  Surgeon: Ortiz Washington MD;  Location: [facility] OR;  Service:    • TRACHEOSTOMY AND PEG TUBE INSERTION     • UMBILICAL HERNIA REPAIR     • WOUND DEBRIDEMENT             Consults:     Consults     Date and Time Order Name Status Description    9/4/2017 1214 Inpatient Consult to Gastroenterology Completed     9/1/2017 1552 Inpatient Consult to Infectious Diseases Completed     8/28/2017 1338 Inpatient Consult to Cardiology Completed     8/28/2017 0930 Inpatient Consult to General Surgery Completed     8/27/2017 2027 IP General Consult (Use specialty-specific consult if known)      8/27/2017 1515 IP General Consult (Use specialty-specific consult if known) Completed     8/13/2017 1942 Family Medicine Consult Completed           Discharge Exam:    /51  Pulse 68  Temp 97.5 °F (36.4 °C) (Oral)   Resp 18  Ht 62.99\" (160 cm)  Wt 277 lb 14.4 oz (126 kg)  SpO2 99%  BMI 49.24 kg/m2     General: Alert, cooperative and appears stated age. In no acute distress  Neck: Supple, symmetrical, trachea midline, no adenopathy;              Thyroid without enlargement/tenderness/nodules;              no carotid bruit or JVD  Cardiovascular: Regular rate, regular rhythm and intact distal " pulses.                              Exam reveals no gallop and no friction rub. No murmur heard  Chest wall: No tenderness or deformity  Pulmonary: diminished bilateral bases, respirations unlabored.                        Bilat wheezing and rhonchi.   Abdominal: Soft, non-tender; bowel sounds active                     Midline incision approximated, Staples now removed.   Extremities: Atraumatic, no cyanosis, chronic lymphedema bilaterally on LE, stable  Pulses:         2 + symmetric all extremities  Neurological: She is alert and oriented to person, place, and time.                           CNII-XII intact, normal strength, sensation intact throughout  Skin: Skin color, texture, turgor normal, stage 2 coccyx wound      Data Review:        Results from last 7 days  Lab Units 09/07/17  0825 09/06/17  0450 09/05/17  0524   WBC 10*3/mm3 9.68 8.02 7.14   HEMOGLOBIN g/dL 9.5* 7.2* 7.1*   HEMATOCRIT % 31.1* 24.8* 23.6*   PLATELETS 10*3/mm3 505* 493 506*         Results from last 7 days  Lab Units 09/08/17  0458 09/07/17  0825 09/06/17  0450   SODIUM mmol/L 146* 138 144   POTASSIUM mmol/L 3.0* 4.0 3.6   CHLORIDE mmol/L 100 96* 102   CO2 mmol/L 38.3* 30.9* 35.4*   BUN mg/dL 10 7* 6*   CREATININE mg/dL 0.86 0.85 0.73   CALCIUM mg/dL 8.9 9.4 9.4   GLUCOSE mg/dL 85 79 100*             Lab Results  Lab Value Date/Time   TROPONINT 0.065 (H) 05/17/2017 0505   TROPONINT 0.077 (H) 05/16/2017 1517   TROPONINT 0.080 (H) 05/16/2017 1112   TROPONINT 0.078 (H) 05/16/2017 0703   TROPONINT 0.084 (H) 05/16/2017 0116   TROPONINT <0.010 05/15/2016 0622   TROPONINT 0.017 05/14/2016 0437   TROPONINT 0.023 05/13/2016 2036   TROPONINT 0.031 (H) 05/13/2016 1231   TROPONINT <0.01 02/03/2016 0537   TROPONINT <0.01 02/02/2016 0914   TROPONINT <0.01 11/11/2015 0516   TROPONINT <0.01 11/09/2015 2049   TROPONINT <0.01 04/18/2014 0517       Microbiology Results (last 10 days)     Procedure Component Value - Date/Time    Clostridium Difficile Toxin  [[id number]] Collected:  09/07/17 1502    Lab Status:  Final result Specimen:  Stool from Per Rectum Updated:  09/07/17 1705    Narrative:       The following orders were created for panel order Clostridium Difficile Toxin.  Procedure                               Abnormality         Status                     ---------                               -----------         ------                     Clostridium Difficile To...[[id number]]  Normal              Final result                 Please view results for these tests on the individual orders.    Clostridium Difficile Toxin, PCR [[id number]]  (Normal) Collected:  09/07/17 1502    Lab Status:  Final result Specimen:  Stool from Per Rectum Updated:  09/07/17 1705     C. Difficile Toxins by PCR Negative           Imaging Results (all)     Procedure Component Value Units Date/Time    XR Chest 1 View [[id number]] Collected:  08/27/17 1259     Updated:  08/27/17 1303    Narrative:       XR CHEST 1 VW-     Clinical: Postoperative fever     COMPARISON 08/20/2017     FINDINGS: No acute airspace disease has developed. The cardiomediastinal  silhouette is stable. No edema or effusion.     CONCLUSION: No acute cardiovascular or pulmonary process is  demonstrated.     This report was finalized on 8/27/2017 1:00 PM by Dr. Kieran Mackay MD.       CT Abdomen Pelvis With Contrast [[id number]] Collected:  08/27/17 1620     Updated:  08/27/17 1640    Narrative:       CT SCANS OF THE CHEST, ABDOMEN, AND PELVIS WITH INTRAVENOUS CONTRAST     HISTORY: Recent hysterectomy. Fever and shortness of breath and  abdominal pain.     The CT scans were performed as an emergency procedure with CT imaging  through the chest, abdomen, and pelvis with intravenous contrast. The  following findings are present:  1. There is some predominately strand-like atelectasis at both lung  bases medially associated with tiny bilateral pleural effusions and the  lungs are otherwise clear. There is no mediastinal or  hilar or axillary  adenopathy. Small to moderate-sized pericardial effusion measuring 1.9  cm in thickness and this shows enlargement since the preoperative CT  scan of 06/18/2017.  2. There is a small hepatic cyst that is unchanged. The spleen,  pancreas, both adrenal glands and right kidney are unremarkable. There  is mild dilatation of the left renal collecting system including the  left ureter which extends into the pelvis where there is prominent  induration of the mesenteric fat planes associated with generalized wall  thickening of the rectosigmoid colon and there is also an adjacent fluid  collection to the left of the sigmoid colon that measures up to 5.5 x  6.8 cm. There is also generalized wall thickening of the urinary  bladder. Some of these findings may relate to postoperative changes but  are concerning for colitis. The fluid collection to the left of the  sigmoid colon could potentially represent an area of developing abscess  and the distended ureter extends to this region. The generalized  thickening of the urinary bladder may be secondary to the adjacent  inflammatory changes but clinical correlation for cystitis is also  recommended.     These findings have been discussed with Dr. Quintero in the emergency  room.     This report was finalized on 8/27/2017 4:37 PM by Dr. Dionte Barry MD.       CT Chest With Contrast [[id number]] Collected:  08/27/17 1620     Updated:  08/27/17 1640    Narrative:       CT SCANS OF THE CHEST, ABDOMEN, AND PELVIS WITH INTRAVENOUS CONTRAST     HISTORY: Recent hysterectomy. Fever and shortness of breath and  abdominal pain.     The CT scans were performed as an emergency procedure with CT imaging  through the chest, abdomen, and pelvis with intravenous contrast. The  following findings are present:  1. There is some predominately strand-like atelectasis at both lung  bases medially associated with tiny bilateral pleural effusions and the  lungs are otherwise clear.  There is no mediastinal or hilar or axillary  adenopathy. Small to moderate-sized pericardial effusion measuring 1.9  cm in thickness and this shows enlargement since the preoperative CT  scan of 06/18/2017.  2. There is a small hepatic cyst that is unchanged. The spleen,  pancreas, both adrenal glands and right kidney are unremarkable. There  is mild dilatation of the left renal collecting system including the  left ureter which extends into the pelvis where there is prominent  induration of the mesenteric fat planes associated with generalized wall  thickening of the rectosigmoid colon and there is also an adjacent fluid  collection to the left of the sigmoid colon that measures up to 5.5 x  6.8 cm. There is also generalized wall thickening of the urinary  bladder. Some of these findings may relate to postoperative changes but  are concerning for colitis. The fluid collection to the left of the  sigmoid colon could potentially represent an area of developing abscess  and the distended ureter extends to this region. The generalized  thickening of the urinary bladder may be secondary to the adjacent  inflammatory changes but clinical correlation for cystitis is also  recommended.     These findings have been discussed with Dr. Quintero in the emergency  room.     This report was finalized on 8/27/2017 4:37 PM by Dr. Dionte Barry MD.       CT Abdomen Pelvis With Contrast [[id number]] Collected:  09/01/17 1927     Updated:  09/01/17 1935    Narrative:       POSTCONTRAST CT ABDOMEN AND PELVIS     HISTORY: 61-year-old female for follow-up of abscess with hypoactive  bowel sounds and persistent abdominal pain.     COMPARISON: 08/27/2017     FINDINGS:  1. Imaging remains limited by body habitus associated upper extremity  artifact, motion artifact.  2. Stable moderate pericardial effusion small bilateral pleural  effusions and bibasilar atelectasis.  3. Cholelithiasis benign hepatic cyst bilateral renal cysts.  4. Left  hydronephrosis and hydroureter extending to inflammatory change  within the left pelvis.  4. Previous described pericolonic abscess 6.8 x 5.5 cm has developed an  enhancing wall and shows no change in size.  5. Severe inflammatory change of the sigmoid colon diffusely.  6. Persistent thickening of the urinary bladder wall.  7. Additional findings as previously noted, no new abnormality.       Impression:       1. No significant interval change in size of pelvic abscess.  2. Other findings as described without other significant interval change  including mild left hydronephrosis, urinary bladder wall thickening,  marked thickening of the sigmoid colon extending to the rectum.     This report was finalized on 9/1/2017 7:32 PM by Dr. Bryan Yadav MD.               Disposition:    Nursing Home    Patient Instructions: See After Visit Summary for Medications      Follow-up Information     [facility]    Specialties:  Skilled Nursing Facility, Intermediate Care Facility    Contact information:    [address] [place] [id number] [phone]        Discharge Order     None        Follow-up Information     [facility]    Specialties:  Skilled Nursing Facility, Intermediate Care Facility    Contact information:    [address] [place] [id number] [phone]          Total time spent discharging patient including evaluation,post hospitalization follow up,  medication and post hospitalization instructions and education total time exceeds 30 minutes.    Signed:  Shane Barcenas MD  9/8/2017  1:12 PM      Much of this encounter note is an electronic transcription/translation of spoken language to printed text. The electronic translation of spoken language may permit erroneous, or at times, nonsensical words or phrases to be inadvertently transcribed; Although I have reviewed the note for such errors, some may still exist

## 2017-09-07 NOTE — PROGRESS NOTES
"  Infectious Diseases Progress Note    Ramya Alex MD     [facility]  [place]: 11 days  Patient Identification:  Name: Lupe Burleson  Age: 61 y.o.  Sex: female  :  1955  MRN: [id number]         Primary Care Physician: Shane Barcenas MD            Subjective: No new complaints but per nursing she is having diarrhea  Interval History: see consultation notes     Objective:    Scheduled Meds:    alteplase 2 mg Intravenous Once   alteplase 2 mg Intravenous Once   alteplase 2 mg Intravenous Once   amLODIPine 5 mg Oral Q24H   atorvastatin 20 mg Per G Tube Daily   bumetanide 2 mg Oral Daily   ferrous sulfate 325 mg Oral Daily With Breakfast   fluconazole 200 mg Oral Q24H   folic acid 1 mg Oral Daily   hydrophor  Topical Q24H   isosorbide mononitrate 30 mg Per G Tube Daily   lactulose 20 g Oral Daily   pantoprazole 40 mg Oral Q AM   piperacillin-tazobactam 3.375 g Intravenous Q8H   polyethylene glycol 17 g Oral Daily   potassium chloride 20 mEq Oral Daily   risperiDONE 2 mg Oral Daily   risperiDONE 3 mg Oral Nightly   sodium chloride 10 mL Intracatheter Q12H   Valproic Acid 250 mg Oral Q12H   vancomycin 1,500 mg Intravenous Q24H     Continuous Infusions:    Pharmacy to dose vancomycin        Vital signs in last 24 hours:  Temp:  [98.1 °F (36.7 °C)-98.9 °F (37.2 °C)] 98.6 °F (37 °C)  Heart Rate:  [85-91] 91  Resp:  [16-18] 16  BP: (106-137)/(57-70) 111/58    Intake/Output:    Intake/Output Summary (Last 24 hours) at 17 0854  Last data filed at 17 0200   Gross per 24 hour   Intake              620 ml   Output                0 ml   Net              620 ml       Exam:  /58 (BP Location: Left arm, Patient Position: Lying)  Pulse 91  Temp 98.6 °F (37 °C) (Oral)   Resp 16  Ht 62.99\" (160 cm)  Wt 284 lb 6.3 oz (129 kg)  SpO2 97%  BMI 50.39 kg/m2    General Appearance:    Awake but doesn't want to interact                          Head:    Normocephalic, without obvious abnormality, " atraumatic                           Eyes:    PERRL, conjunctiva/corneas clear, EOM's intact, both eyes                         Throat:   Lips, tongue, gums normal; oral mucosa pink and moist                           Neck:   Supple, symmetrical, trachea midline, no JVD                         Lungs:    Clear to auscultation bilaterally, respirations unlabored                 Chest Wall:    No tenderness or deformity                          Heart:    Regular rate and rhythm, S1 and S2 normal, no murmur,no  Rub                                      or gallop                  Abdomen:     Soft, obese, surgical incision well approximated, non-tender, bowel sounds active                 Extremities:   Chronic changes in the left lower extremity and right arm PICC line in place                        Pulses:   Pulses palpable in all extremities                            Skin:   Skin is warm and dry,  no rashes or palpable lesions                         Data Review:    I reviewed the patient's new clinical results.    Results from last 7 days  Lab Units 09/07/17  0825 09/06/17  0450 09/05/17  0524 09/04/17  1131 09/02/17  0547 09/01/17  0606   WBC 10*3/mm3 9.68 8.02 7.14 7.61 11.63* 13.66*   HEMOGLOBIN g/dL 9.5* 7.2* 7.1* 6.9* 7.2* 7.3*   PLATELETS 10*3/mm3 505* 493 506* 504* 530* 469       Results from last 7 days  Lab Units 09/06/17  0450 09/05/17  1950 09/05/17  0524 09/04/17  0634 09/03/17  0035 09/02/17  0547 09/01/17  0606   SODIUM mmol/L 144  --  145 147*  --  146* 147*   POTASSIUM mmol/L 3.6 4.0 3.5 3.5 3.6 3.6 3.7   CHLORIDE mmol/L 102  --  105 107  --  108* 110*   CO2 mmol/L 35.4*  --  32.4* 29.5*  --  29.7* 27.8   BUN mg/dL 6*  --  7* 7*  --  8 8   CREATININE mg/dL 0.73  --  0.68 0.67  --  0.58 0.61   CALCIUM mg/dL 9.4  --  8.6 9.0  --  9.1 8.8   GLUCOSE mg/dL 100*  --  112* 86  --  94 98     Microbiology Results (last 10 days)     ** No results found for the last 240 hours. **             Assessment:  Principal Problem:    Sepsis  Active Problems:    Chronic diastolic CHF (congestive heart failure)    Morbid obesity    THAI (obstructive sleep apnea)    Schizo affective schizophrenia    Diabetes mellitus    Coronary artery disease    Chronic respiratory failure with hypoxia and hypercapnia    Lymphedema    Hypertension    Abscess of abdominal cavity  bacteremia likely contaminant    Plan: Check stool for C. difficile toxin assay.  Watch her clinical course.  Would need repeat CAT scan in couple of weeks to decide the duration of antibiotic treatment and end of treatment.    Ramya Alex MD  9/7/2017  8:54 AM    Much of this encounter note is an electronic transcription/translation of spoken language to printed text. The electronic translation of spoken language may permit erroneous, or at times, nonsensical words or phrases to be inadvertently transcribed; Although I have reviewed the note for such errors, some may still exist

## 2017-09-07 NOTE — PROGRESS NOTES
DAILY PROGRESS NOTE  [facility], [facility]    2017    Patient Identification:  Name: Lupe Burleson  Age: 61 y.o.  Sex: female  :  1955  MRN: [id number]           Primary Care Physician: Shane Barcenas MD    Subjective:    Interval History:  Ms. Burleson is sleeping this morning, awakens, but very groggy. She denies CP, SOA, N/V/D, but was reported to have several small stools yesterday. This is day 10 of zosyn and invanz. She is to receive 1 unit PRBC today.     Objective:    Scheduled Meds:    alteplase 2 mg Intravenous Once   alteplase 2 mg Intravenous Once   alteplase 2 mg Intravenous Once   amLODIPine 5 mg Oral Q24H   atorvastatin 20 mg Per G Tube Daily   bumetanide 2 mg Oral Daily   ferrous sulfate 325 mg Oral Daily With Breakfast   fluconazole 200 mg Oral Q24H   folic acid 1 mg Oral Daily   hydrophor  Topical Q24H   isosorbide mononitrate 30 mg Per G Tube Daily   lactulose 20 g Oral Daily   pantoprazole 40 mg Oral Q AM   piperacillin-tazobactam 3.375 g Intravenous Q8H   polyethylene glycol 17 g Oral Daily   potassium chloride 20 mEq Oral Daily   risperiDONE 2 mg Oral Daily   risperiDONE 3 mg Oral Nightly   sodium chloride 10 mL Intracatheter Q12H   Valproic Acid 250 mg Oral Q12H   vancomycin 1,500 mg Intravenous Q24H       Continuous Infusions:    Pharmacy to dose vancomycin        PRN Meds:  •  acetaminophen  •  HYDROcodone-acetaminophen  •  HYDROcodone-acetaminophen  •  ipratropium-albuterol  •  LORazepam  •  Pharmacy to dose vancomycin  •  potassium chloride **OR** potassium chloride **OR** potassium chloride  •  Insert peripheral IV **AND** sodium chloride  •  sodium chloride    Intake/Output:    Intake/Output Summary (Last 24 hours) at 17 0844  Last data filed at 17 0200   Gross per 24 hour   Intake              620 ml   Output                0 ml   Net              620 ml         Exam:    tMax 24 hrs: Temp (24hrs), Av.6 °F (37 °C),  "Min:98.1 °F (36.7 °C), Max:98.9 °F (37.2 °C)    Vitals Ranges:   Temp:  [98.1 °F (36.7 °C)-98.9 °F (37.2 °C)] 98.6 °F (37 °C)  Heart Rate:  [85-91] 91  Resp:  [16-18] 16  BP: (106-137)/(57-70) 111/58    /58 (BP Location: Left arm, Patient Position: Lying)  Pulse 91  Temp 98.6 °F (37 °C) (Oral)   Resp 16  Ht 62.99\" (160 cm)  Wt 284 lb 6.3 oz (129 kg)  SpO2 97%  BMI 50.39 kg/m2    General: Alert, cooperative and appears stated age. In no acute distress  Neck: Supple, symmetrical, trachea midline, no adenopathy;              Thyroid without enlargement/tenderness/nodules;              no carotid bruit or JVD  Cardiovascular: Regular rate, regular rhythm and intact distal pulses.                              Exam reveals no gallop and no friction rub. No murmur heard  Chest wall: No tenderness or deformity  Pulmonary: diminished bilateral bases, respirations unlabored.                        Bilat wheezing and rhonchi.   Abdominal: Soft, non-tender; bowel sounds active                     Midline incision approximated, Staples now removed.   Extremities: Atraumatic, no cyanosis, chronic lymphedema bilaterally on LE, stable  Pulses:         2 + symmetric all extremities  Neurological: She is alert and oriented to person, place, and time.                           CNII-XII intact, normal strength, sensation intact throughout  Skin: Skin color, texture, turgor normal, stage 2 coccyx wound      Data Review:      Results from last 7 days  Lab Units 09/06/17  0450 09/05/17 0524 09/04/17  1131   WBC 10*3/mm3 8.02 7.14 7.61   HEMOGLOBIN g/dL 7.2* 7.1* 6.9*   HEMATOCRIT % 24.8* 23.6* 23.6*   PLATELETS 10*3/mm3 493 506* 504*         Results from last 7 days  Lab Units 09/06/17  0450 09/05/17  1950 09/05/17  0524 09/04/17  0634   SODIUM mmol/L 144  --  145 147*   POTASSIUM mmol/L 3.6 4.0 3.5 3.5   CHLORIDE mmol/L 102  --  105 107   CO2 mmol/L 35.4*  --  32.4* 29.5*   BUN mg/dL 6*  --  7* 7*   CREATININE mg/dL 0.73  " --  0.68 0.67   CALCIUM mg/dL 9.4  --  8.6 9.0   GLUCOSE mg/dL 100*  --  112* 86             Lab Results  Lab Value Date/Time   TROPONINT 0.065 (H) 05/17/2017 0505   TROPONINT 0.077 (H) 05/16/2017 1517   TROPONINT 0.080 (H) 05/16/2017 1112   TROPONINT 0.078 (H) 05/16/2017 0703   TROPONINT 0.084 (H) 05/16/2017 0116   TROPONINT <0.010 05/15/2016 0622   TROPONINT 0.017 05/14/2016 0437   TROPONINT 0.023 05/13/2016 2036   TROPONINT 0.031 (H) 05/13/2016 1231   TROPONINT <0.01 02/03/2016 0537   TROPONINT <0.01 02/02/2016 0914   TROPONINT <0.01 11/11/2015 0516   TROPONINT <0.01 11/09/2015 2049   TROPONINT <0.01 04/18/2014 0517       Microbiology Results (last 10 days)     ** No results found for the last 240 hours. **           Imaging Results (last 7 days)     Procedure Component Value Units Date/Time    XR Chest 1 View [[id number]] Collected:  08/27/17 1259     Updated:  08/27/17 1303    Narrative:       XR CHEST 1 VW-     Clinical: Postoperative fever     COMPARISON 08/20/2017     FINDINGS: No acute airspace disease has developed. The cardiomediastinal  silhouette is stable. No edema or effusion.     CONCLUSION: No acute cardiovascular or pulmonary process is  demonstrated.     This report was finalized on 8/27/2017 1:00 PM by Dr. Kieran Mackay MD.       CT Abdomen Pelvis With Contrast [[id number]] Collected:  08/27/17 1620     Updated:  08/27/17 1640    Narrative:       CT SCANS OF THE CHEST, ABDOMEN, AND PELVIS WITH INTRAVENOUS CONTRAST     HISTORY: Recent hysterectomy. Fever and shortness of breath and  abdominal pain.     The CT scans were performed as an emergency procedure with CT imaging  through the chest, abdomen, and pelvis with intravenous contrast. The  following findings are present:  1. There is some predominately strand-like atelectasis at both lung  bases medially associated with tiny bilateral pleural effusions and the  lungs are otherwise clear. There is no mediastinal or hilar or axillary  adenopathy.  Small to moderate-sized pericardial effusion measuring 1.9  cm in thickness and this shows enlargement since the preoperative CT  scan of 06/18/2017.  2. There is a small hepatic cyst that is unchanged. The spleen,  pancreas, both adrenal glands and right kidney are unremarkable. There  is mild dilatation of the left renal collecting system including the  left ureter which extends into the pelvis where there is prominent  induration of the mesenteric fat planes associated with generalized wall  thickening of the rectosigmoid colon and there is also an adjacent fluid  collection to the left of the sigmoid colon that measures up to 5.5 x  6.8 cm. There is also generalized wall thickening of the urinary  bladder. Some of these findings may relate to postoperative changes but  are concerning for colitis. The fluid collection to the left of the  sigmoid colon could potentially represent an area of developing abscess  and the distended ureter extends to this region. The generalized  thickening of the urinary bladder may be secondary to the adjacent  inflammatory changes but clinical correlation for cystitis is also  recommended.     These findings have been discussed with Dr. Quintero in the emergency  room.     This report was finalized on 8/27/2017 4:37 PM by Dr. Dionte Barry MD.       CT Chest With Contrast [[id number]] Collected:  08/27/17 1620     Updated:  08/27/17 1640    Narrative:       CT SCANS OF THE CHEST, ABDOMEN, AND PELVIS WITH INTRAVENOUS CONTRAST     HISTORY: Recent hysterectomy. Fever and shortness of breath and  abdominal pain.     The CT scans were performed as an emergency procedure with CT imaging  through the chest, abdomen, and pelvis with intravenous contrast. The  following findings are present:  1. There is some predominately strand-like atelectasis at both lung  bases medially associated with tiny bilateral pleural effusions and the  lungs are otherwise clear. There is no mediastinal or hilar  or axillary  adenopathy. Small to moderate-sized pericardial effusion measuring 1.9  cm in thickness and this shows enlargement since the preoperative CT  scan of 06/18/2017.  2. There is a small hepatic cyst that is unchanged. The spleen,  pancreas, both adrenal glands and right kidney are unremarkable. There  is mild dilatation of the left renal collecting system including the  left ureter which extends into the pelvis where there is prominent  induration of the mesenteric fat planes associated with generalized wall  thickening of the rectosigmoid colon and there is also an adjacent fluid  collection to the left of the sigmoid colon that measures up to 5.5 x  6.8 cm. There is also generalized wall thickening of the urinary  bladder. Some of these findings may relate to postoperative changes but  are concerning for colitis. The fluid collection to the left of the  sigmoid colon could potentially represent an area of developing abscess  and the distended ureter extends to this region. The generalized  thickening of the urinary bladder may be secondary to the adjacent  inflammatory changes but clinical correlation for cystitis is also  recommended.     These findings have been discussed with Dr. Quintero in the emergency  room.     This report was finalized on 8/27/2017 4:37 PM by Dr. Dionte Barry MD.       CT Abdomen Pelvis With Contrast [[id number]] Collected:  09/01/17 1927     Updated:  09/01/17 1935    Narrative:       POSTCONTRAST CT ABDOMEN AND PELVIS     HISTORY: 61-year-old female for follow-up of abscess with hypoactive  bowel sounds and persistent abdominal pain.     COMPARISON: 08/27/2017     FINDINGS:  1. Imaging remains limited by body habitus associated upper extremity  artifact, motion artifact.  2. Stable moderate pericardial effusion small bilateral pleural  effusions and bibasilar atelectasis.  3. Cholelithiasis benign hepatic cyst bilateral renal cysts.  4. Left hydronephrosis and hydroureter  extending to inflammatory change  within the left pelvis.  4. Previous described pericolonic abscess 6.8 x 5.5 cm has developed an  enhancing wall and shows no change in size.  5. Severe inflammatory change of the sigmoid colon diffusely.  6. Persistent thickening of the urinary bladder wall.  7. Additional findings as previously noted, no new abnormality.       Impression:       1. No significant interval change in size of pelvic abscess.  2. Other findings as described without other significant interval change  including mild left hydronephrosis, urinary bladder wall thickening,  marked thickening of the sigmoid colon extending to the rectum.     This report was finalized on 9/1/2017 7:32 PM by Dr. Bryan Yadav MD.               Assessment:      Principal Problem:    Sepsis  Active Problems:    Chronic diastolic CHF (congestive heart failure)    Morbid obesity    THAI (obstructive sleep apnea)    Schizo affective schizophrenia    Diabetes mellitus    Coronary artery disease    Chronic respiratory failure with hypoxia and hypercapnia    Lymphedema    Hypertension    Abscess of abdominal cavity  Hypernatremia  Anemia, multifactorial, chronic disease, iron def, cancer, etc  prob d CHF exacerb    Patient Active Problem List   Diagnosis Code   • Elevated troponin R79.89   • Aspiration pneumonia J69.0   • Hematuria R31.9   • Hypokalemia E87.6   • Pelvic mass in female R19.00   • Fecal impaction K56.41   • Endometrial carcinoma C54.1   • Acute on chronic respiratory failure with hypoxia and hypercapnia J96.21, J96.22   • Acute on chronic diastolic CHF (congestive heart failure) I50.33   • UTI (urinary tract infection) N39.0   • Leucocytosis D72.829   • Chronic diastolic CHF (congestive heart failure) I50.32   • DM (diabetes mellitus) E11.9   • Morbid obesity E66.01   • HTN (hypertension) I10   • Schizophrenia F20.9   • Tracheostomy malfunction J95.03   • Pyuria N39.0   • THAI (obstructive sleep apnea) G47.33   •  Generalized weakness R53.1   • Schizo affective schizophrenia F25.0   • Diabetes mellitus E11.9   • Coronary artery disease I25.10   • Endometrial cancer determined by uterine biopsy C54.1, Z15.04   • Chronic respiratory failure with hypoxia and hypercapnia J96.11, J96.12   • Toxic metabolic encephalopathy G92   • Pneumonia J18.9   • Abnormal vaginal bleeding N93.9   • Schizo affective schizophrenia F25.0   • CHF (congestive heart failure) I50.9   • Endometrial cancer determined by uterine biopsy C54.1, Z15.04   • Coronary artery disease I25.10   • Lymphedema I89.0   • Immobility Z74.09   • Acute blood loss anemia D62   • Vaginal bleeding N93.9   • Hypertension I10   • Uterine cancer C55   • Bipolar disorder, unspecified F31.9   • Sepsis A41.9   • Abscess of abdominal cavity K65.1       Plan:      Continue IV antibiotics, as per ID  DC IV fluids  Diet: Reg, mech soft.   Monitor and correct electrolytes  Monitor mental status  DVT prophylaxis, will hold Lovenox (pt may be bleeding) start SCD  Labs in am      Lizzy Hewitt, APRN  9/7/2017  8:44 AM

## 2017-09-07 NOTE — PROGRESS NOTES
"Pharmacokinetic Consult - Vancomycin Dosing (Follow-up Note)    Lupe Burleson is on day # 12 pharmacy to dose vancomycin for Sepsis  Pharmacy dosing vancomycin per Dr Barcenas's request.   Goal trough: 15-20  mg/L     Relevant clinical data and objective history reviewed:  61 y.o. female 62.99\" (160 cm) 284 lb 6.3 oz (129 kg)    Past Medical History:   Diagnosis Date   • Acute respiratory distress syndrome    • Arthritis    • Bipolar disorder, unspecified    • CAD (coronary artery disease)    • Cellulitis    • Cellulitis    • CHF (congestive heart failure)    • Chronic ischemic heart disease    • Chronic respiratory failure with hypoxia and hypercapnia    • Chronic ulcer of calf    • Constipation    • Coronary artery disease    • Depression    • Depressive disorder    • Diabetes mellitus    • Dysphagia    • Dysphagia    • Endometrial cancer determined by uterine biopsy     s/p radiation; no improvement   • Endometrial hyperplasia    • History of transfusion    • Hypercapnia    • Hypertension    • Immobility    • Lack of coordination    • Lymphedema    • Malignant neoplasm of uterus    • Muscle weakness    • Obesities, morbid    • Oxygen dependent    • Post-menopausal bleeding    • Postmenopausal bleeding    • Schizo affective schizophrenia    • Skin ulcer    • Sleep apnea    • Stroke    • Symbolic dysfunction    • Uterine cancer    • Venous insufficiency    • Venous insufficiency      Creatinine   Date Value Ref Range Status   09/07/2017 0.85 0.57 - 1.00 mg/dL Final   09/06/2017 0.73 0.57 - 1.00 mg/dL Final   09/05/2017 0.68 0.57 - 1.00 mg/dL Final     BUN   Date Value Ref Range Status   09/07/2017 7 (L) 8 - 23 mg/dL Final     Estimated Creatinine Clearance: 88.8 mL/min (by C-G formula based on Cr of 0.85).    Lab Results   Component Value Date    WBC 9.68 09/07/2017     Temp Readings from Last 3 Encounters:   09/07/17 98.6 °F (37 °C) (Oral)   08/21/17 98 °F (36.7 °C) (Oral)   06/23/17 97 °F (36.1 °C) (Oral) "     Cultures:   8/27 Wcx: pseudomonas aeruginosa, staph warneri, proteus, K pneumoniae  8/27 Bcx: CNS  8/27 Ucx NG     IV Anti-Infectives     Ordered     Dose/Rate Route Frequency Start Stop    09/01/17 1418  vancomycin 1500 mg/500 mL 0.9% NS IVPB ([facility])     Ordering Provider:  Shane Barcenas MD    1,500 mg  over 120 Minutes Intravenous Every 24 Hours 09/02/17 1216      09/01/17 1857  fluconazole (DIFLUCAN) tablet 200 mg     Ordering Provider:  Ramya Alex MD    200 mg Oral Every 24 Hours 09/01/17 1930      08/28/17 1853  piperacillin-tazobactam (ZOSYN) 3.375 g in iso-osmotic dextrose 50 ml (premix)     Comments:  Pt tolerated Zosyn in ER on 8/27   Ordering Provider:  Ortiz Washington MD    3.375 g  over 4 Hours Intravenous Every 8 Hours 08/28/17 2000 08/27/17 2038  Pharmacy to dose vancomycin     Ordering Provider:  Shane Barcenas MD     Does not apply Continuous PRN 08/27/17 2037 08/27/17 1448  vancomycin 2250 mg/500 mL 0.9% NS IVPB ([facility])     Ordering Provider:  Tor Quintero MD    20 mg/kg × 116 kg Intravenous Once 08/27/17 1450 08/27/17 1553    08/27/17 1448  piperacillin-tazobactam (ZOSYN) 4.5 g in iso-osmotic dextrose 100 mL IVPB (premix)     Ordering Provider:  Tor Quintero MD    4.5 g Intravenous Once 08/27/17 1450 08/27/17 1552         Lab Results   Component Value Date    [facility] 18.80 09/04/2017     Vancomycin dosing history:   8/27 1553 vancomycin 2250 mg once the 1500 mg IV Q12H   8/29 2235 Vt=16.9, drawn 11 hr after the last dose, prior to the 5th dose. Continue vanc 1500 mg Q12H.   9/1 1102 Vt=27.6, drawn 12.5 hr after the last dose, prior to the 10th dose. Decrease vanc 1500 mg Q24H for predicted tr of ~15.   9/4 0634 trough=18.8mg/L however last dose was ~ 18 hrs prior (drawn ~ 6 hrs early); post 2 doses of the new regimen of q24h  9/7  Vancomycin 1500mg iv q 24 hours     Assessment/Plan  1) continue vancomycin 1500 mg every 24 hours.  2) Vancomycin trough 9/8 12:00   3)  Monitor for UOP and SCr.     Montserrat Talley, [facility]

## 2017-09-07 NOTE — PLAN OF CARE
Problem: Patient Care Overview (Adult)  Goal: Plan of Care Review    09/07/17 0921   Coping/Psychosocial Response Interventions   Plan Of Care Reviewed With patient   Patient Care Overview   Progress improving   Outcome Evaluation   Outcome Summary/Follow up Plan no c/o pain or distress noted. vss, hgb 9.2 after 1 unit prbc. pt w/ gross amt diarrhea multiple times. miralax and lactulose held. poss d/c soon. will continue to monitor.         Problem: Skin Integrity Impairment, Risk/Actual (Adult)  Goal: Skin Integrity/Wound Healing  Outcome: Ongoing (interventions implemented as appropriate)    09/07/17 0921   Skin Integrity Impairment, Risk/Actual (Adult)   Skin Integrity/Wound Healing making progress toward outcome         Problem: Sepsis (Adult)  Goal: Signs and Symptoms of Listed Potential Problems Will be Absent or Manageable (Sepsis)  Outcome: Ongoing (interventions implemented as appropriate)    09/07/17 0921   Sepsis   Problems Assessed (Sepsis) all   Problems Present (Sepsis) situational response         Problem: Infection, Risk/Actual (Adult)  Goal: Infection Prevention/Resolution  Outcome: Ongoing (interventions implemented as appropriate)    09/07/17 0921   Infection, Risk/Actual (Adult)   Infection Prevention/Resolution making progress toward outcome         Problem: Fall Risk (Adult)  Goal: Absence of Falls  Outcome: Ongoing (interventions implemented as appropriate)    09/07/17 0921   Fall Risk (Adult)   Absence of Falls making progress toward outcome         Problem: Pain, Acute (Adult)  Goal: Acceptable Pain Control/Comfort Level  Outcome: Ongoing (interventions implemented as appropriate)    09/07/17 0921   Pain, Acute (Adult)   Acceptable Pain Control/Comfort Level making progress toward outcome         Problem: Confusion, Acute (Adult)  Goal: Cognitive/Functional Impairments Minimized  Outcome: Ongoing (interventions implemented as appropriate)    09/07/17 0921   Confusion, Acute (Adult)    Cognitive/Functional Impairments Minimized making progress toward outcome       Goal: Safety  Outcome: Ongoing (interventions implemented as appropriate)    09/07/17 0921   Confusion, Acute (Adult)   Safety making progress toward outcome         Problem: Anxiety (Adult)  Goal: Reduction/Resolution  Outcome: Ongoing (interventions implemented as appropriate)    09/07/17 0921   Anxiety (Adult)   Reduction/Resolution making progress toward outcome         Problem: Wound, Traumatic, Nonburn (Adult)  Goal: Signs and Symptoms of Listed Potential Problems Will be Absent or Manageable (Wound, Traumatic, Nonburn)  Outcome: Ongoing (interventions implemented as appropriate)    09/07/17 0921   Wound, Traumatic, Nonburn   Problems Assessed (Wound) all   Problems Present (Wound) situational response;skin breakdown         Problem: Respiratory Insufficiency (Adult)  Goal: Acid/Base Balance  Outcome: Ongoing (interventions implemented as appropriate)    09/07/17 0921   Respiratory Insufficiency (Adult)   Acid/Base Balance making progress toward outcome       Goal: Effective Ventilation  Outcome: Ongoing (interventions implemented as appropriate)    09/07/17 0921   Respiratory Insufficiency (Adult)   Effective Ventilation making progress toward outcome

## 2017-09-07 NOTE — PLAN OF CARE
Problem: Patient Care Overview (Adult)  Goal: Plan of Care Review  Outcome: Ongoing (interventions implemented as appropriate)    09/07/17 0500   Coping/Psychosocial Response Interventions   Plan Of Care Reviewed With patient   Patient Care Overview   Progress improving   Outcome Evaluation   Outcome Summary/Follow up Plan Patient received 1 unit of PRBC. Several bowel movements tonigt. Bumex given before the blood. VSS. Waiting on labs. Will continue to monitor.

## 2017-09-07 NOTE — PROGRESS NOTES
DAILY PROGRESS NOTE  [facility], [facility]     2017     Patient Identification:  Name: Lupe Burleson  Age: 61 y.o.  Sex: female  :  1955  MRN: [id number]            Primary Care Physician: Shane Barcenas MD     Subjective:     Interval History:    Ms. Burleson is doing OK. Eating breakfast. She has developed diarrhea, large amount. C difficile toxin assay will be sent. She denies CP, SOA, N/V/D. This is day 10 of zosyn and invanz. She is to receive 1 unit PRBC today.      Objective:     Scheduled Meds:     alteplase 2 mg Intravenous Once   alteplase 2 mg Intravenous Once   alteplase 2 mg Intravenous Once   amLODIPine 5 mg Oral Q24H   atorvastatin 20 mg Per G Tube Daily   bumetanide 2 mg Oral Daily   ferrous sulfate 325 mg Oral Daily With Breakfast   fluconazole 200 mg Oral Q24H   folic acid 1 mg Oral Daily   hydrophor   Topical Q24H   isosorbide mononitrate 30 mg Per G Tube Daily   lactulose 20 g Oral Daily   pantoprazole 40 mg Oral Q AM   piperacillin-tazobactam 3.375 g Intravenous Q8H   polyethylene glycol 17 g Oral Daily   potassium chloride 20 mEq Oral Daily   risperiDONE 2 mg Oral Daily   risperiDONE 3 mg Oral Nightly   sodium chloride 10 mL Intracatheter Q12H   Valproic Acid 250 mg Oral Q12H   vancomycin 1,500 mg Intravenous Q24H         Continuous Infusions:     Pharmacy to dose vancomycin           PRN Meds:  •  acetaminophen  •  HYDROcodone-acetaminophen  •  HYDROcodone-acetaminophen  •  ipratropium-albuterol  •  LORazepam  •  Pharmacy to dose vancomycin  •  potassium chloride **OR** potassium chloride **OR** potassium chloride  •  Insert peripheral IV **AND** sodium chloride  •  sodium chloride     Intake/Output:     Intake/Output Summary (Last 24 hours) at 17 0844  Last data filed at 17 0200    Gross per 24 hour   Intake              620 ml   Output                0 ml   Net              620 ml            Exam:     tMax 24 hrs: Temp  "(24hrs), Av.6 °F (37 °C), Min:98.1 °F (36.7 °C), Max:98.9 °F (37.2 °C)     Vitals Ranges:   Temp:  [98.1 °F (36.7 °C)-98.9 °F (37.2 °C)] 98.6 °F (37 °C)  Heart Rate:  [85-91] 91  Resp:  [16-18] 16  BP: (106-137)/(57-70) 111/58     /58 (BP Location: Left arm, Patient Position: Lying)  Pulse 91  Temp 98.6 °F (37 °C) (Oral)   Resp 16  Ht 62.99\" (160 cm)  Wt 284 lb 6.3 oz (129 kg)  SpO2 97%  BMI 50.39 kg/m2     General: Alert, cooperative and appears stated age. In no acute distress  Neck: Supple, symmetrical, trachea midline, no adenopathy;              Thyroid without enlargement/tenderness/nodules;              no carotid bruit or JVD  Cardiovascular: Regular rate, regular rhythm and intact distal pulses.                              Exam reveals no gallop and no friction rub. No murmur heard  Chest wall: No tenderness or deformity  Pulmonary: diminished bilateral bases, respirations unlabored.                        Bilat wheezing and rhonchi.   Abdominal: Soft, non-tender; bowel sounds active                     Midline incision approximated, Staples now removed.   Extremities: Atraumatic, no cyanosis, chronic lymphedema bilaterally on LE, stable  Pulses:         2 + symmetric all extremities  Neurological: She is alert and oriented to person, place, and time.                           CNII-XII intact, normal strength, sensation intact throughout  Skin: Skin color, texture, turgor normal, stage 2 coccyx wound        Data Review:        Results from last 7 days  Lab Units 17  0450 17  0524 17  1131   WBC 10*3/mm3 8.02 7.14 7.61   HEMOGLOBIN g/dL 7.2* 7.1* 6.9*   HEMATOCRIT % 24.8* 23.6* 23.6*   PLATELETS 10*3/mm3 493 506* 504*            Results from last 7 days  Lab Units 17  0450 17  1950 17  0524 17  0634   SODIUM mmol/L 144  --  145 147*   POTASSIUM mmol/L 3.6 4.0 3.5 3.5   CHLORIDE mmol/L 102  --  105 107   CO2 mmol/L 35.4*  --  32.4* 29.5*   BUN mg/dL " 6*  --  7* 7*   CREATININE mg/dL 0.73  --  0.68 0.67   CALCIUM mg/dL 9.4  --  8.6 9.0   GLUCOSE mg/dL 100*  --  112* 86                Lab Results  Lab Value Date/Time   TROPONINT 0.065 (H) 05/17/2017 0505   TROPONINT 0.077 (H) 05/16/2017 1517   TROPONINT 0.080 (H) 05/16/2017 1112   TROPONINT 0.078 (H) 05/16/2017 0703   TROPONINT 0.084 (H) 05/16/2017 0116   TROPONINT <0.010 05/15/2016 0622   TROPONINT 0.017 05/14/2016 0437   TROPONINT 0.023 05/13/2016 2036   TROPONINT 0.031 (H) 05/13/2016 1231   TROPONINT <0.01 02/03/2016 0537   TROPONINT <0.01 02/02/2016 0914   TROPONINT <0.01 11/11/2015 0516   TROPONINT <0.01 11/09/2015 2049   TROPONINT <0.01 04/18/2014 0517             Microbiology Results (last 10 days)      ** No results found for the last 240 hours. **              Imaging Results (last 7 days)     Procedure Component Value Units Date/Time     XR Chest 1 View [[id number]] Collected:  08/27/17 1259       Updated:  08/27/17 1303     Narrative:        XR CHEST 1 VW-      Clinical: Postoperative fever      COMPARISON 08/20/2017      FINDINGS: No acute airspace disease has developed. The cardiomediastinal  silhouette is stable. No edema or effusion.      CONCLUSION: No acute cardiovascular or pulmonary process is  demonstrated.      This report was finalized on 8/27/2017 1:00 PM by Dr. Kieran Mackay MD.         CT Abdomen Pelvis With Contrast [[id number]] Collected:  08/27/17 1620       Updated:  08/27/17 1640     Narrative:        CT SCANS OF THE CHEST, ABDOMEN, AND PELVIS WITH INTRAVENOUS CONTRAST      HISTORY: Recent hysterectomy. Fever and shortness of breath and  abdominal pain.      The CT scans were performed as an emergency procedure with CT imaging  through the chest, abdomen, and pelvis with intravenous contrast. The  following findings are present:  1. There is some predominately strand-like atelectasis at both lung  bases medially associated with tiny bilateral pleural effusions and the  lungs are  otherwise clear. There is no mediastinal or hilar or axillary  adenopathy. Small to moderate-sized pericardial effusion measuring 1.9  cm in thickness and this shows enlargement since the preoperative CT  scan of 06/18/2017.  2. There is a small hepatic cyst that is unchanged. The spleen,  pancreas, both adrenal glands and right kidney are unremarkable. There  is mild dilatation of the left renal collecting system including the  left ureter which extends into the pelvis where there is prominent  induration of the mesenteric fat planes associated with generalized wall  thickening of the rectosigmoid colon and there is also an adjacent fluid  collection to the left of the sigmoid colon that measures up to 5.5 x  6.8 cm. There is also generalized wall thickening of the urinary  bladder. Some of these findings may relate to postoperative changes but  are concerning for colitis. The fluid collection to the left of the  sigmoid colon could potentially represent an area of developing abscess  and the distended ureter extends to this region. The generalized  thickening of the urinary bladder may be secondary to the adjacent  inflammatory changes but clinical correlation for cystitis is also  recommended.      These findings have been discussed with Dr. Quintero in the emergency  room.      This report was finalized on 8/27/2017 4:37 PM by Dr. Dionte Barry MD.         CT Chest With Contrast [[id number]] Collected:  08/27/17 1620       Updated:  08/27/17 1640     Narrative:        CT SCANS OF THE CHEST, ABDOMEN, AND PELVIS WITH INTRAVENOUS CONTRAST      HISTORY: Recent hysterectomy. Fever and shortness of breath and  abdominal pain.      The CT scans were performed as an emergency procedure with CT imaging  through the chest, abdomen, and pelvis with intravenous contrast. The  following findings are present:  1. There is some predominately strand-like atelectasis at both lung  bases medially associated with tiny bilateral  pleural effusions and the  lungs are otherwise clear. There is no mediastinal or hilar or axillary  adenopathy. Small to moderate-sized pericardial effusion measuring 1.9  cm in thickness and this shows enlargement since the preoperative CT  scan of 06/18/2017.  2. There is a small hepatic cyst that is unchanged. The spleen,  pancreas, both adrenal glands and right kidney are unremarkable. There  is mild dilatation of the left renal collecting system including the  left ureter which extends into the pelvis where there is prominent  induration of the mesenteric fat planes associated with generalized wall  thickening of the rectosigmoid colon and there is also an adjacent fluid  collection to the left of the sigmoid colon that measures up to 5.5 x  6.8 cm. There is also generalized wall thickening of the urinary  bladder. Some of these findings may relate to postoperative changes but  are concerning for colitis. The fluid collection to the left of the  sigmoid colon could potentially represent an area of developing abscess  and the distended ureter extends to this region. The generalized  thickening of the urinary bladder may be secondary to the adjacent  inflammatory changes but clinical correlation for cystitis is also  recommended.      These findings have been discussed with Dr. Quintero in the emergency  room.      This report was finalized on 8/27/2017 4:37 PM by Dr. Dionte Barry MD.         CT Abdomen Pelvis With Contrast [[id number]] Collected:  09/01/17 1927       Updated:  09/01/17 1935     Narrative:        POSTCONTRAST CT ABDOMEN AND PELVIS      HISTORY: 61-year-old female for follow-up of abscess with hypoactive  bowel sounds and persistent abdominal pain.      COMPARISON: 08/27/2017      FINDINGS:  1. Imaging remains limited by body habitus associated upper extremity  artifact, motion artifact.  2. Stable moderate pericardial effusion small bilateral pleural  effusions and bibasilar atelectasis.  3.  Cholelithiasis benign hepatic cyst bilateral renal cysts.  4. Left hydronephrosis and hydroureter extending to inflammatory change  within the left pelvis.  4. Previous described pericolonic abscess 6.8 x 5.5 cm has developed an  enhancing wall and shows no change in size.  5. Severe inflammatory change of the sigmoid colon diffusely.  6. Persistent thickening of the urinary bladder wall.  7. Additional findings as previously noted, no new abnormality.         Impression:        1. No significant interval change in size of pelvic abscess.  2. Other findings as described without other significant interval change  including mild left hydronephrosis, urinary bladder wall thickening,  marked thickening of the sigmoid colon extending to the rectum.      This report was finalized on 9/1/2017 7:32 PM by Dr. Bryan Yadav MD.                   Assessment:        Principal Problem:    Sepsis  Active Problems:    Chronic diastolic CHF (congestive heart failure)    Morbid obesity    THAI (obstructive sleep apnea)    Schizo affective schizophrenia    Diabetes mellitus    Coronary artery disease    Chronic respiratory failure with hypoxia and hypercapnia    Lymphedema    Hypertension    Abscess of abdominal cavity  Hypernatremia  Anemia, multifactorial, chronic disease, iron def, cancer, etc  prob d CHF exacerb          Patient Active Problem List   Diagnosis Code   • Elevated troponin R79.89   • Aspiration pneumonia J69.0   • Hematuria R31.9   • Hypokalemia E87.6   • Pelvic mass in female R19.00   • Fecal impaction K56.41   • Endometrial carcinoma C54.1   • Acute on chronic respiratory failure with hypoxia and hypercapnia J96.21, J96.22   • Acute on chronic diastolic CHF (congestive heart failure) I50.33   • UTI (urinary tract infection) N39.0   • Leucocytosis D72.829   • Chronic diastolic CHF (congestive heart failure) I50.32   • DM (diabetes mellitus) E11.9   • Morbid obesity E66.01   • HTN (hypertension) I10   • Schizophrenia  F20.9   • Tracheostomy malfunction J95.03   • Pyuria N39.0   • THAI (obstructive sleep apnea) G47.33   • Generalized weakness R53.1   • Schizo affective schizophrenia F25.0   • Diabetes mellitus E11.9   • Coronary artery disease I25.10   • Endometrial cancer determined by uterine biopsy C54.1, Z15.04   • Chronic respiratory failure with hypoxia and hypercapnia J96.11, J96.12   • Toxic metabolic encephalopathy G92   • Pneumonia J18.9   • Abnormal vaginal bleeding N93.9   • Schizo affective schizophrenia F25.0   • CHF (congestive heart failure) I50.9   • Endometrial cancer determined by uterine biopsy C54.1, Z15.04   • Coronary artery disease I25.10   • Lymphedema I89.0   • Immobility Z74.09   • Acute blood loss anemia D62   • Vaginal bleeding N93.9   • Hypertension I10   • Uterine cancer C55   • Bipolar disorder, unspecified F31.9   • Sepsis A41.9   • Abscess of abdominal cavity K65.1         Plan:        Continue IV antibiotics, as per ID  Send stool for C difficile toxin.   Diet: Reg, mech soft.   Monitor and correct electrolytes  Monitor mental status  DVT prophylaxis with SCD  Labs in am    Hopefully OK to dc in am.        Shane Barcenas MD  9/7/2017  8:44 AM

## 2017-09-08 VITALS
DIASTOLIC BLOOD PRESSURE: 51 MMHG | WEIGHT: 277.9 LBS | RESPIRATION RATE: 18 BRPM | OXYGEN SATURATION: 99 % | HEART RATE: 68 BPM | HEIGHT: 63 IN | BODY MASS INDEX: 49.24 KG/M2 | TEMPERATURE: 97.5 F | SYSTOLIC BLOOD PRESSURE: 101 MMHG

## 2017-09-08 LAB
ANION GAP SERPL CALCULATED.3IONS-SCNC: 7.7 MMOL/L
BUN BLD-MCNC: 10 MG/DL (ref 8–23)
BUN/CREAT SERPL: 11.6 (ref 7–25)
CALCIUM SPEC-SCNC: 8.9 MG/DL (ref 8.6–10.5)
CHLORIDE SERPL-SCNC: 100 MMOL/L (ref 98–107)
CO2 SERPL-SCNC: 38.3 MMOL/L (ref 22–29)
CREAT BLD-MCNC: 0.86 MG/DL (ref 0.57–1)
GFR SERPL CREATININE-BSD FRML MDRD: 81 ML/MIN/1.73
GLUCOSE BLD-MCNC: 85 MG/DL (ref 65–99)
GLUCOSE BLDC GLUCOMTR-MCNC: 110 MG/DL (ref 70–130)
GLUCOSE BLDC GLUCOMTR-MCNC: 89 MG/DL (ref 70–130)
POTASSIUM BLD-SCNC: 3 MMOL/L (ref 3.5–5.2)
SODIUM BLD-SCNC: 146 MMOL/L (ref 136–145)
VANCOMYCIN TROUGH SERPL-MCNC: 21.2 MCG/ML (ref 5–20)

## 2017-09-08 PROCEDURE — 25010000003 POTASSIUM CHLORIDE 10 MEQ/100ML SOLUTION: Performed by: INTERNAL MEDICINE

## 2017-09-08 PROCEDURE — 82962 GLUCOSE BLOOD TEST: CPT

## 2017-09-08 PROCEDURE — 25010000002 PIPERACILLIN SOD-TAZOBACTAM PER 1 G: Performed by: OBSTETRICS & GYNECOLOGY

## 2017-09-08 PROCEDURE — 80202 ASSAY OF VANCOMYCIN: CPT | Performed by: INTERNAL MEDICINE

## 2017-09-08 PROCEDURE — 80048 BASIC METABOLIC PNL TOTAL CA: CPT | Performed by: NURSE PRACTITIONER

## 2017-09-08 RX ORDER — AMOXICILLIN AND CLAVULANATE POTASSIUM 875; 125 MG/1; MG/1
1 TABLET, FILM COATED ORAL 2 TIMES DAILY
Qty: 28 TABLET | Refills: 0 | Status: SHIPPED | OUTPATIENT
Start: 2017-09-08 | End: 2017-11-18

## 2017-09-08 RX ORDER — CIPROFLOXACIN 750 MG/1
750 TABLET, FILM COATED ORAL 2 TIMES DAILY
Qty: 42 TABLET | Refills: 0 | Status: SHIPPED | OUTPATIENT
Start: 2017-09-08 | End: 2017-11-18

## 2017-09-08 RX ORDER — FLUCONAZOLE 200 MG/1
200 TABLET ORAL EVERY 24 HOURS
Qty: 30 TABLET | Refills: 0 | Status: SHIPPED | OUTPATIENT
Start: 2017-09-08 | End: 2017-11-18

## 2017-09-08 RX ORDER — HYDROCODONE BITARTRATE AND ACETAMINOPHEN 7.5; 325 MG/1; MG/1
1 TABLET ORAL EVERY 6 HOURS PRN
Qty: 120 TABLET | Refills: 0 | Status: SHIPPED | OUTPATIENT
Start: 2017-09-08 | End: 2017-09-10

## 2017-09-08 RX ORDER — FOLIC ACID 1 MG/1
1 TABLET ORAL DAILY
Qty: 30 TABLET | Refills: 0 | Status: SHIPPED | OUTPATIENT
Start: 2017-09-08 | End: 2018-02-02 | Stop reason: HOSPADM

## 2017-09-08 RX ORDER — LORAZEPAM 1 MG/1
1 TABLET ORAL EVERY 8 HOURS PRN
Qty: 90 TABLET | Refills: 1 | Status: ON HOLD | OUTPATIENT
Start: 2017-09-08 | End: 2017-11-22

## 2017-09-08 RX ADMIN — PETROLATUM: 42 OINTMENT TOPICAL at 10:13

## 2017-09-08 RX ADMIN — BUMETANIDE 2 MG: 2 TABLET ORAL at 10:09

## 2017-09-08 RX ADMIN — TAZOBACTAM SODIUM AND PIPERACILLIN SODIUM 3.38 G: 375; 3 INJECTION, SOLUTION INTRAVENOUS at 06:06

## 2017-09-08 RX ADMIN — FOLIC ACID 1 MG: 1 TABLET ORAL at 10:09

## 2017-09-08 RX ADMIN — RISPERIDONE 2 MG: 2 TABLET, FILM COATED ORAL at 10:10

## 2017-09-08 RX ADMIN — POTASSIUM CHLORIDE 10 MEQ: 10 INJECTION, SOLUTION INTRAVENOUS at 11:19

## 2017-09-08 RX ADMIN — FERROUS SULFATE TAB 325 MG (65 MG ELEMENTAL FE) 325 MG: 325 (65 FE) TAB at 10:09

## 2017-09-08 RX ADMIN — ATORVASTATIN CALCIUM 20 MG: 20 TABLET, FILM COATED ORAL at 10:08

## 2017-09-08 RX ADMIN — PANTOPRAZOLE SODIUM 40 MG: 40 TABLET, DELAYED RELEASE ORAL at 06:06

## 2017-09-08 RX ADMIN — POTASSIUM CHLORIDE 10 MEQ: 10 INJECTION, SOLUTION INTRAVENOUS at 13:53

## 2017-09-08 RX ADMIN — POTASSIUM CHLORIDE 20 MEQ: 750 CAPSULE, EXTENDED RELEASE ORAL at 10:08

## 2017-09-08 RX ADMIN — POTASSIUM CHLORIDE 10 MEQ: 10 INJECTION, SOLUTION INTRAVENOUS at 12:47

## 2017-09-08 RX ADMIN — Medication 10 ML: at 10:25

## 2017-09-08 RX ADMIN — VALPROIC ACID 250 MG: 250 SOLUTION ORAL at 10:08

## 2017-09-08 NOTE — PROGRESS NOTES
"Pharmacokinetic Consult - Vancomycin Dosing (Follow-up Note)    Lupe Burleson is on day #13  pharmacy to dose vancomycin for Sepsis.  Pharmacy dosing vancomycin per Dr Barcenas 's request.   Goal trough: 15-20  mg/L     Relevant clinical data and objective history reviewed:  61 y.o. female 62.99\" (160 cm) 277 lb 14.4 oz (126 kg)    Past Medical History:   Diagnosis Date   • Acute respiratory distress syndrome    • Arthritis    • Bipolar disorder, unspecified    • CAD (coronary artery disease)    • Cellulitis    • Cellulitis    • CHF (congestive heart failure)    • Chronic ischemic heart disease    • Chronic respiratory failure with hypoxia and hypercapnia    • Chronic ulcer of calf    • Constipation    • Coronary artery disease    • Depression    • Depressive disorder    • Diabetes mellitus    • Dysphagia    • Dysphagia    • Endometrial cancer determined by uterine biopsy     s/p radiation; no improvement   • Endometrial hyperplasia    • History of transfusion    • Hypercapnia    • Hypertension    • Immobility    • Lack of coordination    • Lymphedema    • Malignant neoplasm of uterus    • Muscle weakness    • Obesities, morbid    • Oxygen dependent    • Post-menopausal bleeding    • Postmenopausal bleeding    • Schizo affective schizophrenia    • Skin ulcer    • Sleep apnea    • Stroke    • Symbolic dysfunction    • Uterine cancer    • Venous insufficiency    • Venous insufficiency      Creatinine   Date Value Ref Range Status   09/08/2017 0.86 0.57 - 1.00 mg/dL Final   09/07/2017 0.85 0.57 - 1.00 mg/dL Final   09/06/2017 0.73 0.57 - 1.00 mg/dL Final     BUN   Date Value Ref Range Status   09/08/2017 10 8 - 23 mg/dL Final     Estimated Creatinine Clearance: 86.5 mL/min (by C-G formula based on Cr of 0.86).    Lab Results   Component Value Date    WBC 9.68 09/07/2017     Temp Readings from Last 3 Encounters:   09/08/17 97.5 °F (36.4 °C) (Oral)   08/21/17 98 °F (36.7 °C) (Oral)   06/23/17 97 °F (36.1 °C) (Oral)      " Cultures:   8/27 Wcx: pseudomonas aeruginosa, staph warneri, proteus, K pneumoniae  8/27 Bcx: CNS  8/27 Ucx NG     IV Anti-Infectives     Ordered     Dose/Rate Route Frequency Start Stop    09/08/17 1310  fluconazole (DIFLUCAN) 200 MG tablet     Ordering Provider:  Shane aBrcenas MD    200 mg Oral Every 24 Hours 09/08/17 0000      09/08/17 1310  ciprofloxacin (CIPRO) 750 MG tablet     Ordering Provider:  Shane Barcenas MD    750 mg Oral 2 Times Daily 09/08/17 0000      09/08/17 1310  amoxicillin-clavulanate (AUGMENTIN) 875-125 MG per tablet     Ordering Provider:  Shane Barcenas MD    1 tablet Oral 2 Times Daily 09/08/17 0000      09/01/17 1857  fluconazole (DIFLUCAN) tablet 200 mg     Ordering Provider:  Ramya Alex MD    200 mg Oral Every 24 Hours 09/01/17 1930      08/28/17 1853  piperacillin-tazobactam (ZOSYN) 3.375 g in iso-osmotic dextrose 50 ml (premix)     Comments:  Pt tolerated Zosyn in ER on 8/27   Ordering Provider:  Ortiz Washington MD    3.375 g  over 4 Hours Intravenous Every 8 Hours 08/28/17 2000 08/27/17 2038  Pharmacy to dose vancomycin     Ordering Provider:  Shane Barcenas MD     Does not apply Continuous PRN 08/27/17 2037 08/27/17 1448  vancomycin 2250 mg/500 mL 0.9% NS IVPB ([facility])     Ordering Provider:  Tor Quintero MD    20 mg/kg × 116 kg Intravenous Once 08/27/17 1450 08/27/17 1553    08/27/17 1448  piperacillin-tazobactam (ZOSYN) 4.5 g in iso-osmotic dextrose 100 mL IVPB (premix)     Ordering Provider:  Tor Quintero MD    4.5 g Intravenous Once 08/27/17 1450 08/27/17 1552         Lab Results   Component Value Date    [facility] 21.20 (H) 09/08/2017      Vancomycin dosing history:   8/27 1553 vancomycin 2250 mg once the 1500 mg IV Q12H   8/29 2235 Vt=16.9, drawn 11 hr after the last dose, prior to the 5th dose. Continue vanc 1500 mg Q12H.   9/1 1102 Vt=27.6, drawn 12.5 hr after the last dose, prior to the 10th dose. Decrease vanc 1500 mg Q24H for predicted tr of  ~15.   9/4 0634 trough=18.8mg/L however last dose was ~ 18 hrs prior (drawn ~ 6 hrs early); post 2 doses of the new regimen of q24h  9/7  Vancomycin 1500mg iv q 24 hours  9/8  Vancomycin trough = 21.2 mcg/ml  1210     Assessment/Plan  1) Will hold dose for 12 hours and restart Vancomycin at  1250 mg every 24 hours at 2300 tonight                                                                                                  2) Vancomycin trough 9/10 2230  3) Monitor for UOP and SCr.         Montserrat Talley, [facility]

## 2017-09-08 NOTE — PROGRESS NOTES
[facility]    Physicians Statement of Medical Necessity for Ambulance Transportation    It is medically necessary for:    Patient Name: Lupe Burleson    Insurance Information:  MEDICARE AND [place] MEDICAID- SEE FACESHEET    To [facility] transported by [facility]    From (if nursing facility, specify level of care: skilled, long-term, etc): [facility]    To (specify level of care if nursing facility): [facility]    Date of Service: 9/8/2017     For dialysis patients state date dialysis began: N/A    Diagnosis: SEPSIS    Past Medical/Surgical History:  Past Medical History:   Diagnosis Date   • Acute respiratory distress syndrome    • Arthritis    • Bipolar disorder, unspecified    • CAD (coronary artery disease)    • Cellulitis    • Cellulitis    • CHF (congestive heart failure)    • Chronic ischemic heart disease    • Chronic respiratory failure with hypoxia and hypercapnia    • Chronic ulcer of calf    • Constipation    • Coronary artery disease    • Depression    • Depressive disorder    • Diabetes mellitus    • Dysphagia    • Dysphagia    • Endometrial cancer determined by uterine biopsy     s/p radiation; no improvement   • Endometrial hyperplasia    • History of transfusion    • Hypercapnia    • Hypertension    • Immobility    • Lack of coordination    • Lymphedema    • Malignant neoplasm of uterus    • Muscle weakness    • Obesities, morbid    • Oxygen dependent    • Post-menopausal bleeding    • Postmenopausal bleeding    • Schizo affective schizophrenia    • Skin ulcer    • Sleep apnea    • Stroke    • Symbolic dysfunction    • Uterine cancer    • Venous insufficiency    • Venous insufficiency       Past Surgical History:   Procedure Laterality Date   • D&C HYSTEROSCOPY     • LAPAROSCOPIC TUBAL LIGATION     • TOTAL LAPAROSCOPIC HYSTERECTOMY Bilateral 8/15/2017    Procedure: OPEN TOTAL  ABDOMINAL HYSTERECTOMY WITH BILATERAL  SALPINGO-OOPHERECTOMY;  Surgeon: Ortiz Washington MD;  Location: [facility] OR;  Service:    • TRACHEOSTOMY AND PEG TUBE INSERTION     • UMBILICAL HERNIA REPAIR     • WOUND DEBRIDEMENT          Current Objective Medical Evidence(including physical exam finding to support reason for limitations):    {[facility] EVIDENCE:[id number]}    Other: ***    Physician Signature:           (RN,NP,PA,CAN, Discharge Planner) Date/Time: ***     Printed Name:    __________________________________    [facility]   Phone: [phone] [phone] Phone: [phone]   Fax: [phone] Fax: [phone] Fax: [phone]

## 2017-09-08 NOTE — PROGRESS NOTES
"  Infectious Diseases Progress Note    Ramya Alex MD     [facility]  [place]: 12 days  Patient Identification:  Name: Lupe Burleson  Age: 61 y.o.  Sex: female  :  1955  MRN: [id number]         Primary Care Physician: Shane Barcenas MD            Subjective: Doing well  Interval History: see consultation notes     Objective:    Scheduled Meds:    alteplase 2 mg Intravenous Once   alteplase 2 mg Intravenous Once   alteplase 2 mg Intravenous Once   amLODIPine 5 mg Oral Q24H   atorvastatin 20 mg Per G Tube Daily   bumetanide 2 mg Oral Daily   ferrous sulfate 325 mg Oral Daily With Breakfast   fluconazole 200 mg Oral Q24H   folic acid 1 mg Oral Daily   hydrophor  Topical Q24H   isosorbide mononitrate 30 mg Per G Tube Daily   lactulose 20 g Oral Daily   pantoprazole 40 mg Oral Q AM   piperacillin-tazobactam 3.375 g Intravenous Q8H   polyethylene glycol 17 g Oral Daily   potassium chloride 20 mEq Oral Daily   risperiDONE 2 mg Oral Daily   risperiDONE 3 mg Oral Nightly   sodium chloride 10 mL Intracatheter Q12H   Valproic Acid 250 mg Oral Q12H   vancomycin 1,500 mg Intravenous Q24H     Continuous Infusions:    Pharmacy to dose vancomycin        Vital signs in last 24 hours:  Temp:  [97.5 °F (36.4 °C)-98.8 °F (37.1 °C)] 97.5 °F (36.4 °C)  Heart Rate:  [55-95] 55  Resp:  [18-20] 18  BP: (114-144)/(48-66) 114/48    Intake/Output:  No intake or output data in the 24 hours ending 17 0831    Exam:  /48 (BP Location: Left arm, Patient Position: Lying)  Pulse 55  Temp 97.5 °F (36.4 °C) (Oral)   Resp 18  Ht 62.99\" (160 cm)  Wt 277 lb 14.4 oz (126 kg)  SpO2 99%  BMI 49.24 kg/m2    General Appearance:    Awake but doesn't want to interact                          Head:    Normocephalic, without obvious abnormality, atraumatic                           Eyes:    PERRL, conjunctiva/corneas clear, EOM's intact, both eyes                         Throat:   Lips, tongue, gums normal; oral mucosa " pink and moist                           Neck:   Supple, symmetrical, trachea midline, no JVD                         Lungs:    Clear to auscultation bilaterally, respirations unlabored                 Chest Wall:    No tenderness or deformity                          Heart:    Regular rate and rhythm, S1 and S2 normal, no murmur,no  Rub                                      or gallop                  Abdomen:     Soft, obese, surgical incision well approximated, non-tender, bowel sounds active                 Extremities:   Chronic changes in the left lower extremity and right arm PICC line in place                        Pulses:   Pulses palpable in all extremities                            Skin:   Skin is warm and dry,  no rashes or palpable lesions                         Data Review:    I reviewed the patient's new clinical results.    Results from last 7 days  Lab Units 09/07/17  0825 09/06/17  0450 09/05/17  0524 09/04/17  1131 09/02/17  0547   WBC 10*3/mm3 9.68 8.02 7.14 7.61 11.63*   HEMOGLOBIN g/dL 9.5* 7.2* 7.1* 6.9* 7.2*   PLATELETS 10*3/mm3 505* 493 506* 504* 530*       Results from last 7 days  Lab Units 09/08/17  0458 09/07/17  0825 09/06/17  0450 09/05/17  1950 09/05/17  0524 09/04/17  0634 09/03/17  0035 09/02/17  0547   SODIUM mmol/L 146* 138 144  --  145 147*  --  146*   POTASSIUM mmol/L 3.0* 4.0 3.6 4.0 3.5 3.5 3.6 3.6   CHLORIDE mmol/L 100 96* 102  --  105 107  --  108*   CO2 mmol/L 38.3* 30.9* 35.4*  --  32.4* 29.5*  --  29.7*   BUN mg/dL 10 7* 6*  --  7* 7*  --  8   CREATININE mg/dL 0.86 0.85 0.73  --  0.68 0.67  --  0.58   CALCIUM mg/dL 8.9 9.4 9.4  --  8.6 9.0  --  9.1   GLUCOSE mg/dL 85 79 100*  --  112* 86  --  94     Microbiology Results (last 10 days)     Procedure Component Value - Date/Time    Clostridium Difficile Toxin [[id number]] Collected:  09/07/17 1502    Lab Status:  Final result Specimen:  Stool from Per Rectum Updated:  09/07/17 9602    Narrative:       The following orders  were created for panel order Clostridium Difficile Toxin.  Procedure                               Abnormality         Status                     ---------                               -----------         ------                     Clostridium Difficile To...[[id number]]  Normal              Final result                 Please view results for these tests on the individual orders.    Clostridium Difficile Toxin, PCR [[id number]]  (Normal) Collected:  09/07/17 1502    Lab Status:  Final result Specimen:  Stool from Per Rectum Updated:  09/07/17 1705     C. Difficile Toxins by PCR Negative            Assessment:  Principal Problem:    Sepsis  Active Problems:    Chronic diastolic CHF (congestive heart failure)    Morbid obesity    THAI (obstructive sleep apnea)    Schizo affective schizophrenia    Diabetes mellitus    Coronary artery disease    Chronic respiratory failure with hypoxia and hypercapnia    Lymphedema    Hypertension    Abscess of abdominal cavity  bacteremia likely contaminant    Plan:   Doing well and okay to be discharged from infectious disease standpoint, cemented recommendation at the time of consultation.  Ramya Alex MD  9/8/2017  8:31 AM    Much of this encounter note is an electronic transcription/translation of spoken language to printed text. The electronic translation of spoken language may permit erroneous, or at times, nonsensical words or phrases to be inadvertently transcribed; Although I have reviewed the note for such errors, some may still exist

## 2017-09-08 NOTE — PROGRESS NOTES
[facility]    Physicians Statement of Medical Necessity for Ambulance Transportation    It is medically necessary for:    Patient Name: Lupe Burleson    Insurance Information:  MEDICARE AND [place] MEDICAID- SEE FACESHEET    To be transported by ambulance: [facility]    From (if nursing facility, specify level of care: skilled, senior care, etc): [facility]    To (specify level of care if nursing facility): [facility]    Date of Service: 9/8/2017     For dialysis patients state date dialysis began: N/A    Diagnosis: SEPSIS    Past Medical/Surgical History:  Past Medical History:   Diagnosis Date   • Acute respiratory distress syndrome    • Arthritis    • Bipolar disorder, unspecified    • CAD (coronary artery disease)    • Cellulitis    • Cellulitis    • CHF (congestive heart failure)    • Chronic ischemic heart disease    • Chronic respiratory failure with hypoxia and hypercapnia    • Chronic ulcer of calf    • Constipation    • Coronary artery disease    • Depression    • Depressive disorder    • Diabetes mellitus    • Dysphagia    • Dysphagia    • Endometrial cancer determined by uterine biopsy     s/p radiation; no improvement   • Endometrial hyperplasia    • History of transfusion    • Hypercapnia    • Hypertension    • Immobility    • Lack of coordination    • Lymphedema    • Malignant neoplasm of uterus    • Muscle weakness    • Obesities, morbid    • Oxygen dependent    • Post-menopausal bleeding    • Postmenopausal bleeding    • Schizo affective schizophrenia    • Skin ulcer    • Sleep apnea    • Stroke    • Symbolic dysfunction    • Uterine cancer    • Venous insufficiency    • Venous insufficiency       Past Surgical History:   Procedure Laterality Date   • D&C HYSTEROSCOPY     • LAPAROSCOPIC TUBAL LIGATION     • TOTAL LAPAROSCOPIC HYSTERECTOMY Bilateral 8/15/2017    Procedure: OPEN TOTAL  ABDOMINAL HYSTERECTOMY WITH BILATERAL  SALPINGO-OOPHERECTOMY;  Surgeon: Ortiz Washington MD;  Location: [facility] OR;  Service:    • TRACHEOSTOMY AND PEG TUBE INSERTION     • UMBILICAL HERNIA REPAIR     • WOUND DEBRIDEMENT          Current Objective Medical Evidence(including physical exam finding to support reason for limitations):    Bedridden  Confused/combative: may require restraints  Requires oxygen    Other: Weakness, long term care resident, state guardian patient    Physician Signature:           (RN,NP,PA,CAN, Discharge Planner) Date/Time: 9/8/2017 1:39 PM      Printed Name:    ________Cherrie Bearden RN/ccp__________________________    [facility]   Phone: [phone] Phone: [phone] Phone: [phone]   Fax: [phone] Fax: [phone] Fax: [phone]

## 2017-11-18 ENCOUNTER — APPOINTMENT (OUTPATIENT)
Dept: CT IMAGING | Facility: HOSPITAL | Age: 62
End: 2017-11-18

## 2017-11-18 ENCOUNTER — HOSPITAL ENCOUNTER (INPATIENT)
Facility: HOSPITAL | Age: 62
LOS: 5 days | Discharge: NURSING, REHAB OR LONG-TERM CARE | End: 2017-11-23
Attending: EMERGENCY MEDICINE | Admitting: INTERNAL MEDICINE

## 2017-11-18 DIAGNOSIS — N30.91 HEMORRHAGIC CYSTITIS: Primary | ICD-10-CM

## 2017-11-18 DIAGNOSIS — R59.9 ADENOPATHY: ICD-10-CM

## 2017-11-18 LAB
ABO GROUP BLD: NORMAL
ALBUMIN SERPL-MCNC: 3.3 G/DL (ref 3.5–5.2)
ALBUMIN/GLOB SERPL: 0.8 G/DL
ALP SERPL-CCNC: 79 U/L (ref 39–117)
ALT SERPL W P-5'-P-CCNC: 7 U/L (ref 1–33)
ANION GAP SERPL CALCULATED.3IONS-SCNC: 12.1 MMOL/L
APTT PPP: 27.9 SECONDS (ref 22.7–35.4)
AST SERPL-CCNC: 8 U/L (ref 1–32)
BACTERIA UR QL AUTO: ABNORMAL /HPF
BASOPHILS # BLD AUTO: 0.02 10*3/MM3 (ref 0–0.2)
BASOPHILS NFR BLD AUTO: 0.3 % (ref 0–1.5)
BILIRUB SERPL-MCNC: 0.3 MG/DL (ref 0.1–1.2)
BILIRUB UR QL STRIP: NEGATIVE
BLD GP AB SCN SERPL QL: NEGATIVE
BUN BLD-MCNC: 12 MG/DL (ref 8–23)
BUN/CREAT SERPL: 12.5 (ref 7–25)
CALCIUM SPEC-SCNC: 9.7 MG/DL (ref 8.6–10.5)
CHLORIDE SERPL-SCNC: 98 MMOL/L (ref 98–107)
CLARITY UR: ABNORMAL
CO2 SERPL-SCNC: 30.9 MMOL/L (ref 22–29)
COLOR UR: YELLOW
CREAT BLD-MCNC: 0.96 MG/DL (ref 0.57–1)
D-LACTATE SERPL-SCNC: 1.7 MMOL/L (ref 0.5–2)
DEPRECATED RDW RBC AUTO: 52.1 FL (ref 37–54)
EOSINOPHIL # BLD AUTO: 0.12 10*3/MM3 (ref 0–0.7)
EOSINOPHIL NFR BLD AUTO: 2 % (ref 0.3–6.2)
ERYTHROCYTE [DISTWIDTH] IN BLOOD BY AUTOMATED COUNT: 17.1 % (ref 11.7–13)
GFR SERPL CREATININE-BSD FRML MDRD: 71 ML/MIN/1.73
GLOBULIN UR ELPH-MCNC: 4.3 GM/DL
GLUCOSE BLD-MCNC: 88 MG/DL (ref 65–99)
GLUCOSE UR STRIP-MCNC: NEGATIVE MG/DL
HCT VFR BLD AUTO: 37.7 % (ref 35.6–45.5)
HGB BLD-MCNC: 11.7 G/DL (ref 11.9–15.5)
HGB UR QL STRIP.AUTO: ABNORMAL
HYALINE CASTS UR QL AUTO: ABNORMAL /LPF
IMM GRANULOCYTES # BLD: 0 10*3/MM3 (ref 0–0.03)
IMM GRANULOCYTES NFR BLD: 0 % (ref 0–0.5)
INR PPP: 1 (ref 0.9–1.1)
KETONES UR QL STRIP: NEGATIVE
LEUKOCYTE ESTERASE UR QL STRIP.AUTO: ABNORMAL
LYMPHOCYTES # BLD AUTO: 1.26 10*3/MM3 (ref 0.9–4.8)
LYMPHOCYTES NFR BLD AUTO: 21 % (ref 19.6–45.3)
MCH RBC QN AUTO: 26.9 PG (ref 26.9–32)
MCHC RBC AUTO-ENTMCNC: 31 G/DL (ref 32.4–36.3)
MCV RBC AUTO: 86.7 FL (ref 80.5–98.2)
MONOCYTES # BLD AUTO: 0.64 10*3/MM3 (ref 0.2–1.2)
MONOCYTES NFR BLD AUTO: 10.7 % (ref 5–12)
NEUTROPHILS # BLD AUTO: 3.95 10*3/MM3 (ref 1.9–8.1)
NEUTROPHILS NFR BLD AUTO: 66 % (ref 42.7–76)
NITRITE UR QL STRIP: NEGATIVE
PH UR STRIP.AUTO: 6.5 [PH] (ref 5–8)
PLATELET # BLD AUTO: 322 10*3/MM3 (ref 140–500)
PMV BLD AUTO: 10.5 FL (ref 6–12)
POTASSIUM BLD-SCNC: 4 MMOL/L (ref 3.5–5.2)
PROT SERPL-MCNC: 7.6 G/DL (ref 6–8.5)
PROT UR QL STRIP: ABNORMAL
PROTHROMBIN TIME: 12.8 SECONDS (ref 11.7–14.2)
RBC # BLD AUTO: 4.35 10*6/MM3 (ref 3.9–5.2)
RBC # UR: ABNORMAL /HPF
REF LAB TEST METHOD: ABNORMAL
RH BLD: POSITIVE
SODIUM BLD-SCNC: 141 MMOL/L (ref 136–145)
SP GR UR STRIP: 1.01 (ref 1–1.03)
SQUAMOUS #/AREA URNS HPF: ABNORMAL /HPF
UROBILINOGEN UR QL STRIP: ABNORMAL
VALPROATE SERPL-MCNC: 28 MCG/ML (ref 50–125)
WBC NRBC COR # BLD: 5.99 10*3/MM3 (ref 4.5–10.7)
WBC UR QL AUTO: ABNORMAL /HPF

## 2017-11-18 PROCEDURE — 86901 BLOOD TYPING SEROLOGIC RH(D): CPT | Performed by: EMERGENCY MEDICINE

## 2017-11-18 PROCEDURE — 80053 COMPREHEN METABOLIC PANEL: CPT | Performed by: EMERGENCY MEDICINE

## 2017-11-18 PROCEDURE — 83605 ASSAY OF LACTIC ACID: CPT | Performed by: EMERGENCY MEDICINE

## 2017-11-18 PROCEDURE — 83036 HEMOGLOBIN GLYCOSYLATED A1C: CPT | Performed by: INTERNAL MEDICINE

## 2017-11-18 PROCEDURE — 85610 PROTHROMBIN TIME: CPT | Performed by: EMERGENCY MEDICINE

## 2017-11-18 PROCEDURE — 87040 BLOOD CULTURE FOR BACTERIA: CPT | Performed by: EMERGENCY MEDICINE

## 2017-11-18 PROCEDURE — 74176 CT ABD & PELVIS W/O CONTRAST: CPT

## 2017-11-18 PROCEDURE — 86900 BLOOD TYPING SEROLOGIC ABO: CPT | Performed by: EMERGENCY MEDICINE

## 2017-11-18 PROCEDURE — 87086 URINE CULTURE/COLONY COUNT: CPT | Performed by: EMERGENCY MEDICINE

## 2017-11-18 PROCEDURE — 85025 COMPLETE CBC W/AUTO DIFF WBC: CPT | Performed by: EMERGENCY MEDICINE

## 2017-11-18 PROCEDURE — 81001 URINALYSIS AUTO W/SCOPE: CPT | Performed by: EMERGENCY MEDICINE

## 2017-11-18 PROCEDURE — 51703 INSERT BLADDER CATH COMPLEX: CPT

## 2017-11-18 PROCEDURE — 25010000003 CEFTRIAXONE PER 250 MG: Performed by: EMERGENCY MEDICINE

## 2017-11-18 PROCEDURE — 80164 ASSAY DIPROPYLACETIC ACD TOT: CPT | Performed by: EMERGENCY MEDICINE

## 2017-11-18 PROCEDURE — 85730 THROMBOPLASTIN TIME PARTIAL: CPT | Performed by: EMERGENCY MEDICINE

## 2017-11-18 PROCEDURE — 86850 RBC ANTIBODY SCREEN: CPT | Performed by: EMERGENCY MEDICINE

## 2017-11-18 PROCEDURE — 99284 EMERGENCY DEPT VISIT MOD MDM: CPT

## 2017-11-18 RX ORDER — VALPROIC ACID 250 MG/5ML
250 SOLUTION ORAL EVERY 12 HOURS SCHEDULED
Status: DISCONTINUED | OUTPATIENT
Start: 2017-11-19 | End: 2017-11-23 | Stop reason: HOSPADM

## 2017-11-18 RX ORDER — FERROUS SULFATE 325(65) MG
325 TABLET ORAL
Status: DISCONTINUED | OUTPATIENT
Start: 2017-11-19 | End: 2017-11-23 | Stop reason: HOSPADM

## 2017-11-18 RX ORDER — FOLIC ACID 1 MG/1
1 TABLET ORAL DAILY
Status: DISCONTINUED | OUTPATIENT
Start: 2017-11-19 | End: 2017-11-23 | Stop reason: HOSPADM

## 2017-11-18 RX ORDER — DEXTROSE MONOHYDRATE 25 G/50ML
25 INJECTION, SOLUTION INTRAVENOUS
Status: DISCONTINUED | OUTPATIENT
Start: 2017-11-18 | End: 2017-11-23 | Stop reason: HOSPADM

## 2017-11-18 RX ORDER — RISPERIDONE 2 MG/1
2 TABLET ORAL EVERY MORNING
COMMUNITY

## 2017-11-18 RX ORDER — LORAZEPAM 1 MG/1
1 TABLET ORAL EVERY 8 HOURS PRN
Status: DISCONTINUED | OUTPATIENT
Start: 2017-11-18 | End: 2017-11-23 | Stop reason: HOSPADM

## 2017-11-18 RX ORDER — AMMONIUM LACTATE 120 MG/G
1 CREAM TOPICAL EVERY 12 HOURS SCHEDULED
Status: DISCONTINUED | OUTPATIENT
Start: 2017-11-19 | End: 2017-11-23 | Stop reason: HOSPADM

## 2017-11-18 RX ORDER — IPRATROPIUM BROMIDE AND ALBUTEROL SULFATE 2.5; .5 MG/3ML; MG/3ML
3 SOLUTION RESPIRATORY (INHALATION) EVERY 4 HOURS PRN
Status: DISCONTINUED | OUTPATIENT
Start: 2017-11-18 | End: 2017-11-23 | Stop reason: HOSPADM

## 2017-11-18 RX ORDER — PANTOPRAZOLE SODIUM 40 MG/1
40 TABLET, DELAYED RELEASE ORAL
Status: DISCONTINUED | OUTPATIENT
Start: 2017-11-19 | End: 2017-11-23 | Stop reason: HOSPADM

## 2017-11-18 RX ORDER — POTASSIUM CHLORIDE 750 MG/1
20 CAPSULE, EXTENDED RELEASE ORAL DAILY
Status: DISCONTINUED | OUTPATIENT
Start: 2017-11-19 | End: 2017-11-23 | Stop reason: HOSPADM

## 2017-11-18 RX ORDER — RISPERIDONE 2 MG/1
2 TABLET ORAL DAILY
Status: DISCONTINUED | OUTPATIENT
Start: 2017-11-19 | End: 2017-11-23 | Stop reason: HOSPADM

## 2017-11-18 RX ORDER — RISPERIDONE 3 MG/1
3 TABLET ORAL NIGHTLY
Status: DISCONTINUED | OUTPATIENT
Start: 2017-11-19 | End: 2017-11-23 | Stop reason: HOSPADM

## 2017-11-18 RX ORDER — ATORVASTATIN CALCIUM 20 MG/1
20 TABLET, FILM COATED ORAL DAILY
Status: DISCONTINUED | OUTPATIENT
Start: 2017-11-19 | End: 2017-11-23 | Stop reason: HOSPADM

## 2017-11-18 RX ORDER — HYDROCODONE BITARTRATE AND ACETAMINOPHEN 7.5; 325 MG/1; MG/1
1 TABLET ORAL EVERY 6 HOURS PRN
Status: ON HOLD | COMMUNITY
End: 2017-11-22

## 2017-11-18 RX ORDER — CEFTRIAXONE SODIUM 2 G/50ML
2 INJECTION, SOLUTION INTRAVENOUS EVERY 24 HOURS
Status: DISCONTINUED | OUTPATIENT
Start: 2017-11-19 | End: 2017-11-23 | Stop reason: HOSPADM

## 2017-11-18 RX ORDER — ISOSORBIDE MONONITRATE 20 MG/1
30 TABLET ORAL DAILY
COMMUNITY
End: 2018-02-02 | Stop reason: HOSPADM

## 2017-11-18 RX ORDER — NICOTINE POLACRILEX 4 MG
15 LOZENGE BUCCAL
Status: DISCONTINUED | OUTPATIENT
Start: 2017-11-18 | End: 2017-11-23 | Stop reason: HOSPADM

## 2017-11-18 RX ORDER — CEFTRIAXONE SODIUM 2 G/50ML
2 INJECTION, SOLUTION INTRAVENOUS ONCE
Status: COMPLETED | OUTPATIENT
Start: 2017-11-18 | End: 2017-11-18

## 2017-11-18 RX ORDER — BUMETANIDE 2 MG/1
2 TABLET ORAL DAILY
Status: DISCONTINUED | OUTPATIENT
Start: 2017-11-19 | End: 2017-11-23 | Stop reason: HOSPADM

## 2017-11-18 RX ORDER — LACTULOSE 10 G/15ML
20 SOLUTION ORAL DAILY
Status: DISCONTINUED | OUTPATIENT
Start: 2017-11-19 | End: 2017-11-23 | Stop reason: HOSPADM

## 2017-11-18 RX ORDER — HYDROCODONE BITARTRATE AND ACETAMINOPHEN 7.5; 325 MG/1; MG/1
1 TABLET ORAL EVERY 6 HOURS PRN
Status: DISCONTINUED | OUTPATIENT
Start: 2017-11-18 | End: 2017-11-23 | Stop reason: HOSPADM

## 2017-11-18 RX ADMIN — CEFTRIAXONE SODIUM 2 G: 2 INJECTION, SOLUTION INTRAVENOUS at 21:16

## 2017-11-18 NOTE — ED PROVIDER NOTES
EMERGENCY DEPARTMENT ENCOUNTER    CHIEF COMPLAINT  Chief Complaint: vaginal bleeding  History given by: EMS, NH  History limited by: poor historian  Room Number: RADHA/RADHA  PMD: Shane Barcenas MD      HPI:  Pt is a 62 y.o. female who presents via EMS complaining of vaginal bleeding which began PTA. Per EMS, the NH staff noticed bleeding in the pt's diaper when they went to change her earlier today. The pt was sent to the ED for further evaluation. Patient is unaware to tell me how much pr when the bleeding started. She has some lower pelvic pain. Denies any SOB or CP. Denies any fever. Immbolized and bed ridden at baseline.    Duration:  PTA  Onset: gradual  Timing: constant  Location: vagina  Radiation: none  Quality: bleeding  Intensity/Severity: moderate  Progression: unchanged  Associated Symptoms: none  Aggravating Factors: none  Alleviating Factors: none  Previous Episodes: none  Treatment before arrival: none    PAST MEDICAL HISTORY  Active Ambulatory Problems     Diagnosis Date Noted   • Elevated troponin 05/13/2016   • Aspiration pneumonia 05/16/2016   • Hematuria 05/16/2016   • Hypokalemia 05/18/2016   • Pelvic mass in female 05/19/2016   • Fecal impaction 05/19/2016   • Endometrial carcinoma 05/21/2016   • Acute on chronic respiratory failure with hypoxia and hypercapnia 05/21/2016   • Acute on chronic diastolic CHF (congestive heart failure) 05/21/2016   • UTI (urinary tract infection) 05/29/2016   • Leucocytosis 05/30/2016   • Chronic diastolic CHF (congestive heart failure) 05/30/2016   • DM (diabetes mellitus) 05/30/2016   • Morbid obesity 05/30/2016   • HTN (hypertension) 05/30/2016   • Schizophrenia 05/30/2016   • Tracheostomy malfunction 06/22/2016   • Pyuria 06/22/2016   • THAI (obstructive sleep apnea) 06/22/2016   • Generalized weakness 05/16/2017   • Schizo affective schizophrenia 05/16/2017   • Diabetes mellitus 05/16/2017   • Coronary artery disease 05/16/2017   • Endometrial cancer  determined by uterine biopsy 05/16/2017   • Chronic respiratory failure with hypoxia and hypercapnia 05/16/2017   • Toxic metabolic encephalopathy 05/16/2017   • Pneumonia 05/17/2017   • Abnormal vaginal bleeding 06/18/2017   • Schizo affective schizophrenia 06/19/2017   • CHF (congestive heart failure) 06/19/2017   • Endometrial cancer determined by uterine biopsy 06/19/2017   • Coronary artery disease 06/19/2017   • Lymphedema 06/19/2017   • Immobility 06/19/2017   • Acute blood loss anemia 06/22/2017   • Vaginal bleeding 08/13/2017   • Hypertension 08/14/2017   • Uterine cancer 08/14/2017   • Bipolar disorder, unspecified 08/14/2017   • Sepsis 08/27/2017   • Abscess of abdominal cavity 08/28/2017     Resolved Ambulatory Problems     Diagnosis Date Noted   • No Resolved Ambulatory Problems     Past Medical History:   Diagnosis Date   • Acute respiratory distress syndrome    • Arthritis    • Bipolar disorder, unspecified    • CAD (coronary artery disease)    • Cellulitis    • Cellulitis    • CHF (congestive heart failure)    • Chronic ischemic heart disease    • Chronic respiratory failure with hypoxia and hypercapnia    • Chronic ulcer of calf    • Constipation    • Coronary artery disease    • Depression    • Depressive disorder    • Diabetes mellitus    • Dysphagia    • Dysphagia    • Endometrial cancer determined by uterine biopsy    • Endometrial hyperplasia    • History of transfusion    • Hypercapnia    • Hypertension    • Immobility    • Lack of coordination    • Lymphedema    • Malignant neoplasm of uterus    • Muscle weakness    • Obesities, morbid    • Oxygen dependent    • Post-menopausal bleeding    • Postmenopausal bleeding    • Schizo affective schizophrenia    • Skin ulcer    • Sleep apnea    • Stroke    • Symbolic dysfunction    • Uterine cancer    • Venous insufficiency    • Venous insufficiency        PAST SURGICAL HISTORY  Past Surgical History:   Procedure Laterality Date   • D&C HYSTEROSCOPY      • LAPAROSCOPIC TUBAL LIGATION     • TOTAL LAPAROSCOPIC HYSTERECTOMY Bilateral 8/15/2017    Procedure: OPEN TOTAL  ABDOMINAL HYSTERECTOMY WITH BILATERAL SALPINGO-OOPHERECTOMY;  Surgeon: Ortiz Washington MD;  Location: [facility] OR;  Service:    • TRACHEOSTOMY AND PEG TUBE INSERTION     • UMBILICAL HERNIA REPAIR     • WOUND DEBRIDEMENT         FAMILY HISTORY  Family History   Problem Relation Age of Onset   • Diabetes Mother    • Stroke Mother        SOCIAL HISTORY  Social History     Social History   • Marital status:      Spouse name: N/A   • Number of children: N/A   • Years of education: N/A     Occupational History   • Not on file.     Social History Main Topics   • Smoking status: Former Smoker     Packs/day: 2.00     Years: 15.00     Types: Cigarettes     Quit date: 1988   • Smokeless tobacco: Not on file   • Alcohol use No   • Drug use: No   • Sexual activity: No     Other Topics Concern   • Not on file     Social History Narrative       ALLERGIES  Codeine; Penicillins; and Sulfa antibiotics    REVIEW OF SYSTEMS  Review of Systems   Constitutional: Negative for fever.   HENT: Negative for sore throat.    Eyes: Negative.    Respiratory: Negative for cough and shortness of breath.    Cardiovascular: Negative for chest pain.   Gastrointestinal: Negative for abdominal pain, diarrhea and vomiting.   Genitourinary: Positive for vaginal bleeding. Negative for dysuria.   Musculoskeletal: Negative for neck pain.   Skin: Negative for rash.   Allergic/Immunologic: Negative.    Neurological: Negative for weakness, numbness and headaches.   Hematological: Negative.    Psychiatric/Behavioral: Negative.    All other systems reviewed and are negative.      PHYSICAL EXAM  ED Triage Vitals   Temp Heart Rate Resp BP SpO2   11/18/17 1723 11/18/17 1723 11/18/17 1723 11/18/17 1723 --   99.7 °F (37.6 °C) 70 18 116/72       Temp src Heart Rate Source Patient Position BP Location FiO2 (%)   -- -- -- -- --               Physical Exam   Constitutional: She is oriented to person, place, and time and well-developed, well-nourished, and in no distress. No distress.   Chronically ill, older then stated age   HENT:   Head: Normocephalic and atraumatic.   Eyes: EOM are normal.   Neck: Normal range of motion.   Cardiovascular: Normal rate and regular rhythm.    HR in the 70s   Pulmonary/Chest: Effort normal and breath sounds normal. No respiratory distress. She has no wheezes. She has no rales. She exhibits no tenderness.   Abdominal: Soft. Bowel sounds are normal. There is no tenderness. There is no rebound and no guarding.   Obese, g-tube in place, the pt is wearing a diaper    Genitourinary:   Genitourinary Comments: Rectal Exam:  No gross rectal bleeding. Brown stool   Guaiac negative    On exam, the source of blood appears to be from the pt's urethral meatus. Blood is present in the pt's diaper.       Neurological: She is alert and oriented to person, place, and time. She has normal sensation and normal strength.   Skin: Skin is warm and dry.   Psychiatric: Affect normal.   Nursing note and vitals reviewed.      LAB RESULTS  Lab Results (last 24 hours)     Procedure Component Value Units Date/Time    Lactic Acid, Plasma [[id number]]  (Normal) Collected:  11/18/17 1815    Specimen:  Blood Updated:  11/18/17 1840     Lactate 1.7 mmol/L     Blood Culture - Blood, [[id number]] Collected:  11/18/17 1815    Specimen:  Blood from Arm, Left Updated:  11/18/17 1822    CBC & Differential [[id number]] Collected:  11/18/17 1816    Specimen:  Blood Updated:  11/18/17 1845    Narrative:       The following orders were created for panel order CBC & Differential.  Procedure                               Abnormality         Status                     ---------                               -----------         ------                     CBC Auto Differential[[id number]]        Abnormal            Final result                 Please view results for  these tests on the individual orders.    Comprehensive Metabolic Panel [[id number]]  (Abnormal) Collected:  11/18/17 1816    Specimen:  Blood Updated:  11/18/17 1859     Glucose 88 mg/dL      BUN 12 mg/dL      Creatinine 0.96 mg/dL      Sodium 141 mmol/L      Potassium 4.0 mmol/L      Chloride 98 mmol/L      CO2 30.9 (H) mmol/L      Calcium 9.7 mg/dL      Total Protein 7.6 g/dL      Albumin 3.30 (L) g/dL      ALT (SGPT) 7 U/L      AST (SGOT) 8 U/L      Alkaline Phosphatase 79 U/L      Total Bilirubin 0.3 mg/dL      eGFR  African Amer 71 mL/min/1.73      Globulin 4.3 gm/dL      A/G Ratio 0.8 g/dL      BUN/Creatinine Ratio 12.5     Anion Gap 12.1 mmol/L     Protime-INR [[id number]]  (Normal) Collected:  11/18/17 1816    Specimen:  Blood Updated:  11/18/17 1855     Protime 12.8 Seconds      INR 1.00    aPTT [[id number]]  (Normal) Collected:  11/18/17 1816    Specimen:  Blood Updated:  11/18/17 1855     PTT 27.9 seconds     Valproic Acid Level, Total [[id number]]  (Abnormal) Collected:  11/18/17 1816    Specimen:  Blood Updated:  11/18/17 1859     Valproic Acid 28.0 (L) mcg/mL     CBC Auto Differential [[id number]]  (Abnormal) Collected:  11/18/17 1816    Specimen:  Blood Updated:  11/18/17 1845     WBC 5.99 10*3/mm3      RBC 4.35 10*6/mm3      Hemoglobin 11.7 (L) g/dL      Hematocrit 37.7 %      MCV 86.7 fL      MCH 26.9 pg      MCHC 31.0 (L) g/dL      RDW 17.1 (H) %      RDW-SD 52.1 fl      MPV 10.5 fL      Platelets 322 10*3/mm3      Neutrophil % 66.0 %      Lymphocyte % 21.0 %      Monocyte % 10.7 %      Eosinophil % 2.0 %      Basophil % 0.3 %      Immature Grans % 0.0 %      Neutrophils, Absolute 3.95 10*3/mm3      Lymphocytes, Absolute 1.26 10*3/mm3      Monocytes, Absolute 0.64 10*3/mm3      Eosinophils, Absolute 0.12 10*3/mm3      Basophils, Absolute 0.02 10*3/mm3      Immature Grans, Absolute 0.00 10*3/mm3     Blood Culture - Blood, [[id number]] Collected:  11/18/17 1394    Specimen:  Blood from Arm, Right Updated:   11/18/17 1858    Urinalysis With / Culture If Indicated - Urine, Catheter [[id number]]  (Abnormal) Collected:  11/18/17 1844    Specimen:  Urine from Urine, Catheter Updated:  11/18/17 1903     Color, UA Yellow     Appearance, UA Cloudy (A)     pH, UA 6.5     Specific Gravity, UA 1.010     Glucose, UA Negative     Ketones, UA Negative     Bilirubin, UA Negative     Blood, UA Large (3+) (A)     Protein,  mg/dL (2+) (A)     Leuk Esterase, UA Moderate (2+) (A)     Nitrite, UA Negative     Urobilinogen, UA 1.0 E.U./dL    Urinalysis, Microscopic Only - Urine, Clean Catch [[id number]]  (Abnormal) Collected:  11/18/17 1844    Specimen:  Urine from Urine, Catheter Updated:  11/18/17 1903     RBC, UA Too Numerous to Count (A) /HPF      WBC, UA 31-50 (A) /HPF      Bacteria, UA None Seen /HPF      Squamous Epithelial Cells, UA 0-2 /HPF      Hyaline Casts, UA 7-12 /LPF      Methodology Automated Microscopy    Urine Culture - Urine, Urine, Clean Catch [[id number]] Collected:  11/18/17 1844    Specimen:  Urine from Urine, Catheter Updated:  11/18/17 1901          I ordered the above labs and reviewed the results    RADIOLOGY  CT Abdomen Pelvis Without Contrast   Preliminary Result   1.  Urinary bladder is collapsed however there is severe mural   thickening, concerning for severe cystitis.    2.  Interval development and increase of retroperitoneal   lymphadenopathy, neoplastic etiology to be excluded.   3.  Multiple gallstones measuring up to 2 cm, no evidence for acute   cholecystitis.   4.  Bilateral small pleural effusions and moderate pericardial effusion   demonstrated no significant change.   5.  Findings concerning for constipation/fecal impaction.                   I ordered the above noted radiological studies.Reviewed by me in PACS.       PROCEDURES  Procedures      PROGRESS AND CONSULTS  ED Course     1741: Ordered labs for further evaluation.     1744: Ordered CT abd/pelvis for further evaluation.     2058:  Ordered rocephin for cystitis. Placed call to Dr. Barcenas (PCP).     2059: The pt has been given rocephin in the past and has tolerated it well.    2103: Discussed pt's case with Dr. Barcenas (PCP) who agrees to admit pt.     2143: Rechecked pt, she is resting comfortably.O2 sats 98% The pt's vitals are stable. Informed pt that she had a UTI. Discussed plan to admit pt. She agrees with and understands plan to admit. All questions were addressed at this time.     MEDICAL DECISION MAKING  Results were reviewed/discussed with the patient and they were also made aware of online access. Pt also made aware that some labs, such as cultures, will not be resulted during ER visit and follow up with PMD is necessary.     MDM  Number of Diagnoses or Management Options     Amount and/or Complexity of Data Reviewed  Clinical lab tests: ordered and reviewed (Valproic acid: 28. WBC, UA: 31-50)  Tests in the radiology section of CPT®: ordered and reviewed (CT abd/pelvis: hemorrhagic cystitis and increasing retroperitoneal lymphadenopathy. Constipation. Multiple gallstones, no evidence of acute cholecystitis. )  Decide to obtain previous medical records or to obtain history from someone other than the patient: yes  Review and summarize past medical records: yes (Discharged 9/8 after developing an abcess intraabdominally secondary to a hysterectomy on 8/21 for uterine cancer  Hospital Course: Lupe Burleson  is a 61 year old female resident of a NH with prior medical history of Schizophrenia, Morbid obesity, CAD, CHF, who was discharged from this facility 8-21-17 following CRISS for uterine CA. She was doing fairly well post-operatively when she began to run a fever yesterday. She was sent to the ED where CT scan revealed an intra-abdominal abscess, non amenable to percutaneous drainage due to its position deep in the pelvis.  She was started on IV  Antibiotics and admitted. She has continued IV antibiotics during her stay here. She has  required blood transfusions while here due to multi- factorial anemia: Cancer, iron deficiency, chronic illness, sepsis. She has been eating well. Her abdominal incision has healed. She will need f/U CT in 2 weeks as well as F/U with Dr. Washington in 2 weeks. She will need to have barrier cream applied to stage II coccygeal wound. She did have diarrhea but her Cdiff toxin was negative. At this time she is stable to be discharged to NH)  Discuss the patient with other providers: yes (Dr. Barcenas (PCP))    Patient Progress  Patient progress: stable         DIAGNOSIS  Final diagnoses:   Hemorrhagic cystitis   Adenopathy       DISPOSITION  ADMISSION    Discussed treatment plan and reason for admission with pt/family and admitting physician.  Pt/family voiced understanding of the plan for admission for further testing/treatment as needed.         Latest Documented Vital Signs:  As of 11:09 PM  BP- 129/83 HR- 76 Temp- 99.7 °F (37.6 °C) O2 sat- 98%    --  Documentation assistance provided by jennie Ramires for Dr. Kinney.  Information recorded by the scribe was done at my direction and has been verified and validated by me.          Josiane Ramires  11/18/17 1321       Harley Kinney MD  11/18/17 8262

## 2017-11-18 NOTE — ED TRIAGE NOTES
Brought in by ems from [facility] for abd pain and bleeding that started today without cause. Pt unsure of color of blood. Reports no increase in pad use.     Pt w relaxed face/body language.     Reportedly had pain pill pta at 1629

## 2017-11-19 LAB
ANION GAP SERPL CALCULATED.3IONS-SCNC: 11 MMOL/L
BUN BLD-MCNC: 13 MG/DL (ref 8–23)
BUN/CREAT SERPL: 13.5 (ref 7–25)
CALCIUM SPEC-SCNC: 10 MG/DL (ref 8.6–10.5)
CHLORIDE SERPL-SCNC: 96 MMOL/L (ref 98–107)
CO2 SERPL-SCNC: 29 MMOL/L (ref 22–29)
CREAT BLD-MCNC: 0.96 MG/DL (ref 0.57–1)
DEPRECATED RDW RBC AUTO: 54.9 FL (ref 37–54)
ERYTHROCYTE [DISTWIDTH] IN BLOOD BY AUTOMATED COUNT: 17.4 % (ref 11.7–13)
GFR SERPL CREATININE-BSD FRML MDRD: 71 ML/MIN/1.73
GLUCOSE BLD-MCNC: 96 MG/DL (ref 65–99)
GLUCOSE BLDC GLUCOMTR-MCNC: 102 MG/DL (ref 70–130)
GLUCOSE BLDC GLUCOMTR-MCNC: 124 MG/DL (ref 70–130)
GLUCOSE BLDC GLUCOMTR-MCNC: 130 MG/DL (ref 70–130)
GLUCOSE BLDC GLUCOMTR-MCNC: 90 MG/DL (ref 70–130)
GLUCOSE BLDC GLUCOMTR-MCNC: 90 MG/DL (ref 70–130)
HBA1C MFR BLD: 5.14 % (ref 4.8–5.6)
HCT VFR BLD AUTO: 36.8 % (ref 35.6–45.5)
HGB BLD-MCNC: 10.9 G/DL (ref 11.9–15.5)
MCH RBC QN AUTO: 26.1 PG (ref 26.9–32)
MCHC RBC AUTO-ENTMCNC: 29.6 G/DL (ref 32.4–36.3)
MCV RBC AUTO: 88 FL (ref 80.5–98.2)
PLATELET # BLD AUTO: 266 10*3/MM3 (ref 140–500)
PMV BLD AUTO: 10.2 FL (ref 6–12)
POTASSIUM BLD-SCNC: 4.3 MMOL/L (ref 3.5–5.2)
RBC # BLD AUTO: 4.18 10*6/MM3 (ref 3.9–5.2)
SODIUM BLD-SCNC: 136 MMOL/L (ref 136–145)
WBC NRBC COR # BLD: 5.34 10*3/MM3 (ref 4.5–10.7)

## 2017-11-19 PROCEDURE — 82962 GLUCOSE BLOOD TEST: CPT

## 2017-11-19 PROCEDURE — 25010000002 ENOXAPARIN PER 10 MG: Performed by: INTERNAL MEDICINE

## 2017-11-19 PROCEDURE — 80048 BASIC METABOLIC PNL TOTAL CA: CPT | Performed by: INTERNAL MEDICINE

## 2017-11-19 PROCEDURE — 85027 COMPLETE CBC AUTOMATED: CPT | Performed by: INTERNAL MEDICINE

## 2017-11-19 PROCEDURE — 25010000003 CEFTRIAXONE PER 250 MG: Performed by: INTERNAL MEDICINE

## 2017-11-19 RX ORDER — SODIUM CHLORIDE 9 MG/ML
75 INJECTION, SOLUTION INTRAVENOUS CONTINUOUS
Status: DISCONTINUED | OUTPATIENT
Start: 2017-11-19 | End: 2017-11-20

## 2017-11-19 RX ADMIN — LACTULOSE 20 G: 10 SOLUTION ORAL at 08:46

## 2017-11-19 RX ADMIN — AMMONIUM LACTATE 1 APPLICATION: 120 CREAM TOPICAL at 21:01

## 2017-11-19 RX ADMIN — VALPROIC ACID 250 MG: 250 SOLUTION ORAL at 21:01

## 2017-11-19 RX ADMIN — FOLIC ACID 1 MG: 1 TABLET ORAL at 08:46

## 2017-11-19 RX ADMIN — VALPROIC ACID 250 MG: 250 SOLUTION ORAL at 01:36

## 2017-11-19 RX ADMIN — HYDROCODONE BITARTRATE AND ACETAMINOPHEN 1 TABLET: 7.5; 325 TABLET ORAL at 22:38

## 2017-11-19 RX ADMIN — FERROUS SULFATE TAB 325 MG (65 MG ELEMENTAL FE) 325 MG: 325 (65 FE) TAB at 08:46

## 2017-11-19 RX ADMIN — HYDROCODONE BITARTRATE AND ACETAMINOPHEN 1 TABLET: 7.5; 325 TABLET ORAL at 01:36

## 2017-11-19 RX ADMIN — ISOSORBIDE MONONITRATE 30 MG: 10 TABLET ORAL at 08:49

## 2017-11-19 RX ADMIN — RISPERIDONE 3 MG: 1 TABLET ORAL at 01:36

## 2017-11-19 RX ADMIN — CEFTRIAXONE SODIUM 2 G: 2 INJECTION, SOLUTION INTRAVENOUS at 21:02

## 2017-11-19 RX ADMIN — VALPROIC ACID 250 MG: 250 SOLUTION ORAL at 08:49

## 2017-11-19 RX ADMIN — PANTOPRAZOLE SODIUM 40 MG: 40 TABLET, DELAYED RELEASE ORAL at 06:37

## 2017-11-19 RX ADMIN — ATORVASTATIN CALCIUM 20 MG: 20 TABLET, FILM COATED ORAL at 08:46

## 2017-11-19 RX ADMIN — ENOXAPARIN SODIUM 40 MG: 40 INJECTION SUBCUTANEOUS at 08:46

## 2017-11-19 RX ADMIN — AMMONIUM LACTATE 1 APPLICATION: 120 CREAM TOPICAL at 08:46

## 2017-11-19 RX ADMIN — POTASSIUM CHLORIDE 20 MEQ: 750 CAPSULE, EXTENDED RELEASE ORAL at 08:46

## 2017-11-19 RX ADMIN — RISPERIDONE 3 MG: 1 TABLET ORAL at 21:01

## 2017-11-19 RX ADMIN — BUMETANIDE 2 MG: 2 TABLET ORAL at 08:46

## 2017-11-19 RX ADMIN — RISPERIDONE 2 MG: 2 TABLET ORAL at 08:46

## 2017-11-19 RX ADMIN — SODIUM CHLORIDE 75 ML/HR: 9 INJECTION, SOLUTION INTRAVENOUS at 12:30

## 2017-11-19 NOTE — SIGNIFICANT NOTE
11/19/17 1538   Rehab Treatment   Discipline physical therapist   Rehab Evaluation   Evaluation Not Performed other (see comments)  (Pt unable to follow commands or respond to orientation questions. Pt not appropriate for skilled therapy services at this time secondary to inability to follow commands and adequately participate in skilled PT. RN Carissa notified.)   Recommendation   PT - Next Appointment 11/20/17

## 2017-11-19 NOTE — PLAN OF CARE
Problem: Patient Care Overview (Adult)  Goal: Plan of Care Review  Outcome: Ongoing (interventions implemented as appropriate)    11/19/17 2232   Coping/Psychosocial Response Interventions   Plan Of Care Reviewed With patient   Outcome Evaluation   Outcome Summary/Follow up Plan VSS. Telemetry monitor, NSR/ST. No c/o of pain. O2 at 2 lpm via nc. Chirinos catheter to bedside drainage, ana colored urine. Falls precautions. IV fluids. Will continue to monitor.    Patient Care Overview   Progress improving         Problem: Fall Risk (Adult)  Goal: Identify Related Risk Factors and Signs and Symptoms  Outcome: Ongoing (interventions implemented as appropriate)  Goal: Absence of Falls  Outcome: Ongoing (interventions implemented as appropriate)    Problem: Pain, Acute (Adult)  Goal: Identify Related Risk Factors and Signs and Symptoms  Outcome: Ongoing (interventions implemented as appropriate)  Goal: Acceptable Pain Control/Comfort Level  Outcome: Ongoing (interventions implemented as appropriate)    Problem: Infection, Risk/Actual (Adult)  Goal: Identify Related Risk Factors and Signs and Symptoms  Outcome: Ongoing (interventions implemented as appropriate)  Goal: Infection Prevention/Resolution  Outcome: Ongoing (interventions implemented as appropriate)    Problem: Pressure Ulcer Risk (Sunny Q Scale) (Pediatric)  Goal: Identify Related Risk Factors and Signs and Symptoms  Outcome: Ongoing (interventions implemented as appropriate)  Goal: Skin Integrity  Outcome: Outcome(s) achieved Date Met:  11/19/17

## 2017-11-19 NOTE — PROGRESS NOTES
Clinical Pharmacy Services: Medication History    Lupe Burleson is a 62 y.o. female presenting to [facility] for There are no admission diagnoses documented for this encounter.    She  has a past medical history of Acute respiratory distress syndrome; Arthritis; Bipolar disorder, unspecified; CAD (coronary artery disease); Cellulitis; Cellulitis; CHF (congestive heart failure); Chronic ischemic heart disease; Chronic respiratory failure with hypoxia and hypercapnia; Chronic ulcer of calf; Constipation; Coronary artery disease; Depression; Depressive disorder; Diabetes mellitus; Dysphagia; Dysphagia; Endometrial cancer determined by uterine biopsy; Endometrial hyperplasia; History of transfusion; Hypercapnia; Hypertension; Immobility; Lack of coordination; Lymphedema; Malignant neoplasm of uterus; Muscle weakness; Obesities, morbid; Oxygen dependent; Post-menopausal bleeding; Postmenopausal bleeding; Schizo affective schizophrenia; Skin ulcer; Sleep apnea; Stroke; Symbolic dysfunction; Uterine cancer; Venous insufficiency; and Venous insufficiency.    Allergies as of 11/18/2017 - Harley as Reviewed 11/18/2017   Allergen Reaction Noted   • Codeine  05/13/2016   • Penicillins  05/13/2016   • Sulfa antibiotics  05/13/2016       Medication information was obtained from: [facility] paperwork   Pharmacy and Phone Number: [facility]    Prior to Admission Medications     Prescriptions Last Dose Informant Patient Reported? Taking?    ammonium lactate (AMLACTIN) 12 % cream  Nursing Home Yes Yes    Apply 1 application topically 2 (two) times a day. To bilat legs and feet    atorvastatin (LIPITOR) 20 MG tablet  Nursing Home Yes Yes    20 mg by Per G Tube route Daily.    bumetanide (BUMEX) 2 MG tablet  Nursing Home Yes Yes    2 mg by Per G Tube route Daily.    ferrous sulfate 325 (65 FE) MG tablet  Nursing Home No Yes    Take 1 tablet by mouth Daily With Breakfast.    folic acid (FOLVITE) 1 MG tablet  Nursing Home No  Yes    Take 1 tablet by mouth Daily.    HYDROcodone-acetaminophen (NORCO) 7.5-325 MG per tablet  Nursing Home Yes Yes    Take 1 tablet by mouth Every 6 (Six) Hours As Needed for Moderate Pain .    insulin aspart (novoLOG) 100 UNIT/ML injection  Nursing Home Yes Yes    Inject 5 Units under the skin 3 (Three) Times a Day Before Meals.    isosorbide mononitrate (ISMO,MONOKET) 20 MG tablet  Nursing Home Yes Yes    30 mg by Per G Tube route Daily.      Nursing Home Yes Yes    30 mg by Per G Tube route Daily.    lactulose (CHRONULAC) 10 GM/15ML solution  Nursing Home Yes Yes    20 g by Per PEG Tube route daily.    mupirocin (BACTROBAN) 2 % ointment  Nursing Home Yes Yes    Apply 1 application topically 2 (Two) Times a Day. Apply to wounds twice daily    pantoprazole (PROTONIX) 40 MG pack packet  Nursing Home Yes Yes    40 mg by Per PEG Tube route Every Morning Before Breakfast.    potassium chloride (KAYCIEL) 20 MEQ/15ML (10%) solution  Nursing Home Yes Yes    Take 20 mEq by mouth Daily.    risperiDONE (risperDAL) 2 MG tablet  Nursing Home Yes Yes    Take 2 mg by mouth Every Morning.    risperiDONE (risperDAL) 3 MG tablet  Nursing Home Yes Yes    Take 3 mg by mouth Every Night.    valproic acid (DEPAKENE) 250 MG/5ML syrup syrup  Nursing Home Yes Yes    250 mg by Per PEG Tube route every 12 (twelve) hours.    acetaminophen (MAPAP) 160 MG/5ML liquid  Nursing Home Yes No    650 mg by Per G Tube route Every 4 (Four) Hours As Needed for Fever (give 20.3 ml per G tube).     Unknown Nursing Home No No    Take 1 tablet by mouth Daily. Indications: Infection Within the Abdomen    ipratropium-albuterol (DUO-NEB) 0.5-2.5 mg/mL nebulizer Unknown Nursing Home Yes No    Take 3 mL by nebulization Every 4 (Four) Hours As Needed for Wheezing.    LORazepam (ATIVAN) 1 MG tablet Unknown Nursing Home No No    1 tablet by Per G Tube route Every 8 (Eight) Hours As Needed for Anxiety.            Medication notes: pt tolerated ceftriaxone on 5/17     This medication list is complete to the best of my knowledge as of 11/18/2017    Please call if you have any questions.    Matthew Crook  Medication History Technician   [id number]  11/18/2017 9:02 PM

## 2017-11-19 NOTE — PLAN OF CARE
Problem: Patient Care Overview (Adult)  Goal: Plan of Care Review  Outcome: Ongoing (interventions implemented as appropriate)    11/19/17 0559   Coping/Psychosocial Response Interventions   Plan Of Care Reviewed With patient   Outcome Evaluation   Outcome Summary/Follow up Plan Received from ED for hemorrhagic cystitis. Vitals stable. On O2 at 2l/min via nasal cannula. Chirinos to BSD draining ana urine. NO active bleeding noted. Turned;Fall precuations enforced. Monitored.       Goal: Adult Individualization and Mutuality  Outcome: Ongoing (interventions implemented as appropriate)  Goal: Discharge Needs Assessment  Outcome: Ongoing (interventions implemented as appropriate)    Problem: Fall Risk (Adult)  Goal: Identify Related Risk Factors and Signs and Symptoms  Outcome: Ongoing (interventions implemented as appropriate)  Goal: Absence of Falls  Outcome: Ongoing (interventions implemented as appropriate)    Problem: Pain, Acute (Adult)  Goal: Acceptable Pain Control/Comfort Level  Outcome: Ongoing (interventions implemented as appropriate)    Problem: Infection, Risk/Actual (Adult)  Goal: Identify Related Risk Factors and Signs and Symptoms  Outcome: Ongoing (interventions implemented as appropriate)  Goal: Infection Prevention/Resolution  Outcome: Ongoing (interventions implemented as appropriate)    Problem: Pressure Ulcer Risk (Sunny Q Scale) (Pediatric)  Goal: Skin Integrity  Outcome: Ongoing (interventions implemented as appropriate)

## 2017-11-19 NOTE — ED NOTES
RN from [facility] states pt reported abdominal pain starting the night of 11/17. Pt has history of hematuria per RN [facility].     Christine Parikh RN  11/18/17 1910

## 2017-11-19 NOTE — NURSING NOTE
Called [facility] and talked to Staff - Janis and was informed that patient is on regular,thin liquids and patient's meds are given by mouth (meds crushed with apple sauce). PEG tube is not used.

## 2017-11-19 NOTE — H&P
HISTORY AND PHYSICAL   [facility], [facility]      2017    Patient Identification:    Name: Lupe Burleson  Age: 62 y.o.  Sex: female  :  1955  MRN: [id number]                       Primary Care Physician: Shane Barcenas MD    Chief Complaint:      Chief Complaint   Patient presents with   • Vaginal Bleeding         History of Present Illness:      Patient is a 62-year-old female with multiple medical problems, hypertension, diabetes mellitus, diastolic congestive heart failure, coronary artery disease, recent hysterectomy for endometrial cancer.  She started to complain of having lower abdominal pain as well as vaginal bleeding for about 2 days or so, pain in the abdomen increase so patient was sent to the hospital for evaluation.  In emergency room patient was evaluated, and it was felt the patient was having hematuria rather than a vaginal bleeding, CT scan of the abdomen and pelvis showed probably hemorrhagic cystitis.  Patient is being admitted for further management.    Past Medical History:  Past Medical History:   Diagnosis Date   • Acute respiratory distress syndrome    • Arthritis    • Bipolar disorder, unspecified    • CAD (coronary artery disease)    • Cellulitis    • Cellulitis    • CHF (congestive heart failure)    • Chronic ischemic heart disease    • Chronic respiratory failure with hypoxia and hypercapnia    • Chronic ulcer of calf    • Constipation    • Coronary artery disease    • Depression    • Depressive disorder    • Diabetes mellitus    • Dysphagia    • Dysphagia    • Endometrial cancer determined by uterine biopsy     s/p radiation; no improvement   • Endometrial hyperplasia    • History of transfusion    • Hypercapnia    • Hypertension    • Immobility    • Lack of coordination    • Lymphedema    • Malignant neoplasm of uterus    • Muscle weakness    • Obesities, morbid    • Oxygen dependent    • Post-menopausal bleeding    • Postmenopausal bleeding    • Schizo  affective schizophrenia    • Skin ulcer    • Sleep apnea    • Stroke    • Symbolic dysfunction    • Uterine cancer    • Venous insufficiency    • Venous insufficiency      Past Surgical History:  Past Surgical History:   Procedure Laterality Date   • D&C HYSTEROSCOPY     • LAPAROSCOPIC TUBAL LIGATION     • TOTAL LAPAROSCOPIC HYSTERECTOMY Bilateral 8/15/2017    Procedure: OPEN TOTAL  ABDOMINAL HYSTERECTOMY WITH BILATERAL SALPINGO-OOPHERECTOMY;  Surgeon: Ortiz Washington MD;  Location: [facility];  Service:    • TRACHEOSTOMY AND PEG TUBE INSERTION     • UMBILICAL HERNIA REPAIR     • WOUND DEBRIDEMENT        Home Meds:  Prescriptions Prior to Admission   Medication Sig Dispense Refill Last Dose   • ammonium lactate (AMLACTIN) 12 % cream Apply 1 application topically 2 (two) times a day. To bilat legs and feet   5/28/2016 at Unknown time   • atorvastatin (LIPITOR) 20 MG tablet 20 mg by Per G Tube route Daily.      • bumetanide (BUMEX) 2 MG tablet 2 mg by Per G Tube route Daily.      • ferrous sulfate 325 (65 FE) MG tablet Take 1 tablet by mouth Daily With Breakfast. 60 tablet 12    • folic acid (FOLVITE) 1 MG tablet Take 1 tablet by mouth Daily. 30 tablet 0    • HYDROcodone-acetaminophen (NORCO) 7.5-325 MG per tablet Take 1 tablet by mouth Every 6 (Six) Hours As Needed for Moderate Pain .      • insulin aspart (novoLOG) 100 UNIT/ML injection Inject 5 Units under the skin 3 (Three) Times a Day Before Meals.      • isosorbide mononitrate (ISMO,MONOKET) 20 MG tablet 30 mg by Per G Tube route Daily.      • lactulose (CHRONULAC) 10 GM/15ML solution 20 g by Per PEG Tube route daily.   5/28/2016 at Unknown time   • mupirocin (BACTROBAN) 2 % ointment Apply 1 application topically 2 (Two) Times a Day. Apply to wounds twice daily      • pantoprazole (PROTONIX) 40 MG pack packet 40 mg by Per PEG Tube route Every Morning Before Breakfast.   5/12/2016 at Unknown time   • potassium chloride (KAYCIEL) 20 MEQ/15ML (10%) solution  Take 20 mEq by mouth Daily.      • risperiDONE (risperDAL) 2 MG tablet Take 2 mg by mouth Every Morning.      • risperiDONE (risperDAL) 3 MG tablet Take 3 mg by mouth Every Night.      • valproic acid (DEPAKENE) 250 MG/5ML syrup syrup 250 mg by Per PEG Tube route every 12 (twelve) hours.   5/28/2016 at Unknown time   • acetaminophen (MAPAP) 160 MG/5ML liquid 650 mg by Per G Tube route Every 4 (Four) Hours As Needed for Fever (give 20.3 ml per G tube).      • ipratropium-albuterol (DUO-NEB) 0.5-2.5 mg/mL nebulizer Take 3 mL by nebulization Every 4 (Four) Hours As Needed for Wheezing.   Unknown at Unknown time   • LORazepam (ATIVAN) 1 MG tablet 1 tablet by Per G Tube route Every 8 (Eight) Hours As Needed for Anxiety. 90 tablet 1 Unknown at Unknown time       Allergies:  Allergies   Allergen Reactions   • Codeine    • Penicillins      Tolerated ceftriaxone during 5/2017 admission  Tolerating Zosyn without any problem   • Sulfa Antibiotics      Immunizations:  There is no immunization history for the selected administration types on file for this patient.  Social History:   Social History     Social History Narrative     Social History   Substance Use Topics   • Smoking status: Former Smoker     Packs/day: 2.00     Years: 15.00     Types: Cigarettes     Quit date: 1988   • Smokeless tobacco: Not on file   • Alcohol use No     Family History:  Family History   Problem Relation Age of Onset   • Diabetes Mother    • Stroke Mother         Review of Systems  See history of present illness and past medical history.    Constitutional: Negative for fever.   HENT: Negative for sore throat.    Eyes: Negative.    Respiratory: Negative for cough and shortness of breath.    Cardiovascular: Negative for chest pain.   Gastrointestinal: Negative for abdominal pain, diarrhea and vomiting.   Genitourinary: Positive for vaginal bleeding. Negative for dysuria.   Musculoskeletal: Negative for neck pain.   Skin: Negative for rash.  "  Allergic/Immunologic: Negative.    Neurological: Negative for weakness, numbness and headaches.   Hematological: Negative.    Psychiatric/Behavioral: Negative.    All other systems reviewed and are negative.  Objective:    Exam:    tMax 24 hrs: Temp (24hrs), Av.1 °F (36.7 °C), Min:96.3 °F (35.7 °C), Max:99.7 °F (37.6 °C)    Vitals Ranges:   Temp:  [96.3 °F (35.7 °C)-99.7 °F (37.6 °C)] 96.3 °F (35.7 °C)  Heart Rate:  [58-85] 73  Resp:  [16-18] 16  BP: (116-147)/(60-88) 147/88    /88 (BP Location: Right arm, Patient Position: Lying)  Pulse 73  Temp 96.3 °F (35.7 °C) (Oral)   Resp 16  Ht 62.99\" (160 cm)  Wt 232 lb 3.2 oz (105 kg)  SpO2 98%  BMI 41.15 kg/m2    General: Alert, oriented x 2. Difficult to get information from her, not speaking much.  Appears stated age. Obese.  HEENT:    Head: Normocephalic, without obvious abnormality, atraumatic  Eyes: EOM are normal. Pupils are equal, round, and reactive to light.   Oropharynx: Mucosa and tongue normal  Neck: Supple, symmetrical, trachea midline, no adenopathy;              thyroid:  no enlargement/tenderness/nodules;              no carotid bruit or JVD  Cardiovascular: Normal rate, regular rhythm and intact distal pulses.              Exam reveals no gallop and no friction rub. No murmur heard  Chest wall: No tenderness or deformity  Pulmonary: Diminished breath sounds bilaterally, respirations unlabored.               No rhonchi, wheezing or rales.   Abdominal: Soft. Soft, non-tender, bowel sounds active all four quadrants,     no masses, no hepatomegaly, no splenomegaly.  PEG tube in place, site is okay.    Extremities: Normal, atraumatic, no cyanosis or edema, changes of chronic inflammation in the left leg.    Pulses: 2 + symmetric all extremities  Neurological: He is alert and oriented to person, place, and time.                 CNII-XII intact, normal strength, sensation intact throughout  Skin: Skin color, texture, turgor normal, no rashes or " lesions      Data Review:      Results from last 7 days  Lab Units 11/19/17  0547 11/18/17  1816   WBC 10*3/mm3 5.34 5.99   HEMOGLOBIN g/dL 10.9* 11.7*   HEMATOCRIT % 36.8 37.7   PLATELETS 10*3/mm3 266 322         Results from last 7 days  Lab Units 11/19/17  0547 11/18/17  1816   SODIUM mmol/L 136 141   POTASSIUM mmol/L 4.3 4.0   CHLORIDE mmol/L 96* 98   CO2 mmol/L 29.0 30.9*   BUN mg/dL 13 12   CREATININE mg/dL 0.96 0.96   CALCIUM mg/dL 10.0 9.7   BILIRUBIN mg/dL  --  0.3   ALK PHOS U/L  --  79   ALT (SGPT) U/L  --  7   AST (SGOT) U/L  --  8   GLUCOSE mg/dL 96 88       Results from last 7 days  Lab Units 11/18/17  1816   INR  1.00         Lab Results  Lab Value Date/Time   TROPONINT 0.065 (H) 05/17/2017 0505   TROPONINT 0.077 (H) 05/16/2017 1517   TROPONINT 0.080 (H) 05/16/2017 1112   TROPONINT 0.078 (H) 05/16/2017 0703   TROPONINT 0.084 (H) 05/16/2017 0116   TROPONINT <0.010 05/15/2016 0622   TROPONINT 0.017 05/14/2016 0437   TROPONINT 0.023 05/13/2016 2036   TROPONINT 0.031 (H) 05/13/2016 1231   TROPONINT <0.01 02/03/2016 0537   TROPONINT <0.01 02/02/2016 0914   TROPONINT <0.01 11/11/2015 0516   TROPONINT <0.01 11/09/2015 2049   TROPONINT <0.01 04/18/2014 0517        Imaging Results (all)     Procedure Component Value Units Date/Time    CT Abdomen Pelvis Without Contrast [[id number]] Collected:  11/18/17 1931     Updated:  11/18/17 1931    Narrative:       CT ABDOMEN AND PELVIS WITHOUT CONTRAST.     TECHNIQUE: Radiation dose reduction techniques were utilized, including  automated exposure control and exposure modulation based on body size. A  routine CT scan of the abdomen and pelvis was performed with coronal and  sagittal reconstructed images.     HISTORY: Hematuria.     COMPARISON: 09/01/2017.     FINDINGS:  Bilateral small pleural effusions remain unchanged. Moderately large  pericardial effusion is unchanged, maximum thickness at the level of the  left atrium measures 2.2 cm.      Liver demonstrates  homogeneous parenchyma, no definite mass. The  gallbladder is collapsed and contains gallstones measuring up to 2 cm.  No intra or extrahepatic biliary ductal dilatation.      The spleen is unremarkable. Pancreas is unremarkable, no pancreatic  ductal dilatation. Adrenal glands are within normal limits.     Mild dilatation of the right ureter, no obstructing calculus is seen.  Minimal scarring of the mid pole right kidney. No definite renal  calculi. Urinary bladder is collapsed over a Chirinos catheter however the  wall of the bladder is significantly thickened and measures 2.7 cm  maximum. Large amount of stool in the colon and rectum concerning for  constipation/fecal impaction.     Small and large bowel loops are within normal limits. A gastrostomy tube  is in position.     No significant retroperitoneal lymphadenopathy. There is interval  lymphadenopathy, a previously seen para-aortic lymph node has increased  in size from 1.9 cm to 1.6 cm, there are new subcentimeter lymph nodes  in the para-aortic region. Another lymph node at the level of the aortic  bifurcation has increased in size from 0.7 cm to 2 cm. Multiple other  new lymph nodes are also seen along the right iliac neurovascular  bundle.          Impression:       1.  Urinary bladder is collapsed however there is severe mural  thickening, concerning for severe cystitis.   2.  Interval development and increase of retroperitoneal  lymphadenopathy, neoplastic etiology to be excluded.  3.  Multiple gallstones measuring up to 2 cm, no evidence for acute  cholecystitis.  4.  Bilateral small pleural effusions and moderate pericardial effusion  demonstrated no significant change.  5.  Findings concerning for constipation/fecal impaction.                Assessment:    Principal Problem:    Hemorrhagic cystitis  Active Problems:    Hematuria    Fecal impaction    Diabetes mellitus    Hypertension    Chronic diastolic CHF (congestive heart failure)    THAI  (obstructive sleep apnea)    Schizo affective schizophrenia    Chronic respiratory failure with hypoxia and hypercapnia      Patient Active Problem List   Diagnosis Code   • Elevated troponin R74.8   • Aspiration pneumonia J69.0   • Hematuria R31.9   • Hypokalemia E87.6   • Pelvic mass in female R19.00   • Fecal impaction K56.41   • Endometrial carcinoma C54.1   • Acute on chronic respiratory failure with hypoxia and hypercapnia J96.21, J96.22   • Acute on chronic diastolic CHF (congestive heart failure) I50.33   • UTI (urinary tract infection) N39.0   • Leucocytosis D72.829   • Chronic diastolic CHF (congestive heart failure) I50.32   • DM (diabetes mellitus) E11.9   • Morbid obesity E66.01   • HTN (hypertension) I10   • Schizophrenia F20.9   • Tracheostomy malfunction J95.03   • Pyuria N39.0   • THAI (obstructive sleep apnea) G47.33   • Generalized weakness R53.1   • Schizo affective schizophrenia F25.0   • Diabetes mellitus E11.9   • Coronary artery disease I25.10   • Endometrial cancer determined by uterine biopsy C54.1   • Chronic respiratory failure with hypoxia and hypercapnia J96.11, J96.12   • Toxic metabolic encephalopathy G92   • Pneumonia J18.9   • Abnormal vaginal bleeding N93.9   • Schizo affective schizophrenia F25.0   • CHF (congestive heart failure) I50.9   • Endometrial cancer determined by uterine biopsy C54.1   • Coronary artery disease I25.10   • Lymphedema I89.0   • Immobility Z74.09   • Acute blood loss anemia D62   • Vaginal bleeding N93.9   • Hypertension I10   • Uterine cancer C55   • Bipolar disorder, unspecified F31.9   • Sepsis A41.9   • Abscess of abdominal cavity K65.1   • Hemorrhagic cystitis N30.91       Plan:    Inpatient admission  IV fluids  IV antibiotics  Monitor and correct electrolytes  Accucheck and SSI  Follow culture results  Monitor mental status  Home medications  DVT/stress ulcer prophylaxis  PT consult  Labs in am        Shane Barcenas MD  11/19/2017  9:53 AM

## 2017-11-20 LAB
ANION GAP SERPL CALCULATED.3IONS-SCNC: 11 MMOL/L
BACTERIA SPEC AEROBE CULT: NO GROWTH
BUN BLD-MCNC: 14 MG/DL (ref 8–23)
BUN/CREAT SERPL: 12.6 (ref 7–25)
CALCIUM SPEC-SCNC: 9.6 MG/DL (ref 8.6–10.5)
CHLORIDE SERPL-SCNC: 93 MMOL/L (ref 98–107)
CO2 SERPL-SCNC: 29 MMOL/L (ref 22–29)
CREAT BLD-MCNC: 1.11 MG/DL (ref 0.57–1)
DEPRECATED RDW RBC AUTO: 52 FL (ref 37–54)
ERYTHROCYTE [DISTWIDTH] IN BLOOD BY AUTOMATED COUNT: 16.8 % (ref 11.7–13)
GFR SERPL CREATININE-BSD FRML MDRD: 60 ML/MIN/1.73
GLUCOSE BLD-MCNC: 96 MG/DL (ref 65–99)
GLUCOSE BLDC GLUCOMTR-MCNC: 100 MG/DL (ref 70–130)
GLUCOSE BLDC GLUCOMTR-MCNC: 104 MG/DL (ref 70–130)
GLUCOSE BLDC GLUCOMTR-MCNC: 93 MG/DL (ref 70–130)
GLUCOSE BLDC GLUCOMTR-MCNC: 97 MG/DL (ref 70–130)
HCT VFR BLD AUTO: 35.6 % (ref 35.6–45.5)
HGB BLD-MCNC: 10.9 G/DL (ref 11.9–15.5)
MCH RBC QN AUTO: 26.8 PG (ref 26.9–32)
MCHC RBC AUTO-ENTMCNC: 30.6 G/DL (ref 32.4–36.3)
MCV RBC AUTO: 87.7 FL (ref 80.5–98.2)
PLATELET # BLD AUTO: 261 10*3/MM3 (ref 140–500)
PMV BLD AUTO: 10 FL (ref 6–12)
POTASSIUM BLD-SCNC: 3.8 MMOL/L (ref 3.5–5.2)
RBC # BLD AUTO: 4.06 10*6/MM3 (ref 3.9–5.2)
SODIUM BLD-SCNC: 133 MMOL/L (ref 136–145)
WBC NRBC COR # BLD: 5 10*3/MM3 (ref 4.5–10.7)

## 2017-11-20 PROCEDURE — 80048 BASIC METABOLIC PNL TOTAL CA: CPT | Performed by: INTERNAL MEDICINE

## 2017-11-20 PROCEDURE — 85027 COMPLETE CBC AUTOMATED: CPT | Performed by: INTERNAL MEDICINE

## 2017-11-20 PROCEDURE — 25010000002 ENOXAPARIN PER 10 MG: Performed by: INTERNAL MEDICINE

## 2017-11-20 PROCEDURE — 25010000003 CEFTRIAXONE PER 250 MG: Performed by: INTERNAL MEDICINE

## 2017-11-20 PROCEDURE — 82962 GLUCOSE BLOOD TEST: CPT

## 2017-11-20 RX ADMIN — RISPERIDONE 3 MG: 1 TABLET ORAL at 20:20

## 2017-11-20 RX ADMIN — AMMONIUM LACTATE 1 APPLICATION: 120 CREAM TOPICAL at 20:21

## 2017-11-20 RX ADMIN — MUPIROCIN: 20 OINTMENT TOPICAL at 20:21

## 2017-11-20 RX ADMIN — AMMONIUM LACTATE 1 APPLICATION: 120 CREAM TOPICAL at 08:32

## 2017-11-20 RX ADMIN — LACTULOSE 20 G: 10 SOLUTION ORAL at 08:32

## 2017-11-20 RX ADMIN — SODIUM CHLORIDE 75 ML/HR: 9 INJECTION, SOLUTION INTRAVENOUS at 15:50

## 2017-11-20 RX ADMIN — SODIUM CHLORIDE 75 ML/HR: 9 INJECTION, SOLUTION INTRAVENOUS at 02:06

## 2017-11-20 RX ADMIN — RISPERIDONE 2 MG: 2 TABLET ORAL at 08:32

## 2017-11-20 RX ADMIN — ENOXAPARIN SODIUM 40 MG: 40 INJECTION SUBCUTANEOUS at 08:31

## 2017-11-20 RX ADMIN — POTASSIUM CHLORIDE 20 MEQ: 750 CAPSULE, EXTENDED RELEASE ORAL at 08:32

## 2017-11-20 RX ADMIN — CEFTRIAXONE SODIUM 2 G: 2 INJECTION, SOLUTION INTRAVENOUS at 20:20

## 2017-11-20 RX ADMIN — ATORVASTATIN CALCIUM 20 MG: 20 TABLET, FILM COATED ORAL at 08:32

## 2017-11-20 RX ADMIN — FOLIC ACID 1 MG: 1 TABLET ORAL at 08:32

## 2017-11-20 RX ADMIN — FERROUS SULFATE TAB 325 MG (65 MG ELEMENTAL FE) 325 MG: 325 (65 FE) TAB at 08:32

## 2017-11-20 RX ADMIN — BUMETANIDE 2 MG: 2 TABLET ORAL at 08:32

## 2017-11-20 RX ADMIN — ISOSORBIDE MONONITRATE 30 MG: 10 TABLET ORAL at 08:32

## 2017-11-20 RX ADMIN — PANTOPRAZOLE SODIUM 40 MG: 40 TABLET, DELAYED RELEASE ORAL at 06:55

## 2017-11-20 RX ADMIN — VALPROIC ACID 250 MG: 250 SOLUTION ORAL at 20:19

## 2017-11-20 RX ADMIN — VALPROIC ACID 250 MG: 250 SOLUTION ORAL at 08:32

## 2017-11-20 NOTE — SIGNIFICANT NOTE
11/20/17 1600   Rehab Treatment   Discipline physical therapist   Rehab Evaluation   Evaluation Not Performed other (see comments)  (Pt admitted from [facility]. Pt total care at NH, uses leonardo lift for mobility at baseline. Not appropriate for skilled PT services at this time. Discussed w/RNGloria. Will sign off. )

## 2017-11-20 NOTE — PROGRESS NOTES
"Adult Nutrition  Assessment/PES    Patient Name:  Lupe Burleson  YOB: 1955  MRN: [id number]  Admit Date:  11/18/2017    Assessment Date:  11/20/2017    Comments:  Assessment triggered by steff score of 12.  Patient with no pressure areas noted per chart skin report.  67% x 3 meals (0-100%).  Patient has PEG in place, but according to chart it is not used at NH and patient is on regular diet with thin liquids at NH.      Please obtain a re-weight on patient as current weight in chart is very inconsistent with previous weight in chart from last admit.      Will continue to follow.          Reason for Assessment       11/20/17 1315    Reason for Assessment    Reason For Assessment/Visit skin risk    Diagnosis Diagnosis    Cardiac CAD;CHF;HTN    Endocrine DM Type 2    Neurological CVA    Oncology Endometrial cancer;Other (comment)   uterine CA    Ortho Osteoarthritis    Psychosocial Schizophrenia;Other (comment);Depression   bipolar    Pulmonary/Critical Care Obstructive sleep apnea              Nutrition/Diet History       11/20/17 1317    Nutrition/Diet History    Typical Food/Fluid Intake patient has PEG, but it is not used at NH - eats regular diet with thin liquids at NH            Anthropometrics       11/20/17 1317    Anthropometrics    Height 160 cm (62.99\")    Weight 105 kg (231 lb 7.7 oz)    RD Documented Current Weight  105 kg (231 lb 7.7 oz)    Anthropometrics (Special Considerations)    RD Calculated BMI (kg/m2) 41.13    Ideal Body Weight (IBW)    Ideal Body Weight (IBW), Female 53.1    % Ideal Body Weight 198.15    Usual Body Weight (UBW)    Weight Loss Time Frame appears as though patient may have had weight loss since last admit, awaiting re-weight    Body Mass Index (BMI)    BMI (kg/m2) 41.1    BMI Grade greater than 40 - obesity grade III            Labs/Tests/Procedures/Meds       11/20/17 1318    Labs/Tests/Procedures/Meds    Diagnostic Test/Procedure Review reviewed, pertinent "    Labs/Tests Review Reviewed;Na+;Cl-;Creat;GFR;Hgb Hct    Medication Review Reviewed, pertinent;Diuretic;Insulin;Antacid   FeSO4, folic acid, lactulose, KCl    Significant Vitals reviewed, pertinent            Physical Findings       11/20/17 1319    Physical Findings/Assessment    Additional Documentation Physical Appearance (Group)   per chart, not following commands    Physical Appearance    Overall Physical Appearance obese    Gastrointestinal feeding tube    Tubes peg tube    Skin --   L lower calf wound, B=12              Nutrition Prescription Ordered       11/20/17 1320    Nutrition Prescription PO    Current PO Diet Regular    Supplement Magic Cup;Glucerna Shake    Supplement Frequency 2 times a day   from last admit    Common Modifiers Cardiac;Consistent Carbohydrate            Evaluation of Received Nutrient/Fluid Intake       11/20/17 1320    PO Evaluation    Number of Meals 3    % PO Intake 67   0-100%            Problem/Interventions:        Problem 1       11/20/17 1320    Nutrition Diagnoses Problem 1    Problem 1 Nutrition Appropriate for Condition at this Time    Etiology (related to) MNT for Treatment/Condition    Signs/Symptoms (evidenced by) PO Intake;Report/Observation    Percent (%) intake recorded 67 %    Over number of meals 3                    Intervention Goal       11/20/17 1321    Intervention Goal    General Maintain nutrition;Disease management/therapy;Reduce/improve symptoms    PO Maintain intake;PO intake (%)    PO Intake % 75 %    Weight No significant weight loss            Nutrition Intervention       11/20/17 1321    Nutrition Intervention    RD/Tech Action Follow Tx progress;Care plan reviewd;Supplement provided              Education/Evaluation       11/20/17 1321    Education    Education Will Instruct as appropriate    Monitor/Evaluation    Monitor Per protocol;PO intake;Supplement intake;Skin status;Weight        Electronically signed by:  Bambi Caba RD  11/20/17 1:22  PM

## 2017-11-20 NOTE — PLAN OF CARE
Problem: Patient Care Overview (Adult)  Goal: Plan of Care Review  Outcome: Ongoing (interventions implemented as appropriate)    11/20/17 1630   Coping/Psychosocial Response Interventions   Plan Of Care Reviewed With patient   Outcome Evaluation   Outcome Summary/Follow up Plan VSS. patient tolerating diet. continuous O2 @ 2L via NC. Chirinos to BSD. no complaints of pain. will continue to monitor.   Patient Care Overview   Progress improving         Problem: Fall Risk (Adult)  Goal: Identify Related Risk Factors and Signs and Symptoms  Outcome: Outcome(s) achieved Date Met:  11/20/17  Goal: Absence of Falls  Outcome: Ongoing (interventions implemented as appropriate)    Problem: Pain, Acute (Adult)  Goal: Identify Related Risk Factors and Signs and Symptoms  Outcome: Outcome(s) achieved Date Met:  11/20/17  Goal: Acceptable Pain Control/Comfort Level  Outcome: Ongoing (interventions implemented as appropriate)    Problem: Infection, Risk/Actual (Adult)  Goal: Identify Related Risk Factors and Signs and Symptoms  Outcome: Outcome(s) achieved Date Met:  11/20/17  Goal: Infection Prevention/Resolution  Outcome: Ongoing (interventions implemented as appropriate)    Problem: Pressure Ulcer Risk (Sunny Scale) (Adult,Obstetrics,Pediatric)  Goal: Identify Related Risk Factors and Signs and Symptoms  Outcome: Outcome(s) achieved Date Met:  11/20/17  Goal: Skin Integrity  Outcome: Ongoing (interventions implemented as appropriate)

## 2017-11-20 NOTE — PLAN OF CARE
Problem: Patient Care Overview (Adult)  Goal: Plan of Care Review  Outcome: Ongoing (interventions implemented as appropriate)    11/20/17 0501   Coping/Psychosocial Response Interventions   Plan Of Care Reviewed With patient   Outcome Evaluation   Outcome Summary/Follow up Plan VSS stable. Not in any distress. On O2 at 2l/min via NC. Tolerating diet. Chirinos to BSB draining yellow urine,no signs of bleeding. Turned,kept dry. Waffle boots refused. Monitored.       Goal: Adult Individualization and Mutuality  Outcome: Ongoing (interventions implemented as appropriate)  Goal: Discharge Needs Assessment  Outcome: Outcome(s) achieved Date Met:  11/20/17    Problem: Fall Risk (Adult)  Goal: Identify Related Risk Factors and Signs and Symptoms  Outcome: Ongoing (interventions implemented as appropriate)  Goal: Absence of Falls  Outcome: Ongoing (interventions implemented as appropriate)    Problem: Pain, Acute (Adult)  Goal: Acceptable Pain Control/Comfort Level  Outcome: Ongoing (interventions implemented as appropriate)    Problem: Infection, Risk/Actual (Adult)  Goal: Identify Related Risk Factors and Signs and Symptoms  Outcome: Ongoing (interventions implemented as appropriate)    Problem: Pressure Ulcer Risk (Sunny Q Scale) (Pediatric)  Goal: Identify Related Risk Factors and Signs and Symptoms  Outcome: Ongoing (interventions implemented as appropriate)

## 2017-11-20 NOTE — PROGRESS NOTES
"DAILY PROGRESS NOTE  [facility], [facility]    2017    Patient Identification:  Name: Lupe Burleson  Age: 62 y.o.  Sex: female  :  1955  MRN: [id number]           Primary Care Physician: Shane Barcenas MD    Subjective:    Interval History:     Continues with IVF's, F/C. No pat hematuria reported  Continues Rocephin, day 2    ROS:     Denies CP, SOA, N/V/D    Objective:    Scheduled Meds:    ammonium lactate 1 application Topical Q12H   atorvastatin 20 mg Per G Tube Daily   bumetanide 2 mg Per G Tube Daily   ceftriaxone 2 g Intravenous Q24H   enoxaparin 40 mg Subcutaneous Q24H   ferrous sulfate 325 mg Oral Daily With Breakfast   folic acid 1 mg Oral Daily   insulin aspart 0-9 Units Subcutaneous 4x Daily With Meals & Nightly   insulin aspart 5 Units Subcutaneous TID AC   isosorbide mononitrate 30 mg Oral Daily   lactulose 20 g Oral Daily   mupirocin  Topical Q12H   pantoprazole 40 mg Oral Q AM   potassium chloride 20 mEq Oral Daily   risperiDONE 2 mg Oral Daily   risperiDONE 3 mg Oral Nightly   Valproic Acid 250 mg Oral Q12H       Continuous Infusions:    sodium chloride 75 mL/hr Last Rate: 75 mL/hr (17 1550)       PRN Meds:  dextrose  •  dextrose  •  glucagon (human recombinant)  •  HYDROcodone-acetaminophen  •  ipratropium-albuterol  •  LORazepam    Intake/Output:    Intake/Output Summary (Last 24 hours) at 17 1724  Last data filed at 17 1550   Gross per 24 hour   Intake           2442.5 ml   Output             3625 ml   Net          -1182.5 ml         Exam:    tMax 24 hrs: Temp (24hrs), Av.4 °F (36.9 °C), Min:98.3 °F (36.8 °C), Max:98.4 °F (36.9 °C)    Vitals Ranges:   Temp:  [98.3 °F (36.8 °C)-98.4 °F (36.9 °C)] 98.3 °F (36.8 °C)  Heart Rate:  [59-78] 66  Resp:  [18] 18  BP: (104-197)/(47-95) 104/54    /54 (BP Location: Right arm, Patient Position: Lying)  Pulse 66  Temp 98.3 °F (36.8 °C) (Oral)   Resp 18  Ht 62.99\" " (160 cm)  Wt 231 lb 7.7 oz (105 kg)  SpO2 99%  BMI 41.02 kg/m2    General: Alert, cooperative, no distress, appears stated age  Neck: Supple, symmetrical, trachea midline, no adenopathy;              thyroid:  no enlargement/tenderness/nodules;              no carotid bruit or JVD  Cardiovascular: Normal rate, regular rhythm and intact distal pulses.              Exam reveals no gallop and no friction rub. No murmur heard  Pulmonary: Clear to auscultation bilaterally, respirations unlabored.               No rhonchi, wheezing or rales.   Abdominal: Soft. Soft, non-tender, bowel sounds active all four quadrants,     no masses, no hepatomegaly, no splenomegaly. F/C with clear urine  Extremities: Normal, atraumatic, no cyanosis or edema  Neurological: Patient is alert and oriented to person, place, and time.                 CNII-XII intact, normal strength, sensation intact throughout  Skin: Skin color, texture, turgor normal. Healing stage II on coccyx.       Data Review:      Results from last 7 days  Lab Units 11/20/17 0527 11/19/17 0547 11/18/17 1816   WBC 10*3/mm3 5.00 5.34 5.99   HEMOGLOBIN g/dL 10.9* 10.9* 11.7*   HEMATOCRIT % 35.6 36.8 37.7   PLATELETS 10*3/mm3 261 266 322         Results from last 7 days  Lab Units 11/20/17 0527 11/19/17 0547 11/18/17 1816   SODIUM mmol/L 133* 136 141   POTASSIUM mmol/L 3.8 4.3 4.0   CHLORIDE mmol/L 93* 96* 98   CO2 mmol/L 29.0 29.0 30.9*   BUN mg/dL 14 13 12   CREATININE mg/dL 1.11* 0.96 0.96   CALCIUM mg/dL 9.6 10.0 9.7   BILIRUBIN mg/dL  --   --  0.3   ALK PHOS U/L  --   --  79   ALT (SGPT) U/L  --   --  7   AST (SGOT) U/L  --   --  8   GLUCOSE mg/dL 96 96 88       Estimated Creatinine Clearance: 59.3 mL/min (by C-G formula based on Cr of 1.11).      Results from last 7 days  Lab Units 11/18/17 1816   INR  1.00         Lab Results  Lab Value Date/Time   TROPONINT 0.065 (H) 05/17/2017 0505   TROPONINT 0.077 (H) 05/16/2017 1517   TROPONINT 0.080 (H) 05/16/2017 1112    TROPONINT 0.078 (H) 05/16/2017 0703   TROPONINT 0.084 (H) 05/16/2017 0116   TROPONINT <0.010 05/15/2016 0622   TROPONINT 0.017 05/14/2016 0437   TROPONINT 0.023 05/13/2016 2036   TROPONINT 0.031 (H) 05/13/2016 1231   TROPONINT <0.01 02/03/2016 0537   TROPONINT <0.01 02/02/2016 0914   TROPONINT <0.01 11/11/2015 0516   TROPONINT <0.01 11/09/2015 2049   TROPONINT <0.01 04/18/2014 0517       Microbiology Results (last 10 days)     Procedure Component Value - Date/Time    Urine Culture - Urine, Urine, Clean Catch [[id number]]  (Normal) Collected:  11/18/17 1844    Lab Status:  Final result Specimen:  Urine from Urine, Catheter Updated:  11/20/17 0643     Urine Culture No growth    Blood Culture - Blood, [[id number]]  (Normal) Collected:  11/18/17 1820    Lab Status:  Preliminary result Specimen:  Blood from Arm, Right Updated:  11/19/17 1901     Blood Culture No growth at 24 hours    Blood Culture - Blood, [[id number]]  (Normal) Collected:  11/18/17 1815    Lab Status:  Preliminary result Specimen:  Blood from Arm, Left Updated:  11/19/17 1831     Blood Culture No growth at 24 hours           Imaging Results (last 72 hours)     Procedure Component Value Units Date/Time    CT Abdomen Pelvis Without Contrast [[id number]] Collected:  11/18/17 1931     Updated:  11/19/17 1922    Narrative:       CT ABDOMEN AND PELVIS WITHOUT CONTRAST.     TECHNIQUE: Radiation dose reduction techniques were utilized, including  automated exposure control and exposure modulation based on body size. A  routine CT scan of the abdomen and pelvis was performed with coronal and  sagittal reconstructed images.     HISTORY: Hematuria.     COMPARISON: 09/01/2017.     FINDINGS:  Bilateral small pleural effusions remain unchanged. Moderately large  pericardial effusion is unchanged, maximum thickness at the level of the  left atrium measures 2.2 cm.      Liver demonstrates homogeneous parenchyma, no definite mass. The  gallbladder is collapsed and  contains gallstones measuring up to 2 cm.  No intra or extrahepatic biliary ductal dilatation.      The spleen is unremarkable. Pancreas is unremarkable, no pancreatic  ductal dilatation. Adrenal glands are within normal limits.     Mild dilatation of the right ureter, no obstructing calculus is seen.  Minimal scarring of the mid pole right kidney. No definite renal  calculi. Urinary bladder is collapsed over a Chirinos catheter however the  wall of the bladder is significantly thickened and measures 2.7 cm  maximum.         Small and large bowel loops are within normal limits. A gastrostomy tube  is in position. Large amount of stool in the colon and rectum concerning  for constipation/fecal impaction.     No significant retroperitoneal lymphadenopathy. There is interval  lymphadenopathy, a previously seen para-aortic lymph node has increased  in size from 1.1 cm to 1.8 cm, there are new subcentimeter lymph nodes  in the para-aortic region. Another lymph node at the level of the aortic  bifurcation has increased in size from 0.7 cm to 2 cm. Multiple other  new lymph nodes are also seen along the right iliac neurovascular  bundle.          Impression:       1.  Urinary bladder is collapsed however there is severe mural  thickening, concerning for severe cystitis.   2.  Interval development and increase of retroperitoneal  lymphadenopathy, neoplastic etiology to be excluded. Further evaluation  is recommended.  3.  Multiple gallstones measuring up to 2 cm, no evidence for acute  cholecystitis.  4.  Bilateral small pleural effusions and moderate pericardial effusion  demonstrated no significant change.  5.  Findings concerning for constipation/fecal impaction.     This report was finalized on 11/19/2017 7:19 PM by Dr. Cesia Sarabia MD.               Assessment:    Principal Problem:    Hemorrhagic cystitis  Active Problems:    Hematuria    Fecal impaction    Diabetes mellitus    Hypertension    Chronic diastolic CHF  (congestive heart failure)    THAI (obstructive sleep apnea)    Schizo affective schizophrenia    Chronic respiratory failure with hypoxia and hypercapnia      Patient Active Problem List   Diagnosis Code   • Elevated troponin R74.8   • Aspiration pneumonia J69.0   • Hematuria R31.9   • Hypokalemia E87.6   • Pelvic mass in female R19.00   • Fecal impaction K56.41   • Endometrial carcinoma C54.1   • Acute on chronic respiratory failure with hypoxia and hypercapnia J96.21, J96.22   • Acute on chronic diastolic CHF (congestive heart failure) I50.33   • UTI (urinary tract infection) N39.0   • Leucocytosis D72.829   • Chronic diastolic CHF (congestive heart failure) I50.32   • DM (diabetes mellitus) E11.9   • Morbid obesity E66.01   • HTN (hypertension) I10   • Schizophrenia F20.9   • Tracheostomy malfunction J95.03   • Pyuria N39.0   • THAI (obstructive sleep apnea) G47.33   • Generalized weakness R53.1   • Schizo affective schizophrenia F25.0   • Diabetes mellitus E11.9   • Coronary artery disease I25.10   • Endometrial cancer determined by uterine biopsy C54.1   • Chronic respiratory failure with hypoxia and hypercapnia J96.11, J96.12   • Toxic metabolic encephalopathy G92   • Pneumonia J18.9   • Abnormal vaginal bleeding N93.9   • Schizo affective schizophrenia F25.0   • CHF (congestive heart failure) I50.9   • Endometrial cancer determined by uterine biopsy C54.1   • Coronary artery disease I25.10   • Lymphedema I89.0   • Immobility Z74.09   • Acute blood loss anemia D62   • Vaginal bleeding N93.9   • Hypertension I10   • Uterine cancer C55   • Bipolar disorder, unspecified F31.9   • Sepsis A41.9   • Abscess of abdominal cavity K65.1   • Hemorrhagic cystitis N30.91       Plan:    DC IV fluids, she has chronic diastolic CHF  Continue IV antibiotics  Follow culture reports  Monitor and correct electrolytes  Adjust insulin as needed  Monitor mental status  Continue home medications  PT seeing pt  DVT/stress ulcer  prophylaxis  Labs in         Shane Barcenas MD  11/20/2017

## 2017-11-20 NOTE — NURSING NOTE
"   11/20/17 1130   Wound 08/27/17 1712 Left lower calf   Date first assessed/Time first assessed: 08/27/17 1712   Side: Left  Orientation: lower  Location: calf   Wound WDL WDL   Dressing Appearance dried drainage   Base moist;pink  (2 open areas)   Periwound Area pink;dry;other (see comments)  (scar tissue)   Edges open   Drainage Characteristics/Odor serous   Drainage Amount scant   Wound Cleaning cleansed with;sterile normal saline   Wound Interventions (recommend bactroban)   Dressing Dressing changed;foam;low-adherent     CWOCN consult. Assessed ankle wound. When we have seen patient prior these have not been open. She said \"they recently started to put a dressing on there.\" Due to her infection history, recommend topical bactroban and silicone border dressing. Appears that coccyx is currently epithelialized per photo.  "

## 2017-11-21 LAB
ANION GAP SERPL CALCULATED.3IONS-SCNC: 13.5 MMOL/L
ANISOCYTOSIS BLD QL: NORMAL
BASOPHILS # BLD AUTO: 0.02 10*3/MM3 (ref 0–0.2)
BASOPHILS NFR BLD AUTO: 0.4 % (ref 0–1.5)
BUN BLD-MCNC: 16 MG/DL (ref 8–23)
BUN/CREAT SERPL: 18 (ref 7–25)
BURR CELLS BLD QL SMEAR: NORMAL
CALCIUM SPEC-SCNC: 9.9 MG/DL (ref 8.6–10.5)
CHLORIDE SERPL-SCNC: 94 MMOL/L (ref 98–107)
CLUMPED PLATELETS: PRESENT
CO2 SERPL-SCNC: 28.5 MMOL/L (ref 22–29)
CREAT BLD-MCNC: 0.89 MG/DL (ref 0.57–1)
DEPRECATED RDW RBC AUTO: 51.8 FL (ref 37–54)
EOSINOPHIL # BLD AUTO: 0.12 10*3/MM3 (ref 0–0.7)
EOSINOPHIL NFR BLD AUTO: 2.2 % (ref 0.3–6.2)
ERYTHROCYTE [DISTWIDTH] IN BLOOD BY AUTOMATED COUNT: 16.8 % (ref 11.7–13)
GFR SERPL CREATININE-BSD FRML MDRD: 77 ML/MIN/1.73
GLUCOSE BLD-MCNC: 113 MG/DL (ref 65–99)
GLUCOSE BLDC GLUCOMTR-MCNC: 103 MG/DL (ref 70–130)
GLUCOSE BLDC GLUCOMTR-MCNC: 136 MG/DL (ref 70–130)
GLUCOSE BLDC GLUCOMTR-MCNC: 86 MG/DL (ref 70–130)
GLUCOSE BLDC GLUCOMTR-MCNC: 92 MG/DL (ref 70–130)
HCT VFR BLD AUTO: 35.4 % (ref 35.6–45.5)
HGB BLD-MCNC: 10.9 G/DL (ref 11.9–15.5)
IMM GRANULOCYTES # BLD: 0 10*3/MM3 (ref 0–0.03)
IMM GRANULOCYTES NFR BLD: 0 % (ref 0–0.5)
LYMPHOCYTES # BLD AUTO: 0.85 10*3/MM3 (ref 0.9–4.8)
LYMPHOCYTES NFR BLD AUTO: 15.9 % (ref 19.6–45.3)
MCH RBC QN AUTO: 26.5 PG (ref 26.9–32)
MCHC RBC AUTO-ENTMCNC: 30.8 G/DL (ref 32.4–36.3)
MCV RBC AUTO: 85.9 FL (ref 80.5–98.2)
MONOCYTES # BLD AUTO: 0.48 10*3/MM3 (ref 0.2–1.2)
MONOCYTES NFR BLD AUTO: 9 % (ref 5–12)
NEUTROPHILS # BLD AUTO: 3.87 10*3/MM3 (ref 1.9–8.1)
NEUTROPHILS NFR BLD AUTO: 72.3 % (ref 42.7–76)
NRBC BLD MANUAL-RTO: 1.1 /100 WBC (ref 0–0)
PLATELET # BLD AUTO: 305 10*3/MM3 (ref 140–500)
PMV BLD AUTO: 10.8 FL (ref 6–12)
POTASSIUM BLD-SCNC: 3.7 MMOL/L (ref 3.5–5.2)
RBC # BLD AUTO: 4.12 10*6/MM3 (ref 3.9–5.2)
SCHISTOCYTES BLD QL SMEAR: NORMAL
SODIUM BLD-SCNC: 136 MMOL/L (ref 136–145)
WBC MORPH BLD: NORMAL
WBC NRBC COR # BLD: 5.35 10*3/MM3 (ref 4.5–10.7)

## 2017-11-21 PROCEDURE — 25010000002 ENOXAPARIN PER 10 MG: Performed by: INTERNAL MEDICINE

## 2017-11-21 PROCEDURE — 25010000003 CEFTRIAXONE PER 250 MG: Performed by: INTERNAL MEDICINE

## 2017-11-21 PROCEDURE — 80048 BASIC METABOLIC PNL TOTAL CA: CPT | Performed by: NURSE PRACTITIONER

## 2017-11-21 PROCEDURE — 82962 GLUCOSE BLOOD TEST: CPT

## 2017-11-21 PROCEDURE — 85007 BL SMEAR W/DIFF WBC COUNT: CPT | Performed by: NURSE PRACTITIONER

## 2017-11-21 PROCEDURE — 63710000001 INSULIN ASPART PER 5 UNITS: Performed by: INTERNAL MEDICINE

## 2017-11-21 PROCEDURE — 85025 COMPLETE CBC W/AUTO DIFF WBC: CPT | Performed by: NURSE PRACTITIONER

## 2017-11-21 RX ADMIN — VALPROIC ACID 250 MG: 250 SOLUTION ORAL at 08:36

## 2017-11-21 RX ADMIN — RISPERIDONE 3 MG: 1 TABLET ORAL at 20:47

## 2017-11-21 RX ADMIN — RISPERIDONE 2 MG: 2 TABLET ORAL at 12:01

## 2017-11-21 RX ADMIN — ISOSORBIDE MONONITRATE 30 MG: 10 TABLET ORAL at 08:35

## 2017-11-21 RX ADMIN — FERROUS SULFATE TAB 325 MG (65 MG ELEMENTAL FE) 325 MG: 325 (65 FE) TAB at 08:25

## 2017-11-21 RX ADMIN — PANTOPRAZOLE SODIUM 40 MG: 40 TABLET, DELAYED RELEASE ORAL at 05:53

## 2017-11-21 RX ADMIN — AMMONIUM LACTATE 1 APPLICATION: 120 CREAM TOPICAL at 08:25

## 2017-11-21 RX ADMIN — INSULIN ASPART 5 UNITS: 100 INJECTION, SOLUTION INTRAVENOUS; SUBCUTANEOUS at 08:26

## 2017-11-21 RX ADMIN — CEFTRIAXONE SODIUM 2 G: 2 INJECTION, SOLUTION INTRAVENOUS at 20:47

## 2017-11-21 RX ADMIN — VALPROIC ACID 250 MG: 250 SOLUTION ORAL at 20:47

## 2017-11-21 RX ADMIN — AMMONIUM LACTATE 1 APPLICATION: 120 CREAM TOPICAL at 20:47

## 2017-11-21 RX ADMIN — FOLIC ACID 1 MG: 1 TABLET ORAL at 08:25

## 2017-11-21 RX ADMIN — MUPIROCIN: 20 OINTMENT TOPICAL at 20:47

## 2017-11-21 RX ADMIN — MUPIROCIN: 20 OINTMENT TOPICAL at 08:25

## 2017-11-21 RX ADMIN — ATORVASTATIN CALCIUM 20 MG: 20 TABLET, FILM COATED ORAL at 08:25

## 2017-11-21 RX ADMIN — BUMETANIDE 2 MG: 2 TABLET ORAL at 08:25

## 2017-11-21 RX ADMIN — POTASSIUM CHLORIDE 20 MEQ: 750 CAPSULE, EXTENDED RELEASE ORAL at 08:35

## 2017-11-21 RX ADMIN — ENOXAPARIN SODIUM 40 MG: 40 INJECTION SUBCUTANEOUS at 08:26

## 2017-11-21 RX ADMIN — INSULIN ASPART 5 UNITS: 100 INJECTION, SOLUTION INTRAVENOUS; SUBCUTANEOUS at 17:20

## 2017-11-21 NOTE — PROGRESS NOTES
"DAILY PROGRESS NOTE  [facility], [facility]    2017    Patient Identification:  Name: Lupe Burleson  Age: 62 y.o.  Sex: female  :  1955  MRN: [id number]           Primary Care Physician: Shane Barcenas MD    Subjective:    Interval History:     IVF's dc'd.  F/C. No pat hematuria reported  Continues Rocephin, day 3  Urine culture, blood culture negative to date.     ROS:     Denies CP, SOA, N/V/D    Objective:    Scheduled Meds:    ammonium lactate 1 application Topical Q12H   atorvastatin 20 mg Per G Tube Daily   bumetanide 2 mg Per G Tube Daily   ceftriaxone 2 g Intravenous Q24H   enoxaparin 40 mg Subcutaneous Q24H   ferrous sulfate 325 mg Oral Daily With Breakfast   folic acid 1 mg Oral Daily   insulin aspart 0-9 Units Subcutaneous 4x Daily With Meals & Nightly   insulin aspart 5 Units Subcutaneous TID AC   isosorbide mononitrate 30 mg Oral Daily   lactulose 20 g Oral Daily   mupirocin  Topical Q12H   pantoprazole 40 mg Oral Q AM   potassium chloride 20 mEq Oral Daily   risperiDONE 2 mg Oral Daily   risperiDONE 3 mg Oral Nightly   Valproic Acid 250 mg Oral Q12H       Continuous Infusions:       PRN Meds:  dextrose  •  dextrose  •  glucagon (human recombinant)  •  HYDROcodone-acetaminophen  •  ipratropium-albuterol  •  LORazepam    Intake/Output:    Intake/Output Summary (Last 24 hours) at 17 1851  Last data filed at 17 1658   Gross per 24 hour   Intake             1175 ml   Output             2700 ml   Net            -1525 ml         Exam:    tMax 24 hrs: Temp (24hrs), Av.9 °F (36.6 °C), Min:96.3 °F (35.7 °C), Max:99.6 °F (37.6 °C)    Vitals Ranges:   Temp:  [96.3 °F (35.7 °C)-99.6 °F (37.6 °C)] 98.2 °F (36.8 °C)  Heart Rate:  [65-77] 77  Resp:  [16] 16  BP: (110-151)/(47-66) 115/66    /66 (BP Location: Right arm, Patient Position: Lying)  Pulse 77  Temp 98.2 °F (36.8 °C) (Oral)   Resp 16  Ht 62.99\" (160 cm)  Wt 238 lb 9.6 oz " (108 kg)  SpO2 97%  BMI 42.28 kg/m2    General: Alert, cooperative, no distress, appears stated age  Neck: Supple, symmetrical, trachea midline, no adenopathy;              thyroid:  no enlargement/tenderness/nodules;              no carotid bruit or JVD  Cardiovascular: Normal rate, regular rhythm and intact distal pulses.              Exam reveals no gallop and no friction rub. No murmur heard  Pulmonary: Clear to auscultation bilaterally, respirations unlabored.               No rhonchi, wheezing or rales.   Abdominal: Soft. Soft, non-tender, bowel sounds active all four quadrants,     no masses, no hepatomegaly, no splenomegaly. F/C with clear urine  Extremities: Normal, atraumatic, no cyanosis or edema  Neurological: Patient is alert and oriented to person, place, and time.                 CNII-XII intact, normal strength, sensation intact throughout  Skin: Skin color, texture, turgor normal. Healing stage II on coccyx. Old wound LLE reopened.       Data Review:      Results from last 7 days  Lab Units 11/21/17 0626 11/20/17 0527 11/19/17  0547   WBC 10*3/mm3 5.35 5.00 5.34   HEMOGLOBIN g/dL 10.9* 10.9* 10.9*   HEMATOCRIT % 35.4* 35.6 36.8   PLATELETS 10*3/mm3 305 261 266         Results from last 7 days  Lab Units 11/21/17 0626 11/20/17 0527 11/19/17  0547 11/18/17  1816   SODIUM mmol/L 136 133* 136 141   POTASSIUM mmol/L 3.7 3.8 4.3 4.0   CHLORIDE mmol/L 94* 93* 96* 98   CO2 mmol/L 28.5 29.0 29.0 30.9*   BUN mg/dL 16 14 13 12   CREATININE mg/dL 0.89 1.11* 0.96 0.96   CALCIUM mg/dL 9.9 9.6 10.0 9.7   BILIRUBIN mg/dL  --   --   --  0.3   ALK PHOS U/L  --   --   --  79   ALT (SGPT) U/L  --   --   --  7   AST (SGOT) U/L  --   --   --  8   GLUCOSE mg/dL 113* 96 96 88       Estimated Creatinine Clearance: 75.2 mL/min (by C-G formula based on Cr of 0.89).      Results from last 7 days  Lab Units 11/18/17  1816   INR  1.00         Lab Results  Lab Value Date/Time   TROPONINT 0.065 (H) 05/17/2017 4785    TROPONINT 0.077 (H) 05/16/2017 1517   TROPONINT 0.080 (H) 05/16/2017 1112   TROPONINT 0.078 (H) 05/16/2017 0703   TROPONINT 0.084 (H) 05/16/2017 0116   TROPONINT <0.010 05/15/2016 0622   TROPONINT 0.017 05/14/2016 0437   TROPONINT 0.023 05/13/2016 2036   TROPONINT 0.031 (H) 05/13/2016 1231   TROPONINT <0.01 02/03/2016 0537   TROPONINT <0.01 02/02/2016 0914   TROPONINT <0.01 11/11/2015 0516   TROPONINT <0.01 11/09/2015 2049   TROPONINT <0.01 04/18/2014 0517       Microbiology Results (last 10 days)     Procedure Component Value - Date/Time    Urine Culture - Urine, Urine, Clean Catch [[id number]]  (Normal) Collected:  11/18/17 1844    Lab Status:  Final result Specimen:  Urine from Urine, Catheter Updated:  11/20/17 0643     Urine Culture No growth    Blood Culture - Blood, [[id number]]  (Normal) Collected:  11/18/17 1820    Lab Status:  Preliminary result Specimen:  Blood from Arm, Right Updated:  11/20/17 1901     Blood Culture No growth at 2 days    Blood Culture - Blood, [[id number]]  (Normal) Collected:  11/18/17 1815    Lab Status:  Preliminary result Specimen:  Blood from Arm, Left Updated:  11/21/17 1831     Blood Culture No growth at 3 days           Imaging Results (last 72 hours)     Procedure Component Value Units Date/Time    CT Abdomen Pelvis Without Contrast [[id number]] Collected:  11/18/17 1931     Updated:  11/19/17 1922    Narrative:       CT ABDOMEN AND PELVIS WITHOUT CONTRAST.     TECHNIQUE: Radiation dose reduction techniques were utilized, including  automated exposure control and exposure modulation based on body size. A  routine CT scan of the abdomen and pelvis was performed with coronal and  sagittal reconstructed images.     HISTORY: Hematuria.     COMPARISON: 09/01/2017.     FINDINGS:  Bilateral small pleural effusions remain unchanged. Moderately large  pericardial effusion is unchanged, maximum thickness at the level of the  left atrium measures 2.2 cm.      Liver demonstrates homogeneous  parenchyma, no definite mass. The  gallbladder is collapsed and contains gallstones measuring up to 2 cm.  No intra or extrahepatic biliary ductal dilatation.      The spleen is unremarkable. Pancreas is unremarkable, no pancreatic  ductal dilatation. Adrenal glands are within normal limits.     Mild dilatation of the right ureter, no obstructing calculus is seen.  Minimal scarring of the mid pole right kidney. No definite renal  calculi. Urinary bladder is collapsed over a Chirinos catheter however the  wall of the bladder is significantly thickened and measures 2.7 cm  maximum.         Small and large bowel loops are within normal limits. A gastrostomy tube  is in position. Large amount of stool in the colon and rectum concerning  for constipation/fecal impaction.     No significant retroperitoneal lymphadenopathy. There is interval  lymphadenopathy, a previously seen para-aortic lymph node has increased  in size from 1.1 cm to 1.8 cm, there are new subcentimeter lymph nodes  in the para-aortic region. Another lymph node at the level of the aortic  bifurcation has increased in size from 0.7 cm to 2 cm. Multiple other  new lymph nodes are also seen along the right iliac neurovascular  bundle.          Impression:       1.  Urinary bladder is collapsed however there is severe mural  thickening, concerning for severe cystitis.   2.  Interval development and increase of retroperitoneal  lymphadenopathy, neoplastic etiology to be excluded. Further evaluation  is recommended.  3.  Multiple gallstones measuring up to 2 cm, no evidence for acute  cholecystitis.  4.  Bilateral small pleural effusions and moderate pericardial effusion  demonstrated no significant change.  5.  Findings concerning for constipation/fecal impaction.     This report was finalized on 11/19/2017 7:19 PM by Dr. Cesia Sarabia MD.               Assessment:    Principal Problem:    Hemorrhagic cystitis  Active Problems:    Hematuria    Fecal  impaction    Diabetes mellitus    Hypertension    Chronic diastolic CHF (congestive heart failure)    THAI (obstructive sleep apnea)    Schizo affective schizophrenia    Chronic respiratory failure with hypoxia and hypercapnia      Patient Active Problem List   Diagnosis Code   • Elevated troponin R74.8   • Aspiration pneumonia J69.0   • Hematuria R31.9   • Hypokalemia E87.6   • Pelvic mass in female R19.00   • Fecal impaction K56.41   • Endometrial carcinoma C54.1   • Acute on chronic respiratory failure with hypoxia and hypercapnia J96.21, J96.22   • Acute on chronic diastolic CHF (congestive heart failure) I50.33   • UTI (urinary tract infection) N39.0   • Leucocytosis D72.829   • Chronic diastolic CHF (congestive heart failure) I50.32   • DM (diabetes mellitus) E11.9   • Morbid obesity E66.01   • HTN (hypertension) I10   • Schizophrenia F20.9   • Tracheostomy malfunction J95.03   • Pyuria N39.0   • THAI (obstructive sleep apnea) G47.33   • Generalized weakness R53.1   • Schizo affective schizophrenia F25.0   • Diabetes mellitus E11.9   • Coronary artery disease I25.10   • Endometrial cancer determined by uterine biopsy C54.1   • Chronic respiratory failure with hypoxia and hypercapnia J96.11, J96.12   • Toxic metabolic encephalopathy G92   • Pneumonia J18.9   • Abnormal vaginal bleeding N93.9   • Schizo affective schizophrenia F25.0   • CHF (congestive heart failure) I50.9   • Endometrial cancer determined by uterine biopsy C54.1   • Coronary artery disease I25.10   • Lymphedema I89.0   • Immobility Z74.09   • Acute blood loss anemia D62   • Vaginal bleeding N93.9   • Hypertension I10   • Uterine cancer C55   • Bipolar disorder, unspecified F31.9   • Sepsis A41.9   • Abscess of abdominal cavity K65.1   • Hemorrhagic cystitis N30.91       Plan:    Continue IV antibiotics  Follow culture reports  Urine clear, will DC Chirinos catheter.  Monitor and correct electrolytes  Adjust insulin as needed  Monitor mental  status  Continue home medications  PT seeing pt  DVT/stress ulcer prophylaxis  We'll be discharged to subacute rehabilitation tomorrow  Labs in am        Shane Barcenas MD  11/21/2017

## 2017-11-21 NOTE — PLAN OF CARE
Problem: Patient Care Overview (Adult)  Goal: Plan of Care Review  Outcome: Ongoing (interventions implemented as appropriate)    11/21/17 0334   Coping/Psychosocial Response Interventions   Plan Of Care Reviewed With patient   Outcome Evaluation   Outcome Summary/Follow up Plan Patient is not complaining of any abdominal pain. No active bleeding from bladder noticed. Due medications given. Needs all attended. Vitals checked and recorded. Resting in bed with call bell within reach and bed alarm on.Will continue to monitor.   Patient Care Overview   Progress no change       Goal: Adult Individualization and Mutuality  Outcome: Ongoing (interventions implemented as appropriate)    Problem: Fall Risk (Adult)  Goal: Absence of Falls  Outcome: Ongoing (interventions implemented as appropriate)    Problem: Pain, Acute (Adult)  Goal: Acceptable Pain Control/Comfort Level  Outcome: Ongoing (interventions implemented as appropriate)    Problem: Pressure Ulcer Risk (Sunny Scale) (Adult,Obstetrics,Pediatric)  Goal: Skin Integrity  Outcome: Ongoing (interventions implemented as appropriate)

## 2017-11-21 NOTE — PLAN OF CARE
Problem: Patient Care Overview (Adult)  Goal: Plan of Care Review  Outcome: Ongoing (interventions implemented as appropriate)  Goal: Adult Individualization and Mutuality  Outcome: Ongoing (interventions implemented as appropriate)    Problem: Fall Risk (Adult)  Goal: Absence of Falls  Outcome: Ongoing (interventions implemented as appropriate)    Problem: Pain, Acute (Adult)  Goal: Acceptable Pain Control/Comfort Level  Outcome: Ongoing (interventions implemented as appropriate)    Problem: Infection, Risk/Actual (Adult)  Goal: Infection Prevention/Resolution  Outcome: Ongoing (interventions implemented as appropriate)    Problem: Pressure Ulcer Risk (Sunny Scale) (Adult,Obstetrics,Pediatric)  Goal: Skin Integrity  Outcome: Ongoing (interventions implemented as appropriate)

## 2017-11-21 NOTE — PROGRESS NOTES
Continued Stay Note  [facility]     Patient Name: Lupe Burleson  MRN: [id number]  Today's Date: 11/21/2017    Admit Date: 11/18/2017          Discharge Plan       11/21/17 1632    Case Management/Social Work Plan    Plan [facility]     Additional Comments Incoming call recieved from Helene Pepe, pts plan for discharge is to return to [facility]. CCP to follow.               Discharge Codes     None            Nallely Ruiz RN

## 2017-11-21 NOTE — PLAN OF CARE
Problem: Patient Care Overview (Adult)  Goal: Plan of Care Review  Outcome: Ongoing (interventions implemented as appropriate)    11/21/17 4816   Coping/Psychosocial Response Interventions   Plan Of Care Reviewed With patient   Outcome Evaluation   Outcome Summary/Follow up Plan No c/o abd pain. Urine clear yellow per keen. No bleeding noted. Continue to monitor.   Patient Care Overview   Progress improving         Problem: Fall Risk (Adult)  Goal: Absence of Falls  Outcome: Ongoing (interventions implemented as appropriate)    Problem: Pain, Acute (Adult)  Goal: Acceptable Pain Control/Comfort Level  Outcome: Ongoing (interventions implemented as appropriate)    Problem: Infection, Risk/Actual (Adult)  Goal: Infection Prevention/Resolution  Outcome: Ongoing (interventions implemented as appropriate)    Problem: Pressure Ulcer Risk (Sunny Scale) (Adult,Obstetrics,Pediatric)  Goal: Skin Integrity  Outcome: Ongoing (interventions implemented as appropriate)

## 2017-11-21 NOTE — PROGRESS NOTES
Discharge Planning Assessment  [facility]     Patient Name: Lupe Burleson  MRN: [id number]  Today's Date: 11/21/2017    Admit Date: 11/18/2017    Discharge Plan       11/21/17 1321    Case Management/Social Work Plan    Plan [facility]     Patient/Family In Agreement With Plan yes    Additional Comments Pt has a state guardian Helene Pepe [phone] ext [phone], left message to verify discharge plan. Called and spoke with Flor/Williams, pt is from the [facility], from a LT medicaid bed without a bedhold but is able to return. Pt will ideally return skilled if she exhibits a need for skilled rehab. Pkt on chart. CCP to follow.         Discharge Placement     Facility/Agency Request Status Selected? Address Phone Number Fax Number    [facility] Accepted    Yes [address] [facility] [id number] [phone]          Nallely Ruiz RN

## 2017-11-21 NOTE — DISCHARGE PLACEMENT REQUEST
"Lupe Burleson (62 y.o. 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), MDR Acinetobacter (04/17/14)   Code Status:  Conditional    Ht:  62.99\" (160 cm)   Wt:  238 lb 9.6 oz (108 kg)    Admission Cmt:  None   Principal Problem:  Hemorrhagic cystitis [N30.91]                 Active Insurance as of 11/18/2017     Primary Coverage     Payor Plan Insurance Group Employer/Plan Group    MEDICARE MEDICARE A & B      Payor Plan Address Payor Plan Phone Number Effective From Effective To    [address] [phone] 10/1/1991     [place] [address]       Subscriber Name Subscriber Birth Date Member ID       LUPE BURLESON 1955 [id number]           Secondary Coverage     Payor Plan Insurance Group Employer/Plan Group    [place]      Payor Plan Address Payor Plan Phone Number Effective From Effective To    [address] [phone] 5/28/2016     [place] [address]       Subscriber Name Subscriber Birth Date Member ID       LUPE BURLESON 1955 [id number]                 Emergency Contacts      (Rel.) Home Phone Work Phone Mobile Phone    Helene Pepe (Guardian) [phone] -- --    Tavia Torres (Relative) [phone] -- --              "

## 2017-11-22 VITALS
HEIGHT: 63 IN | RESPIRATION RATE: 18 BRPM | WEIGHT: 238.6 LBS | DIASTOLIC BLOOD PRESSURE: 75 MMHG | BODY MASS INDEX: 42.28 KG/M2 | SYSTOLIC BLOOD PRESSURE: 113 MMHG | HEART RATE: 65 BPM | TEMPERATURE: 97.9 F | OXYGEN SATURATION: 100 %

## 2017-11-22 LAB
ANION GAP SERPL CALCULATED.3IONS-SCNC: 10.1 MMOL/L
BASOPHILS # BLD AUTO: 0.01 10*3/MM3 (ref 0–0.2)
BASOPHILS NFR BLD AUTO: 0.2 % (ref 0–1.5)
BUN BLD-MCNC: 17 MG/DL (ref 8–23)
BUN/CREAT SERPL: 16.7 (ref 7–25)
CALCIUM SPEC-SCNC: 9.8 MG/DL (ref 8.6–10.5)
CHLORIDE SERPL-SCNC: 96 MMOL/L (ref 98–107)
CO2 SERPL-SCNC: 29.9 MMOL/L (ref 22–29)
CREAT BLD-MCNC: 1.02 MG/DL (ref 0.57–1)
DEPRECATED RDW RBC AUTO: 51.3 FL (ref 37–54)
EOSINOPHIL # BLD AUTO: 0.11 10*3/MM3 (ref 0–0.7)
EOSINOPHIL NFR BLD AUTO: 2.1 % (ref 0.3–6.2)
ERYTHROCYTE [DISTWIDTH] IN BLOOD BY AUTOMATED COUNT: 16.8 % (ref 11.7–13)
GFR SERPL CREATININE-BSD FRML MDRD: 66 ML/MIN/1.73
GLUCOSE BLD-MCNC: 88 MG/DL (ref 65–99)
GLUCOSE BLDC GLUCOMTR-MCNC: 135 MG/DL (ref 70–130)
GLUCOSE BLDC GLUCOMTR-MCNC: 82 MG/DL (ref 70–130)
GLUCOSE BLDC GLUCOMTR-MCNC: 89 MG/DL (ref 70–130)
GLUCOSE BLDC GLUCOMTR-MCNC: 93 MG/DL (ref 70–130)
GLUCOSE BLDC GLUCOMTR-MCNC: 99 MG/DL (ref 70–130)
HCT VFR BLD AUTO: 33.6 % (ref 35.6–45.5)
HGB BLD-MCNC: 10.3 G/DL (ref 11.9–15.5)
IMM GRANULOCYTES # BLD: 0 10*3/MM3 (ref 0–0.03)
IMM GRANULOCYTES NFR BLD: 0 % (ref 0–0.5)
LYMPHOCYTES # BLD AUTO: 1.12 10*3/MM3 (ref 0.9–4.8)
LYMPHOCYTES NFR BLD AUTO: 21.4 % (ref 19.6–45.3)
MCH RBC QN AUTO: 26.1 PG (ref 26.9–32)
MCHC RBC AUTO-ENTMCNC: 30.7 G/DL (ref 32.4–36.3)
MCV RBC AUTO: 85.3 FL (ref 80.5–98.2)
MONOCYTES # BLD AUTO: 0.49 10*3/MM3 (ref 0.2–1.2)
MONOCYTES NFR BLD AUTO: 9.4 % (ref 5–12)
NEUTROPHILS # BLD AUTO: 3.5 10*3/MM3 (ref 1.9–8.1)
NEUTROPHILS NFR BLD AUTO: 66.9 % (ref 42.7–76)
PLATELET # BLD AUTO: 293 10*3/MM3 (ref 140–500)
PMV BLD AUTO: 10.3 FL (ref 6–12)
POTASSIUM BLD-SCNC: 3.8 MMOL/L (ref 3.5–5.2)
RBC # BLD AUTO: 3.94 10*6/MM3 (ref 3.9–5.2)
SODIUM BLD-SCNC: 136 MMOL/L (ref 136–145)
WBC NRBC COR # BLD: 5.23 10*3/MM3 (ref 4.5–10.7)

## 2017-11-22 PROCEDURE — 25010000003 CEFTRIAXONE PER 250 MG: Performed by: INTERNAL MEDICINE

## 2017-11-22 PROCEDURE — 25010000002 ENOXAPARIN PER 10 MG: Performed by: INTERNAL MEDICINE

## 2017-11-22 PROCEDURE — 80048 BASIC METABOLIC PNL TOTAL CA: CPT | Performed by: NURSE PRACTITIONER

## 2017-11-22 PROCEDURE — 63710000001 INSULIN ASPART PER 5 UNITS: Performed by: INTERNAL MEDICINE

## 2017-11-22 PROCEDURE — 85025 COMPLETE CBC W/AUTO DIFF WBC: CPT | Performed by: NURSE PRACTITIONER

## 2017-11-22 PROCEDURE — 82962 GLUCOSE BLOOD TEST: CPT

## 2017-11-22 RX ORDER — CEFUROXIME AXETIL 250 MG/1
250 TABLET ORAL 2 TIMES DAILY
Qty: 6 TABLET | Refills: 0 | Status: SHIPPED | OUTPATIENT
Start: 2017-11-22 | End: 2017-11-25

## 2017-11-22 RX ORDER — HYDROCODONE BITARTRATE AND ACETAMINOPHEN 7.5; 325 MG/1; MG/1
1 TABLET ORAL EVERY 6 HOURS PRN
Qty: 120 TABLET | Refills: 0 | Status: SHIPPED | OUTPATIENT
Start: 2017-11-22 | End: 2018-01-10 | Stop reason: HOSPADM

## 2017-11-22 RX ORDER — LORAZEPAM 1 MG/1
1 TABLET ORAL EVERY 8 HOURS PRN
Qty: 90 TABLET | Refills: 1 | Status: SHIPPED | OUTPATIENT
Start: 2017-11-22 | End: 2018-01-10 | Stop reason: HOSPADM

## 2017-11-22 RX ADMIN — MUPIROCIN: 20 OINTMENT TOPICAL at 09:16

## 2017-11-22 RX ADMIN — AMMONIUM LACTATE 1 APPLICATION: 120 CREAM TOPICAL at 09:16

## 2017-11-22 RX ADMIN — AMMONIUM LACTATE 1 APPLICATION: 120 CREAM TOPICAL at 20:08

## 2017-11-22 RX ADMIN — FOLIC ACID 1 MG: 1 TABLET ORAL at 09:15

## 2017-11-22 RX ADMIN — BUMETANIDE 2 MG: 2 TABLET ORAL at 09:15

## 2017-11-22 RX ADMIN — RISPERIDONE 3 MG: 1 TABLET ORAL at 20:08

## 2017-11-22 RX ADMIN — ENOXAPARIN SODIUM 40 MG: 40 INJECTION SUBCUTANEOUS at 09:15

## 2017-11-22 RX ADMIN — MUPIROCIN: 20 OINTMENT TOPICAL at 20:08

## 2017-11-22 RX ADMIN — CEFTRIAXONE SODIUM 2 G: 2 INJECTION, SOLUTION INTRAVENOUS at 20:08

## 2017-11-22 RX ADMIN — POTASSIUM CHLORIDE 20 MEQ: 750 CAPSULE, EXTENDED RELEASE ORAL at 09:15

## 2017-11-22 RX ADMIN — RISPERIDONE 2 MG: 2 TABLET ORAL at 09:15

## 2017-11-22 RX ADMIN — FERROUS SULFATE TAB 325 MG (65 MG ELEMENTAL FE) 325 MG: 325 (65 FE) TAB at 09:15

## 2017-11-22 RX ADMIN — INSULIN ASPART 5 UNITS: 100 INJECTION, SOLUTION INTRAVENOUS; SUBCUTANEOUS at 12:03

## 2017-11-22 RX ADMIN — LACTULOSE 20 G: 10 SOLUTION ORAL at 09:15

## 2017-11-22 RX ADMIN — ISOSORBIDE MONONITRATE 30 MG: 10 TABLET ORAL at 09:15

## 2017-11-22 RX ADMIN — VALPROIC ACID 250 MG: 250 SOLUTION ORAL at 09:16

## 2017-11-22 RX ADMIN — VALPROIC ACID 250 MG: 250 SOLUTION ORAL at 20:08

## 2017-11-22 RX ADMIN — ATORVASTATIN CALCIUM 20 MG: 20 TABLET, FILM COATED ORAL at 09:15

## 2017-11-22 RX ADMIN — PANTOPRAZOLE SODIUM 40 MG: 40 TABLET, DELAYED RELEASE ORAL at 06:40

## 2017-11-22 NOTE — PLAN OF CARE
Problem: Patient Care Overview (Adult)  Goal: Plan of Care Review  Outcome: Ongoing (interventions implemented as appropriate)    11/22/17 0515   Coping/Psychosocial Response Interventions   Plan Of Care Reviewed With patient   Outcome Evaluation   Outcome Summary/Follow up Plan Pt rested well. No c/o pain. Voiding well after FC removed yesterday. Q2 turns, heels elevated. VSS, no s/s acute distress, will continue to monitor   Patient Care Overview   Progress improving       Goal: Adult Individualization and Mutuality  Outcome: Ongoing (interventions implemented as appropriate)    Problem: Fall Risk (Adult)  Goal: Absence of Falls  Outcome: Ongoing (interventions implemented as appropriate)    Problem: Pain, Acute (Adult)  Goal: Acceptable Pain Control/Comfort Level  Outcome: Ongoing (interventions implemented as appropriate)    Problem: Infection, Risk/Actual (Adult)  Goal: Infection Prevention/Resolution  Outcome: Ongoing (interventions implemented as appropriate)    Problem: Pressure Ulcer Risk (Sunny Scale) (Adult,Obstetrics,Pediatric)  Goal: Skin Integrity  Outcome: Ongoing (interventions implemented as appropriate)

## 2017-11-22 NOTE — PLAN OF CARE
Problem: Patient Care Overview (Adult)  Goal: Plan of Care Review  Outcome: Ongoing (interventions implemented as appropriate)    11/22/17 1549   Coping/Psychosocial Response Interventions   Plan Of Care Reviewed With patient   Outcome Evaluation   Outcome Summary/Follow up Plan denies pain denies nausea, tolerating meals, heels elevated will continue to monitor   Patient Care Overview   Progress improving       Goal: Adult Individualization and Mutuality  Outcome: Ongoing (interventions implemented as appropriate)    Problem: Fall Risk (Adult)  Goal: Absence of Falls  Outcome: Ongoing (interventions implemented as appropriate)    Problem: Pain, Acute (Adult)  Goal: Acceptable Pain Control/Comfort Level  Outcome: Ongoing (interventions implemented as appropriate)    Problem: Infection, Risk/Actual (Adult)  Goal: Infection Prevention/Resolution  Outcome: Ongoing (interventions implemented as appropriate)    Problem: Pressure Ulcer Risk (Sunny Scale) (Adult,Obstetrics,Pediatric)  Goal: Skin Integrity  Outcome: Ongoing (interventions implemented as appropriate)

## 2017-11-22 NOTE — DISCHARGE SUMMARY
PHYSICIAN DISCHARGE SUMMARY  [facility], [place]    Patient Identification:    Name: Lupe Burleson  Age: 62 y.o.  Sex: female  :  1955  MRN: [id number]    Primary Care Physician: Shane Barcenas MD    Admit date: 2017    Discharge date and time:2017    Discharged Condition: stable    Discharge Diagnoses:  Principal Problem:    Hemorrhagic cystitis  Active Problems:    Hematuria    Fecal impaction    Diabetes mellitus    Hypertension    Chronic diastolic CHF (congestive heart failure)    THAI (obstructive sleep apnea)    Schizo affective schizophrenia    Chronic respiratory failure with hypoxia and hypercapnia    Patient Active Problem List   Diagnosis Code   • Elevated troponin R74.8   • Aspiration pneumonia J69.0   • Hematuria R31.9   • Hypokalemia E87.6   • Pelvic mass in female R19.00   • Fecal impaction K56.41   • Endometrial carcinoma C54.1   • Acute on chronic respiratory failure with hypoxia and hypercapnia J96.21, J96.22   • Acute on chronic diastolic CHF (congestive heart failure) I50.33   • UTI (urinary tract infection) N39.0   • Leucocytosis D72.829   • Chronic diastolic CHF (congestive heart failure) I50.32   • DM (diabetes mellitus) E11.9   • Morbid obesity E66.01   • HTN (hypertension) I10   • Schizophrenia F20.9   • Tracheostomy malfunction J95.03   • Pyuria N39.0   • THAI (obstructive sleep apnea) G47.33   • Generalized weakness R53.1   • Schizo affective schizophrenia F25.0   • Diabetes mellitus E11.9   • Coronary artery disease I25.10   • Endometrial cancer determined by uterine biopsy C54.1   • Chronic respiratory failure with hypoxia and hypercapnia J96.11, J96.12   • Toxic metabolic encephalopathy G92   • Pneumonia J18.9   • Abnormal vaginal bleeding N93.9   • Schizo affective schizophrenia F25.0   • CHF (congestive heart failure) I50.9   • Endometrial cancer determined by uterine biopsy C54.1   • Coronary artery disease I25.10   •  Lymphedema I89.0   • Immobility Z74.09   • Acute blood loss anemia D62   • Vaginal bleeding N93.9   • Hypertension I10   • Uterine cancer C55   • Bipolar disorder, unspecified F31.9   • Sepsis A41.9   • Abscess of abdominal cavity K65.1   • Hemorrhagic cystitis N30.91          Hospital Course: Lupe Burleson  is a 62-year-old female with multiple medical problems: hypertension, diabetes mellitus, diastolic congestive heart failure, coronary artery disease, recent hysterectomy for endometrial cancer.  She started to complain of having lower abdominal pain as well as vaginal bleeding for about 2 days prior to admission. Pain in the abdomen increased so patient was sent to the hospital for evaluation.  In emergency room patient was evaluated, and it was felt the patient was having hematuria rather than a vaginal bleeding, CT scan of the abdomen and pelvis showed probably hemorrhagic cystitis.  Patient was being admitted for further management. She was never noted to have vaginal bleeding. There was no noted pat hematuria, so F/C was Dc'd yesterday. She has received rocephin X 4 days. Her Vitals are stable and she is taking and tolerating PO diet. She will be discharged back to the NH.     PMHX:   Past Medical History:   Diagnosis Date   • Acute respiratory distress syndrome    • Arthritis    • Bipolar disorder, unspecified    • CAD (coronary artery disease)    • Cellulitis    • Cellulitis    • CHF (congestive heart failure)    • Chronic ischemic heart disease    • Chronic respiratory failure with hypoxia and hypercapnia    • Chronic ulcer of calf    • Constipation    • Coronary artery disease    • Depression    • Depressive disorder    • Diabetes mellitus    • Dysphagia    • Dysphagia    • Endometrial cancer determined by uterine biopsy     s/p radiation; no improvement   • Endometrial hyperplasia    • History of transfusion    • Hypercapnia    • Hypertension    • Immobility    • Lack of coordination    •  "Lymphedema    • Malignant neoplasm of uterus    • Muscle weakness    • Obesities, morbid    • Oxygen dependent    • Post-menopausal bleeding    • Postmenopausal bleeding    • Schizo affective schizophrenia    • Skin ulcer    • Sleep apnea    • Stroke    • Symbolic dysfunction    • Uterine cancer    • Venous insufficiency    • Venous insufficiency      PSHX:   Past Surgical History:   Procedure Laterality Date   • D&C HYSTEROSCOPY     • LAPAROSCOPIC TUBAL LIGATION     • TOTAL LAPAROSCOPIC HYSTERECTOMY Bilateral 8/15/2017    Procedure: OPEN TOTAL  ABDOMINAL HYSTERECTOMY WITH BILATERAL SALPINGO-OOPHERECTOMY;  Surgeon: Ortiz Washington MD;  Location: [facility] OR;  Service:    • TRACHEOSTOMY AND PEG TUBE INSERTION     • UMBILICAL HERNIA REPAIR     • WOUND DEBRIDEMENT             Consults:     Consults     Date and Time Order Name Status Description    11/18/2017 2059 Family Medicine Consult Completed           Discharge Exam:    /51 (BP Location: Right arm, Patient Position: Lying)  Pulse 64  Temp 98.7 °F (37.1 °C) (Oral)   Resp 18  Ht 62.99\" (160 cm)  Wt 238 lb 9.6 oz (108 kg)  SpO2 100%  BMI 42.28 kg/m2    General: Alert, cooperative, no distress, appears stated age  Neck: Supple, symmetrical, trachea midline, no adenopathy;              thyroid:  no enlargement/tenderness/nodules;              no carotid bruit or JVD  Cardiovascular: Normal rate, regular rhythm and intact distal pulses.              Exam reveals no gallop and no friction rub. No murmur heard  Pulmonary: Clear to auscultation bilaterally, respirations unlabored.               No rhonchi, wheezing or rales.   Abdominal: Soft. Soft, non-tender, bowel sounds active all four quadrants,     no masses, no hepatomegaly, no splenomegaly.   Extremities: Normal, atraumatic, no cyanosis or edema  Pulses: 2 + symmetric all extremities  Neurological: He is alert and oriented to person, place, and time.                 CNII-XII intact, normal " strength, sensation intact throughout  Skin: Skin color, texture, turgor normal. Chronic wound LLE reopened.     Data Review:          Results from last 7 days  Lab Units 11/22/17  0641 11/21/17 0626 11/20/17 0527   WBC 10*3/mm3 5.23 5.35 5.00   HEMOGLOBIN g/dL 10.3* 10.9* 10.9*   HEMATOCRIT % 33.6* 35.4* 35.6   PLATELETS 10*3/mm3 293 305 261         Results from last 7 days  Lab Units 11/22/17  0641 11/21/17  0626 11/20/17 0527 11/18/17  1816   SODIUM mmol/L 136 136 133*  < > 141   POTASSIUM mmol/L 3.8 3.7 3.8  < > 4.0   CHLORIDE mmol/L 96* 94* 93*  < > 98   CO2 mmol/L 29.9* 28.5 29.0  < > 30.9*   BUN mg/dL 17 16 14  < > 12   CREATININE mg/dL 1.02* 0.89 1.11*  < > 0.96   CALCIUM mg/dL 9.8 9.9 9.6  < > 9.7   BILIRUBIN mg/dL  --   --   --   --  0.3   ALK PHOS U/L  --   --   --   --  79   ALT (SGPT) U/L  --   --   --   --  7   AST (SGOT) U/L  --   --   --   --  8   GLUCOSE mg/dL 88 113* 96  < > 88   < > = values in this interval not displayed.    Results from last 7 days  Lab Units 11/18/17  1816   INR  1.00       Brief Urine Lab Results  (Last result in the past 365 days)      Color   Clarity   Blood   Leuk Est   Nitrite   Protein   CREAT   Urine HCG        11/18/17 1844 Yellow Cloudy(A) Large (3+)(A) Moderate (2+)(A) Negative 100 mg/dL (2+)(A)                 Lab Results  Lab Value Date/Time   TROPONINT 0.065 (H) 05/17/2017 0505   TROPONINT 0.077 (H) 05/16/2017 1517   TROPONINT 0.080 (H) 05/16/2017 1112   TROPONINT 0.078 (H) 05/16/2017 0703   TROPONINT 0.084 (H) 05/16/2017 0116   TROPONINT <0.010 05/15/2016 0622   TROPONINT 0.017 05/14/2016 0437   TROPONINT 0.023 05/13/2016 2036   TROPONINT 0.031 (H) 05/13/2016 1231   TROPONINT <0.01 02/03/2016 0537   TROPONINT <0.01 02/02/2016 0914   TROPONINT <0.01 11/11/2015 0516   TROPONINT <0.01 11/09/2015 2049   TROPONINT <0.01 04/18/2014 0517       Microbiology Results (last 10 days)     Procedure Component Value - Date/Time    Urine Culture - Urine, Urine, Clean Catch  [[id number]]  (Normal) Collected:  11/18/17 1844    Lab Status:  Final result Specimen:  Urine from Urine, Catheter Updated:  11/20/17 0643     Urine Culture No growth    Blood Culture - Blood, [[id number]]  (Normal) Collected:  11/18/17 1820    Lab Status:  Preliminary result Specimen:  Blood from Arm, Right Updated:  11/21/17 1901     Blood Culture No growth at 3 days    Blood Culture - Blood, [[id number]]  (Normal) Collected:  11/18/17 1815    Lab Status:  Preliminary result Specimen:  Blood from Arm, Left Updated:  11/21/17 1831     Blood Culture No growth at 3 days           Imaging Results (all)     Procedure Component Value Units Date/Time    CT Abdomen Pelvis Without Contrast [[id number]] Collected:  11/18/17 1931     Updated:  11/19/17 1922    Narrative:       CT ABDOMEN AND PELVIS WITHOUT CONTRAST.     TECHNIQUE: Radiation dose reduction techniques were utilized, including  automated exposure control and exposure modulation based on body size. A  routine CT scan of the abdomen and pelvis was performed with coronal and  sagittal reconstructed images.     HISTORY: Hematuria.     COMPARISON: 09/01/2017.     FINDINGS:  Bilateral small pleural effusions remain unchanged. Moderately large  pericardial effusion is unchanged, maximum thickness at the level of the  left atrium measures 2.2 cm.      Liver demonstrates homogeneous parenchyma, no definite mass. The  gallbladder is collapsed and contains gallstones measuring up to 2 cm.  No intra or extrahepatic biliary ductal dilatation.      The spleen is unremarkable. Pancreas is unremarkable, no pancreatic  ductal dilatation. Adrenal glands are within normal limits.     Mild dilatation of the right ureter, no obstructing calculus is seen.  Minimal scarring of the mid pole right kidney. No definite renal  calculi. Urinary bladder is collapsed over a Chirinos catheter however the  wall of the bladder is significantly thickened and measures 2.7 cm  maximum.         Small and  large bowel loops are within normal limits. A gastrostomy tube  is in position. Large amount of stool in the colon and rectum concerning  for constipation/fecal impaction.     No significant retroperitoneal lymphadenopathy. There is interval  lymphadenopathy, a previously seen para-aortic lymph node has increased  in size from 1.1 cm to 1.8 cm, there are new subcentimeter lymph nodes  in the para-aortic region. Another lymph node at the level of the aortic  bifurcation has increased in size from 0.7 cm to 2 cm. Multiple other  new lymph nodes are also seen along the right iliac neurovascular  bundle.          Impression:       1.  Urinary bladder is collapsed however there is severe mural  thickening, concerning for severe cystitis.   2.  Interval development and increase of retroperitoneal  lymphadenopathy, neoplastic etiology to be excluded. Further evaluation  is recommended.  3.  Multiple gallstones measuring up to 2 cm, no evidence for acute  cholecystitis.  4.  Bilateral small pleural effusions and moderate pericardial effusion  demonstrated no significant change.  5.  Findings concerning for constipation/fecal impaction.     This report was finalized on 11/19/2017 7:19 PM by Dr. Cesia Sarabia MD.               Disposition:    Nursing Home    Patient Instructions: See After Visit Summary for Medications   Lupe Burleson   Home Medication Instructions NATO:[id number]    Printed on:11/22/17 3965   Medication Information                      acetaminophen (MAPAP) 160 MG/5ML liquid  650 mg by Per G Tube route Every 4 (Four) Hours As Needed for Fever (give 20.3 ml per G tube).             ammonium lactate (AMLACTIN) 12 % cream  Apply 1 application topically 2 (two) times a day. To bilat legs and feet             atorvastatin (LIPITOR) 20 MG tablet  20 mg by Per G Tube route Daily.             bumetanide (BUMEX) 2 MG tablet  2 mg by Per G Tube route Daily.             cefuroxime (CEFTIN) 250 MG tablet  Take 1  tablet by mouth 2 (Two) Times a Day for 3 days.             ferrous sulfate 325 (65 FE) MG tablet  Take 1 tablet by mouth Daily With Breakfast.             folic acid (FOLVITE) 1 MG tablet  Take 1 tablet by mouth Daily.             HYDROcodone-acetaminophen (NORCO) 7.5-325 MG per tablet  Take 1 tablet by mouth Every 6 (Six) Hours As Needed for Moderate Pain .             insulin aspart (novoLOG) 100 UNIT/ML injection  Inject 5 Units under the skin 3 (Three) Times a Day Before Meals.             ipratropium-albuterol (DUO-NEB) 0.5-2.5 mg/mL nebulizer  Take 3 mL by nebulization Every 4 (Four) Hours As Needed for Wheezing.             isosorbide mononitrate (ISMO,MONOKET) 20 MG tablet  30 mg by Per G Tube route Daily.             lactulose (CHRONULAC) 10 GM/15ML solution  20 g by Per PEG Tube route daily.             LORazepam (ATIVAN) 1 MG tablet  1 tablet by Per G Tube route Every 8 (Eight) Hours As Needed for Anxiety.             mupirocin (BACTROBAN) 2 % ointment  Apply 1 application topically 2 (Two) Times a Day. Apply to wounds twice daily             pantoprazole (PROTONIX) 40 MG pack packet  40 mg by Per PEG Tube route Every Morning Before Breakfast.             potassium chloride (KAYCIEL) 20 MEQ/15ML (10%) solution  Take 20 mEq by mouth Daily.             risperiDONE (risperDAL) 2 MG tablet  Take 2 mg by mouth Every Morning.             risperiDONE (risperDAL) 3 MG tablet  Take 3 mg by mouth Every Night.             valproic acid (DEPAKENE) 250 MG/5ML syrup syrup  250 mg by Per PEG Tube route every 12 (twelve) hours.                   Discharge Order     None          Follow-up Information     Follow up with [facility] .    Specialties:  Skilled Nursing Facility, Intermediate Care Facility    Contact information:    [address] [facility] [address] [phone]        Follow up with Shane Barcenas MD .    Specialties:  Internal Medicine, Hospitalist    Contact information:     [address] [address]  [phone]            Total time spent discharging patient including evaluation,post hospitalization follow up,  medication and post hospitalization instructions and education total time exceeds 30 minutes.    Signed:  Shane Barcenas MD  11/22/2017  5:42 PM

## 2017-11-23 LAB
BACTERIA SPEC AEROBE CULT: NORMAL
BACTERIA SPEC AEROBE CULT: NORMAL

## 2017-11-27 NOTE — PROGRESS NOTES
Case Management Discharge Note         Discharge Placement     Facility/Agency Request Status Selected? Address Phone Number Fax Number    [facility]    [facility] [address] [facility] [address] [phone]             Discharge Codes: 04  Discharged/transferred to intermediate care facility (ICF)

## 2018-01-02 ENCOUNTER — HOSPITAL ENCOUNTER (INPATIENT)
Facility: HOSPITAL | Age: 63
LOS: 7 days | Discharge: NURSING, REHAB OR LONG-TERM CARE | End: 2018-01-10
Attending: EMERGENCY MEDICINE | Admitting: INTERNAL MEDICINE

## 2018-01-02 ENCOUNTER — APPOINTMENT (OUTPATIENT)
Dept: GENERAL RADIOLOGY | Facility: HOSPITAL | Age: 63
End: 2018-01-02

## 2018-01-02 ENCOUNTER — TRANSCRIBE ORDERS (OUTPATIENT)
Dept: ADMINISTRATIVE | Facility: HOSPITAL | Age: 63
End: 2018-01-02

## 2018-01-02 DIAGNOSIS — N17.9 AKI (ACUTE KIDNEY INJURY) (HCC): ICD-10-CM

## 2018-01-02 DIAGNOSIS — R41.0 CONFUSION: Primary | ICD-10-CM

## 2018-01-02 DIAGNOSIS — C54.1 ENDOMETRIAL CANCER (HCC): Primary | ICD-10-CM

## 2018-01-02 DIAGNOSIS — R77.8 ELEVATED TROPONIN: ICD-10-CM

## 2018-01-02 DIAGNOSIS — E86.0 DEHYDRATION: ICD-10-CM

## 2018-01-02 DIAGNOSIS — R91.8 PULMONARY NODULES: ICD-10-CM

## 2018-01-02 LAB
ALBUMIN SERPL-MCNC: 1.9 G/DL (ref 3.5–5.2)
ALBUMIN/GLOB SERPL: 0.3 G/DL
ALP SERPL-CCNC: 64 U/L (ref 39–117)
ALT SERPL W P-5'-P-CCNC: 7 U/L (ref 1–33)
AMORPH URATE CRY URNS QL MICRO: ABNORMAL /HPF
ANION GAP SERPL CALCULATED.3IONS-SCNC: 14.1 MMOL/L
AST SERPL-CCNC: 6 U/L (ref 1–32)
BACTERIA UR QL AUTO: ABNORMAL /HPF
BILIRUB SERPL-MCNC: 0.3 MG/DL (ref 0.1–1.2)
BILIRUB UR QL STRIP: NEGATIVE
BUN BLD-MCNC: 63 MG/DL (ref 8–23)
BUN/CREAT SERPL: 16.5 (ref 7–25)
CALCIUM SPEC-SCNC: 8.9 MG/DL (ref 8.6–10.5)
CHLORIDE SERPL-SCNC: 120 MMOL/L (ref 98–107)
CLARITY UR: ABNORMAL
CO2 SERPL-SCNC: 25.9 MMOL/L (ref 22–29)
COLOR UR: ABNORMAL
CREAT BLD-MCNC: 3.81 MG/DL (ref 0.57–1)
GFR SERPL CREATININE-BSD FRML MDRD: 14 ML/MIN/1.73
GLOBULIN UR ELPH-MCNC: 5.6 GM/DL
GLUCOSE BLD-MCNC: 84 MG/DL (ref 65–99)
GLUCOSE UR STRIP-MCNC: NEGATIVE MG/DL
HGB UR QL STRIP.AUTO: ABNORMAL
HOLD SPECIMEN: NORMAL
HYALINE CASTS UR QL AUTO: ABNORMAL /LPF
KETONES UR QL STRIP: NEGATIVE
LEUKOCYTE ESTERASE UR QL STRIP.AUTO: ABNORMAL
LIPASE SERPL-CCNC: 24 U/L (ref 13–60)
NITRITE UR QL STRIP: NEGATIVE
PH UR STRIP.AUTO: <=5 [PH] (ref 5–8)
POTASSIUM BLD-SCNC: 4.9 MMOL/L (ref 3.5–5.2)
PROT SERPL-MCNC: 7.5 G/DL (ref 6–8.5)
PROT UR QL STRIP: ABNORMAL
RBC # UR: ABNORMAL /HPF
REF LAB TEST METHOD: ABNORMAL
SODIUM BLD-SCNC: 160 MMOL/L (ref 136–145)
SP GR UR STRIP: 1.01 (ref 1–1.03)
SQUAMOUS #/AREA URNS HPF: ABNORMAL /HPF
TRANS CELLS #/AREA URNS HPF: ABNORMAL /HPF
TROPONIN T SERPL-MCNC: 0.18 NG/ML (ref 0–0.03)
UROBILINOGEN UR QL STRIP: ABNORMAL
VALPROATE SERPL-MCNC: 22 MCG/ML (ref 50–125)
WBC UR QL AUTO: ABNORMAL /HPF

## 2018-01-02 PROCEDURE — 84484 ASSAY OF TROPONIN QUANT: CPT | Performed by: EMERGENCY MEDICINE

## 2018-01-02 PROCEDURE — 93010 ELECTROCARDIOGRAM REPORT: CPT | Performed by: INTERNAL MEDICINE

## 2018-01-02 PROCEDURE — 81001 URINALYSIS AUTO W/SCOPE: CPT | Performed by: EMERGENCY MEDICINE

## 2018-01-02 PROCEDURE — 87086 URINE CULTURE/COLONY COUNT: CPT | Performed by: EMERGENCY MEDICINE

## 2018-01-02 PROCEDURE — 80053 COMPREHEN METABOLIC PANEL: CPT | Performed by: EMERGENCY MEDICINE

## 2018-01-02 PROCEDURE — 36415 COLL VENOUS BLD VENIPUNCTURE: CPT | Performed by: EMERGENCY MEDICINE

## 2018-01-02 PROCEDURE — 83690 ASSAY OF LIPASE: CPT | Performed by: EMERGENCY MEDICINE

## 2018-01-02 PROCEDURE — 93005 ELECTROCARDIOGRAM TRACING: CPT | Performed by: EMERGENCY MEDICINE

## 2018-01-02 PROCEDURE — 99285 EMERGENCY DEPT VISIT HI MDM: CPT

## 2018-01-02 PROCEDURE — 80164 ASSAY DIPROPYLACETIC ACD TOT: CPT | Performed by: EMERGENCY MEDICINE

## 2018-01-02 PROCEDURE — 71045 X-RAY EXAM CHEST 1 VIEW: CPT

## 2018-01-02 RX ORDER — SODIUM CHLORIDE 9 MG/ML
125 INJECTION, SOLUTION INTRAVENOUS CONTINUOUS
Status: DISCONTINUED | OUTPATIENT
Start: 2018-01-02 | End: 2018-01-03

## 2018-01-02 RX ORDER — SODIUM CHLORIDE 0.9 % (FLUSH) 0.9 %
10 SYRINGE (ML) INJECTION AS NEEDED
Status: DISCONTINUED | OUTPATIENT
Start: 2018-01-02 | End: 2018-01-10 | Stop reason: HOSPADM

## 2018-01-03 ENCOUNTER — APPOINTMENT (OUTPATIENT)
Dept: CT IMAGING | Facility: HOSPITAL | Age: 63
End: 2018-01-03

## 2018-01-03 PROBLEM — R41.0 CONFUSION: Status: ACTIVE | Noted: 2018-01-03

## 2018-01-03 PROBLEM — N17.9 AKI (ACUTE KIDNEY INJURY) (HCC): Status: ACTIVE | Noted: 2018-01-03

## 2018-01-03 LAB
ANION GAP SERPL CALCULATED.3IONS-SCNC: 15.7 MMOL/L
ANION GAP SERPL CALCULATED.3IONS-SCNC: 8.1 MMOL/L
BASOPHILS # BLD AUTO: 0.01 10*3/MM3 (ref 0–0.2)
BASOPHILS # BLD AUTO: 0.02 10*3/MM3 (ref 0–0.2)
BASOPHILS NFR BLD AUTO: 0.2 % (ref 0–1.5)
BASOPHILS NFR BLD AUTO: 0.4 % (ref 0–1.5)
BUN BLD-MCNC: 60 MG/DL (ref 8–23)
BUN BLD-MCNC: 67 MG/DL (ref 8–23)
BUN/CREAT SERPL: 17.4 (ref 7–25)
BUN/CREAT SERPL: 17.7 (ref 7–25)
CALCIUM SPEC-SCNC: 8.5 MG/DL (ref 8.6–10.5)
CALCIUM SPEC-SCNC: 8.8 MG/DL (ref 8.6–10.5)
CHLORIDE SERPL-SCNC: 112 MMOL/L (ref 98–107)
CHLORIDE SERPL-SCNC: 121 MMOL/L (ref 98–107)
CO2 SERPL-SCNC: 26.3 MMOL/L (ref 22–29)
CO2 SERPL-SCNC: 28.9 MMOL/L (ref 22–29)
CREAT BLD-MCNC: 3.45 MG/DL (ref 0.57–1)
CREAT BLD-MCNC: 3.79 MG/DL (ref 0.57–1)
DEPRECATED RDW RBC AUTO: 66.8 FL (ref 37–54)
DEPRECATED RDW RBC AUTO: 67.1 FL (ref 37–54)
EOSINOPHIL # BLD AUTO: 0.15 10*3/MM3 (ref 0–0.7)
EOSINOPHIL # BLD AUTO: 0.16 10*3/MM3 (ref 0–0.7)
EOSINOPHIL NFR BLD AUTO: 3.3 % (ref 0.3–6.2)
EOSINOPHIL NFR BLD AUTO: 3.3 % (ref 0.3–6.2)
ERYTHROCYTE [DISTWIDTH] IN BLOOD BY AUTOMATED COUNT: 19.7 % (ref 11.7–13)
ERYTHROCYTE [DISTWIDTH] IN BLOOD BY AUTOMATED COUNT: 19.8 % (ref 11.7–13)
GFR SERPL CREATININE-BSD FRML MDRD: 15 ML/MIN/1.73
GFR SERPL CREATININE-BSD FRML MDRD: 16 ML/MIN/1.73
GLUCOSE BLD-MCNC: 75 MG/DL (ref 65–99)
GLUCOSE BLD-MCNC: 82 MG/DL (ref 65–99)
GLUCOSE BLDC GLUCOMTR-MCNC: 78 MG/DL (ref 70–130)
GLUCOSE BLDC GLUCOMTR-MCNC: 79 MG/DL (ref 70–130)
HCT VFR BLD AUTO: 37.3 % (ref 35.6–45.5)
HCT VFR BLD AUTO: 39.4 % (ref 35.6–45.5)
HGB BLD-MCNC: 10.8 G/DL (ref 11.9–15.5)
HGB BLD-MCNC: 11.1 G/DL (ref 11.9–15.5)
IMM GRANULOCYTES # BLD: 0 10*3/MM3 (ref 0–0.03)
IMM GRANULOCYTES # BLD: 0 10*3/MM3 (ref 0–0.03)
IMM GRANULOCYTES NFR BLD: 0 % (ref 0–0.5)
IMM GRANULOCYTES NFR BLD: 0 % (ref 0–0.5)
LYMPHOCYTES # BLD AUTO: 1.14 10*3/MM3 (ref 0.9–4.8)
LYMPHOCYTES # BLD AUTO: 1.24 10*3/MM3 (ref 0.9–4.8)
LYMPHOCYTES NFR BLD AUTO: 25.2 % (ref 19.6–45.3)
LYMPHOCYTES NFR BLD AUTO: 25.4 % (ref 19.6–45.3)
MCH RBC QN AUTO: 26.3 PG (ref 26.9–32)
MCH RBC QN AUTO: 27 PG (ref 26.9–32)
MCHC RBC AUTO-ENTMCNC: 28.2 G/DL (ref 32.4–36.3)
MCHC RBC AUTO-ENTMCNC: 29 G/DL (ref 32.4–36.3)
MCV RBC AUTO: 93.3 FL (ref 80.5–98.2)
MCV RBC AUTO: 93.4 FL (ref 80.5–98.2)
MONOCYTES # BLD AUTO: 0.38 10*3/MM3 (ref 0.2–1.2)
MONOCYTES # BLD AUTO: 0.5 10*3/MM3 (ref 0.2–1.2)
MONOCYTES NFR BLD AUTO: 10.2 % (ref 5–12)
MONOCYTES NFR BLD AUTO: 8.4 % (ref 5–12)
NEUTROPHILS # BLD AUTO: 2.85 10*3/MM3 (ref 1.9–8.1)
NEUTROPHILS # BLD AUTO: 2.96 10*3/MM3 (ref 1.9–8.1)
NEUTROPHILS NFR BLD AUTO: 60.7 % (ref 42.7–76)
NEUTROPHILS NFR BLD AUTO: 62.9 % (ref 42.7–76)
NRBC BLD MANUAL-RTO: 0 /100 WBC (ref 0–0)
PLATELET # BLD AUTO: 172 10*3/MM3 (ref 140–500)
PLATELET # BLD AUTO: 191 10*3/MM3 (ref 140–500)
PMV BLD AUTO: 11.6 FL (ref 6–12)
PMV BLD AUTO: 11.9 FL (ref 6–12)
POTASSIUM BLD-SCNC: 4 MMOL/L (ref 3.5–5.2)
POTASSIUM BLD-SCNC: 4.1 MMOL/L (ref 3.5–5.2)
RBC # BLD AUTO: 4 10*6/MM3 (ref 3.9–5.2)
RBC # BLD AUTO: 4.22 10*6/MM3 (ref 3.9–5.2)
SODIUM BLD-SCNC: 149 MMOL/L (ref 136–145)
SODIUM BLD-SCNC: 163 MMOL/L (ref 136–145)
TROPONIN T SERPL-MCNC: 0.15 NG/ML (ref 0–0.03)
TROPONIN T SERPL-MCNC: 0.18 NG/ML (ref 0–0.03)
WBC NRBC COR # BLD: 4.53 10*3/MM3 (ref 4.5–10.7)
WBC NRBC COR # BLD: 4.88 10*3/MM3 (ref 4.5–10.7)

## 2018-01-03 PROCEDURE — 84484 ASSAY OF TROPONIN QUANT: CPT | Performed by: NURSE PRACTITIONER

## 2018-01-03 PROCEDURE — 93005 ELECTROCARDIOGRAM TRACING: CPT | Performed by: NURSE PRACTITIONER

## 2018-01-03 PROCEDURE — 92610 EVALUATE SWALLOWING FUNCTION: CPT

## 2018-01-03 PROCEDURE — 80048 BASIC METABOLIC PNL TOTAL CA: CPT | Performed by: INTERNAL MEDICINE

## 2018-01-03 PROCEDURE — 85025 COMPLETE CBC W/AUTO DIFF WBC: CPT | Performed by: EMERGENCY MEDICINE

## 2018-01-03 PROCEDURE — 70450 CT HEAD/BRAIN W/O DYE: CPT

## 2018-01-03 PROCEDURE — 93010 ELECTROCARDIOGRAM REPORT: CPT | Performed by: INTERNAL MEDICINE

## 2018-01-03 PROCEDURE — 85025 COMPLETE CBC W/AUTO DIFF WBC: CPT | Performed by: INTERNAL MEDICINE

## 2018-01-03 PROCEDURE — 82962 GLUCOSE BLOOD TEST: CPT

## 2018-01-03 PROCEDURE — 84484 ASSAY OF TROPONIN QUANT: CPT | Performed by: INTERNAL MEDICINE

## 2018-01-03 RX ORDER — AMMONIUM LACTATE 120 MG/G
1 CREAM TOPICAL EVERY 12 HOURS
Status: DISCONTINUED | OUTPATIENT
Start: 2018-01-03 | End: 2018-01-10 | Stop reason: HOSPADM

## 2018-01-03 RX ORDER — IPRATROPIUM BROMIDE AND ALBUTEROL SULFATE 2.5; .5 MG/3ML; MG/3ML
3 SOLUTION RESPIRATORY (INHALATION) EVERY 4 HOURS PRN
Status: DISCONTINUED | OUTPATIENT
Start: 2018-01-03 | End: 2018-01-10 | Stop reason: HOSPADM

## 2018-01-03 RX ORDER — DEXTROSE MONOHYDRATE 50 MG/ML
75 INJECTION, SOLUTION INTRAVENOUS CONTINUOUS
Status: DISCONTINUED | OUTPATIENT
Start: 2018-01-03 | End: 2018-01-06

## 2018-01-03 RX ADMIN — SODIUM CHLORIDE 125 ML/HR: 9 INJECTION, SOLUTION INTRAVENOUS at 00:04

## 2018-01-03 RX ADMIN — DEXTROSE MONOHYDRATE 125 ML/HR: 50 INJECTION, SOLUTION INTRAVENOUS at 05:46

## 2018-01-03 RX ADMIN — ISOSORBIDE MONONITRATE 30 MG: 10 TABLET ORAL at 09:20

## 2018-01-03 RX ADMIN — AMMONIUM LACTATE 1 APPLICATION: 120 CREAM TOPICAL at 09:20

## 2018-01-03 NOTE — PLAN OF CARE
Problem: Inpatient SLP  Goal: Dysphagia- Patient will safely consume diet as per recommendation with no signs/symptoms of aspiration   01/03/18 1603   Safely Consume Diet   Safely Consume Diet- SLP, Date Established 01/03/18   Safely Consume Diet- SLP, Time to Achieve by discharge

## 2018-01-03 NOTE — THERAPY EVALUATION
Acute Care - Speech Language Pathology   Swallow Initial Evaluation [facility]     Patient Name: Lupe Burleson  : 1955  MRN: [id number]  Today's Date: 1/3/2018               Admit Date: 2018    SPEECH-LANGUAGE PATHOLOGY EVALUATION - SWALLOW  Subjective: The patient was seen on this date for a Clinical Swallow evaluation.  Pt with limited vocalizations..  Significant history: AMS. Pt stopped eating and drinking 3 days ago per chart review. Pt with g tube in place per chart review for meds only (eats PO diet). BSE completed 17 recommended puree and thins d/t pocketing foods. CXR clear. Pt with h/o schizophrenia, stroke and endometrial cancer. Pt with limited vocalizations, voice harsh/strained, difficult to rate vocal quality.   Objective: Textures given included ice, thin liquid, puree consistency and mechanical soft consistency.  Assessment: Difficulties were noted with puree consistency and mechanical soft consistency.  Observations:  Oral holding with puree. Oral residue on right with mech soft. Audible swallow at times with thins via straw.   SLP Findings:  Patient presents with oral dysphagia.   Recommendations: Diet Textures: thin liquid, puree consistency food.  Medications should be taken whole or crushed with thins or puree.   Recommended Strategies: Upright for PO, may use straw and supervision with all PO. Oral care before breakfast, after all meals and PRN.  Other Recommended Evaluations: VFSS only if MD concerned for silent aspiration.   Dysphagia therapy is recommended. Rationale: will monitor diet tolerance.        Visit Dx:     ICD-10-CM ICD-9-CM   1. Confusion R41.0 298.9   2. EVER (acute kidney injury) N17.9 584.9   3. Dehydration E86.0 276.51   4. Elevated troponin R74.8 790.6     Patient Active Problem List   Diagnosis   • Elevated troponin   • Aspiration pneumonia   • Hematuria   • Hypokalemia   • Pelvic mass in female   • Fecal impaction   • Endometrial carcinoma   • Acute  on chronic respiratory failure with hypoxia and hypercapnia   • Acute on chronic diastolic CHF (congestive heart failure)   • UTI (urinary tract infection)   • Leucocytosis   • Chronic diastolic CHF (congestive heart failure)   • DM (diabetes mellitus)   • Morbid obesity   • HTN (hypertension)   • Schizophrenia   • Tracheostomy malfunction   • Pyuria   • THAI (obstructive sleep apnea)   • Generalized weakness   • Schizo affective schizophrenia   • Diabetes mellitus   • Coronary artery disease   • Endometrial cancer determined by uterine biopsy   • Chronic respiratory failure with hypoxia and hypercapnia   • Toxic metabolic encephalopathy   • Pneumonia   • Abnormal vaginal bleeding   • Schizo affective schizophrenia   • CHF (congestive heart failure)   • Endometrial cancer determined by uterine biopsy   • Coronary artery disease   • Lymphedema   • Immobility   • Acute blood loss anemia   • Vaginal bleeding   • Hypertension   • Malignant neoplasm of uterus   • Bipolar disorder, unspecified   • Sepsis   • Abscess of abdominal cavity   • Hemorrhagic cystitis   • Confusion   • EVER (acute kidney injury)     Past Medical History:   Diagnosis Date   • Acute respiratory distress syndrome    • Arthritis    • Bipolar disorder, unspecified    • CAD (coronary artery disease)    • Cellulitis    • Cellulitis    • CHF (congestive heart failure)    • Chronic ischemic heart disease    • Chronic respiratory failure with hypoxia and hypercapnia    • Chronic ulcer of calf    • Constipation    • Coronary artery disease    • Depression    • Depressive disorder    • Diabetes mellitus    • Dysphagia    • Dysphagia    • Endometrial cancer determined by uterine biopsy     s/p radiation; no improvement   • Endometrial hyperplasia    • History of transfusion    • Hypercapnia    • Hypertension    • Immobility    • Lack of coordination    • Lymphedema    • Malignant neoplasm of uterus    • Muscle weakness    • Obesities, morbid    • Oxygen  dependent    • Post-menopausal bleeding    • Postmenopausal bleeding    • Schizo affective schizophrenia    • Skin ulcer    • Sleep apnea    • Stroke    • Symbolic dysfunction    • Uterine cancer    • Venous insufficiency    • Venous insufficiency      Past Surgical History:   Procedure Laterality Date   • D&C HYSTEROSCOPY     • LAPAROSCOPIC TUBAL LIGATION     • TOTAL LAPAROSCOPIC HYSTERECTOMY Bilateral 8/15/2017    Procedure: OPEN TOTAL  ABDOMINAL HYSTERECTOMY WITH BILATERAL SALPINGO-OOPHERECTOMY;  Surgeon: Ortiz Washington MD;  Location: [facility] OR;  Service:    • TRACHEOSTOMY AND PEG TUBE INSERTION     • UMBILICAL HERNIA REPAIR     • WOUND DEBRIDEMENT            SWALLOW EVALUATION (last 72 hours)      Swallow Evaluation       01/03/18 1532                Rehab Evaluation    Document Type evaluation  -        Subjective Information agree to therapy  -        Patient Effort, Rehab Treatment fair  -        Symptoms Noted During/After Treatment none  -        General Information    Patient Profile Review yes  -        Subjective Patient Observations Limited vocalizations  -        Pertinent History Of Current Problem AMS  -        Current Diet Limitations NPO  -        Prior Level of Function- Swallowing diet modifications- foods;other (comment)   g tube  -        Plans/Goals Discussed With patient  -        Clinical Impression    Patient's Goals For Discharge patient did not state  -        SLP Swallowing Diagnosis moderate dysphagia;oral dysfunction  -        Criteria for Skilled Therapeutic Interventions Met skilled criteria for dysphagia intervention met  -        FCM, Swallowing 4-->Level 4  -        Therapy Frequency PRN  -        Predicted Duration Therapy Interv (days) until discharge  -        SLP Diet Recommendation thin liquids;II - pureed  -        Recommended Diagnostics reassess via clinical swallow (non-instrumental exam)  -        Recommended Feeding/Eating  Techniques maintain upright posture during/after eating for 30 mins  [facility]        SLP Rec. for Method of Medication Administration meds whole in pudding/applesauce;meds crushed in pudding/applesauce;meds whole with thin liquid  [facility]        Monitor For Signs Of Aspiration cough;elevated WBC count;gurgly voice;throat clearing;fever;upper respiratory infection;pneumonia;right lower lobe infiltrates  -        Anticipated Discharge Disposition placement for care  [facility]        Pain Assessment    Pain Assessment No/denies pain  -        Oral Motor Structure and Function    Oral Motor Assessment Comment Pt unable to follow directions to complete OME, harsh voice. Poor dentition.  -        Dysphagia Treatment Objectives and Progress    Dysphagia Treatment Objectives Other 1  -        Dysphagia Other 1    Dysphagia Other 1 Objective Pt will tolerate puree and thins without overt s/s of aspiration.   -          User Key  (r) = Recorded By, (t) = Taken By, (c) = Cosigned By    Initials Name Effective Dates     Dorota RON Ferro, MS CCC-SLP 07/13/17 -         EDUCATION  The patient has been educated in the following areas:   Modified Diet Instruction.    SLP Recommendation and Plan  SLP Swallowing Diagnosis: moderate dysphagia, oral dysfunction  SLP Diet Recommendation: thin liquids, II - pureed  Recommended Feeding/Eating Techniques: maintain upright posture during/after eating for 30 mins  SLP Rec. for Method of Medication Administration: meds whole in pudding/applesauce, meds crushed in pudding/applesauce, meds whole with thin liquid  Monitor For Signs Of Aspiration: cough, elevated WBC count, gurgly voice, throat clearing, fever, upper respiratory infection, pneumonia, right lower lobe infiltrates  Recommended Diagnostics: reassess via clinical swallow (non-instrumental exam)  Criteria for Skilled Therapeutic Interventions Met: skilled criteria for dysphagia intervention met  Anticipated Discharge Disposition: placement  for care     Therapy Frequency: PRN                           Time Calculation:       Time Calculation:         Time Calculation- SLP       01/03/18 1600          Time Calculation- [facility]    SLP Start Time 1500  -      SLP Stop Time 1600  -      SLP Time Calculation (min) 60 min  -      SLP Received On 01/03/18  -        User Key  (r) = Recorded By, (t) = Taken By, (c) = Cosigned By    Initials Name Provider Type     Dorota Ferro MS CCC-SLP Speech and Language Pathologist          Therapy Charges for Today     Code Description Service Date Service Provider Modifiers Qty    [id number] HC ST EVAL ORAL PHARYNG SWALLOW 4 1/3/2018 Dorota Ferro MS CCC-SLP GN 1                   Dorota Ferro MS CCC-SLP  1/3/2018

## 2018-01-03 NOTE — PLAN OF CARE
Problem: Respiratory Insufficiency (Adult)  Goal: Identify Related Risk Factors and Signs and Symptoms  Outcome: Ongoing (interventions implemented as appropriate)   01/03/18 0653   Respiratory Insufficiency   Related Risk Factors (Respiratory Insufficiency) activity intolerance;obesity;knowledge deficit;physiological factors   Signs and Symptoms (Respiratory Insufficiency) abnormal breath sounds;breathing pattern changes;respiratory rate alterations      01/03/18 0653   Respiratory Insufficiency   Related Risk Factors (Respiratory Insufficiency) activity intolerance;obesity;knowledge deficit;physiological factors   Signs and Symptoms (Respiratory Insufficiency) abnormal breath sounds;breathing pattern changes;respiratory rate alterations     Goal: Acid/Base Balance  Outcome: Ongoing (interventions implemented as appropriate)   01/03/18 0653   Respiratory Insufficiency (Adult)   Acid/Base Balance making progress toward outcome     Goal: Effective Ventilation  Outcome: Ongoing (interventions implemented as appropriate)   01/03/18 0653   Respiratory Insufficiency (Adult)   Effective Ventilation making progress toward outcome       Problem: Self-Care Deficit (Adult,Obstetrics,Pediatric)  Goal: Identify Related Risk Factors and Signs and Symptoms  Outcome: Ongoing (interventions implemented as appropriate)   01/03/18 0653   Self-Care Deficit   Self-Care Deficit: Related Risk Factors activity intolerance;cognitive impairment;immobility;fatigue/weakness   Signs and Symptoms (Self-Care Deficit) alteration in consciousness;cognitive changes;decreased functional activity tolerance     Goal: Improved Ability to Perform BADL and IADL  Outcome: Ongoing (interventions implemented as appropriate)   01/03/18 0653   Self-Care Deficit (Adult,Obstetrics,Pediatric)   Improved Ability to Perform BADL and IADL making progress toward outcome

## 2018-01-03 NOTE — ED NOTES
"FABIAN Nowak from facility was unable to advise last dosage received time for patients medications d/t it \"not being my singh tonight.\"  Asked for Ms. Benjy nurse to return call when he got finished with lunch.       sIha Elias RN  01/02/18 5891    "

## 2018-01-03 NOTE — H&P
HISTORY AND PHYSICAL   [facility], [facility]      1/3/2018    Patient Identification:    Name: Lupe Burleson  Age: 62 y.o.  Sex: female  :  1955  MRN: [id number]                       Primary Care Physician: Shane Barcenas MD    Chief Complaint:  Altered Mental Status    Chief Complaint   Patient presents with   • Altered Mental Status     LETHARGIC, CONFUSED, DECREASED PO INTAKE FOR THE LAST 2-3 DAYS         History of Present Illness:     Ms. Lupe Burleson is a 62 year old resident of a NH with a prior medical history of Paranoid schizophrenia, CHF, DMII, and endometrial Cancer s/p [facility] who was discharged from this facility 17 after treatment for hemorrhagic cystitis. Per the NH staff, she was doing well until 2-3 days ago when she stopped eating or drinking. Last night, she became unresponsive so was sent to the ED where her Sodium was 160, BUN 63, Creatinine 3.81 and her troponin was 0.182. She is admitted for further evaluation and treatment. This morning, she remains in the ED. She withdraws only to pain, is nonverbal and definitely off of her baseline mental status.     Past Medical History:  Past Medical History:   Diagnosis Date   • Acute respiratory distress syndrome    • Arthritis    • Bipolar disorder, unspecified    • CAD (coronary artery disease)    • Cellulitis    • Cellulitis    • CHF (congestive heart failure)    • Chronic ischemic heart disease    • Chronic respiratory failure with hypoxia and hypercapnia    • Chronic ulcer of calf    • Constipation    • Coronary artery disease    • Depression    • Depressive disorder    • Diabetes mellitus    • Dysphagia    • Dysphagia    • Endometrial cancer determined by uterine biopsy     s/p radiation; no improvement   • Endometrial hyperplasia    • History of transfusion    • Hypercapnia    • Hypertension    • Immobility    • Lack of coordination    • Lymphedema    • Malignant neoplasm of uterus    • Muscle weakness    •  Obesities, morbid    • Oxygen dependent    • Post-menopausal bleeding    • Postmenopausal bleeding    • Schizo affective schizophrenia    • Skin ulcer    • Sleep apnea    • Stroke    • Symbolic dysfunction    • Uterine cancer    • Venous insufficiency    • Venous insufficiency      Past Surgical History:  Past Surgical History:   Procedure Laterality Date   • D&C HYSTEROSCOPY     • LAPAROSCOPIC TUBAL LIGATION     • TOTAL LAPAROSCOPIC HYSTERECTOMY Bilateral 8/15/2017    Procedure: OPEN TOTAL  ABDOMINAL HYSTERECTOMY WITH BILATERAL SALPINGO-OOPHERECTOMY;  Surgeon: Ortiz Washington MD;  Location: [facility];  Service:    • TRACHEOSTOMY AND PEG TUBE INSERTION     • UMBILICAL HERNIA REPAIR     • WOUND DEBRIDEMENT        Home Meds:    (Not in a hospital admission)    Allergies:  Allergies   Allergen Reactions   • Codeine    • Penicillins      Tolerated ceftriaxone during 2017 admission  Tolerating Zosyn without any problem   • Sulfa Antibiotics      Immunizations:  There is no immunization history for the selected administration types on file for this patient.  Social History:   Social History     Social History Narrative     Social History   Substance Use Topics   • Smoking status: Former Smoker     Packs/day: 2.00     Years: 15.00     Types: Cigarettes     Quit date:    • Smokeless tobacco: Not on file   • Alcohol use No     Family History:  Family History   Problem Relation Age of Onset   • Diabetes Mother    • Stroke Mother         Review of Systems  See history of present illness and past medical history.    Unable to complete ROS due to altered mental status.     Objective:    Exam:    tMax 24 hrs: Temp (24hrs), Av.8 °F (36.6 °C), Min:97.4 °F (36.3 °C), Max:98.1 °F (36.7 °C)    Vitals Ranges:   Temp:  [97.4 °F (36.3 °C)-98.1 °F (36.7 °C)] 97.4 °F (36.3 °C)  Heart Rate:  [47-67] 47  Resp:  [12-16] 12  BP: (113-158)/(50-87) 135/55    /55  Pulse (!) 47  Temp 97.4 °F (36.3 °C) (Tympanic)   Resp 12  " Ht 162.6 cm (64\")  Wt 107 kg (236 lb)  SpO2 100%  BMI 40.51 kg/m2    General: Nonverbal, withdrawing to pain only. No apparent distress, appears stated age  HEENT:    Head: Normocephalic, without obvious abnormality, atraumatic  Eyes. Pupils are equal, round, and reactive to light.   Oropharynx: Mucosa and tongue dry  Neck: Supple, symmetrical, trachea midline, no adenopathy;              thyroid:  no enlargement/tenderness/nodules;              no carotid bruit or JVD  Cardiovascular: Normal rate, regular rhythm and intact distal pulses.              Exam reveals no gallop and no friction rub. No murmur heard  Chest wall: No tenderness or deformity  Pulmonary: Clear to auscultation bilaterally, respirations unlabored.               No rhonchi, wheezing or rales.   Abdominal: Soft. Soft, non-tender, bowel sounds active all four quadrants,     no masses, no hepatomegaly, no splenomegaly.   Extremities: Normal, atraumatic, no cyanosis. Chronic lymphedema bilateral LE's  Pulses: 2 + symmetric all extremities  Neurological: Patient is unresponsive, withdrawing to pain.   Skin: coccygeal wound      Data Review:      Results from last 7 days  Lab Units 01/03/18  0612 01/03/18  0145   WBC 10*3/mm3 4.53 4.88   HEMOGLOBIN g/dL 11.1* 10.8*   HEMATOCRIT % 39.4 37.3   PLATELETS 10*3/mm3 191 172         Results from last 7 days  Lab Units 01/03/18  0612 01/02/18  1945   SODIUM mmol/L 163* 160*   POTASSIUM mmol/L 4.0 4.9   CHLORIDE mmol/L 121* 120*   CO2 mmol/L 26.3 25.9   BUN mg/dL 67* 63*   CREATININE mg/dL 3.79* 3.81*   CALCIUM mg/dL 8.8 8.9   BILIRUBIN mg/dL  --  0.3   ALK PHOS U/L  --  64   ALT (SGPT) U/L  --  7   AST (SGOT) U/L  --  6   GLUCOSE mg/dL 82 84       Estimated Creatinine Clearance: 17.9 mL/min (by C-G formula based on Cr of 3.79).    Brief Urine Lab Results  (Last result in the past 365 days)      Color   Clarity   Blood   Leuk Est   Nitrite   Protein   CREAT   Urine HCG        01/02/18 2317 Dark Yellow(A) " Cloudy(A) Large (3+)(A) Moderate (2+)(A) Negative 100 mg/dL (2+)(A)                       Lab Results  Lab Value Date/Time   TROPONINT 0.177 (C) 01/03/2018 0612   TROPONINT 0.182 (C) 01/02/2018 1945   TROPONINT 0.065 (H) 05/17/2017 0505   TROPONINT 0.077 (H) 05/16/2017 1517   TROPONINT 0.080 (H) 05/16/2017 1112   TROPONINT 0.078 (H) 05/16/2017 0703   TROPONINT 0.084 (H) 05/16/2017 0116   TROPONINT <0.010 05/15/2016 0622   TROPONINT 0.017 05/14/2016 0437   TROPONINT 0.023 05/13/2016 2036   TROPONINT 0.031 (H) 05/13/2016 1231   TROPONINT <0.01 02/03/2016 0537   TROPONINT <0.01 02/02/2016 0914   TROPONINT <0.01 11/11/2015 0516   TROPONINT <0.01 11/09/2015 2049   TROPONINT <0.01 04/18/2014 0517        Imaging Results (all)     Procedure Component Value Units Date/Time    XR Chest 1 View [[id number]] Collected:  01/03/18 0007     Updated:  01/03/18 0007    Narrative:       X-RAY CHEST 1 VIEW.     HISTORY: Altered mental status.     COMPARISON: 08/27/2017.     FINDINGS:  Cardiomegaly, lung volumes are low.         There is no consolidation or effusion. Mild bibasilar atelectasis,  overall no significant change.          Impression:       No acute findings.           CT Head Without Contrast [[id number]] Collected:  01/03/18 0032     Updated:  01/03/18 0032    Narrative:       CT SCAN OF THE BRAIN WITHOUT CONTRAST.     TECHNIQUE: Radiation dose reduction techniques were utilized, including  automated exposure control and exposure modulation based on body size.  Multiple axial images of the brain were obtained from the vertex to the  base of the brain.     HISTORY: Altered mental status.     COMPARISON: 05/23/2014.     FINDINGS:   Midline structures are within normal limits, there is no hydrocephalus.  Gray-white matter differentiation is maintained. Findings of small  vessel ischemic disease, a few old lacunar infarcts and cortical  atrophy.     Orbits are within normal limits. No significant mucosal disease of  the  para-nasal sinuses. Left mastoid air cells are fluid-filled, fluid is  also seen in the middle ear these findings are new.              Impression:       1.  No definite acute intracranial pathology.   2.  Findings of left mastoiditis, fluid is also seen in the middle ear.                   Assessment:    Active Problems:    Confusion    EVER (acute kidney injury)  Probable hemorrhagic cystitis  Hypernatremia  Dehydration  CKD3  Elevated troponin  Chronic respiratory failure  Morbid obesity  Schizoaffective disorder  Gastrostomy status  DM      Patient Active Problem List   Diagnosis Code   • Elevated troponin R74.8   • Aspiration pneumonia J69.0   • Hematuria R31.9   • Hypokalemia E87.6   • Pelvic mass in female R19.00   • Fecal impaction K56.41   • Endometrial carcinoma C54.1   • Acute on chronic respiratory failure with hypoxia and hypercapnia J96.21, J96.22   • Acute on chronic diastolic CHF (congestive heart failure) I50.33   • UTI (urinary tract infection) N39.0   • Leucocytosis D72.829   • Chronic diastolic CHF (congestive heart failure) I50.32   • DM (diabetes mellitus) E11.9   • Morbid obesity E66.01   • HTN (hypertension) I10   • Schizophrenia F20.9   • Tracheostomy malfunction J95.03   • Pyuria N39.0   • THAI (obstructive sleep apnea) G47.33   • Generalized weakness R53.1   • Schizo affective schizophrenia F25.0   • Diabetes mellitus E11.9   • Coronary artery disease I25.10   • Endometrial cancer determined by uterine biopsy C54.1   • Chronic respiratory failure with hypoxia and hypercapnia J96.11, J96.12   • Toxic metabolic encephalopathy G92   • Pneumonia J18.9   • Abnormal vaginal bleeding N93.9   • Schizo affective schizophrenia F25.0   • CHF (congestive heart failure) I50.9   • Endometrial cancer determined by uterine biopsy C54.1   • Coronary artery disease I25.10   • Lymphedema I89.0   • Immobility Z74.09   • Acute blood loss anemia D62   • Vaginal bleeding N93.9   • Hypertension I10   •  Malignant neoplasm of uterus C55   • Bipolar disorder, unspecified F31.9   • Sepsis A41.9   • Abscess of abdominal cavity K65.1   • Hemorrhagic cystitis N30.91   • Confusion R41.0   • EVER (acute kidney injury) N17.9       Plan:    Inpatient admission  IV fluids, free water  Await culture, may have hemorrhagic cystitis again.  Monitor and correct electrolytes  Accucheck and SSI  Monitor mental status  Wound consult  Home medications  DVT/stress ulcer prophylaxis  PT, ST consult when appropiate  Labs in am      Shane Barcenas MD  1/3/2018  8:57 AM

## 2018-01-03 NOTE — ED NOTES
"Pt to Room 38 via EMS from [facility] with c/o AMS, decreased PO intake.  Pt did arrive with a gastrostomy tube in place.  Pt arrives with Unstageable ulcer to the coccyx area that was not dressed upon arrival.  Attempted to contact facility at this time for report.  Advised that nurse is \"at lunch.\"  Pt is presently non-verbal with this nurse and staff.  Unknown if this is patients baseline.  Pt arrived to room covered in feces and urine.  Pt was cleaned, straight cath'ed for urine at this time.  Photos were taken of patients wounds at this time.  Pt appears unkempt at this time.  Pt was wiped down with soap and water and gown was changed.  Pt does appear chronically ill appearing, presently on 4L nasal cannula.  Bed in low position, call light within reach, side rails up X2.      Isha Elias RN  01/02/18 9521    "

## 2018-01-03 NOTE — ED PROVIDER NOTES
EMERGENCY DEPARTMENT ENCOUNTER    CHIEF COMPLAINT  Chief Complaint: altered mental status  History given by: NH report  History limited by: pt is nonverbal  Room Number: 38/38  PMD: Shane Barcenas MD      HPI:  Pt is a 62 y.o. female who presents to the ED via EMS from the NH. Pt was sent for AMS. They report she is more lethargic than normal as well as not eating and drinking per her normal.     Duration:  unknown  Onset: unknown  Timing: unknown  Location: n/a  Radiation: n/a  Quality: n/a  Intensity/Severity: unknown  Progression: unknown  Associated Symptoms: unknown  Aggravating Factors: unknown  Alleviating Factors: unknown  Previous Episodes: unknown  Treatment before arrival: EMS care and intervention    PAST MEDICAL HISTORY  Active Ambulatory Problems     Diagnosis Date Noted   • Elevated troponin 05/13/2016   • Aspiration pneumonia 05/16/2016   • Hematuria 05/16/2016   • Hypokalemia 05/18/2016   • Pelvic mass in female 05/19/2016   • Fecal impaction 05/19/2016   • Endometrial carcinoma 05/21/2016   • Acute on chronic respiratory failure with hypoxia and hypercapnia 05/21/2016   • Acute on chronic diastolic CHF (congestive heart failure) 05/21/2016   • UTI (urinary tract infection) 05/29/2016   • Leucocytosis 05/30/2016   • Chronic diastolic CHF (congestive heart failure) 05/30/2016   • DM (diabetes mellitus) 05/30/2016   • Morbid obesity 05/30/2016   • HTN (hypertension) 05/30/2016   • Schizophrenia 05/30/2016   • Tracheostomy malfunction 06/22/2016   • Pyuria 06/22/2016   • THAI (obstructive sleep apnea) 06/22/2016   • Generalized weakness 05/16/2017   • Schizo affective schizophrenia 05/16/2017   • Diabetes mellitus 05/16/2017   • Coronary artery disease 05/16/2017   • Endometrial cancer determined by uterine biopsy 05/16/2017   • Chronic respiratory failure with hypoxia and hypercapnia 05/16/2017   • Toxic metabolic encephalopathy 05/16/2017   • Pneumonia 05/17/2017   • Abnormal vaginal bleeding  06/18/2017   • Schizo affective schizophrenia 06/19/2017   • CHF (congestive heart failure) 06/19/2017   • Endometrial cancer determined by uterine biopsy 06/19/2017   • Coronary artery disease 06/19/2017   • Lymphedema 06/19/2017   • Immobility 06/19/2017   • Acute blood loss anemia 06/22/2017   • Vaginal bleeding 08/13/2017   • Hypertension 08/14/2017   • Uterine cancer 08/14/2017   • Bipolar disorder, unspecified 08/14/2017   • Sepsis 08/27/2017   • Abscess of abdominal cavity 08/28/2017   • Hemorrhagic cystitis 11/18/2017     Resolved Ambulatory Problems     Diagnosis Date Noted   • No Resolved Ambulatory Problems     Past Medical History:   Diagnosis Date   • Acute respiratory distress syndrome    • Arthritis    • Bipolar disorder, unspecified    • CAD (coronary artery disease)    • Cellulitis    • Cellulitis    • CHF (congestive heart failure)    • Chronic ischemic heart disease    • Chronic respiratory failure with hypoxia and hypercapnia    • Chronic ulcer of calf    • Constipation    • Coronary artery disease    • Depression    • Depressive disorder    • Diabetes mellitus    • Dysphagia    • Dysphagia    • Endometrial cancer determined by uterine biopsy    • Endometrial hyperplasia    • History of transfusion    • Hypercapnia    • Hypertension    • Immobility    • Lack of coordination    • Lymphedema    • Malignant neoplasm of uterus    • Muscle weakness    • Obesities, morbid    • Oxygen dependent    • Post-menopausal bleeding    • Postmenopausal bleeding    • Schizo affective schizophrenia    • Skin ulcer    • Sleep apnea    • Stroke    • Symbolic dysfunction    • Uterine cancer    • Venous insufficiency    • Venous insufficiency        PAST SURGICAL HISTORY  Past Surgical History:   Procedure Laterality Date   • D&C HYSTEROSCOPY     • LAPAROSCOPIC TUBAL LIGATION     • TOTAL LAPAROSCOPIC HYSTERECTOMY Bilateral 8/15/2017    Procedure: OPEN TOTAL  ABDOMINAL HYSTERECTOMY WITH BILATERAL SALPINGO-OOPHERECTOMY;   Surgeon: Ortiz Washington MD;  Location: [facility] OR;  Service:    • TRACHEOSTOMY AND PEG TUBE INSERTION     • UMBILICAL HERNIA REPAIR     • WOUND DEBRIDEMENT         FAMILY HISTORY  Family History   Problem Relation Age of Onset   • Diabetes Mother    • Stroke Mother        SOCIAL HISTORY  Social History     Social History   • Marital status:      Spouse name: N/A   • Number of children: N/A   • Years of education: N/A     Occupational History   • Not on file.     Social History Main Topics   • Smoking status: Former Smoker     Packs/day: 2.00     Years: 15.00     Types: Cigarettes     Quit date: 1988   • Smokeless tobacco: Not on file   • Alcohol use No   • Drug use: No   • Sexual activity: No     Other Topics Concern   • Not on file     Social History Narrative       ALLERGIES  Codeine; Penicillins; and Sulfa antibiotics    REVIEW OF SYSTEMS  Review of Systems   Unable to perform ROS: Patient nonverbal       PHYSICAL EXAM  ED Triage Vitals   Temp Heart Rate Resp BP SpO2   01/02/18 1915 01/02/18 1915 01/02/18 1915 01/02/18 1915 01/02/18 1915   97.9 °F (36.6 °C) 67 16 125/58 99 %      Temp src Heart Rate Source Patient Position BP Location FiO2 (%)   01/02/18 1915 -- -- -- --   Tympanic           Physical Exam   Constitutional: She is well-developed, well-nourished, and in no distress. No distress.   HENT:   Head: Normocephalic and atraumatic.   Mouth/Throat: Mucous membranes are dry.   Eyes: EOM are normal. Pupils are equal, round, and reactive to light.   Neck: Normal range of motion. Neck supple.   Cardiovascular: Normal rate, regular rhythm, normal heart sounds, intact distal pulses and normal pulses.  Exam reveals no gallop and no friction rub.    No murmur heard.  Pulmonary/Chest: Effort normal and breath sounds normal. No respiratory distress.   Abdominal: Soft. She exhibits no mass. There is no tenderness. There is no rebound and no guarding.   Musculoskeletal: Normal range of motion. She  exhibits no edema.   Neurological:   Pt is nonverbal   Skin: Skin is warm and dry. No rash noted.   Psychiatric: Mood and affect normal.   Nursing note and vitals reviewed.      LAB RESULTS  Lab Results (last 24 hours)     Procedure Component Value Units Date/Time    Comprehensive Metabolic Panel [[id number]]  (Abnormal) Collected:  01/02/18 1945    Specimen:  Blood Updated:  01/02/18 2045     Glucose 84 mg/dL      BUN 63 (H) mg/dL      Creatinine 3.81 (H) mg/dL      Sodium 160 (H) mmol/L      Potassium 4.9 mmol/L      Chloride 120 (H) mmol/L      CO2 25.9 mmol/L      Calcium 8.9 mg/dL      Total Protein 7.5 g/dL      Albumin 1.90 (L) g/dL      ALT (SGPT) 7 U/L      AST (SGOT) 6 U/L      Alkaline Phosphatase 64 U/L      Total Bilirubin 0.3 mg/dL      eGFR  African Amer 14 (L) mL/min/1.73      Globulin 5.6 gm/dL      A/G Ratio 0.3 g/dL      BUN/Creatinine Ratio 16.5     Anion Gap 14.1 mmol/L     Lipase [[id number]]  (Normal) Collected:  01/02/18 1945    Specimen:  Blood Updated:  01/02/18 2045     Lipase 24 U/L     Valproic Acid Level, Total [[id number]]  (Abnormal) Collected:  01/02/18 1945    Specimen:  Blood Updated:  01/02/18 2045     Valproic Acid 22.0 (L) mcg/mL     Troponin [[id number]]  (Abnormal) Collected:  01/02/18 1945    Specimen:  Blood Updated:  01/02/18 2353     Troponin T 0.182 (C) ng/mL     Narrative:       Troponin T Reference Ranges:  Less than 0.03 ng/mL:    Negative for AMI  0.03 to 0.09 ng/mL:      Indeterminant for AMI  Greater than 0.09 ng/mL: Positive for AMI    Urinalysis With / Culture If Indicated - Urine, Clean Catch [[id number]]  (Abnormal) Collected:  01/02/18 2316    Specimen:  Urine from Urine, Clean Catch Updated:  01/02/18 2332     Color, UA Dark Yellow (A)     Appearance, UA Cloudy (A)     pH, UA <=5.0     Specific Gravity, UA 1.014     Glucose, UA Negative     Ketones, UA Negative     Bilirubin, UA Negative     Blood, UA Large (3+) (A)     Protein,  mg/dL (2+) (A)     Leuk  Esterase, UA Moderate (2+) (A)     Nitrite, UA Negative     Urobilinogen, UA 0.2 E.U./dL    Urinalysis, Microscopic Only - Urine, Clean Catch [[id number]]  (Abnormal) Collected:  01/02/18 2316    Specimen:  Urine from Urine, Clean Catch Updated:  01/02/18 2353     RBC, UA Too Numerous to Count (A) /HPF      WBC, UA 6-12 (A) /HPF      Bacteria, UA Trace (A) /HPF      Squamous Epithelial Cells, UA 3-6 (A) /HPF      Transitional Epithelial Cells, UA 0-2 /HPF      Hyaline Casts, UA None Seen /LPF      Amorphous Crystals, UA Moderate/2+ /HPF      Methodology Manual Light Microscopy    Urine Culture - Urine, Urine, Clean Catch [[id number]] Collected:  01/02/18 2316    Specimen:  Urine from Urine, Clean Catch Updated:  01/02/18 2332    CBC & Differential [[id number]] Collected:  01/03/18 0145    Specimen:  Blood Updated:  01/03/18 0152    Narrative:       The following orders were created for panel order CBC & Differential.  Procedure                               Abnormality         Status                     ---------                               -----------         ------                     CBC Auto Differential[[id number]]                            In process                   Please view results for these tests on the individual orders.    CBC Auto Differential [[id number]] Collected:  01/03/18 0145    Specimen:  Blood Updated:  01/03/18 0152          I ordered the above labs and reviewed the results    RADIOLOGY  CT Head Without Contrast   Preliminary Result   1.  No definite acute intracranial pathology.    2.  Findings of left mastoiditis, fluid is also seen in the middle ear.                  XR Chest 1 View   Preliminary Result   No acute findings.                   I ordered the above noted radiological studies. Interpreted by radiologist. Reviewed by me in PACS.       PROCEDURES  Procedures  EKG           EKG time: 2335  Rhythm/Rate: NSR 60  P waves and NJ: nml  QRS, axis: LAD   ST and T waves: non specific  changes     Interpreted Contemporaneously by me, independently viewed  Previous ischemic changes have resolved compared to 8/28/17    PROGRESS AND CONSULTS  ED Course   2322  EKG, troponin, CXR, CT head ordered. IVF ordered for hydration.    0141  Call placed to PCP. Called labs to get results from CBC. Advised they never received it.    0144  Consulted with Dr. Barcenas, PCP, about the pt who agreed to admit the pt.    MEDICAL DECISION MAKING  Results were reviewed/discussed with the patient and they were also made aware of online access. Pt also made aware that some labs, such as cultures, will not be resulted during ER visit and follow up with PMD is necessary.     MDM  Number of Diagnoses or Management Options     Amount and/or Complexity of Data Reviewed  Clinical lab tests: reviewed (Creatinine 3.81, GFR 14)  Tests in the radiology section of CPT®: reviewed (CXR-negative  CT head-negative)  Tests in the medicine section of CPT®: reviewed (See EKG procedure note.)  Decide to obtain previous medical records or to obtain history from someone other than the patient: yes (Admitted 11/18/17 for hemorrhagic cystitis )  Discuss the patient with other providers: yes (Dr. Barcenas, PCP)           DIAGNOSIS  Final diagnoses:   Confusion   EVER (acute kidney injury)   Dehydration   Elevated troponin       DISPOSITION  ADMISSION    Discussed treatment plan and reason for admission with pt/family and admitting physician.  Pt/family voiced understanding of the plan for admission for further testing/treatment as needed.     Latest Documented Vital Signs:  As of 1:55 AM  BP- 136/65 HR- 64 Temp- 98.1 °F (36.7 °C) (Tympanic) O2 sat- 98%    --  Documentation assistance provided by jennie Arnold for Dr. Blankenship.  Information recorded by the scribe was done at my direction and has been verified and validated by me.       Merna Arnold  01/03/18 0155       Narciso Blankenship MD  01/03/18 0258

## 2018-01-03 NOTE — ED NOTES
Pt did have what appeared to be small blood clots that came out while cathing her.  Per patients history she does have a history cervical cancer.  Dr. Slater advised at this time.        Isha Elias RN  01/02/18 0146

## 2018-01-03 NOTE — ED NOTES
"Spoke to She, from [facility], advised that patient was sent out for \"decrease eating, she's not acting herself and she not eating ice like she usually does.\"  Advised that the g-tube is used for medications only.       Isha Elias RN  01/02/18 8675    "

## 2018-01-04 LAB
ANION GAP SERPL CALCULATED.3IONS-SCNC: 12 MMOL/L
BACTERIA SPEC AEROBE CULT: NO GROWTH
BASOPHILS # BLD AUTO: 0.01 10*3/MM3 (ref 0–0.2)
BASOPHILS NFR BLD AUTO: 0.2 % (ref 0–1.5)
BUN BLD-MCNC: 62 MG/DL (ref 8–23)
BUN/CREAT SERPL: 18.4 (ref 7–25)
CALCIUM SPEC-SCNC: 8.5 MG/DL (ref 8.6–10.5)
CHLORIDE SERPL-SCNC: 114 MMOL/L (ref 98–107)
CO2 SERPL-SCNC: 24 MMOL/L (ref 22–29)
CREAT BLD-MCNC: 3.37 MG/DL (ref 0.57–1)
DEPRECATED RDW RBC AUTO: 62.4 FL (ref 37–54)
EOSINOPHIL # BLD AUTO: 0.13 10*3/MM3 (ref 0–0.7)
EOSINOPHIL NFR BLD AUTO: 3.1 % (ref 0.3–6.2)
ERYTHROCYTE [DISTWIDTH] IN BLOOD BY AUTOMATED COUNT: 18.9 % (ref 11.7–13)
GFR SERPL CREATININE-BSD FRML MDRD: 17 ML/MIN/1.73
GLUCOSE BLD-MCNC: 89 MG/DL (ref 65–99)
GLUCOSE BLDC GLUCOMTR-MCNC: 121 MG/DL (ref 70–130)
GLUCOSE BLDC GLUCOMTR-MCNC: 87 MG/DL (ref 70–130)
GLUCOSE BLDC GLUCOMTR-MCNC: 90 MG/DL (ref 70–130)
HCT VFR BLD AUTO: 34.8 % (ref 35.6–45.5)
HGB BLD-MCNC: 10 G/DL (ref 11.9–15.5)
IMM GRANULOCYTES # BLD: 0 10*3/MM3 (ref 0–0.03)
IMM GRANULOCYTES NFR BLD: 0 % (ref 0–0.5)
LYMPHOCYTES # BLD AUTO: 1.1 10*3/MM3 (ref 0.9–4.8)
LYMPHOCYTES NFR BLD AUTO: 26.1 % (ref 19.6–45.3)
MCH RBC QN AUTO: 26.3 PG (ref 26.9–32)
MCHC RBC AUTO-ENTMCNC: 28.7 G/DL (ref 32.4–36.3)
MCV RBC AUTO: 91.6 FL (ref 80.5–98.2)
MONOCYTES # BLD AUTO: 0.51 10*3/MM3 (ref 0.2–1.2)
MONOCYTES NFR BLD AUTO: 12.1 % (ref 5–12)
NEUTROPHILS # BLD AUTO: 2.46 10*3/MM3 (ref 1.9–8.1)
NEUTROPHILS NFR BLD AUTO: 58.5 % (ref 42.7–76)
NRBC BLD MANUAL-RTO: 0 /100 WBC (ref 0–0)
PLATELET # BLD AUTO: 181 10*3/MM3 (ref 140–500)
PMV BLD AUTO: 11 FL (ref 6–12)
POTASSIUM BLD-SCNC: 3.8 MMOL/L (ref 3.5–5.2)
RBC # BLD AUTO: 3.8 10*6/MM3 (ref 3.9–5.2)
SODIUM BLD-SCNC: 150 MMOL/L (ref 136–145)
WBC NRBC COR # BLD: 4.21 10*3/MM3 (ref 4.5–10.7)

## 2018-01-04 PROCEDURE — 82962 GLUCOSE BLOOD TEST: CPT

## 2018-01-04 PROCEDURE — 80048 BASIC METABOLIC PNL TOTAL CA: CPT | Performed by: NURSE PRACTITIONER

## 2018-01-04 PROCEDURE — 85025 COMPLETE CBC W/AUTO DIFF WBC: CPT | Performed by: NURSE PRACTITIONER

## 2018-01-04 RX ADMIN — DEXTROSE MONOHYDRATE 150 ML/HR: 50 INJECTION, SOLUTION INTRAVENOUS at 22:46

## 2018-01-04 RX ADMIN — DEXTROSE MONOHYDRATE 150 ML/HR: 50 INJECTION, SOLUTION INTRAVENOUS at 11:28

## 2018-01-04 RX ADMIN — AMMONIUM LACTATE 1 APPLICATION: 120 CREAM TOPICAL at 17:30

## 2018-01-04 RX ADMIN — ISOSORBIDE MONONITRATE 30 MG: 10 TABLET ORAL at 09:06

## 2018-01-04 RX ADMIN — DEXTROSE MONOHYDRATE 150 ML/HR: 50 INJECTION, SOLUTION INTRAVENOUS at 04:18

## 2018-01-04 NOTE — PLAN OF CARE
Problem: Pressure Ulcer (Adult)  Intervention: Promote/Optimize Nutrition  Will continue oral nutrition supplements from last admission to help promote wound healing - Glucerna daily and Magic Cup daily.    Will add David BID to promote wound healing.    Recommend adding MVI with minerals, vitamin C (500 mg BID x 14 days), and zinc to also promote wound healing.

## 2018-01-04 NOTE — PLAN OF CARE
Problem: Confusion, Acute (Adult)  Goal: Identify Related Risk Factors and Signs and Symptoms  Outcome: Ongoing (interventions implemented as appropriate)    Goal: Cognitive/Functional Impairments Minimized  Outcome: Ongoing (interventions implemented as appropriate)    Goal: Safety  Outcome: Ongoing (interventions implemented as appropriate)      Problem: Fall Risk (Adult)  Goal: Identify Related Risk Factors and Signs and Symptoms  Outcome: Ongoing (interventions implemented as appropriate)    Goal: Absence of Falls  Outcome: Ongoing (interventions implemented as appropriate)      Problem: Respiratory Insufficiency (Adult)  Goal: Identify Related Risk Factors and Signs and Symptoms  Outcome: Ongoing (interventions implemented as appropriate)    Goal: Acid/Base Balance  Outcome: Ongoing (interventions implemented as appropriate)    Goal: Effective Ventilation  Outcome: Ongoing (interventions implemented as appropriate)      Problem: Self-Care Deficit (Adult,Obstetrics,Pediatric)  Goal: Identify Related Risk Factors and Signs and Symptoms  Outcome: Ongoing (interventions implemented as appropriate)    Goal: Improved Ability to Perform BADL and IADL  Outcome: Ongoing (interventions implemented as appropriate)      Problem: Patient Care Overview (Adult)  Goal: Plan of Care Review  Outcome: Ongoing (interventions implemented as appropriate)   01/03/18 1800   Outcome Evaluation   Outcome Summary/Follow up Plan received pt to [facility], oriented to romm, VS as charted, monitor sinus Bradycardia, pt opens eyes but doesn't respond to questions, O2 in place, sacral decubiti noted and pictures taken, pt placed on low air loss bed,IV fluids infusing per orders, Dr Barcenas notified of pt's arrival, monitor closely.     Goal: Adult Individualization and Mutuality  Outcome: Ongoing (interventions implemented as appropriate)    Goal: Discharge Needs Assessment  Outcome: Ongoing (interventions implemented as appropriate)      Problem:  Skin Integrity Impairment, Risk/Actual (Adult)  Goal: Identify Related Risk Factors and Signs and Symptoms  Outcome: Ongoing (interventions implemented as appropriate)    Goal: Skin Integrity/Wound Healing  Outcome: Ongoing (interventions implemented as appropriate)

## 2018-01-04 NOTE — PROGRESS NOTES
"Adult Nutrition  Assessment/PES    Patient Name:  Lupe Burleson  YOB: 1955  MRN: [id number]  Admit Date:  1/2/2018    Assessment Date:  1/4/2018    Comments:  Assessment triggered by steff score of 12 and skin report.  Patient admitted with unstagable PU to coccyx and st III PU to coccyx.  Increased nutrient needs r/t this.     Recommend continuing same oral nutrition supplementation of Glucerna daily and Magic Cup daily to promote wound healing.  Will also add David BID for this same reason.    Recommend adding MVI with minerals, vitamin C (500 mg BID x 14 days), and zinc to promote wound healing.    Will continue to follow.          Reason for Assessment       01/04/18 1440    Reason for Assessment    Reason For Assessment/Visit skin risk    Diagnosis Diagnosis    Cardiac CAD;HTN;CHF    Endocrine DM Type 2    Neurological CVA    Oncology Endometrial cancer    Psychosocial Schizophrenia;Depression    Pulmonary/Critical Care Obstructive sleep apnea              Nutrition/Diet History       01/04/18 1442    Nutrition/Diet History    Typical Food/Fluid Intake decreased PO x 2-3 days before admission, lethargic, confusion, nonverbal, NH res            Anthropometrics       01/04/18 1443    Anthropometrics    Height 162.6 cm (64.02\")    Weight 107 kg (235 lb 14.3 oz)    RD Documented Current Weight  107 kg (235 lb 14.3 oz)    Anthropometrics (Special Considerations)    RD Calculated BMI (kg/m2) 40.5    Ideal Body Weight (IBW)    Ideal Body Weight (IBW), Female 55.44    % Ideal Body Weight 193.4    Body Mass Index (BMI)    BMI (kg/m2) 40.56    BMI Grade greater than 40 - obesity grade III            Labs/Tests/Procedures/Meds       01/04/18 1443    Labs/Tests/Procedures/Meds    Diagnostic Test/Procedure Review reviewed, pertinent    Labs/Tests Review Reviewed    Medication Review Reviewed, pertinent    Significant Vitals reviewed, pertinent            Physical Findings       01/04/18 1443    Physical " "Findings/Assessment    Additional Documentation Physical Appearance (Group)    Physical Appearance    Gastrointestinal feeding tube    Tubes peg tube   but not used at NH per last admit    Skin pressure ulcer(s)   B=10, coccyx unstageable, st III coccyx            Estimated/Assessed Needs       01/04/18 1445    Calculation Measurements    Weight Used For Calculations 54.6 kg (120 lb 5.9 oz)   IBW    Height Used for Calculations 1.626 m (5' 4.02\")    Estimated/Assessed Energy Needs    Energy Need Method Kcal/kg    kcal/kg 35    35 Kcal/Kg (kcal) 1911    Estimated Kcal Range  6524-4101    Estimated/Assessed Protein Needs    Weight Used for Protein Calculation --   IBW    Estimated Protein Range 66-82    Estimated/Assessed Fluid Needs    Fluid Need Method RDA method    RDA Method (mL)  1638            Nutrition Prescription Ordered       01/04/18 1446    Nutrition Prescription PO    Current PO Diet Dysphagia    Dysphagia Level 2  Pureed    Fluid Consistency Thin            Evaluation of Received Nutrient/Fluid Intake       01/04/18 1447    PO Evaluation    Number of Days PO Intake Evaluated Insufficient Data            Problem/Interventions:        Problem 1       01/04/18 1447    Nutrition Diagnoses Problem 1    Problem 1 Increased Nutrient Needs    Macronutrient Kcal;Protein    Etiology (related to) Medical Diagnosis    Skin Pressure ulcer    Signs/Symptoms (evidenced by) Report/Observation                    Intervention Goal       01/04/18 1447    Intervention Goal    General Maintain nutrition;Disease management/therapy;Reduce/improve symptoms;Meet nutritional needs for age/condition    PO Establish PO;PO intake (%)    PO Intake % 75 %    Weight No significant weight loss            Nutrition Intervention       01/04/18 1447    Nutrition Intervention    RD/Tech Action Follow Tx progress;Care plan reviewd;Recommend/ordered    Recommended/Ordered Supplement            Nutrition Prescription       01/04/18 1447    " Nutrition Prescription PO    PO Prescription Begin/change supplement    Supplement Glucerna Shake;Magic Cup;David    Supplement Frequency 2 times a day   continue from last admit and add David BID            Education/Evaluation       01/04/18 9948    Education    Education Will Instruct as appropriate    Monitor/Evaluation    Monitor Per protocol;PO intake;Supplement intake;Pertinent labs;Weight;Skin status    Education Follow-up Reinforce PRN        Electronically signed by:  Bambi Caba RD  01/04/18 2:50 PM

## 2018-01-04 NOTE — DISCHARGE PLACEMENT REQUEST
"Lupe Burleson (62 y.o. Female)     Date of Birth Social Security Number Address Home Phone MRN    1955  [address] [place] [address] [phone] [id number]    Episcopal Marital Status          Anabaptism        Admission Date Admission Type Admitting Provider Attending Provider Department, Room/Bed    1/2/18 Emergency Shane Barcenas MD [place], Marlon R, MD [address], [address]    Discharge Date Discharge Disposition Discharge Destination                      Attending Provider: Shane Barcenas MD     Allergies:  Codeine, Penicillins, Sulfa Antibiotics    Isolation:  Contact   Infection:  CRE (05/31/16), MDR Acinetobacter (04/17/14)   Code Status:  Prior    Ht:  162.6 cm (64.02\")   Wt:  107 kg (235 lb 14.3 oz)    Admission Cmt:  None   Principal Problem:  None                Active Insurance as of 1/2/2018     Primary Coverage     Payor Plan Insurance Group Employer/Plan Group    MEDICARE MEDICARE A & B      Payor Plan Address Payor Plan Phone Number Effective From Effective To    [address] [phone] 10/1/1991     [place] [address]       Subscriber Name Subscriber Birth Date Member ID       LUPE BURLESON 1955 [id number]           Secondary Coverage     Payor Plan Insurance Group Employer/Plan Group    [place]      Payor Plan Address Payor Plan Phone Number Effective From Effective To    [address] [phone] 5/28/2016     [place] [address]       Subscriber Name Subscriber Birth Date Member ID       LUPE BURLESON 1955 [id number]                 Emergency Contacts      (Rel.) Home Phone Work Phone Mobile Phone    Helene Pepe (Guardian) [phone] -- --    Tavia Torres (Relative) [phone] -- --              "

## 2018-01-04 NOTE — PLAN OF CARE
Problem: Confusion, Acute (Adult)  Goal: Identify Related Risk Factors and Signs and Symptoms  Outcome: Outcome(s) achieved Date Met: 01/04/18    Goal: Cognitive/Functional Impairments Minimized  Outcome: Ongoing (interventions implemented as appropriate)    Goal: Safety  Outcome: Ongoing (interventions implemented as appropriate)      Problem: Fall Risk (Adult)  Goal: Identify Related Risk Factors and Signs and Symptoms  Outcome: Outcome(s) achieved Date Met: 01/04/18    Goal: Absence of Falls  Outcome: Ongoing (interventions implemented as appropriate)      Problem: Respiratory Insufficiency (Adult)  Goal: Identify Related Risk Factors and Signs and Symptoms  Outcome: Outcome(s) achieved Date Met: 01/04/18    Goal: Acid/Base Balance  Outcome: Ongoing (interventions implemented as appropriate)    Goal: Effective Ventilation  Outcome: Ongoing (interventions implemented as appropriate)      Problem: Self-Care Deficit (Adult,Obstetrics,Pediatric)  Goal: Identify Related Risk Factors and Signs and Symptoms  Outcome: Outcome(s) achieved Date Met: 01/04/18    Goal: Improved Ability to Perform BADL and IADL  Outcome: Ongoing (interventions implemented as appropriate)      Problem: Patient Care Overview (Adult)  Goal: Plan of Care Review  Outcome: Ongoing (interventions implemented as appropriate)   01/04/18 8714   Outcome Evaluation   Outcome Summary/Follow up Plan Pt alert, but disoriented. Wound consult for PU. VSS. SB on monitor. Will continue to monitor.   Coping/Psychosocial Response Interventions   Plan Of Care Reviewed With patient   Patient Care Overview   Progress improving     Goal: Adult Individualization and Mutuality  Outcome: Ongoing (interventions implemented as appropriate)    Goal: Discharge Needs Assessment  Outcome: Ongoing (interventions implemented as appropriate)      Problem: Pressure Ulcer (Adult)  Goal: Signs and Symptoms of Listed Potential Problems Will be Absent or Manageable (Pressure  Ulcer)  Outcome: Ongoing (interventions implemented as appropriate)

## 2018-01-04 NOTE — PROGRESS NOTES
"DAILY PROGRESS NOTE  [facility], [facility]    2018    Patient Identification:  Name: Lupe Burleson  Age: 62 y.o.  Sex: female  :  1955  MRN: [id number]           Primary Care Physician: Shane Barcenas MD    Subjective:    Interval History:    Much more awake today. Responding verbally, but unable to clarify events leading to hospitalization. Creatinine continues to be elevated as well as sodium- although it is improving.     ROS:     Denies CP, SOA, N/V/D    Objective:    Scheduled Meds:    ammonium lactate 1 application Topical Q12H   isosorbide mononitrate 30 mg Per G Tube Daily   sodium hypochlorite 946 mL Irrigation Q12H       Continuous Infusions:    dextrose 150 mL/hr Last Rate: 150 mL/hr (18 1128)       PRN Meds:  ipratropium-albuterol  •  sodium chloride    Intake/Output:    Intake/Output Summary (Last 24 hours) at 18 1740  Last data filed at 18 1300   Gross per 24 hour   Intake              890 ml   Output                0 ml   Net              890 ml         Exam:    tMax 24 hrs: Temp (24hrs), Av.9 °F (37.2 °C), Min:98.4 °F (36.9 °C), Max:99.4 °F (37.4 °C)    Vitals Ranges:   Temp:  [98.4 °F (36.9 °C)-99.4 °F (37.4 °C)] 99.4 °F (37.4 °C)  Heart Rate:  [55-64] 60  Resp:  [16-18] 18  BP: (111-134)/(65-76) 124/65    /65 (BP Location: Left arm, Patient Position: Lying)  Pulse 60  Temp 99.4 °F (37.4 °C) (Axillary)   Resp 18  Ht 162.6 cm (64.02\")  Wt 107 kg (235 lb 14.3 oz)  SpO2 98%  BMI 40.47 kg/m2    General: Alert, cooperative, no distress, appears stated age  Neck: Supple, symmetrical, trachea midline, no adenopathy;              thyroid:  no enlargement/tenderness/nodules;              no carotid bruit or JVD  Cardiovascular: Normal rate, regular rhythm and intact distal pulses.              Exam reveals no gallop and no friction rub. No murmur heard  Pulmonary: Clear to auscultation bilaterally, respirations " unlabored.               No rhonchi, wheezing or rales.   Abdominal: Soft. Soft, non-tender, bowel sounds active all four quadrants,     no masses, no hepatomegaly, no splenomegaly.   Extremities: Normal, atraumatic, no cyanosis or edema  Neurological: Patient is alert and oriented to person, place, and time.                 CNII-XII intact, normal strength, sensation intact throughout  Skin: Skin color, texture, turgor normal. No rashes or lesions      Data Review:      Results from last 7 days  Lab Units 01/04/18 0621 01/03/18 0612 01/03/18  0145   WBC 10*3/mm3 4.21* 4.53 4.88   HEMOGLOBIN g/dL 10.0* 11.1* 10.8*   HEMATOCRIT % 34.8* 39.4 37.3   PLATELETS 10*3/mm3 181 191 172         Results from last 7 days  Lab Units 01/04/18 0621 01/03/18 1959 01/03/18 0612 01/02/18  1945   SODIUM mmol/L 150* 149* 163* 160*   POTASSIUM mmol/L 3.8 4.1 4.0 4.9   CHLORIDE mmol/L 114* 112* 121* 120*   CO2 mmol/L 24.0 28.9 26.3 25.9   BUN mg/dL 62* 60* 67* 63*   CREATININE mg/dL 3.37* 3.45* 3.79* 3.81*   CALCIUM mg/dL 8.5* 8.5* 8.8 8.9   BILIRUBIN mg/dL  --   --   --  0.3   ALK PHOS U/L  --   --   --  64   ALT (SGPT) U/L  --   --   --  7   AST (SGOT) U/L  --   --   --  6   GLUCOSE mg/dL 89 75 82 84       Estimated Creatinine Clearance: 20.1 mL/min (by C-G formula based on Cr of 3.37).            Lab Results  Lab Value Date/Time   TROPONINT 0.155 (C) 01/03/2018 1315   TROPONINT 0.177 (C) 01/03/2018 0612   TROPONINT 0.182 (C) 01/02/2018 1945   TROPONINT 0.065 (H) 05/17/2017 0505   TROPONINT 0.077 (H) 05/16/2017 1517   TROPONINT 0.080 (H) 05/16/2017 1112   TROPONINT 0.078 (H) 05/16/2017 0703   TROPONINT 0.084 (H) 05/16/2017 0116   TROPONINT <0.010 05/15/2016 0622   TROPONINT 0.017 05/14/2016 0437   TROPONINT 0.023 05/13/2016 2036   TROPONINT 0.031 (H) 05/13/2016 1231   TROPONINT <0.01 02/03/2016 0537   TROPONINT <0.01 02/02/2016 0914   TROPONINT <0.01 11/11/2015 0516   TROPONINT <0.01 11/09/2015 2049   TROPONINT <0.01 04/18/2014  0517       Microbiology Results (last 10 days)     Procedure Component Value - Date/Time    Urine Culture - Urine, Urine, Clean Catch [[id number]]  (Normal) Collected:  01/02/18 2316    Lab Status:  Final result Specimen:  Urine from Urine, Clean Catch Updated:  01/04/18 0657     Urine Culture No growth           Imaging Results (last 72 hours)     Procedure Component Value Units Date/Time    XR Chest 1 View [[id number]] Collected:  01/03/18 0007     Updated:  01/03/18 0007    Narrative:       X-RAY CHEST 1 VIEW.     HISTORY: Altered mental status.     COMPARISON: 08/27/2017.     FINDINGS:  Cardiomegaly, lung volumes are low.         There is no consolidation or effusion. Mild bibasilar atelectasis,  overall no significant change.          Impression:       No acute findings.           CT Head Without Contrast [[id number]] Collected:  01/03/18 0032     Updated:  01/03/18 0032    Narrative:       CT SCAN OF THE BRAIN WITHOUT CONTRAST.     TECHNIQUE: Radiation dose reduction techniques were utilized, including  automated exposure control and exposure modulation based on body size.  Multiple axial images of the brain were obtained from the vertex to the  base of the brain.     HISTORY: Altered mental status.     COMPARISON: 05/23/2014.     FINDINGS:   Midline structures are within normal limits, there is no hydrocephalus.  Gray-white matter differentiation is maintained. Findings of small  vessel ischemic disease, a few old lacunar infarcts and cortical  atrophy.     Orbits are within normal limits. No significant mucosal disease of the  para-nasal sinuses. Left mastoid air cells are fluid-filled, fluid is  also seen in the middle ear these findings are new.              Impression:       1.  No definite acute intracranial pathology.   2.  Findings of left mastoiditis, fluid is also seen in the middle ear.                     Assessment:    Active Problems:    Confusion    EVER (acute kidney injury)  Probable hemorrhagic  cystitis  Hypernatremia  Dehydration  CKD3  Elevated troponin  Chronic respiratory failure  Morbid obesity  Schizoaffective disorder  Gastrostomy status  DM       Patient Active Problem List   Diagnosis Code   • Elevated troponin R74.8   • Aspiration pneumonia J69.0   • Hematuria R31.9   • Hypokalemia E87.6   • Pelvic mass in female R19.00   • Fecal impaction K56.41   • Endometrial carcinoma C54.1   • Acute on chronic respiratory failure with hypoxia and hypercapnia J96.21, J96.22   • Acute on chronic diastolic CHF (congestive heart failure) I50.33   • UTI (urinary tract infection) N39.0   • Leucocytosis D72.829   • Chronic diastolic CHF (congestive heart failure) I50.32   • DM (diabetes mellitus) E11.9   • Morbid obesity E66.01   • HTN (hypertension) I10   • Schizophrenia F20.9   • Tracheostomy malfunction J95.03   • Pyuria N39.0   • THAI (obstructive sleep apnea) G47.33   • Generalized weakness R53.1   • Schizo affective schizophrenia F25.0   • Diabetes mellitus E11.9   • Coronary artery disease I25.10   • Endometrial cancer determined by uterine biopsy C54.1   • Chronic respiratory failure with hypoxia and hypercapnia J96.11, J96.12   • Toxic metabolic encephalopathy G92   • Pneumonia J18.9   • Abnormal vaginal bleeding N93.9   • Schizo affective schizophrenia F25.0   • CHF (congestive heart failure) I50.9   • Endometrial cancer determined by uterine biopsy C54.1   • Coronary artery disease I25.10   • Lymphedema I89.0   • Immobility Z74.09   • Acute blood loss anemia D62   • Vaginal bleeding N93.9   • Hypertension I10   • Malignant neoplasm of uterus C55   • Bipolar disorder, unspecified F31.9   • Sepsis A41.9   • Abscess of abdominal cavity K65.1   • Hemorrhagic cystitis N30.91   • Confusion R41.0   • EVER (acute kidney injury) N17.9       Plan:    Continue IV fluids, Na still high, needs free water  Monitor and correct electrolytes  Adjust insulin as needed  Monitor mental status- off risperdol due to AMS, but  approaching baseline mentation. Will resume in am  Continue home medications  PT/OT/ST consulted  DVT/stress ulcer prophylaxis  Labs in am        Shane Barcenas MD  1/4/2018  5:40 PM

## 2018-01-04 NOTE — PROGRESS NOTES
Discharge Planning Assessment  [facility]     Patient Name: Lupe Burleson  MRN: [id number]  Today's Date: 1/4/2018    Admit Date: 1/2/2018          Discharge Needs Assessment       01/04/18 1504    Living Environment    Lives With facility resident    Living Arrangements extended care facility    Home Accessibility no concerns    Transportation Available ambulance    Living Environment    Quality Of Family Relationships --   Has legal guardian    Able to Return to Prior Living Arrangements yes    Discharge Needs Assessment    Concerns To Be Addressed no discharge needs identified    Anticipated Changes Related to Illness none    Discharge Planning Comments State gretchen Pepe [phone] ext. [phone]            Discharge Plan       01/04/18 1505    Case Management/Social Work Plan    Plan Return to [facility]    Patient/Family In Agreement With Plan other (see comments)   Awaiting return call    Additional Comments Patient is from [facility] - awaiting return call from Flor with bed status.  Left message for state gretchen Pepe [phone] ext. [phone] to confirm plan to return to [place].  Also left a message with request for guardianship papers.  Packet started and in CCP office.  CCP will follow. BHumeniuk RN        Discharge Placement     Facility/Agency Request Status Selected? Address Phone Number Fax Number    [facility] Accepted    Yes [address] [id number] [phone]                Demographic Summary       01/04/18 1501    Referral Information    Admission Type inpatient    Arrived From nursing facility    Referral Source admission list    Reason For Consult discharge planning    Record Reviewed history and physical    Primary Care Physician Information    Name Dr. Shane Barcenas            Functional Status       01/04/18 1503    Functional Status Current    Ambulation 4-->completely dependent    Transferring 4-->completely  dependent    Toileting 4-->completely dependent    Bathing 4-->completely dependent    Dressing 4-->completely dependent    Eating 4-->completely dependent    Communication 2-->difficulty understanding and speaking (not related to language barrier)    Change in Functional Status Since Onset of Current Illness/Injury no    Functional Status Prior    Ambulation 4-->completely dependent    Transferring 4-->completely dependent    Toileting 4-->completely dependent    Bathing 4-->completely dependent    Dressing 4-->completely dependent    Eating 4-->completely dependent    Communication 2-->difficulty understanding and speaking (not related to language barrier)    IADL    Medications completely dependent    Meal Preparation completely dependent    Housekeeping completely dependent    Laundry completely dependent    Shopping completely dependent    Oral Care assistive person    Activity Tolerance    Usual Activity Tolerance poor    Current Activity Tolerance poor    Cognitive/Perceptual/Developmental    Current Mental Status/Cognitive Functioning unable to assess   Did not arouse            Psychosocial     None            Abuse/Neglect     None            Legal       01/04/18 1500    Legal    Legal Comments Guardian is Helene Pepe [phone] ext. [phone]            Substance Abuse     None            Patient Forms     None          Becky S. Humeniuk, RN

## 2018-01-05 LAB
ANION GAP SERPL CALCULATED.3IONS-SCNC: 12.5 MMOL/L
BASOPHILS # BLD AUTO: 0.01 10*3/MM3 (ref 0–0.2)
BASOPHILS NFR BLD AUTO: 0.2 % (ref 0–1.5)
BUN BLD-MCNC: 55 MG/DL (ref 8–23)
BUN/CREAT SERPL: 17.7 (ref 7–25)
CALCIUM SPEC-SCNC: 8.4 MG/DL (ref 8.6–10.5)
CHLORIDE SERPL-SCNC: 101 MMOL/L (ref 98–107)
CO2 SERPL-SCNC: 24.5 MMOL/L (ref 22–29)
CREAT BLD-MCNC: 3.1 MG/DL (ref 0.57–1)
CREAT UR-MCNC: 24.4 MG/DL
DEPRECATED RDW RBC AUTO: 58.6 FL (ref 37–54)
EOSINOPHIL # BLD AUTO: 0.09 10*3/MM3 (ref 0–0.7)
EOSINOPHIL NFR BLD AUTO: 2.1 % (ref 0.3–6.2)
ERYTHROCYTE [DISTWIDTH] IN BLOOD BY AUTOMATED COUNT: 17.8 % (ref 11.7–13)
GFR SERPL CREATININE-BSD FRML MDRD: 18 ML/MIN/1.73
GLUCOSE BLD-MCNC: 85 MG/DL (ref 65–99)
HCT VFR BLD AUTO: 36.6 % (ref 35.6–45.5)
HGB BLD-MCNC: 10.6 G/DL (ref 11.9–15.5)
IMM GRANULOCYTES # BLD: 0.04 10*3/MM3 (ref 0–0.03)
IMM GRANULOCYTES NFR BLD: 1 % (ref 0–0.5)
LYMPHOCYTES # BLD AUTO: 0.79 10*3/MM3 (ref 0.9–4.8)
LYMPHOCYTES NFR BLD AUTO: 18.8 % (ref 19.6–45.3)
MCH RBC QN AUTO: 26.2 PG (ref 26.9–32)
MCHC RBC AUTO-ENTMCNC: 29 G/DL (ref 32.4–36.3)
MCV RBC AUTO: 90.6 FL (ref 80.5–98.2)
MONOCYTES # BLD AUTO: 0.54 10*3/MM3 (ref 0.2–1.2)
MONOCYTES NFR BLD AUTO: 12.9 % (ref 5–12)
NEUTROPHILS # BLD AUTO: 2.73 10*3/MM3 (ref 1.9–8.1)
NEUTROPHILS NFR BLD AUTO: 65 % (ref 42.7–76)
PLATELET # BLD AUTO: 175 10*3/MM3 (ref 140–500)
PMV BLD AUTO: 10.7 FL (ref 6–12)
POTASSIUM BLD-SCNC: 3.3 MMOL/L (ref 3.5–5.2)
PROT UR-MCNC: 55 MG/DL
PROT/CREAT UR: 2254.1 MG/G CREA
RBC # BLD AUTO: 4.04 10*6/MM3 (ref 3.9–5.2)
SODIUM BLD-SCNC: 138 MMOL/L (ref 136–145)
WBC NRBC COR # BLD: 4.2 10*3/MM3 (ref 4.5–10.7)

## 2018-01-05 PROCEDURE — 85025 COMPLETE CBC W/AUTO DIFF WBC: CPT | Performed by: NURSE PRACTITIONER

## 2018-01-05 PROCEDURE — 82570 ASSAY OF URINE CREATININE: CPT | Performed by: INTERNAL MEDICINE

## 2018-01-05 PROCEDURE — 80048 BASIC METABOLIC PNL TOTAL CA: CPT | Performed by: NURSE PRACTITIONER

## 2018-01-05 PROCEDURE — 84156 ASSAY OF PROTEIN URINE: CPT | Performed by: INTERNAL MEDICINE

## 2018-01-05 RX ORDER — RISPERIDONE 1 MG/1
3 TABLET ORAL NIGHTLY
Status: DISCONTINUED | OUTPATIENT
Start: 2018-01-05 | End: 2018-01-10 | Stop reason: HOSPADM

## 2018-01-05 RX ADMIN — DAKIN'S SOLUTION 0.125% (QUARTER STRENGTH) 946 ML: 0.12 SOLUTION at 02:50

## 2018-01-05 RX ADMIN — AMMONIUM LACTATE 1 APPLICATION: 120 CREAM TOPICAL at 05:07

## 2018-01-05 RX ADMIN — ISOSORBIDE MONONITRATE 30 MG: 10 TABLET ORAL at 08:55

## 2018-01-05 RX ADMIN — DEXTROSE MONOHYDRATE 150 ML/HR: 50 INJECTION, SOLUTION INTRAVENOUS at 08:56

## 2018-01-05 RX ADMIN — DEXTROSE MONOHYDRATE 75 ML/HR: 50 INJECTION, SOLUTION INTRAVENOUS at 15:39

## 2018-01-05 RX ADMIN — DAKIN'S SOLUTION 0.125% (QUARTER STRENGTH) 946 ML: 0.12 SOLUTION at 15:39

## 2018-01-05 RX ADMIN — RISPERIDONE 3 MG: 1 TABLET ORAL at 23:49

## 2018-01-05 RX ADMIN — AMMONIUM LACTATE 1 APPLICATION: 120 CREAM TOPICAL at 16:48

## 2018-01-05 NOTE — PLAN OF CARE
Problem: Fall Risk (Adult)  Goal: Absence of Falls  Outcome: Ongoing (interventions implemented as appropriate)      Problem: Self-Care Deficit (Adult,Obstetrics,Pediatric)  Goal: Improved Ability to Perform BADL and IADL  Outcome: Ongoing (interventions implemented as appropriate)      Problem: Patient Care Overview (Adult)  Goal: Plan of Care Review  Outcome: Ongoing (interventions implemented as appropriate)   01/05/18 0542   Outcome Evaluation   Outcome Summary/Follow up Plan PT QUIET ALL SHIFT, REQUESTED ICE WATER, GIVEN AND CONSUELO WELL. DRESSING CHNAGED TO COCCYX COMPLETED,. PT HAD XLG GREEN,FORMED BM. THERE ARE NO S/SX OF DISCOMFORT. VSS. NAD. TURNED AND REPOSITIONED Q2HRS FOR PRESSURE RELIEF.    Coping/Psychosocial Response Interventions   Plan Of Care Reviewed With patient   Patient Care Overview   Progress no change     Goal: Adult Individualization and Mutuality  Outcome: Ongoing (interventions implemented as appropriate)    Goal: Discharge Needs Assessment  Outcome: Ongoing (interventions implemented as appropriate)      Problem: Pressure Ulcer (Adult)  Goal: Signs and Symptoms of Listed Potential Problems Will be Absent or Manageable (Pressure Ulcer)  Outcome: Ongoing (interventions implemented as appropriate)

## 2018-01-05 NOTE — PROGRESS NOTES
Continued Stay Note  [facility]     Patient Name: Lupe Burleson  MRN: [id number]  Today's Date: 1/5/2018    Admit Date: 1/2/2018          Discharge Plan       01/05/18 1109    Case Management/Social Work Plan    Plan [facility]    Additional Comments Called and spoke with Flor/Williams, pt is from a medicaid bed with a bedhold at [facility]. Pt may return whenever she is medically stable for dc. Message left for Helene Pepe(state guardian) to confirm discharge plan. Spoke with  at the [facility] office, Guardianship paperwork faxed to CCP. A copy of the guardianship paperwork was placed in the chart and the other copy placed in HIM to be scaned in Epic. Pkt on chart. Pt will need an ambulance for transport upon dc. CCP to follow.               Discharge Codes     None            Nallely Ruiz RN

## 2018-01-05 NOTE — PLAN OF CARE
Problem: Confusion, Acute (Adult)  Goal: Cognitive/Functional Impairments Minimized  Outcome: Ongoing (interventions implemented as appropriate)    Goal: Safety  Outcome: Ongoing (interventions implemented as appropriate)      Problem: Fall Risk (Adult)  Goal: Absence of Falls  Outcome: Ongoing (interventions implemented as appropriate)      Problem: Respiratory Insufficiency (Adult)  Goal: Acid/Base Balance  Outcome: Ongoing (interventions implemented as appropriate)    Goal: Effective Ventilation  Outcome: Ongoing (interventions implemented as appropriate)      Problem: Self-Care Deficit (Adult,Obstetrics,Pediatric)  Goal: Improved Ability to Perform BADL and IADL  Outcome: Ongoing (interventions implemented as appropriate)      Problem: Patient Care Overview (Adult)  Goal: Plan of Care Review  Outcome: Ongoing (interventions implemented as appropriate)   01/05/18 1650   Outcome Evaluation   Outcome Summary/Follow up Plan Pt alert, but disoriented. Dressing changed. IVF decreased. BM x2. Chirinos cath care done. VSS. Will continue to monitor.   Coping/Psychosocial Response Interventions   Plan Of Care Reviewed With patient   Patient Care Overview   Progress improving     Goal: Adult Individualization and Mutuality  Outcome: Ongoing (interventions implemented as appropriate)    Goal: Discharge Needs Assessment  Outcome: Ongoing (interventions implemented as appropriate)      Problem: Pressure Ulcer (Adult)  Goal: Signs and Symptoms of Listed Potential Problems Will be Absent or Manageable (Pressure Ulcer)  Outcome: Ongoing (interventions implemented as appropriate)

## 2018-01-05 NOTE — NURSING NOTE
Patient has very limited PIV access options -- may need to explore options if not able to keep PIV access

## 2018-01-05 NOTE — PROGRESS NOTES
"DAILY PROGRESS NOTE  [facility], [facility]    2018    Patient Identification:  Name: Lupe Burleson  Age: 62 y.o.  Sex: female  :  1955  MRN: [id number]           Primary Care Physician: Shane Barcenas MD    Subjective:    Interval History:    Sleeping this morning, but awakens without difficulty . Responding verbally. Recognizing examiner. Requesting and drinking free water. Sodium and creatinine both trending down, although creatinine remains significantly higher than baseline. Potassium is a little low today.      ROS:     Denies CP, SOA, N/V/D    Objective:    Scheduled Meds:    ammonium lactate 1 application Topical Q12H   isosorbide mononitrate 30 mg Per G Tube Daily   sodium hypochlorite 946 mL Irrigation Q12H       Continuous Infusions:    dextrose 75 mL/hr Last Rate: 75 mL/hr (18 1539)       PRN Meds:  ipratropium-albuterol  •  sodium chloride    Intake/Output:    Intake/Output Summary (Last 24 hours) at 18 1709  Last data filed at 18 0856   Gross per 24 hour   Intake              150 ml   Output              750 ml   Net             -600 ml         Exam:    tMax 24 hrs: Temp (24hrs), Av.1 °F (36.7 °C), Min:98 °F (36.7 °C), Max:98.3 °F (36.8 °C)    Vitals Ranges:   Temp:  [98 °F (36.7 °C)-98.3 °F (36.8 °C)] 98.1 °F (36.7 °C)  Heart Rate:  [71-83] 83  Resp:  [18] 18  BP: (122-128)/(64-76) 122/73    /73 (BP Location: Left arm, Patient Position: Lying)  Pulse 83  Temp 98.1 °F (36.7 °C) (Oral)   Resp 18  Ht 162.6 cm (64.02\")  Wt 107 kg (235 lb 14.3 oz)  SpO2 98%  BMI 40.47 kg/m2    General: Alert, cooperative, no distress, appears stated age  Neck: Supple, symmetrical, trachea midline, no adenopathy;              thyroid:  no enlargement/tenderness/nodules;              no carotid bruit or JVD  Cardiovascular: Normal rate, regular rhythm and intact distal pulses.              Exam reveals no gallop and no friction rub. " No murmur heard  Pulmonary: Clear to auscultation bilaterally, respirations unlabored.               No rhonchi, wheezing or rales.   Abdominal: Soft. Soft, non-tender, bowel sounds active all four quadrants,     no masses, no hepatomegaly, no splenomegaly.   Extremities: Normal, atraumatic, no cyanosis or edema  Neurological: Patient is alert and oriented to person, place, and time.                 CNII-XII intact, normal strength, sensation intact throughout  Skin: Skin color, texture, turgor normal. No rashes or lesions      Data Review:      Results from last 7 days  Lab Units 01/05/18  0711 01/04/18  0621 01/03/18  0612   WBC 10*3/mm3 4.20* 4.21* 4.53   HEMOGLOBIN g/dL 10.6* 10.0* 11.1*   HEMATOCRIT % 36.6 34.8* 39.4   PLATELETS 10*3/mm3 175 181 191         Results from last 7 days  Lab Units 01/05/18  0711 01/04/18  0621 01/03/18 1959 01/02/18  1945   SODIUM mmol/L 138 150* 149*  < > 160*   POTASSIUM mmol/L 3.3* 3.8 4.1  < > 4.9   CHLORIDE mmol/L 101 114* 112*  < > 120*   CO2 mmol/L 24.5 24.0 28.9  < > 25.9   BUN mg/dL 55* 62* 60*  < > 63*   CREATININE mg/dL 3.10* 3.37* 3.45*  < > 3.81*   CALCIUM mg/dL 8.4* 8.5* 8.5*  < > 8.9   BILIRUBIN mg/dL  --   --   --   --  0.3   ALK PHOS U/L  --   --   --   --  64   ALT (SGPT) U/L  --   --   --   --  7   AST (SGOT) U/L  --   --   --   --  6   GLUCOSE mg/dL 85 89 75  < > 84   < > = values in this interval not displayed.    Estimated Creatinine Clearance: 21.9 mL/min (by C-G formula based on Cr of 3.1).            Lab Results  Lab Value Date/Time   TROPONINT 0.155 (C) 01/03/2018 1315   TROPONINT 0.177 (C) 01/03/2018 0612   TROPONINT 0.182 (C) 01/02/2018 1945   TROPONINT 0.065 (H) 05/17/2017 0505   TROPONINT 0.077 (H) 05/16/2017 1517   TROPONINT 0.080 (H) 05/16/2017 1112   TROPONINT 0.078 (H) 05/16/2017 0703   TROPONINT 0.084 (H) 05/16/2017 0116   TROPONINT <0.010 05/15/2016 0622   TROPONINT 0.017 05/14/2016 0437   TROPONINT 0.023 05/13/2016 2036   TROPONINT 0.031 (H)  05/13/2016 1231   TROPONINT <0.01 02/03/2016 0537   TROPONINT <0.01 02/02/2016 0914   TROPONINT <0.01 11/11/2015 0516   TROPONINT <0.01 11/09/2015 2049   TROPONINT <0.01 04/18/2014 0517       Microbiology Results (last 10 days)     Procedure Component Value - Date/Time    Urine Culture - Urine, Urine, Clean Catch [[id number]]  (Normal) Collected:  01/02/18 2316    Lab Status:  Final result Specimen:  Urine from Urine, Clean Catch Updated:  01/04/18 0657     Urine Culture No growth           Imaging Results (last 72 hours)     Procedure Component Value Units Date/Time    XR Chest 1 View [[id number]] Collected:  01/03/18 0007     Updated:  01/03/18 0007    Narrative:       X-RAY CHEST 1 VIEW.     HISTORY: Altered mental status.     COMPARISON: 08/27/2017.     FINDINGS:  Cardiomegaly, lung volumes are low.         There is no consolidation or effusion. Mild bibasilar atelectasis,  overall no significant change.          Impression:       No acute findings.           CT Head Without Contrast [[id number]] Collected:  01/03/18 0032     Updated:  01/03/18 0032    Narrative:       CT SCAN OF THE BRAIN WITHOUT CONTRAST.     TECHNIQUE: Radiation dose reduction techniques were utilized, including  automated exposure control and exposure modulation based on body size.  Multiple axial images of the brain were obtained from the vertex to the  base of the brain.     HISTORY: Altered mental status.     COMPARISON: 05/23/2014.     FINDINGS:   Midline structures are within normal limits, there is no hydrocephalus.  Gray-white matter differentiation is maintained. Findings of small  vessel ischemic disease, a few old lacunar infarcts and cortical  atrophy.     Orbits are within normal limits. No significant mucosal disease of the  para-nasal sinuses. Left mastoid air cells are fluid-filled, fluid is  also seen in the middle ear these findings are new.              Impression:       1.  No definite acute intracranial pathology.   2.   Findings of left mastoiditis, fluid is also seen in the middle ear.                     Assessment:    Active Problems:    Confusion    EVER (acute kidney injury)  Probable hemorrhagic cystitis, Cx negaive  Hypernatremia  Dehydration  CKD3  Elevated troponin  Chronic respiratory failure  Morbid obesity  Schizoaffective disorder  Gastrostomy status  DM       Patient Active Problem List   Diagnosis Code   • Elevated troponin R74.8   • Aspiration pneumonia J69.0   • Hematuria R31.9   • Hypokalemia E87.6   • Pelvic mass in female R19.00   • Fecal impaction K56.41   • Endometrial carcinoma C54.1   • Acute on chronic respiratory failure with hypoxia and hypercapnia J96.21, J96.22   • Acute on chronic diastolic CHF (congestive heart failure) I50.33   • UTI (urinary tract infection) N39.0   • Leucocytosis D72.829   • Chronic diastolic CHF (congestive heart failure) I50.32   • DM (diabetes mellitus) E11.9   • Morbid obesity E66.01   • HTN (hypertension) I10   • Schizophrenia F20.9   • Tracheostomy malfunction J95.03   • Pyuria N39.0   • THAI (obstructive sleep apnea) G47.33   • Generalized weakness R53.1   • Schizo affective schizophrenia F25.0   • Diabetes mellitus E11.9   • Coronary artery disease I25.10   • Endometrial cancer determined by uterine biopsy C54.1   • Chronic respiratory failure with hypoxia and hypercapnia J96.11, J96.12   • Toxic metabolic encephalopathy G92   • Pneumonia J18.9   • Abnormal vaginal bleeding N93.9   • Schizo affective schizophrenia F25.0   • CHF (congestive heart failure) I50.9   • Endometrial cancer determined by uterine biopsy C54.1   • Coronary artery disease I25.10   • Lymphedema I89.0   • Immobility Z74.09   • Acute blood loss anemia D62   • Vaginal bleeding N93.9   • Hypertension I10   • Malignant neoplasm of uterus C55   • Bipolar disorder, unspecified F31.9   • Sepsis A41.9   • Abscess of abdominal cavity K65.1   • Hemorrhagic cystitis N30.91   • Confusion R41.0   • EVER (acute kidney  injury) N17.9       Plan:    Continue IV fluids  Monitor and correct electrolytes- replace potassium   Adjust insulin as needed  Monitor mental status- restart risperdal  Continue home medications  PT/OT/ST consulted  DVT/stress ulcer prophylaxis  Labs in am        Shane Barcenas MD  1/5/2018  5:09 PM

## 2018-01-05 NOTE — CONSULTS
Referring Provider: Shane Barcenas MD  Reason for Consultation: Evaluation patient with acute kidney injury    Subjective     Chief complaint   Chief Complaint   Patient presents with   • Altered Mental Status     LETHARGIC, CONFUSED, DECREASED PO INTAKE FOR THE LAST 2-3 DAYS       History of present illness:    This is a 62-year-old -American female who is a resident of nursing home was transferred with the altered mental status, she has history of paranoid schizophrenia, [place] heart failure, diabetes mellitus type 2, history of endometrial cancer with prior total abdominal hysterectomy.  History of hemorrhagic cystitis.  When she arrived to the emergency department for sodium noted to be 160 and her creatinine 3.81 UA and 63.  On 11/22/2017 her serum creatinine was 1.02.  This admission her creatinine is slowly improving is down to 3.1, and the sodium has decreased to 138.  The patient is unable to give any history she had the PEG tube and she has indwelling Chirinos catheter.  Her past medical history includes in addition what is noted above coronary artery disease chronic respiratory failure with hypoxia, chronic ulcer of her calf, dysphagia, hypertension and immobility.  Also she has history of lymphedema      Past Medical History:   Diagnosis Date   • Acute respiratory distress syndrome    • Arthritis    • Bipolar disorder, unspecified    • CAD (coronary artery disease)    • Cellulitis    • Cellulitis    • CHF (congestive heart failure)    • Chronic ischemic heart disease    • Chronic respiratory failure with hypoxia and hypercapnia    • Chronic ulcer of calf    • Constipation    • Coronary artery disease    • Depression    • Depressive disorder    • Diabetes mellitus    • Dysphagia    • Dysphagia    • Endometrial cancer determined by uterine biopsy     s/p radiation; no improvement   • Endometrial hyperplasia    • History of transfusion    • Hypercapnia    • Hypertension    • Immobility    • Lack of  coordination    • Lymphedema    • Malignant neoplasm of uterus    • Muscle weakness    • Obesities, morbid    • Oxygen dependent    • Post-menopausal bleeding    • Postmenopausal bleeding    • Schizo affective schizophrenia    • Skin ulcer    • Sleep apnea    • Stroke    • Symbolic dysfunction    • Uterine cancer    • Venous insufficiency    • Venous insufficiency      Past Surgical History:   Procedure Laterality Date   • D&C HYSTEROSCOPY     • LAPAROSCOPIC TUBAL LIGATION     • TOTAL LAPAROSCOPIC HYSTERECTOMY Bilateral 8/15/2017    Procedure: OPEN TOTAL  ABDOMINAL HYSTERECTOMY WITH BILATERAL SALPINGO-OOPHERECTOMY;  Surgeon: Ortiz Washington MD;  Location: [facility] OR;  Service:    • TRACHEOSTOMY AND PEG TUBE INSERTION     • UMBILICAL HERNIA REPAIR     • WOUND DEBRIDEMENT       Family History   Problem Relation Age of Onset   • Diabetes Mother    • Stroke Mother      Not obtainable in regard to kidney disease  Social History   Substance Use Topics   • Smoking status: Former Smoker     Packs/day: 2.00     Years: 15.00     Types: Cigarettes     Quit date: 1988   • Smokeless tobacco: None   • Alcohol use No     Prescriptions Prior to Admission   Medication Sig Dispense Refill Last Dose   • ammonium lactate (AMLACTIN) 12 % cream Apply 1 application topically 2 (two) times a day. To bilat legs and feet      • atorvastatin (LIPITOR) 20 MG tablet 20 mg by Per G Tube route Daily.      • bumetanide (BUMEX) 2 MG tablet 2 mg by Per G Tube route Daily.      • ferrous sulfate 325 (65 FE) MG tablet Take 1 tablet by mouth Daily With Breakfast. 60 tablet 12    • folic acid (FOLVITE) 1 MG tablet Take 1 tablet by mouth Daily. 30 tablet 0    • HYDROcodone-acetaminophen (NORCO) 7.5-325 MG per tablet Take 1 tablet by mouth Every 6 (Six) Hours As Needed for Moderate Pain . 120 tablet 0    • insulin aspart (novoLOG) 100 UNIT/ML injection Inject 5 Units under the skin 3 (Three) Times a Day Before Meals.      • ipratropium-albuterol  "(DUO-NEB) 0.5-2.5 mg/mL nebulizer Take 3 mL by nebulization Every 4 (Four) Hours As Needed for Wheezing.      • isosorbide mononitrate (ISMO,MONOKET) 20 MG tablet 30 mg by Per G Tube route Daily.      • lactulose (CHRONULAC) 10 GM/15ML solution 20 g by Per PEG Tube route daily.      • LORazepam (ATIVAN) 1 MG tablet 1 tablet by Per G Tube route Every 8 (Eight) Hours As Needed for Anxiety. 90 tablet 1    • mupirocin (BACTROBAN) 2 % ointment Apply 1 application topically 2 (Two) Times a Day. Apply to wounds twice daily      • pantoprazole (PROTONIX) 40 MG pack packet 40 mg by Per PEG Tube route Every Morning Before Breakfast.      • potassium chloride (KAYCIEL) 20 MEQ/15ML (10%) solution Take 20 mEq by mouth Daily.      • risperiDONE (risperDAL) 2 MG tablet Take 2 mg by mouth Every Morning.      • risperiDONE (risperDAL) 3 MG tablet Take 3 mg by mouth Every Night.      • valproic acid (DEPAKENE) 250 MG/5ML syrup syrup 250 mg by Per PEG Tube route every 12 (twelve) hours.        Allergies:  Codeine; Penicillins; and Sulfa antibiotics    Review of Systems  Unobtainable the patient is obtunded and not answering any questions.    Objective     Vital Signs  Temp:  [98 °F (36.7 °C)-98.3 °F (36.8 °C)] 98.1 °F (36.7 °C)  Heart Rate:  [60-83] 83  Resp:  [18] 18  BP: (122-128)/(64-76) 122/73    Flowsheet Rows         First Filed Value    Admission Height  162.6 cm (64\") Documented at 01/02/2018 2340    Admission Weight  107 kg (236 lb) Documented at 01/02/2018 2351           I/O this shift:  In: 150 [Other:150]  Out: -   I/O last 3 completed shifts:  In: 890 [P.O.:300; I.V.:500; Other:90]  Out: 750 [Urine:750]    Intake/Output Summary (Last 24 hours) at 01/05/18 1134  Last data filed at 01/05/18 0856   Gross per 24 hour   Intake              450 ml   Output              750 ml   Net             -300 ml       Physical Exam:  General Appearance: Poorly responsive, morbidly obese, no acute distress,   Skin: warm and dry  HEENT: " pupils round and reactive to light, oral mucosa dry,   Neck: No JVD, trachea midline  Lungs: Bilateral rhonchi, unlabored breathing effort  Heart: RRR, normal S1 and S2, no S3, no rub  Abdomen: soft, she has PEG tube,  present bowel sounds to auscultation  : Chirinos catheter was anchored in place, no palpable bladder,   Extremities: Chronic lymphedema,no  cyanosis or clubbing  Neuro: Unable to assess  Lymphatic: No cervical or supraclavicular lymphadenopathy  Joints: No significant deformities noted, no crepitation over the knees or the ankles    Results Review:  Results for orders placed or performed during the hospital encounter of 01/02/18   Urine Culture - Urine, Urine, Clean Catch   Result Value Ref Range    Urine Culture No growth    Comprehensive Metabolic Panel   Result Value Ref Range    Glucose 84 65 - 99 mg/dL    BUN 63 (H) 8 - 23 mg/dL    Creatinine 3.81 (H) 0.57 - 1.00 mg/dL    Sodium 160 (H) 136 - 145 mmol/L    Potassium 4.9 3.5 - 5.2 mmol/L    Chloride 120 (H) 98 - 107 mmol/L    CO2 25.9 22.0 - 29.0 mmol/L    Calcium 8.9 8.6 - 10.5 mg/dL    Total Protein 7.5 6.0 - 8.5 g/dL    Albumin 1.90 (L) 3.50 - 5.20 g/dL    ALT (SGPT) 7 1 - 33 U/L    AST (SGOT) 6 1 - 32 U/L    Alkaline Phosphatase 64 39 - 117 U/L    Total Bilirubin 0.3 0.1 - 1.2 mg/dL    eGFR  African Amer 14 (L) >60 mL/min/1.73    Globulin 5.6 gm/dL    A/G Ratio 0.3 g/dL    BUN/Creatinine Ratio 16.5 7.0 - 25.0    Anion Gap 14.1 mmol/L   Lipase   Result Value Ref Range    Lipase 24 13 - 60 U/L   Urinalysis With / Culture If Indicated - Urine, Clean Catch   Result Value Ref Range    Color, UA Dark Yellow (A) Yellow, Straw    Appearance, UA Cloudy (A) Clear    pH, UA <=5.0 5.0 - 8.0    Specific Gravity, UA 1.014 1.005 - 1.030    Glucose, UA Negative Negative    Ketones, UA Negative Negative    Bilirubin, UA Negative Negative    Blood, UA Large (3+) (A) Negative    Protein,  mg/dL (2+) (A) Negative    Leuk Esterase, UA Moderate (2+) (A)  Negative    Nitrite, UA Negative Negative    Urobilinogen, UA 0.2 E.U./dL 0.2 - 1.0 E.U./dL   Valproic Acid Level, Total   Result Value Ref Range    Valproic Acid 22.0 (L) 50.0 - 125.0 mcg/mL   Troponin   Result Value Ref Range    Troponin T 0.182 (C) 0.000 - 0.030 ng/mL   Urinalysis, Microscopic Only - Urine, Clean Catch   Result Value Ref Range    RBC, UA Too Numerous to Count (A) None Seen, 0-2 /HPF    WBC, UA 6-12 (A) None Seen, 0-2 /HPF    Bacteria, UA Trace (A) None Seen /HPF    Squamous Epithelial Cells, UA 3-6 (A) None Seen, 0-2 /HPF    Transitional Epithelial Cells, UA 0-2 0 - 2 /HPF    Hyaline Casts, UA None Seen None Seen /LPF    Amorphous Crystals, UA Moderate/2+ None Seen /HPF    Methodology Manual Light Microscopy    CBC Auto Differential   Result Value Ref Range    WBC 4.88 4.50 - 10.70 10*3/mm3    RBC 4.00 3.90 - 5.20 10*6/mm3    Hemoglobin 10.8 (L) 11.9 - 15.5 g/dL    Hematocrit 37.3 35.6 - 45.5 %    MCV 93.3 80.5 - 98.2 fL    MCH 27.0 26.9 - 32.0 pg    MCHC 29.0 (L) 32.4 - 36.3 g/dL    RDW 19.7 (H) 11.7 - 13.0 %    RDW-SD 67.1 (H) 37.0 - 54.0 fl    MPV 11.6 6.0 - 12.0 fL    Platelets 172 140 - 500 10*3/mm3    Neutrophil % 60.7 42.7 - 76.0 %    Lymphocyte % 25.4 19.6 - 45.3 %    Monocyte % 10.2 5.0 - 12.0 %    Eosinophil % 3.3 0.3 - 6.2 %    Basophil % 0.4 0.0 - 1.5 %    Immature Grans % 0.0 0.0 - 0.5 %    Neutrophils, Absolute 2.96 1.90 - 8.10 10*3/mm3    Lymphocytes, Absolute 1.24 0.90 - 4.80 10*3/mm3    Monocytes, Absolute 0.50 0.20 - 1.20 10*3/mm3    Eosinophils, Absolute 0.16 0.00 - 0.70 10*3/mm3    Basophils, Absolute 0.02 0.00 - 0.20 10*3/mm3    Immature Grans, Absolute 0.00 0.00 - 0.03 10*3/mm3   Basic Metabolic Panel   Result Value Ref Range    Glucose 82 65 - 99 mg/dL    BUN 67 (H) 8 - 23 mg/dL    Creatinine 3.79 (H) 0.57 - 1.00 mg/dL    Sodium 163 (C) 136 - 145 mmol/L    Potassium 4.0 3.5 - 5.2 mmol/L    Chloride 121 (H) 98 - 107 mmol/L    CO2 26.3 22.0 - 29.0 mmol/L    Calcium 8.8 8.6 -  10.5 mg/dL    eGFR  African Amer 15 (L) >60 mL/min/1.73    BUN/Creatinine Ratio 17.7 7.0 - 25.0    Anion Gap 15.7 mmol/L   Troponin   Result Value Ref Range    Troponin T 0.177 (C) 0.000 - 0.030 ng/mL   CBC Auto Differential   Result Value Ref Range    WBC 4.53 4.50 - 10.70 10*3/mm3    RBC 4.22 3.90 - 5.20 10*6/mm3    Hemoglobin 11.1 (L) 11.9 - 15.5 g/dL    Hematocrit 39.4 35.6 - 45.5 %    MCV 93.4 80.5 - 98.2 fL    MCH 26.3 (L) 26.9 - 32.0 pg    MCHC 28.2 (L) 32.4 - 36.3 g/dL    RDW 19.8 (H) 11.7 - 13.0 %    RDW-SD 66.8 (H) 37.0 - 54.0 fl    MPV 11.9 6.0 - 12.0 fL    Platelets 191 140 - 500 10*3/mm3    Neutrophil % 62.9 42.7 - 76.0 %    Lymphocyte % 25.2 19.6 - 45.3 %    Monocyte % 8.4 5.0 - 12.0 %    Eosinophil % 3.3 0.3 - 6.2 %    Basophil % 0.2 0.0 - 1.5 %    Immature Grans % 0.0 0.0 - 0.5 %    Neutrophils, Absolute 2.85 1.90 - 8.10 10*3/mm3    Lymphocytes, Absolute 1.14 0.90 - 4.80 10*3/mm3    Monocytes, Absolute 0.38 0.20 - 1.20 10*3/mm3    Eosinophils, Absolute 0.15 0.00 - 0.70 10*3/mm3    Basophils, Absolute 0.01 0.00 - 0.20 10*3/mm3    Immature Grans, Absolute 0.00 0.00 - 0.03 10*3/mm3    nRBC 0.0 0.0 - 0.0 /100 WBC   Troponin   Result Value Ref Range    Troponin T 0.155 (C) 0.000 - 0.030 ng/mL   Basic Metabolic Panel   Result Value Ref Range    Glucose 75 65 - 99 mg/dL    BUN 60 (H) 8 - 23 mg/dL    Creatinine 3.45 (H) 0.57 - 1.00 mg/dL    Sodium 149 (H) 136 - 145 mmol/L    Potassium 4.1 3.5 - 5.2 mmol/L    Chloride 112 (H) 98 - 107 mmol/L    CO2 28.9 22.0 - 29.0 mmol/L    Calcium 8.5 (L) 8.6 - 10.5 mg/dL    eGFR  African Amer 16 (L) >60 mL/min/1.73    BUN/Creatinine Ratio 17.4 7.0 - 25.0    Anion Gap 8.1 mmol/L   Basic Metabolic Panel   Result Value Ref Range    Glucose 89 65 - 99 mg/dL    BUN 62 (H) 8 - 23 mg/dL    Creatinine 3.37 (H) 0.57 - 1.00 mg/dL    Sodium 150 (H) 136 - 145 mmol/L    Potassium 3.8 3.5 - 5.2 mmol/L    Chloride 114 (H) 98 - 107 mmol/L    CO2 24.0 22.0 - 29.0 mmol/L    Calcium 8.5 (L)  8.6 - 10.5 mg/dL    eGFR  African Amer 17 (L) >60 mL/min/1.73    BUN/Creatinine Ratio 18.4 7.0 - 25.0    Anion Gap 12.0 mmol/L   CBC Auto Differential   Result Value Ref Range    WBC 4.21 (L) 4.50 - 10.70 10*3/mm3    RBC 3.80 (L) 3.90 - 5.20 10*6/mm3    Hemoglobin 10.0 (L) 11.9 - 15.5 g/dL    Hematocrit 34.8 (L) 35.6 - 45.5 %    MCV 91.6 80.5 - 98.2 fL    MCH 26.3 (L) 26.9 - 32.0 pg    MCHC 28.7 (L) 32.4 - 36.3 g/dL    RDW 18.9 (H) 11.7 - 13.0 %    RDW-SD 62.4 (H) 37.0 - 54.0 fl    MPV 11.0 6.0 - 12.0 fL    Platelets 181 140 - 500 10*3/mm3    Neutrophil % 58.5 42.7 - 76.0 %    Lymphocyte % 26.1 19.6 - 45.3 %    Monocyte % 12.1 (H) 5.0 - 12.0 %    Eosinophil % 3.1 0.3 - 6.2 %    Basophil % 0.2 0.0 - 1.5 %    Immature Grans % 0.0 0.0 - 0.5 %    Neutrophils, Absolute 2.46 1.90 - 8.10 10*3/mm3    Lymphocytes, Absolute 1.10 0.90 - 4.80 10*3/mm3    Monocytes, Absolute 0.51 0.20 - 1.20 10*3/mm3    Eosinophils, Absolute 0.13 0.00 - 0.70 10*3/mm3    Basophils, Absolute 0.01 0.00 - 0.20 10*3/mm3    Immature Grans, Absolute 0.00 0.00 - 0.03 10*3/mm3    nRBC 0.0 0.0 - 0.0 /100 WBC   Basic Metabolic Panel   Result Value Ref Range    Glucose 85 65 - 99 mg/dL    BUN 55 (H) 8 - 23 mg/dL    Creatinine 3.10 (H) 0.57 - 1.00 mg/dL    Sodium 138 136 - 145 mmol/L    Potassium 3.3 (L) 3.5 - 5.2 mmol/L    Chloride 101 98 - 107 mmol/L    CO2 24.5 22.0 - 29.0 mmol/L    Calcium 8.4 (L) 8.6 - 10.5 mg/dL    eGFR  African Amer 18 (L) >60 mL/min/1.73    BUN/Creatinine Ratio 17.7 7.0 - 25.0    Anion Gap 12.5 mmol/L   CBC Auto Differential   Result Value Ref Range    WBC 4.20 (L) 4.50 - 10.70 10*3/mm3    RBC 4.04 3.90 - 5.20 10*6/mm3    Hemoglobin 10.6 (L) 11.9 - 15.5 g/dL    Hematocrit 36.6 35.6 - 45.5 %    MCV 90.6 80.5 - 98.2 fL    MCH 26.2 (L) 26.9 - 32.0 pg    MCHC 29.0 (L) 32.4 - 36.3 g/dL    RDW 17.8 (H) 11.7 - 13.0 %    RDW-SD 58.6 (H) 37.0 - 54.0 fl    MPV 10.7 6.0 - 12.0 fL    Platelets 175 140 - 500 10*3/mm3    Neutrophil % 65.0 42.7 -  76.0 %    Lymphocyte % 18.8 (L) 19.6 - 45.3 %    Monocyte % 12.9 (H) 5.0 - 12.0 %    Eosinophil % 2.1 0.3 - 6.2 %    Basophil % 0.2 0.0 - 1.5 %    Immature Grans % 1.0 (H) 0.0 - 0.5 %    Neutrophils, Absolute 2.73 1.90 - 8.10 10*3/mm3    Lymphocytes, Absolute 0.79 (L) 0.90 - 4.80 10*3/mm3    Monocytes, Absolute 0.54 0.20 - 1.20 10*3/mm3    Eosinophils, Absolute 0.09 0.00 - 0.70 10*3/mm3    Basophils, Absolute 0.01 0.00 - 0.20 10*3/mm3    Immature Grans, Absolute 0.04 (H) 0.00 - 0.03 10*3/mm3   POC Glucose Once   Result Value Ref Range    Glucose 79 70 - 130 mg/dL   POC Glucose Once   Result Value Ref Range    Glucose 78 70 - 130 mg/dL   POC Glucose Once   Result Value Ref Range    Glucose 90 70 - 130 mg/dL   POC Glucose Once   Result Value Ref Range    Glucose 121 70 - 130 mg/dL   POC Glucose Once   Result Value Ref Range    Glucose 87 70 - 130 mg/dL   Green Top (Gel)   Result Value Ref Range    Extra Tube Hold for add-ons.      Imaging Results (last 72 hours)     Procedure Component Value Units Date/Time    XR Chest 1 View [[id number]] Collected:  01/03/18 0007     Updated:  01/03/18 0007    Narrative:       X-RAY CHEST 1 VIEW.     HISTORY: Altered mental status.     COMPARISON: 08/27/2017.     FINDINGS:  Cardiomegaly, lung volumes are low.         There is no consolidation or effusion. Mild bibasilar atelectasis,  overall no significant change.          Impression:       No acute findings.           CT Head Without Contrast [[id number]] Collected:  01/03/18 0032     Updated:  01/03/18 0032    Narrative:       CT SCAN OF THE BRAIN WITHOUT CONTRAST.     TECHNIQUE: Radiation dose reduction techniques were utilized, including  automated exposure control and exposure modulation based on body size.  Multiple axial images of the brain were obtained from the vertex to the  base of the brain.     HISTORY: Altered mental status.     COMPARISON: 05/23/2014.     FINDINGS:   Midline structures are within normal limits, there is  no hydrocephalus.  Gray-white matter differentiation is maintained. Findings of small  vessel ischemic disease, a few old lacunar infarcts and cortical  atrophy.     Orbits are within normal limits. No significant mucosal disease of the  para-nasal sinuses. Left mastoid air cells are fluid-filled, fluid is  also seen in the middle ear these findings are new.              Impression:       1.  No definite acute intracranial pathology.   2.  Findings of left mastoiditis, fluid is also seen in the middle ear.                       ammonium lactate 1 application Topical Q12H   isosorbide mononitrate 30 mg Per G Tube Daily   sodium hypochlorite 946 mL Irrigation Q12H       dextrose 150 mL/hr Last Rate: 150 mL/hr (01/05/18 0856)       Assessment/Plan   1.  Acute kidney injury most likely associated with volume depletion patient had significant dehydration with estimated 6-8 L of free water deficit on admission that has been corrected.  And the creatinine is coming down down to 3.1.  2.  At baseline the patient has evidence of chronic kidney disease stage II with estimated GFR is 66 mL per minute.  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  Metabolic encephalopathy  4.  Diabetes mellitus type  5.  History of paranoid schizophrenia.  6.  Chronic anemia hemoglobin today is 10.6.      Plan:  1.  I agree with the present treatment and IV fluid.  We'll decrease the IV fluid D5W to 75 cc per hour  2.  We'll check the random urine for protein to creatinine ratio  3.  Surveillance labs      Thank you Dr. Barcenas for asking me to see one of your patient in consultation              Goldy Pradhan MD  01/05/18  11:35 AM

## 2018-01-06 LAB
ALBUMIN SERPL-MCNC: 2.1 G/DL (ref 3.5–5.2)
ALBUMIN/GLOB SERPL: 0.4 G/DL
ALP SERPL-CCNC: 65 U/L (ref 39–117)
ALT SERPL W P-5'-P-CCNC: 5 U/L (ref 1–33)
ANION GAP SERPL CALCULATED.3IONS-SCNC: 15.2 MMOL/L
AST SERPL-CCNC: 9 U/L (ref 1–32)
BASOPHILS # BLD AUTO: 0.01 10*3/MM3 (ref 0–0.2)
BASOPHILS NFR BLD AUTO: 0.2 % (ref 0–1.5)
BILIRUB SERPL-MCNC: 0.3 MG/DL (ref 0.1–1.2)
BUN BLD-MCNC: 48 MG/DL (ref 8–23)
BUN/CREAT SERPL: 16.6 (ref 7–25)
CALCIUM SPEC-SCNC: 8.1 MG/DL (ref 8.6–10.5)
CHLORIDE SERPL-SCNC: 98 MMOL/L (ref 98–107)
CO2 SERPL-SCNC: 19.8 MMOL/L (ref 22–29)
CREAT BLD-MCNC: 2.89 MG/DL (ref 0.57–1)
DEPRECATED RDW RBC AUTO: 54.9 FL (ref 37–54)
EOSINOPHIL # BLD AUTO: 0.09 10*3/MM3 (ref 0–0.7)
EOSINOPHIL NFR BLD AUTO: 2.1 % (ref 0.3–6.2)
ERYTHROCYTE [DISTWIDTH] IN BLOOD BY AUTOMATED COUNT: 17.5 % (ref 11.7–13)
GFR SERPL CREATININE-BSD FRML MDRD: 20 ML/MIN/1.73
GLOBULIN UR ELPH-MCNC: 4.7 GM/DL
GLUCOSE BLD-MCNC: 84 MG/DL (ref 65–99)
HCT VFR BLD AUTO: 32.2 % (ref 35.6–45.5)
HGB BLD-MCNC: 10 G/DL (ref 11.9–15.5)
IMM GRANULOCYTES # BLD: 0.02 10*3/MM3 (ref 0–0.03)
IMM GRANULOCYTES NFR BLD: 0.5 % (ref 0–0.5)
LYMPHOCYTES # BLD AUTO: 0.83 10*3/MM3 (ref 0.9–4.8)
LYMPHOCYTES NFR BLD AUTO: 19.7 % (ref 19.6–45.3)
MAGNESIUM SERPL-MCNC: 2.3 MG/DL (ref 1.6–2.4)
MCH RBC QN AUTO: 26.9 PG (ref 26.9–32)
MCHC RBC AUTO-ENTMCNC: 31.1 G/DL (ref 32.4–36.3)
MCV RBC AUTO: 86.6 FL (ref 80.5–98.2)
MONOCYTES # BLD AUTO: 0.37 10*3/MM3 (ref 0.2–1.2)
MONOCYTES NFR BLD AUTO: 8.8 % (ref 5–12)
NEUTROPHILS # BLD AUTO: 2.9 10*3/MM3 (ref 1.9–8.1)
NEUTROPHILS NFR BLD AUTO: 68.7 % (ref 42.7–76)
PHOSPHATE SERPL-MCNC: 3.7 MG/DL (ref 2.5–4.5)
PLATELET # BLD AUTO: 209 10*3/MM3 (ref 140–500)
PMV BLD AUTO: 10.8 FL (ref 6–12)
POTASSIUM BLD-SCNC: 3.6 MMOL/L (ref 3.5–5.2)
PROT SERPL-MCNC: 6.8 G/DL (ref 6–8.5)
RBC # BLD AUTO: 3.72 10*6/MM3 (ref 3.9–5.2)
SODIUM BLD-SCNC: 133 MMOL/L (ref 136–145)
URATE SERPL-MCNC: 5.2 MG/DL (ref 2.4–5.7)
WBC NRBC COR # BLD: 4.22 10*3/MM3 (ref 4.5–10.7)

## 2018-01-06 PROCEDURE — 80053 COMPREHEN METABOLIC PANEL: CPT | Performed by: INTERNAL MEDICINE

## 2018-01-06 PROCEDURE — 84100 ASSAY OF PHOSPHORUS: CPT | Performed by: INTERNAL MEDICINE

## 2018-01-06 PROCEDURE — 85025 COMPLETE CBC W/AUTO DIFF WBC: CPT | Performed by: NURSE PRACTITIONER

## 2018-01-06 PROCEDURE — 84550 ASSAY OF BLOOD/URIC ACID: CPT | Performed by: INTERNAL MEDICINE

## 2018-01-06 PROCEDURE — 83735 ASSAY OF MAGNESIUM: CPT | Performed by: INTERNAL MEDICINE

## 2018-01-06 RX ADMIN — DAKIN'S SOLUTION 0.125% (QUARTER STRENGTH) 946 ML: 0.12 SOLUTION at 02:57

## 2018-01-06 RX ADMIN — ISOSORBIDE MONONITRATE 30 MG: 10 TABLET ORAL at 13:54

## 2018-01-06 RX ADMIN — DAKIN'S SOLUTION 0.125% (QUARTER STRENGTH) 946 ML: 0.12 SOLUTION at 15:47

## 2018-01-06 RX ADMIN — AMMONIUM LACTATE 1 APPLICATION: 120 CREAM TOPICAL at 17:16

## 2018-01-06 RX ADMIN — AMMONIUM LACTATE 1 APPLICATION: 120 CREAM TOPICAL at 06:26

## 2018-01-06 RX ADMIN — RISPERIDONE 3 MG: 1 TABLET ORAL at 23:23

## 2018-01-06 NOTE — PLAN OF CARE
Problem: Confusion, Acute (Adult)  Goal: Cognitive/Functional Impairments Minimized  Outcome: Ongoing (interventions implemented as appropriate)    Goal: Safety  Outcome: Ongoing (interventions implemented as appropriate)      Problem: Fall Risk (Adult)  Goal: Absence of Falls  Outcome: Ongoing (interventions implemented as appropriate)      Problem: Respiratory Insufficiency (Adult)  Goal: Acid/Base Balance  Outcome: Ongoing (interventions implemented as appropriate)    Goal: Effective Ventilation  Outcome: Ongoing (interventions implemented as appropriate)      Problem: Self-Care Deficit (Adult,Obstetrics,Pediatric)  Goal: Improved Ability to Perform BADL and IADL  Outcome: Ongoing (interventions implemented as appropriate)      Problem: Patient Care Overview (Adult)  Goal: Plan of Care Review  Outcome: Ongoing (interventions implemented as appropriate)   01/06/18 1700   Outcome Evaluation   Outcome Summary/Follow up Plan No falls. No respiratory distress noted. Drsg change to sacral/coccyx ulcer as ordered. Pt tolerated well. Remains oriented to person only. Pleasant and cooperative. Cont to monitor.   Coping/Psychosocial Response Interventions   Plan Of Care Reviewed With patient   Patient Care Overview   Progress improving     Goal: Adult Individualization and Mutuality  Outcome: Ongoing (interventions implemented as appropriate)    Goal: Discharge Needs Assessment  Outcome: Ongoing (interventions implemented as appropriate)      Problem: Pressure Ulcer (Adult)  Goal: Signs and Symptoms of Listed Potential Problems Will be Absent or Manageable (Pressure Ulcer)  Outcome: Ongoing (interventions implemented as appropriate)

## 2018-01-06 NOTE — NURSING NOTE
NOT  ABLE TO KEEP ADEQUATE IV ACCESS.... Patient has very poor vasculature access options.  Veins are very small and will not accommodate IV catheter.  Evaluated with ultrasound without success... Very small PIV was obtained in superficial vein on right hand /index index. See flow sheet documentation.. This site will need to be monitored closely for infiltrate -- discussed with patients bedside nurse.

## 2018-01-06 NOTE — PROGRESS NOTES
"DAILY PROGRESS NOTE  [facility], [facility]    2018    Patient Identification:  Name: Lupe Burleson  Age: 62 y.o.  Sex: female  :  1955  MRN: [id number]           Primary Care Physician: Shane Barcenas MD    Subjective:    Interval History:    Awake, breathing better.  VSS  Cr improving, K replaced  Eating breakfast    ROS:     Denies CP, SOA, N/V/D    Objective:    Scheduled Meds:    ammonium lactate 1 application Topical Q12H   isosorbide mononitrate 30 mg Per G Tube Daily   risperiDONE 3 mg Oral Nightly   sodium hypochlorite 946 mL Irrigation Q12H       Continuous Infusions:       PRN Meds:  ipratropium-albuterol  •  sodium chloride    Intake/Output:    Intake/Output Summary (Last 24 hours) at 18  Last data filed at 18 1810   Gross per 24 hour   Intake             1390 ml   Output             1900 ml   Net             -510 ml         Exam:    tMax 24 hrs: Temp (24hrs), Av.5 °F (36.9 °C), Min:97.5 °F (36.4 °C), Max:99.2 °F (37.3 °C)    Vitals Ranges:   Temp:  [97.5 °F (36.4 °C)-99.2 °F (37.3 °C)] 99.2 °F (37.3 °C)  Heart Rate:  [70-85] 85  Resp:  [18-20] 20  BP: (107-114)/(50-85) 114/72    /72 (BP Location: Left arm, Patient Position: Lying)  Pulse 85  Temp 99.2 °F (37.3 °C) (Rectal)   Resp 20  Ht 162.6 cm (64.02\")  Wt 107 kg (235 lb 14.3 oz)  SpO2 97%  BMI 40.47 kg/m2    General: Alert, cooperative, no distress, appears stated age  Neck: Supple, symmetrical, trachea midline, no adenopathy;              thyroid:  no enlargement/tenderness/nodules;              no carotid bruit or JVD  Cardiovascular: Normal rate, regular rhythm and intact distal pulses.              Exam reveals no gallop and no friction rub. No murmur heard  Pulmonary: Clear to auscultation bilaterally, respirations unlabored.               No rhonchi, wheezing or rales.   Abdominal: Soft. Soft, non-tender, bowel sounds active all four quadrants,     no " masses, no hepatomegaly, no splenomegaly.   Extremities: Normal, atraumatic, no cyanosis or edema  Neurological: Patient is alert and oriented to person, place, and time.                 CNII-XII intact, normal strength, sensation intact throughout  Skin: Skin color, texture, turgor normal. No rashes or lesions      Data Review:      Results from last 7 days  Lab Units 01/06/18  0722 01/05/18  0711 01/04/18  0621   WBC 10*3/mm3 4.22* 4.20* 4.21*   HEMOGLOBIN g/dL 10.0* 10.6* 10.0*   HEMATOCRIT % 32.2* 36.6 34.8*   PLATELETS 10*3/mm3 209 175 181         Results from last 7 days  Lab Units 01/06/18  0634 01/05/18  0711 01/04/18  0621  01/02/18  1945   SODIUM mmol/L 133* 138 150*  < > 160*   POTASSIUM mmol/L 3.6 3.3* 3.8  < > 4.9   CHLORIDE mmol/L 98 101 114*  < > 120*   CO2 mmol/L 19.8* 24.5 24.0  < > 25.9   BUN mg/dL 48* 55* 62*  < > 63*   CREATININE mg/dL 2.89* 3.10* 3.37*  < > 3.81*   CALCIUM mg/dL 8.1* 8.4* 8.5*  < > 8.9   BILIRUBIN mg/dL 0.3  --   --   --  0.3   ALK PHOS U/L 65  --   --   --  64   ALT (SGPT) U/L 5  --   --   --  7   AST (SGOT) U/L 9  --   --   --  6   GLUCOSE mg/dL 84 85 89  < > 84   < > = values in this interval not displayed.    Estimated Creatinine Clearance: 23.5 mL/min (by C-G formula based on Cr of 2.89).            Lab Results  Lab Value Date/Time   TROPONINT 0.155 (C) 01/03/2018 1315   TROPONINT 0.177 (C) 01/03/2018 0612   TROPONINT 0.182 (C) 01/02/2018 1945   TROPONINT 0.065 (H) 05/17/2017 0505   TROPONINT 0.077 (H) 05/16/2017 1517   TROPONINT 0.080 (H) 05/16/2017 1112   TROPONINT 0.078 (H) 05/16/2017 0703   TROPONINT 0.084 (H) 05/16/2017 0116   TROPONINT <0.010 05/15/2016 0622   TROPONINT 0.017 05/14/2016 0437   TROPONINT 0.023 05/13/2016 2036   TROPONINT 0.031 (H) 05/13/2016 1231   TROPONINT <0.01 02/03/2016 0537   TROPONINT <0.01 02/02/2016 0914   TROPONINT <0.01 11/11/2015 0516   TROPONINT <0.01 11/09/2015 2049   TROPONINT <0.01 04/18/2014 0517       Microbiology Results (last 10  days)     Procedure Component Value - Date/Time    Urine Culture - Urine, Urine, Clean Catch [[id number]]  (Normal) Collected:  01/02/18 2316    Lab Status:  Final result Specimen:  Urine from Urine, Clean Catch Updated:  01/04/18 0657     Urine Culture No growth           Imaging Results (last 72 hours)     Procedure Component Value Units Date/Time    XR Chest 1 View [[id number]] Collected:  01/03/18 0007     Updated:  01/03/18 0007    Narrative:       X-RAY CHEST 1 VIEW.     HISTORY: Altered mental status.     COMPARISON: 08/27/2017.     FINDINGS:  Cardiomegaly, lung volumes are low.         There is no consolidation or effusion. Mild bibasilar atelectasis,  overall no significant change.          Impression:       No acute findings.           CT Head Without Contrast [[id number]] Collected:  01/03/18 0032     Updated:  01/03/18 0032    Narrative:       CT SCAN OF THE BRAIN WITHOUT CONTRAST.     TECHNIQUE: Radiation dose reduction techniques were utilized, including  automated exposure control and exposure modulation based on body size.  Multiple axial images of the brain were obtained from the vertex to the  base of the brain.     HISTORY: Altered mental status.     COMPARISON: 05/23/2014.     FINDINGS:   Midline structures are within normal limits, there is no hydrocephalus.  Gray-white matter differentiation is maintained. Findings of small  vessel ischemic disease, a few old lacunar infarcts and cortical  atrophy.     Orbits are within normal limits. No significant mucosal disease of the  para-nasal sinuses. Left mastoid air cells are fluid-filled, fluid is  also seen in the middle ear these findings are new.              Impression:       1.  No definite acute intracranial pathology.   2.  Findings of left mastoiditis, fluid is also seen in the middle ear.                     Assessment:    Active Problems:    Confusion    EVER (acute kidney injury)  Probable hemorrhagic cystitis, Cx  negaive  Hypernatremia  Dehydration  CKD3  Elevated troponin  Chronic respiratory failure  Morbid obesity  Schizoaffective disorder  Gastrostomy status  DM           Plan:    Continue IV fluids  Monitor and correct electrolytes- replace potassium prn  Adjust insulin as needed  Monitor mental status- restart risperdal  Continue home medications  PT/OT/ST consulted  DVT/stress ulcer prophylaxis  Labs in am         Shane Barcenas MD  1/6/2018  9:39 PM

## 2018-01-06 NOTE — PROGRESS NOTES
"   LOS: 3 days    Patient Care Team:  Shane Barcenas MD as PCP - General  Shane Barcenas MD as PCP - Family Medicine    Chief Complaint:    Chief Complaint   Patient presents with   • Altered Mental Status     LETHARGIC, CONFUSED, DECREASED PO INTAKE FOR THE LAST 2-3 DAYS       Subjective follow-up acute kidney injury    Interval History:   Evaluation is poorly responsive not answering any questions.  Chart was reviewed.      Review of Systems:   Not obtainable    Objective     Vital Signs  Temp:  [97.5 °F (36.4 °C)-98.8 °F (37.1 °C)] 98.8 °F (37.1 °C)  Heart Rate:  [70-85] 70  Resp:  [18-22] 20  BP: (107-111)/(50-85) 111/85    Flowsheet Rows         First Filed Value    Admission Height  162.6 cm (64\") Documented at 01/02/2018 2340    Admission Weight  107 kg (236 lb) Documented at 01/02/2018 2351          I/O this shift:  In: 240 [P.O.:240]  Out: -   I/O last 3 completed shifts:  In: 5500 [P.O.:240; I.V.:4800; Other:250]  Out: 1750 [Urine:1750]    Intake/Output Summary (Last 24 hours) at 01/06/18 1437  Last data filed at 01/06/18 0800   Gross per 24 hour   Intake             5350 ml   Output             1000 ml   Net             4350 ml       Physical Exam:  General Appearance: Poorly responsive, morbidly obese, no acute distress,   Skin: warm and dry  HEENT: pupils round and reactive to light, oral mucosa dry,   Neck: No JVD, trachea midline  Lungs: Bilateral rhonchi, unlabored breathing effort  Heart: RRR, normal S1 and S2, no S3, no rub  Abdomen: soft, she has PEG tube,  present bowel sounds to auscultation  : Chirinos catheter was anchored in place, no palpable bladder,   Extremities: Chronic lymphedema,no  cyanosis or clubbing  Neuro: Unable to assess       Results Review:      Results from last 7 days  Lab Units 01/06/18  0634 01/05/18  0711 01/04/18  0621  01/02/18  1945   SODIUM mmol/L 133* 138 150*  < > 160*   POTASSIUM mmol/L 3.6 3.3* 3.8  < > 4.9   CHLORIDE mmol/L 98 101 114*  < > 120*   CO2 mmol/L " 19.8* 24.5 24.0  < > 25.9   BUN mg/dL 48* 55* 62*  < > 63*   CREATININE mg/dL 2.89* 3.10* 3.37*  < > 3.81*   CALCIUM mg/dL 8.1* 8.4* 8.5*  < > 8.9   BILIRUBIN mg/dL 0.3  --   --   --  0.3   ALK PHOS U/L 65  --   --   --  64   ALT (SGPT) U/L 5  --   --   --  7   AST (SGOT) U/L 9  --   --   --  6   GLUCOSE mg/dL 84 85 89  < > 84   < > = values in this interval not displayed.    Estimated Creatinine Clearance: 23.5 mL/min (by C-G formula based on Cr of 2.89).      Results from last 7 days  Lab Units 01/06/18  0634   MAGNESIUM mg/dL 2.3   PHOSPHORUS mg/dL 3.7         Results from last 7 days  Lab Units 01/06/18  0634   URIC ACID mg/dL 5.2         Results from last 7 days  Lab Units 01/06/18  0722 01/05/18  0711 01/04/18  0621 01/03/18  0612 01/03/18  0145   WBC 10*3/mm3 4.22* 4.20* 4.21* 4.53 4.88   HEMOGLOBIN g/dL 10.0* 10.6* 10.0* 11.1* 10.8*   PLATELETS 10*3/mm3 209 175 181 191 172               Imaging Results (last 24 hours)     ** No results found for the last 24 hours. **          ammonium lactate 1 application Topical Q12H   isosorbide mononitrate 30 mg Per G Tube Daily   risperiDONE 3 mg Oral Nightly   sodium hypochlorite 946 mL Irrigation Q12H       dextrose 75 mL/hr Last Rate: 75 mL/hr (01/05/18 1539)       Medication Review:   Current Facility-Administered Medications   Medication Dose Route Frequency Provider Last Rate Last Dose   • ammonium lactate (AMLACTIN) cream 1 application  1 application Topical Q12H Shane Barcenas MD   1 application at 01/06/18 0626   • dextrose (D5W) 5 % infusion  75 mL/hr Intravenous Continuous Goldy Pradhan MD 75 mL/hr at 01/05/18 1539 75 mL/hr at 01/05/18 1539   • ipratropium-albuterol (DUO-NEB) nebulizer solution 3 mL  3 mL Nebulization Q4H PRN Shane Barcenas MD       • isosorbide mononitrate (ISMO,MONOKET) tablet 30 mg  30 mg Per G Tube Daily Shane Barcenas MD   30 mg at 01/06/18 1354   • risperiDONE (risperDAL) tablet 3 mg  3 mg Oral Nightly Shane Barcenas MD    3 mg at 01/05/18 9929   • sodium chloride 0.9 % flush 10 mL  10 mL Intravenous PRN Narciso Blankenship MD       • Sodium Hypochloride 0.0625 % (Dakins 1/8th Strength)  946 mL Irrigation Q12H Shane Barcenas MD   946 mL at 01/06/18 0257       Assessment/Plan   1.  Acute kidney injury most likely associated with volume depletion patient had significant dehydration with estimated 6-8 L of free water deficit on admission that has been corrected.  And the creatinine is coming down down to 2.89, Na Is down to 133.  2.  At baseline the patient has evidence of chronic kidney disease stage II with estimated GFR is 66 mL per minute.  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  Metabolic encephalopathy  4.  Diabetes mellitus type  5.  History of paranoid schizophrenia.  6.  Chronic anemia hemoglobin today is 10.      Plan:  1.  We'll discontinue the IV fluid.  2.  Surveillance labs        Goldy Pradhan MD  01/06/18  2:37 PM

## 2018-01-07 LAB
ALBUMIN SERPL-MCNC: 2.3 G/DL (ref 3.5–5.2)
ANION GAP SERPL CALCULATED.3IONS-SCNC: 13.9 MMOL/L
BASOPHILS # BLD AUTO: 0.01 10*3/MM3 (ref 0–0.2)
BASOPHILS NFR BLD AUTO: 0.2 % (ref 0–1.5)
BUN BLD-MCNC: 49 MG/DL (ref 8–23)
BUN/CREAT SERPL: 17.5 (ref 7–25)
CALCIUM SPEC-SCNC: 7.8 MG/DL (ref 8.6–10.5)
CHLORIDE SERPL-SCNC: 102 MMOL/L (ref 98–107)
CO2 SERPL-SCNC: 21.1 MMOL/L (ref 22–29)
CREAT BLD-MCNC: 2.8 MG/DL (ref 0.57–1)
DEPRECATED RDW RBC AUTO: 54.7 FL (ref 37–54)
EOSINOPHIL # BLD AUTO: 0.09 10*3/MM3 (ref 0–0.7)
EOSINOPHIL NFR BLD AUTO: 1.5 % (ref 0.3–6.2)
ERYTHROCYTE [DISTWIDTH] IN BLOOD BY AUTOMATED COUNT: 17.6 % (ref 11.7–13)
GFR SERPL CREATININE-BSD FRML MDRD: 21 ML/MIN/1.73
GLUCOSE BLD-MCNC: 102 MG/DL (ref 65–99)
HCT VFR BLD AUTO: 29.5 % (ref 35.6–45.5)
HGB BLD-MCNC: 9.3 G/DL (ref 11.9–15.5)
IMM GRANULOCYTES # BLD: 0.02 10*3/MM3 (ref 0–0.03)
IMM GRANULOCYTES NFR BLD: 0.3 % (ref 0–0.5)
LYMPHOCYTES # BLD AUTO: 0.58 10*3/MM3 (ref 0.9–4.8)
LYMPHOCYTES NFR BLD AUTO: 9.5 % (ref 19.6–45.3)
MAGNESIUM SERPL-MCNC: 2.2 MG/DL (ref 1.6–2.4)
MCH RBC QN AUTO: 26.6 PG (ref 26.9–32)
MCHC RBC AUTO-ENTMCNC: 31.5 G/DL (ref 32.4–36.3)
MCV RBC AUTO: 84.5 FL (ref 80.5–98.2)
MONOCYTES # BLD AUTO: 0.9 10*3/MM3 (ref 0.2–1.2)
MONOCYTES NFR BLD AUTO: 14.8 % (ref 5–12)
NEUTROPHILS # BLD AUTO: 4.48 10*3/MM3 (ref 1.9–8.1)
NEUTROPHILS NFR BLD AUTO: 73.7 % (ref 42.7–76)
PHOSPHATE SERPL-MCNC: 3.4 MG/DL (ref 2.5–4.5)
PLATELET # BLD AUTO: 232 10*3/MM3 (ref 140–500)
PMV BLD AUTO: 10.9 FL (ref 6–12)
POTASSIUM BLD-SCNC: 3.5 MMOL/L (ref 3.5–5.2)
RBC # BLD AUTO: 3.49 10*6/MM3 (ref 3.9–5.2)
SODIUM BLD-SCNC: 137 MMOL/L (ref 136–145)
URATE SERPL-MCNC: 5 MG/DL (ref 2.4–5.7)
WBC NRBC COR # BLD: 6.08 10*3/MM3 (ref 4.5–10.7)

## 2018-01-07 PROCEDURE — 25810000003 SODIUM CHLORIDE 0.9 % WITH KCL 20 MEQ 20-0.9 MEQ/L-% SOLUTION: Performed by: INTERNAL MEDICINE

## 2018-01-07 PROCEDURE — 80069 RENAL FUNCTION PANEL: CPT | Performed by: INTERNAL MEDICINE

## 2018-01-07 PROCEDURE — 83735 ASSAY OF MAGNESIUM: CPT | Performed by: INTERNAL MEDICINE

## 2018-01-07 PROCEDURE — 85025 COMPLETE CBC W/AUTO DIFF WBC: CPT | Performed by: INTERNAL MEDICINE

## 2018-01-07 PROCEDURE — 84550 ASSAY OF BLOOD/URIC ACID: CPT | Performed by: INTERNAL MEDICINE

## 2018-01-07 RX ORDER — FOLIC ACID 1 MG/1
1 TABLET ORAL DAILY
Status: DISCONTINUED | OUTPATIENT
Start: 2018-01-07 | End: 2018-01-10 | Stop reason: HOSPADM

## 2018-01-07 RX ORDER — FERROUS SULFATE 325(65) MG
325 TABLET ORAL
Status: DISCONTINUED | OUTPATIENT
Start: 2018-01-07 | End: 2018-01-10 | Stop reason: HOSPADM

## 2018-01-07 RX ORDER — ATORVASTATIN CALCIUM 20 MG/1
20 TABLET, FILM COATED ORAL NIGHTLY
Status: DISCONTINUED | OUTPATIENT
Start: 2018-01-07 | End: 2018-01-10 | Stop reason: HOSPADM

## 2018-01-07 RX ORDER — PANTOPRAZOLE SODIUM 40 MG/1
40 TABLET, DELAYED RELEASE ORAL
Status: DISCONTINUED | OUTPATIENT
Start: 2018-01-08 | End: 2018-01-10 | Stop reason: HOSPADM

## 2018-01-07 RX ORDER — RISPERIDONE 1 MG/1
2 TABLET ORAL DAILY
Status: DISCONTINUED | OUTPATIENT
Start: 2018-01-07 | End: 2018-01-10 | Stop reason: HOSPADM

## 2018-01-07 RX ORDER — LORAZEPAM 0.5 MG/1
0.5 TABLET ORAL EVERY 6 HOURS PRN
Status: DISCONTINUED | OUTPATIENT
Start: 2018-01-07 | End: 2018-01-10 | Stop reason: HOSPADM

## 2018-01-07 RX ORDER — SODIUM CHLORIDE AND POTASSIUM CHLORIDE 150; 900 MG/100ML; MG/100ML
100 INJECTION, SOLUTION INTRAVENOUS CONTINUOUS
Status: DISPENSED | OUTPATIENT
Start: 2018-01-07 | End: 2018-01-08

## 2018-01-07 RX ORDER — SODIUM CHLORIDE AND POTASSIUM CHLORIDE 150; 900 MG/100ML; MG/100ML
100 INJECTION, SOLUTION INTRAVENOUS CONTINUOUS
Status: DISCONTINUED | OUTPATIENT
Start: 2018-01-07 | End: 2018-01-07

## 2018-01-07 RX ORDER — POTASSIUM CHLORIDE 1.5 G/1.77G
40 POWDER, FOR SOLUTION ORAL ONCE
Status: COMPLETED | OUTPATIENT
Start: 2018-01-07 | End: 2018-01-07

## 2018-01-07 RX ADMIN — ATORVASTATIN CALCIUM 20 MG: 20 TABLET, FILM COATED ORAL at 23:58

## 2018-01-07 RX ADMIN — RISPERIDONE 2 MG: 1 TABLET ORAL at 13:33

## 2018-01-07 RX ADMIN — AMMONIUM LACTATE 1 APPLICATION: 120 CREAM TOPICAL at 03:57

## 2018-01-07 RX ADMIN — AMMONIUM LACTATE 1 APPLICATION: 120 CREAM TOPICAL at 17:49

## 2018-01-07 RX ADMIN — DAKIN'S SOLUTION 0.125% (QUARTER STRENGTH) 946 ML: 0.12 SOLUTION at 03:57

## 2018-01-07 RX ADMIN — DAKIN'S SOLUTION 0.125% (QUARTER STRENGTH) 946 ML: 0.12 SOLUTION at 15:25

## 2018-01-07 RX ADMIN — ISOSORBIDE MONONITRATE 30 MG: 10 TABLET ORAL at 08:41

## 2018-01-07 RX ADMIN — POTASSIUM CHLORIDE 40 MEQ: 1.5 POWDER, FOR SOLUTION ORAL at 14:54

## 2018-01-07 RX ADMIN — FOLIC ACID 1 MG: 1 TABLET ORAL at 13:33

## 2018-01-07 RX ADMIN — FERROUS SULFATE TAB 325 MG (65 MG ELEMENTAL FE) 325 MG: 325 (65 FE) TAB at 13:33

## 2018-01-07 RX ADMIN — POTASSIUM CHLORIDE AND SODIUM CHLORIDE 100 ML/HR: 900; 150 INJECTION, SOLUTION INTRAVENOUS at 13:34

## 2018-01-07 RX ADMIN — RISPERIDONE 3 MG: 1 TABLET ORAL at 23:58

## 2018-01-07 NOTE — PLAN OF CARE
Problem: Confusion, Acute (Adult)  Goal: Cognitive/Functional Impairments Minimized  Outcome: Ongoing (interventions implemented as appropriate)    Goal: Safety  Outcome: Ongoing (interventions implemented as appropriate)      Problem: Fall Risk (Adult)  Goal: Absence of Falls  Outcome: Ongoing (interventions implemented as appropriate)      Problem: Respiratory Insufficiency (Adult)  Goal: Acid/Base Balance  Outcome: Ongoing (interventions implemented as appropriate)    Goal: Effective Ventilation  Outcome: Ongoing (interventions implemented as appropriate)      Problem: Self-Care Deficit (Adult,Obstetrics,Pediatric)  Goal: Improved Ability to Perform BADL and IADL  Outcome: Ongoing (interventions implemented as appropriate)      Problem: Patient Care Overview (Adult)  Goal: Plan of Care Review  Outcome: Ongoing (interventions implemented as appropriate)   01/07/18 1726   Outcome Evaluation   Outcome Summary/Follow up Plan No falls. Turned q2hrs. IVF restarted. Received one dose Kayciel for low potassium. PT to see. MD restarted some home meds. Drsg change done. Cont to monitor.   Coping/Psychosocial Response Interventions   Plan Of Care Reviewed With patient   Patient Care Overview   Progress improving     Goal: Adult Individualization and Mutuality  Outcome: Ongoing (interventions implemented as appropriate)    Goal: Discharge Needs Assessment  Outcome: Ongoing (interventions implemented as appropriate)

## 2018-01-07 NOTE — PLAN OF CARE
Problem: Confusion, Acute (Adult)  Goal: Cognitive/Functional Impairments Minimized  Outcome: Ongoing (interventions implemented as appropriate)    Goal: Safety  Outcome: Ongoing (interventions implemented as appropriate)      Problem: Fall Risk (Adult)  Goal: Absence of Falls  Outcome: Ongoing (interventions implemented as appropriate)      Problem: Respiratory Insufficiency (Adult)  Goal: Acid/Base Balance  Outcome: Ongoing (interventions implemented as appropriate)    Goal: Effective Ventilation  Outcome: Ongoing (interventions implemented as appropriate)      Problem: Self-Care Deficit (Adult,Obstetrics,Pediatric)  Goal: Improved Ability to Perform BADL and IADL  Outcome: Ongoing (interventions implemented as appropriate)      Problem: Patient Care Overview (Adult)  Goal: Plan of Care Review  Outcome: Ongoing (interventions implemented as appropriate)   01/07/18 0555   Outcome Evaluation   Outcome Summary/Follow up Plan Has been awake most the shift. C/o f/c hurting urine leaking around cath removed and new f/c attempted. Unable to place new keen resistance met awaiting md return call. Dsg to buttock/coccyx changed as ordered. Pt resting quietly at present. no distress   Coping/Psychosocial Response Interventions   Plan Of Care Reviewed With patient   Patient Care Overview   Progress improving     Goal: Adult Individualization and Mutuality  Outcome: Ongoing (interventions implemented as appropriate)    Goal: Discharge Needs Assessment  Outcome: Ongoing (interventions implemented as appropriate)      Problem: Pressure Ulcer (Adult)  Goal: Signs and Symptoms of Listed Potential Problems Will be Absent or Manageable (Pressure Ulcer)  Outcome: Ongoing (interventions implemented as appropriate)

## 2018-01-07 NOTE — PROGRESS NOTES
"   LOS: 4 days    Patient Care Team:  Shane Barcenas MD as PCP - General  Shane Barcenas MD as PCP - Family Medicine    Chief Complaint:    Chief Complaint   Patient presents with   • Altered Mental Status     LETHARGIC, CONFUSED, DECREASED PO INTAKE FOR THE LAST 2-3 DAYS       Subjective follow-up acute kidney injury    Interval History:   He lunged, denies any chest pain or shortness of air, no nausea or vomiting.  Has Chirinos catheter anchored in place    Review of Systems:   Not obtainable    Objective     Vital Signs  Temp:  [98.8 °F (37.1 °C)-99.2 °F (37.3 °C)] 99 °F (37.2 °C)  Heart Rate:  [70-99] 99  Resp:  [18-20] 18  BP: ()/(71-85) 99/75    Flowsheet Rows         First Filed Value    Admission Height  162.6 cm (64\") Documented at 01/02/2018 2340    Admission Weight  107 kg (236 lb) Documented at 01/02/2018 2351          I/O this shift:  In: 820 [P.O.:820]  Out: -   I/O last 3 completed shifts:  In: 7630 [P.O.:2160; I.V.:4800; Other:100]  Out: 2250 [Urine:2250]    Intake/Output Summary (Last 24 hours) at 01/07/18 1313  Last data filed at 01/07/18 1016   Gross per 24 hour   Intake             3100 ml   Output             1250 ml   Net             1850 ml       Physical Exam:  General Appearance: Awake, alert but disoriented, morbidly obese, no acute distress,   Skin: warm and dry  HEENT: pupils round and reactive to light, oral mucosa dry,   Neck: No JVD, trachea midline  Lungs: Bilateral rhonchi, unlabored breathing effort  Heart: RRR, normal S1 and S2, no S3, no rub  Abdomen: soft, she has PEG tube,  present bowel sounds to auscultation  : Chirinos catheter was anchored in place, no palpable bladder,   Extremities: Chronic lymphedema,no  cyanosis or clubbing  Neuro: Her speech is very slow and weak.       Results Review:      Results from last 7 days  Lab Units 01/07/18  0718 01/06/18  0634 01/05/18  0711  01/02/18  1945   SODIUM mmol/L 137 133* 138  < > 160*   POTASSIUM mmol/L 3.5 3.6 3.3*  < > 4.9 "   CHLORIDE mmol/L 102 98 101  < > 120*   CO2 mmol/L 21.1* 19.8* 24.5  < > 25.9   BUN mg/dL 49* 48* 55*  < > 63*   CREATININE mg/dL 2.80* 2.89* 3.10*  < > 3.81*   CALCIUM mg/dL 7.8* 8.1* 8.4*  < > 8.9   BILIRUBIN mg/dL  --  0.3  --   --  0.3   ALK PHOS U/L  --  65  --   --  64   ALT (SGPT) U/L  --  5  --   --  7   AST (SGOT) U/L  --  9  --   --  6   GLUCOSE mg/dL 102* 84 85  < > 84   < > = values in this interval not displayed.    Estimated Creatinine Clearance: 24.2 mL/min (by C-G formula based on Cr of 2.8).      Results from last 7 days  Lab Units 01/07/18  0718 01/06/18  0634   MAGNESIUM mg/dL 2.2 2.3   PHOSPHORUS mg/dL 3.4 3.7         Results from last 7 days  Lab Units 01/07/18  0718 01/06/18  0634   URIC ACID mg/dL 5.0 5.2         Results from last 7 days  Lab Units 01/07/18  0718 01/06/18  0722 01/05/18  0711 01/04/18  0621 01/03/18  0612   WBC 10*3/mm3 6.08 4.22* 4.20* 4.21* 4.53   HEMOGLOBIN g/dL 9.3* 10.0* 10.6* 10.0* 11.1*   PLATELETS 10*3/mm3 232 209 175 181 191               Imaging Results (last 24 hours)     ** No results found for the last 24 hours. **          ammonium lactate 1 application Topical Q12H   atorvastatin 20 mg Oral Nightly   ferrous sulfate 325 mg Oral Daily With Breakfast   folic acid 1 mg Oral Daily   isosorbide mononitrate 30 mg Per G Tube Daily   [START ON 1/8/2018] pantoprazole 40 mg Oral Q AM   risperiDONE 2 mg Oral Daily   risperiDONE 3 mg Oral Nightly   sodium hypochlorite 946 mL Irrigation Q12H       sodium chloride 0.9 % with KCl 20 mEq 100 mL/hr       Medication Review:   Current Facility-Administered Medications   Medication Dose Route Frequency Provider Last Rate Last Dose   • ammonium lactate (AMLACTIN) cream 1 application  1 application Topical Q12H Shane Barcenas MD   1 application at 01/07/18 0357   • atorvastatin (LIPITOR) tablet 20 mg  20 mg Oral Nightly Shane Barcenas MD       • ferrous sulfate tablet 325 mg  325 mg Oral Daily With Breakfast Shane Barcenas MD        • folic acid (FOLVITE) tablet 1 mg  1 mg Oral Daily Shane Barcenas MD       • ipratropium-albuterol (DUO-NEB) nebulizer solution 3 mL  3 mL Nebulization Q4H PRN Shane Barcenas MD       • isosorbide mononitrate (ISMO,MONOKET) tablet 30 mg  30 mg Per G Tube Daily Shane Barcenas MD   30 mg at 01/07/18 0841   • LORazepam (ATIVAN) tablet 0.5 mg  0.5 mg Oral Q6H PRN Shane Barcenas MD       • [START ON 1/8/2018] pantoprazole (PROTONIX) EC tablet 40 mg  40 mg Oral Q AM Shane Barcenas MD       • risperiDONE (risperDAL) tablet 2 mg  2 mg Oral Daily Shane Barcenas MD       • risperiDONE (risperDAL) tablet 3 mg  3 mg Oral Nightly Shane Barcenas MD   3 mg at 01/06/18 8923   • sodium chloride 0.9 % flush 10 mL  10 mL Intravenous PRN Narciso Blankenship MD       • sodium chloride 0.9 % with KCl 20 mEq/L infusion  100 mL/hr Intravenous Continuous Shane Barcenas MD       • Sodium Hypochloride 0.0625 % (Dakins 1/8th Strength)  946 mL Irrigation Q12H Shane Barcenas MD   946 mL at 01/07/18 0357       Assessment/Plan   1.  Acute kidney injury most likely associated with volume depletion patient had significant dehydration with estimated 6-8 L of free water deficit on admission that has been corrected.  And the creatinine is coming down down to 2.80, Na Is down to 137.  2.  At baseline the patient has evidence of chronic kidney disease stage II with estimated GFR is 66 mL per minute.  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  Metabolic encephalopathy  4.  Diabetes mellitus type  5.  History of paranoid schizophrenia.  6.  Chronic anemia hemoglobin today is 9.3.  7.  Hypokalemia, potassium today is 3.5.      Plan:  1.  Replete potassium  2.  Surveillance labs        Goldy Pradhan MD  01/07/18  1:13 PM

## 2018-01-07 NOTE — PROGRESS NOTES
"DAILY PROGRESS NOTE  [facility], [facility]    2018    Patient Identification:  Name: Lupe Burleson  Age: 62 y.o.  Sex: female  :  1955  MRN: [id number]           Primary Care Physician: Shane Barcenas MD    Subjective:    Interval History:    Patient is doing okay, no new complaints.  Blood pressure in the low side  Creatinine improving but slowly.  Potassium being replaced.  Tolerating current diet.    ROS:     Denies CP, SOA, N/V/D    Objective:    Scheduled Meds:    ammonium lactate 1 application Topical Q12H   isosorbide mononitrate 30 mg Per G Tube Daily   risperiDONE 3 mg Oral Nightly   sodium hypochlorite 946 mL Irrigation Q12H       Continuous Infusions:       PRN Meds:  ipratropium-albuterol  •  sodium chloride    Intake/Output:    Intake/Output Summary (Last 24 hours) at 18 1157  Last data filed at 18 1016   Gross per 24 hour   Intake             3100 ml   Output             1250 ml   Net             1850 ml         Exam:    tMax 24 hrs: Temp (24hrs), Av °F (37.2 °C), Min:98.8 °F (37.1 °C), Max:99.2 °F (37.3 °C)    Vitals Ranges:   Temp:  [98.8 °F (37.1 °C)-99.2 °F (37.3 °C)] 99 °F (37.2 °C)  Heart Rate:  [70-99] 99  Resp:  [18-20] 18  BP: ()/(71-85) 99/75    BP 99/75 (BP Location: Left arm, Patient Position: Lying)  Pulse 99  Temp 99 °F (37.2 °C) (Oral)   Resp 18  Ht 162.6 cm (64.02\")  Wt 107 kg (235 lb 14.3 oz)  SpO2 93%  BMI 40.47 kg/m2    General: Alert, cooperative, no distress, appears stated age  Neck: Supple, symmetrical, trachea midline, no adenopathy;              thyroid:  no enlargement/tenderness/nodules;              no carotid bruit or JVD  Cardiovascular: Normal rate, regular rhythm and intact distal pulses.              Exam reveals no gallop and no friction rub. No murmur heard  Pulmonary: Clear to auscultation bilaterally, respirations unlabored.               No rhonchi, wheezing or rales. "   Abdominal: Soft. Soft, non-tender, bowel sounds active all four quadrants,     no masses, no hepatomegaly, no splenomegaly.   Extremities: Normal, atraumatic, no cyanosis or edema  Neurological: Patient is alert and oriented to person, place, and time.                 CNII-XII intact, normal strength, sensation intact throughout  Skin: Skin color, texture, turgor normal. No rashes or lesions      Data Review:      Results from last 7 days  Lab Units 01/07/18  0718 01/06/18  0722 01/05/18  0711   WBC 10*3/mm3 6.08 4.22* 4.20*   HEMOGLOBIN g/dL 9.3* 10.0* 10.6*   HEMATOCRIT % 29.5* 32.2* 36.6   PLATELETS 10*3/mm3 232 209 175         Results from last 7 days  Lab Units 01/07/18  0718 01/06/18  0634 01/05/18  0711  01/02/18  1945   SODIUM mmol/L 137 133* 138  < > 160*   POTASSIUM mmol/L 3.5 3.6 3.3*  < > 4.9   CHLORIDE mmol/L 102 98 101  < > 120*   CO2 mmol/L 21.1* 19.8* 24.5  < > 25.9   BUN mg/dL 49* 48* 55*  < > 63*   CREATININE mg/dL 2.80* 2.89* 3.10*  < > 3.81*   CALCIUM mg/dL 7.8* 8.1* 8.4*  < > 8.9   BILIRUBIN mg/dL  --  0.3  --   --  0.3   ALK PHOS U/L  --  65  --   --  64   ALT (SGPT) U/L  --  5  --   --  7   AST (SGOT) U/L  --  9  --   --  6   GLUCOSE mg/dL 102* 84 85  < > 84   < > = values in this interval not displayed.    Estimated Creatinine Clearance: 24.2 mL/min (by C-G formula based on Cr of 2.8).            Lab Results  Lab Value Date/Time   TROPONINT 0.155 (C) 01/03/2018 1315   TROPONINT 0.177 (C) 01/03/2018 0612   TROPONINT 0.182 (C) 01/02/2018 1945   TROPONINT 0.065 (H) 05/17/2017 0505   TROPONINT 0.077 (H) 05/16/2017 1517   TROPONINT 0.080 (H) 05/16/2017 1112   TROPONINT 0.078 (H) 05/16/2017 0703   TROPONINT 0.084 (H) 05/16/2017 0116   TROPONINT <0.010 05/15/2016 0622   TROPONINT 0.017 05/14/2016 0437   TROPONINT 0.023 05/13/2016 2036   TROPONINT 0.031 (H) 05/13/2016 1231   TROPONINT <0.01 02/03/2016 0537   TROPONINT <0.01 02/02/2016 0914   TROPONINT <0.01 11/11/2015 0516   TROPONINT <0.01  11/09/2015 2049   TROPONINT <0.01 04/18/2014 0517       Microbiology Results (last 10 days)     Procedure Component Value - Date/Time    Urine Culture - Urine, Urine, Clean Catch [[id number]]  (Normal) Collected:  01/02/18 2316    Lab Status:  Final result Specimen:  Urine from Urine, Clean Catch Updated:  01/04/18 0657     Urine Culture No growth           Imaging Results (last 72 hours)     Procedure Component Value Units Date/Time    XR Chest 1 View [[id number]] Collected:  01/03/18 0007     Updated:  01/03/18 0007    Narrative:       X-RAY CHEST 1 VIEW.     HISTORY: Altered mental status.     COMPARISON: 08/27/2017.     FINDINGS:  Cardiomegaly, lung volumes are low.         There is no consolidation or effusion. Mild bibasilar atelectasis,  overall no significant change.          Impression:       No acute findings.           CT Head Without Contrast [[id number]] Collected:  01/03/18 0032     Updated:  01/03/18 0032    Narrative:       CT SCAN OF THE BRAIN WITHOUT CONTRAST.     TECHNIQUE: Radiation dose reduction techniques were utilized, including  automated exposure control and exposure modulation based on body size.  Multiple axial images of the brain were obtained from the vertex to the  base of the brain.     HISTORY: Altered mental status.     COMPARISON: 05/23/2014.     FINDINGS:   Midline structures are within normal limits, there is no hydrocephalus.  Gray-white matter differentiation is maintained. Findings of small  vessel ischemic disease, a few old lacunar infarcts and cortical  atrophy.     Orbits are within normal limits. No significant mucosal disease of the  para-nasal sinuses. Left mastoid air cells are fluid-filled, fluid is  also seen in the middle ear these findings are new.              Impression:       1.  No definite acute intracranial pathology.   2.  Findings of left mastoiditis, fluid is also seen in the middle ear.                     Assessment:    Active Problems:    Confusion    EVER  (acute kidney injury)  Hematuria  Hypernatremia  Dehydration  CKD3  Elevated troponin  Chronic respiratory failure  Morbid obesity  Schizoaffective disorder  Gastrostomy status  DM           Plan:    We'll re start IV fluids gently   Monitor and correct electrolytes- replace potassium prn  BS have been doing well  Monitor mental status  Restart home medications- restart risperdal  Continue home medications  PT/OT/ST consulted  DVT/stress ulcer prophylaxis  Labs in am         Shane Barcenas MD  1/7/2018  11:57 AM

## 2018-01-08 LAB
ALBUMIN SERPL-MCNC: 2.2 G/DL (ref 3.5–5.2)
ANION GAP SERPL CALCULATED.3IONS-SCNC: 13 MMOL/L
BUN BLD-MCNC: 56 MG/DL (ref 8–23)
BUN/CREAT SERPL: 23.5 (ref 7–25)
CALCIUM SPEC-SCNC: 7.8 MG/DL (ref 8.6–10.5)
CHLORIDE SERPL-SCNC: 107 MMOL/L (ref 98–107)
CO2 SERPL-SCNC: 20 MMOL/L (ref 22–29)
CREAT BLD-MCNC: 2.38 MG/DL (ref 0.57–1)
DEPRECATED RDW RBC AUTO: 55.6 FL (ref 37–54)
ERYTHROCYTE [DISTWIDTH] IN BLOOD BY AUTOMATED COUNT: 18.2 % (ref 11.7–13)
GFR SERPL CREATININE-BSD FRML MDRD: 25 ML/MIN/1.73
GLUCOSE BLD-MCNC: 104 MG/DL (ref 65–99)
HCT VFR BLD AUTO: 29.5 % (ref 35.6–45.5)
HGB BLD-MCNC: 9.3 G/DL (ref 11.9–15.5)
MAGNESIUM SERPL-MCNC: 2.1 MG/DL (ref 1.6–2.4)
MCH RBC QN AUTO: 27 PG (ref 26.9–32)
MCHC RBC AUTO-ENTMCNC: 31.5 G/DL (ref 32.4–36.3)
MCV RBC AUTO: 85.5 FL (ref 80.5–98.2)
PHOSPHATE SERPL-MCNC: 2.9 MG/DL (ref 2.5–4.5)
PLATELET # BLD AUTO: 253 10*3/MM3 (ref 140–500)
PMV BLD AUTO: 11.1 FL (ref 6–12)
POTASSIUM BLD-SCNC: 4.2 MMOL/L (ref 3.5–5.2)
RBC # BLD AUTO: 3.45 10*6/MM3 (ref 3.9–5.2)
SODIUM BLD-SCNC: 140 MMOL/L (ref 136–145)
URATE SERPL-MCNC: 4.5 MG/DL (ref 2.4–5.7)
WBC NRBC COR # BLD: 5.12 10*3/MM3 (ref 4.5–10.7)

## 2018-01-08 PROCEDURE — 25810000003 SODIUM CHLORIDE 0.9 % WITH KCL 20 MEQ 20-0.9 MEQ/L-% SOLUTION: Performed by: INTERNAL MEDICINE

## 2018-01-08 PROCEDURE — 84550 ASSAY OF BLOOD/URIC ACID: CPT | Performed by: INTERNAL MEDICINE

## 2018-01-08 PROCEDURE — 80069 RENAL FUNCTION PANEL: CPT | Performed by: INTERNAL MEDICINE

## 2018-01-08 PROCEDURE — 85027 COMPLETE CBC AUTOMATED: CPT | Performed by: INTERNAL MEDICINE

## 2018-01-08 PROCEDURE — 83735 ASSAY OF MAGNESIUM: CPT | Performed by: INTERNAL MEDICINE

## 2018-01-08 RX ADMIN — POTASSIUM CHLORIDE AND SODIUM CHLORIDE 100 ML/HR: 900; 150 INJECTION, SOLUTION INTRAVENOUS at 10:09

## 2018-01-08 RX ADMIN — ATORVASTATIN CALCIUM 20 MG: 20 TABLET, FILM COATED ORAL at 20:22

## 2018-01-08 RX ADMIN — RISPERIDONE 2 MG: 1 TABLET ORAL at 09:25

## 2018-01-08 RX ADMIN — POTASSIUM CHLORIDE AND SODIUM CHLORIDE 100 ML/HR: 900; 150 INJECTION, SOLUTION INTRAVENOUS at 00:00

## 2018-01-08 RX ADMIN — FERROUS SULFATE TAB 325 MG (65 MG ELEMENTAL FE) 325 MG: 325 (65 FE) TAB at 11:37

## 2018-01-08 RX ADMIN — FOLIC ACID 1 MG: 1 TABLET ORAL at 09:25

## 2018-01-08 RX ADMIN — AMMONIUM LACTATE 1 APPLICATION: 120 CREAM TOPICAL at 17:05

## 2018-01-08 RX ADMIN — DAKIN'S SOLUTION 0.125% (QUARTER STRENGTH) 946 ML: 0.12 SOLUTION at 14:28

## 2018-01-08 RX ADMIN — DAKIN'S SOLUTION 0.125% (QUARTER STRENGTH) 946 ML: 0.12 SOLUTION at 04:04

## 2018-01-08 RX ADMIN — AMMONIUM LACTATE 1 APPLICATION: 120 CREAM TOPICAL at 04:04

## 2018-01-08 RX ADMIN — PANTOPRAZOLE SODIUM 40 MG: 40 TABLET, DELAYED RELEASE ORAL at 11:37

## 2018-01-08 RX ADMIN — RISPERIDONE 3 MG: 1 TABLET ORAL at 20:22

## 2018-01-08 RX ADMIN — ISOSORBIDE MONONITRATE 30 MG: 10 TABLET ORAL at 09:25

## 2018-01-08 NOTE — PLAN OF CARE
Problem: Confusion, Acute (Adult)  Goal: Cognitive/Functional Impairments Minimized  Outcome: Ongoing (interventions implemented as appropriate)    Goal: Safety  Outcome: Ongoing (interventions implemented as appropriate)      Problem: Fall Risk (Adult)  Goal: Absence of Falls  Outcome: Ongoing (interventions implemented as appropriate)      Problem: Respiratory Insufficiency (Adult)  Goal: Acid/Base Balance  Outcome: Ongoing (interventions implemented as appropriate)    Goal: Effective Ventilation  Outcome: Ongoing (interventions implemented as appropriate)      Problem: Self-Care Deficit (Adult,Obstetrics,Pediatric)  Goal: Improved Ability to Perform BADL and IADL  Outcome: Ongoing (interventions implemented as appropriate)      Problem: Patient Care Overview (Adult)  Goal: Plan of Care Review  Outcome: Ongoing (interventions implemented as appropriate)   01/08/18 6182   Outcome Evaluation   Outcome Summary/Follow up Plan Pt turned q2. Dressing changed. IVF continued. VSS. Will continue to monitor.   Coping/Psychosocial Response Interventions   Plan Of Care Reviewed With patient   Patient Care Overview   Progress improving     Goal: Adult Individualization and Mutuality  Outcome: Ongoing (interventions implemented as appropriate)    Goal: Discharge Needs Assessment  Outcome: Ongoing (interventions implemented as appropriate)      Problem: Pressure Ulcer (Adult)  Goal: Signs and Symptoms of Listed Potential Problems Will be Absent or Manageable (Pressure Ulcer)  Outcome: Ongoing (interventions implemented as appropriate)

## 2018-01-08 NOTE — SIGNIFICANT NOTE
01/08/18 0826   Rehab Treatment   Discipline physical therapist   Rehab Evaluation   Evaluation Not Performed other (see comments)  (Per previous admission notes, pt requires total care at the [facility] and uses a leonardo lift for transfers at baseline. Pt is not appropriate for PT services at this time secondary to limited PLOF. RN Alexa romero.)

## 2018-01-08 NOTE — PLAN OF CARE
Problem: Confusion, Acute (Adult)  Goal: Cognitive/Functional Impairments Minimized  Outcome: Ongoing (interventions implemented as appropriate)    Goal: Safety  Outcome: Ongoing (interventions implemented as appropriate)      Problem: Fall Risk (Adult)  Goal: Absence of Falls  Outcome: Ongoing (interventions implemented as appropriate)      Problem: Respiratory Insufficiency (Adult)  Goal: Acid/Base Balance  Outcome: Ongoing (interventions implemented as appropriate)    Goal: Effective Ventilation  Outcome: Ongoing (interventions implemented as appropriate)      Problem: Patient Care Overview (Adult)  Goal: Plan of Care Review  Outcome: Ongoing (interventions implemented as appropriate)   01/08/18 0512   Outcome Evaluation   Outcome Summary/Follow up Plan Has slept long intervals. Denies pain. Dsg changed as ordered. purewick in place. no changes. no distress   Coping/Psychosocial Response Interventions   Plan Of Care Reviewed With patient

## 2018-01-08 NOTE — PROGRESS NOTES
"DAILY PROGRESS NOTE  [facility], [facility]    2018    Patient Identification:  Name: Lupe Burleson  Age: 62 y.o.  Sex: female  :  1955  MRN: [id number]           Primary Care Physician: Shane Barcenas MD    Subjective:    Interval History:    Eating a little, drinking free water  Creatinine continues to improve, but slowly.   IVF's continue    ROS:     Denies CP, SOA, N/V/D    Objective:    Scheduled Meds:    ammonium lactate 1 application Topical Q12H   atorvastatin 20 mg Oral Nightly   ferrous sulfate 325 mg Oral Daily With Breakfast   folic acid 1 mg Oral Daily   isosorbide mononitrate 30 mg Per G Tube Daily   pantoprazole 40 mg Oral Q AM   risperiDONE 2 mg Oral Daily   risperiDONE 3 mg Oral Nightly   sodium hypochlorite 946 mL Irrigation Q12H       Continuous Infusions:       PRN Meds:  ipratropium-albuterol  •  LORazepam  •  sodium chloride    Intake/Output:    Intake/Output Summary (Last 24 hours) at 18 1801  Last data filed at 18 1500   Gross per 24 hour   Intake             2677 ml   Output             1300 ml   Net             1377 ml         Exam:    tMax 24 hrs: Temp (24hrs), Av.2 °F (36.8 °C), Min:97.5 °F (36.4 °C), Max:98.7 °F (37.1 °C)    Vitals Ranges:   Temp:  [97.5 °F (36.4 °C)-98.7 °F (37.1 °C)] 98.3 °F (36.8 °C)  Heart Rate:  [76-95] 79  Resp:  [18] 18  BP: (112-132)/(61-92) 112/61    /61 (BP Location: Left arm, Patient Position: Lying)  Pulse 79  Temp 98.3 °F (36.8 °C) (Oral)   Resp 18  Ht 162.6 cm (64.02\")  Wt 107 kg (235 lb 14.3 oz)  SpO2 97%  BMI 40.47 kg/m2    General: Alert, cooperative, no distress, appears stated age  Neck: Supple, symmetrical, trachea midline, no adenopathy;              thyroid:  no enlargement/tenderness/nodules;              no carotid bruit or JVD  Cardiovascular: Normal rate, regular rhythm and intact distal pulses.              Exam reveals no gallop and no friction rub. No " murmur heard  Pulmonary: Clear to auscultation bilaterally, respirations unlabored.               No rhonchi, wheezing or rales.   Abdominal: Soft. Soft, non-tender, bowel sounds active all four quadrants,     no masses, no hepatomegaly, no splenomegaly.   Extremities: Normal, atraumatic, no cyanosis or edema  Neurological: Patient is alert and oriented to person, place, and time.                 CNII-XII intact, normal strength, sensation intact throughout  Skin: Skin color, texture, turgor normal. No rashes or lesions      Data Review:      Results from last 7 days  Lab Units 01/08/18  0624 01/07/18  0718 01/06/18  0722   WBC 10*3/mm3 5.12 6.08 4.22*   HEMOGLOBIN g/dL 9.3* 9.3* 10.0*   HEMATOCRIT % 29.5* 29.5* 32.2*   PLATELETS 10*3/mm3 253 232 209         Results from last 7 days  Lab Units 01/08/18  0624 01/07/18  0718 01/06/18  0634  01/02/18  1945   SODIUM mmol/L 140 137 133*  < > 160*   POTASSIUM mmol/L 4.2 3.5 3.6  < > 4.9   CHLORIDE mmol/L 107 102 98  < > 120*   CO2 mmol/L 20.0* 21.1* 19.8*  < > 25.9   BUN mg/dL 56* 49* 48*  < > 63*   CREATININE mg/dL 2.38* 2.80* 2.89*  < > 3.81*   CALCIUM mg/dL 7.8* 7.8* 8.1*  < > 8.9   BILIRUBIN mg/dL  --   --  0.3  --  0.3   ALK PHOS U/L  --   --  65  --  64   ALT (SGPT) U/L  --   --  5  --  7   AST (SGOT) U/L  --   --  9  --  6   GLUCOSE mg/dL 104* 102* 84  < > 84   < > = values in this interval not displayed.    Estimated Creatinine Clearance: 28.5 mL/min (by C-G formula based on Cr of 2.38).            Lab Results  Lab Value Date/Time   TROPONINT 0.155 (C) 01/03/2018 1315   TROPONINT 0.177 (C) 01/03/2018 0612   TROPONINT 0.182 (C) 01/02/2018 1945   TROPONINT 0.065 (H) 05/17/2017 0505   TROPONINT 0.077 (H) 05/16/2017 1517   TROPONINT 0.080 (H) 05/16/2017 1112   TROPONINT 0.078 (H) 05/16/2017 0703   TROPONINT 0.084 (H) 05/16/2017 0116   TROPONINT <0.010 05/15/2016 0622   TROPONINT 0.017 05/14/2016 0437   TROPONINT 0.023 05/13/2016 2036   TROPONINT 0.031 (H) 05/13/2016  1231   TROPONINT <0.01 02/03/2016 0537   TROPONINT <0.01 02/02/2016 0914   TROPONINT <0.01 11/11/2015 0516   TROPONINT <0.01 11/09/2015 2049   TROPONINT <0.01 04/18/2014 0517       Microbiology Results (last 10 days)     Procedure Component Value - Date/Time    Urine Culture - Urine, Urine, Clean Catch [[id number]]  (Normal) Collected:  01/02/18 2316    Lab Status:  Final result Specimen:  Urine from Urine, Clean Catch Updated:  01/04/18 0657     Urine Culture No growth           Imaging Results (last 72 hours)     Procedure Component Value Units Date/Time    XR Chest 1 View [[id number]] Collected:  01/03/18 0007     Updated:  01/03/18 0007    Narrative:       X-RAY CHEST 1 VIEW.     HISTORY: Altered mental status.     COMPARISON: 08/27/2017.     FINDINGS:  Cardiomegaly, lung volumes are low.         There is no consolidation or effusion. Mild bibasilar atelectasis,  overall no significant change.          Impression:       No acute findings.           CT Head Without Contrast [[id number]] Collected:  01/03/18 0032     Updated:  01/03/18 0032    Narrative:       CT SCAN OF THE BRAIN WITHOUT CONTRAST.     TECHNIQUE: Radiation dose reduction techniques were utilized, including  automated exposure control and exposure modulation based on body size.  Multiple axial images of the brain were obtained from the vertex to the  base of the brain.     HISTORY: Altered mental status.     COMPARISON: 05/23/2014.     FINDINGS:   Midline structures are within normal limits, there is no hydrocephalus.  Gray-white matter differentiation is maintained. Findings of small  vessel ischemic disease, a few old lacunar infarcts and cortical  atrophy.     Orbits are within normal limits. No significant mucosal disease of the  para-nasal sinuses. Left mastoid air cells are fluid-filled, fluid is  also seen in the middle ear these findings are new.              Impression:       1.  No definite acute intracranial pathology.   2.  Findings of  left mastoiditis, fluid is also seen in the middle ear.                     Assessment:    Active Problems:    Confusion    EVER (acute kidney injury)  Hematuria  Hypernatremia  Dehydration  CKD3  Elevated troponin  Chronic respiratory failure  Morbid obesity  Schizoaffective disorder  Gastrostomy status  DM           Plan:    Continue IVF's- creatinine slowly improving.   Monitor and correct electrolytes  Monitor mental status  Continue home medications  PT/OT/ST consulted  DVT/stress ulcer prophylaxis  Labs in          Shane Barcenas MD  1/8/2018  6:01 PM

## 2018-01-09 ENCOUNTER — HOSPITAL ENCOUNTER (OUTPATIENT)
Dept: CT IMAGING | Facility: HOSPITAL | Age: 63
End: 2018-01-09
Attending: OBSTETRICS & GYNECOLOGY

## 2018-01-09 LAB
ALBUMIN SERPL-MCNC: 2.1 G/DL (ref 3.5–5.2)
ANION GAP SERPL CALCULATED.3IONS-SCNC: 13.1 MMOL/L
BASOPHILS # BLD AUTO: 0.01 10*3/MM3 (ref 0–0.2)
BASOPHILS NFR BLD AUTO: 0.2 % (ref 0–1.5)
BUN BLD-MCNC: 59 MG/DL (ref 8–23)
BUN/CREAT SERPL: 25.9 (ref 7–25)
CALCIUM SPEC-SCNC: 7.9 MG/DL (ref 8.6–10.5)
CHLORIDE SERPL-SCNC: 105 MMOL/L (ref 98–107)
CO2 SERPL-SCNC: 18.9 MMOL/L (ref 22–29)
CREAT BLD-MCNC: 2.28 MG/DL (ref 0.57–1)
DEPRECATED RDW RBC AUTO: 57.3 FL (ref 37–54)
EOSINOPHIL # BLD AUTO: 0.14 10*3/MM3 (ref 0–0.7)
EOSINOPHIL NFR BLD AUTO: 2.6 % (ref 0.3–6.2)
ERYTHROCYTE [DISTWIDTH] IN BLOOD BY AUTOMATED COUNT: 18.2 % (ref 11.7–13)
GFR SERPL CREATININE-BSD FRML MDRD: 26 ML/MIN/1.73
GLUCOSE BLD-MCNC: 92 MG/DL (ref 65–99)
HCT VFR BLD AUTO: 29.7 % (ref 35.6–45.5)
HGB BLD-MCNC: 9.1 G/DL (ref 11.9–15.5)
IMM GRANULOCYTES # BLD: 0.02 10*3/MM3 (ref 0–0.03)
IMM GRANULOCYTES NFR BLD: 0.4 % (ref 0–0.5)
LYMPHOCYTES # BLD AUTO: 1.16 10*3/MM3 (ref 0.9–4.8)
LYMPHOCYTES NFR BLD AUTO: 21.4 % (ref 19.6–45.3)
MAGNESIUM SERPL-MCNC: 2 MG/DL (ref 1.6–2.4)
MCH RBC QN AUTO: 26.8 PG (ref 26.9–32)
MCHC RBC AUTO-ENTMCNC: 30.6 G/DL (ref 32.4–36.3)
MCV RBC AUTO: 87.6 FL (ref 80.5–98.2)
MONOCYTES # BLD AUTO: 0.71 10*3/MM3 (ref 0.2–1.2)
MONOCYTES NFR BLD AUTO: 13.1 % (ref 5–12)
NEUTROPHILS # BLD AUTO: 3.39 10*3/MM3 (ref 1.9–8.1)
NEUTROPHILS NFR BLD AUTO: 62.3 % (ref 42.7–76)
PHOSPHATE SERPL-MCNC: 3.2 MG/DL (ref 2.5–4.5)
PLATELET # BLD AUTO: 240 10*3/MM3 (ref 140–500)
PMV BLD AUTO: 10.8 FL (ref 6–12)
POTASSIUM BLD-SCNC: 4 MMOL/L (ref 3.5–5.2)
RBC # BLD AUTO: 3.39 10*6/MM3 (ref 3.9–5.2)
SODIUM BLD-SCNC: 137 MMOL/L (ref 136–145)
WBC NRBC COR # BLD: 5.43 10*3/MM3 (ref 4.5–10.7)

## 2018-01-09 PROCEDURE — 85025 COMPLETE CBC W/AUTO DIFF WBC: CPT | Performed by: NURSE PRACTITIONER

## 2018-01-09 PROCEDURE — 83735 ASSAY OF MAGNESIUM: CPT | Performed by: INTERNAL MEDICINE

## 2018-01-09 PROCEDURE — 80069 RENAL FUNCTION PANEL: CPT | Performed by: INTERNAL MEDICINE

## 2018-01-09 RX ORDER — ACETAMINOPHEN 160 MG/5ML
650 SOLUTION ORAL EVERY 4 HOURS PRN
COMMUNITY
End: 2018-01-10 | Stop reason: HOSPADM

## 2018-01-09 RX ORDER — LORAZEPAM 0.5 MG/1
0.5 TABLET ORAL
Qty: 60 TABLET | Refills: 0 | Status: SHIPPED | OUTPATIENT
Start: 2018-01-09 | End: 2018-02-02 | Stop reason: HOSPADM

## 2018-01-09 RX ADMIN — ISOSORBIDE MONONITRATE 30 MG: 10 TABLET ORAL at 09:46

## 2018-01-09 RX ADMIN — RISPERIDONE 3 MG: 1 TABLET ORAL at 22:59

## 2018-01-09 RX ADMIN — FOLIC ACID 1 MG: 1 TABLET ORAL at 09:46

## 2018-01-09 RX ADMIN — AMMONIUM LACTATE 1 APPLICATION: 120 CREAM TOPICAL at 05:33

## 2018-01-09 RX ADMIN — DAKIN'S SOLUTION 0.125% (QUARTER STRENGTH) 946 ML: 0.12 SOLUTION at 02:59

## 2018-01-09 RX ADMIN — ATORVASTATIN CALCIUM 20 MG: 20 TABLET, FILM COATED ORAL at 22:59

## 2018-01-09 RX ADMIN — DAKIN'S SOLUTION 0.125% (QUARTER STRENGTH) 946 ML: 0.12 SOLUTION at 14:00

## 2018-01-09 RX ADMIN — FERROUS SULFATE TAB 325 MG (65 MG ELEMENTAL FE) 325 MG: 325 (65 FE) TAB at 09:46

## 2018-01-09 RX ADMIN — RISPERIDONE 2 MG: 1 TABLET ORAL at 09:46

## 2018-01-09 RX ADMIN — AMMONIUM LACTATE 1 APPLICATION: 120 CREAM TOPICAL at 17:17

## 2018-01-09 RX ADMIN — PANTOPRAZOLE SODIUM 40 MG: 40 TABLET, DELAYED RELEASE ORAL at 05:33

## 2018-01-09 NOTE — PROGRESS NOTES
"   LOS: 5 days    Patient Care Team:  Shane Barcenas MD as PCP - General  Shane Barcenas MD as PCP - Family Medicine    Chief Complaint:    Chief Complaint   Patient presents with   • Altered Mental Status     LETHARGIC, CONFUSED, DECREASED PO INTAKE FOR THE LAST 2-3 DAYS       Subjective follow-up acute kidney injury    Interval History:  She is non-verbal for me, tho is awake and looks comfortable; mouthed \"no\" when asked if SOB    Objective     Vital Signs  Temp:  [97.5 °F (36.4 °C)-98.4 °F (36.9 °C)] 98.3 °F (36.8 °C)  Heart Rate:  [76-80] 79  Resp:  [18] 18  BP: (112-132)/(61-92) 112/61    Flowsheet Rows         First Filed Value    Admission Height  162.6 cm (64\") Documented at 01/02/2018 2340    Admission Weight  107 kg (236 lb) Documented at 01/02/2018 2351             I/O last 3 completed shifts:  In: 4067 [P.O.:2830; I.V.:1057; Other:180]  Out: 2100 [Urine:2100]    Intake/Output Summary (Last 24 hours) at 01/08/18 1910  Last data filed at 01/08/18 1800   Gross per 24 hour   Intake             3247 ml   Output             1300 ml   Net             1947 ml       Physical Exam:  General Appearance: Awake, seems alert, but mostly non-verbal, morbidly obese, no acute distress   Skin: warm and dry  HEENT: pupils round and reactive to light, oral mucosa dry   Neck: No JVD, trachea midline  Lungs: mostly clear; unlabored breathing effort on RA  Heart: RRR, normal S1 and S2, no S3, no rub  Abdomen: soft, present bowel sounds to auscultation; NT  : +f/c; no palpable bladder but obesity hinders exam   Extremities: chronic lymphedema, no cyanosis or clubbing; venous stasis changes bilat  Neuro: speech absent for me       Results Review:      Results from last 7 days  Lab Units 01/08/18  0624 01/07/18  0718 01/06/18  0634  01/02/18  1945   SODIUM mmol/L 140 137 133*  < > 160*   POTASSIUM mmol/L 4.2 3.5 3.6  < > 4.9   CHLORIDE mmol/L 107 102 98  < > 120*   CO2 mmol/L 20.0* 21.1* 19.8*  < > 25.9   BUN mg/dL 56* 49* " 48*  < > 63*   CREATININE mg/dL 2.38* 2.80* 2.89*  < > 3.81*   CALCIUM mg/dL 7.8* 7.8* 8.1*  < > 8.9   BILIRUBIN mg/dL  --   --  0.3  --  0.3   ALK PHOS U/L  --   --  65  --  64   ALT (SGPT) U/L  --   --  5  --  7   AST (SGOT) U/L  --   --  9  --  6   GLUCOSE mg/dL 104* 102* 84  < > 84   < > = values in this interval not displayed.    Estimated Creatinine Clearance: 28.5 mL/min (by C-G formula based on Cr of 2.38).      Results from last 7 days  Lab Units 01/08/18  0624 01/07/18  0718 01/06/18  0634   MAGNESIUM mg/dL 2.1 2.2 2.3   PHOSPHORUS mg/dL 2.9 3.4 3.7         Results from last 7 days  Lab Units 01/08/18  0624 01/07/18  0718 01/06/18  0634   URIC ACID mg/dL 4.5 5.0 5.2         Results from last 7 days  Lab Units 01/08/18  0624 01/07/18  0718 01/06/18  0722 01/05/18  0711 01/04/18  0621   WBC 10*3/mm3 5.12 6.08 4.22* 4.20* 4.21*   HEMOGLOBIN g/dL 9.3* 9.3* 10.0* 10.6* 10.0*   PLATELETS 10*3/mm3 253 232 209 175 181               Imaging Results (last 24 hours)     Procedure Component Value Units Date/Time    XR Chest 1 View [[id number]] Collected:  01/03/18 0007     Updated:  01/07/18 2110    Narrative:       X-RAY CHEST 1 VIEW.     HISTORY: Altered mental status.     COMPARISON: 08/27/2017.     FINDINGS:  Cardiomegaly, lung volumes are low.         There is no consolidation or effusion. Mild bibasilar atelectasis,  overall no significant change.          Impression:       No acute findings.         This report was finalized on 1/7/2018 9:07 PM by Dr. Cesia Sarabia MD.       CT Head Without Contrast [[id number]] Collected:  01/03/18 0032     Updated:  01/07/18 2114    Narrative:       CT SCAN OF THE BRAIN WITHOUT CONTRAST.     TECHNIQUE: Radiation dose reduction techniques were utilized, including  automated exposure control and exposure modulation based on body size.  Multiple axial images of the brain were obtained from the vertex to the  base of the brain.     HISTORY: Altered mental status.     COMPARISON:  05/23/2014.     FINDINGS:   Midline structures are within normal limits, there is no hydrocephalus.  Gray-white matter differentiation is maintained. Findings of small  vessel ischemic disease, a few old lacunar infarcts and cortical  atrophy.     Orbits are within normal limits. No significant mucosal disease of the  para-nasal sinuses. Left mastoid air cells are fluid-filled, fluid is  also seen in the middle ear these findings are new.              Impression:       1.  No definite acute intracranial pathology.   2.  Findings of left mastoiditis, fluid is also seen in the middle ear.     This report was finalized on 1/7/2018 9:11 PM by Dr. Cesia Sarabia MD.             ammonium lactate 1 application Topical Q12H   atorvastatin 20 mg Oral Nightly   ferrous sulfate 325 mg Oral Daily With Breakfast   folic acid 1 mg Oral Daily   isosorbide mononitrate 30 mg Per G Tube Daily   pantoprazole 40 mg Oral Q AM   risperiDONE 2 mg Oral Daily   risperiDONE 3 mg Oral Nightly   sodium hypochlorite 946 mL Irrigation Q12H          Medication Review:   Current Facility-Administered Medications   Medication Dose Route Frequency Provider Last Rate Last Dose   • ammonium lactate (AMLACTIN) cream 1 application  1 application Topical Q12H Shane Barcenas MD   1 application at 01/08/18 1705   • atorvastatin (LIPITOR) tablet 20 mg  20 mg Oral Nightly Shane Barcenas MD   20 mg at 01/07/18 2358   • ferrous sulfate tablet 325 mg  325 mg Oral Daily With Breakfast Shane Barcenas MD   325 mg at 01/08/18 1137   • folic acid (FOLVITE) tablet 1 mg  1 mg Oral Daily Shane Barcenas MD   1 mg at 01/08/18 0925   • ipratropium-albuterol (DUO-NEB) nebulizer solution 3 mL  3 mL Nebulization Q4H PRN Shane Barcenas MD       • isosorbide mononitrate (ISMO,MONOKET) tablet 30 mg  30 mg Per G Tube Daily Shane Barcenas MD   30 mg at 01/08/18 0925   • LORazepam (ATIVAN) tablet 0.5 mg  0.5 mg Oral Q6H PRN Shane Barcenas MD       • pantoprazole  (PROTONIX) EC tablet 40 mg  40 mg Oral Q AM Shane Barcenas MD   40 mg at 01/08/18 1137   • risperiDONE (risperDAL) tablet 2 mg  2 mg Oral Daily Shane Barcenas MD   2 mg at 01/08/18 0925   • risperiDONE (risperDAL) tablet 3 mg  3 mg Oral Nightly Shane Barcenas MD   3 mg at 01/07/18 8988   • sodium chloride 0.9 % flush 10 mL  10 mL Intravenous PRN Narciso Blankenship MD       • Sodium Hypochloride 0.0625 % (Dakins 1/8th Strength)  946 mL Irrigation Q12H Shane Barcenas MD   946 mL at 01/08/18 1428       Assessment/Plan   1.  Acute kidney injury, non-oliguric, better: d/t volume depletion.  HyperNa followed by hypoNa resolved.  2.  At baseline the patient has evidence of chronic kidney disease stage II with estimated GFR is 66 mL per minute, prob on basis of diabetic and hypertensive glomerulosclerosis  3.  Metabolic encephalopathy  4.  Diabetes mellitus type 2  5.  History of paranoid schizophrenia.  6.  Chronic anemia        Plan:  1.  Nutrition  2.  Surveillance labs        Leeroy Maxwell MD  01/08/18  7:10 PM

## 2018-01-09 NOTE — DISCHARGE SUMMARY
PHYSICIAN DISCHARGE SUMMARY  [facility], [facility]    Patient Identification:    Name: Lupe Burleson  Age: 62 y.o.  Sex: female  :  1955  MRN: [id number]    Primary Care Physician: Shane Barcenas MD    Admit date: 2018    Discharge date and time:2018    Discharged Condition: stable    Discharge Diagnoses:  Active Problems:    Toxic metabolic encephalopathy    Confusion    EVER (acute kidney injury)  Hematuria  Hypernatremia  Dehydration  CKD3  Elevated troponin  Chronic respiratory failure  Morbid obesity  Schizoaffective disorder  Gastrostomy status  DM             Hospital Course: Lupe Burleson  is  a 62 year old resident of a NH with a prior medical history of Paranoid schizophrenia, CHF, DMII, and endometrial Cancer s/p [facility] who was discharged from this facility 17 after treatment for hemorrhagic cystitis. Per the NH staff, she was doing well until 2-3 days prior to admission  when she stopped eating or drinking. She became unresponsive so was sent to the ED where her Sodium was 160, BUN 63, Creatinine 3.81 and her troponin was 0.182. She was withdrawing to pain only. She was admitted for further evaluation and treatment.  IVF''s were started and nephrology consulted. Initially she was started on IV ABX, but her cultures were negative so this was discontinued. She slowly responded to the IVF's. Today, her creatinine is closer to her baseline, she is taking and tolerating PO diet without difficulty and she can be discharged back to the NH. Her coccygeal wound is being treated with  dakin's wet to dry dressing changes q12 hours. We have adjusted her Lorazepam, also she did not need pain medications while in the hospital.     PMHX:   Past Medical History:   Diagnosis Date   • Acute respiratory distress syndrome    • Arthritis    • Bipolar disorder, unspecified    • CAD (coronary artery disease)    • Cellulitis    • Cellulitis    • CHF  "(congestive heart failure)    • Chronic ischemic heart disease    • Chronic respiratory failure with hypoxia and hypercapnia    • Chronic ulcer of calf    • Constipation    • Coronary artery disease    • Depression    • Depressive disorder    • Diabetes mellitus    • Dysphagia    • Dysphagia    • Endometrial cancer determined by uterine biopsy     s/p radiation; no improvement   • Endometrial hyperplasia    • History of transfusion    • Hypercapnia    • Hypertension    • Immobility    • Lack of coordination    • Lymphedema    • Malignant neoplasm of uterus    • Muscle weakness    • Obesities, morbid    • Oxygen dependent    • Post-menopausal bleeding    • Postmenopausal bleeding    • Schizo affective schizophrenia    • Skin ulcer    • Sleep apnea    • Stroke    • Symbolic dysfunction    • Uterine cancer    • Venous insufficiency    • Venous insufficiency      PSHX:   Past Surgical History:   Procedure Laterality Date   • D&C HYSTEROSCOPY     • LAPAROSCOPIC TUBAL LIGATION     • TOTAL LAPAROSCOPIC HYSTERECTOMY Bilateral 8/15/2017    Procedure: OPEN TOTAL  ABDOMINAL HYSTERECTOMY WITH BILATERAL SALPINGO-OOPHERECTOMY;  Surgeon: Ortiz Washington MD;  Location: [facility];  Service:    • TRACHEOSTOMY AND PEG TUBE INSERTION     • UMBILICAL HERNIA REPAIR     • WOUND DEBRIDEMENT             Consults:     Consults     Date and Time Order Name Status Description    1/4/2018 1741 Inpatient Consult to Nephrology Completed     1/3/2018 0141 Family Medicine Consult Completed           Discharge Exam:    /64 (BP Location: Right arm, Patient Position: Lying)  Pulse 91  Temp 98.8 °F (37.1 °C) (Oral)   Resp 16  Ht 162.6 cm (64.02\")  Wt 107 kg (235 lb 14.3 oz)  SpO2 97%  BMI 40.47 kg/m2    General: Alert, cooperative, no distress, appears stated age  Neck: Supple, symmetrical, trachea midline, no adenopathy;              thyroid:  no enlargement/tenderness/nodules;              no carotid bruit or JVD  Cardiovascular: " Normal rate, regular rhythm and intact distal pulses.              Exam reveals no gallop and no friction rub. No murmur heard  Pulmonary: Clear to auscultation bilaterally, respirations unlabored.               No rhonchi, wheezing or rales.   Abdominal: Soft. Soft, non-tender, bowel sounds active all four quadrants,     no masses, no hepatomegaly, no splenomegaly.   Extremities: Normal, atraumatic, no cyanosis. Chronic lymphedema bilateral LE's  Pulses: 2 + symmetric all extremities  Neurological: He is alert and oriented to person, place, and time.                 CNII-XII intact, normal strength, sensation intact throughout  Skin: Skin color, texture, turgor normal, no rashes. Coccygeal wound w/ dressing C/D/I    Data Review:          Results from last 7 days  Lab Units 01/09/18  0548 01/08/18  0624 01/07/18  0718   WBC 10*3/mm3 5.43 5.12 6.08   HEMOGLOBIN g/dL 9.1* 9.3* 9.3*   HEMATOCRIT % 29.7* 29.5* 29.5*   PLATELETS 10*3/mm3 240 253 232         Results from last 7 days  Lab Units 01/09/18  0548 01/08/18  0624 01/07/18  0718 01/06/18  0634  01/02/18  1945   SODIUM mmol/L 137 140 137 133*  < > 160*   POTASSIUM mmol/L 4.0 4.2 3.5 3.6  < > 4.9   CHLORIDE mmol/L 105 107 102 98  < > 120*   CO2 mmol/L 18.9* 20.0* 21.1* 19.8*  < > 25.9   BUN mg/dL 59* 56* 49* 48*  < > 63*   CREATININE mg/dL 2.28* 2.38* 2.80* 2.89*  < > 3.81*   CALCIUM mg/dL 7.9* 7.8* 7.8* 8.1*  < > 8.9   BILIRUBIN mg/dL  --   --   --  0.3  --  0.3   ALK PHOS U/L  --   --   --  65  --  64   ALT (SGPT) U/L  --   --   --  5  --  7   AST (SGOT) U/L  --   --   --  9  --  6   GLUCOSE mg/dL 92 104* 102* 84  < > 84   < > = values in this interval not displayed.        Brief Urine Lab Results  (Last result in the past 365 days)      Color   Clarity   Blood   Leuk Est   Nitrite   Protein   CREAT   Urine HCG        01/05/18 1350             24.4               Lab Results  Lab Value Date/Time   TROPONINT 0.155 (C) 01/03/2018 1315   TROPONINT 0.177 (C)  01/03/2018 0612   TROPONINT 0.182 (C) 01/02/2018 1945   TROPONINT 0.065 (H) 05/17/2017 0505   TROPONINT 0.077 (H) 05/16/2017 1517   TROPONINT 0.080 (H) 05/16/2017 1112   TROPONINT 0.078 (H) 05/16/2017 0703   TROPONINT 0.084 (H) 05/16/2017 0116   TROPONINT <0.010 05/15/2016 0622   TROPONINT 0.017 05/14/2016 0437   TROPONINT 0.023 05/13/2016 2036   TROPONINT 0.031 (H) 05/13/2016 1231   TROPONINT <0.01 02/03/2016 0537   TROPONINT <0.01 02/02/2016 0914   TROPONINT <0.01 11/11/2015 0516   TROPONINT <0.01 11/09/2015 2049   TROPONINT <0.01 04/18/2014 0517       Microbiology Results (last 10 days)     Procedure Component Value - Date/Time    Urine Culture - Urine, Urine, Clean Catch [[id number]]  (Normal) Collected:  01/02/18 2316    Lab Status:  Final result Specimen:  Urine from Urine, Clean Catch Updated:  01/04/18 0657     Urine Culture No growth           Imaging Results (all)     Procedure Component Value Units Date/Time    XR Chest 1 View [[id number]] Collected:  01/03/18 0007     Updated:  01/07/18 2110    Narrative:       X-RAY CHEST 1 VIEW.     HISTORY: Altered mental status.     COMPARISON: 08/27/2017.     FINDINGS:  Cardiomegaly, lung volumes are low.         There is no consolidation or effusion. Mild bibasilar atelectasis,  overall no significant change.          Impression:       No acute findings.         This report was finalized on 1/7/2018 9:07 PM by Dr. Cesia Sarabia MD.       CT Head Without Contrast [[id number]] Collected:  01/03/18 0032     Updated:  01/07/18 2114    Narrative:       CT SCAN OF THE BRAIN WITHOUT CONTRAST.     TECHNIQUE: Radiation dose reduction techniques were utilized, including  automated exposure control and exposure modulation based on body size.  Multiple axial images of the brain were obtained from the vertex to the  base of the brain.     HISTORY: Altered mental status.     COMPARISON: 05/23/2014.     FINDINGS:   Midline structures are within normal limits, there is no  hydrocephalus.  Gray-white matter differentiation is maintained. Findings of small  vessel ischemic disease, a few old lacunar infarcts and cortical  atrophy.     Orbits are within normal limits. No significant mucosal disease of the  para-nasal sinuses. Left mastoid air cells are fluid-filled, fluid is  also seen in the middle ear these findings are new.              Impression:       1.  No definite acute intracranial pathology.   2.  Findings of left mastoiditis, fluid is also seen in the middle ear.     This report was finalized on 1/7/2018 9:11 PM by Dr. Cesia Sarabia MD.               Disposition:    Nursing Home    Patient Instructions: See After Visit Summary for Medications   Lupe Burleson   Home Medication Instructions NATO:[id number]    Printed on:01/09/18 5842   Medication Information                      ammonium lactate (AMLACTIN) 12 % cream  Apply 1 application topically 2 (two) times a day. To bilat legs and feet             atorvastatin (LIPITOR) 20 MG tablet  20 mg by Per G Tube route Daily.             ferrous sulfate 325 (65 FE) MG tablet  Take 1 tablet by mouth Daily With Breakfast.             folic acid (FOLVITE) 1 MG tablet  Take 1 tablet by mouth Daily.             insulin aspart (novoLOG) 100 UNIT/ML injection  Inject 5 Units under the skin 3 (Three) Times a Day Before Meals.             ipratropium-albuterol (DUO-NEB) 0.5-2.5 mg/mL nebulizer  Take 3 mL by nebulization Every 4 (Four) Hours As Needed for Wheezing.             isosorbide mononitrate (ISMO,MONOKET) 20 MG tablet  30 mg by Per G Tube route Daily.             lactulose (CHRONULAC) 10 GM/15ML solution  20 g by Per PEG Tube route daily.             LORazepam (ATIVAN) 0.5 MG tablet  Take 1 tablet by mouth 2 (Two) Times a Day.             LORazepam (ATIVAN) 1 MG tablet  Take 1 tablet by mouth every 8 (eight) hours as needed for anxiety for up to 10 days.             LORazepam (ATIVAN) 1 MG tablet  Take 1 tablet by mouth every 8  (eight) hours as needed for anxiety for up to 10 days.             pantoprazole (PROTONIX) 40 MG pack packet  40 mg by Per PEG Tube route Every Morning Before Breakfast.             potassium chloride (KAYCIEL) 20 MEQ/15ML (10%) solution  Take 20 mEq by mouth Daily.             risperiDONE (risperDAL) 2 MG tablet  Take 2 mg by mouth Every Morning.             risperiDONE (risperDAL) 3 MG tablet  Take 3 mg by mouth Every Night.             valproic acid (DEPAKENE) 250 MG/5ML syrup syrup  250 mg by Per PEG Tube route every 12 (twelve) hours.                     Discharge Order     None          Follow-up Information     Follow up with [facility] .    Specialties:  Skilled Nursing Facility, Intermediate Care Facility    Contact information:    [address]  [facility] [address] [phone]        Follow up with Shane Barcenas MD .    Specialties:  Internal Medicine, Hospitalist    Contact information:    [address] [place] [id number] [phone]            Total time spent discharging patient including evaluation,post hospitalization follow up,  medication and post hospitalization instructions and education total time exceeds 30 minutes.    Signed:  Shane Barcenas MD  1/9/2018  6:19 PM

## 2018-01-09 NOTE — PLAN OF CARE
Problem: Confusion, Acute (Adult)  Goal: Cognitive/Functional Impairments Minimized  Outcome: Ongoing (interventions implemented as appropriate)    Goal: Safety  Outcome: Ongoing (interventions implemented as appropriate)      Problem: Fall Risk (Adult)  Goal: Absence of Falls  Outcome: Ongoing (interventions implemented as appropriate)      Problem: Respiratory Insufficiency (Adult)  Goal: Acid/Base Balance  Outcome: Ongoing (interventions implemented as appropriate)    Goal: Effective Ventilation  Outcome: Ongoing (interventions implemented as appropriate)      Problem: Self-Care Deficit (Adult,Obstetrics,Pediatric)  Goal: Improved Ability to Perform BADL and IADL  Outcome: Ongoing (interventions implemented as appropriate)      Problem: Patient Care Overview (Adult)  Goal: Plan of Care Review  Outcome: Ongoing (interventions implemented as appropriate)   01/09/18 1641   Outcome Evaluation   Outcome Summary/Follow up Plan pt alert at times, constantly calling out for pop. q 2 turn. dressing changed with dakins wet to dry. bed alarm in place. pt disoriented x4. purewick in place to low wall suction. will continue to monitor. no signs or symptoms of distress.    Coping/Psychosocial Response Interventions   Plan Of Care Reviewed With patient   Patient Care Overview   Progress no change     Goal: Adult Individualization and Mutuality  Outcome: Ongoing (interventions implemented as appropriate)    Goal: Discharge Needs Assessment  Outcome: Ongoing (interventions implemented as appropriate)      Problem: Skin Integrity Impairment, Risk/Actual (Adult)  Goal: Skin Integrity/Wound Healing  Outcome: Ongoing (interventions implemented as appropriate)      Problem: Pressure Ulcer (Adult)  Goal: Signs and Symptoms of Listed Potential Problems Will be Absent or Manageable (Pressure Ulcer)  Outcome: Ongoing (interventions implemented as appropriate)

## 2018-01-09 NOTE — PROGRESS NOTES
"   LOS: 6 days    Patient Care Team:  Shane Barcenas MD as PCP - General  Shane Barcenas MD as PCP - Family Medicine    Chief Complaint:    Chief Complaint   Patient presents with   • Altered Mental Status     LETHARGIC, CONFUSED, DECREASED PO INTAKE FOR THE LAST 2-3 DAYS       Subjective follow-up acute kidney injury, CKD 2.     Interval History:   Not hungry. Thirsty.  Feels cold.   Objective     Vital Signs  Temp:  [98.4 °F (36.9 °C)-98.8 °F (37.1 °C)] 98.8 °F (37.1 °C)  Heart Rate:  [80-91] 91  Resp:  [16-18] 16  BP: (114-128)/(42-69) 124/64    Flowsheet Rows         First Filed Value    Admission Height  162.6 cm (64\") Documented at 01/02/2018 2340    Admission Weight  107 kg (236 lb) Documented at 01/02/2018 2351             I/O last 3 completed shifts:  In: 3657 [P.O.:2340; I.V.:1057; Other:260]  Out: 1300 [Urine:1300]    Intake/Output Summary (Last 24 hours) at 01/09/18 1721  Last data filed at 01/08/18 2100   Gross per 24 hour   Intake              980 ml   Output                0 ml   Net              980 ml       Physical Exam:  General Appearance: Awake, seems alert, but mostly non-verbal, morbidly obese, no acute distress   Skin: warm and dry  HEENT: pupils round and reactive to light, oral mucosa dry   Neck: No JVD, trachea midline  Lungs: mostly clear; unlabored breathing  Heart: RRR, normal S1 and S2, no S3, no rub  Abdomen: soft, + bowel sounds to auscultation, not tender.   : +f/c  Extremities: chronic lymphedema, no cyanosis or clubbing; venous stasis changes bilateral lower ext.        Results Review:      Results from last 7 days  Lab Units 01/09/18  0548 01/08/18  0624 01/07/18  0718 01/06/18  0634  01/02/18  1945   SODIUM mmol/L 137 140 137 133*  < > 160*   POTASSIUM mmol/L 4.0 4.2 3.5 3.6  < > 4.9   CHLORIDE mmol/L 105 107 102 98  < > 120*   CO2 mmol/L 18.9* 20.0* 21.1* 19.8*  < > 25.9   BUN mg/dL 59* 56* 49* 48*  < > 63*   CREATININE mg/dL 2.28* 2.38* 2.80* 2.89*  < > 3.81*   CALCIUM " mg/dL 7.9* 7.8* 7.8* 8.1*  < > 8.9   BILIRUBIN mg/dL  --   --   --  0.3  --  0.3   ALK PHOS U/L  --   --   --  65  --  64   ALT (SGPT) U/L  --   --   --  5  --  7   AST (SGOT) U/L  --   --   --  9  --  6   GLUCOSE mg/dL 92 104* 102* 84  < > 84   < > = values in this interval not displayed.    Estimated Creatinine Clearance: 29.8 mL/min (by C-G formula based on Cr of 2.28).      Results from last 7 days  Lab Units 01/09/18  0548 01/08/18 0624 01/07/18  0718   MAGNESIUM mg/dL 2.0 2.1 2.2   PHOSPHORUS mg/dL 3.2 2.9 3.4         Results from last 7 days  Lab Units 01/08/18  0624 01/07/18  0718 01/06/18  0634   URIC ACID mg/dL 4.5 5.0 5.2         Results from last 7 days  Lab Units 01/09/18  0548 01/08/18  0624 01/07/18  0718 01/06/18  0722 01/05/18  0711   WBC 10*3/mm3 5.43 5.12 6.08 4.22* 4.20*   HEMOGLOBIN g/dL 9.1* 9.3* 9.3* 10.0* 10.6*   PLATELETS 10*3/mm3 240 253 232 209 175               Imaging Results (last 24 hours)     ** No results found for the last 24 hours. **          ammonium lactate 1 application Topical Q12H   atorvastatin 20 mg Oral Nightly   ferrous sulfate 325 mg Oral Daily With Breakfast   folic acid 1 mg Oral Daily   isosorbide mononitrate 30 mg Per G Tube Daily   pantoprazole 40 mg Oral Q AM   risperiDONE 2 mg Oral Daily   risperiDONE 3 mg Oral Nightly   sodium hypochlorite 946 mL Irrigation Q12H          Medication Review:   Current Facility-Administered Medications   Medication Dose Route Frequency Provider Last Rate Last Dose   • ammonium lactate (AMLACTIN) cream 1 application  1 application Topical Q12H Shane Barcenas MD   1 application at 01/09/18 8524   • atorvastatin (LIPITOR) tablet 20 mg  20 mg Oral Nightly Shane Barcenas MD   20 mg at 01/08/18 2022   • ferrous sulfate tablet 325 mg  325 mg Oral Daily With Breakfast Shane Barcenas MD   325 mg at 01/09/18 0946   • folic acid (FOLVITE) tablet 1 mg  1 mg Oral Daily Shane Barcenas MD   1 mg at 01/09/18 0946   • ipratropium-albuterol  (DUO-NEB) nebulizer solution 3 mL  3 mL Nebulization Q4H PRN Shane Barcenas MD       • isosorbide mononitrate (ISMO,MONOKET) tablet 30 mg  30 mg Per G Tube Daily Shane Barcenas MD   30 mg at 01/09/18 0946   • LORazepam (ATIVAN) tablet 0.5 mg  0.5 mg Oral Q6H PRN Shane Barcenas MD       • pantoprazole (PROTONIX) EC tablet 40 mg  40 mg Oral Q AM Shane Barcenas MD   40 mg at 01/09/18 0533   • risperiDONE (risperDAL) tablet 2 mg  2 mg Oral Daily Shane Barcenas MD   2 mg at 01/09/18 0946   • risperiDONE (risperDAL) tablet 3 mg  3 mg Oral Nightly Shane Barcenas MD   3 mg at 01/08/18 2022   • sodium chloride 0.9 % flush 10 mL  10 mL Intravenous PRN Narciso Blankenship MD       • Sodium Hypochloride 0.0625 % (Dakins 1/8th Strength)  946 mL Irrigation Q12H Shane Barcenas MD   946 mL at 01/09/18 1400       Assessment/Plan   1.  Acute kidney injury, non-oliguric,improving.  Now volume replete.  HyperNa followed by hypoNa resolved.  Off IVF.   2. Chronic kidney disease stage II with estimated GFR is 66 mL per minute, prob on basis of diabetic and hypertensive glomerulosclerosis  3.  Metabolic encephalopathy  4.  Diabetes mellitus type 2  5.  History of paranoid schizophrenia.  6.  Chronic anemia        Plan:  1.  Monitor chemistries.        Sheridan Jeffers MD  01/09/18  5:21 PM

## 2018-01-09 NOTE — PLAN OF CARE
Problem: Confusion, Acute (Adult)  Goal: Cognitive/Functional Impairments Minimized  Outcome: Ongoing (interventions implemented as appropriate)    Goal: Safety  Outcome: Ongoing (interventions implemented as appropriate)      Problem: Fall Risk (Adult)  Goal: Absence of Falls  Outcome: Ongoing (interventions implemented as appropriate)      Problem: Respiratory Insufficiency (Adult)  Goal: Acid/Base Balance  Outcome: Ongoing (interventions implemented as appropriate)    Goal: Effective Ventilation  Outcome: Ongoing (interventions implemented as appropriate)      Problem: Self-Care Deficit (Adult,Obstetrics,Pediatric)  Goal: Improved Ability to Perform BADL and IADL  Outcome: Ongoing (interventions implemented as appropriate)      Problem: Patient Care Overview (Adult)  Goal: Plan of Care Review  Outcome: Ongoing (interventions implemented as appropriate)   01/09/18 0608   Outcome Evaluation   Outcome Summary/Follow up Plan pt alert but not interactive, at baseline mentally, dressing changed, iv fluids d/c, vitals WNL, bed alarm in place,    Coping/Psychosocial Response Interventions   Plan Of Care Reviewed With patient   Patient Care Overview   Progress improving     Goal: Adult Individualization and Mutuality  Outcome: Ongoing (interventions implemented as appropriate)      Problem: Pressure Ulcer (Adult)  Goal: Signs and Symptoms of Listed Potential Problems Will be Absent or Manageable (Pressure Ulcer)  Outcome: Ongoing (interventions implemented as appropriate)

## 2018-01-10 VITALS
DIASTOLIC BLOOD PRESSURE: 57 MMHG | TEMPERATURE: 98.1 F | OXYGEN SATURATION: 97 % | WEIGHT: 235.89 LBS | HEIGHT: 64 IN | BODY MASS INDEX: 40.27 KG/M2 | RESPIRATION RATE: 16 BRPM | HEART RATE: 86 BPM | SYSTOLIC BLOOD PRESSURE: 123 MMHG

## 2018-01-11 NOTE — PROGRESS NOTES
Case Management Discharge Note    Final Note: Pt dc'd to [facility]     Discharge Placement     Facility/Agency Request Status Selected? Address Phone Number Fax Number    [facility] Accepted    [facility] [address] [facility] [place] [address] [phone]        Ambulance: Yellow    Discharge Codes: 03  Discharged/transferred to skilled nursing facility (SNF) with Medicare certification in anticipation of skilled care

## 2018-01-17 ENCOUNTER — HOSPITAL ENCOUNTER (INPATIENT)
Facility: HOSPITAL | Age: 63
LOS: 15 days | Discharge: NURSING, REHAB OR LONG-TERM CARE | End: 2018-02-02
Attending: INTERNAL MEDICINE | Admitting: INTERNAL MEDICINE

## 2018-01-17 PROBLEM — L08.9 INFECTED DECUBITUS ULCER, STAGE III (HCC): Status: ACTIVE | Noted: 2018-01-17

## 2018-01-17 PROBLEM — L89.93 INFECTED DECUBITUS ULCER, STAGE III (HCC): Status: ACTIVE | Noted: 2018-01-17

## 2018-01-17 LAB
ALBUMIN SERPL-MCNC: 2.4 G/DL (ref 3.5–5.2)
ALBUMIN/GLOB SERPL: 0.5 G/DL
ALP SERPL-CCNC: 72 U/L (ref 39–117)
ALT SERPL W P-5'-P-CCNC: <5 U/L (ref 1–33)
ANION GAP SERPL CALCULATED.3IONS-SCNC: 16.2 MMOL/L
AST SERPL-CCNC: 6 U/L (ref 1–32)
BASOPHILS # BLD AUTO: 0.01 10*3/MM3 (ref 0–0.2)
BASOPHILS NFR BLD AUTO: 0.1 % (ref 0–1.5)
BILIRUB SERPL-MCNC: 0.3 MG/DL (ref 0.1–1.2)
BUN BLD-MCNC: 48 MG/DL (ref 8–23)
BUN/CREAT SERPL: 14 (ref 7–25)
CALCIUM SPEC-SCNC: 8.4 MG/DL (ref 8.6–10.5)
CHLORIDE SERPL-SCNC: 104 MMOL/L (ref 98–107)
CO2 SERPL-SCNC: 20.8 MMOL/L (ref 22–29)
CREAT BLD-MCNC: 3.42 MG/DL (ref 0.57–1)
DEPRECATED RDW RBC AUTO: 60.8 FL (ref 37–54)
EOSINOPHIL # BLD AUTO: 0.06 10*3/MM3 (ref 0–0.7)
EOSINOPHIL NFR BLD AUTO: 0.9 % (ref 0.3–6.2)
ERYTHROCYTE [DISTWIDTH] IN BLOOD BY AUTOMATED COUNT: 19.2 % (ref 11.7–13)
GFR SERPL CREATININE-BSD FRML MDRD: 16 ML/MIN/1.73
GLOBULIN UR ELPH-MCNC: 4.6 GM/DL
GLUCOSE BLD-MCNC: 78 MG/DL (ref 65–99)
GLUCOSE BLDC GLUCOMTR-MCNC: 78 MG/DL (ref 70–130)
GLUCOSE BLDC GLUCOMTR-MCNC: 87 MG/DL (ref 70–130)
HCT VFR BLD AUTO: 27.6 % (ref 35.6–45.5)
HGB BLD-MCNC: 8.6 G/DL (ref 11.9–15.5)
IMM GRANULOCYTES # BLD: 0 10*3/MM3 (ref 0–0.03)
IMM GRANULOCYTES NFR BLD: 0 % (ref 0–0.5)
LYMPHOCYTES # BLD AUTO: 0.79 10*3/MM3 (ref 0.9–4.8)
LYMPHOCYTES NFR BLD AUTO: 11.5 % (ref 19.6–45.3)
MCH RBC QN AUTO: 26.9 PG (ref 26.9–32)
MCHC RBC AUTO-ENTMCNC: 31.2 G/DL (ref 32.4–36.3)
MCV RBC AUTO: 86.3 FL (ref 80.5–98.2)
MONOCYTES # BLD AUTO: 0.39 10*3/MM3 (ref 0.2–1.2)
MONOCYTES NFR BLD AUTO: 5.7 % (ref 5–12)
NEUTROPHILS # BLD AUTO: 5.63 10*3/MM3 (ref 1.9–8.1)
NEUTROPHILS NFR BLD AUTO: 81.8 % (ref 42.7–76)
PLATELET # BLD AUTO: 249 10*3/MM3 (ref 140–500)
PMV BLD AUTO: 9.5 FL (ref 6–12)
POTASSIUM BLD-SCNC: 3.8 MMOL/L (ref 3.5–5.2)
PROT SERPL-MCNC: 7 G/DL (ref 6–8.5)
RBC # BLD AUTO: 3.2 10*6/MM3 (ref 3.9–5.2)
SODIUM BLD-SCNC: 141 MMOL/L (ref 136–145)
WBC NRBC COR # BLD: 6.88 10*3/MM3 (ref 4.5–10.7)

## 2018-01-17 PROCEDURE — 80053 COMPREHEN METABOLIC PANEL: CPT | Performed by: INTERNAL MEDICINE

## 2018-01-17 PROCEDURE — 87070 CULTURE OTHR SPECIMN AEROBIC: CPT | Performed by: INTERNAL MEDICINE

## 2018-01-17 PROCEDURE — 82962 GLUCOSE BLOOD TEST: CPT

## 2018-01-17 PROCEDURE — 87205 SMEAR GRAM STAIN: CPT | Performed by: INTERNAL MEDICINE

## 2018-01-17 PROCEDURE — 85025 COMPLETE CBC W/AUTO DIFF WBC: CPT | Performed by: INTERNAL MEDICINE

## 2018-01-17 PROCEDURE — G0378 HOSPITAL OBSERVATION PER HR: HCPCS

## 2018-01-17 PROCEDURE — 87076 CULTURE ANAEROBE IDENT EACH: CPT | Performed by: INTERNAL MEDICINE

## 2018-01-17 PROCEDURE — 87186 SC STD MICRODIL/AGAR DIL: CPT | Performed by: INTERNAL MEDICINE

## 2018-01-17 RX ORDER — AMMONIUM LACTATE 120 MG/G
1 CREAM TOPICAL EVERY 12 HOURS PRN
Status: DISCONTINUED | OUTPATIENT
Start: 2018-01-17 | End: 2018-02-03 | Stop reason: HOSPADM

## 2018-01-17 RX ORDER — RISPERIDONE 3 MG/1
3 TABLET ORAL NIGHTLY
Status: DISCONTINUED | OUTPATIENT
Start: 2018-01-17 | End: 2018-02-03 | Stop reason: HOSPADM

## 2018-01-17 RX ORDER — DEXTROSE MONOHYDRATE 25 G/50ML
25 INJECTION, SOLUTION INTRAVENOUS
Status: DISCONTINUED | OUTPATIENT
Start: 2018-01-17 | End: 2018-02-03 | Stop reason: HOSPADM

## 2018-01-17 RX ORDER — PANTOPRAZOLE SODIUM 40 MG/1
40 TABLET, DELAYED RELEASE ORAL
Status: DISCONTINUED | OUTPATIENT
Start: 2018-01-18 | End: 2018-01-21

## 2018-01-17 RX ORDER — LACTULOSE 10 G/15ML
20 SOLUTION ORAL DAILY
Status: DISCONTINUED | OUTPATIENT
Start: 2018-01-17 | End: 2018-02-03 | Stop reason: HOSPADM

## 2018-01-17 RX ORDER — VALPROIC ACID 250 MG/5ML
250 SOLUTION ORAL EVERY 12 HOURS SCHEDULED
Status: DISCONTINUED | OUTPATIENT
Start: 2018-01-17 | End: 2018-01-18

## 2018-01-17 RX ORDER — FERROUS SULFATE 325(65) MG
325 TABLET ORAL
Status: DISCONTINUED | OUTPATIENT
Start: 2018-01-18 | End: 2018-01-25

## 2018-01-17 RX ORDER — POTASSIUM CHLORIDE 20MEQ/15ML
20 LIQUID (ML) ORAL DAILY
Status: DISCONTINUED | OUTPATIENT
Start: 2018-01-17 | End: 2018-01-17

## 2018-01-17 RX ORDER — IPRATROPIUM BROMIDE AND ALBUTEROL SULFATE 2.5; .5 MG/3ML; MG/3ML
3 SOLUTION RESPIRATORY (INHALATION) EVERY 4 HOURS PRN
Status: DISCONTINUED | OUTPATIENT
Start: 2018-01-17 | End: 2018-02-03 | Stop reason: HOSPADM

## 2018-01-17 RX ORDER — RISPERIDONE 2 MG/1
2 TABLET ORAL EVERY MORNING
Status: DISCONTINUED | OUTPATIENT
Start: 2018-01-18 | End: 2018-02-03 | Stop reason: HOSPADM

## 2018-01-17 RX ORDER — POTASSIUM CHLORIDE 1.5 G/1.77G
20 POWDER, FOR SOLUTION ORAL DAILY
Status: DISCONTINUED | OUTPATIENT
Start: 2018-01-18 | End: 2018-01-18

## 2018-01-17 RX ORDER — NICOTINE POLACRILEX 4 MG
15 LOZENGE BUCCAL
Status: DISCONTINUED | OUTPATIENT
Start: 2018-01-17 | End: 2018-02-03 | Stop reason: HOSPADM

## 2018-01-17 RX ORDER — ATORVASTATIN CALCIUM 20 MG/1
20 TABLET, FILM COATED ORAL DAILY
Status: DISCONTINUED | OUTPATIENT
Start: 2018-01-17 | End: 2018-01-25

## 2018-01-17 RX ORDER — FOLIC ACID 1 MG/1
1 TABLET ORAL DAILY
Status: DISCONTINUED | OUTPATIENT
Start: 2018-01-17 | End: 2018-02-03 | Stop reason: HOSPADM

## 2018-01-17 RX ADMIN — VALPROIC ACID 250 MG: 250 SOLUTION ORAL at 21:24

## 2018-01-17 RX ADMIN — RISPERIDONE 3 MG: 1 TABLET ORAL at 23:03

## 2018-01-17 RX ADMIN — ISOSORBIDE MONONITRATE 30 MG: 10 TABLET ORAL at 23:02

## 2018-01-17 RX ADMIN — ATORVASTATIN CALCIUM 20 MG: 20 TABLET, FILM COATED ORAL at 21:26

## 2018-01-17 RX ADMIN — LACTULOSE 20 G: 20 SOLUTION ORAL at 21:23

## 2018-01-17 RX ADMIN — FOLIC ACID 1 MG: 1 TABLET ORAL at 21:25

## 2018-01-18 ENCOUNTER — APPOINTMENT (OUTPATIENT)
Dept: ULTRASOUND IMAGING | Facility: HOSPITAL | Age: 63
End: 2018-01-18
Attending: INTERNAL MEDICINE

## 2018-01-18 PROBLEM — L08.9 INFECTED DECUBITUS ULCER, STAGE IV (HCC): Status: ACTIVE | Noted: 2018-01-18

## 2018-01-18 PROBLEM — N18.30 CKD (CHRONIC KIDNEY DISEASE) STAGE 3, GFR 30-59 ML/MIN (HCC): Status: ACTIVE | Noted: 2018-01-18

## 2018-01-18 PROBLEM — L89.94 INFECTED DECUBITUS ULCER, STAGE IV (HCC): Status: ACTIVE | Noted: 2018-01-18

## 2018-01-18 LAB
ANION GAP SERPL CALCULATED.3IONS-SCNC: 14.7 MMOL/L
BUN BLD-MCNC: 46 MG/DL (ref 8–23)
BUN/CREAT SERPL: 13 (ref 7–25)
CALCIUM SPEC-SCNC: 8.3 MG/DL (ref 8.6–10.5)
CHLORIDE SERPL-SCNC: 100 MMOL/L (ref 98–107)
CHLORIDE UR-SCNC: <20 MMOL/L
CO2 SERPL-SCNC: 20.3 MMOL/L (ref 22–29)
CREAT BLD-MCNC: 3.53 MG/DL (ref 0.57–1)
DEPRECATED RDW RBC AUTO: 60.3 FL (ref 37–54)
ERYTHROCYTE [DISTWIDTH] IN BLOOD BY AUTOMATED COUNT: 19.2 % (ref 11.7–13)
GFR SERPL CREATININE-BSD FRML MDRD: 16 ML/MIN/1.73
GLUCOSE BLD-MCNC: 76 MG/DL (ref 65–99)
GLUCOSE BLDC GLUCOMTR-MCNC: 69 MG/DL (ref 70–130)
GLUCOSE BLDC GLUCOMTR-MCNC: 80 MG/DL (ref 70–130)
GLUCOSE BLDC GLUCOMTR-MCNC: 80 MG/DL (ref 70–130)
GLUCOSE BLDC GLUCOMTR-MCNC: 87 MG/DL (ref 70–130)
HCT VFR BLD AUTO: 27.8 % (ref 35.6–45.5)
HGB BLD-MCNC: 8.7 G/DL (ref 11.9–15.5)
MCH RBC QN AUTO: 27 PG (ref 26.9–32)
MCHC RBC AUTO-ENTMCNC: 31.3 G/DL (ref 32.4–36.3)
MCV RBC AUTO: 86.3 FL (ref 80.5–98.2)
PLATELET # BLD AUTO: 252 10*3/MM3 (ref 140–500)
PMV BLD AUTO: 9.6 FL (ref 6–12)
POTASSIUM BLD-SCNC: 4.1 MMOL/L (ref 3.5–5.2)
RBC # BLD AUTO: 3.22 10*6/MM3 (ref 3.9–5.2)
SODIUM BLD-SCNC: 135 MMOL/L (ref 136–145)
SODIUM UR-SCNC: <20 MMOL/L
WBC NRBC COR # BLD: 6.22 10*3/MM3 (ref 4.5–10.7)

## 2018-01-18 PROCEDURE — 80048 BASIC METABOLIC PNL TOTAL CA: CPT | Performed by: INTERNAL MEDICINE

## 2018-01-18 PROCEDURE — 82436 ASSAY OF URINE CHLORIDE: CPT | Performed by: INTERNAL MEDICINE

## 2018-01-18 PROCEDURE — 76775 US EXAM ABDO BACK WALL LIM: CPT

## 2018-01-18 PROCEDURE — 82962 GLUCOSE BLOOD TEST: CPT

## 2018-01-18 PROCEDURE — 85027 COMPLETE CBC AUTOMATED: CPT | Performed by: INTERNAL MEDICINE

## 2018-01-18 PROCEDURE — 25010000002 VANCOMYCIN 10 G RECONSTITUTED SOLUTION: Performed by: INTERNAL MEDICINE

## 2018-01-18 PROCEDURE — 25010000002 CEFEPIME PER 500 MG: Performed by: INTERNAL MEDICINE

## 2018-01-18 PROCEDURE — 84300 ASSAY OF URINE SODIUM: CPT | Performed by: INTERNAL MEDICINE

## 2018-01-18 RX ORDER — SODIUM CHLORIDE 9 MG/ML
75 INJECTION, SOLUTION INTRAVENOUS CONTINUOUS
Status: DISCONTINUED | OUTPATIENT
Start: 2018-01-18 | End: 2018-01-23

## 2018-01-18 RX ADMIN — RISPERIDONE 2 MG: 1 TABLET ORAL at 06:42

## 2018-01-18 RX ADMIN — ISOSORBIDE MONONITRATE 30 MG: 10 TABLET ORAL at 08:15

## 2018-01-18 RX ADMIN — PANTOPRAZOLE SODIUM 40 MG: 40 TABLET, DELAYED RELEASE ORAL at 06:42

## 2018-01-18 RX ADMIN — WATER 1 G: 1 INJECTION INTRAMUSCULAR; INTRAVENOUS; SUBCUTANEOUS at 14:43

## 2018-01-18 RX ADMIN — RISPERIDONE 3 MG: 1 TABLET ORAL at 21:48

## 2018-01-18 RX ADMIN — LACTULOSE 20 G: 20 SOLUTION ORAL at 08:15

## 2018-01-18 RX ADMIN — FOLIC ACID 1 MG: 1 TABLET ORAL at 08:15

## 2018-01-18 RX ADMIN — VALPROIC ACID 250 MG: 250 SOLUTION ORAL at 08:15

## 2018-01-18 RX ADMIN — ATORVASTATIN CALCIUM 20 MG: 20 TABLET, FILM COATED ORAL at 08:15

## 2018-01-18 RX ADMIN — SODIUM CHLORIDE 100 ML/HR: 9 INJECTION, SOLUTION INTRAVENOUS at 08:17

## 2018-01-18 RX ADMIN — FERROUS SULFATE TAB 325 MG (65 MG ELEMENTAL FE) 325 MG: 325 (65 FE) TAB at 08:15

## 2018-01-18 RX ADMIN — POTASSIUM CHLORIDE 20 MEQ: 1.5 POWDER, FOR SOLUTION ORAL at 08:16

## 2018-01-18 RX ADMIN — SODIUM CHLORIDE 125 ML/HR: 9 INJECTION, SOLUTION INTRAVENOUS at 23:10

## 2018-01-18 RX ADMIN — VANCOMYCIN HYDROCHLORIDE 2250 MG: 10 INJECTION, POWDER, LYOPHILIZED, FOR SOLUTION INTRAVENOUS at 14:43

## 2018-01-19 LAB
ANION GAP SERPL CALCULATED.3IONS-SCNC: 14.7 MMOL/L
BASOPHILS # BLD AUTO: 0.01 10*3/MM3 (ref 0–0.2)
BASOPHILS NFR BLD AUTO: 0.2 % (ref 0–1.5)
BUN BLD-MCNC: 42 MG/DL (ref 8–23)
BUN/CREAT SERPL: 12.8 (ref 7–25)
CALCIUM SPEC-SCNC: 8.1 MG/DL (ref 8.6–10.5)
CHLORIDE SERPL-SCNC: 101 MMOL/L (ref 98–107)
CO2 SERPL-SCNC: 19.3 MMOL/L (ref 22–29)
CREAT BLD-MCNC: 3.29 MG/DL (ref 0.57–1)
DEPRECATED RDW RBC AUTO: 59.7 FL (ref 37–54)
EOSINOPHIL # BLD AUTO: 0.07 10*3/MM3 (ref 0–0.7)
EOSINOPHIL NFR BLD AUTO: 1.2 % (ref 0.3–6.2)
ERYTHROCYTE [DISTWIDTH] IN BLOOD BY AUTOMATED COUNT: 18.9 % (ref 11.7–13)
GFR SERPL CREATININE-BSD FRML MDRD: 17 ML/MIN/1.73
GLUCOSE BLD-MCNC: 72 MG/DL (ref 65–99)
GLUCOSE BLDC GLUCOMTR-MCNC: 78 MG/DL (ref 70–130)
GLUCOSE BLDC GLUCOMTR-MCNC: 81 MG/DL (ref 70–130)
GLUCOSE BLDC GLUCOMTR-MCNC: 85 MG/DL (ref 70–130)
GLUCOSE BLDC GLUCOMTR-MCNC: 94 MG/DL (ref 70–130)
HCT VFR BLD AUTO: 27.3 % (ref 35.6–45.5)
HGB BLD-MCNC: 8.5 G/DL (ref 11.9–15.5)
IMM GRANULOCYTES # BLD: 0 10*3/MM3 (ref 0–0.03)
IMM GRANULOCYTES NFR BLD: 0 % (ref 0–0.5)
LYMPHOCYTES # BLD AUTO: 0.72 10*3/MM3 (ref 0.9–4.8)
LYMPHOCYTES NFR BLD AUTO: 12 % (ref 19.6–45.3)
MCH RBC QN AUTO: 26.8 PG (ref 26.9–32)
MCHC RBC AUTO-ENTMCNC: 31.1 G/DL (ref 32.4–36.3)
MCV RBC AUTO: 86.1 FL (ref 80.5–98.2)
MONOCYTES # BLD AUTO: 0.38 10*3/MM3 (ref 0.2–1.2)
MONOCYTES NFR BLD AUTO: 6.4 % (ref 5–12)
NEUTROPHILS # BLD AUTO: 4.8 10*3/MM3 (ref 1.9–8.1)
NEUTROPHILS NFR BLD AUTO: 80.2 % (ref 42.7–76)
PLATELET # BLD AUTO: 229 10*3/MM3 (ref 140–500)
PMV BLD AUTO: 9.6 FL (ref 6–12)
POTASSIUM BLD-SCNC: 3.6 MMOL/L (ref 3.5–5.2)
RBC # BLD AUTO: 3.17 10*6/MM3 (ref 3.9–5.2)
SODIUM BLD-SCNC: 135 MMOL/L (ref 136–145)
VANCOMYCIN SERPL-MCNC: 21 MCG/ML (ref 5–40)
VANCOMYCIN SERPL-MCNC: 26.5 MCG/ML (ref 5–40)
WBC NRBC COR # BLD: 5.98 10*3/MM3 (ref 4.5–10.7)

## 2018-01-19 PROCEDURE — 80202 ASSAY OF VANCOMYCIN: CPT | Performed by: INTERNAL MEDICINE

## 2018-01-19 PROCEDURE — 82962 GLUCOSE BLOOD TEST: CPT

## 2018-01-19 PROCEDURE — 80048 BASIC METABOLIC PNL TOTAL CA: CPT | Performed by: NURSE PRACTITIONER

## 2018-01-19 PROCEDURE — 85025 COMPLETE CBC W/AUTO DIFF WBC: CPT | Performed by: NURSE PRACTITIONER

## 2018-01-19 PROCEDURE — 25010000002 CEFEPIME PER 500 MG: Performed by: INTERNAL MEDICINE

## 2018-01-19 RX ORDER — VANCOMYCIN HYDROCHLORIDE 1 G/200ML
1000 INJECTION, SOLUTION INTRAVENOUS ONCE
Status: COMPLETED | OUTPATIENT
Start: 2018-01-20 | End: 2018-01-20

## 2018-01-19 RX ADMIN — SODIUM CHLORIDE 125 ML/HR: 9 INJECTION, SOLUTION INTRAVENOUS at 10:50

## 2018-01-19 RX ADMIN — PANTOPRAZOLE SODIUM 40 MG: 40 TABLET, DELAYED RELEASE ORAL at 06:34

## 2018-01-19 RX ADMIN — FERROUS SULFATE TAB 325 MG (65 MG ELEMENTAL FE) 325 MG: 325 (65 FE) TAB at 10:50

## 2018-01-19 RX ADMIN — ISOSORBIDE MONONITRATE 30 MG: 10 TABLET ORAL at 10:50

## 2018-01-19 RX ADMIN — RISPERIDONE 2 MG: 1 TABLET ORAL at 06:35

## 2018-01-19 RX ADMIN — SODIUM CHLORIDE 125 ML/HR: 9 INJECTION, SOLUTION INTRAVENOUS at 19:02

## 2018-01-19 RX ADMIN — FOLIC ACID 1 MG: 1 TABLET ORAL at 10:51

## 2018-01-19 RX ADMIN — ATORVASTATIN CALCIUM 20 MG: 20 TABLET, FILM COATED ORAL at 10:51

## 2018-01-19 RX ADMIN — WATER 1 G: 1 INJECTION INTRAMUSCULAR; INTRAVENOUS; SUBCUTANEOUS at 13:31

## 2018-01-19 RX ADMIN — DAKIN'S SOLUTION 0.125% (QUARTER STRENGTH) 946 ML: 0.12 SOLUTION at 21:45

## 2018-01-19 RX ADMIN — RISPERIDONE 3 MG: 1 TABLET ORAL at 21:33

## 2018-01-20 LAB
ANION GAP SERPL CALCULATED.3IONS-SCNC: 14.7 MMOL/L
BASOPHILS # BLD AUTO: 0.01 10*3/MM3 (ref 0–0.2)
BASOPHILS NFR BLD AUTO: 0.2 % (ref 0–1.5)
BUN BLD-MCNC: 50 MG/DL (ref 8–23)
BUN/CREAT SERPL: 17 (ref 7–25)
CALCIUM SPEC-SCNC: 8.2 MG/DL (ref 8.6–10.5)
CHLORIDE SERPL-SCNC: 106 MMOL/L (ref 98–107)
CO2 SERPL-SCNC: 17.3 MMOL/L (ref 22–29)
CREAT BLD-MCNC: 2.94 MG/DL (ref 0.57–1)
DEPRECATED RDW RBC AUTO: 59.1 FL (ref 37–54)
EOSINOPHIL # BLD AUTO: 0.1 10*3/MM3 (ref 0–0.7)
EOSINOPHIL NFR BLD AUTO: 2.1 % (ref 0.3–6.2)
ERYTHROCYTE [DISTWIDTH] IN BLOOD BY AUTOMATED COUNT: 18.7 % (ref 11.7–13)
GFR SERPL CREATININE-BSD FRML MDRD: 19 ML/MIN/1.73
GLUCOSE BLD-MCNC: 85 MG/DL (ref 65–99)
GLUCOSE BLDC GLUCOMTR-MCNC: 82 MG/DL (ref 70–130)
GLUCOSE BLDC GLUCOMTR-MCNC: 85 MG/DL (ref 70–130)
GLUCOSE BLDC GLUCOMTR-MCNC: 89 MG/DL (ref 70–130)
GLUCOSE BLDC GLUCOMTR-MCNC: 90 MG/DL (ref 70–130)
HCT VFR BLD AUTO: 25.6 % (ref 35.6–45.5)
HGB BLD-MCNC: 8 G/DL (ref 11.9–15.5)
IMM GRANULOCYTES # BLD: 0.02 10*3/MM3 (ref 0–0.03)
IMM GRANULOCYTES NFR BLD: 0.4 % (ref 0–0.5)
LYMPHOCYTES # BLD AUTO: 0.72 10*3/MM3 (ref 0.9–4.8)
LYMPHOCYTES NFR BLD AUTO: 15.5 % (ref 19.6–45.3)
MCH RBC QN AUTO: 26.9 PG (ref 26.9–32)
MCHC RBC AUTO-ENTMCNC: 31.3 G/DL (ref 32.4–36.3)
MCV RBC AUTO: 86.2 FL (ref 80.5–98.2)
MONOCYTES # BLD AUTO: 0.34 10*3/MM3 (ref 0.2–1.2)
MONOCYTES NFR BLD AUTO: 7.3 % (ref 5–12)
NEUTROPHILS # BLD AUTO: 3.47 10*3/MM3 (ref 1.9–8.1)
NEUTROPHILS NFR BLD AUTO: 74.5 % (ref 42.7–76)
PLATELET # BLD AUTO: 239 10*3/MM3 (ref 140–500)
PMV BLD AUTO: 9.5 FL (ref 6–12)
POTASSIUM BLD-SCNC: 3.7 MMOL/L (ref 3.5–5.2)
RBC # BLD AUTO: 2.97 10*6/MM3 (ref 3.9–5.2)
SODIUM BLD-SCNC: 138 MMOL/L (ref 136–145)
WBC NRBC COR # BLD: 4.66 10*3/MM3 (ref 4.5–10.7)

## 2018-01-20 PROCEDURE — 85025 COMPLETE CBC W/AUTO DIFF WBC: CPT | Performed by: NURSE PRACTITIONER

## 2018-01-20 PROCEDURE — 25010000002 VANCOMYCIN PER 500 MG: Performed by: INTERNAL MEDICINE

## 2018-01-20 PROCEDURE — 82962 GLUCOSE BLOOD TEST: CPT

## 2018-01-20 PROCEDURE — 80048 BASIC METABOLIC PNL TOTAL CA: CPT | Performed by: NURSE PRACTITIONER

## 2018-01-20 PROCEDURE — 25010000002 CEFEPIME PER 500 MG: Performed by: INTERNAL MEDICINE

## 2018-01-20 RX ORDER — SODIUM BICARBONATE 650 MG/1
650 TABLET ORAL 3 TIMES DAILY
Status: DISCONTINUED | OUTPATIENT
Start: 2018-01-20 | End: 2018-01-21

## 2018-01-20 RX ADMIN — PANTOPRAZOLE SODIUM 40 MG: 40 TABLET, DELAYED RELEASE ORAL at 06:38

## 2018-01-20 RX ADMIN — FOLIC ACID 1 MG: 1 TABLET ORAL at 09:44

## 2018-01-20 RX ADMIN — LACTULOSE 20 G: 20 SOLUTION ORAL at 09:44

## 2018-01-20 RX ADMIN — RISPERIDONE 2 MG: 1 TABLET ORAL at 06:38

## 2018-01-20 RX ADMIN — DAKIN'S SOLUTION 0.125% (QUARTER STRENGTH) 946 ML: 0.12 SOLUTION at 21:05

## 2018-01-20 RX ADMIN — RISPERIDONE 3 MG: 1 TABLET ORAL at 21:05

## 2018-01-20 RX ADMIN — DAKIN'S SOLUTION 0.125% (QUARTER STRENGTH) 946 ML: 0.12 SOLUTION at 09:48

## 2018-01-20 RX ADMIN — VANCOMYCIN HYDROCHLORIDE 1000 MG: 1 INJECTION, SOLUTION INTRAVENOUS at 06:39

## 2018-01-20 RX ADMIN — ISOSORBIDE MONONITRATE 30 MG: 10 TABLET ORAL at 09:44

## 2018-01-20 RX ADMIN — AMMONIUM LACTATE 1 APPLICATION: 120 CREAM TOPICAL at 21:11

## 2018-01-20 RX ADMIN — WATER 1 G: 1 INJECTION INTRAMUSCULAR; INTRAVENOUS; SUBCUTANEOUS at 13:20

## 2018-01-20 RX ADMIN — SODIUM CHLORIDE 125 ML/HR: 9 INJECTION, SOLUTION INTRAVENOUS at 03:02

## 2018-01-20 RX ADMIN — SODIUM BICARBONATE 650 MG: 650 TABLET ORAL at 18:48

## 2018-01-20 RX ADMIN — SODIUM CHLORIDE 125 ML/HR: 9 INJECTION, SOLUTION INTRAVENOUS at 13:21

## 2018-01-21 LAB
AMORPH URATE CRY URNS QL MICRO: ABNORMAL /HPF
ANION GAP SERPL CALCULATED.3IONS-SCNC: 15.1 MMOL/L
BACTERIA SPEC AEROBE CULT: ABNORMAL
BACTERIA UR QL AUTO: ABNORMAL /HPF
BILIRUB UR QL STRIP: NEGATIVE
BUN BLD-MCNC: 60 MG/DL (ref 8–23)
BUN/CREAT SERPL: 20.4 (ref 7–25)
CALCIUM SPEC-SCNC: 8.2 MG/DL (ref 8.6–10.5)
CHLORIDE SERPL-SCNC: 105 MMOL/L (ref 98–107)
CLARITY UR: ABNORMAL
CO2 SERPL-SCNC: 16.9 MMOL/L (ref 22–29)
COLOR UR: YELLOW
CREAT BLD-MCNC: 2.94 MG/DL (ref 0.57–1)
DEPRECATED RDW RBC AUTO: 58.8 FL (ref 37–54)
ERYTHROCYTE [DISTWIDTH] IN BLOOD BY AUTOMATED COUNT: 18.4 % (ref 11.7–13)
GFR SERPL CREATININE-BSD FRML MDRD: 19 ML/MIN/1.73
GLUCOSE BLD-MCNC: 80 MG/DL (ref 65–99)
GLUCOSE BLDC GLUCOMTR-MCNC: 110 MG/DL (ref 70–130)
GLUCOSE BLDC GLUCOMTR-MCNC: 90 MG/DL (ref 70–130)
GLUCOSE BLDC GLUCOMTR-MCNC: 91 MG/DL (ref 70–130)
GLUCOSE BLDC GLUCOMTR-MCNC: 95 MG/DL (ref 70–130)
GLUCOSE UR STRIP-MCNC: NEGATIVE MG/DL
GRAM STN SPEC: ABNORMAL
HCT VFR BLD AUTO: 26.3 % (ref 35.6–45.5)
HGB BLD-MCNC: 8 G/DL (ref 11.9–15.5)
HGB UR QL STRIP.AUTO: ABNORMAL
HYALINE CASTS UR QL AUTO: ABNORMAL /LPF
KETONES UR QL STRIP: NEGATIVE
LEUKOCYTE ESTERASE UR QL STRIP.AUTO: ABNORMAL
MAGNESIUM SERPL-MCNC: 1.7 MG/DL (ref 1.6–2.4)
MCH RBC QN AUTO: 26.4 PG (ref 26.9–32)
MCHC RBC AUTO-ENTMCNC: 30.4 G/DL (ref 32.4–36.3)
MCV RBC AUTO: 86.8 FL (ref 80.5–98.2)
NITRITE UR QL STRIP: NEGATIVE
PH UR STRIP.AUTO: 6 [PH] (ref 5–8)
PLATELET # BLD AUTO: 234 10*3/MM3 (ref 140–500)
PMV BLD AUTO: 9.6 FL (ref 6–12)
POTASSIUM BLD-SCNC: 3.6 MMOL/L (ref 3.5–5.2)
PROT UR QL STRIP: ABNORMAL
RBC # BLD AUTO: 3.03 10*6/MM3 (ref 3.9–5.2)
RBC # UR: ABNORMAL /HPF
REF LAB TEST METHOD: ABNORMAL
SODIUM BLD-SCNC: 137 MMOL/L (ref 136–145)
SP GR UR STRIP: 1.01 (ref 1–1.03)
SQUAMOUS #/AREA URNS HPF: ABNORMAL /HPF
TRANS CELLS #/AREA URNS HPF: ABNORMAL /HPF
UROBILINOGEN UR QL STRIP: ABNORMAL
VANCOMYCIN SERPL-MCNC: 22.7 MCG/ML (ref 5–40)
WBC NRBC COR # BLD: 5.12 10*3/MM3 (ref 4.5–10.7)
WBC UR QL AUTO: ABNORMAL /HPF

## 2018-01-21 PROCEDURE — 82962 GLUCOSE BLOOD TEST: CPT

## 2018-01-21 PROCEDURE — 99221 1ST HOSP IP/OBS SF/LOW 40: CPT | Performed by: SURGERY

## 2018-01-21 PROCEDURE — 25010000002 VANCOMYCIN 10 G RECONSTITUTED SOLUTION: Performed by: INTERNAL MEDICINE

## 2018-01-21 PROCEDURE — 25010000002 CEFEPIME PER 500 MG: Performed by: INTERNAL MEDICINE

## 2018-01-21 PROCEDURE — 85027 COMPLETE CBC AUTOMATED: CPT | Performed by: INTERNAL MEDICINE

## 2018-01-21 PROCEDURE — 80048 BASIC METABOLIC PNL TOTAL CA: CPT | Performed by: INTERNAL MEDICINE

## 2018-01-21 PROCEDURE — 87086 URINE CULTURE/COLONY COUNT: CPT | Performed by: INTERNAL MEDICINE

## 2018-01-21 PROCEDURE — 83735 ASSAY OF MAGNESIUM: CPT | Performed by: INTERNAL MEDICINE

## 2018-01-21 PROCEDURE — 80202 ASSAY OF VANCOMYCIN: CPT | Performed by: INTERNAL MEDICINE

## 2018-01-21 PROCEDURE — 81001 URINALYSIS AUTO W/SCOPE: CPT | Performed by: INTERNAL MEDICINE

## 2018-01-21 RX ORDER — SODIUM BICARBONATE 650 MG/1
1300 TABLET ORAL 3 TIMES DAILY
Status: DISCONTINUED | OUTPATIENT
Start: 2018-01-21 | End: 2018-02-03 | Stop reason: HOSPADM

## 2018-01-21 RX ORDER — FAMOTIDINE 20 MG/1
20 TABLET, FILM COATED ORAL DAILY
Status: DISCONTINUED | OUTPATIENT
Start: 2018-01-21 | End: 2018-02-03 | Stop reason: HOSPADM

## 2018-01-21 RX ADMIN — ISOSORBIDE MONONITRATE 30 MG: 10 TABLET ORAL at 08:31

## 2018-01-21 RX ADMIN — RISPERIDONE 2 MG: 1 TABLET ORAL at 07:01

## 2018-01-21 RX ADMIN — AMMONIUM LACTATE 1 APPLICATION: 120 CREAM TOPICAL at 22:29

## 2018-01-21 RX ADMIN — SODIUM BICARBONATE 1300 MG: 650 TABLET ORAL at 20:37

## 2018-01-21 RX ADMIN — VANCOMYCIN HYDROCHLORIDE 750 MG: 10 INJECTION, POWDER, LYOPHILIZED, FOR SOLUTION INTRAVENOUS at 11:23

## 2018-01-21 RX ADMIN — DAKIN'S SOLUTION 0.125% (QUARTER STRENGTH) 946 ML: 0.12 SOLUTION at 20:37

## 2018-01-21 RX ADMIN — SODIUM BICARBONATE 1300 MG: 650 TABLET ORAL at 18:22

## 2018-01-21 RX ADMIN — PANTOPRAZOLE SODIUM 40 MG: 40 TABLET, DELAYED RELEASE ORAL at 07:01

## 2018-01-21 RX ADMIN — FOLIC ACID 1 MG: 1 TABLET ORAL at 08:32

## 2018-01-21 RX ADMIN — SODIUM CHLORIDE 100 ML/HR: 9 INJECTION, SOLUTION INTRAVENOUS at 16:24

## 2018-01-21 RX ADMIN — LACTULOSE 20 G: 20 SOLUTION ORAL at 08:31

## 2018-01-21 RX ADMIN — SODIUM BICARBONATE 1300 MG: 650 TABLET ORAL at 08:32

## 2018-01-21 RX ADMIN — FAMOTIDINE 20 MG: 20 TABLET, FILM COATED ORAL at 10:51

## 2018-01-21 RX ADMIN — WATER 1 G: 1 INJECTION INTRAMUSCULAR; INTRAVENOUS; SUBCUTANEOUS at 16:24

## 2018-01-21 RX ADMIN — ATORVASTATIN CALCIUM 20 MG: 20 TABLET, FILM COATED ORAL at 08:32

## 2018-01-21 RX ADMIN — FERROUS SULFATE TAB 325 MG (65 MG ELEMENTAL FE) 325 MG: 325 (65 FE) TAB at 08:32

## 2018-01-21 RX ADMIN — DAKIN'S SOLUTION 0.125% (QUARTER STRENGTH) 946 ML: 0.12 SOLUTION at 10:02

## 2018-01-21 RX ADMIN — AMMONIUM LACTATE 1 APPLICATION: 120 CREAM TOPICAL at 10:04

## 2018-01-21 RX ADMIN — SODIUM CHLORIDE 75 ML/HR: 9 INJECTION, SOLUTION INTRAVENOUS at 03:55

## 2018-01-21 RX ADMIN — RISPERIDONE 3 MG: 1 TABLET ORAL at 20:37

## 2018-01-22 LAB
ALBUMIN SERPL-MCNC: 1.9 G/DL (ref 3.5–5.2)
ANION GAP SERPL CALCULATED.3IONS-SCNC: 12.7 MMOL/L
BACTERIA SPEC AEROBE CULT: NO GROWTH
BASOPHILS # BLD AUTO: 0.01 10*3/MM3 (ref 0–0.2)
BASOPHILS NFR BLD AUTO: 0.2 % (ref 0–1.5)
BUN BLD-MCNC: 63 MG/DL (ref 8–23)
BUN/CREAT SERPL: 23.8 (ref 7–25)
CALCIUM SPEC-SCNC: 8.1 MG/DL (ref 8.6–10.5)
CHLORIDE SERPL-SCNC: 109 MMOL/L (ref 98–107)
CO2 SERPL-SCNC: 18.3 MMOL/L (ref 22–29)
CREAT BLD-MCNC: 2.65 MG/DL (ref 0.57–1)
DEPRECATED RDW RBC AUTO: 58.6 FL (ref 37–54)
EOSINOPHIL # BLD AUTO: 0.1 10*3/MM3 (ref 0–0.7)
EOSINOPHIL NFR BLD AUTO: 2 % (ref 0.3–6.2)
ERYTHROCYTE [DISTWIDTH] IN BLOOD BY AUTOMATED COUNT: 18.7 % (ref 11.7–13)
FERRITIN SERPL-MCNC: 249.7 NG/ML (ref 13–150)
GFR SERPL CREATININE-BSD FRML MDRD: 22 ML/MIN/1.73
GLUCOSE BLD-MCNC: 86 MG/DL (ref 65–99)
GLUCOSE BLDC GLUCOMTR-MCNC: 109 MG/DL (ref 70–130)
GLUCOSE BLDC GLUCOMTR-MCNC: 112 MG/DL (ref 70–130)
GLUCOSE BLDC GLUCOMTR-MCNC: 84 MG/DL (ref 70–130)
GLUCOSE BLDC GLUCOMTR-MCNC: 95 MG/DL (ref 70–130)
HCT VFR BLD AUTO: 26 % (ref 35.6–45.5)
HGB BLD-MCNC: 8.1 G/DL (ref 11.9–15.5)
IMM GRANULOCYTES # BLD: 0.02 10*3/MM3 (ref 0–0.03)
IMM GRANULOCYTES NFR BLD: 0.4 % (ref 0–0.5)
IRON 24H UR-MRATE: 42 MCG/DL (ref 37–145)
IRON SATN MFR SERPL: 22 % (ref 20–50)
LYMPHOCYTES # BLD AUTO: 0.58 10*3/MM3 (ref 0.9–4.8)
LYMPHOCYTES NFR BLD AUTO: 11.5 % (ref 19.6–45.3)
MCH RBC QN AUTO: 26.7 PG (ref 26.9–32)
MCHC RBC AUTO-ENTMCNC: 31.2 G/DL (ref 32.4–36.3)
MCV RBC AUTO: 85.8 FL (ref 80.5–98.2)
MONOCYTES # BLD AUTO: 0.56 10*3/MM3 (ref 0.2–1.2)
MONOCYTES NFR BLD AUTO: 11.1 % (ref 5–12)
NEUTROPHILS # BLD AUTO: 3.77 10*3/MM3 (ref 1.9–8.1)
NEUTROPHILS NFR BLD AUTO: 74.8 % (ref 42.7–76)
PHOSPHATE SERPL-MCNC: 3.7 MG/DL (ref 2.5–4.5)
PLATELET # BLD AUTO: 236 10*3/MM3 (ref 140–500)
PMV BLD AUTO: 9.2 FL (ref 6–12)
POTASSIUM BLD-SCNC: 4.2 MMOL/L (ref 3.5–5.2)
RBC # BLD AUTO: 3.03 10*6/MM3 (ref 3.9–5.2)
SODIUM BLD-SCNC: 140 MMOL/L (ref 136–145)
TIBC SERPL-MCNC: 188 MCG/DL (ref 298–536)
TRANSFERRIN SERPL-MCNC: 126 MG/DL (ref 200–360)
VANCOMYCIN SERPL-MCNC: 27.1 MCG/ML (ref 5–40)
WBC NRBC COR # BLD: 5.04 10*3/MM3 (ref 4.5–10.7)

## 2018-01-22 PROCEDURE — 82962 GLUCOSE BLOOD TEST: CPT

## 2018-01-22 PROCEDURE — 82728 ASSAY OF FERRITIN: CPT | Performed by: INTERNAL MEDICINE

## 2018-01-22 PROCEDURE — 25010000002 CEFEPIME PER 500 MG: Performed by: INTERNAL MEDICINE

## 2018-01-22 PROCEDURE — 80069 RENAL FUNCTION PANEL: CPT | Performed by: INTERNAL MEDICINE

## 2018-01-22 PROCEDURE — 84466 ASSAY OF TRANSFERRIN: CPT | Performed by: INTERNAL MEDICINE

## 2018-01-22 PROCEDURE — 80202 ASSAY OF VANCOMYCIN: CPT | Performed by: INTERNAL MEDICINE

## 2018-01-22 PROCEDURE — 85025 COMPLETE CBC W/AUTO DIFF WBC: CPT | Performed by: INTERNAL MEDICINE

## 2018-01-22 PROCEDURE — 83540 ASSAY OF IRON: CPT | Performed by: INTERNAL MEDICINE

## 2018-01-22 RX ADMIN — RISPERIDONE 3 MG: 1 TABLET ORAL at 22:47

## 2018-01-22 RX ADMIN — FERROUS SULFATE TAB 325 MG (65 MG ELEMENTAL FE) 325 MG: 325 (65 FE) TAB at 09:27

## 2018-01-22 RX ADMIN — ATORVASTATIN CALCIUM 20 MG: 20 TABLET, FILM COATED ORAL at 09:29

## 2018-01-22 RX ADMIN — FOLIC ACID 1 MG: 1 TABLET ORAL at 09:29

## 2018-01-22 RX ADMIN — RISPERIDONE 2 MG: 1 TABLET ORAL at 06:35

## 2018-01-22 RX ADMIN — SODIUM BICARBONATE 1300 MG: 650 TABLET ORAL at 16:19

## 2018-01-22 RX ADMIN — ISOSORBIDE MONONITRATE 30 MG: 10 TABLET ORAL at 09:29

## 2018-01-22 RX ADMIN — SODIUM CHLORIDE 100 ML/HR: 9 INJECTION, SOLUTION INTRAVENOUS at 11:38

## 2018-01-22 RX ADMIN — SODIUM BICARBONATE 1300 MG: 650 TABLET ORAL at 22:47

## 2018-01-22 RX ADMIN — WATER 1 G: 1 INJECTION INTRAMUSCULAR; INTRAVENOUS; SUBCUTANEOUS at 14:48

## 2018-01-22 RX ADMIN — SODIUM CHLORIDE 100 ML/HR: 9 INJECTION, SOLUTION INTRAVENOUS at 01:27

## 2018-01-22 RX ADMIN — DAKIN'S SOLUTION 0.125% (QUARTER STRENGTH) 946 ML: 0.12 SOLUTION at 21:41

## 2018-01-22 RX ADMIN — FAMOTIDINE 20 MG: 20 TABLET, FILM COATED ORAL at 09:29

## 2018-01-22 RX ADMIN — DAKIN'S SOLUTION 0.125% (QUARTER STRENGTH) 946 ML: 0.12 SOLUTION at 10:11

## 2018-01-22 RX ADMIN — SODIUM BICARBONATE 1300 MG: 650 TABLET ORAL at 09:29

## 2018-01-23 ENCOUNTER — APPOINTMENT (OUTPATIENT)
Dept: CT IMAGING | Facility: HOSPITAL | Age: 63
End: 2018-01-23

## 2018-01-23 LAB
ANION GAP SERPL CALCULATED.3IONS-SCNC: 13 MMOL/L
BUN BLD-MCNC: 65 MG/DL (ref 8–23)
BUN/CREAT SERPL: 21.7 (ref 7–25)
CALCIUM SPEC-SCNC: 8.6 MG/DL (ref 8.6–10.5)
CHLORIDE SERPL-SCNC: 113 MMOL/L (ref 98–107)
CO2 SERPL-SCNC: 19 MMOL/L (ref 22–29)
CREAT BLD-MCNC: 3 MG/DL (ref 0.57–1)
DEPRECATED RDW RBC AUTO: 60.7 FL (ref 37–54)
ERYTHROCYTE [DISTWIDTH] IN BLOOD BY AUTOMATED COUNT: 19.1 % (ref 11.7–13)
GFR SERPL CREATININE-BSD FRML MDRD: 19 ML/MIN/1.73
GLUCOSE BLD-MCNC: 90 MG/DL (ref 65–99)
GLUCOSE BLDC GLUCOMTR-MCNC: 79 MG/DL (ref 70–130)
GLUCOSE BLDC GLUCOMTR-MCNC: 86 MG/DL (ref 70–130)
GLUCOSE BLDC GLUCOMTR-MCNC: 88 MG/DL (ref 70–130)
GLUCOSE BLDC GLUCOMTR-MCNC: 97 MG/DL (ref 70–130)
HCT VFR BLD AUTO: 25.1 % (ref 35.6–45.5)
HGB BLD-MCNC: 7.7 G/DL (ref 11.9–15.5)
MCH RBC QN AUTO: 26.5 PG (ref 26.9–32)
MCHC RBC AUTO-ENTMCNC: 30.7 G/DL (ref 32.4–36.3)
MCV RBC AUTO: 86.3 FL (ref 80.5–98.2)
PLATELET # BLD AUTO: 241 10*3/MM3 (ref 140–500)
PMV BLD AUTO: 9 FL (ref 6–12)
POTASSIUM BLD-SCNC: 3.7 MMOL/L (ref 3.5–5.2)
RBC # BLD AUTO: 2.91 10*6/MM3 (ref 3.9–5.2)
SODIUM BLD-SCNC: 145 MMOL/L (ref 136–145)
WBC NRBC COR # BLD: 4.57 10*3/MM3 (ref 4.5–10.7)

## 2018-01-23 PROCEDURE — 85027 COMPLETE CBC AUTOMATED: CPT | Performed by: INTERNAL MEDICINE

## 2018-01-23 PROCEDURE — 99231 SBSQ HOSP IP/OBS SF/LOW 25: CPT | Performed by: SURGERY

## 2018-01-23 PROCEDURE — 80048 BASIC METABOLIC PNL TOTAL CA: CPT | Performed by: NURSE PRACTITIONER

## 2018-01-23 PROCEDURE — 74176 CT ABD & PELVIS W/O CONTRAST: CPT

## 2018-01-23 PROCEDURE — 25010000002 CEFEPIME PER 500 MG: Performed by: INTERNAL MEDICINE

## 2018-01-23 PROCEDURE — 82962 GLUCOSE BLOOD TEST: CPT

## 2018-01-23 RX ORDER — SODIUM CHLORIDE 450 MG/100ML
75 INJECTION, SOLUTION INTRAVENOUS CONTINUOUS
Status: DISCONTINUED | OUTPATIENT
Start: 2018-01-23 | End: 2018-01-25

## 2018-01-23 RX ADMIN — ATORVASTATIN CALCIUM 20 MG: 20 TABLET, FILM COATED ORAL at 09:28

## 2018-01-23 RX ADMIN — SODIUM BICARBONATE 1300 MG: 650 TABLET ORAL at 20:50

## 2018-01-23 RX ADMIN — SODIUM CHLORIDE 75 ML/HR: 9 INJECTION, SOLUTION INTRAVENOUS at 14:14

## 2018-01-23 RX ADMIN — DAKIN'S SOLUTION 0.125% (QUARTER STRENGTH) 946 ML: 0.12 SOLUTION at 20:50

## 2018-01-23 RX ADMIN — DAKIN'S SOLUTION 0.125% (QUARTER STRENGTH) 946 ML: 0.12 SOLUTION at 09:31

## 2018-01-23 RX ADMIN — SODIUM BICARBONATE 1300 MG: 650 TABLET ORAL at 09:28

## 2018-01-23 RX ADMIN — RISPERIDONE 2 MG: 1 TABLET ORAL at 06:34

## 2018-01-23 RX ADMIN — SODIUM CHLORIDE 75 ML/HR: 4.5 INJECTION, SOLUTION INTRAVENOUS at 16:03

## 2018-01-23 RX ADMIN — FOLIC ACID 1 MG: 1 TABLET ORAL at 09:28

## 2018-01-23 RX ADMIN — ISOSORBIDE MONONITRATE 30 MG: 10 TABLET ORAL at 09:28

## 2018-01-23 RX ADMIN — RISPERIDONE 3 MG: 1 TABLET ORAL at 20:50

## 2018-01-23 RX ADMIN — WATER 1 G: 1 INJECTION INTRAMUSCULAR; INTRAVENOUS; SUBCUTANEOUS at 16:02

## 2018-01-23 RX ADMIN — FAMOTIDINE 20 MG: 20 TABLET, FILM COATED ORAL at 09:28

## 2018-01-23 RX ADMIN — SODIUM BICARBONATE 1300 MG: 650 TABLET ORAL at 16:01

## 2018-01-23 RX ADMIN — SODIUM CHLORIDE 75 ML/HR: 9 INJECTION, SOLUTION INTRAVENOUS at 00:54

## 2018-01-23 RX ADMIN — FERROUS SULFATE TAB 325 MG (65 MG ELEMENTAL FE) 325 MG: 325 (65 FE) TAB at 09:28

## 2018-01-23 RX ADMIN — LACTULOSE 20 G: 20 SOLUTION ORAL at 09:32

## 2018-01-24 LAB
ANION GAP SERPL CALCULATED.3IONS-SCNC: 11 MMOL/L
ANION GAP SERPL CALCULATED.3IONS-SCNC: 13.7 MMOL/L
BASOPHILS # BLD AUTO: 0.02 10*3/MM3 (ref 0–0.2)
BASOPHILS NFR BLD AUTO: 0.4 % (ref 0–1.5)
BUN BLD-MCNC: 61 MG/DL (ref 8–23)
BUN BLD-MCNC: 65 MG/DL (ref 8–23)
BUN/CREAT SERPL: 20.7 (ref 7–25)
BUN/CREAT SERPL: 22.3 (ref 7–25)
CALCIUM SPEC-SCNC: 8.4 MG/DL (ref 8.6–10.5)
CALCIUM SPEC-SCNC: 8.7 MG/DL (ref 8.6–10.5)
CHLORIDE SERPL-SCNC: 108 MMOL/L (ref 98–107)
CHLORIDE SERPL-SCNC: 109 MMOL/L (ref 98–107)
CO2 SERPL-SCNC: 18.3 MMOL/L (ref 22–29)
CO2 SERPL-SCNC: 19 MMOL/L (ref 22–29)
CREAT BLD-MCNC: 2.91 MG/DL (ref 0.57–1)
CREAT BLD-MCNC: 2.94 MG/DL (ref 0.57–1)
DEPRECATED RDW RBC AUTO: 62.9 FL (ref 37–54)
EOSINOPHIL # BLD AUTO: 0.11 10*3/MM3 (ref 0–0.7)
EOSINOPHIL NFR BLD AUTO: 2.4 % (ref 0.3–6.2)
ERYTHROCYTE [DISTWIDTH] IN BLOOD BY AUTOMATED COUNT: 19.6 % (ref 11.7–13)
GFR SERPL CREATININE-BSD FRML MDRD: 19 ML/MIN/1.73
GFR SERPL CREATININE-BSD FRML MDRD: 20 ML/MIN/1.73
GLUCOSE BLD-MCNC: 108 MG/DL (ref 65–99)
GLUCOSE BLD-MCNC: 81 MG/DL (ref 65–99)
GLUCOSE BLDC GLUCOMTR-MCNC: 100 MG/DL (ref 70–130)
GLUCOSE BLDC GLUCOMTR-MCNC: 100 MG/DL (ref 70–130)
GLUCOSE BLDC GLUCOMTR-MCNC: 77 MG/DL (ref 70–130)
GLUCOSE BLDC GLUCOMTR-MCNC: 83 MG/DL (ref 70–130)
HCT VFR BLD AUTO: 26.5 % (ref 35.6–45.5)
HGB BLD-MCNC: 8.1 G/DL (ref 11.9–15.5)
IMM GRANULOCYTES # BLD: 0.02 10*3/MM3 (ref 0–0.03)
IMM GRANULOCYTES NFR BLD: 0.4 % (ref 0–0.5)
LYMPHOCYTES # BLD AUTO: 0.85 10*3/MM3 (ref 0.9–4.8)
LYMPHOCYTES NFR BLD AUTO: 18.3 % (ref 19.6–45.3)
MCH RBC QN AUTO: 26.7 PG (ref 26.9–32)
MCHC RBC AUTO-ENTMCNC: 30.6 G/DL (ref 32.4–36.3)
MCV RBC AUTO: 87.5 FL (ref 80.5–98.2)
MONOCYTES # BLD AUTO: 0.6 10*3/MM3 (ref 0.2–1.2)
MONOCYTES NFR BLD AUTO: 12.9 % (ref 5–12)
NEUTROPHILS # BLD AUTO: 3.05 10*3/MM3 (ref 1.9–8.1)
NEUTROPHILS NFR BLD AUTO: 65.6 % (ref 42.7–76)
PLATELET # BLD AUTO: 237 10*3/MM3 (ref 140–500)
PMV BLD AUTO: 9.1 FL (ref 6–12)
POTASSIUM BLD-SCNC: 3.8 MMOL/L (ref 3.5–5.2)
POTASSIUM BLD-SCNC: 3.8 MMOL/L (ref 3.5–5.2)
RBC # BLD AUTO: 3.03 10*6/MM3 (ref 3.9–5.2)
SODIUM BLD-SCNC: 138 MMOL/L (ref 136–145)
SODIUM BLD-SCNC: 141 MMOL/L (ref 136–145)
WBC NRBC COR # BLD: 4.65 10*3/MM3 (ref 4.5–10.7)

## 2018-01-24 PROCEDURE — 25010000002 CEFEPIME PER 500 MG: Performed by: INTERNAL MEDICINE

## 2018-01-24 PROCEDURE — 85025 COMPLETE CBC W/AUTO DIFF WBC: CPT | Performed by: NURSE PRACTITIONER

## 2018-01-24 PROCEDURE — 82962 GLUCOSE BLOOD TEST: CPT

## 2018-01-24 PROCEDURE — 80048 BASIC METABOLIC PNL TOTAL CA: CPT | Performed by: NURSE PRACTITIONER

## 2018-01-24 PROCEDURE — 80048 BASIC METABOLIC PNL TOTAL CA: CPT | Performed by: INTERNAL MEDICINE

## 2018-01-24 RX ADMIN — ATORVASTATIN CALCIUM 20 MG: 20 TABLET, FILM COATED ORAL at 09:55

## 2018-01-24 RX ADMIN — SODIUM BICARBONATE 1300 MG: 650 TABLET ORAL at 09:55

## 2018-01-24 RX ADMIN — SODIUM BICARBONATE 1300 MG: 650 TABLET ORAL at 16:53

## 2018-01-24 RX ADMIN — FERROUS SULFATE TAB 325 MG (65 MG ELEMENTAL FE) 325 MG: 325 (65 FE) TAB at 09:55

## 2018-01-24 RX ADMIN — DAKIN'S SOLUTION 0.125% (QUARTER STRENGTH) 946 ML: 0.12 SOLUTION at 21:21

## 2018-01-24 RX ADMIN — SODIUM CHLORIDE 75 ML/HR: 4.5 INJECTION, SOLUTION INTRAVENOUS at 03:58

## 2018-01-24 RX ADMIN — DAKIN'S SOLUTION 0.125% (QUARTER STRENGTH) 946 ML: 0.12 SOLUTION at 10:14

## 2018-01-24 RX ADMIN — WATER 1 G: 1 INJECTION INTRAMUSCULAR; INTRAVENOUS; SUBCUTANEOUS at 14:50

## 2018-01-24 RX ADMIN — ISOSORBIDE MONONITRATE 30 MG: 10 TABLET ORAL at 09:55

## 2018-01-24 RX ADMIN — FAMOTIDINE 20 MG: 20 TABLET, FILM COATED ORAL at 09:55

## 2018-01-24 RX ADMIN — FOLIC ACID 1 MG: 1 TABLET ORAL at 09:55

## 2018-01-24 RX ADMIN — LACTULOSE 20 G: 20 SOLUTION ORAL at 09:55

## 2018-01-24 RX ADMIN — RISPERIDONE 3 MG: 1 TABLET ORAL at 21:21

## 2018-01-24 RX ADMIN — SODIUM CHLORIDE 75 ML/HR: 4.5 INJECTION, SOLUTION INTRAVENOUS at 16:20

## 2018-01-24 RX ADMIN — AMMONIUM LACTATE 1 APPLICATION: 120 CREAM TOPICAL at 23:42

## 2018-01-24 RX ADMIN — SODIUM BICARBONATE 1300 MG: 650 TABLET ORAL at 21:21

## 2018-01-24 RX ADMIN — RISPERIDONE 2 MG: 1 TABLET ORAL at 06:04

## 2018-01-25 ENCOUNTER — APPOINTMENT (OUTPATIENT)
Dept: GENERAL RADIOLOGY | Facility: HOSPITAL | Age: 63
End: 2018-01-25

## 2018-01-25 PROBLEM — C79.89 METASTATIC CANCER TO PELVIS (HCC): Status: ACTIVE | Noted: 2018-01-25

## 2018-01-25 PROBLEM — N13.30 HYDRONEPHROSIS, BILATERAL: Status: ACTIVE | Noted: 2018-01-25

## 2018-01-25 PROBLEM — N13.8 NEPHROPATHY, OBSTRUCTIVE: Status: ACTIVE | Noted: 2018-01-25

## 2018-01-25 LAB
ANION GAP SERPL CALCULATED.3IONS-SCNC: 13.4 MMOL/L
BASOPHILS # BLD AUTO: 0.01 10*3/MM3 (ref 0–0.2)
BASOPHILS NFR BLD AUTO: 0.2 % (ref 0–1.5)
BUN BLD-MCNC: 60 MG/DL (ref 8–23)
BUN/CREAT SERPL: 20.9 (ref 7–25)
CALCIUM SPEC-SCNC: 8 MG/DL (ref 8.6–10.5)
CHLORIDE SERPL-SCNC: 105 MMOL/L (ref 98–107)
CO2 SERPL-SCNC: 19.6 MMOL/L (ref 22–29)
CREAT BLD-MCNC: 2.87 MG/DL (ref 0.57–1)
DEPRECATED RDW RBC AUTO: 62.3 FL (ref 37–54)
EOSINOPHIL # BLD AUTO: 0.11 10*3/MM3 (ref 0–0.7)
EOSINOPHIL NFR BLD AUTO: 2.4 % (ref 0.3–6.2)
ERYTHROCYTE [DISTWIDTH] IN BLOOD BY AUTOMATED COUNT: 19.3 % (ref 11.7–13)
GFR SERPL CREATININE-BSD FRML MDRD: 20 ML/MIN/1.73
GLUCOSE BLD-MCNC: 101 MG/DL (ref 65–99)
GLUCOSE BLDC GLUCOMTR-MCNC: 89 MG/DL (ref 70–130)
GLUCOSE BLDC GLUCOMTR-MCNC: 93 MG/DL (ref 70–130)
HCT VFR BLD AUTO: 25.5 % (ref 35.6–45.5)
HGB BLD-MCNC: 7.8 G/DL (ref 11.9–15.5)
IMM GRANULOCYTES # BLD: 0.03 10*3/MM3 (ref 0–0.03)
IMM GRANULOCYTES NFR BLD: 0.7 % (ref 0–0.5)
LYMPHOCYTES # BLD AUTO: 0.67 10*3/MM3 (ref 0.9–4.8)
LYMPHOCYTES NFR BLD AUTO: 14.8 % (ref 19.6–45.3)
MCH RBC QN AUTO: 26.7 PG (ref 26.9–32)
MCHC RBC AUTO-ENTMCNC: 30.6 G/DL (ref 32.4–36.3)
MCV RBC AUTO: 87.3 FL (ref 80.5–98.2)
MONOCYTES # BLD AUTO: 0.73 10*3/MM3 (ref 0.2–1.2)
MONOCYTES NFR BLD AUTO: 16.1 % (ref 5–12)
NEUTROPHILS # BLD AUTO: 2.98 10*3/MM3 (ref 1.9–8.1)
NEUTROPHILS NFR BLD AUTO: 65.8 % (ref 42.7–76)
PLATELET # BLD AUTO: 231 10*3/MM3 (ref 140–500)
PMV BLD AUTO: 9.2 FL (ref 6–12)
POTASSIUM BLD-SCNC: 3.3 MMOL/L (ref 3.5–5.2)
RBC # BLD AUTO: 2.92 10*6/MM3 (ref 3.9–5.2)
SODIUM BLD-SCNC: 138 MMOL/L (ref 136–145)
WBC NRBC COR # BLD: 4.53 10*3/MM3 (ref 4.5–10.7)

## 2018-01-25 PROCEDURE — 85025 COMPLETE CBC W/AUTO DIFF WBC: CPT | Performed by: NURSE PRACTITIONER

## 2018-01-25 PROCEDURE — 80048 BASIC METABOLIC PNL TOTAL CA: CPT | Performed by: NURSE PRACTITIONER

## 2018-01-25 PROCEDURE — 94640 AIRWAY INHALATION TREATMENT: CPT

## 2018-01-25 PROCEDURE — 82962 GLUCOSE BLOOD TEST: CPT

## 2018-01-25 PROCEDURE — 25010000002 FUROSEMIDE PER 20 MG: Performed by: INTERNAL MEDICINE

## 2018-01-25 PROCEDURE — 94799 UNLISTED PULMONARY SVC/PX: CPT

## 2018-01-25 PROCEDURE — 71045 X-RAY EXAM CHEST 1 VIEW: CPT

## 2018-01-25 RX ORDER — HYDRALAZINE HYDROCHLORIDE 10 MG/1
10 TABLET, FILM COATED ORAL EVERY 6 HOURS PRN
Status: DISCONTINUED | OUTPATIENT
Start: 2018-01-25 | End: 2018-02-03 | Stop reason: HOSPADM

## 2018-01-25 RX ORDER — POTASSIUM CHLORIDE 750 MG/1
40 CAPSULE, EXTENDED RELEASE ORAL ONCE
Status: COMPLETED | OUTPATIENT
Start: 2018-01-25 | End: 2018-01-25

## 2018-01-25 RX ORDER — OXYCODONE HYDROCHLORIDE AND ACETAMINOPHEN 5; 325 MG/1; MG/1
1 TABLET ORAL EVERY 4 HOURS PRN
Status: DISCONTINUED | OUTPATIENT
Start: 2018-01-25 | End: 2018-01-29

## 2018-01-25 RX ORDER — IPRATROPIUM BROMIDE AND ALBUTEROL SULFATE 2.5; .5 MG/3ML; MG/3ML
3 SOLUTION RESPIRATORY (INHALATION)
Status: DISCONTINUED | OUTPATIENT
Start: 2018-01-25 | End: 2018-02-03 | Stop reason: HOSPADM

## 2018-01-25 RX ORDER — FUROSEMIDE 10 MG/ML
40 INJECTION INTRAMUSCULAR; INTRAVENOUS EVERY 12 HOURS
Status: DISCONTINUED | OUTPATIENT
Start: 2018-01-25 | End: 2018-02-03 | Stop reason: HOSPADM

## 2018-01-25 RX ADMIN — FAMOTIDINE 20 MG: 20 TABLET, FILM COATED ORAL at 08:13

## 2018-01-25 RX ADMIN — IPRATROPIUM BROMIDE AND ALBUTEROL SULFATE 3 ML: .5; 3 SOLUTION RESPIRATORY (INHALATION) at 21:13

## 2018-01-25 RX ADMIN — FUROSEMIDE 40 MG: 10 INJECTION, SOLUTION INTRAMUSCULAR; INTRAVENOUS at 15:00

## 2018-01-25 RX ADMIN — SODIUM CHLORIDE 75 ML/HR: 4.5 INJECTION, SOLUTION INTRAVENOUS at 05:35

## 2018-01-25 RX ADMIN — IPRATROPIUM BROMIDE AND ALBUTEROL SULFATE 3 ML: .5; 3 SOLUTION RESPIRATORY (INHALATION) at 10:11

## 2018-01-25 RX ADMIN — ISOSORBIDE MONONITRATE 30 MG: 10 TABLET ORAL at 08:13

## 2018-01-25 RX ADMIN — POTASSIUM CHLORIDE 40 MEQ: 750 CAPSULE, EXTENDED RELEASE ORAL at 15:00

## 2018-01-25 RX ADMIN — ATORVASTATIN CALCIUM 20 MG: 20 TABLET, FILM COATED ORAL at 08:13

## 2018-01-25 RX ADMIN — OXYCODONE HYDROCHLORIDE AND ACETAMINOPHEN 1 TABLET: 5; 325 TABLET ORAL at 15:42

## 2018-01-25 RX ADMIN — DAKIN'S SOLUTION 0.125% (QUARTER STRENGTH) 946 ML: 0.12 SOLUTION at 20:25

## 2018-01-25 RX ADMIN — LACTULOSE 20 G: 20 SOLUTION ORAL at 08:13

## 2018-01-25 RX ADMIN — FERROUS SULFATE TAB 325 MG (65 MG ELEMENTAL FE) 325 MG: 325 (65 FE) TAB at 08:13

## 2018-01-25 RX ADMIN — SODIUM BICARBONATE 1300 MG: 650 TABLET ORAL at 15:41

## 2018-01-25 RX ADMIN — AMMONIUM LACTATE 1 APPLICATION: 120 CREAM TOPICAL at 23:56

## 2018-01-25 RX ADMIN — DAKIN'S SOLUTION 0.125% (QUARTER STRENGTH) 946 ML: 0.12 SOLUTION at 10:25

## 2018-01-25 RX ADMIN — RISPERIDONE 3 MG: 1 TABLET ORAL at 20:25

## 2018-01-25 RX ADMIN — RISPERIDONE 2 MG: 1 TABLET ORAL at 06:36

## 2018-01-25 RX ADMIN — SODIUM BICARBONATE 1300 MG: 650 TABLET ORAL at 20:25

## 2018-01-25 RX ADMIN — IPRATROPIUM BROMIDE AND ALBUTEROL SULFATE 3 ML: .5; 3 SOLUTION RESPIRATORY (INHALATION) at 14:59

## 2018-01-25 RX ADMIN — FOLIC ACID 1 MG: 1 TABLET ORAL at 08:13

## 2018-01-25 RX ADMIN — SODIUM BICARBONATE 1300 MG: 650 TABLET ORAL at 08:13

## 2018-01-26 LAB
ANION GAP SERPL CALCULATED.3IONS-SCNC: 16.6 MMOL/L
BASOPHILS # BLD AUTO: 0 10*3/MM3 (ref 0–0.2)
BASOPHILS NFR BLD AUTO: 0 % (ref 0–1.5)
BUN BLD-MCNC: 72 MG/DL (ref 8–23)
BUN/CREAT SERPL: 25 (ref 7–25)
CALCIUM SPEC-SCNC: 8.5 MG/DL (ref 8.6–10.5)
CHLORIDE SERPL-SCNC: 109 MMOL/L (ref 98–107)
CO2 SERPL-SCNC: 18.4 MMOL/L (ref 22–29)
CREAT BLD-MCNC: 2.88 MG/DL (ref 0.57–1)
DEPRECATED RDW RBC AUTO: 61.5 FL (ref 37–54)
EOSINOPHIL # BLD AUTO: 0.09 10*3/MM3 (ref 0–0.7)
EOSINOPHIL NFR BLD AUTO: 2 % (ref 0.3–6.2)
ERYTHROCYTE [DISTWIDTH] IN BLOOD BY AUTOMATED COUNT: 19.3 % (ref 11.7–13)
GFR SERPL CREATININE-BSD FRML MDRD: 20 ML/MIN/1.73
GLUCOSE BLD-MCNC: 101 MG/DL (ref 65–99)
HCT VFR BLD AUTO: 25 % (ref 35.6–45.5)
HGB BLD-MCNC: 7.7 G/DL (ref 11.9–15.5)
IMM GRANULOCYTES # BLD: 0.05 10*3/MM3 (ref 0–0.03)
IMM GRANULOCYTES NFR BLD: 1.1 % (ref 0–0.5)
LYMPHOCYTES # BLD AUTO: 0.84 10*3/MM3 (ref 0.9–4.8)
LYMPHOCYTES NFR BLD AUTO: 19 % (ref 19.6–45.3)
MCH RBC QN AUTO: 26.6 PG (ref 26.9–32)
MCHC RBC AUTO-ENTMCNC: 30.8 G/DL (ref 32.4–36.3)
MCV RBC AUTO: 86.5 FL (ref 80.5–98.2)
MONOCYTES # BLD AUTO: 0.67 10*3/MM3 (ref 0.2–1.2)
MONOCYTES NFR BLD AUTO: 15.1 % (ref 5–12)
NEUTROPHILS # BLD AUTO: 2.78 10*3/MM3 (ref 1.9–8.1)
NEUTROPHILS NFR BLD AUTO: 62.8 % (ref 42.7–76)
PLATELET # BLD AUTO: 233 10*3/MM3 (ref 140–500)
PMV BLD AUTO: 8.9 FL (ref 6–12)
POTASSIUM BLD-SCNC: 3.8 MMOL/L (ref 3.5–5.2)
RBC # BLD AUTO: 2.89 10*6/MM3 (ref 3.9–5.2)
SODIUM BLD-SCNC: 144 MMOL/L (ref 136–145)
WBC NRBC COR # BLD: 4.43 10*3/MM3 (ref 4.5–10.7)

## 2018-01-26 PROCEDURE — 85025 COMPLETE CBC W/AUTO DIFF WBC: CPT | Performed by: NURSE PRACTITIONER

## 2018-01-26 PROCEDURE — 80048 BASIC METABOLIC PNL TOTAL CA: CPT | Performed by: NURSE PRACTITIONER

## 2018-01-26 PROCEDURE — 94799 UNLISTED PULMONARY SVC/PX: CPT

## 2018-01-26 PROCEDURE — 25010000002 FUROSEMIDE PER 20 MG: Performed by: INTERNAL MEDICINE

## 2018-01-26 RX ADMIN — FAMOTIDINE 20 MG: 20 TABLET, FILM COATED ORAL at 09:54

## 2018-01-26 RX ADMIN — IPRATROPIUM BROMIDE AND ALBUTEROL SULFATE 3 ML: .5; 3 SOLUTION RESPIRATORY (INHALATION) at 11:18

## 2018-01-26 RX ADMIN — FUROSEMIDE 40 MG: 10 INJECTION, SOLUTION INTRAMUSCULAR; INTRAVENOUS at 02:15

## 2018-01-26 RX ADMIN — DAKIN'S SOLUTION 0.125% (QUARTER STRENGTH) 946 ML: 0.12 SOLUTION at 21:16

## 2018-01-26 RX ADMIN — RISPERIDONE 3 MG: 1 TABLET ORAL at 21:10

## 2018-01-26 RX ADMIN — ISOSORBIDE MONONITRATE 30 MG: 10 TABLET ORAL at 09:54

## 2018-01-26 RX ADMIN — FOLIC ACID 1 MG: 1 TABLET ORAL at 09:54

## 2018-01-26 RX ADMIN — SODIUM BICARBONATE 1300 MG: 650 TABLET ORAL at 21:10

## 2018-01-26 RX ADMIN — IPRATROPIUM BROMIDE AND ALBUTEROL SULFATE 3 ML: .5; 3 SOLUTION RESPIRATORY (INHALATION) at 20:32

## 2018-01-26 RX ADMIN — OXYCODONE HYDROCHLORIDE AND ACETAMINOPHEN 1 TABLET: 5; 325 TABLET ORAL at 11:19

## 2018-01-26 RX ADMIN — LACTULOSE 20 G: 20 SOLUTION ORAL at 09:58

## 2018-01-26 RX ADMIN — FUROSEMIDE 40 MG: 10 INJECTION, SOLUTION INTRAMUSCULAR; INTRAVENOUS at 14:29

## 2018-01-26 RX ADMIN — IPRATROPIUM BROMIDE AND ALBUTEROL SULFATE 3 ML: .5; 3 SOLUTION RESPIRATORY (INHALATION) at 08:02

## 2018-01-26 RX ADMIN — SODIUM BICARBONATE 1300 MG: 650 TABLET ORAL at 09:55

## 2018-01-26 RX ADMIN — DAKIN'S SOLUTION 0.125% (QUARTER STRENGTH) 946 ML: 0.12 SOLUTION at 10:35

## 2018-01-26 RX ADMIN — IPRATROPIUM BROMIDE AND ALBUTEROL SULFATE 3 ML: .5; 3 SOLUTION RESPIRATORY (INHALATION) at 15:09

## 2018-01-26 RX ADMIN — SODIUM BICARBONATE 1300 MG: 650 TABLET ORAL at 16:19

## 2018-01-26 RX ADMIN — RISPERIDONE 2 MG: 1 TABLET ORAL at 06:26

## 2018-01-27 LAB
ANION GAP SERPL CALCULATED.3IONS-SCNC: 15.3 MMOL/L
BUN BLD-MCNC: 77 MG/DL (ref 8–23)
BUN/CREAT SERPL: 25.2 (ref 7–25)
CALCIUM SPEC-SCNC: 8.7 MG/DL (ref 8.6–10.5)
CHLORIDE SERPL-SCNC: 105 MMOL/L (ref 98–107)
CO2 SERPL-SCNC: 19.7 MMOL/L (ref 22–29)
CREAT BLD-MCNC: 3.06 MG/DL (ref 0.57–1)
DEPRECATED RDW RBC AUTO: 60.6 FL (ref 37–54)
ERYTHROCYTE [DISTWIDTH] IN BLOOD BY AUTOMATED COUNT: 19.5 % (ref 11.7–13)
GFR SERPL CREATININE-BSD FRML MDRD: 19 ML/MIN/1.73
GLUCOSE BLD-MCNC: 88 MG/DL (ref 65–99)
HCT VFR BLD AUTO: 25 % (ref 35.6–45.5)
HGB BLD-MCNC: 7.8 G/DL (ref 11.9–15.5)
MCH RBC QN AUTO: 26.7 PG (ref 26.9–32)
MCHC RBC AUTO-ENTMCNC: 31.2 G/DL (ref 32.4–36.3)
MCV RBC AUTO: 85.6 FL (ref 80.5–98.2)
PLATELET # BLD AUTO: 254 10*3/MM3 (ref 140–500)
PMV BLD AUTO: 9.1 FL (ref 6–12)
POTASSIUM BLD-SCNC: 3.8 MMOL/L (ref 3.5–5.2)
RBC # BLD AUTO: 2.92 10*6/MM3 (ref 3.9–5.2)
SODIUM BLD-SCNC: 140 MMOL/L (ref 136–145)
WBC NRBC COR # BLD: 4.34 10*3/MM3 (ref 4.5–10.7)

## 2018-01-27 PROCEDURE — 80048 BASIC METABOLIC PNL TOTAL CA: CPT | Performed by: INTERNAL MEDICINE

## 2018-01-27 PROCEDURE — 94799 UNLISTED PULMONARY SVC/PX: CPT

## 2018-01-27 PROCEDURE — 85027 COMPLETE CBC AUTOMATED: CPT | Performed by: INTERNAL MEDICINE

## 2018-01-27 PROCEDURE — 25010000002 FUROSEMIDE PER 20 MG: Performed by: INTERNAL MEDICINE

## 2018-01-27 RX ADMIN — OXYCODONE HYDROCHLORIDE AND ACETAMINOPHEN 1 TABLET: 5; 325 TABLET ORAL at 16:01

## 2018-01-27 RX ADMIN — IPRATROPIUM BROMIDE AND ALBUTEROL SULFATE 3 ML: .5; 3 SOLUTION RESPIRATORY (INHALATION) at 12:51

## 2018-01-27 RX ADMIN — FUROSEMIDE 40 MG: 10 INJECTION, SOLUTION INTRAMUSCULAR; INTRAVENOUS at 14:54

## 2018-01-27 RX ADMIN — DAKIN'S SOLUTION 0.125% (QUARTER STRENGTH) 946 ML: 0.12 SOLUTION at 21:30

## 2018-01-27 RX ADMIN — IPRATROPIUM BROMIDE AND ALBUTEROL SULFATE 3 ML: .5; 3 SOLUTION RESPIRATORY (INHALATION) at 21:39

## 2018-01-27 RX ADMIN — SODIUM BICARBONATE 1300 MG: 650 TABLET ORAL at 12:45

## 2018-01-27 RX ADMIN — RISPERIDONE 2 MG: 1 TABLET ORAL at 12:45

## 2018-01-27 RX ADMIN — SODIUM BICARBONATE 1300 MG: 650 TABLET ORAL at 18:50

## 2018-01-27 RX ADMIN — OXYCODONE HYDROCHLORIDE AND ACETAMINOPHEN 1 TABLET: 5; 325 TABLET ORAL at 05:52

## 2018-01-27 RX ADMIN — DAKIN'S SOLUTION 0.125% (QUARTER STRENGTH) 946 ML: 0.12 SOLUTION at 11:30

## 2018-01-27 RX ADMIN — FUROSEMIDE 40 MG: 10 INJECTION, SOLUTION INTRAMUSCULAR; INTRAVENOUS at 02:34

## 2018-01-27 RX ADMIN — IPRATROPIUM BROMIDE AND ALBUTEROL SULFATE 3 ML: .5; 3 SOLUTION RESPIRATORY (INHALATION) at 17:17

## 2018-01-27 RX ADMIN — IPRATROPIUM BROMIDE AND ALBUTEROL SULFATE 3 ML: .5; 3 SOLUTION RESPIRATORY (INHALATION) at 10:18

## 2018-01-27 RX ADMIN — FAMOTIDINE 20 MG: 20 TABLET, FILM COATED ORAL at 14:54

## 2018-01-27 RX ADMIN — ISOSORBIDE MONONITRATE 30 MG: 10 TABLET ORAL at 12:44

## 2018-01-27 RX ADMIN — RISPERIDONE 3 MG: 1 TABLET ORAL at 22:59

## 2018-01-27 RX ADMIN — OXYCODONE HYDROCHLORIDE AND ACETAMINOPHEN 1 TABLET: 5; 325 TABLET ORAL at 21:49

## 2018-01-27 RX ADMIN — FOLIC ACID 1 MG: 1 TABLET ORAL at 12:44

## 2018-01-27 RX ADMIN — SODIUM BICARBONATE 1300 MG: 650 TABLET ORAL at 22:58

## 2018-01-27 RX ADMIN — OXYCODONE HYDROCHLORIDE AND ACETAMINOPHEN 1 TABLET: 5; 325 TABLET ORAL at 10:35

## 2018-01-28 PROCEDURE — 94799 UNLISTED PULMONARY SVC/PX: CPT

## 2018-01-28 PROCEDURE — 25010000002 FUROSEMIDE PER 20 MG: Performed by: INTERNAL MEDICINE

## 2018-01-28 RX ADMIN — FAMOTIDINE 20 MG: 20 TABLET, FILM COATED ORAL at 10:12

## 2018-01-28 RX ADMIN — OXYCODONE HYDROCHLORIDE AND ACETAMINOPHEN 1 TABLET: 5; 325 TABLET ORAL at 11:17

## 2018-01-28 RX ADMIN — DAKIN'S SOLUTION 0.125% (QUARTER STRENGTH) 946 ML: 0.12 SOLUTION at 21:44

## 2018-01-28 RX ADMIN — DAKIN'S SOLUTION 0.125% (QUARTER STRENGTH) 946 ML: 0.12 SOLUTION at 11:19

## 2018-01-28 RX ADMIN — IPRATROPIUM BROMIDE AND ALBUTEROL SULFATE 3 ML: .5; 3 SOLUTION RESPIRATORY (INHALATION) at 09:18

## 2018-01-28 RX ADMIN — LACTULOSE 20 G: 20 SOLUTION ORAL at 10:13

## 2018-01-28 RX ADMIN — SODIUM BICARBONATE 1300 MG: 650 TABLET ORAL at 16:07

## 2018-01-28 RX ADMIN — OXYCODONE HYDROCHLORIDE AND ACETAMINOPHEN 1 TABLET: 5; 325 TABLET ORAL at 17:15

## 2018-01-28 RX ADMIN — FUROSEMIDE 40 MG: 10 INJECTION, SOLUTION INTRAMUSCULAR; INTRAVENOUS at 17:15

## 2018-01-28 RX ADMIN — RISPERIDONE 3 MG: 1 TABLET ORAL at 21:45

## 2018-01-28 RX ADMIN — SODIUM BICARBONATE 1300 MG: 650 TABLET ORAL at 21:45

## 2018-01-28 RX ADMIN — RISPERIDONE 2 MG: 1 TABLET ORAL at 07:35

## 2018-01-28 RX ADMIN — FOLIC ACID 1 MG: 1 TABLET ORAL at 10:13

## 2018-01-28 RX ADMIN — IPRATROPIUM BROMIDE AND ALBUTEROL SULFATE 3 ML: .5; 3 SOLUTION RESPIRATORY (INHALATION) at 16:15

## 2018-01-28 RX ADMIN — IPRATROPIUM BROMIDE AND ALBUTEROL SULFATE 3 ML: .5; 3 SOLUTION RESPIRATORY (INHALATION) at 21:54

## 2018-01-28 RX ADMIN — OXYCODONE HYDROCHLORIDE AND ACETAMINOPHEN 1 TABLET: 5; 325 TABLET ORAL at 07:35

## 2018-01-28 RX ADMIN — FUROSEMIDE 40 MG: 10 INJECTION, SOLUTION INTRAMUSCULAR; INTRAVENOUS at 05:52

## 2018-01-28 RX ADMIN — ISOSORBIDE MONONITRATE 30 MG: 10 TABLET ORAL at 10:12

## 2018-01-28 RX ADMIN — SODIUM BICARBONATE 1300 MG: 650 TABLET ORAL at 10:12

## 2018-01-28 RX ADMIN — IPRATROPIUM BROMIDE AND ALBUTEROL SULFATE 3 ML: .5; 3 SOLUTION RESPIRATORY (INHALATION) at 11:43

## 2018-01-29 LAB
ANION GAP SERPL CALCULATED.3IONS-SCNC: 14.6 MMOL/L
BUN BLD-MCNC: 77 MG/DL (ref 8–23)
BUN/CREAT SERPL: 22.8 (ref 7–25)
CALCIUM SPEC-SCNC: 8.5 MG/DL (ref 8.6–10.5)
CHLORIDE SERPL-SCNC: 101 MMOL/L (ref 98–107)
CO2 SERPL-SCNC: 21.4 MMOL/L (ref 22–29)
CREAT BLD-MCNC: 3.37 MG/DL (ref 0.57–1)
DEPRECATED RDW RBC AUTO: 64.6 FL (ref 37–54)
ERYTHROCYTE [DISTWIDTH] IN BLOOD BY AUTOMATED COUNT: 20.4 % (ref 11.7–13)
GFR SERPL CREATININE-BSD FRML MDRD: 17 ML/MIN/1.73
GLUCOSE BLD-MCNC: 76 MG/DL (ref 65–99)
HCT VFR BLD AUTO: 25 % (ref 35.6–45.5)
HGB BLD-MCNC: 7.7 G/DL (ref 11.9–15.5)
MCH RBC QN AUTO: 26.8 PG (ref 26.9–32)
MCHC RBC AUTO-ENTMCNC: 30.8 G/DL (ref 32.4–36.3)
MCV RBC AUTO: 87.1 FL (ref 80.5–98.2)
PLATELET # BLD AUTO: 247 10*3/MM3 (ref 140–500)
PMV BLD AUTO: 8.9 FL (ref 6–12)
POTASSIUM BLD-SCNC: 5.4 MMOL/L (ref 3.5–5.2)
RBC # BLD AUTO: 2.87 10*6/MM3 (ref 3.9–5.2)
SODIUM BLD-SCNC: 137 MMOL/L (ref 136–145)
WBC NRBC COR # BLD: 5.09 10*3/MM3 (ref 4.5–10.7)

## 2018-01-29 PROCEDURE — 94799 UNLISTED PULMONARY SVC/PX: CPT

## 2018-01-29 PROCEDURE — 80048 BASIC METABOLIC PNL TOTAL CA: CPT | Performed by: INTERNAL MEDICINE

## 2018-01-29 PROCEDURE — 25010000002 FUROSEMIDE PER 20 MG: Performed by: INTERNAL MEDICINE

## 2018-01-29 PROCEDURE — 85027 COMPLETE CBC AUTOMATED: CPT | Performed by: INTERNAL MEDICINE

## 2018-01-29 RX ORDER — SODIUM POLYSTYRENE SULFONATE 15 G/60ML
15 SUSPENSION ORAL; RECTAL ONCE
Status: DISCONTINUED | OUTPATIENT
Start: 2018-01-29 | End: 2018-02-03 | Stop reason: HOSPADM

## 2018-01-29 RX ORDER — OXYCODONE AND ACETAMINOPHEN 10; 325 MG/1; MG/1
1 TABLET ORAL EVERY 4 HOURS PRN
Status: DISCONTINUED | OUTPATIENT
Start: 2018-01-29 | End: 2018-02-03 | Stop reason: HOSPADM

## 2018-01-29 RX ORDER — HYDROMORPHONE HYDROCHLORIDE 1 MG/ML
0.5 INJECTION, SOLUTION INTRAMUSCULAR; INTRAVENOUS; SUBCUTANEOUS EVERY 4 HOURS PRN
Status: DISCONTINUED | OUTPATIENT
Start: 2018-01-29 | End: 2018-02-03 | Stop reason: HOSPADM

## 2018-01-29 RX ADMIN — DAKIN'S SOLUTION 0.125% (QUARTER STRENGTH) 946 ML: 0.12 SOLUTION at 17:53

## 2018-01-29 RX ADMIN — IPRATROPIUM BROMIDE AND ALBUTEROL SULFATE 3 ML: .5; 3 SOLUTION RESPIRATORY (INHALATION) at 21:35

## 2018-01-29 RX ADMIN — RISPERIDONE 2 MG: 1 TABLET ORAL at 11:04

## 2018-01-29 RX ADMIN — IPRATROPIUM BROMIDE AND ALBUTEROL SULFATE 3 ML: .5; 3 SOLUTION RESPIRATORY (INHALATION) at 16:17

## 2018-01-29 RX ADMIN — FUROSEMIDE 40 MG: 10 INJECTION, SOLUTION INTRAMUSCULAR; INTRAVENOUS at 21:39

## 2018-01-29 RX ADMIN — FAMOTIDINE 20 MG: 20 TABLET, FILM COATED ORAL at 11:05

## 2018-01-29 RX ADMIN — FUROSEMIDE 40 MG: 10 INJECTION, SOLUTION INTRAMUSCULAR; INTRAVENOUS at 06:04

## 2018-01-29 RX ADMIN — HYDROMORPHONE HYDROCHLORIDE 0.5 MG: 10 INJECTION INTRAMUSCULAR; INTRAVENOUS; SUBCUTANEOUS at 21:40

## 2018-01-29 RX ADMIN — ISOSORBIDE MONONITRATE 30 MG: 10 TABLET ORAL at 11:05

## 2018-01-29 RX ADMIN — OXYCODONE HYDROCHLORIDE AND ACETAMINOPHEN 1 TABLET: 5; 325 TABLET ORAL at 17:52

## 2018-01-29 RX ADMIN — OXYCODONE HYDROCHLORIDE AND ACETAMINOPHEN 1 TABLET: 5; 325 TABLET ORAL at 00:32

## 2018-01-29 RX ADMIN — IPRATROPIUM BROMIDE AND ALBUTEROL SULFATE 3 ML: .5; 3 SOLUTION RESPIRATORY (INHALATION) at 08:03

## 2018-01-29 RX ADMIN — SODIUM BICARBONATE 1300 MG: 650 TABLET ORAL at 21:40

## 2018-01-29 RX ADMIN — SODIUM BICARBONATE 1300 MG: 650 TABLET ORAL at 17:52

## 2018-01-29 RX ADMIN — SODIUM BICARBONATE 1300 MG: 650 TABLET ORAL at 11:05

## 2018-01-29 RX ADMIN — LACTULOSE 20 G: 20 SOLUTION ORAL at 11:05

## 2018-01-29 RX ADMIN — FOLIC ACID 1 MG: 1 TABLET ORAL at 11:05

## 2018-01-29 RX ADMIN — RISPERIDONE 3 MG: 1 TABLET ORAL at 21:40

## 2018-01-29 RX ADMIN — OXYCODONE HYDROCHLORIDE AND ACETAMINOPHEN 1 TABLET: 5; 325 TABLET ORAL at 11:04

## 2018-01-30 LAB
ANION GAP SERPL CALCULATED.3IONS-SCNC: 10.1 MMOL/L
BASOPHILS # BLD AUTO: 0.02 10*3/MM3 (ref 0–0.2)
BASOPHILS NFR BLD AUTO: 0.4 % (ref 0–1.5)
BUN BLD-MCNC: 77 MG/DL (ref 8–23)
BUN/CREAT SERPL: 23.9 (ref 7–25)
CALCIUM SPEC-SCNC: 8.5 MG/DL (ref 8.6–10.5)
CHLORIDE SERPL-SCNC: 100 MMOL/L (ref 98–107)
CO2 SERPL-SCNC: 26.9 MMOL/L (ref 22–29)
CREAT BLD-MCNC: 3.22 MG/DL (ref 0.57–1)
DEPRECATED RDW RBC AUTO: 64.2 FL (ref 37–54)
EOSINOPHIL # BLD AUTO: 0.11 10*3/MM3 (ref 0–0.7)
EOSINOPHIL NFR BLD AUTO: 2.1 % (ref 0.3–6.2)
ERYTHROCYTE [DISTWIDTH] IN BLOOD BY AUTOMATED COUNT: 20.4 % (ref 11.7–13)
GFR SERPL CREATININE-BSD FRML MDRD: 18 ML/MIN/1.73
GLUCOSE BLD-MCNC: 75 MG/DL (ref 65–99)
HCT VFR BLD AUTO: 25.2 % (ref 35.6–45.5)
HGB BLD-MCNC: 7.8 G/DL (ref 11.9–15.5)
IMM GRANULOCYTES # BLD: 0.05 10*3/MM3 (ref 0–0.03)
IMM GRANULOCYTES NFR BLD: 0.9 % (ref 0–0.5)
LYMPHOCYTES # BLD AUTO: 1.12 10*3/MM3 (ref 0.9–4.8)
LYMPHOCYTES NFR BLD AUTO: 21.1 % (ref 19.6–45.3)
MCH RBC QN AUTO: 26.9 PG (ref 26.9–32)
MCHC RBC AUTO-ENTMCNC: 31 G/DL (ref 32.4–36.3)
MCV RBC AUTO: 86.9 FL (ref 80.5–98.2)
MONOCYTES # BLD AUTO: 0.72 10*3/MM3 (ref 0.2–1.2)
MONOCYTES NFR BLD AUTO: 13.5 % (ref 5–12)
NEUTROPHILS # BLD AUTO: 3.3 10*3/MM3 (ref 1.9–8.1)
NEUTROPHILS NFR BLD AUTO: 62 % (ref 42.7–76)
PLATELET # BLD AUTO: 258 10*3/MM3 (ref 140–500)
PMV BLD AUTO: 9.2 FL (ref 6–12)
POTASSIUM BLD-SCNC: 5.4 MMOL/L (ref 3.5–5.2)
RBC # BLD AUTO: 2.9 10*6/MM3 (ref 3.9–5.2)
SODIUM BLD-SCNC: 137 MMOL/L (ref 136–145)
WBC NRBC COR # BLD: 5.32 10*3/MM3 (ref 4.5–10.7)

## 2018-01-30 PROCEDURE — 80048 BASIC METABOLIC PNL TOTAL CA: CPT | Performed by: NURSE PRACTITIONER

## 2018-01-30 PROCEDURE — 94799 UNLISTED PULMONARY SVC/PX: CPT

## 2018-01-30 PROCEDURE — 25010000002 FUROSEMIDE PER 20 MG: Performed by: INTERNAL MEDICINE

## 2018-01-30 PROCEDURE — 85025 COMPLETE CBC W/AUTO DIFF WBC: CPT | Performed by: NURSE PRACTITIONER

## 2018-01-30 RX ADMIN — DAKIN'S SOLUTION 0.125% (QUARTER STRENGTH) 946 ML: 0.12 SOLUTION at 09:49

## 2018-01-30 RX ADMIN — OXYCODONE HYDROCHLORIDE AND ACETAMINOPHEN 1 TABLET: 10; 325 TABLET ORAL at 10:00

## 2018-01-30 RX ADMIN — DAKIN'S SOLUTION 0.125% (QUARTER STRENGTH) 946 ML: 0.12 SOLUTION at 21:39

## 2018-01-30 RX ADMIN — OXYCODONE HYDROCHLORIDE AND ACETAMINOPHEN 1 TABLET: 10; 325 TABLET ORAL at 05:56

## 2018-01-30 RX ADMIN — FUROSEMIDE 40 MG: 10 INJECTION, SOLUTION INTRAMUSCULAR; INTRAVENOUS at 05:56

## 2018-01-30 RX ADMIN — IPRATROPIUM BROMIDE AND ALBUTEROL SULFATE 3 ML: .5; 3 SOLUTION RESPIRATORY (INHALATION) at 08:13

## 2018-01-30 RX ADMIN — OXYCODONE HYDROCHLORIDE AND ACETAMINOPHEN 1 TABLET: 10; 325 TABLET ORAL at 20:22

## 2018-01-30 RX ADMIN — IPRATROPIUM BROMIDE AND ALBUTEROL SULFATE 3 ML: .5; 3 SOLUTION RESPIRATORY (INHALATION) at 20:23

## 2018-01-30 RX ADMIN — ISOSORBIDE MONONITRATE 30 MG: 10 TABLET ORAL at 09:58

## 2018-01-30 RX ADMIN — OXYCODONE HYDROCHLORIDE AND ACETAMINOPHEN 1 TABLET: 10; 325 TABLET ORAL at 16:20

## 2018-01-30 RX ADMIN — OXYCODONE HYDROCHLORIDE AND ACETAMINOPHEN 1 TABLET: 10; 325 TABLET ORAL at 00:41

## 2018-01-30 RX ADMIN — HYDROMORPHONE HYDROCHLORIDE 0.5 MG: 10 INJECTION INTRAMUSCULAR; INTRAVENOUS; SUBCUTANEOUS at 04:55

## 2018-01-30 RX ADMIN — IPRATROPIUM BROMIDE AND ALBUTEROL SULFATE 3 ML: .5; 3 SOLUTION RESPIRATORY (INHALATION) at 12:06

## 2018-01-30 RX ADMIN — SODIUM BICARBONATE 1300 MG: 650 TABLET ORAL at 20:22

## 2018-01-30 RX ADMIN — HYDROMORPHONE HYDROCHLORIDE 0.5 MG: 10 INJECTION INTRAMUSCULAR; INTRAVENOUS; SUBCUTANEOUS at 09:04

## 2018-01-30 RX ADMIN — SODIUM BICARBONATE 1300 MG: 650 TABLET ORAL at 09:58

## 2018-01-30 RX ADMIN — FOLIC ACID 1 MG: 1 TABLET ORAL at 09:59

## 2018-01-30 RX ADMIN — FUROSEMIDE 40 MG: 10 INJECTION, SOLUTION INTRAMUSCULAR; INTRAVENOUS at 19:02

## 2018-01-30 RX ADMIN — RISPERIDONE 3 MG: 1 TABLET ORAL at 20:22

## 2018-01-30 RX ADMIN — RISPERIDONE 2 MG: 1 TABLET ORAL at 07:54

## 2018-01-30 RX ADMIN — SODIUM BICARBONATE 1300 MG: 650 TABLET ORAL at 16:20

## 2018-01-30 RX ADMIN — IPRATROPIUM BROMIDE AND ALBUTEROL SULFATE 3 ML: .5; 3 SOLUTION RESPIRATORY (INHALATION) at 16:34

## 2018-01-30 RX ADMIN — FAMOTIDINE 20 MG: 20 TABLET, FILM COATED ORAL at 09:58

## 2018-01-30 RX ADMIN — DAKIN'S SOLUTION 0.125% (QUARTER STRENGTH) 946 ML: 0.12 SOLUTION at 05:52

## 2018-01-31 PROCEDURE — 94799 UNLISTED PULMONARY SVC/PX: CPT

## 2018-01-31 PROCEDURE — 25010000002 FUROSEMIDE PER 20 MG: Performed by: INTERNAL MEDICINE

## 2018-01-31 RX ADMIN — IPRATROPIUM BROMIDE AND ALBUTEROL SULFATE 3 ML: .5; 3 SOLUTION RESPIRATORY (INHALATION) at 20:05

## 2018-01-31 RX ADMIN — OXYCODONE HYDROCHLORIDE AND ACETAMINOPHEN 1 TABLET: 10; 325 TABLET ORAL at 12:00

## 2018-01-31 RX ADMIN — DAKIN'S SOLUTION 0.125% (QUARTER STRENGTH) 946 ML: 0.12 SOLUTION at 09:02

## 2018-01-31 RX ADMIN — OXYCODONE HYDROCHLORIDE AND ACETAMINOPHEN 1 TABLET: 10; 325 TABLET ORAL at 00:26

## 2018-01-31 RX ADMIN — OXYCODONE HYDROCHLORIDE AND ACETAMINOPHEN 1 TABLET: 10; 325 TABLET ORAL at 05:47

## 2018-01-31 RX ADMIN — RISPERIDONE 2 MG: 1 TABLET ORAL at 08:15

## 2018-01-31 RX ADMIN — IPRATROPIUM BROMIDE AND ALBUTEROL SULFATE 3 ML: .5; 3 SOLUTION RESPIRATORY (INHALATION) at 15:43

## 2018-01-31 RX ADMIN — FOLIC ACID 1 MG: 1 TABLET ORAL at 08:15

## 2018-01-31 RX ADMIN — FAMOTIDINE 20 MG: 20 TABLET, FILM COATED ORAL at 08:15

## 2018-01-31 RX ADMIN — SODIUM BICARBONATE 1300 MG: 650 TABLET ORAL at 08:15

## 2018-01-31 RX ADMIN — FUROSEMIDE 40 MG: 10 INJECTION, SOLUTION INTRAMUSCULAR; INTRAVENOUS at 17:58

## 2018-01-31 RX ADMIN — IPRATROPIUM BROMIDE AND ALBUTEROL SULFATE 3 ML: .5; 3 SOLUTION RESPIRATORY (INHALATION) at 07:28

## 2018-01-31 RX ADMIN — RISPERIDONE 3 MG: 1 TABLET ORAL at 20:50

## 2018-01-31 RX ADMIN — SODIUM BICARBONATE 1300 MG: 650 TABLET ORAL at 17:58

## 2018-01-31 RX ADMIN — SODIUM BICARBONATE 1300 MG: 650 TABLET ORAL at 20:50

## 2018-01-31 RX ADMIN — HYDROMORPHONE HYDROCHLORIDE 0.5 MG: 10 INJECTION INTRAMUSCULAR; INTRAVENOUS; SUBCUTANEOUS at 16:34

## 2018-01-31 RX ADMIN — LACTULOSE 20 G: 20 SOLUTION ORAL at 08:15

## 2018-01-31 RX ADMIN — OXYCODONE HYDROCHLORIDE AND ACETAMINOPHEN 1 TABLET: 10; 325 TABLET ORAL at 20:53

## 2018-01-31 RX ADMIN — DAKIN'S SOLUTION 0.125% (QUARTER STRENGTH) 946 ML: 0.12 SOLUTION at 20:57

## 2018-01-31 RX ADMIN — HYDROMORPHONE HYDROCHLORIDE 0.5 MG: 10 INJECTION INTRAMUSCULAR; INTRAVENOUS; SUBCUTANEOUS at 04:35

## 2018-01-31 RX ADMIN — IPRATROPIUM BROMIDE AND ALBUTEROL SULFATE 3 ML: .5; 3 SOLUTION RESPIRATORY (INHALATION) at 11:16

## 2018-01-31 RX ADMIN — FUROSEMIDE 40 MG: 10 INJECTION, SOLUTION INTRAMUSCULAR; INTRAVENOUS at 05:42

## 2018-02-01 LAB
ANION GAP SERPL CALCULATED.3IONS-SCNC: 13.3 MMOL/L
ANISOCYTOSIS BLD QL: NORMAL
BASOPHILS # BLD AUTO: 0.04 10*3/MM3 (ref 0–0.2)
BASOPHILS NFR BLD AUTO: 0.6 % (ref 0–1.5)
BUN BLD-MCNC: 73 MG/DL (ref 8–23)
BUN/CREAT SERPL: 20.8 (ref 7–25)
CALCIUM SPEC-SCNC: 8.8 MG/DL (ref 8.6–10.5)
CHLORIDE SERPL-SCNC: 105 MMOL/L (ref 98–107)
CO2 SERPL-SCNC: 28.7 MMOL/L (ref 22–29)
CREAT BLD-MCNC: 3.51 MG/DL (ref 0.57–1)
DEPRECATED RDW RBC AUTO: 65.2 FL (ref 37–54)
EOSINOPHIL # BLD AUTO: 0.09 10*3/MM3 (ref 0–0.7)
EOSINOPHIL NFR BLD AUTO: 1.4 % (ref 0.3–6.2)
ERYTHROCYTE [DISTWIDTH] IN BLOOD BY AUTOMATED COUNT: 21.1 % (ref 11.7–13)
GFR SERPL CREATININE-BSD FRML MDRD: 16 ML/MIN/1.73
GLUCOSE BLD-MCNC: 81 MG/DL (ref 65–99)
HCT VFR BLD AUTO: 25.5 % (ref 35.6–45.5)
HGB BLD-MCNC: 8 G/DL (ref 11.9–15.5)
HYPOCHROMIA BLD QL: NORMAL
IMM GRANULOCYTES # BLD: 0.07 10*3/MM3 (ref 0–0.03)
IMM GRANULOCYTES NFR BLD: 1.1 % (ref 0–0.5)
LYMPHOCYTES # BLD AUTO: 1.05 10*3/MM3 (ref 0.9–4.8)
LYMPHOCYTES NFR BLD AUTO: 16.1 % (ref 19.6–45.3)
MCH RBC QN AUTO: 26.7 PG (ref 26.9–32)
MCHC RBC AUTO-ENTMCNC: 31.4 G/DL (ref 32.4–36.3)
MCV RBC AUTO: 85 FL (ref 80.5–98.2)
MONOCYTES # BLD AUTO: 0.43 10*3/MM3 (ref 0.2–1.2)
MONOCYTES NFR BLD AUTO: 6.6 % (ref 5–12)
NEUTROPHILS # BLD AUTO: 4.85 10*3/MM3 (ref 1.9–8.1)
NEUTROPHILS NFR BLD AUTO: 74.2 % (ref 42.7–76)
NRBC BLD MANUAL-RTO: 0 /100 WBC (ref 0–0)
PLAT MORPH BLD: NORMAL
PLATELET # BLD AUTO: 285 10*3/MM3 (ref 140–500)
PMV BLD AUTO: 10 FL (ref 6–12)
POTASSIUM BLD-SCNC: 5.4 MMOL/L (ref 3.5–5.2)
RBC # BLD AUTO: 3 10*6/MM3 (ref 3.9–5.2)
SODIUM BLD-SCNC: 147 MMOL/L (ref 136–145)
WBC MORPH BLD: NORMAL
WBC NRBC COR # BLD: 6.53 10*3/MM3 (ref 4.5–10.7)

## 2018-02-01 PROCEDURE — 25010000002 FUROSEMIDE PER 20 MG: Performed by: INTERNAL MEDICINE

## 2018-02-01 PROCEDURE — 94799 UNLISTED PULMONARY SVC/PX: CPT

## 2018-02-01 PROCEDURE — 80048 BASIC METABOLIC PNL TOTAL CA: CPT | Performed by: NURSE PRACTITIONER

## 2018-02-01 PROCEDURE — 85007 BL SMEAR W/DIFF WBC COUNT: CPT | Performed by: NURSE PRACTITIONER

## 2018-02-01 PROCEDURE — 25010000002 HYDROMORPHONE PER 4 MG: Performed by: INTERNAL MEDICINE

## 2018-02-01 PROCEDURE — 85025 COMPLETE CBC W/AUTO DIFF WBC: CPT | Performed by: NURSE PRACTITIONER

## 2018-02-01 RX ADMIN — IPRATROPIUM BROMIDE AND ALBUTEROL SULFATE 3 ML: .5; 3 SOLUTION RESPIRATORY (INHALATION) at 07:45

## 2018-02-01 RX ADMIN — FUROSEMIDE 40 MG: 10 INJECTION, SOLUTION INTRAMUSCULAR; INTRAVENOUS at 06:22

## 2018-02-01 RX ADMIN — FOLIC ACID 1 MG: 1 TABLET ORAL at 09:48

## 2018-02-01 RX ADMIN — OXYCODONE HYDROCHLORIDE AND ACETAMINOPHEN 1 TABLET: 10; 325 TABLET ORAL at 04:45

## 2018-02-01 RX ADMIN — RISPERIDONE 3 MG: 1 TABLET ORAL at 21:33

## 2018-02-01 RX ADMIN — RISPERIDONE 2 MG: 1 TABLET ORAL at 06:22

## 2018-02-01 RX ADMIN — FAMOTIDINE 20 MG: 20 TABLET, FILM COATED ORAL at 09:46

## 2018-02-01 RX ADMIN — ISOSORBIDE MONONITRATE 30 MG: 10 TABLET ORAL at 09:45

## 2018-02-01 RX ADMIN — IPRATROPIUM BROMIDE AND ALBUTEROL SULFATE 3 ML: .5; 3 SOLUTION RESPIRATORY (INHALATION) at 21:37

## 2018-02-01 RX ADMIN — OXYCODONE HYDROCHLORIDE AND ACETAMINOPHEN 1 TABLET: 10; 325 TABLET ORAL at 21:32

## 2018-02-01 RX ADMIN — DAKIN'S SOLUTION 0.125% (QUARTER STRENGTH) 946 ML: 0.12 SOLUTION at 09:49

## 2018-02-01 RX ADMIN — SODIUM BICARBONATE 1300 MG: 650 TABLET ORAL at 09:45

## 2018-02-01 RX ADMIN — IPRATROPIUM BROMIDE AND ALBUTEROL SULFATE 3 ML: .5; 3 SOLUTION RESPIRATORY (INHALATION) at 15:43

## 2018-02-01 RX ADMIN — LACTULOSE 20 G: 20 SOLUTION ORAL at 09:48

## 2018-02-01 RX ADMIN — FUROSEMIDE 40 MG: 10 INJECTION, SOLUTION INTRAMUSCULAR; INTRAVENOUS at 19:06

## 2018-02-01 RX ADMIN — IPRATROPIUM BROMIDE AND ALBUTEROL SULFATE 3 ML: .5; 3 SOLUTION RESPIRATORY (INHALATION) at 13:08

## 2018-02-01 RX ADMIN — SODIUM BICARBONATE 1300 MG: 650 TABLET ORAL at 21:32

## 2018-02-01 RX ADMIN — OXYCODONE HYDROCHLORIDE AND ACETAMINOPHEN 1 TABLET: 10; 325 TABLET ORAL at 16:46

## 2018-02-01 RX ADMIN — HYDROMORPHONE HYDROCHLORIDE 0.5 MG: 10 INJECTION INTRAMUSCULAR; INTRAVENOUS; SUBCUTANEOUS at 14:00

## 2018-02-01 RX ADMIN — OXYCODONE HYDROCHLORIDE AND ACETAMINOPHEN 1 TABLET: 10; 325 TABLET ORAL at 09:46

## 2018-02-01 RX ADMIN — SODIUM BICARBONATE 1300 MG: 650 TABLET ORAL at 16:46

## 2018-02-01 RX ADMIN — DAKIN'S SOLUTION 0.125% (QUARTER STRENGTH) 946 ML: 0.12 SOLUTION at 21:33

## 2018-02-01 NOTE — PROGRESS NOTES
Discharge Planning Assessment  [facility]     Patient Name: Lupe Burleson  MRN: [id number]  Today's Date: 2/1/2018    Admit Date: 1/17/2018          Discharge Needs Assessment     None            Discharge Plan       02/01/18 1536    Case Management/Social Work Plan    Additional Comments Received a call and fax from [facility] Department of Aging and Independent Living states approval for Hospice and Comfort Care Approval for Lupe Burleson. (Please see the blue chart for a coy of HIM/Media was also faxed a copy of the ruling. Notified RN/Alexa TAFOYA and she will notify Dr. Barcenas of the ruling. PIPER Hodge Rn, CCP.         Discharge Placement     Facility/Agency Request Status Selected? Address Phone Number Fax Number    [facility]     [address] [facility] [address] [phone]                Demographic Summary     None            Functional Status     None            Psychosocial     None            Abuse/Neglect     None            Legal     None            Substance Abuse     None            Patient Forms     None          Alicia Hodge, MIGUE

## 2018-02-01 NOTE — PROGRESS NOTES
"DAILY PROGRESS NOTE  [facility], [facility]    2018    Patient Identification:  Name: Lupe Burleson  Age: 62 y.o.  Sex: female  :  1955  MRN: [id number]           Primary Care Physician: Shane Barcenas MD    Subjective:    Interval History:    No major changes.  Awake this morning. Responding verbally.   Pain better controlled, but has generally escalated.   State has approved DNR, but NOT palliative care yet, awaiting  decision  Continued Dakin's dressing changes to sacral wound.  Creatinine and potassium still trending up    ROS:   Denies CP, SOA.  Nsg reports no vomiting or diarrhea.     Objective:    Scheduled Meds:    famotidine 20 mg Oral Daily   folic acid 1 mg Oral Daily   furosemide 40 mg Intravenous Q12H   ipratropium-albuterol 3 mL Nebulization 4x Daily - RT   isosorbide mononitrate 30 mg Per G Tube Daily   lactulose 20 g Oral Daily   risperiDONE 2 mg Oral QAM   risperiDONE 3 mg Oral Nightly   sodium bicarbonate 1,300 mg Oral TID   sodium hypochlorite 946 mL Irrigation Q12H   sodium polystyrene 15 g Oral Once       Continuous Infusions:       PRN Meds:  •  ammonium lactate  •  dextrose  •  dextrose  •  glucagon (human recombinant)  •  hydrALAZINE  •  HYDROmorphone  •  ipratropium-albuterol  •  oxyCODONE-acetaminophen    Intake/Output:    Intake/Output Summary (Last 24 hours) at 18 2325  Last data filed at 18 1729   Gross per 24 hour   Intake             1360 ml   Output             2500 ml   Net            -1140 ml         Exam:    tMax 24 hrs: Temp (24hrs), Av.2 °F (36.8 °C), Min:97.5 °F (36.4 °C), Max:98.6 °F (37 °C)    Vitals Ranges:   Temp:  [97.5 °F (36.4 °C)-98.6 °F (37 °C)] 97.5 °F (36.4 °C)  Heart Rate:  [68-80] 68  Resp:  [16-20] 18  BP: (128-148)/(60-83) 128/60    /60 (BP Location: Right arm)  Pulse 68  Temp 97.5 °F (36.4 °C) (Oral)   Resp 18  Ht 160 cm (63\")  Wt 107 kg (235 lb 14.3 oz)  SpO2 95%  BMI " 41.79 kg/m2    General: Sleeping, no distress  Neck: Supple, symmetrical, trachea midline, no adenopathy;              thyroid:  no enlargement/tenderness/nodules;              no carotid bruit or JVD  Cardiovascular: Normal rate, regular rhythm and intact distal pulses.              Exam reveals no gallop and no friction rub. No murmur heard  Pulmonary: diminished bilaterally with rhonchi right base. Wheezing bilaterally.  Abdominal:  Soft, non-tender, bowel sounds active all four quadrants,     no masses, no hepatomegaly, no splenomegaly.   Extremities: Normal, atraumatic, no cyanosis. + edema  Neurological: Patient is oriented to person and place. .                 dysphagic, normal strength, sensation intact throughout  Skin: Cocyx/sacral decubitus covered with dressing.    Data Review:      Results from last 7 days  Lab Units 02/01/18  0704 01/30/18  0604 01/29/18  0636   WBC 10*3/mm3 6.53 5.32 5.09   HEMOGLOBIN g/dL 8.0* 7.8* 7.7*   HEMATOCRIT % 25.5* 25.2* 25.0*   PLATELETS 10*3/mm3 285 258 247         Results from last 7 days  Lab Units 02/01/18  0704 01/30/18  0604 01/29/18  0636   SODIUM mmol/L 147* 137 137   POTASSIUM mmol/L 5.4* 5.4* 5.4*   CHLORIDE mmol/L 105 100 101   CO2 mmol/L 28.7 26.9 21.4*   BUN mg/dL 73* 77* 77*   CREATININE mg/dL 3.51* 3.22* 3.37*   CALCIUM mg/dL 8.8 8.5* 8.5*   GLUCOSE mg/dL 81 75 76       Estimated Creatinine Clearance: 19 mL/min (by C-G formula based on Cr of 3.51).            Lab Results  Lab Value Date/Time   TROPONINT 0.155 (C) 01/03/2018 1315   TROPONINT 0.177 (C) 01/03/2018 0612   TROPONINT 0.182 (C) 01/02/2018 1945   TROPONINT 0.065 (H) 05/17/2017 0505   TROPONINT 0.077 (H) 05/16/2017 1517   TROPONINT 0.080 (H) 05/16/2017 1112   TROPONINT 0.078 (H) 05/16/2017 0703   TROPONINT 0.084 (H) 05/16/2017 0116   TROPONINT <0.010 05/15/2016 0622   TROPONINT 0.017 05/14/2016 0437   TROPONINT 0.023 05/13/2016 2036   TROPONINT 0.031 (H) 05/13/2016 1231   TROPONINT <0.01 02/03/2016  0537   TROPONINT <0.01 02/02/2016 0914   TROPONINT <0.01 11/11/2015 0516   TROPONINT <0.01 11/09/2015 2049   TROPONINT <0.01 04/18/2014 0517       Microbiology Results (last 10 days)     ** No results found for the last 240 hours. **        BILATERAL RENAL SONOGRAM      HISTORY: Acute on chronic kidney disease.      TECHNIQUE: Bilateral renal sonogram was performed in standard fashion  and is correlated with CT scan performed 11/18/2017 and 09/01/2017.      FINDINGS: There is moderate-to-severe hydronephrosis bilaterally. Right  kidney measures 11.2 x 6.7 x 6.0 cm and the left kidney measures 11.4 x  5.9 x 5.6 cm. No focal renal lesion is identified. No obstructing lesion  is identified along the visualized proximal ureters but most of the  course of the ureters is excluded.      The bladder is decompressed. There appears to be some intermediate  echogenicity material along the dependent portion of the urinary  bladder. This could represent blood or purulent material, or could  represent thickened bladder wall.      IMPRESSION:  Bilateral hydronephrosis with an abnormal appearing urinary  bladder which appears decompressed but contains some material which may  represent a thickened bladder wall, hemorrhage, or purulent material.        CT scan 01/23/2018    CT ABDOMEN AND PELVIS WITHOUT CONTRAST      HISTORY: Bilateral hydronephrosis. Bladder outlet symptoms. Bilateral  lower extremity edema. Patient has history of endometrial cancer.      TECHNIQUE: Axial CT images of the abdomen and pelvis were obtained  without administration of intravenous contrast. The patient was was not  given oral contrast. Coronal and sagittal reformats were obtained.      COMPARISON: 11/18/2017      FINDINGS: Limited secondary to absence of contrast and streak artifact.  Bilateral adrenal glands are normal. There is bilateral moderate  hydroureteronephrosis that has progressed in the interim from prior  imaging. Both ureters are dilated to  the level of the pelvis where there  is marked asymmetric urinary bladder wall thickening present that is  almost masslike and especially well demonstrated on the sagittal images.  Additionally, there is perivesicular stranding present. There is also  irregular soft tissue density seen along the left lateral pelvic  sidewall measuring approximately 3.3 x 2.7 cm. The sigmoid colon also  appears to be circumferentially thickened and adherent at this level.  There is no evidence of bowel obstruction. There are enlarged  retroperitoneal and pelvic lymph nodes also present. An index right  external iliac lymph node measures up to 1.2 cm. An index left  para-aortic lymph node measures 3.0 x 1.3 cm not significantly changed  since prior imaging. Enlarged inguinal lymph nodes are also present with  an index lymph node on the right side measuring up to 2.5 x 1.7 cm.  There is mild diffuse anasarca present. The liver demonstrates normal  attenuation. Low-density lesion is again seen in the liver unchanged  from prior imaging. Cholelithiasis is present. The patient has a  percutaneous gastrostomy tube in place that appears appropriate in  location. The spleen, gallbladder and the pancreas are normal. Patient  has a decubitus midline sacral ulcer. L4-5 degenerative disc disease  with vacuum disc phenomena. There are bilateral layering pleural  effusions with bibasilar atelectasis.      IMPRESSION:  1. Although this exam is extremely limited secondary to streak artifact  and motion as well as absence of contrast, the findings are concerning  for  recurrence of patient's malignancy. There is significant wall  thickening of the bladder which may represent tumor involvement and/or  severe cystitis. There is additionally soft tissue seen along the left  pelvic sidewall. There are multiple enlarged lymph nodes seen within the  pelvis and the retroperitoneum which are highly concerning for  metastatic disease. There is no current  evidence of bowel obstruction.  There is bilateral moderate hydronephrosis which has progressed since  prior CT. Cystoscopy and/or biopsy should be performed for further  evaluation.  2. Midline sacral decubitus ulcer.      3. Bilateral small layering pleural effusions with bibasilar  atelectasis.    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 Awaiting state approval for palliative care.    Renal function worsening, non candidate for HD.  Increasing pain.  Will continue to monitor and correct electrolytes  O2 to maintain comfort and sats.   Adjust insulin as needed  Monitor mental status  Continue home medications  DVT/stress ulcer prophylaxis   Await for palliative care approval.  To NH once palliative care is approved.          Shane Barcenas MD  2/1/2018

## 2018-02-01 NOTE — PROGRESS NOTES
"   LOS: 14 days    Patient Care Team:  Shane Barcenas MD as PCP - General  Shane Barcenas MD as PCP - Family Medicine    Chief Complaint:  No chief complaint on file.      Subjective   Follow up EVER    Interval History:   Awake, does nod to some questions.  Awaiting palliative care with her legal state guarding.     Objective     Vital Signs  Temp:  [98.4 °F (36.9 °C)-98.6 °F (37 °C)] 98.6 °F (37 °C)  Heart Rate:  [66-78] 70  Resp:  [16-20] 20  BP: (133-148)/(67-83) 148/83    Flowsheet Rows         First Filed Value    Admission Height  160 cm (63\") Documented at 01/17/2018 1825    Admission Weight  107 kg (235 lb 14.3 oz) Documented at 01/18/2018 1259          I/O this shift:  In: 600 [P.O.:600]  Out: 750 [Urine:750]  I/O last 3 completed shifts:  In: 1290 [P.O.:1240; Other:50]  Out: 3350 [Urine:3350]    Intake/Output Summary (Last 24 hours) at 02/01/18 1455  Last data filed at 02/01/18 1140   Gross per 24 hour   Intake             1000 ml   Output             2050 ml   Net            -1050 ml       Physical Exam:  gen - NAD, flat affect, awake.   Neck supple no jvd  Lungs Bilateral exp wheezes,  Scattered rales.   Heart RRR no s3 or rub.   Abd Distended, + bs, body wall edema.   Ext  1+ lower ext edema     Results Review:      Results from last 7 days  Lab Units 02/01/18  0704 01/30/18  0604 01/29/18  0636   SODIUM mmol/L 147* 137 137   POTASSIUM mmol/L 5.4* 5.4* 5.4*   CHLORIDE mmol/L 105 100 101   CO2 mmol/L 28.7 26.9 21.4*   BUN mg/dL 73* 77* 77*   CREATININE mg/dL 3.51* 3.22* 3.37*   CALCIUM mg/dL 8.8 8.5* 8.5*   GLUCOSE mg/dL 81 75 76       Estimated Creatinine Clearance: 19 mL/min (by C-G formula based on Cr of 3.51).                  Results from last 7 days  Lab Units 02/01/18  0704 01/30/18  0604 01/29/18  0636 01/27/18  0606 01/26/18  0644   WBC 10*3/mm3 6.53 5.32 5.09 4.34* 4.43*   HEMOGLOBIN g/dL 8.0* 7.8* 7.7* 7.8* 7.7*   PLATELETS 10*3/mm3 285 258 247 254 233               Imaging Results (last " 24 hours)     ** No results found for the last 24 hours. **          famotidine 20 mg Oral Daily   folic acid 1 mg Oral Daily   furosemide 40 mg Intravenous Q12H   ipratropium-albuterol 3 mL Nebulization 4x Daily - RT   isosorbide mononitrate 30 mg Per G Tube Daily   lactulose 20 g Oral Daily   risperiDONE 2 mg Oral QAM   risperiDONE 3 mg Oral Nightly   sodium bicarbonate 1,300 mg Oral TID   sodium hypochlorite 946 mL Irrigation Q12H   sodium polystyrene 15 g Oral Once          Medication Review:   Current Facility-Administered Medications   Medication Dose Route Frequency Provider Last Rate Last Dose   • ammonium lactate (AMLACTIN) cream 1 application  1 application Topical Q12H PRMARCUS Barcenas MD   1 application at 01/25/18 2356   • dextrose (D50W) solution 25 g  25 g Intravenous Q15 Min PRMARCUS Barcenas MD       • dextrose (GLUTOSE) oral gel 15 g  15 g Oral Q15 Min PRMARCUS Barcenas MD       • famotidine (PEPCID) tablet 20 mg  20 mg Oral Daily Kevin Howell MD   20 mg at 02/01/18 0946   • folic acid (FOLVITE) tablet 1 mg  1 mg Oral Daily Shane Barcenas MD   1 mg at 02/01/18 0948   • furosemide (LASIX) injection 40 mg  40 mg Intravenous Q12H Shane Barcenas MD   40 mg at 02/01/18 0622   • glucagon (human recombinant) (GLUCAGEN DIAGNOSTIC) injection 1 mg  1 mg Subcutaneous Q15 Min PRMARCUS Barcenas MD       • hydrALAZINE (APRESOLINE) tablet 10 mg  10 mg Oral Q6H PRN Shane Barcenas MD       • HYDROmorphone (DILAUDID) injection 0.5 mg  0.5 mg Intravenous Q4H PRMARCUS Barcenas MD   0.5 mg at 02/01/18 1400   • ipratropium-albuterol (DUO-NEB) nebulizer solution 3 mL  3 mL Nebulization Q4H PRN Shane Barcenas MD       • ipratropium-albuterol (DUO-NEB) nebulizer solution 3 mL  3 mL Nebulization 4x Daily - RT VASHTI Heller   3 mL at 02/01/18 1308   • isosorbide mononitrate (ISMO,MONOKET) tablet 30 mg  30 mg Per G Tube Daily Shane Barcenas MD   30 mg at 02/01/18 0999   •  lactulose (CHRONULAC) 10 GM/15ML solution 20 g  20 g Oral Daily Shane Barcenas MD   20 g at 02/01/18 0948   • oxyCODONE-acetaminophen (PERCOCET)  MG per tablet 1 tablet  1 tablet Oral Q4H PRN Shane Barcenas MD   1 tablet at 02/01/18 0946   • risperiDONE (risperDAL) tablet 2 mg  2 mg Oral QAM Shane Barcenas MD   2 mg at 02/01/18 0622   • risperiDONE (risperDAL) tablet 3 mg  3 mg Oral Nightly Shane Barcenas MD   3 mg at 01/31/18 2050   • sodium bicarbonate tablet 1,300 mg  1,300 mg Oral TID Kevin Howell MD   1,300 mg at 02/01/18 0945   • Sodium Hypochloride 0.0625 % (Dakins 1/8th Strength)  946 mL Irrigation Q12H Shane Barcenas MD   946 mL at 02/01/18 0949   • sodium polystyrene (KAYEXALATE) 15 GM/60ML suspension 15 g  15 g Oral Once Lizzy Hewitt, VASHTI           Assessment/Plan   1.  Acute kidney injury -  due to obstruction with severe bilateral hydronephrosis.  Crt stable, non-oliguric.   Do not think any intervention will change outcome of her malignancy, which has progressed since Nov. 2017.       2.  Chronic kidney disease stage 3 - BL Cr 1.1 mg/dL as of Nov 2017  3.  Metabolic acidosis - po bicarb.   4.  Diabetes mellitus 2  5.  History of paranoid schizophrenia.  6.  Stage IV sacral decubitus ulcer   7.  GERD - on PPI   8.  Anemia of CKD - Hgb 7.7    Plan    Patient was changed to DNR/DNI.  Comfort care status was requested.  Not candidate for dialysis and no need for it  Continue current management.  Surveillance labs      Gallito Orr MD  02/01/18  2:55 PM

## 2018-02-01 NOTE — PROGRESS NOTES
Discharge Planning Assessment  [facility]     Patient Name: Lupe Burleson  MRN: [id number]  Today's Date: 2/1/2018    Admit Date: 1/17/2018          Discharge Needs Assessment     None            Discharge Plan       02/01/18 1032    Case Management/Social Work Plan    Additional Comments Spoke with [facility] Department for Aging and Independent Living [phone] ext [phone] and she states she has not received the signed order from  yet. CCP will continue to follow for any needs that may arise. PIPER Hodge RN, CCP        Discharge Placement     Facility/Agency Request Status Selected? Address Phone Number Fax Number    [facility]     [address] [facility] [address] [phone]                Demographic Summary     None            Functional Status     None            Psychosocial     None            Abuse/Neglect     None            Legal     None            Substance Abuse     None            Patient Forms     None          Alicia Hodge RN

## 2018-02-01 NOTE — PROGRESS NOTES
Discharge Planning Assessment  [facility]     Patient Name: Lupe Burleson  MRN: [id number]  Today's Date: 2/1/2018    Admit Date: 1/17/2018          Discharge Needs Assessment     None            Discharge Plan       02/01/18 1536    Case Management/Social Work Plan    Additional Comments Received a call and fax from Josiane/[facility]ili[facility] and [facility] Services Department of Aging and Independent Living states approval for Hospice and Comfort Care Approval for Lupe Burleson. (Please see the blue chart for a coy of HIM/Media was also faxed a copy of the ruling. Notified RN/Alexa TAFOYA and she will notify Dr. Barcenas of the ruling. PIPER Hodge Rn, CCP.         Discharge Placement     Facility/Agency Request Status Selected? Address Phone Number Fax Number    [facility]     [address] [facility] [address] [phone]                Demographic Summary     None            Functional Status     None            Psychosocial     None            Abuse/Neglect     None            Legal     None            Substance Abuse     None            Patient Forms     None          Alicia Hodge, MIGUE

## 2018-02-01 NOTE — PLAN OF CARE
Problem: Patient Care Overview (Adult)  Goal: Plan of Care Review  Outcome: Ongoing (interventions implemented as appropriate)   02/01/18 3365   Coping/Psychosocial Response Interventions   Plan Of Care Reviewed With patient   Patient Care Overview   Progress no change   Outcome Evaluation   Outcome Summary/Follow up Plan No significant changes today. Cries out with repositioning and dressing changes, premedicated prior to movement. Pt tolerating po diet and meds. Will continue to monitor.     Goal: Adult Individualization and Mutuality  Outcome: Ongoing (interventions implemented as appropriate)    Goal: Discharge Needs Assessment  Outcome: Ongoing (interventions implemented as appropriate)      Problem: Fall Risk (Adult)  Goal: Absence of Falls  Outcome: Ongoing (interventions implemented as appropriate)      Problem: Infection, Risk/Actual (Adult)  Goal: Infection Prevention/Resolution  Outcome: Ongoing (interventions implemented as appropriate)      Problem: Pressure Ulcer (Adult)  Goal: Signs and Symptoms of Listed Potential Problems Will be Absent or Manageable (Pressure Ulcer)  Outcome: Ongoing (interventions implemented as appropriate)

## 2018-02-01 NOTE — PLAN OF CARE
Problem: Patient Care Overview (Adult)  Goal: Plan of Care Review  Outcome: Ongoing (interventions implemented as appropriate)   02/01/18 0458   Coping/Psychosocial Response Interventions   Plan Of Care Reviewed With patient   Patient Care Overview   Progress no change   Outcome Evaluation   Outcome Summary/Follow up Plan vital signs stable; pain medication given per MAR; AND orders obtained; await palliative care vs discharge; dressing changed; turned throughout shift;; will continue to monitor       Problem: Fall Risk (Adult)  Goal: Absence of Falls  Outcome: Ongoing (interventions implemented as appropriate)      Problem: Infection, Risk/Actual (Adult)  Goal: Infection Prevention/Resolution  Outcome: Ongoing (interventions implemented as appropriate)      Problem: Pressure Ulcer (Adult)  Goal: Signs and Symptoms of Listed Potential Problems Will be Absent or Manageable (Pressure Ulcer)  Outcome: Ongoing (interventions implemented as appropriate)

## 2018-02-01 NOTE — PROGRESS NOTES
Discharge Planning Assessment  [facility]     Patient Name: Lupe Burleson  MRN: [id number]  Today's Date: 2/1/2018    Admit Date: 1/17/2018          Discharge Needs Assessment     None            Discharge Plan       02/01/18 1536    Case Management/Social Work Plan    Additional Comments Received a call and fax from Josiane/[facility]ili[facility] of [facility] and Independent Living states approval for Hospice and Comfort Care Approval for Lupe Burleson. (Please see the blue chart for a copy of the ruling. HIM/Media was also faxed a copy of the ruling. Notified RN/Alexa TAFOYA and she will notify Dr. Barcenas of the ruling. PIPER Hodge RN, CCP.           Discharge Placement     Facility/Agency Request Status Selected? Address Phone Number Fax Number    [facility]     [address] [address] [phone]                Demographic Summary     None            Functional Status     None            Psychosocial     None            Abuse/Neglect     None            Legal     None            Substance Abuse     None            Patient Forms     None          Alicia Hodge, MIGUE

## 2018-02-02 VITALS
OXYGEN SATURATION: 95 % | DIASTOLIC BLOOD PRESSURE: 59 MMHG | BODY MASS INDEX: 41.8 KG/M2 | HEIGHT: 63 IN | RESPIRATION RATE: 16 BRPM | WEIGHT: 235.89 LBS | SYSTOLIC BLOOD PRESSURE: 103 MMHG | TEMPERATURE: 98.2 F | HEART RATE: 57 BPM

## 2018-02-02 LAB
ANION GAP SERPL CALCULATED.3IONS-SCNC: 8.9 MMOL/L
BASOPHILS # BLD AUTO: 0.02 10*3/MM3 (ref 0–0.2)
BASOPHILS NFR BLD AUTO: 0.3 % (ref 0–1.5)
BUN BLD-MCNC: 74 MG/DL (ref 8–23)
BUN/CREAT SERPL: 20 (ref 7–25)
CALCIUM SPEC-SCNC: 8.6 MG/DL (ref 8.6–10.5)
CHLORIDE SERPL-SCNC: 97 MMOL/L (ref 98–107)
CO2 SERPL-SCNC: 33.1 MMOL/L (ref 22–29)
CREAT BLD-MCNC: 3.7 MG/DL (ref 0.57–1)
DEPRECATED RDW RBC AUTO: 67.7 FL (ref 37–54)
EOSINOPHIL # BLD AUTO: 0.07 10*3/MM3 (ref 0–0.7)
EOSINOPHIL NFR BLD AUTO: 1.1 % (ref 0.3–6.2)
ERYTHROCYTE [DISTWIDTH] IN BLOOD BY AUTOMATED COUNT: 21.1 % (ref 11.7–13)
GFR SERPL CREATININE-BSD FRML MDRD: 15 ML/MIN/1.73
GLUCOSE BLD-MCNC: 79 MG/DL (ref 65–99)
HCT VFR BLD AUTO: 25.4 % (ref 35.6–45.5)
HGB BLD-MCNC: 7.7 G/DL (ref 11.9–15.5)
IMM GRANULOCYTES # BLD: 0.02 10*3/MM3 (ref 0–0.03)
IMM GRANULOCYTES NFR BLD: 0.3 % (ref 0–0.5)
LYMPHOCYTES # BLD AUTO: 0.8 10*3/MM3 (ref 0.9–4.8)
LYMPHOCYTES NFR BLD AUTO: 13.1 % (ref 19.6–45.3)
MCH RBC QN AUTO: 26.7 PG (ref 26.9–32)
MCHC RBC AUTO-ENTMCNC: 30.3 G/DL (ref 32.4–36.3)
MCV RBC AUTO: 88.2 FL (ref 80.5–98.2)
MONOCYTES # BLD AUTO: 0.54 10*3/MM3 (ref 0.2–1.2)
MONOCYTES NFR BLD AUTO: 8.8 % (ref 5–12)
NEUTROPHILS # BLD AUTO: 4.66 10*3/MM3 (ref 1.9–8.1)
NEUTROPHILS NFR BLD AUTO: 76.4 % (ref 42.7–76)
PLATELET # BLD AUTO: 292 10*3/MM3 (ref 140–500)
PMV BLD AUTO: 9.5 FL (ref 6–12)
POTASSIUM BLD-SCNC: 5.5 MMOL/L (ref 3.5–5.2)
RBC # BLD AUTO: 2.88 10*6/MM3 (ref 3.9–5.2)
SODIUM BLD-SCNC: 139 MMOL/L (ref 136–145)
WBC NRBC COR # BLD: 6.11 10*3/MM3 (ref 4.5–10.7)

## 2018-02-02 PROCEDURE — 80048 BASIC METABOLIC PNL TOTAL CA: CPT | Performed by: NURSE PRACTITIONER

## 2018-02-02 PROCEDURE — 94799 UNLISTED PULMONARY SVC/PX: CPT

## 2018-02-02 PROCEDURE — 85025 COMPLETE CBC W/AUTO DIFF WBC: CPT | Performed by: NURSE PRACTITIONER

## 2018-02-02 PROCEDURE — 25010000002 FUROSEMIDE PER 20 MG: Performed by: INTERNAL MEDICINE

## 2018-02-02 RX ORDER — MORPHINE SULFATE 100 MG/5ML
10 SOLUTION ORAL
Qty: 90 ML | Refills: 0 | Status: SHIPPED | OUTPATIENT
Start: 2018-02-02

## 2018-02-02 RX ORDER — LORAZEPAM 2 MG/ML
1 CONCENTRATE ORAL EVERY 4 HOURS PRN
Qty: 90 ML | Refills: 0 | Status: SHIPPED | OUTPATIENT
Start: 2018-02-02

## 2018-02-02 RX ADMIN — DAKIN'S SOLUTION 0.125% (QUARTER STRENGTH) 946 ML: 0.12 SOLUTION at 18:00

## 2018-02-02 RX ADMIN — IPRATROPIUM BROMIDE AND ALBUTEROL SULFATE 3 ML: .5; 3 SOLUTION RESPIRATORY (INHALATION) at 12:41

## 2018-02-02 RX ADMIN — FUROSEMIDE 40 MG: 10 INJECTION, SOLUTION INTRAMUSCULAR; INTRAVENOUS at 07:14

## 2018-02-02 RX ADMIN — SODIUM BICARBONATE 1300 MG: 650 TABLET ORAL at 09:26

## 2018-02-02 RX ADMIN — HYDROMORPHONE HYDROCHLORIDE 0.5 MG: 10 INJECTION INTRAMUSCULAR; INTRAVENOUS; SUBCUTANEOUS at 11:17

## 2018-02-02 RX ADMIN — IPRATROPIUM BROMIDE AND ALBUTEROL SULFATE 3 ML: .5; 3 SOLUTION RESPIRATORY (INHALATION) at 20:34

## 2018-02-02 RX ADMIN — OXYCODONE HYDROCHLORIDE AND ACETAMINOPHEN 1 TABLET: 10; 325 TABLET ORAL at 18:16

## 2018-02-02 RX ADMIN — OXYCODONE HYDROCHLORIDE AND ACETAMINOPHEN 1 TABLET: 10; 325 TABLET ORAL at 14:07

## 2018-02-02 RX ADMIN — HYDROMORPHONE HYDROCHLORIDE 0.5 MG: 10 INJECTION INTRAMUSCULAR; INTRAVENOUS; SUBCUTANEOUS at 21:41

## 2018-02-02 RX ADMIN — RISPERIDONE 3 MG: 1 TABLET ORAL at 21:07

## 2018-02-02 RX ADMIN — SODIUM BICARBONATE 1300 MG: 650 TABLET ORAL at 21:07

## 2018-02-02 RX ADMIN — RISPERIDONE 2 MG: 1 TABLET ORAL at 07:15

## 2018-02-02 RX ADMIN — IPRATROPIUM BROMIDE AND ALBUTEROL SULFATE 3 ML: .5; 3 SOLUTION RESPIRATORY (INHALATION) at 15:46

## 2018-02-02 RX ADMIN — LACTULOSE 20 G: 20 SOLUTION ORAL at 09:25

## 2018-02-02 RX ADMIN — HYDROMORPHONE HYDROCHLORIDE 0.5 MG: 10 INJECTION INTRAMUSCULAR; INTRAVENOUS; SUBCUTANEOUS at 16:53

## 2018-02-02 RX ADMIN — DAKIN'S SOLUTION 0.125% (QUARTER STRENGTH) 946 ML: 0.12 SOLUTION at 11:17

## 2018-02-02 RX ADMIN — OXYCODONE HYDROCHLORIDE AND ACETAMINOPHEN 1 TABLET: 10; 325 TABLET ORAL at 01:55

## 2018-02-02 RX ADMIN — SODIUM BICARBONATE 1300 MG: 650 TABLET ORAL at 18:16

## 2018-02-02 RX ADMIN — IPRATROPIUM BROMIDE AND ALBUTEROL SULFATE 3 ML: .5; 3 SOLUTION RESPIRATORY (INHALATION) at 07:41

## 2018-02-02 RX ADMIN — OXYCODONE HYDROCHLORIDE AND ACETAMINOPHEN 1 TABLET: 10; 325 TABLET ORAL at 09:26

## 2018-02-02 RX ADMIN — FOLIC ACID 1 MG: 1 TABLET ORAL at 09:26

## 2018-02-02 RX ADMIN — FUROSEMIDE 40 MG: 10 INJECTION, SOLUTION INTRAMUSCULAR; INTRAVENOUS at 18:17

## 2018-02-02 RX ADMIN — FAMOTIDINE 20 MG: 20 TABLET, FILM COATED ORAL at 09:26

## 2018-02-02 NOTE — CONSULTS
initiated visit with patient.  While patient was awake and saw  at bedside and followed her with her eyes, she did not speak any words to her.  introduced herself and her role on the floor.  handed pt her card and pt accepted.  Pt appears to be confused.

## 2018-02-02 NOTE — PROGRESS NOTES
Discharge Planning Assessment  [facility]     Patient Name: Lupe Burleson  MRN: [id number]  Today's Date: 2/2/2018    Admit Date: 1/17/2018          Discharge Needs Assessment     None            Discharge Plan       02/02/18 1711    Case Management/Social Work Plan    Plan Return to [facility]    Final Note    Final Note Patient discharged to [place] call placed to State Guardian Helene Pepe [phone] to inform her of discharge. Segundo Fritz from [facility] came to the hospital to see the patient he is following.         Discharge Placement     Facility/Agency Request Status Selected? Address Phone Number Fax Number    [facility] Accepted     [address] [facility] [address] [phone]        Expected Discharge Date and Time     Expected Discharge Date Expected Discharge Time    Feb 2, 2018               Demographic Summary     None            Functional Status     None            Psychosocial     None            Abuse/Neglect     None            Legal     None            Substance Abuse     None            Patient Forms     None          Ayah Rand RN

## 2018-02-02 NOTE — DISCHARGE SUMMARY
PHYSICIAN DISCHARGE SUMMARY  [facility], [facility]    Patient Identification:    Name: Lupe Burleson  Age: 62 y.o.  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    Hydronephrosis, bilateral    Nephropathy, obstructive    Patient Active Problem List   Diagnosis Code   • Elevated troponin R74.8   • Aspiration pneumonia J69.0   • Hematuria R31.9   • Hypokalemia E87.6   • Pelvic mass in female R19.00   • Fecal impaction K56.41   • Endometrial carcinoma C54.1   • Acute on chronic respiratory failure with hypoxia and hypercapnia J96.21, J96.22   • Acute on chronic diastolic CHF (congestive heart failure) I50.33   • UTI (urinary tract infection) N39.0   • Leucocytosis D72.829   • Chronic diastolic CHF (congestive heart failure) I50.32   • DM (diabetes mellitus) E11.9   • Morbid obesity E66.01   • HTN (hypertension) I10   • Schizophrenia F20.9   • Tracheostomy malfunction J95.03   • Pyuria N39.0   • THAI (obstructive sleep apnea) G47.33   • Generalized weakness R53.1   • Schizo affective schizophrenia F25.0   • Diabetes mellitus E11.9   • Coronary artery disease I25.10   • Endometrial cancer determined by uterine biopsy C54.1   • Chronic respiratory failure with hypoxia and hypercapnia J96.11, J96.12   • Toxic metabolic encephalopathy G92   • Pneumonia J18.9   • Abnormal vaginal bleeding N93.9   • Schizo  affective schizophrenia F25.0   • CHF (congestive heart failure) I50.9   • Endometrial cancer determined by uterine biopsy C54.1   • Coronary artery disease I25.10   • Lymphedema I89.0   • Immobility Z74.09   • Acute blood loss anemia D62   • Vaginal bleeding N93.9   • Hypertension I10   • Malignant neoplasm of uterus C55   • Bipolar disorder, unspecified F31.9   • Sepsis A41.9   • Abscess of abdominal cavity K65.1   • Hemorrhagic cystitis N30.91   • Confusion R41.0   • EVER (acute kidney injury) N17.9   • Infected decubitus ulcer, stage III L89.93, L08.9   • Infected decubitus ulcer, stage IV L89.94, L08.9   • CKD (chronic kidney disease) stage 3, GFR 30-59 ml/min N18.3   • Metastatic 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 She has h/o schizophrenia, COPD, CKD,  CHF, DM, HTN, CAD. She has developed a  Decubitus ulcer in her coccyx which is progressively getting bigger and deeper. She was evaluated the day of admission  at NH and  her wound became worse, with drainage, foul odor. It was much deeper and then a stage IV. Patient was admitted for further management with intention of wound debridement as well as possible diverting colostomy. While awaiting evaluations from surgery, she began to have pat hematuria. Urology was consulted. U/S found bilateral severe hydronephrosis. CT scan confirmed metastatic disease affecting bladder, colon, pelvis, causing obstructive uropathy. Renal,  GYN Oncology, and surgical consults all agreed that she was not a candidate for further surgery and would not tolerate chemotherapy. Surgical debridement was cancelled and requests made to State guardian for, first, DNR status and then, Palliative care. Both have now been approved. During the last week or so her pain has been escalating, primarily abdomen and sacrum. Her pain medications have been  adjusted accordingly. She has continued with Dakin's treatment to sacral wound BID. She will be discharged back to the NH under palliative orders. Her code status is DNR and allow natural death.    PMHX:   Past Medical History:   Diagnosis Date   • Acute respiratory distress syndrome    • Arthritis    • Bipolar disorder, unspecified    • CAD (coronary artery disease)    • Cellulitis    • Cellulitis    • CHF (congestive heart failure)    • Chronic ischemic heart disease    • Chronic respiratory failure with hypoxia and hypercapnia    • Chronic ulcer of calf    • Constipation    • Coronary artery disease    • Depression    • Depressive disorder    • Diabetes mellitus    • Dysphagia    • Dysphagia    • Endometrial cancer determined by uterine biopsy     s/p radiation; no improvement   • Endometrial hyperplasia    • History of transfusion    • Hypercapnia    • Hypertension    • Immobility    • Lack of coordination    • Lymphedema    • Malignant neoplasm of uterus    • Muscle weakness    • Obesities, morbid    • Oxygen dependent    • Post-menopausal bleeding    • Postmenopausal bleeding    • Schizo affective schizophrenia    • Skin ulcer    • Sleep apnea    • Stroke    • Symbolic dysfunction    • Uterine cancer    • Venous insufficiency    • Venous insufficiency      PSHX:   Past Surgical History:   Procedure Laterality Date   • D&C HYSTEROSCOPY     • LAPAROSCOPIC TUBAL LIGATION     • TOTAL LAPAROSCOPIC HYSTERECTOMY Bilateral 8/15/2017    Procedure: OPEN TOTAL  ABDOMINAL HYSTERECTOMY WITH BILATERAL SALPINGO-OOPHERECTOMY;  Surgeon: Ortiz Washington MD;  Location: [facility];  Service:    • TRACHEOSTOMY AND PEG TUBE INSERTION     • UMBILICAL HERNIA REPAIR     • WOUND DEBRIDEMENT             Consults:     Consults     Date and Time Order Name Status Description    1/24/2018 1138 Inpatient Consult to Gynecologic Oncology      1/23/2018 1809 Inpatient Consult to Gynecologic Oncology Completed     1/22/2018 1546 Inpatient  "Consult to Urology Completed     1/20/2018 1249 Inpatient Consult to General Surgery Completed     1/18/2018 1236 Inpatient Consult to Nephrology Completed     1/17/2018 1802 Inpatient Consult to Plastic Surgery      1/4/2018 1741 Inpatient Consult to Nephrology Completed     1/3/2018 0141 Family Medicine Consult Completed           Discharge Exam:    BP 99/58 (BP Location: Left arm, Patient Position: Lying)  Pulse 74  Temp 98.3 °F (36.8 °C) (Oral)   Resp 24  Ht 160 cm (63\")  Wt 107 kg (235 lb 14.3 oz)  SpO2 91%  BMI 41.79 kg/m2    General: Sleeping, no distress  Neck: Supple, symmetrical, trachea midline, no adenopathy;              thyroid:  no enlargement/tenderness/nodules;              no carotid bruit or JVD  Cardiovascular: Normal rate, regular rhythm and intact distal pulses.              Exam reveals no gallop and no friction rub. No murmur heard  Pulmonary: diminished bilaterally with rhonchi right base. Wheezing bilaterally.  Abdominal:  Soft, non-tender, bowel sounds active all four quadrants,     no masses, no hepatomegaly, no splenomegaly.   Extremities: Normal, atraumatic, no cyanosis. + edema  Neurological: Patient is oriented to person and place. .                 dysphagic, normal strength, sensation intact throughout  Skin: Cocyx/sacral decubitus covered with dressing.       Data Review:          Results from last 7 days  Lab Units 02/02/18  0431 02/01/18  0704 01/30/18  0604   WBC 10*3/mm3 6.11 6.53 5.32   HEMOGLOBIN g/dL 7.7* 8.0* 7.8*   HEMATOCRIT % 25.4* 25.5* 25.2*   PLATELETS 10*3/mm3 292 285 258         Results from last 7 days  Lab Units 02/02/18  0431 02/01/18  0704 01/30/18  0604   SODIUM mmol/L 139 147* 137   POTASSIUM mmol/L 5.5* 5.4* 5.4*   CHLORIDE mmol/L 97* 105 100   CO2 mmol/L 33.1* 28.7 26.9   BUN mg/dL 74* 73* 77*   CREATININE mg/dL 3.70* 3.51* 3.22*   CALCIUM mg/dL 8.6 8.8 8.5*   GLUCOSE mg/dL 79 81 75           Brief Urine Lab Results  (Last result in the past 365 days) "      Color   Clarity   Blood   Leuk Est   Nitrite   Protein   CREAT   Urine HCG        01/21/18 1034 Yellow Cloudy(A) Large (3+)(A) Large (3+)(A) Negative 30 mg/dL (1+)(A)                 Lab Results  Lab Value Date/Time   TROPONINT 0.155 (C) 01/03/2018 1315   TROPONINT 0.177 (C) 01/03/2018 0612   TROPONINT 0.182 (C) 01/02/2018 1945   TROPONINT 0.065 (H) 05/17/2017 0505   TROPONINT 0.077 (H) 05/16/2017 1517   TROPONINT 0.080 (H) 05/16/2017 1112   TROPONINT 0.078 (H) 05/16/2017 0703   TROPONINT 0.084 (H) 05/16/2017 0116   TROPONINT <0.010 05/15/2016 0622   TROPONINT 0.017 05/14/2016 0437   TROPONINT 0.023 05/13/2016 2036   TROPONINT 0.031 (H) 05/13/2016 1231   TROPONINT <0.01 02/03/2016 0537   TROPONINT <0.01 02/02/2016 0914   TROPONINT <0.01 11/11/2015 0516   TROPONINT <0.01 11/09/2015 2049   TROPONINT <0.01 04/18/2014 0517       Microbiology Results (last 10 days)     ** No results found for the last 240 hours. **           Imaging Results (all)     Procedure Component Value Units Date/Time    US Renal Bilateral [[id number]] Collected:  01/18/18 1737     Updated:  01/18/18 2152    Narrative:       BILATERAL RENAL SONOGRAM     HISTORY: Acute on chronic kidney disease.     TECHNIQUE: Bilateral renal sonogram was performed in standard fashion  and is correlated with CT scan performed 11/18/2017 and 09/01/2017.     FINDINGS: There is moderate-to-severe hydronephrosis bilaterally. Right  kidney measures 11.2 x 6.7 x 6.0 cm and the left kidney measures 11.4 x  5.9 x 5.6 cm. No focal renal lesion is identified. No obstructing lesion  is identified along the visualized proximal ureters but most of the  course of the ureters is excluded.     The bladder is decompressed. There appears to be some intermediate  echogenicity material along the dependent portion of the urinary  bladder. This could represent blood or purulent material, or could  represent thickened bladder wall.       Impression:       Bilateral hydronephrosis  with an abnormal appearing urinary  bladder which appears decompressed but contains some material which may  represent a thickened bladder wall, hemorrhage, or purulent material.     This report was finalized on 1/18/2018 9:48 PM by Dr. Robb Draper MD.       CT Abdomen Pelvis Without Contrast [[id number]] Collected:  01/23/18 0925     Updated:  01/24/18 1255    Narrative:       CT ABDOMEN AND PELVIS WITHOUT CONTRAST     HISTORY: Bilateral hydronephrosis. Bladder outlet symptoms. Bilateral  lower extremity edema. Patient has history of endometrial cancer.     TECHNIQUE: Axial CT images of the abdomen and pelvis were obtained  without administration of intravenous contrast. The patient was was not  given oral contrast. Coronal and sagittal reformats were obtained.     COMPARISON: 11/18/2017     FINDINGS: Limited secondary to absence of contrast and streak artifact.  Bilateral adrenal glands are normal. There is bilateral moderate  hydroureteronephrosis that has progressed in the interim from prior  imaging. Both ureters are dilated to the level of the pelvis where there  is marked asymmetric urinary bladder wall thickening present that is  almost masslike and especially well demonstrated on the sagittal images.  Additionally, there is perivesicular stranding present. There is also  irregular soft tissue density seen along the left lateral pelvic  sidewall measuring approximately 3.3 x 2.7 cm. The sigmoid colon also  appears to be circumferentially thickened and adherent at this level.  There is no evidence of bowel obstruction. There are enlarged  retroperitoneal and pelvic lymph nodes also present. An index right  external iliac lymph node measures up to 1.2 cm. An index left  para-aortic lymph node measures 3.0 x 1.3 cm not significantly changed  since prior imaging. Enlarged inguinal lymph nodes are also present with  an index lymph node on the right side measuring up to 2.5 x 1.7 cm.  There is mild diffuse  anasarca present. The liver demonstrates normal  attenuation. Low-density lesion is again seen in the liver unchanged  from prior imaging. Cholelithiasis is present. The patient has a  percutaneous gastrostomy tube in place that appears appropriate in  location. The spleen, gallbladder and the pancreas are normal. Patient  has a decubitus midline sacral ulcer. L4-5 degenerative disc disease  with vacuum disc phenomena. There are bilateral layering pleural  effusions with bibasilar atelectasis.       Impression:       1. Although this exam is extremely limited secondary to streak artifact  and motion as well as absence of contrast, the findings are concerning  for  recurrence of patient's malignancy. There is significant wall  thickening of the bladder which may represent tumor involvement and/or  severe cystitis. There is additionally soft tissue seen along the left  pelvic sidewall. There are multiple enlarged lymph nodes seen within the  pelvis and the retroperitoneum which are highly concerning for  metastatic disease. There is no current evidence of bowel obstruction.  There is bilateral moderate hydronephrosis which has progressed since  prior CT. Cystoscopy and/or biopsy should be performed for further  evaluation.  2. Midline sacral decubitus ulcer.     3. Bilateral small layering pleural effusions with bibasilar  atelectasis.     Radiation dose reduction techniques were utilized, including automated  exposure control and exposure modulation based on body size.     This report was finalized on 1/24/2018 12:52 PM by Dr. Hasmukh Edwards MD.       XR Chest 1 View [[id number]] Collected:  01/25/18 1000     Updated:  01/25/18 1006    Narrative:       EMERGENCY PORTAL CHEST SINGLE VIEW     HISTORY: Morbidly obese 62-year-old female presents for evaluation of  acute shortness of breath     COMPARISON: 01/02/2018     FINDINGS:  1. Imaging remains limited by body habitus portable technique and low  lung volumes.  2.  Left basilar opacification similar to prior study question pneumonia.  3. Pulmonary vascular and interstitial prominence suggest a component of  congestive failure, similar to previous study.  4. Follow-up PA and lateral or CT follow-up would be helpful.     Results and recommendations were called to patient's nurse by Dr. Yadav per telephone at 10:04     This report was finalized on 1/25/2018 10:03 AM by Dr. Bryan Yadav MD.               Disposition:    Nursing Home    Patient Instructions: See After Visit Summary for Medications   Lupe Burleson   Home Medication Instructions NATO:[id number]    Printed on:02/02/18 1300   Medication Information                      ammonium lactate (AMLACTIN) 12 % cream  Apply 1 application topically 2 (two) times a day. To bilat legs and feet             ipratropium-albuterol (DUO-NEB) 0.5-2.5 mg/mL nebulizer  Take 3 mL by nebulization Every 4 (Four) Hours As Needed for Wheezing.             lactulose (CHRONULAC) 10 GM/15ML solution  30 g by Per PEG Tube route Daily.             LORazepam (LORAZEPAM INTENSOL) 2 MG/ML concentrated solution  Take 0.5 mL by mouth Every 4 (Four) Hours As Needed for Anxiety.             morphine 20 MG/ML concentrated solution  Take 0.5 mL by mouth Every 2 (Two) Hours As Needed for Severe Pain .             risperiDONE (risperDAL) 2 MG tablet  Take 2 mg by mouth Every Morning.             risperiDONE (risperDAL) 3 MG tablet  Take 3 mg by mouth Every Night.                     Discharge Order     None          Follow-up Information     Follow up with [facility] .    Specialties:  Skilled Nursing Facility, Intermediate Care Facility    Contact information:    [address]  [facility] [address] [phone]        Follow up with Shane Barcenas MD .    Specialties:  Internal Medicine, Hospitalist    Contact information:    [address]  [facility] [address]  [facility] [id number] [phone]            Total time spent  discharging patient including evaluation,post hospitalization follow up,  medication and post hospitalization instructions and education total time exceeds 30 minutes.    Signed:  Shane Barcenas MD  2/2/2018  1:00 PM

## 2018-02-03 ENCOUNTER — HOSPITAL ENCOUNTER (EMERGENCY)
Facility: HOSPITAL | Age: 63
Discharge: HOME | End: 2018-02-03
Attending: EMERGENCY MEDICINE | Admitting: EMERGENCY MEDICINE

## 2018-02-03 ENCOUNTER — APPOINTMENT (OUTPATIENT)
Dept: GENERAL RADIOLOGY | Facility: HOSPITAL | Age: 63
End: 2018-02-03

## 2018-02-03 VITALS
HEIGHT: 66 IN | WEIGHT: 235.89 LBS | SYSTOLIC BLOOD PRESSURE: 117 MMHG | HEART RATE: 91 BPM | RESPIRATION RATE: 18 BRPM | OXYGEN SATURATION: 95 % | BODY MASS INDEX: 37.91 KG/M2 | TEMPERATURE: 98 F | DIASTOLIC BLOOD PRESSURE: 99 MMHG

## 2018-02-03 DIAGNOSIS — M25.552 LEFT HIP PAIN: ICD-10-CM

## 2018-02-03 DIAGNOSIS — S70.02XA CONTUSION OF LEFT HIP, INITIAL ENCOUNTER: ICD-10-CM
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
## 2018-02-03 PROCEDURE — 99283 EMERGENCY DEPT VISIT LOW MDM: CPT

## 2018-02-03 PROCEDURE — 73502 X-RAY EXAM HIP UNI 2-3 VIEWS: CPT

## 2018-02-03 NOTE — ED PROVIDER NOTES
Pt presents complaining of L hip pain secondary to a fall onset PTA. She denies LOC secondary to her pain. Pt denies any other symptoms at this time.    On exam:  Musculoskeletal: pain around L hip with no hip shortening    I reviewed pt's imaging results. Will discharge. Pt understands and agrees with the plan. All questions answered.    I supervised care provided by the midlevel provider.    We have discussed this patient's history, physical exam, and treatment plan.   I have reviewed the note and personally saw and examined the patient and agree with the plan of care. Documentation assistance provided by jennie Shankar for Dr. Thibodeaux.  Information recorded by the jennie was done at my direction and has been verified and validated by me.'     Rosa Shankar  02/03/18 0238       Ady Thibodeaux MD  02/03/18 3125

## 2018-02-03 NOTE — ED NOTES
EMS called, report called to [facility]. Pt awaiting transport back to NH.     Edyta Cohen RN  02/03/18 4668

## 2018-02-03 NOTE — PLAN OF CARE
Problem: Patient Care Overview (Adult)  Goal: Plan of Care Review  Outcome: Ongoing (interventions implemented as appropriate)    Goal: Adult Individualization and Mutuality  Outcome: Ongoing (interventions implemented as appropriate)    Goal: Discharge Needs Assessment  Outcome: Ongoing (interventions implemented as appropriate)      Problem: Fall Risk (Adult)  Goal: Absence of Falls  Outcome: Ongoing (interventions implemented as appropriate)

## 2018-02-03 NOTE — ED PROVIDER NOTES
" EMERGENCY DEPARTMENT ENCOUNTER    CHIEF COMPLAINT  Chief Complaint: Fall  History given by: NH  History limited by: Poor Historian  Room Number: EDWR/WR  PMD: Shane Barcenas MD      HPI:  Pt is a 62 y.o. female who presents from NH for fall. Pt was being transferred when the stretcher that she was lying on tipped over causing her to fall. NH states that pt did not strike her head or have any LOC. Pt reports pain over the L hip and L buttock.     Duration: PTA  Onset: sudden  Timing: constant  Location: L hip, L buttock  Radiation: none  Quality: \"pain\"  Progression: unchanged  Associated Symptoms: none  Aggravating Factors: movement  Alleviating Factors: none  Previous Episodes: unable to assess  Treatment before arrival: none    PAST MEDICAL HISTORY  Active Ambulatory Problems     Diagnosis Date Noted   • Elevated troponin 05/13/2016   • Aspiration pneumonia 05/16/2016   • Hematuria 05/16/2016   • Hypokalemia 05/18/2016   • Pelvic mass in female 05/19/2016   • Fecal impaction 05/19/2016   • Endometrial carcinoma 05/21/2016   • Acute on chronic respiratory failure with hypoxia and hypercapnia 05/21/2016   • Acute on chronic diastolic CHF (congestive heart failure) 05/21/2016   • UTI (urinary tract infection) 05/29/2016   • Leucocytosis 05/30/2016   • Chronic diastolic CHF (congestive heart failure) 05/30/2016   • DM (diabetes mellitus) 05/30/2016   • Morbid obesity 05/30/2016   • HTN (hypertension) 05/30/2016   • Schizophrenia 05/30/2016   • Tracheostomy malfunction 06/22/2016   • Pyuria 06/22/2016   • THAI (obstructive sleep apnea) 06/22/2016   • Generalized weakness 05/16/2017   • Schizo affective schizophrenia 05/16/2017   • Diabetes mellitus 05/16/2017   • Coronary artery disease 05/16/2017   • Endometrial cancer determined by uterine biopsy 05/16/2017   • Chronic respiratory failure with hypoxia and hypercapnia 05/16/2017   • Toxic metabolic encephalopathy 05/16/2017   • Pneumonia 05/17/2017   • Abnormal " vaginal bleeding 06/18/2017   • Schizo affective schizophrenia 06/19/2017   • CHF (congestive heart failure) 06/19/2017   • Endometrial cancer determined by uterine biopsy 06/19/2017   • Coronary artery disease 06/19/2017   • Lymphedema 06/19/2017   • Immobility 06/19/2017   • Acute blood loss anemia 06/22/2017   • Vaginal bleeding 08/13/2017   • Hypertension 08/14/2017   • Malignant neoplasm of uterus 08/14/2017   • Bipolar disorder, unspecified 08/14/2017   • Sepsis 08/27/2017   • Abscess of abdominal cavity 08/28/2017   • Hemorrhagic cystitis 11/18/2017   • Confusion 01/03/2018   • EVER (acute kidney injury) 01/03/2018   • Infected decubitus ulcer, stage III 01/17/2018   • Infected decubitus ulcer, stage IV 01/18/2018   • CKD (chronic kidney disease) stage 3, GFR 30-59 ml/min 01/18/2018   • Metastatic cancer to pelvis 01/25/2018   • Hydronephrosis, bilateral 01/25/2018   • Nephropathy, obstructive 01/25/2018     Resolved Ambulatory Problems     Diagnosis Date Noted   • No Resolved Ambulatory Problems     Past Medical History:   Diagnosis Date   • Acute respiratory distress syndrome    • Arthritis    • Bipolar disorder, unspecified    • CAD (coronary artery disease)    • Cellulitis    • Cellulitis    • CHF (congestive heart failure)    • Chronic ischemic heart disease    • Chronic respiratory failure with hypoxia and hypercapnia    • Chronic ulcer of calf    • Constipation    • Coronary artery disease    • Depression    • Depressive disorder    • Diabetes mellitus    • Dysphagia    • Dysphagia    • Endometrial cancer determined by uterine biopsy    • Endometrial hyperplasia    • History of transfusion    • Hypercapnia    • Hypertension    • Immobility    • Lack of coordination    • Lymphedema    • Malignant neoplasm of uterus    • Muscle weakness    • Obesities, morbid    • Oxygen dependent    • Post-menopausal bleeding    • Postmenopausal bleeding    • Schizo affective schizophrenia    • Skin ulcer    • Sleep  apnea    • Stroke    • Symbolic dysfunction    • Uterine cancer    • Venous insufficiency    • Venous insufficiency        PAST SURGICAL HISTORY  Past Surgical History:   Procedure Laterality Date   • D&C HYSTEROSCOPY     • LAPAROSCOPIC TUBAL LIGATION     • TOTAL LAPAROSCOPIC HYSTERECTOMY Bilateral 8/15/2017    Procedure: OPEN TOTAL  ABDOMINAL HYSTERECTOMY WITH BILATERAL SALPINGO-OOPHERECTOMY;  Surgeon: Ortiz Washington MD;  Location: [facility] OR;  Service:    • TRACHEOSTOMY AND PEG TUBE INSERTION     • UMBILICAL HERNIA REPAIR     • WOUND DEBRIDEMENT         FAMILY HISTORY  Family History   Problem Relation Age of Onset   • Diabetes Mother    • Stroke Mother        SOCIAL HISTORY  Social History     Social History   • Marital status:      Spouse name: N/A   • Number of children: N/A   • Years of education: N/A     Occupational History   • Not on file.     Social History Main Topics   • Smoking status: Former Smoker     Packs/day: 2.00     Years: 15.00     Types: Cigarettes     Quit date: 1988   • Smokeless tobacco: Never Used   • Alcohol use No   • Drug use: No   • Sexual activity: No     Other Topics Concern   • Not on file     Social History Narrative       ALLERGIES  Codeine; Sulfa antibiotics; and Penicillins    REVIEW OF SYSTEMS  Review of Systems   Unable to perform ROS: Other (poor historian)   Musculoskeletal: Positive for arthralgias (L hip) and back pain.       PHYSICAL EXAM  ED Triage Vitals   Temp Heart Rate Resp BP SpO2   02/03/18 0018 02/03/18 0018 02/03/18 0018 02/03/18 0018 02/03/18 0018   98 °F (36.7 °C) 92 18 150/80 95 %      Temp src Heart Rate Source Patient Position BP Location FiO2 (%)   02/03/18 0018 -- -- -- --   Tympanic           Physical Exam   Constitutional: She is oriented to person, place, and time and well-developed, well-nourished, and in no distress. No distress.   HENT:   Head: Normocephalic and atraumatic.   Eyes: EOM are normal. Pupils are equal, round, and reactive to  light.   Neck: Normal range of motion. Neck supple.   Cardiovascular: Normal rate, regular rhythm and normal heart sounds.    Pulmonary/Chest: Effort normal and breath sounds normal. No respiratory distress.   Abdominal: Soft. There is no tenderness. There is no rebound and no guarding.   Musculoskeletal: Normal range of motion. She exhibits no edema.   Mild L hip/buttock tenderness.    Neurological: She is alert and oriented to person, place, and time. She has normal sensation and normal strength.   Skin: Skin is warm and dry. No rash noted.   There is a 5cm stage II decbuitus ulcer that is currently dressed and being treated. No signs of infection.    Psychiatric: Mood and affect normal.   Nursing note and vitals reviewed.      LAB RESULTS  Lab Results (last 24 hours)     ** No results found for the last 24 hours. **          I ordered the above labs and reviewed the results    RADIOLOGY  XR Hip With or Without Pelvis 2 - 3 View Left   Preliminary Result   No acute fracture or dislocation.                   I ordered the above noted radiological studies. Interpreted by radiologist. Reviewed by me in PACS.       PROCEDURES  Procedures      PROGRESS AND CONSULTS  ED Course   1:00 AM  Ordered X-ray L hip.  2:37 AM  Reviewed pt's history and workup with Dr. Thibodeaux.  After a bedside evaluation; Dr Thibodeaux agrees with the plan of care      MEDICAL DECISION MAKING  Results were reviewed/discussed with the patient and they were also made aware of online access. Pt also made aware that some labs, such as cultures, will not be resulted during ER visit and follow up with PMD is necessary.     MDM  Number of Diagnoses or Management Options  Contusion of left hip, initial encounter:   Fall, initial encounter:   Left hip pain:      Amount and/or Complexity of Data Reviewed  Tests in the radiology section of CPT®: ordered and reviewed           DIAGNOSIS  Final diagnoses:   Fall, initial encounter   Left hip pain   Contusion of  left hip, initial encounter       DISPOSITION  DISCHARGE    FOLLOW-UP  Shane Barcenas MD  [address]  [place] [id number] [phone]    Call  If symptoms worsen, As needed         Medication List      Notice     No changes were made to your prescriptions during this visit.            Latest Documented Vital Signs:  As of 6:17 AM  BP- 117/99 HR- 91 Temp- 98 °F (36.7 °C) (Tympanic) O2 sat- 95%    --  Documentation assistance provided by jennie Munguia for Reynold Pelaez PA-C .  Information recorded by the scribe was done at my direction and has been verified and validated by me.       Yoav Munguia  02/03/18 0410       BAR Rodrigues III  02/03/18 1624

## 2018-02-03 NOTE — ED TRIAGE NOTES
Patient was being transferred back to nursing home, while in the process the stretcher tipped and patient states she is hurting on the left side of her buttock.

## 2019-07-21 NOTE — NURSING NOTE
01/04/18 1344   Pressure Ulcer 01/03/18 1352 coccyx unstageable   Date first assessed/Time first assessed: 01/03/18 1352   Present On Admission (Pressure Ulcer): yes;picture taken  Location: coccyx  Stage: unstageable   Dressing Appearance intact;dry;moist drainage   Pressure Ulcer Appearance moist;yellow;slough   Length (Pressure Ulcer) (cm) 3   Width (Pressure Ulcer) (cm) 2   Depth (Pressure Ulcer) (cm) 1   Pressure Ulcer Wound Care cleansed with;sterile normal saline   Dressing Dressing changed;gauze, wet-to-dry;other (see comments)  (saline wet to dry, dry 4x4 gauze, secured with paper tape)   Picture taken yes;On admission   Pressure Ulcer 01/03/18 1353 coccyx Stage III   Date first assessed/Time first assessed: 01/03/18 1353   Present On Admission (Pressure Ulcer): yes;picture taken  Location: coccyx  Stage: Stage III   Dressing Appearance dry;intact   Pressure Ulcer Appearance moist;reddened   Length (Pressure Ulcer) (cm) 4   Width (Pressure Ulcer) (cm) 3   Depth (Pressure Ulcer) (cm) 0.2   Pressure Ulcer Wound Care cleansed with;sterile normal saline   Dressing Dressing changed;gauze, wet-to-dry;other (see comments)  (ns wet to dry, dry 4x4 gauze, secured with paper tape)   Picture taken yes;On admission     Consult received for pressure ulcer to coccyx.  Patient with chronic immobility, bed bound, non-verbal and hx of pressure injuries to sacral area.  She presents again on admission from NH facility with an unstageable PU to coccyx along with a larger stage 3 PU just posteriorly to other.  See photo and measurement documentation.  Cleansed wounds with sterile saline and applied a saline wet to dry dressing.  Unstageable injury with small amount of yellow drainage and yellow slough to wound bed and malodorous.  Stage 3 full thickness PU is without drainage.  Recommended wound care:  1/8 strength Dakins wet to dry dressing changes twice daily.  Discussed with MIGUE Liu.  Patient on NGA currently.  Thank  you for consult.  Please do not hesitate to call with any questions or re consult if needed.    (4) walks frequently

## 2021-01-01 NOTE — PROGRESS NOTES
"DAILY PROGRESS NOTE    Patient Identification:  Name: Lupe Burleson  Age: 61 y.o.  Sex: Female  :  1955  MRN:[id number]    Cheif complaint:  Chief Complaint   Patient presents with   • Vaginal Bleeding     pt with \"excessive bleeding\" and a history of endometrial CA       Subjective:  Patient mildly interactive. Admits to back pain. Is unaware of any vaginal bleeding.     Objective:  Scheduled Meds:    atorvastatin 20 mg Per G Tube Daily   bumetanide 2 mg Per G Tube Daily   clopidogrel 75 mg Oral Daily   insulin aspart 0-9 Units Subcutaneous 4x Daily With Meals & Nightly   isosorbide mononitrate 30 mg Per G Tube Daily   lactulose 20 g Oral Daily   pantoprazole 40 mg Oral Q AM   potassium chloride 20 mEq Per G Tube Daily   risperiDONE 2 mg Oral QAM   risperiDONE 3 mg Oral Nightly   Valproic Acid 250 mg Oral Q12H       Continuous Infusions:    sodium chloride 75 mL/hr Last Rate: 75 mL/hr (17 1029)       PRN Meds:  dextrose  •  dextrose  •  glucagon (human recombinant)  •  HYDROcodone-acetaminophen  •  ipratropium-albuterol  •  LORazepam    Intake/Output:  I/O last 3 completed shifts:  In: 1490 [P.O.:720; I.V.:770]  Out: 0     Exam:  /64 (BP Location: Left arm, Patient Position: Lying)  Pulse 83  Temp 97.3 °F (36.3 °C) (Oral)   Resp 18  Ht 63\" (160 cm)  Wt 258 lb 6.4 oz (117 kg)  SpO2 98%  BMI 45.77 kg/m2    Physical Examination:   General appearance - chronically ill appearing, alert, and in no distress  Chest - clear to auscultation, no wheezes, rales or rhonchi, symmetric air entry  Heart - normal rate, regular rhythm, normal S1, S2, no murmurs, rubs, clicks or gallops  Abdomen - soft, nontender, nondistended,      Data Review:  Recent Results (from the past 36 hour(s))   CBC (No Diff)    Collection Time: 17  6:26 AM   Result Value Ref Range    WBC 6.02 4.50 - 10.70 10*3/mm3    RBC 3.97 3.90 - 5.20 10*6/mm3    Hemoglobin 10.1 (L) 11.9 - 15.5 g/dL    Hematocrit 32.0 (L) 35.6 - " 45.5 %    MCV 80.6 80.5 - 98.2 fL    MCH 25.4 (L) 26.9 - 32.0 pg    MCHC 31.6 (L) 32.4 - 36.3 g/dL    RDW 17.0 (H) 11.7 - 13.0 %    RDW-SD 50.6 37.0 - 54.0 fl    MPV 10.5 6.0 - 12.0 fL    Platelets 279 140 - 500 10*3/mm3   Basic Metabolic Panel    Collection Time: 06/19/17  6:26 AM   Result Value Ref Range    Glucose 97 65 - 99 mg/dL    BUN 13 8 - 23 mg/dL    Creatinine 0.53 (L) 0.57 - 1.00 mg/dL    Sodium 141 136 - 145 mmol/L    Potassium 3.4 (L) 3.5 - 5.2 mmol/L    Chloride 101 98 - 107 mmol/L    CO2 29.6 (H) 22.0 - 29.0 mmol/L    Calcium 9.3 8.6 - 10.5 mg/dL    eGFR  African Amer 141 >60 mL/min/1.73    BUN/Creatinine Ratio 24.5 7.0 - 25.0    Anion Gap 10.4 mmol/L   Protime-INR    Collection Time: 06/19/17  6:26 AM   Result Value Ref Range    Protime 13.7 11.7 - 14.2 Seconds    INR 1.10 0.90 - 1.10   POC Glucose Fingerstick    Collection Time: 06/19/17  7:33 AM   Result Value Ref Range    Glucose 106 70 - 130 mg/dL   POC Glucose Fingerstick    Collection Time: 06/19/17 11:42 AM   Result Value Ref Range    Glucose 92 70 - 130 mg/dL   POC Glucose Fingerstick    Collection Time: 06/19/17  4:04 PM   Result Value Ref Range    Glucose 101 70 - 130 mg/dL   POC Glucose Fingerstick    Collection Time: 06/19/17  9:30 PM   Result Value Ref Range    Glucose 100 70 - 130 mg/dL   POC Glucose Fingerstick    Collection Time: 06/20/17  5:54 AM   Result Value Ref Range    Glucose 89 70 - 130 mg/dL   Basic Metabolic Panel    Collection Time: 06/20/17  5:55 AM   Result Value Ref Range    Glucose 91 65 - 99 mg/dL    BUN 11 8 - 23 mg/dL    Creatinine 0.64 0.57 - 1.00 mg/dL    Sodium 137 136 - 145 mmol/L    Potassium 4.3 3.5 - 5.2 mmol/L    Chloride 100 98 - 107 mmol/L    CO2 27.5 22.0 - 29.0 mmol/L    Calcium 9.0 8.6 - 10.5 mg/dL    eGFR  African Amer 114 >60 mL/min/1.73    BUN/Creatinine Ratio 17.2 7.0 - 25.0    Anion Gap 9.5 mmol/L   CBC Auto Differential    Collection Time: 06/20/17  5:55 AM   Result Value Ref Range    WBC 5.41 4.50  - 10.70 10*3/mm3    RBC 3.73 (L) 3.90 - 5.20 10*6/mm3    Hemoglobin 9.6 (L) 11.9 - 15.5 g/dL    Hematocrit 30.7 (L) 35.6 - 45.5 %    MCV 82.3 80.5 - 98.2 fL    MCH 25.7 (L) 26.9 - 32.0 pg    MCHC 31.3 (L) 32.4 - 36.3 g/dL    RDW 17.0 (H) 11.7 - 13.0 %    RDW-SD 51.4 37.0 - 54.0 fl    MPV 10.7 6.0 - 12.0 fL    Platelets 301 140 - 500 10*3/mm3    Neutrophil % 58.8 42.7 - 76.0 %    Lymphocyte % 25.3 19.6 - 45.3 %    Monocyte % 12.2 (H) 5.0 - 12.0 %    Eosinophil % 3.3 0.3 - 6.2 %    Basophil % 0.4 0.0 - 1.5 %    Immature Grans % 0.0 0.0 - 0.5 %    Neutrophils, Absolute 3.18 1.90 - 8.10 10*3/mm3    Lymphocytes, Absolute 1.37 0.90 - 4.80 10*3/mm3    Monocytes, Absolute 0.66 0.20 - 1.20 10*3/mm3    Eosinophils, Absolute 0.18 0.00 - 0.70 10*3/mm3    Basophils, Absolute 0.02 0.00 - 0.20 10*3/mm3    Immature Grans, Absolute 0.00 0.00 - 0.03 10*3/mm3         Assessment/Plan:  Papillary serous carcinoma of the uterus.  Considering palliative hysterectomy to control vaginal bleeding.   Cardiac consult for pre-operative clearance noted recent eval 5/17)  Co-morbidities- CHF, CAD, DM, obesity, history of respiratory failture  Dr. Washington plans to discuss with State guardian, Helene Pepe MD to follow    VASHTI Sanchez    6/20/2017  8:02 AM     Gynecologic Oncology Attending Physician:  History reviewed with VASHTI Webber.  The patient is more alert and talkative this afternoon.  She states that she had been told in the past that she could not have a hysterectomy due to her weight.  She states that she would be willing to consider a hysterectomy    Physical exam: Abdomen is obese, but soft and nontender.    Agree with assessment and plans as outlined above by VASHTI Webber.    We will discuss potential hysterectomy with her guardian.  Await cardiology evaluation    Electronically signed by:  Ortiz Washington MD 6/20/2017 6:36 PM            (2) cough or sneeze

## 2022-05-27 NOTE — PLAN OF CARE
Problem: Confusion, Acute (Adult)  Goal: Cognitive/Functional Impairments Minimized  Outcome: Ongoing (interventions implemented as appropriate)    Goal: Safety  Outcome: Ongoing (interventions implemented as appropriate)      Problem: Fall Risk (Adult)  Goal: Absence of Falls  Outcome: Ongoing (interventions implemented as appropriate)      Problem: Respiratory Insufficiency (Adult)  Goal: Acid/Base Balance  Outcome: Ongoing (interventions implemented as appropriate)    Goal: Effective Ventilation  Outcome: Ongoing (interventions implemented as appropriate)      Problem: Self-Care Deficit (Adult,Obstetrics,Pediatric)  Goal: Improved Ability to Perform BADL and IADL  Outcome: Ongoing (interventions implemented as appropriate)      Problem: Patient Care Overview (Adult)  Goal: Plan of Care Review  Outcome: Ongoing (interventions implemented as appropriate)   01/06/18 0536   Outcome Evaluation   Outcome Summary/Follow up Plan Has slept long intervals. Denies pain. Minimal verbal response. Dsg to buttock/coccyx changes as ordered.vss. no other changes. No distress   Coping/Psychosocial Response Interventions   Plan Of Care Reviewed With patient   Patient Care Overview   Progress improving     Goal: Adult Individualization and Mutuality  Outcome: Ongoing (interventions implemented as appropriate)    Goal: Discharge Needs Assessment  Outcome: Ongoing (interventions implemented as appropriate)      Problem: Pressure Ulcer (Adult)  Goal: Signs and Symptoms of Listed Potential Problems Will be Absent or Manageable (Pressure Ulcer)  Outcome: Ongoing (interventions implemented as appropriate)

## 2023-07-02 NOTE — PLAN OF CARE
Problem: Patient Care Overview (Adult)  Goal: Plan of Care Review  Outcome: Ongoing (interventions implemented as appropriate)   02/02/18 0540   Outcome Evaluation   Outcome Summary/Follow up Plan Percocet prior to turns but still cries out with repositioning, dressing to coccyx changed , approved for palliative care, may be sent back to NH with [facility]      Goal: Adult Individualization and Mutuality  Outcome: Ongoing (interventions implemented as appropriate)    Goal: Discharge Needs Assessment  Outcome: Ongoing (interventions implemented as appropriate)      Problem: Infection, Risk/Actual (Adult)  Goal: Infection Prevention/Resolution  Outcome: Ongoing (interventions implemented as appropriate)      Problem: Pressure Ulcer (Adult)  Goal: Signs and Symptoms of Listed Potential Problems Will be Absent or Manageable (Pressure Ulcer)  Outcome: Ongoing (interventions implemented as appropriate)         2-3x/week

## 2024-08-02 NOTE — PROGRESS NOTES
Continued Stay Note  [facility]     Patient Name: Lupe Burleson  MRN: [id number]  Today's Date: 9/8/2017    Admit Date: 8/27/2017          Discharge Plan       09/08/17 1342    Case Management/Social Work Plan    Plan [facility]- via ambulance     Patient/Family In Agreement With Plan yes    Additional Comments DC Orders noted, spoke with patients state guardian Helene Winona via outbound call and she requests quick transfer as she needs to assess patient today at [facility]. Also reviewed IMM notice and gave copy in pts room. [facility] arranged ambulance  for 2:30pm , notified Helene via vm, spoke with Flor with [facility] and updated MIGUE Holder. DC summary and prescriptions faxed to [facility]. Packet given to MIGUE. Vidal              Discharge Codes     None        Expected Discharge Date and Time     Expected Discharge Date Expected Discharge Time    Sep 8, 2017             Cherrie Bearden, RN     4 = No assist / stand by assistance

## (undated) DEVICE — SUT VIC 0 TIES 18IN J912G

## (undated) DEVICE — GLV SURG SENSICARE MICRO PF LF 7 STRL

## (undated) DEVICE — NDL HYPO PRECISIONGLIDE REG 25G 1 1/2

## (undated) DEVICE — OBT BLADLES ENDOWRIST DAVINCI/S 8MM

## (undated) DEVICE — SOL NACL 0.9PCT 1000ML

## (undated) DEVICE — SPNG LAP 18X18IN LF STRL PK/5

## (undated) DEVICE — CATH FOL SIMPLYLATEX SILELAST 16F 17IN

## (undated) DEVICE — IRRIGATOR BULB ASEPTO 60CC STRL

## (undated) DEVICE — ENDOPATH XCEL BLADELESS TROCARS WITH STABILITY SLEEVES: Brand: ENDOPATH XCEL

## (undated) DEVICE — NDL SPINE 22G 31/2IN BLK

## (undated) DEVICE — DRAPE,UNDERBUTTOCKS,PCH,STERILE: Brand: MEDLINE

## (undated) DEVICE — 2, DISPOSABLE SUCTION/IRRIGATOR WITH DISPOSABLE TIP: Brand: STRYKEFLOW

## (undated) DEVICE — SOL ANTISTICK CAUTRY ELECTROLUBE LF

## (undated) DEVICE — STPLR SKIN VISISTAT WD 35CT

## (undated) DEVICE — SUT VIC 0 CT1 36IN J946H

## (undated) DEVICE — LOU LITHOTOMY ROBOTIC: Brand: MEDLINE INDUSTRIES, INC.

## (undated) DEVICE — ANTIBACTERIAL UNDYED BRAIDED (POLYGLACTIN 910), SYNTHETIC ABSORBABLE SUTURE: Brand: COATED VICRYL

## (undated) DEVICE — GLV SURG TRIUMPH CLASSIC PF LTX 7.5 STRL

## (undated) DEVICE — DRP ARM DAVINCI

## (undated) DEVICE — PREMIUM WET SKIN PREP TRAY: Brand: MEDLINE INDUSTRIES, INC.

## (undated) DEVICE — Device

## (undated) DEVICE — DEV SUT GRSPR CLOSUR 15CM 14G

## (undated) DEVICE — CANNULA SEAL

## (undated) DEVICE — TIP COVER ACCESSORY

## (undated) DEVICE — DRAPE,REIN 53X77,STERILE: Brand: MEDLINE

## (undated) DEVICE — DRSNG WND BORDR/ADHS NONADHR/GZ LF 4X14IN STRL